# Patient Record
Sex: MALE | Race: WHITE | NOT HISPANIC OR LATINO | Employment: OTHER | URBAN - METROPOLITAN AREA
[De-identification: names, ages, dates, MRNs, and addresses within clinical notes are randomized per-mention and may not be internally consistent; named-entity substitution may affect disease eponyms.]

---

## 2017-01-09 DIAGNOSIS — D66 SEVERE HEMOPHILIA A (H): ICD-10-CM

## 2017-01-09 DIAGNOSIS — M36.2 HEMOPHILIC ARTHROPATHY (H): Primary | ICD-10-CM

## 2017-01-09 DIAGNOSIS — D66 HEMOPHILIC ARTHROPATHY (H): Primary | ICD-10-CM

## 2017-01-09 RX ORDER — CODEINE SULFATE 30 MG/1
60 TABLET ORAL EVERY 6 HOURS PRN
Qty: 168 TABLET | Refills: 0 | Status: SHIPPED | OUTPATIENT
Start: 2017-01-09 | End: 2017-02-06

## 2017-02-03 DIAGNOSIS — F41.1 GENERALIZED ANXIETY DISORDER: Primary | ICD-10-CM

## 2017-02-03 RX ORDER — MIRTAZAPINE 45 MG/1
45 TABLET, FILM COATED ORAL AT BEDTIME
Qty: 30 TABLET | Refills: 1 | Status: SHIPPED | OUTPATIENT
Start: 2017-02-03 | End: 2017-03-07

## 2017-02-03 NOTE — TELEPHONE ENCOUNTER
Medication Requested: Mirtazapine 45 mg tabs  Last Written RX Date: 9-8-16  Last Pharmacy Fill Date: 1-12-17  Last RX Quantity: 30,   # refills: 5    Last Office Visit: 9-8-16  Recommended RTC: 6 mo  Next Scheduled Office Visit: 3-7-17    Since last Visit: # of CX 0 ,# of NOS 0    Last Visit Recommendations for:  Medications: continue  Labs: 0    Will refill 30 day supply per protocol with 1 additional refill.    Kathleen M Doege RN

## 2017-02-06 DIAGNOSIS — D66 HEMOPHILIC ARTHROPATHY (H): Primary | ICD-10-CM

## 2017-02-06 DIAGNOSIS — D66 SEVERE HEMOPHILIA A (H): Primary | ICD-10-CM

## 2017-02-06 DIAGNOSIS — M36.2 HEMOPHILIC ARTHROPATHY (H): Primary | ICD-10-CM

## 2017-02-06 RX ORDER — CODEINE SULFATE 30 MG/1
60 TABLET ORAL EVERY 6 HOURS PRN
Qty: 168 TABLET | Refills: 0 | Status: SHIPPED | OUTPATIENT
Start: 2017-02-06 | End: 2017-03-13

## 2017-02-09 ENCOUNTER — DOCUMENTATION ONLY (OUTPATIENT)
Dept: HEMATOLOGY | Facility: CLINIC | Age: 67
End: 2017-02-09

## 2017-02-09 NOTE — PROGRESS NOTES
PA approved.  Effective dates: 01/02/2017 to 02/08/18  PA reference #: [id number]  Pt/pharmacy notified: 02/09/17    PA for quantity limit submitted over the phone, ins allows 6 tabs per day, patient using up to 8 tabs per day. 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

## 2017-03-02 DIAGNOSIS — I10 ESSENTIAL HYPERTENSION WITH GOAL BLOOD PRESSURE LESS THAN 140/90: ICD-10-CM

## 2017-03-02 DIAGNOSIS — D66 SEVERE HEMOPHILIA A (H): ICD-10-CM

## 2017-03-02 RX ORDER — AMLODIPINE BESYLATE 5 MG/1
5 TABLET ORAL DAILY
Qty: 90 TABLET | Refills: 1 | Status: SHIPPED | OUTPATIENT
Start: 2017-03-02 | End: 2017-08-31

## 2017-03-02 NOTE — TELEPHONE ENCOUNTER
amLODIPine (NORVASC) 5 MG tablet      Last Written Prescription Date: 9/7/16  Last Fill Quantity: 90, # refills: 1    Last Office Visit with [facility], [facility] or [facility] prescribing provider:  9/7/16   Future Office Visit:        BP Readings from Last 3 Encounters:   12/20/16 124/88   09/07/16 (!) 122/94   02/09/16 (!) 161/104

## 2017-03-02 NOTE — TELEPHONE ENCOUNTER
HTN addressed 9/7/2016    Prescription approved per [facility] Refill Protocol.    Signed Prescriptions:                        Disp   Refills    amLODIPine (NORVASC) 5 MG tablet           90 tab*1        Sig: Take 1 tablet (5 mg) by mouth daily  Authorizing Provider: JUDY RANGEL  Ordering User: DOUGIE GIBBS      Closing encounter - no further actions needed at this time    Dougie Gibbs RN

## 2017-03-07 ENCOUNTER — OFFICE VISIT (OUTPATIENT)
Dept: PSYCHIATRY | Facility: CLINIC | Age: 67
End: 2017-03-07
Attending: CLINICAL NURSE SPECIALIST
Payer: MEDICARE

## 2017-03-07 VITALS
BODY MASS INDEX: 24.38 KG/M2 | HEART RATE: 75 BPM | SYSTOLIC BLOOD PRESSURE: 128 MMHG | DIASTOLIC BLOOD PRESSURE: 82 MMHG | WEIGHT: 174.8 LBS

## 2017-03-07 DIAGNOSIS — F32.A DEPRESSIVE DISORDER: ICD-10-CM

## 2017-03-07 DIAGNOSIS — F41.1 GENERALIZED ANXIETY DISORDER: ICD-10-CM

## 2017-03-07 PROCEDURE — 99212 OFFICE O/P EST SF 10 MIN: CPT | Mod: ZF

## 2017-03-07 RX ORDER — MIRTAZAPINE 45 MG/1
45 TABLET, FILM COATED ORAL AT BEDTIME
Qty: 30 TABLET | Refills: 5 | Status: SHIPPED | OUTPATIENT
Start: 2017-03-07 | End: 2017-08-10

## 2017-03-07 RX ORDER — DIAZEPAM 5 MG
TABLET ORAL
Qty: 30 TABLET | Refills: 5
Start: 2017-03-07 | End: 2017-09-12

## 2017-03-07 NOTE — TELEPHONE ENCOUNTER
Message  Received: Today       Kaylin Rajan APRN CNS Blake, Katherine J, IRON                     Please phone in diazepam.  Looks like the PA might need to be renewed.

## 2017-03-07 NOTE — PROGRESS NOTES
"  Outpatient Psychiatry Progress Note     Provider: JELLY Chapa CNS  Date: 3/7/2017  Service:  Medication follow up with counseling.   Patient Identification: Orlin Alcantar  : 1950   MRN: [id number]    Orlin Alcantar is a 67 year old year old male who presents for ongoing psychiatric care.  Orlin Alcantar was last seen in clinic on 16.   At that time,   Assessment & Plan      Orlin Alcantar is seen today for follow up and reports his mood has been much better since he has moved to an apartment that is both less expensive and more accessible for his disabilities. He would like to continue current medications. Is using Diazepam less frequently.     Diagnosis  Axis 1: Depression, Anxiety  Axis 2: none  Axis 3: See problem list in the medical record     Plan:  Medication: Continue current medications  OTC Recommendations: none  Lab Orders: none  Referrals: none  Release of Information: none  Future Treatment Considerations:per symptoms  Return for Follow Up:6 months      3/7/2017  Today Orlin reports he takes one table of Diazepam x1-2/week.. He had a kidney bleed last week (not diagnosed, but had bleeding in urine at home) and was worried that it might be mirtazapine so has been off for a couple weeks. He has been having some problems sleeping since off it .  His mood has been very good and he is not sure that if he wants to go back on Mirtazapine as he feels \"best I feel in 3 years\"  but will think about it.    Finances are still tentative.  He is still in his apartment from last  and this going well. Still has the same car which seems to be be holding up. Pt is also concerned financial effects on his medical condition; his insurance is currently covered by hemophilia [employer], but it will  x1 year in 2017.      Side effects of medication include: none    Psychiatric Review of Systems:  The patient endorses symptoms of depression: In the last 2 weeks per PHQ 9, several days: " depressed mood, worthelessness  He  patient endorses symptoms of anxiety : stable  He endorses symptoms of sis including none.    He endorses symptoms of psychosis including no psychotic symptoms.       Review of Medical Systems:  Sleep: takes about 1/2 hour to fall asleep since off Mirtazapine  Energy: stable  Concentration: stable  Appetite: stable  GI Concerns: none  Cardiac concerns: none  Neurological concerns: none  Other medical concerns: possible hematuria due to Mirtazapine?    Current Substance Use:  Alcohol:denies  Other drugs:denies  Caffeine:occasional  Nicotine: none    Past Medical History:   Past Medical History   Diagnosis Date     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      Hemophilia (H)      Type A     Hepatitis C, chronic (H)      treated [facility] interferon/ribavirin     Patient Active Problem List   Diagnosis     History of total hip arthroplasty     Hemophilia (H)     Pain in joint, pelvic region and thigh     Anxiety state     Depressive disorder, not elsewhere classified     Adjustment disorder with anxious mood     ACP (advance care planning)     Hypertension goal BP (blood pressure) < 140/90     Mood disorder in conditions classified elsewhere       Allergies:   Allergies   Allergen Reactions     Hemophilia Support Therapy      Please see hemophilia treatment recommendations from progress note 10/14/2014-patient does not have VWD type 2         Aspirin      This drug inhibits platelets and is contraindicated due to hemophilia diagnosis.             Current Medications     Current Outpatient Prescriptions Ordered in [employer]   Medication Sig Dispense Refill     factor VIII, recomb, (HELIXATE FS) 1000 UNITS KIT Infuse 2000 to 3000 units of Helixate 2-3 times weekly and as needed for bleeding episodes 23004 Units 3     amLODIPine (NORVASC) 5 MG tablet Take 1 tablet (5 mg) by mouth daily 90 tablet 1     codeine 30 MG tablet Take 2 tablets (60 mg) by mouth every 6 hours as needed for moderate to severe pain (up  "to 8 tabs in 24 hours) 168 tablet 0     mirtazapine (REMERON) 45 MG tablet Take 1 tablet (45 mg) by mouth At Bedtime 30 tablet 1     diazepam (VALIUM) 5 MG tablet Take up to one tablet by mouth daily as needed for anxiety 30 tablet 5     lisinopril (PRINIVIL,ZESTRIL) 40 MG tablet Take 1 tablet (40 mg) by mouth daily 90 tablet 1     No current Epic-ordered facility-administered medications on file.         Mental Status Exam     Appearance:  Casually dressed and Adequately groomed  Behavior/relationship to examiner/demeanor:  Cooperative, Engaged and Pleasant  Orientation: Oriented to person, place, time and situation  Psychomotor: normal form  Speech Rate:  Normal  Speech Spontaneity:  Normal  Mood:  \"okay\"  Affect:  Appropriate/mood-congruent  Thought Process (Associations):  Logical and Goal directed  Thought Content:  no overt psychosis, denies suicidal ideation, intent or thoughts and patient does not appear to be responding to internal stimuli  Abnormal Perception:  None  Attention/Concentration:  Normal  Language:  Intact  Insight:  Good  Judgment:  Good      Results     Vital signs: /82  Pulse 75  Wt 79.3 kg (174 lb 12.8 oz)  BMI 24.38 kg/m2    Laboratory Data:  no new data reviewed today    Assessment & Plan      Orlin Alcantar is seen today for follow up and reports not taking Remeron for couple weeks after hematuria.  Pt reports his anxiety is fairly well controlled and his mood is stable, but having some initial insomnia.  Pt is using Valium x1-2/week. Pt is unsure if he wants to restart his medication at this time, but will consider and wants refill of the medications.    Diagnosis  Axis 1: Depression, Anxiety  Axis 2: none  Axis 3: See problem list in the medical record    Plan:  Medication: Will reorder Mirtazapine and Valium.    OTC Recommendations: none  Lab Orders:  none  Referrals: none  Release of Information: per symptoms  Future Treatment Considerations:per symptoms  Return for Follow Up:6 " months   The risks, benefits, alternatives and side effects have been discussed and are understood by the patient. The patient understands the risks of using street drugs or alcohol. There are no medical contraindications, the patient agrees to treatment, and has the capacity to do so. The patient understands to call 911 or come to the nearest ED if life threatening or urgent symptoms present.  Over 50% of this time was spent counseling the patient and/or coordinating care regarding review of social and occupational functioning.  In addition patient was counseled on health and wellness practices to augment medication treatment of symptoms. See note for details.    Nelida Flores, Psychiatric/Mental Health Nurse Practitioner Student, 3/7/2017      Kaylin Rajan, JELLY CNS 3/7/2017

## 2017-03-07 NOTE — NURSING NOTE
Chief Complaint   Patient presents with     RECHECK     Generalized anxiety disorder      Reviewed allergies, smoking status, and pharmacy preference  Administered abuse screening questions   Obtained weight, blood pressure and heart rate   Shanti Pittman LPN

## 2017-03-07 NOTE — MR AVS SNAPSHOT
After Visit Summary   3/7/2017    Orlin Alcantar    MRN: [id number]           Patient Information     Date Of Birth          1950        Visit Information        Provider Department      3/7/2017 10:15 AM Kaylin Rajan APRN CNS Psychiatry [facility]        Today's Diagnoses     Generalized anxiety disorder        Depressive disorder           Follow-ups after your visit        Follow-up notes from your care team     Return in about 6 months (around 9/7/2017).      Your next 10 appointments already scheduled     Mar 21, 2017 12:30 PM CDT   (Arrive by 12:15 PM)   COMPREHENSIVE CLINIC VISIT with Jorge Jiang MD   [facility] ([facility])    [address]  [address] [facility] [id number] [phone]            Sep 12, 2017 10:15 AM CDT   Adult Med Follow UP with JELLY Chapa CNS   Psychiatry Clinic ([facility])    [place] [id number] [address] [phone]              Who to contact     Please call your clinic at [phone] to:    Ask questions about your health    Make or cancel appointments    Discuss your medicines    Learn about your test results    Speak to your doctor   If you have compliments or concerns about an experience at your clinic, or if you wish to file a complaint, please contact [facility] Patient Relations at [phone] or email us at [email or web address]         Additional Information About Your Visit        MyChart Information     [employer] is an electronic gateway that provides easy, online access to your medical records. With [employer], you can request a clinic appointment, read your test results, renew a prescription or communicate with your care team.     To sign up for [employer] visit the website at [email or web address]   You will be asked to enter the access code listed below, as well  as some personal information. Please follow the directions to create your username and password.     Your access code is: [email or web address]  Expires: 2017  7:30 AM     Your access code will  in 90 days. If you need help or a new code, please contact your [facility] or call [phone] for assistance.        Care EveryWhere ID     This is your Care EveryWhere ID. This could be used by other organizations to access your [place] medical records  [id number]        Your Vitals Were     Pulse BMI (Body Mass Index)                75 24.38 kg/m2           Blood Pressure from Last 3 Encounters:   17 128/82   16 124/88   16 131/87    Weight from Last 3 Encounters:   17 79.3 kg (174 lb 12.8 oz)   16 78 kg (172 lb)   16 77.6 kg (171 lb)              Today, you had the following     No orders found for display         Where to get your medicines      These medications were sent to [place], [place] - [address] [facility] [address] [address]    Hours:  TRANSPLANT PHONE NUMBER [phone] Phone:  [phone]     mirtazapine 45 MG tablet         Some of these will need a paper prescription and others can be bought over the counter.  Ask your nurse if you have questions.     You don't need a prescription for these medications     diazepam 5 MG tablet          Primary Care Provider Office Phone # Fax #    Katie Ramos [phone]       [facility] [address] [facility] [address]        Thank you!     Thank you for choosing PSYCHIATRY CLINIC  for your care. Our goal is always to provide you with excellent care. Hearing back from our patients is one way we can continue to improve our services. Please take a few minutes to complete the written survey that you may receive in the mail after your visit with us. Thank you!             Your Updated  Medication List - Protect others around you: Learn how to safely use, store and throw away your medicines at www.disposemymeds.org.          This list is accurate as of: 3/7/17 11:59 PM.  Always use your most recent med list.                   Brand Name Dispense Instructions for use    amLODIPine 5 MG tablet    NORVASC    90 tablet    Take 1 tablet (5 mg) by mouth daily       codeine 30 MG tablet     168 tablet    Take 2 tablets (60 mg) by mouth every 6 hours as needed for moderate to severe pain (up to 8 tabs in 24 hours)       diazepam 5 MG tablet    VALIUM    30 tablet    Take up to one tablet by mouth daily as needed for anxiety       factor VIII (recomb) 1000 UNITS Kit     89656 Units    Infuse 2000 to 3000 units of Helixate 2-3 times weekly and as needed for bleeding episodes       lisinopril 40 MG tablet    PRINIVIL/ZESTRIL    90 tablet    Take 1 tablet (40 mg) by mouth daily       mirtazapine 45 MG tablet    REMERON    30 tablet    Take 1 tablet (45 mg) by mouth At Bedtime

## 2017-03-07 NOTE — TELEPHONE ENCOUNTER
Returned call to [facility] to confirm the order for Valium. Confirmed with Hannah the Valium 5 mg order.

## 2017-03-07 NOTE — TELEPHONE ENCOUNTER
-Contacted [facility] (ph: [phone])  -No answer  -Left msg on pharmacy vm with refill information for Valium as outlined in med tab  -Asked that if PA is needed that information be faxed to us at [phone]

## 2017-03-07 NOTE — TELEPHONE ENCOUNTER
Rosi Pradhan, Rosi Morelos, RN        Phone Number: [phone]                       Previous Messages       ----- Message -----      From: Mely Geiger      Sent: 3/7/2017  12:18 PM        To: Isadora Sood, RN   Subject: Pharmacy Call                                     Hi Hannah Schmitt from the [facility] (on [address]) is calling to confirm the order for Valium.  Please call Hannah or Viola at [phone] to confirm.     ThanksMely

## 2017-03-13 DIAGNOSIS — M36.2 HEMOPHILIC ARTHROPATHY (H): ICD-10-CM

## 2017-03-13 DIAGNOSIS — D66 HEMOPHILIC ARTHROPATHY (H): ICD-10-CM

## 2017-03-13 RX ORDER — CODEINE SULFATE 30 MG/1
60 TABLET ORAL EVERY 6 HOURS PRN
Qty: 168 TABLET | Refills: 0 | Status: SHIPPED | OUTPATIENT
Start: 2017-03-13 | End: 2017-04-24

## 2017-03-15 DIAGNOSIS — M25.571 CHRONIC PAIN OF BOTH ANKLES: ICD-10-CM

## 2017-03-15 DIAGNOSIS — M25.669 DECREASED RANGE OF KNEE MOVEMENT: ICD-10-CM

## 2017-03-15 DIAGNOSIS — M25.551 HIP PAIN, RIGHT: ICD-10-CM

## 2017-03-15 DIAGNOSIS — M25.572 CHRONIC PAIN OF BOTH ANKLES: ICD-10-CM

## 2017-03-15 DIAGNOSIS — D66 SEVERE HEMOPHILIA A (H): Primary | ICD-10-CM

## 2017-03-15 DIAGNOSIS — M25.522 PAIN OF BOTH ELBOWS: ICD-10-CM

## 2017-03-15 DIAGNOSIS — M25.629 DECREASED RANGE OF MOTION OF ELBOW, UNSPECIFIED LATERALITY: ICD-10-CM

## 2017-03-15 DIAGNOSIS — G89.29 CHRONIC PAIN OF BOTH ANKLES: ICD-10-CM

## 2017-03-15 DIAGNOSIS — M25.673 DECREASED RANGE OF MOTION OF ANKLE: ICD-10-CM

## 2017-03-15 DIAGNOSIS — M25.521 PAIN OF BOTH ELBOWS: ICD-10-CM

## 2017-03-21 ENCOUNTER — HOSPITAL ENCOUNTER (OUTPATIENT)
Dept: PHYSICAL THERAPY | Facility: CLINIC | Age: 67
Setting detail: THERAPIES SERIES
End: 2017-03-21
Attending: INTERNAL MEDICINE
Payer: MEDICARE

## 2017-03-21 ENCOUNTER — OFFICE VISIT (OUTPATIENT)
Dept: HEMATOLOGY | Facility: CLINIC | Age: 67
End: 2017-03-21
Attending: INTERNAL MEDICINE
Payer: COMMERCIAL

## 2017-03-21 ENCOUNTER — OFFICE VISIT (OUTPATIENT)
Dept: HEMATOLOGY | Facility: CLINIC | Age: 67
End: 2017-03-21

## 2017-03-21 ENCOUNTER — DOCUMENTATION ONLY (OUTPATIENT)
Dept: PHARMACY | Facility: CLINIC | Age: 67
End: 2017-03-21

## 2017-03-21 VITALS
WEIGHT: 178.9 LBS | HEIGHT: 72 IN | BODY MASS INDEX: 24.23 KG/M2 | DIASTOLIC BLOOD PRESSURE: 95 MMHG | HEART RATE: 86 BPM | SYSTOLIC BLOOD PRESSURE: 138 MMHG | TEMPERATURE: 97.8 F

## 2017-03-21 DIAGNOSIS — D66 SEVERE HEMOPHILIA A (H): Primary | ICD-10-CM

## 2017-03-21 DIAGNOSIS — M36.3 ARTHROPATHY ASSOCIATED WITH A HEMATOLOGICAL DISORDER: ICD-10-CM

## 2017-03-21 DIAGNOSIS — D75.9 ARTHROPATHY ASSOCIATED WITH A HEMATOLOGICAL DISORDER: ICD-10-CM

## 2017-03-21 DIAGNOSIS — Z71.9 ENCOUNTER FOR COUNSELING: Primary | ICD-10-CM

## 2017-03-21 PROCEDURE — 99213 OFFICE O/P EST LOW 20 MIN: CPT

## 2017-03-21 PROCEDURE — 97535 SELF CARE MNGMENT TRAINING: CPT | Mod: GP | Performed by: PHYSICAL THERAPIST

## 2017-03-21 PROCEDURE — 40000176 ZZH STATISTIC PT HEMOPHILIA CLINIC VISIT: Performed by: PHYSICAL THERAPIST

## 2017-03-21 PROCEDURE — 40000183 ZZH STATISTIC PT MED CONFERENCE < 30 MIN: Performed by: PHYSICAL THERAPIST

## 2017-03-21 PROCEDURE — G8979 MOBILITY GOAL STATUS: HCPCS | Mod: GP,CJ | Performed by: PHYSICAL THERAPIST

## 2017-03-21 PROCEDURE — G8980 MOBILITY D/C STATUS: HCPCS | Mod: GP,CJ | Performed by: PHYSICAL THERAPIST

## 2017-03-21 PROCEDURE — 97161 PT EVAL LOW COMPLEX 20 MIN: CPT | Mod: GP | Performed by: PHYSICAL THERAPIST

## 2017-03-21 PROCEDURE — 99215 OFFICE O/P EST HI 40 MIN: CPT | Performed by: INTERNAL MEDICINE

## 2017-03-21 PROCEDURE — G8978 MOBILITY CURRENT STATUS: HCPCS | Mod: GP,CJ | Performed by: PHYSICAL THERAPIST

## 2017-03-21 ASSESSMENT — PAIN SCALES - GENERAL: PAINLEVEL: NO PAIN (0)

## 2017-03-21 NOTE — PROGRESS NOTES
Teaching Flowsheet   Orlin Alcantar  has a diagnosis of HEMOPHILIA A  with a baseline factor  FACTOR VIII  of   <1% . He  presents today for his  comprehensive Hemophilia clinic evaluation.  Teaching Topic: hemophilia self care     Person(s) involved in teaching:   Patient     Motivation Level:  Asks Questions: Yes Eager to Learn: Yes Cooperative: Yes  Receptive (willing/able to accept information): Yes  Comments: very pleasant and engaged     Patient demonstrates understanding of the following:  Reason for the appointment, diagnosis and treatment plan: Yes  Knowledge of proper use of medications and conditions for which they are ordered (with special attention to potential side effects or drug interactions): Yes  Which situations necessitate calling provider and whom to contact: Yes      Proper use and care of   (medical equip, care aids, etc.): NA  Nutritional needs and diet plan: NA  Pain management techniques: Yes  Wound Care: NA  How and/when to access community resources: Yes      Orlin Alcantar   averages 0-1  Bleeding Episodes per Month . Annual Bleed Rate (ABR)= ~ 6.  He uses Helixate brand factor VIII concentrate and treats prophylactically.  He treats himself using peripheral venipuncture independently and his typical dose   is 8862-4044 units. He is not on a consistent schedule and reports treating every other day, but sometimes daily.  He was encouraged to keep track of his infusions.      In the last 6 months, he reports one episode of urinary track bleeding and 1 bleed into right elbow and 1 bleed into left ankle.     Reviewed procedure for home infusions of factor concentrates.  See attached treatment recommendations.      Reviewed severe/life threatening hemorrhage and handout given that recommends appropriate dosing.  If suspect bleeding in head, neck, throat or abdomen, give dose of factor if able, contact the hemophilia center immediately or report to the Emergency Room at [facility] or the nearest emergency room.      Discussed importance of Medic Alert identification.  Social work will assist patient in getting a medic alert.     Discussed need for dental check-up.  He has not seen a dentist since the 1970s,  but has no dental problems and brushes regularly.     Patient does receive information from [facility].    Margot Reed, RN - Nurse Clinician - [facility] - [phone]

## 2017-03-21 NOTE — NURSING NOTE
Reviewed and discussed the patient instructions point by point and patient verbalized understanding. Copy of the After Visit Summary (AVS) given to patient for future reference. Patient given the contact card for the [facility] and has the appropriate numbers to call with any questions.    Margot Reed RN - Nurse Clinician - [facility] - [phone]

## 2017-03-21 NOTE — PROGRESS NOTES
[facility]  Outpatient Clinic Visit    Orlin Alcantar is a 67-year-old male with severe hemophilia A who is seen today for annual Comprehensive Hemophilia Clinic evaluation.  Since we last saw him a year ago, he has been doing well.  He has not made any significant changes in his factor regimen.  After many years of treating on demand, he has been on a more regular prophylaxis schedule over the last 2-3 years, which he acknowledges has been beneficial for him.  He is not on a rigid schedule, but generally treats fairly consistently.  Based on dispensing records, he is using approximately 12,000 units per week.  There are some months where his usage has been closer to 15,000 units per week, although he states that he has been continuing to treat the same as he always has.  He has had  3 bleeding episodes in the last 6 months.  This is a bit less than he had in the previous 6 months.  Although he has hemophiliac arthropathy involving multiple joints, he says that his joint function is currently stable and he has no specific limitations related to that.  He has continued to remain quite active in spite of his arthropathy.       He has undergone several orthopedic procedures including a right hip arthroplasty in 1992 with revision in 2012.  He underwent a left total knee arthroplasty in 1992.        He continues to use codeine tablets (30 mg), 2 in the morning, 2 at bedtime and 1-2 tablets as needed during the day.  This has been a consistent regimen now for a number of years.       Other than hypertension, his overall health has been good.  He has regular followup in primary care clinic for that issue.       PHYSICAL EXAMINATION:  He looks well.  I did not perform a detailed physical exam today.       LABORATORY DATA:  No new labs were obtained here today.         ASSESSMENT AND PLAN:   1.  Severe hemophilia A with a baseline factor VIII level of less than 1%.  He has no history of  factor VIII inhibitor.   2.  Hemophiliac arthropathy involving multiple joints.      I spent a total of 40 minutes today with Orlin, all of which was in counseling and coordination of care.  We had a detailed discussion regarding factor prophylaxis and he was particularly interested in some of the newer treatments coming down the pipeline.  After many years of treating on demand, he is now treating on a fairly consistent prophylaxis schedule and he has noticed a significant decrease in his bleed frequency and improvement in his overall function.  His joint pain remains stable on the codeine regimen outlined above which has not changed in several years.       Overall, his factor usage seems stable and when assessed on a weekly basis is in line with standard prophylactic regimens.  He sometimes treats in a bit of an atypical fashion, but overall his factor utilization is appropriate and his response to this regimen has been acceptable and so we decided to make no changes in that today.  He prefers to receive 1000-unit vials, as he sometimes uses small doses more frequently because he feels that works better for him.  We will continue to monitor his factor dispensing and will continue to work with him to be sure he remains consistent in this regard.      Lastly, we did discuss that Helixate, which is the factor VIII product he has been using for quite some time, will be discontinued at the end of this year.  We discussed options of transitioning to another factor product such as Kogenate, which is essentially the same product (same , different distributor).  There are other options as well.  He is aware of this and had no further questions today but will continue to work with our pharmacy team in that regard.     We will see him back in 12 months, or sooner if there are new issues / concerns.      Jorge Jiang MD  Associate Professor of Medicine  Division of Hematology, Oncology, and  Transplantation  Director, [facility]

## 2017-03-21 NOTE — MR AVS SNAPSHOT
"              After Visit Summary   3/21/2017    Orlin Alcantar    MRN: [id number]           Patient Information     Date Of Birth          1950        Visit Information        Provider Department      3/21/2017 12:30 PM Jorge Jiang MD [facility]        Care Instructions    Factor Plan:    Continue taking Helixate factor concentrate at 8285-8926 units per dose.  Please try to limit to no more than 12,000 units per week.     We discussed that Helixate will no longer be available by the end of 2017.  Kogenate is an option which is very similar.    Please call with any breakthrough bleeding to discuss your plan of care.    If you have a severe blow to the head, please give 4000 units to boost your factor level to 100%.  Please go the the Emergency Room for evaluation and for head CT scan.    Please keep infusion records if possible.    Carry factor concentrate with you at all times. Call the center if you will be traveling out of the area. We will create a travel letter and letter with instructions on emergency care in hemophilia. For treatment centers around the world go to [email or web address], click on left link \"resources\" and then click on \"passport\" and type in travel destination.    PHYSICAL THERAPY INSTRUCTIONS:  Treatment of New Joint and/or Muscle Bleeds:     Treat with factor per doctor s orders.    Apply RICE treatment as needed for pain relief and to allow rest and healing   o R=Rest, Limit movement and protect your joint with splints, braces and assistive devices as directed by your treatment center.  Use crutches and do not put any weight on a new lower extremity bleed (ex: hip, knee, ankle).   Only move in pain-free range.  o I=Ice, Apply ice to surround the affected area for 15-20 minutes every 2 hours.  Use ice cubes in bag, frozen vegetables, gel ice packs, or commercial products (Cryocuff ).  o C=Compression, Helps to control swelling and can decrease pain. Can use elastic " wraps or compression sleeve such as tubigrip.  Avoid tourniquet effect by using proper technique. Remove if pain increases or with any numbness or tingling.   o E=Elevate, Can help decrease swelling and encourages rest.  Most effective with elevation above level of the heart.  Joint Health: Bleed prevention and regular activity are key elements to maintaining health joints. Limit activities that cause joint pain and spread our activities/exercises throughout the day and the week, alternate exercises each day.    Footwear: Always wear supportive footwear-shoes with laces and good ankle support are best.  If you experience frequent foot/ankle pain, try wearing shoes indoors as well as outdoors.    Cardiovascular activity recommendations (18 years and over): For health benefits, adults need at least 150 minutes (about 30 minutes, 5 days a week) of moderate intensity aerobic activity each week AND 2 or more days of muscle strengthening to include all major muscle groups.  Also include a general stretching program into your daily routine.  Try breaking up your aerobic activity into small chunks of time during the day,  10 minutes at a time.        General Recommendations:    Wear medic alert on your person for highest level of safety [email or web address]    Continue to brush your teeth regularly.  Ideally, we could get you into see a dentist. Let us know if/when you are interested in pursuing this and we can get you scheduled.   Since you have had a joint replacements, we may recommend antibiotics (by mouth) just prior to dental cleaning.    See primary care provider for general health maintenance.    For any questions, please contact your [facility] nurse, Margot Reed RN at [phone] or the main phone number [phone].    If you have emergency needs in the evening or weekend, please contact the page  [phone] and page the hematologist on call or Dr. Jorge Jiang.    Please return for comprehensive clinic in  1 year.      Margot Reed RN - Nurse Clinician - [facility] - [phone]      Emergencies in Hemophilia  ? Remember:  with all hemophilia emergencies, treat with factor concentrate first         and seek medical evaluation immediately. (4000 units should bring your level up to 100% from baseline.)  ? Note: Any severe physical trauma should be evaluated immediately even if no symptoms are present.  ?   Head Injury / Bleeding  ? All head injuries are considered to be serious!  Please seek medical evaluation immediately.  ? Signs:  headache, drowsiness, dizziness, irritability, lethargy, nausea and/or vomiting, seizures, dilated or unequal pupils,  stiff neck and/or back, double vision, confusion    Throat / Neck Bleeding  ? Bruising in the mouth or under the tongue can spread down the tissues of the neck and   block the airway (this can happen after dental work if not adequately treated with factor/Amicar)  ? Tonsillitis or strep throat often can cause throat bleeding.  ? Severe coughing spells can cause bleeding.  ? Injury to the neck area can cause bleeding which can result in a blocked airway.  ? All swelling in this area should be considered to be the result of bleeding unless proven otherwise.    Abdominal Bleeding  ? Any complaint of abdominal pain should be investigated by a doctor.  ? Vomiting blood may be a result of abdominal bleeding.  ? Bleeding from the rectum or black, tarry stools may be a result of abdominal bleeding.  ? Large amounts of blood can be lost in this area with little or no swelling.    Iliopsoas Muscle Bleed (Thigh/groin/hip area)  ? Signs: * Hip flexion (difficult/painful to straighten leg) * Groin pain   * Numbness/tingling in quadriceps muscle * Lower back/flank pain       (front of the thigh)  ? Large amounts of blood can be lost in this muscle group with little or no swelling.    Compartment Syndrome  ? An increase in pressure within the muscle  which can cut off the circulation to the area.  ? Numbness/tingling to the affected area or below (such as fingers or toes)  ? Usually happens 12 hours to 3 days after the injury  ? Surgery may be required to relieve pressure on the blood vessels and nerves.    The following types of bleeds aren t always emergencies, but left untreated or treated incorrectly can lead to emergencies:    Mouth / Nose Bleeding  ? Minor mouth bleeding that continues for several days can become serious.  Slow, constant loss         of blood could quickly lead to severe anemia and require blood transfusion  ? Coughing up or vomiting blood may be a result of continued swallowing of blood.  ? Bleeding from the rectum or black, tarry stools may be from continued swallowing of blood.      Hematuria (Blood in urine)  ? Amicar /Lysteda should not be used in patients with hematuria, to avoid risk of clotting in the kidneys.  ? In most instances, pushing fluids and decreased activity or bedrest are the only treatments necessary.  ? Seek medical attention if hematuria is associated with significant pain, urine is bright red and recurs frequently, or if bleeding continues following factor infusion.            Follow-ups after your visit        Your next 10 appointments already scheduled     Sep 12, 2017 10:15 AM CDT   Adult Med Follow UP with JELLY Chapa CNS   Psychiatry Clinic ([facility])    [address] [facility] [address] [phone]              Who to contact     If you have questions or need follow up information about today's clinic visit or your schedule please contact [facility] directly at [phone].  Normal or non-critical lab and imaging results will be communicated to you by MyChart, letter or phone within 4 business days after the clinic has received the results. If you do not hear from us within 7 days, please contact the clinic  "through [employer] or phone. If you have a critical or abnormal lab result, we will notify you by phone as soon as possible.  Submit refill requests through [employer] or call your pharmacy and they will forward the refill request to us. Please allow 3 business days for your refill to be completed.          Additional Information About Your Visit        [employer] Information     [employer] lets you send messages to your doctor, view your test results, renew your prescriptions, schedule appointments and more. To sign up, go to [email or web address] . Click on \"Log in\" on the left side of the screen, which will take you to the Welcome page. Then click on \"Sign up Now\" on the right side of the page.     You will be asked to enter the access code listed below, as well as some personal information. Please follow the directions to create your username and password.     Your access code is: [email or web address]  Expires: 2017  7:30 AM     Your access code will  in 90 days. If you need help or a new code, please call your [place] clinic or [phone].        Care EveryWhere ID     This is your Care EveryWhere ID. This could be used by other organizations to access your [place] medical records  [id number]        Your Vitals Were     Pulse Temperature Height BMI (Body Mass Index)          86 97.8  F (36.6  C) (Oral) 1.829 m (6') 24.26 kg/m2         Blood Pressure from Last 3 Encounters:   17 (!) 138/95   16 124/88   16 (!) 122/94    Weight from Last 3 Encounters:   17 81.1 kg (178 lb 14.4 oz)   16 77.6 kg (171 lb)   16 72.3 kg (159 lb 8 oz)              Today, you had the following     No orders found for display       Primary Care Provider Office Phone # Fax #    Katie Ramos [phone]       [facility] [address]  [facility] [address]        Thank you!     Thank you for choosing  [facility]  for your care. Our goal is always to " provide you with excellent care. Hearing back from our patients is one way we can continue to improve our services. Please take a few minutes to complete the written survey that you may receive in the mail after your visit with us. Thank you!             Your Updated Medication List - Protect others around you: Learn how to safely use, store and throw away your medicines at www.disposemymeds.org.          This list is accurate as of: 3/21/17  1:52 PM.  Always use your most recent med list.                   Brand Name Dispense Instructions for use    amLODIPine 5 MG tablet    NORVASC    90 tablet    Take 1 tablet (5 mg) by mouth daily       codeine 30 MG tablet     168 tablet    Take 2 tablets (60 mg) by mouth every 6 hours as needed for moderate to severe pain (up to 8 tabs in 24 hours)       diazepam 5 MG tablet    VALIUM    30 tablet    Take up to one tablet by mouth daily as needed for anxiety       factor VIII (recomb) 1000 UNITS Kit     07199 Units    Infuse 2000 to 3000 units of Helixate 2-3 times weekly and as needed for bleeding episodes       lisinopril 40 MG tablet    PRINIVIL/ZESTRIL    90 tablet    Take 1 tablet (40 mg) by mouth daily       mirtazapine 45 MG tablet    REMERON    30 tablet    Take 1 tablet (45 mg) by mouth At Bedtime

## 2017-03-21 NOTE — PROGRESS NOTES
HEMOPHILIA EMERGENCY TREATMENT RECOMMENDATIONS    Please CALL the [facility] [phone] if seen in the ER and for additional recommendations and follow-up treatment.    AFTER HOURS: Doctor consult line [phone] & ask for hematologist. [place] [phone].     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    (Avoid aspirin, all other non-steroidal anti-inflammatory drugs, and other drugs known to inhibit platelet function.)    TREATMENT PLAN - Factor MUST be infused PRIOR to IMAGING or SURGICAL procedures. Infuse bolus factor at 10ml/minute.    1. SUSPECTED INTRACRANIAL HEMORRHAGE OR LIFE THREATENING BLEEDS (Hemorrhages or Injuries to Neck, Thorax, or Gastrointestinal Bleeding)  A. Initial Dose: Give 50 units/kg recombinant factor VIII (approx. 4000 units). 10 minutes after bolus dose draw factor VIII level.  B. Begin Factor VIII continuous infusion IMMEDIATELY after drawing Factor VIII level at 4-6 units/kg/hour (approx.400 units).    2. SERIOUS BLEEDS INTO JOINTS, MUSCLES, SOFT TISSUE, OR GROSS HEMATURIA OR MUCOUS MEMBRANE BLEEDING  A. Initial Dose: 30 units/kg recombinant Factor VIII (approx.2500 units)  B. Comment: Amicar may be used for mucous membrane bleeding. The dose is   3 gms TID x 5-10 days. Do not use Amicar if there is hematuria. Splinting and  Immobilization is often used for injured joints/muscles.  C. Follow-up dosing, please contact [facility].    Margot Reed RN - Nurse Clinician - [facility] - [phone]  Jorge Jiang M.D.  and Director of the [facility].

## 2017-03-21 NOTE — NURSING NOTE
Chief Complaint   Patient presents with     RECHECK     follow up with comp xavier, gumaro campbell       Initial BP (!) 138/95  Pulse 86  Temp 97.8  F (36.6  C) (Oral)  Ht 1.829 m (6')  Wt 81.1 kg (178 lb 14.4 oz)  BMI 24.26 kg/m2 Estimated body mass index is 24.26 kg/(m^2) as calculated from the following:    Height as of this encounter: 1.829 m (6').    Weight as of this encounter: 81.1 kg (178 lb 14.4 oz).  Medication Reconciliation: complete

## 2017-03-21 NOTE — PATIENT INSTRUCTIONS
"Factor Plan:    Continue taking Helixate factor concentrate at 8438-9683 units per dose.  Please try to limit to no more than 12,000 units per week.     We discussed that Helixate will no longer be available by the end of 2017.  Kogenate is an option which is very similar.    Please call with any breakthrough bleeding to discuss your plan of care.    If you have a severe blow to the head, please give 4000 units to boost your factor level to 100%.  Please go the the Emergency Room for evaluation and for head CT scan.    Please keep infusion records if possible.    Carry factor concentrate with you at all times. Call the center if you will be traveling out of the area. We will create a travel letter and letter with instructions on emergency care in hemophilia. For treatment centers around the world go to [email or web address], click on left link \"resources\" and then click on \"passport\" and type in travel destination.    PHYSICAL THERAPY INSTRUCTIONS:  Treatment of New Joint and/or Muscle Bleeds:     Treat with factor per doctor s orders.    Apply RICE treatment as needed for pain relief and to allow rest and healing   o R=Rest, Limit movement and protect your joint with splints, braces and assistive devices as directed by your treatment center.  Use crutches and do not put any weight on a new lower extremity bleed (ex: hip, knee, ankle).   Only move in pain-free range.  o I=Ice, Apply ice to surround the affected area for 15-20 minutes every 2 hours.  Use ice cubes in bag, frozen vegetables, gel ice packs, or commercial products (Cryocuff ).  o C=Compression, Helps to control swelling and can decrease pain. Can use elastic wraps or compression sleeve such as tubigrip.  Avoid tourniquet effect by using proper technique. Remove if pain increases or with any numbness or tingling.   o E=Elevate, Can help decrease swelling and encourages rest.  Most effective with elevation above level of the heart.  Joint Health: Bleed prevention " and regular activity are key elements to maintaining health joints. Limit activities that cause joint pain and spread our activities/exercises throughout the day and the week, alternate exercises each day.    Footwear: Always wear supportive footwear-shoes with laces and good ankle support are best.  If you experience frequent foot/ankle pain, try wearing shoes indoors as well as outdoors.    Cardiovascular activity recommendations (18 years and over): For health benefits, adults need at least 150 minutes (about 30 minutes, 5 days a week) of moderate intensity aerobic activity each week AND 2 or more days of muscle strengthening to include all major muscle groups.  Also include a general stretching program into your daily routine.  Try breaking up your aerobic activity into small chunks of time during the day,  10 minutes at a time.        General Recommendations:    Wear medic alert on your person for highest level of safety [email or web address]    Continue to brush your teeth regularly.  Ideally, we could get you into see a dentist. Let us know if/when you are interested in pursuing this and we can get you scheduled.   Since you have had a joint replacements, we may recommend antibiotics (by mouth) just prior to dental cleaning.    See primary care provider for general health maintenance.    For any questions, please contact your [facility] nurse, Margot Reed RN at [phone] or the main phone number [phone].    If you have emergency needs in the evening or weekend, please contact the page  [phone] and page the hematologist on call or Dr. Jorge Jiang.    Please return for comprehensive clinic in 1 year.      Margot Reed RN - Nurse Clinician - [facility] - [phone]      Emergencies in Hemophilia  ? Remember:  with all hemophilia emergencies, treat with factor concentrate first         and seek medical evaluation immediately. (4000 units should bring your  level up to 100% from baseline.)  ? Note: Any severe physical trauma should be evaluated immediately even if no symptoms are present.  ?   Head Injury / Bleeding  ? All head injuries are considered to be serious!  Please seek medical evaluation immediately.  ? Signs:  headache, drowsiness, dizziness, irritability, lethargy, nausea and/or vomiting, seizures, dilated or unequal pupils,  stiff neck and/or back, double vision, confusion    Throat / Neck Bleeding  ? Bruising in the mouth or under the tongue can spread down the tissues of the neck and   block the airway (this can happen after dental work if not adequately treated with factor/Amicar)  ? Tonsillitis or strep throat often can cause throat bleeding.  ? Severe coughing spells can cause bleeding.  ? Injury to the neck area can cause bleeding which can result in a blocked airway.  ? All swelling in this area should be considered to be the result of bleeding unless proven otherwise.    Abdominal Bleeding  ? Any complaint of abdominal pain should be investigated by a doctor.  ? Vomiting blood may be a result of abdominal bleeding.  ? Bleeding from the rectum or black, tarry stools may be a result of abdominal bleeding.  ? Large amounts of blood can be lost in this area with little or no swelling.    Iliopsoas Muscle Bleed (Thigh/groin/hip area)  ? Signs: * Hip flexion (difficult/painful to straighten leg) * Groin pain   * Numbness/tingling in quadriceps muscle * Lower back/flank pain       (front of the thigh)  ? Large amounts of blood can be lost in this muscle group with little or no swelling.    Compartment Syndrome  ? An increase in pressure within the muscle which can cut off the circulation to the area.  ? Numbness/tingling to the affected area or below (such as fingers or toes)  ? Usually happens 12 hours to 3 days after the injury  ? Surgery may be required to relieve pressure on the blood vessels and nerves.    The following types of bleeds aren t always  emergencies, but left untreated or treated incorrectly can lead to emergencies:    Mouth / Nose Bleeding  ? Minor mouth bleeding that continues for several days can become serious.  Slow, constant loss         of blood could quickly lead to severe anemia and require blood transfusion  ? Coughing up or vomiting blood may be a result of continued swallowing of blood.  ? Bleeding from the rectum or black, tarry stools may be from continued swallowing of blood.      Hematuria (Blood in urine)  ? Amicar /Lysteda should not be used in patients with hematuria, to avoid risk of clotting in the kidneys.  ? In most instances, pushing fluids and decreased activity or bedrest are the only treatments necessary.  ? Seek medical attention if hematuria is associated with significant pain, urine is bright red and recurs frequently, or if bleeding continues following factor infusion.

## 2017-03-21 NOTE — PROGRESS NOTES
OUTPATIENT PHYSICAL THERAPY HEMOPHILIA CLINIC NOTE  [facility]    Orlin Alcantar     YOB: 1950  [id number]    Reason for visit: Comprehensive Clinic  Date of service:  3/21/2017  Referring provider: Dr. Jorge Jiang  Medical Diagnosis: Factor VIII -  Severe  Replacement therapy: Prophylaxis: Schedule-variable, about 10,000 units/week    Interval History (include personal factors and/or comorbidities that impact the plan of care):   Orlin was last seen on 2/9/2016 for a comp clinic at which time his primary concern was increasing difficulty accessing his 3rd floor apartment in a building without an elevator.  Today he is happy to report that he was able to move to new housing this past summer and the building has no stairs to enter and has an elevator to access his apartment.  He reports that he has not had to use his crutches for LE pain flares and/or bleeding since the move.    Orlin reports that he does not get any regular exercise but he is also not sedentary.  He is out of the apartment running errands every day.      Target joints: Right elbow, Left elbow, Left knee, Right ankle , Left ankle and right hip all have loss of motion     Orthopedic past medical history:   Right SUNG approx. 1992  Right hip multiple steroid injections between 6315-5810  Right hip open synovectomy 2005  Right hip revision with synovectomy 4/26/12  Left TKA-20+ years ago  Left hip fracture with internal fixation-20+ years ago    Patient rehab goal: continue with current level of function and activity without further loss of joint motion    Pain:  Chief complaint: chronic bilat ankles, right hip and bilat elbows  Symptoms have been present for many years and have not progressed in the past year.  He actually reports some decrease in LE pain now without having to do daily stairs.    Fall Risk Screen:  Has the patient fallen 2 or more times in the last year? No  Has the patient fallen and had an injury in  the past year? No  Timed Up and Go Score: NA  Is the patient a fall risk? Yes due to multiple joint contractures impairing his balance strategies, department fall risk interventions implemented.    Physical Exam: ROM measurements deferred per patient request, he does not feel that he has lost any motion since his last visit.  Previous measurements showed contractures at bilateral elbows, knees, ankles and right hip extension. He continues to ambulate without an assistive device.  He is also driving independently and walking for all errands and daily activities.  Orlin has gained some weight since his last visit and appears sturdier and healthier overall.      Assessment: Despite multiple joint contractures his mobility and daily function is fair/good and he has developed strategies to manage his joint limitations.   Pain: present in multiple joints  Swelling: none  Crepitus: NA  ROM: impaired- no functional change from previous  Strength: impaired in bilateral UE and LEs, adequate for daily activities  Imagin xrays show severe degenerative changes in left ankle and bilateral elbows  Gait: impaired, meeting functional needs  Education: Orlin has a good understanding of his bleeding disorder and has made appropriate adjustments to his activities including moving to a new apartment building without stairs which has greatly improved his LE pain and has eliminated the need for crutches at this time.     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  Treatment focused on symptom management and joint protection today and included:      Instructed in use of RICE for new injuries and/or bleeds    Educated about joint health and bleed prevention including activity modifications and pacing. Discussed taking inventory with body scan daily and modifying schedule in response.    Educated on bony restrictions in his joints and expectation that ROM will not increase significantly but that continued loss of ROM is concerning and should be addressed quickly.  Encouraged him to contact our clinic if he notices any changes in joint motion or function.  Plan of Care:   1. Continue with regular daily activity using pacing techniques to limit overuse and joint stress.    2. Recognize and treat all bleeds early and adequately.  Contact our center with any new concerns, or changes in joint motion or function.    Therapy Frequency and Predicted Duration of Therapy Intervention: One session evaluation and treatment  Goals: Patient verbalizes understanding of evaluation results, and home management instructions provided today to maximize functional mobility and allow for continued safe participation in current home activities. -Goal Met    Recommendations: Follow up at next scheduled [facility] clinic visit  Educational assessment/Barriers to learning: No barriers noted  Treatment provided this date (including minutes): Home management   Patient/Family Education:Early and adequate treatment principles, Signs and symptoms of a bleed, Adjunctive treatment/RICE, Home environment safety/falls prevention, Energy conservation and Hemophilic arthropathy   Risks and benefits of evaluation/treatment have been explained.  Patient, family and/or caregiver are in agreement with Plan of Care.    Evaluation time: 10   Treatment time: 12  Total contact time: 22  Signature: Maria Teresa Chadwick [facility]  Physical Therapist  [facility]  [phone]  Date: 3/21/2017    Mobility: Walking and Moving Around  Current Status , Goal , Discharge   1 session  only, modifier the same for all G-codes.  CJ: 20-39% impairment  Modifier determined by clinical judgment in conjunction with objective data and subjective report.      Certification:  Onset date: 3/21/2017  Start of care date: 3/21/2017  Certification date from 3/21/2017 to 3/22/2017    I CERTIFY THE NEED FOR THESE SERVICES FURNISHED UNDER        THIS PLAN OF TREATMENT AND WHILE UNDER MY CARE     (Physician co-signature of this document indicates review and certification of the therapy plan).                 .

## 2017-03-22 NOTE — PROGRESS NOTES
"Social Work Evaluation  [facility]    Name: Orlin Alcantar  Age:  67 year old  :  1950    Patient is a SWM diagnosed with severe hemophilia A who presented to clinic today for his annual comprehensive clinic visit.   Met with the patient 1:1 to complete updated psychosocial assessment.    Living Situation:   Orlin lives in section 8 housing in [place].  He relocated from a 3rd floor apartment to his current unit last summer and it is working out very well for him.    Family/Social Support:    Orlin has stated that he has limited social support andthat was due to his own preference of being a \"loner\" and that he is \"stubborn.\" He stated if he needs support he will reach out to [facility] clinic staff as they know him the best.   He goes to the psychiatry clinic twice a year and stated that he was involved with some [facility] gatherings in the past.    He was raised by foster parents since the age of 1. His foster parents and siblings have all passed away within the last 9 years per prior documentation.    Functional Status: Independent. Owns a vehicle and drives to and from appointments as needed.  See PT note for specific documentation.    Current Community Services:  Currently receiving premium assistance from [employer] through their general bleeding disorders fund. His eligibility for this is expected to change as of , so option for [facility] specific PSI assistance will be explored.    Past Community Services: Inpatient psychiatry stay at [facility] in .  He discussed this briefly today, and stated \"it was a low time in my life but I am doing much better now, and feel as though I have been better for quite some time.\"    Chemical Dependency:    The patient reports using alcohol one time per week, and has one glass of Guiness.  The patient denies using marijuana.  The patient denies using street drugs.    The patient reported the following psychosocial concerns related to use: " "none.    Mental/Emotional Health:   The patient reported a history of the following mental health concerns and/or diagnoses: depression and anxiety.   He stated his symptoms are currently well managed, managed to the point that he and his psychiatry provider are considering cutting his bi-monthly visits back to monthly and continuing to taper as appropriate.    Abuse Concerns:  No concerns indicated    Education/Employment:   Orlin is a high school graduate and has one year of post-secondary education.  He worked as a  and is now retired.    Financial/Income:  Orlin recieves $1596/month in SSI payments, pre-Medicare deductions. Finances are very tight for him but he is \"making ends meet.\"    Health Insurance:  Medicare and BCBS.  He has a $3000 deductible and receives assistance from the [facility] pharmacy program with this.  He has a premium of $170/month that he pays quarterly for his [employer].  He stated that he is receiving assistance from [facility] for this payment, but that this will end July 1st.  Writer and patient to explore [facility]'s PSI assistance program for patient.     Health Literacy/Adjustment to Illness:  Orlin has taken a proactive stance at managing his finances and medical bills from a hemophilia standpoint.     Advanced Care Planning:  Health Care Directive is on file listing Dr oJrge Jiang as his POA. He confirms this document is up to date.     Authorization to discuss PHI and Email Consent: Not on file, patient declined needing to complete either form..    Interventions Utilized:  Assessment of patient's strengths and needs  Assessment of impact of illness, overall adjustment and current coping skills  Normalized and validated feelings and experiences  Provided positive feedback and encouragement  Verified current Health Care Agent  Discussion re: applicable community resources  Case Coordination    Impressions/Recommendations:    The patient presented as alert, oriented and " engaged.  He came to today's visit with several questions related to community resources and finances, which were discussed.  Orlin stated his mental health is very well controlled at this point, and did not have specific needs related to his mental health.  Housing is stable and he continues to spend time reading and listening to music.    Plan:   Writer to contact patient on Friday at 1300 to discuss PSI application.  There were no further SW questions at the conclusion of today's visit.  Writer will remain available to assist with psychosocial needs or concerns as they arise.  Contact information was provided and the patient was encouraged to call as needed.    MARIN Ferrari, LICSW, ACM  Clinical   [facility], [facility], [facility]  Phone: [phone]  Pager: [phone]

## 2017-03-23 NOTE — PROGRESS NOTES
I met with Orlin Alcantar on March 21, 2017 during his annual comprehensive clinic visit with the [facility].     INSURANCE: Orlin has a Medicare advantage policy through [facility]. He has a $3000 deductible.     ASSISTANCE PROGRAMS: Orlin is currently receiving assistance with premium costs through [employer] which he says will end after the June payment. His premiums are $170 per month. He expressed interest in the PSI program we have just for our patients. Analilia PATHAK, , and I will follow up with him about this. Orlin is also approved for assistance through our [facility], [facility] Assistance fund for 2017.     CURRENT FACTOR PLAN: Orlin currently prophy prescription is to treat with Helixate 2000 to 3000 two to three times weekly and as needed for bleeding, however he does more of a modified prophy treating with 3000 sometimes, or 2000 one day and 1000 the next.  Based on his dispense history, he averages just over 12,000 units per week. He had some increased bleeding last year around his move to a new apartment. His plan is staying the same today. We did talk about the fact that Helixate will no longer be available on the market after the end of this year. He is interested in transitioning to Kogenate once that happens. We may send him a few vials of that in the next couple of months so he can get familiar with reconstituting it.     RECORDS: Orlin does not currently keep records of his infusions / bleeds. He said he used to, but feels it is too much work and not worth it. I explained to him the benefits of keeping track both for himself and for his care team. I also told him that payers may start requiring records in the future and that we have heard of this already happening in some states, and there are rumors CMS will start soon. I gave him both one of our log books and a printed, blank calendar and asked that at the very least, he slap on the  sticker from the vials when he treats for a breakthrough bleed and document the location of the bleed. He agreed to this. I hope that once he gets in the routine for this, he may be willing to also use stickers/ calendar to record all prophy.     The patient choice form to designate patient's pharmacy and pharmaceutical company was discussed and signed.     Pharmacy: [facility]  Phone: [phone]  Fax:  [phone]  Product: Helixate - will switch to Kogenate when necessary  Home Care Company (if applicable): None

## 2017-03-31 DIAGNOSIS — D66 SEVERE HEMOPHILIA A (H): Primary | ICD-10-CM

## 2017-03-31 RX ORDER — ANTIHEMOPHILIC FACTOR (RECOMBINANT) 1000 (+/-)
KIT INTRAVENOUS
Qty: 6000 EACH | Refills: 0 | Status: SHIPPED | OUTPATIENT
Start: 2017-03-31 | End: 2017-05-12

## 2017-04-07 ENCOUNTER — TELEPHONE (OUTPATIENT)
Dept: HEMATOLOGY | Facility: CLINIC | Age: 67
End: 2017-04-07

## 2017-04-07 NOTE — TELEPHONE ENCOUNTER
Called patient at the request of Analilia Rajput (pharmacy hemophilia supervisor) to document patient's use of factor.    Orlin Alcantar has severe Hemophilia A.  He is on home therapy and usually receives a shipment of Kogenate 12, 000 units every Monday.  He treats on a unique schedule based on his activity and symptoms. He is calling to request additional factor VIII due to treatment of a urinary bleed this week.    Orlin reports that he awoke with blood in his urine on 4/3.  He had no precipitating injury and denied pain on urination and denied an problems voiding.  He did say he had some left flank dull ached for ~ 2 hours, but this resolved. He reports the urine was clear red (like Jack Aid) and he did see one small clot.  He treated with 3000 units on Monday and the urine was clear and normal color. He treated with 1000 units every 8 hours x 2 more doses.  On Wdnesday AM, he awoke and had darkened urine, more brown tinged.  He again dosed with 3000 units and then a couple of follow up doses.  He used a total of 11,000 units of factor up to today and was going to take his last dose of 1000 units today.    We discussed his factor use, recommendation to force fluids and rest with urinary bleeding, and discussed factor fall off levels.  Orlin is very anxious and had a more severe urinary bleed in February.  We discussed that we would like to strike a balance between treating and not over treating.  WE discussed the possibility of getting a factor recovery done so that we can demonstrate how he utilizes factor in his system.  He was very open to this.  I told him we would discuss as a team at our Monday meeting (4/10) and get back to him with a plan.    I did tell him that it would be great to hear from him when he has such a bleed so that we can help guide his treatment a bit.     Margot Reed, RN - Nurse Clinician - [facility] - [phone]

## 2017-04-11 ENCOUNTER — TELEPHONE (OUTPATIENT)
Dept: HEMATOLOGY | Facility: CLINIC | Age: 67
End: 2017-04-11

## 2017-04-11 NOTE — TELEPHONE ENCOUNTER
Orlin Alcantar has severe Hemophilia A and treats himself independently.  He is on prophylaxis with Helixate FS, using ~ 53912 units per week.  His schedule varies each week with his symptoms and activities. We talked last week as he was having some urinary bleeding, that responded to factor infusion.    He called today saying that he had taken Helixate 3000 units 4/10 AM and than awoke with blood in urine today.  He said it looked like dilute Jack Aid - it was not viscous, and there were no clots. He again treated with 3000 units today.  I told him to continue to force fluids and hold off on further factor until I could discuss with Dr. Jiang. He says he still has this very slight twinge of left sided low back pain, but the pain is not significant     Reviewed with Dr. Jiang who recommended that he force fluids and dose again with Helixate FS 3000 units 4/11 AM if he notes blood in the urine.  He agreed to talk via conference call at 11:30 on Wednesday with Dr. Jiang and Yu Schultz RN and this writer to discuss next steps for this recurrent urinary bleeding.      I did briefly review the half life of factor and reinforce that he has good levels of factor VIII in his system currently and that we need to explore other reasons for bleeding. I reassured Orlin that we are taking this seriously and will be available as he has more questions. Margot Reed RN - Nurse Clinician - [facility] - [phone]

## 2017-04-12 ENCOUNTER — TELEPHONE (OUTPATIENT)
Dept: HEMATOLOGY | Facility: CLINIC | Age: 67
End: 2017-04-12

## 2017-04-13 ENCOUNTER — TELEPHONE (OUTPATIENT)
Dept: HEMATOLOGY | Facility: CLINIC | Age: 67
End: 2017-04-13

## 2017-04-13 DIAGNOSIS — D66 SEVERE HEMOPHILIA A (H): ICD-10-CM

## 2017-04-13 DIAGNOSIS — D66 HEMOPHILIA A (H): Primary | ICD-10-CM

## 2017-04-13 DIAGNOSIS — N30.01 ACUTE CYSTITIS WITH HEMATURIA: Primary | ICD-10-CM

## 2017-04-13 RX ORDER — PREDNISONE 20 MG/1
TABLET ORAL
Qty: 8 TABLET | Refills: 0 | Status: SHIPPED | OUTPATIENT
Start: 2017-04-13 | End: 2017-11-08

## 2017-04-13 NOTE — TELEPHONE ENCOUNTER
Mr. Cavanaugh is 67 year old man with severe Hemophilia A and recurring hematuria (see notes form 4/7, 4/11, and 4/12). He called this morning to report more blood in his urine and wanted to proceed with evaluation by Urology as proposed yesterday during the phone conference. For follow up:  1. Mr Cavanaugh will infuse 1,000 units per day through Monday 4/17 and continue hydration and rest.  2. A 1:00 pm appointment Monday in Urology Clinic 4/17.  3. A short course of prednisone (40 mg po daily for 4 days).

## 2017-04-13 NOTE — TELEPHONE ENCOUNTER
Mr. Cavanaugh, a 67 year old man with severe Hemophilia A, had two recent episodes of hematuria (see notes from 4/7 and 4/11/2017 for details). A conference call was established ( Mr. Cavanaugh,Dr. Jiang, Margot Reed RN, Analliia Jones Hemophilia Pharmacy Supervisor, and myself) to review the situation and establish a f/u plan. He reports that currently is urine is clear and he continues with hydration. Evaluation by Urology was proposed and discussed. Mr. Cavanaugh, given that his urine is currently clear, wanted to wait on the referral. The plan is that if the urine remains clear a urine sample will be obtained for a UA with reflex culture and urine cytology. We will talk with Mr. Cavanaugh on Friday for an update on the hematuria and finalize the laboratory evaluation plan.

## 2017-04-14 ENCOUNTER — PRE VISIT (OUTPATIENT)
Dept: UROLOGY | Facility: CLINIC | Age: 67
End: 2017-04-14

## 2017-04-14 DIAGNOSIS — I10 ESSENTIAL HYPERTENSION WITH GOAL BLOOD PRESSURE LESS THAN 140/90: ICD-10-CM

## 2017-04-14 NOTE — TELEPHONE ENCOUNTER
Medication Detail      Disp Refills Start End HORACE   lisinopril (PRINIVIL,ZESTRIL) 40 MG tablet 90 tablet 1 9/7/2016  No   Sig: Take 1 tablet (40 mg) by mouth daily   Class: E-Prescribe   Route: Oral       Last Office Visit with G, P or [facility] prescribing provider: 3/21/2017       Potassium   Date Value Ref Range Status   04/14/2016 4.4 3.4 - 5.3 mmol/L Final     Creatinine   Date Value Ref Range Status   04/14/2016 0.84 0.66 - 1.25 mg/dL Final     BP Readings from Last 3 Encounters:   03/21/17 (!) 138/95   12/20/16 124/88   09/07/16 (!) 122/94

## 2017-04-17 ENCOUNTER — OFFICE VISIT (OUTPATIENT)
Dept: UROLOGY | Facility: CLINIC | Age: 67
End: 2017-04-17

## 2017-04-17 VITALS
WEIGHT: 177 LBS | DIASTOLIC BLOOD PRESSURE: 87 MMHG | SYSTOLIC BLOOD PRESSURE: 130 MMHG | BODY MASS INDEX: 23.98 KG/M2 | HEIGHT: 72 IN | HEART RATE: 115 BPM

## 2017-04-17 DIAGNOSIS — D66 SEVERE HEMOPHILIA A (H): Primary | ICD-10-CM

## 2017-04-17 DIAGNOSIS — R31.0 GROSS HEMATURIA: Primary | ICD-10-CM

## 2017-04-17 DIAGNOSIS — D66 SEVERE HEMOPHILIA A (H): ICD-10-CM

## 2017-04-17 DIAGNOSIS — B18.2 CHRONIC HEPATITIS C WITHOUT HEPATIC COMA (H): ICD-10-CM

## 2017-04-17 DIAGNOSIS — D66 HEMOPHILIA A (H): ICD-10-CM

## 2017-04-17 LAB
ALBUMIN UR-MCNC: NEGATIVE MG/DL
APPEARANCE UR: CLEAR
BILIRUB UR QL STRIP: NEGATIVE
COLOR UR AUTO: YELLOW
GLUCOSE UR STRIP-MCNC: NEGATIVE MG/DL
HGB UR QL STRIP: ABNORMAL
HYALINE CASTS #/AREA URNS LPF: 10 /LPF (ref 0–2)
KETONES UR STRIP-MCNC: 5 MG/DL
LEUKOCYTE ESTERASE UR QL STRIP: ABNORMAL
MUCOUS THREADS #/AREA URNS LPF: PRESENT /LPF
NITRATE UR QL: NEGATIVE
PH UR STRIP: 5 PH (ref 5–7)
RBC #/AREA URNS AUTO: 24 /HPF (ref 0–2)
SP GR UR STRIP: 1.02 (ref 1–1.03)
URN SPEC COLLECT METH UR: ABNORMAL
UROBILINOGEN UR STRIP-MCNC: 0 MG/DL (ref 0–2)
WBC #/AREA URNS AUTO: 23 /HPF (ref 0–2)

## 2017-04-17 ASSESSMENT — PAIN SCALES - GENERAL: PAINLEVEL: NO PAIN (0)

## 2017-04-17 NOTE — PROGRESS NOTES
"CC: gross hematuria.    HPI: It is a pleasure to see Mr. Orlin Alcantar, a very pleasant 67 year old male with severe hemophilia A seen today in the urology clinic in consultation from Dr. Jiang for evaluation of gross hematuria. The patient states he has seen blood in his urine off and on for the past 50 years - describes this as \"intermittent kidney hemorrhaging\" which occurs every 3-4 years, lasts for 1-2 days, and then resolves on it's own. Last time this occurred was 2/2017. He does self-infuse at home with clotting factors when this occurs which usually takes care of the problem. More recently, he noticed blood in his urine on the morning of 4/3/17. Self-treated with clotting factors as normal but hematuria persisted. Describes his urine as dilute Koolaid in appearance with passage of a few small clots. Got in touch with his hematology team who provided recommendations re: pushing fluids and self-treating with clotting factors. Despite following recommendations, his gross hematuria has persisted for close to 2 weeks now. Last saw blood in his urine yesterday morning - has been clear yellow today.     Denies any dysuria, frequency, urgency, hesitancy, intermittency, needing to strain to void, or sensation of incomplete emptying. No abdominal pain. Has felt a \"tightness\" in his left lower back for the past few days to weeks - almost like a pulled muscle. Denies history of kidney stones or UTI's. Was a longtime smoker until 2013 when he quit.     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      left total knee arthroplasty  07/1992     pins in left hip      hip fracture     radioactive synovectomy  03/2003     right total hip arthroplasty  12/1992     SYNOVECTOMY HIP  4/26/2012    Procedure:SYNOVECTOMY HIP; Right Hip Open Synovectomy, Right Total Hip Revision with Exchange of Poly Liner and Head; Surgeon:ALEJANDRA RAYMOND; Location:[facility] OR     Current Outpatient Prescriptions   Medication Sig Dispense Refill     predniSONE (DELTASONE) 20 MG tablet Take two 20 mg tablets daily for 4 days 8 tablet 0     factor VIII, recomb, (HELIXATE FS) 1000 UNITS KIT Infuse 3000 units of Helixate x 1 then 1000 every 24 hours through 4/17/2017 secondary to persistent hematuria 4000 Units 0     factor VIII, recomb, (KOGENATE FS) 1000 UNITS KIT Infuse 2000 to 3000 units of Helixate 2-3 times weekly and as needed for bleeding episodes 6000 each 0     codeine 30 MG tablet Take 2 tablets (60 mg) by mouth every 6 hours as needed for moderate to severe pain (up to 8 tabs in 24 hours) 168 tablet 0     mirtazapine (REMERON) 45 MG tablet Take 1 tablet (45 mg) by mouth At Bedtime 30 tablet 5     diazepam (VALIUM) 5 MG tablet Take up to one tablet by mouth daily as needed for anxiety 30 tablet 5     factor VIII, recomb, (HELIXATE FS) 1000 UNITS KIT Infuse 2000 to 3000 units of Helixate 2-3 times weekly and as needed for bleeding episodes 69924 Units 3     amLODIPine (NORVASC) 5 MG tablet Take 1 tablet (5 mg) by mouth daily 90 tablet 1     lisinopril (PRINIVIL,ZESTRIL) 40 MG tablet Take 1 tablet (40 mg) by mouth daily 90 tablet 1     Allergies   Allergen Reactions     Aspirin      This drug inhibits platelets and  is contraindicated due to hemophilia diagnosis.        FAMILY HISTORY: Unknown as he is adopted.     SOCIAL HISTORY: The patient does formerly smoked cigarettes, no EtOH and no illicit drug use.    ROS: A comprehensive 14 point ROS was obtained and was positive for hemophilia A, HTN, anxiety and otherwise negative except for that outlined above in the HPI.    PHYSICAL EXAM:   Vitals:    04/17/17 1300   BP: 130/87   Pulse: 115   Weight: 80.3 kg (177 lb)   Height: 1.829 m (6')     GENERAL: Well groomed, well developed, well nourished male in NAD.  HEENT: AT, NC, EOMI bilaterally.  SKIN: Warm to touch, dry.  No visible rashes or lesions on examined areas.  RESP: No increased respiratory effort.  MS: Full ROM in extremities.  : Deferred for now.  ALEXI: Deferred for now.  NEURO: Alert and oriented x 3.  PSYCH: Normal mood and affect, pleasant and agreeable during interview and exam.    LABS: UA in process    IMAGING: none    ASSESSMENT and PLAN:    Mr. Orlin Alcantar is a pleasant 67 year old male with severe hemophilia A, referred to urology by hematology clinic for continued evaluation of persistent gross hematuria that has been ongoing for close to 2 weeks now despite self-treatment with clotting factors and pushing fluids. Has had similar episodes of gross hematuria in the past but these were always fleeting and transient - went away with self-treatment with clotting factors. Given his previous smoking history, would recommend full hematuria workup to exclude other causes.  The differential diagnosis at this point includes stone disease, infection, BPH, prostatitis, urothelial malignancy, renal disorder versus another yet unknown diagnosis.    Recommend proceeding with comprehensive hematuria evaluation to include:  - Urinalysis and urine culture to rule out an acute urinary tract infection.   - Urine cytology to look for cells concerning for malignancy.  - CT urogram for upper tract imaging.  - Cystoscopy with the  first available urologist to evaluate the interior of the bladder. Follow up for hematuria as recommended by urologist performing cystoscopic evaluation.    Thank you for allowing me to participate in Mr. Alcantar's care.      About 25 minutes were spent with the patient today, > 50% in counseling and coordination of care.    Mely Smith PA-C  Department of Urology

## 2017-04-17 NOTE — MR AVS SNAPSHOT
After Visit Summary   4/17/2017    rOlin Alcantar    MRN: [id number]           Patient Information     Date Of Birth          1950        Visit Information        Provider Department      4/17/2017 1:00 PM Mely Smith PA-C [facility] and [facility] for Prostate and Urologic Cancers        Today's Diagnoses     Gross hematuria    -  1      Care Instructions    To look for a source for the blood in your urine, we will plan for the following things:  1) Urine culture today to check for infection  2) Urine cytology today to check for cancer cells or other abnormal cells  3) CT scan to look at the kidneys, ureters, and bladder (today if possible)  4) Cystoscopy with one of our urologists to evaluate the interior of the bladder    Please do not hesitate to contact me with any questions or concerns in the meantime.    Thank you,  Mely Smith PA-C  Urology        Follow-ups after your visit        Your next 10 appointments already scheduled     Apr 17, 2017  5:20 PM CDT   (Arrive by 5:05 PM)   CT ABDOMEN PELVIS W/O & W CONTRAST with UCCT1   [facility] CT ([facility])    [address] [place] [id number] [phone]           Please bring any scans or X-rays taken at other hospitals, if similar tests were done. Also bring a list of your medicines, including vitamins, minerals and over-the-counter drugs. It is safest to leave personal items at home.  Be sure to tell your doctor:   If you have any allergies.   If there s any chance you are pregnant.   If you are breastfeeding.   If you have any special needs.  You may have contrast for this exam. To prepare:   Do not eat or drink for 2 hours before your exam. If you need to take medicine, you may take it with small sips of water. (We may ask you to take liquid medicine as well.)   The day before your exam, drink extra fluids at least six 8-ounce glasses (unless your doctor tells you  to restrict your fluids).  Patients over 70 or patients with diabetes or kidney problems:   If you haven t had a blood test (creatinine test) within the last 30 days, go to your clinic or Diagnostic Imaging Department for this test.  If you have diabetes:   If your kidney function is normal, continue taking your metformin (Avandamet, Glucophage, Glucovance, Metaglip) on the day of your exam.   If your kidney function is abnormal, wait 48 hours before restarting this medicine.  You will have oral contrast for this exam:   You will drink the contrast at home. Get this from your clinic or Diagnostic Imaging Department. Please follow the directions given.  Please wear loose clothing, such as a sweat suit or jogging clothes. Avoid snaps, zippers and other metal. We may ask you to undress and put on a hospital gown.  If you have any questions, please call the Imaging Department where you will have your exam.            May 22, 2017  2:30 PM CDT   (Arrive by 2:15 PM)   Cystoscopy with Gutierrez Nagel DO   [facility] and [facility] for Prostate and Urologic Cancers ([facility])    [address] [id number] [phone]            Sep 12, 2017 10:15 AM CDT   Adult Med Follow UP with JELLY Chapa CNS   Psychiatry Clinic ([facility])    [place] [id number] [facility] [id number] [phone]              Future tests that were ordered for you today     Open Future Orders        Priority Expected Expires Ordered    CT Abdomen pelvis w & w/o Contrast Routine  4/17/2018 4/17/2017    Factor 8 assay Routine 4/17/2017 4/17/2018 4/17/2017            Who to contact     Please call your clinic at [phone] to:    Ask questions about your health    Make or cancel appointments    Discuss your medicines    Learn about your test results    Speak to your doctor   If you have compliments or concerns about an  experience at your clinic, or if you wish to file a complaint, please contact [facility] Patient Relations at [phone] or email us at [email or web address]         Additional Information About Your Visit        [employer] Information     [employer] is an electronic gateway that provides easy, online access to your medical records. With [employer], you can request a clinic appointment, read your test results, renew a prescription or communicate with your care team.     To sign up for [employer] visit the website at [email or web address]   You will be asked to enter the access code listed below, as well as some personal information. Please follow the directions to create your username and password.     Your access code is: [email or web address]  Expires: 2017  7:30 AM     Your access code will  in 90 days. If you need help or a new code, please contact your [facility] or call [phone] for assistance.        Care EveryWhere ID     This is your Care EveryWhere ID. This could be used by other organizations to access your [place] medical records  [id number]        Your Vitals Were     Pulse Height BMI (Body Mass Index)             115 1.829 m (6') 24.01 kg/m2          Blood Pressure from Last 3 Encounters:   17 130/87   17 (!) 138/95   16 124/88    Weight from Last 3 Encounters:   17 80.3 kg (177 lb)   17 81.1 kg (178 lb 14.4 oz)   16 77.6 kg (171 lb)              We Performed the Following     Cytology non gyn     Routine UA with microscopic - No culture     Urine Culture Aerobic Bacterial        Primary Care Provider Office Phone # Fax #    Katie Ramos [phone]       [facility] [address]  [facility] [address]        Thank you!     Thank you for choosing [facility] UROLOGY AND [facility] FOR PROSTATE AND UROLOGIC CANCERS  for your care. Our goal is always to provide you with  excellent care. Hearing back from our patients is one way we can continue to improve our services. Please take a few minutes to complete the written survey that you may receive in the mail after your visit with us. Thank you!             Your Updated Medication List - Protect others around you: Learn how to safely use, store and throw away your medicines at www.disposemymeds.org.          This list is accurate as of: 4/17/17  1:35 PM.  Always use your most recent med list.                   Brand Name Dispense Instructions for use    amLODIPine 5 MG tablet    NORVASC    90 tablet    Take 1 tablet (5 mg) by mouth daily       codeine 30 MG tablet     168 tablet    Take 2 tablets (60 mg) by mouth every 6 hours as needed for moderate to severe pain (up to 8 tabs in 24 hours)       diazepam 5 MG tablet    VALIUM    30 tablet    Take up to one tablet by mouth daily as needed for anxiety       * factor VIII (recomb) 1000 UNITS Kit     34152 Units    Infuse 2000 to 3000 units of Helixate 2-3 times weekly and as needed for bleeding episodes       * factor VIII (recomb) 1000 UNITS Kit     6000 each    Infuse 2000 to 3000 units of Helixate 2-3 times weekly and as needed for bleeding episodes       * factor VIII (recomb) 1000 UNITS Kit     4000 Units    Infuse 3000 units of Helixate x 1 then 1000 every 24 hours through 4/17/2017 secondary to persistent hematuria       lisinopril 40 MG tablet    PRINIVIL/ZESTRIL    90 tablet    Take 1 tablet (40 mg) by mouth daily       mirtazapine 45 MG tablet    REMERON    30 tablet    Take 1 tablet (45 mg) by mouth At Bedtime       predniSONE 20 MG tablet    DELTASONE    8 tablet    Take two 20 mg tablets daily for 4 days       * Notice:  This list has 3 medication(s) that are the same as other medications prescribed for you. Read the directions carefully, and ask your doctor or other care provider to review them with you.

## 2017-04-17 NOTE — ADDENDUM NOTE
Encounter addended by: Maria Teresa Chadwick PT on: 4/17/2017 11:32 AM<BR>     Actions taken: Charge Capture section accepted

## 2017-04-17 NOTE — LETTER
"4/17/2017       RE: Orlin Alcantar  [address]   [place] [address]     Dear Colleague,    Thank you for referring your patient, Orlin Alcantar, to the [facility] UROLOGY AND INST FOR PROSTATE AND UROLOGIC CANCERS at [facility]. Please see a copy of my visit note below.    CC: gross hematuria.    HPI: It is a pleasure to see Mr. Orlin Alcantar, a very pleasant 67 year old male with severe hemophilia A seen today in the urology clinic in consultation from Dr. Jiang for evaluation of gross hematuria. The patient states he has seen blood in his urine off and on for the past 50 years - describes this as \"intermittent kidney hemorrhaging\" which occurs every 3-4 years, lasts for 1-2 days, and then resolves on it's own. Last time this occurred was 2/2017. He does self-infuse at home with clotting factors when this occurs which usually takes care of the problem. More recently, he noticed blood in his urine on the morning of 4/3/17. Self-treated with clotting factors as normal but hematuria persisted. Describes his urine as dilute Koolaid in appearance with passage of a few small clots. Got in touch with his hematology team who provided recommendations re: pushing fluids and self-treating with clotting factors. Despite following recommendations, his gross hematuria has persisted for close to 2 weeks now. Last saw blood in his urine yesterday morning - has been clear yellow today.     Denies any dysuria, frequency, urgency, hesitancy, intermittency, needing to strain to void, or sensation of incomplete emptying. No abdominal pain. Has felt a \"tightness\" in his left lower back for the past few days to weeks - almost like a pulled muscle. Denies history of kidney stones or UTI's. Was a longtime smoker until 2013 when he quit.     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 interferon/ribavirin     Past Surgical History:   Procedure Laterality Date     ARTHROPLASTY REVISION HIP  4/26/2012    Procedure:ARTHROPLASTY REVISION HIP; Surgeon:ALEJANDRA RAYMOND; [facility]     INJECT STEROID (LOCATION)      right hip x 2, 2 weeks before.      left total knee arthroplasty  07/1992     pins in left hip      hip fracture     radioactive synovectomy  03/2003     right total hip arthroplasty  12/1992     SYNOVECTOMY HIP  4/26/2012    Procedure:SYNOVECTOMY HIP; Right Hip Open Synovectomy, Right Total Hip Revision with Exchange of Poly Liner and Head; Surgeon:ALEJANDRA RAYMOND; [facility]     Current Outpatient Prescriptions   Medication Sig Dispense Refill     predniSONE (DELTASONE) 20 MG tablet Take two 20 mg tablets daily for 4 days 8 tablet 0     factor VIII, recomb, (HELIXATE FS) 1000 UNITS KIT Infuse 3000 units of Helixate x 1 then 1000 every 24 hours through 4/17/2017 secondary to persistent hematuria 4000 Units 0     factor VIII, recomb, (KOGENATE FS) 1000 UNITS KIT Infuse 2000 to 3000 units of Helixate 2-3 times weekly and as needed for bleeding episodes 6000 each 0     codeine 30 MG tablet Take 2 tablets (60 mg) by mouth every 6 hours as needed for moderate to severe pain (up to 8 tabs in 24 hours) 168 tablet 0     mirtazapine (REMERON) 45 MG tablet Take 1 tablet (45 mg) by mouth At Bedtime 30 tablet 5     diazepam (VALIUM) 5 MG tablet Take up to one tablet by mouth daily as needed for anxiety 30 tablet 5     factor VIII, recomb, (HELIXATE FS) 1000 UNITS KIT Infuse 2000 to 3000 units of Helixate  2-3 times weekly and as needed for bleeding episodes 76355 Units 3     amLODIPine (NORVASC) 5 MG tablet Take 1 tablet (5 mg) by mouth daily 90 tablet 1     lisinopril (PRINIVIL,ZESTRIL) 40 MG tablet Take 1 tablet (40 mg) by mouth daily 90 tablet 1     Allergies   Allergen Reactions     Aspirin      This drug inhibits platelets and is contraindicated due to hemophilia diagnosis.        FAMILY HISTORY: Unknown as he is adopted.     SOCIAL HISTORY: The patient does formerly smoked cigarettes, no EtOH and no illicit drug use.    ROS: A comprehensive 14 point ROS was obtained and was positive for hemophilia A, HTN, anxiety and otherwise negative except for that outlined above in the HPI.    PHYSICAL EXAM:   Vitals:    04/17/17 1300   BP: 130/87   Pulse: 115   Weight: 80.3 kg (177 lb)   Height: 1.829 m (6')     GENERAL: Well groomed, well developed, well nourished male in NAD.  HEENT: AT, NC, EOMI bilaterally.  SKIN: Warm to touch, dry.  No visible rashes or lesions on examined areas.  RESP: No increased respiratory effort.  MS: Full ROM in extremities.  : Deferred for now.  ALEXI: Deferred for now.  NEURO: Alert and oriented x 3.  PSYCH: Normal mood and affect, pleasant and agreeable during interview and exam.    LABS: UA in process    IMAGING: none    ASSESSMENT and PLAN:    Mr. Orlin Alcantar is a pleasant 67 year old male with severe hemophilia A, referred to urology by hematology clinic for continued evaluation of persistent gross hematuria that has been ongoing for close to 2 weeks now despite self-treatment with clotting factors and pushing fluids. Has had similar episodes of gross hematuria in the past but these were always fleeting and transient - went away with self-treatment with clotting factors. Given his previous smoking history, would recommend full hematuria workup to exclude other causes.  The differential diagnosis at this point includes stone disease, infection, BPH, prostatitis, urothelial malignancy,  renal disorder versus another yet unknown diagnosis.    Recommend proceeding with comprehensive hematuria evaluation to include:  - Urinalysis and urine culture to rule out an acute urinary tract infection.   - Urine cytology to look for cells concerning for malignancy.  - CT urogram for upper tract imaging.  - Cystoscopy with the first available urologist to evaluate the interior of the bladder. Follow up for hematuria as recommended by urologist performing cystoscopic evaluation.    Thank you for allowing me to participate in Mr. Alcantar's care.      About 25 minutes were spent with the patient today, > 50% in counseling and coordination of care.    Mely Smith PA-C  Department of Urology

## 2017-04-17 NOTE — LETTER
April 20, 2017       TO: Orlin Greenberg  [address]   [place] [address]       Dear ,    We are writing to inform you of your test results.    Your test results fall within the expected range(s) or remain unchanged from previous results.  Please continue with current treatment plan.    Resulted Orders   Routine UA with microscopic - No culture   Result Value Ref Range    Color Urine Yellow     Appearance Urine Clear     Glucose Urine Negative NEG mg/dL    Bilirubin Urine Negative NEG    Ketones Urine 5 (A) NEG mg/dL    Specific Gravity Urine 1.023 1.003 - 1.035    Blood Urine Small (A) NEG    pH Urine 5.0 5.0 - 7.0 pH    Protein Albumin Urine Negative NEG mg/dL    Urobilinogen mg/dL 0.0 0.0 - 2.0 mg/dL    Nitrite Urine Negative NEG    Leukocyte Esterase Urine Trace (A) NEG    Source Midstream Urine     WBC Urine 23 (H) 0 - 2 /HPF    RBC Urine 24 (H) 0 - 2 /HPF    Mucous Urine Present (A) NEG /LPF    Hyaline Casts 10 (H) 0 - 2 /LPF   Urine Culture Aerobic Bacterial   Result Value Ref Range    Specimen Description Midstream Urine     Special Requests Specimen received in preservative     Culture Micro No growth     Micro Report Status FINAL 04/18/2017    Cytology non gyn   Result Value Ref Range    Copath Report       Patient Name: ORLIN GREENBERG  MR#: [id number]  Specimen #: [id number]  Collected: 4/17/2017  Received: 4/18/2017  Reported: 4/18/2017 14:19  Ordering Phy(s): JOSS NAIK    For improved result formatting, select 'View Enhanced Report Format'  under Linked Documents section.    SPECIMEN/STAIN PROCESS:  Urine-voided       Pap-Cyto x 1    ----------------------------------------------------------------    CYTOLOGIC INTERPRETATION:    Urine, voided:  - Negative for High-Grade Urothelial Carcinoma  - Crystals are present.  Specimen Adequacy: Satisfactory for evaluation.    I have personally reviewed all specimens and/or slides, including the  listed special stains, and used  them with my medical judgment to  determine the final diagnosis.    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 A voided urine specimen is submitted for  cytologic evaluation.    ,    GROSS:  Urine-voided:  Received 15 ml of dark yellow, hazy fluid, processed as 1  Pap stained Autocyte..    MICROSCOPIC:  Microscopic examination is performed.    DO Chris Tenorio III, MD.    CPT Codes:  A: 08432-ZOGEAGP    TESTING LAB LOCATION:  [facility], [address]   [id number] [phone]    COLLECTION SITE:  Client:  [facility]  Location:  UCUROP (B)           The results of your CT scan and urine tests are attached and are essentially normal.   The CT scan shows a few tiny kidney stones - one in each kidney. There were a few benign cysts in the left kidney as well - nothing of concern.   There were a few enlarged blood vessels along the left ureter (tube that connects kidney to bladder). This is a non-specific finding and could be related to inflammation, infection, a recently passed kidney stone, or nothing at all.   Your bladder has some diverticula (or outpouchings). Also not a concerning finding. These can be further evaluated with the cystoscopy procedure (scope to look into your bladder) which you are scheduled for in May.   The urine tests show persistent blood but no infection. The cytology test to look for cancer cells was negative, but did show some crystals in the urine (we can often see crystals when kidney stones are present).   Overall, nothing that would suggest a concerning cause for the blood in your urine (no masses or  tumors).   Please plan to follow up for the cystoscopy procedure to look into your bladder as the last step in this process.   Let me know if you have any questions or concerns in the meantime.     Thank you,   Mely Smith PA-C

## 2017-04-17 NOTE — LETTER
[facility]   [address] [place]   [address]  [phone]    April 20, 2017      Orlin Alcantar  [address]   [place] [address]              Dear Mr. Alcantar,    Thanks for completing the Hepatitis C level.  This shows no evidence for active Hepatitis C (no Hepatitis C in your blood).    Results for orders placed or performed in visit on 04/17/17   Hepatitis C RNA, quantitative   Result Value Ref Range    HCV RNA Quant IU/ml  HCVND [IU]/mL     HCV RNA Not Detected   The TYESHA AmpliPrep/TYESHA TaqMan HCV Test is an FDA-approved in vitro nucleic   acid amplification test for the quantitation of HCV RNA in human plasma (ETDA   plasma) or serum using the TYESHA AmpliPrep Instrument for automated viral   nucleic acid extraction and the TYESHA TaqMan Analyzer or TYESHA TaqMan for   automated Real Time PCR amplification and detection of the viral nucleic acid   target.   Titer results are reported in International Units/mL (IU/mL) using the 1st WHO   International standard for HCV for Nucleic Acid Amplification based assays.      Log of HCV RNA Qt Not Calculated <1.2 Log IU/mL   Factor 8 assay   Result Value Ref Range    Factor 8 Assay 40 (L) 55 - 200 %          Sincerely,    Katie Ramos MD

## 2017-04-17 NOTE — NURSING NOTE
Chief Complaint   Patient presents with     Consult     gross hematuria          Katie Renteria MA

## 2017-04-17 NOTE — PATIENT INSTRUCTIONS
To look for a source for the blood in your urine, we will plan for the following things:  1) Urine culture today to check for infection  2) Urine cytology today to check for cancer cells or other abnormal cells  3) CT scan to look at the kidneys, ureters, and bladder (today if possible)  4) Cystoscopy with one of our urologists to evaluate the interior of the bladder    Please do not hesitate to contact me with any questions or concerns in the meantime.    Thank you,  Mely Smith PA-C  Urology

## 2017-04-18 LAB
BACTERIA SPEC CULT: NO GROWTH
COPATH REPORT: NORMAL
FACT VIII ACT/NOR PPP: 40 % (ref 55–200)
Lab: NORMAL
MICRO REPORT STATUS: NORMAL
SPECIMEN SOURCE: NORMAL

## 2017-04-18 RX ORDER — LISINOPRIL 40 MG/1
40 TABLET ORAL DAILY
Qty: 90 TABLET | Refills: 1 | Status: SHIPPED | OUTPATIENT
Start: 2017-04-18 | End: 2017-10-20

## 2017-04-18 NOTE — TELEPHONE ENCOUNTER
BP Readings from Last 3 Encounters:   04/17/17 130/87   03/21/17 (!) 138/95   12/20/16 124/88       Refill request to MD/primary care provider.  Ashly Drake RN

## 2017-04-20 LAB
HCV RNA SERPL NAA+PROBE-ACNC: NORMAL [IU]/ML
HCV RNA SERPL NAA+PROBE-LOG IU: NORMAL LOG IU/ML

## 2017-04-24 DIAGNOSIS — D66 HEMOPHILIC ARTHROPATHY (H): ICD-10-CM

## 2017-04-24 DIAGNOSIS — M36.2 HEMOPHILIC ARTHROPATHY (H): ICD-10-CM

## 2017-04-24 RX ORDER — CODEINE SULFATE 30 MG/1
60 TABLET ORAL EVERY 6 HOURS PRN
Qty: 168 TABLET | Refills: 0 | Status: SHIPPED | OUTPATIENT
Start: 2017-04-24 | End: 2017-05-30

## 2017-05-01 DIAGNOSIS — D66 SEVERE HEMOPHILIA A (H): ICD-10-CM

## 2017-05-12 DIAGNOSIS — D66 SEVERE HEMOPHILIA A (H): ICD-10-CM

## 2017-05-12 RX ORDER — ANTIHEMOPHILIC FACTOR (RECOMBINANT) 1000 (+/-)
KIT INTRAVENOUS
Qty: 13200 EACH | Refills: 1 | Status: SHIPPED | OUTPATIENT
Start: 2017-05-12 | End: 2017-06-14

## 2017-05-12 NOTE — TELEPHONE ENCOUNTER
Orlin Alcantar   :1950  Bleeding disorder: Hemophilia A    Last visit: 3/21/17   Next visit scheduled: Due 2018  Prophy (Y/N):DARLING Ordaz gets his factor weekly. His pharmacy, [facility], is calling today requesting a new prescription.      Margot Reed RN - Nurse Clinician - [facility] - [phone]

## 2017-05-16 ENCOUNTER — PRE VISIT (OUTPATIENT)
Dept: UROLOGY | Facility: CLINIC | Age: 67
End: 2017-05-16

## 2017-05-16 NOTE — TELEPHONE ENCOUNTER
Patient coming in for cystoscopy for hematuria workup. Patient saw Mely CUEVAS on 4/17/17. Records in [facility]. No need to contact patient

## 2017-05-30 DIAGNOSIS — M36.2 HEMOPHILIC ARTHROPATHY (H): ICD-10-CM

## 2017-05-30 DIAGNOSIS — D66 HEMOPHILIC ARTHROPATHY (H): ICD-10-CM

## 2017-05-30 RX ORDER — CODEINE SULFATE 30 MG/1
60 TABLET ORAL EVERY 6 HOURS PRN
Qty: 168 TABLET | Refills: 0 | Status: SHIPPED | OUTPATIENT
Start: 2017-05-30 | End: 2017-07-17

## 2017-05-30 NOTE — TELEPHONE ENCOUNTER
Called for routine refill.  Continue with current plan.  Refill is not early.            Yanira Branch MPH, PA-C  [facility]  [facility]  [phone] main line  [phone] pager  [phone] fax

## 2017-06-14 DIAGNOSIS — D66 SEVERE HEMOPHILIA A (H): ICD-10-CM

## 2017-06-14 RX ORDER — ANTIHEMOPHILIC FACTOR (RECOMBINANT) 1000 (+/-)
KIT INTRAVENOUS
Qty: 13200 EACH | Refills: 1 | Status: SHIPPED | OUTPATIENT
Start: 2017-06-14 | End: 2017-06-28

## 2017-06-20 ENCOUNTER — TELEPHONE (OUTPATIENT)
Dept: HEMATOLOGY | Facility: CLINIC | Age: 67
End: 2017-06-20

## 2017-06-20 NOTE — TELEPHONE ENCOUNTER
Social Work Note - [facility]    Patient and writer completed application to [facility] for premium assistance.  Income verification, insurance verification/invoice, and MD statement uploaded to [employer] portal.    Awaiting application review by [facility].    MARIN Ferrari, LICSW, ACM  Clinical   [facility], [facility], and [facility]  Phone: [phone]  Pager: [phone]

## 2017-06-28 DIAGNOSIS — D66 SEVERE HEMOPHILIA A (H): ICD-10-CM

## 2017-06-28 RX ORDER — ANTIHEMOPHILIC FACTOR (RECOMBINANT) 1000 (+/-)
KIT INTRAVENOUS
Qty: 13200 EACH | Refills: 1 | Status: SHIPPED | OUTPATIENT
Start: 2017-06-28 | End: 2017-06-28

## 2017-06-28 RX ORDER — ANTIHEMOPHILIC FACTOR (RECOMBINANT) 1000 (+/-)
KIT INTRAVENOUS
Qty: 13200 EACH | Refills: 3 | Status: SHIPPED | OUTPATIENT
Start: 2017-06-28 | End: 2017-07-31

## 2017-07-17 DIAGNOSIS — D66 HEMOPHILIC ARTHROPATHY (H): ICD-10-CM

## 2017-07-17 DIAGNOSIS — M36.2 HEMOPHILIC ARTHROPATHY (H): ICD-10-CM

## 2017-07-17 RX ORDER — CODEINE SULFATE 30 MG/1
60 TABLET ORAL EVERY 6 HOURS PRN
Qty: 168 TABLET | Refills: 0 | Status: SHIPPED | OUTPATIENT
Start: 2017-07-17 | End: 2017-09-01

## 2017-07-31 DIAGNOSIS — D66 SEVERE HEMOPHILIA A (H): ICD-10-CM

## 2017-07-31 RX ORDER — ANTIHEMOPHILIC FACTOR (RECOMBINANT) 1000 (+/-)
KIT INTRAVENOUS
Qty: 13200 EACH | Refills: 3 | Status: SHIPPED | OUTPATIENT
Start: 2017-07-31 | End: 2017-08-28

## 2017-08-10 DIAGNOSIS — F41.1 GENERALIZED ANXIETY DISORDER: ICD-10-CM

## 2017-08-10 RX ORDER — MIRTAZAPINE 45 MG/1
45 TABLET, FILM COATED ORAL AT BEDTIME
Qty: 30 TABLET | Refills: 0 | Status: SHIPPED | OUTPATIENT
Start: 2017-08-10 | End: 2017-09-01

## 2017-08-10 NOTE — TELEPHONE ENCOUNTER
Medication requested: Remeron  Last refilled: 7/24/17  Qty: 30      Last seen: 3/7/17  RTC: 6 months  Cancel: 0  No-show: 0  Next appt: 9/12/17    Refill decision: refill x 1month supply

## 2017-08-28 ENCOUNTER — TELEPHONE (OUTPATIENT)
Dept: FAMILY MEDICINE | Facility: CLINIC | Age: 67
End: 2017-08-28

## 2017-08-28 DIAGNOSIS — D66 SEVERE HEMOPHILIA A (H): ICD-10-CM

## 2017-08-28 RX ORDER — ANTIHEMOPHILIC FACTOR (RECOMBINANT) 1000 (+/-)
KIT INTRAVENOUS
Qty: 13200 EACH | Refills: 3 | Status: SHIPPED | OUTPATIENT
Start: 2017-08-28 | End: 2017-09-25

## 2017-08-31 DIAGNOSIS — I10 ESSENTIAL HYPERTENSION WITH GOAL BLOOD PRESSURE LESS THAN 140/90: ICD-10-CM

## 2017-08-31 NOTE — TELEPHONE ENCOUNTER
amLODIPine (NORVASC) 5 MG tablet      Last Written Prescription Date: 3/2/17  Last Fill Quantity: 90, # refills: 1    Last Office Visit with [facility], [facility] or [facility] prescribing provider:  9/7/16   Future Office Visit:        BP Readings from Last 3 Encounters:   04/17/17 130/87   03/21/17 (!) 138/95   12/20/16 124/88

## 2017-09-01 DIAGNOSIS — M36.2 HEMOPHILIC ARTHROPATHY (H): ICD-10-CM

## 2017-09-01 DIAGNOSIS — D66 HEMOPHILIC ARTHROPATHY (H): ICD-10-CM

## 2017-09-01 DIAGNOSIS — F41.1 GENERALIZED ANXIETY DISORDER: ICD-10-CM

## 2017-09-01 RX ORDER — CODEINE SULFATE 30 MG/1
60 TABLET ORAL EVERY 6 HOURS PRN
Qty: 168 TABLET | Refills: 0 | Status: SHIPPED | OUTPATIENT
Start: 2017-09-05 | End: 2017-09-05

## 2017-09-01 RX ORDER — MIRTAZAPINE 45 MG/1
45 TABLET, FILM COATED ORAL AT BEDTIME
Qty: 30 TABLET | Refills: 0 | Status: SHIPPED | OUTPATIENT
Start: 2017-09-01 | End: 2017-09-12

## 2017-09-01 RX ORDER — AMLODIPINE BESYLATE 5 MG/1
5 TABLET ORAL DAILY
Qty: 30 TABLET | Refills: 0 | Status: SHIPPED | OUTPATIENT
Start: 2017-09-01 | End: 2017-11-08

## 2017-09-01 NOTE — TELEPHONE ENCOUNTER
Medication requested: mirtazapine   Last refilled: 8/10/17  Qty: 30:0    Last seen: 3/7/17  RTC: 6 MOS  Cancel: 0  No-show: 0  Next appt: 9/12/17  Refill decision: Refilled for 30 days per protocol

## 2017-09-01 NOTE — TELEPHONE ENCOUNTER
Medication is being filled for 1 time refill only due to:  Patient needs to be seen because it has been more than one year since last visit.   IRON Moran

## 2017-09-05 RX ORDER — CODEINE SULFATE 30 MG/1
60 TABLET ORAL EVERY 6 HOURS PRN
Qty: 168 TABLET | Refills: 0 | Status: SHIPPED | OUTPATIENT
Start: 2017-09-05 | End: 2017-10-16

## 2017-09-12 ENCOUNTER — OFFICE VISIT (OUTPATIENT)
Dept: PSYCHIATRY | Facility: CLINIC | Age: 67
End: 2017-09-12
Attending: CLINICAL NURSE SPECIALIST
Payer: MEDICARE

## 2017-09-12 VITALS
HEART RATE: 78 BPM | DIASTOLIC BLOOD PRESSURE: 85 MMHG | BODY MASS INDEX: 25.01 KG/M2 | WEIGHT: 184.4 LBS | SYSTOLIC BLOOD PRESSURE: 125 MMHG

## 2017-09-12 DIAGNOSIS — F41.1 GENERALIZED ANXIETY DISORDER: ICD-10-CM

## 2017-09-12 DIAGNOSIS — F41.1 ANXIETY STATE: Primary | ICD-10-CM

## 2017-09-12 DIAGNOSIS — F06.30 MOOD DISORDER IN CONDITIONS CLASSIFIED ELSEWHERE: ICD-10-CM

## 2017-09-12 DIAGNOSIS — F32.A DEPRESSIVE DISORDER: ICD-10-CM

## 2017-09-12 PROCEDURE — 99212 OFFICE O/P EST SF 10 MIN: CPT | Mod: ZF

## 2017-09-12 RX ORDER — MIRTAZAPINE 45 MG/1
45 TABLET, FILM COATED ORAL AT BEDTIME
Qty: 37 TABLET | Refills: 5 | Status: SHIPPED | OUTPATIENT
Start: 2017-09-12 | End: 2018-03-13

## 2017-09-12 RX ORDER — DIAZEPAM 5 MG
TABLET ORAL
Qty: 30 TABLET | Refills: 5
Start: 2017-09-12 | End: 2018-03-13

## 2017-09-12 NOTE — NURSING NOTE
Chief Complaint   Patient presents with     Recheck Medication     RATNA     Reviewed allergies, smoking status, and pharmacy preference  Administered abuse screening question   Obtained weight, blood pressure and heart rate

## 2017-09-12 NOTE — PROGRESS NOTES
Outpatient Psychiatry Progress Note     Provider: JELLY Chapa CNS  Date: 2017  Service:  Medication follow up with counseling.   Patient Identification: Orlin Alcantar  : 1950   MRN: [id number]    Orlin Alcantar is a 67 year old year old male who presents for ongoing psychiatric care.  Orlin Alcantar was last seen in clinic on 3/7/17.   At that time,   Assessment & Plan       Orlin Alcantar is seen today for follow up and reports not taking Remeron for couple weeks after hematuria.  Pt reports his anxiety is fairly well controlled and his mood is stable, but having some initial insomnia.  Pt is using Valium x1-2/week. Pt is unsure if he wants to restart his medication at this time, but will consider and wants refill of the medications.     Diagnosis  Axis 1: Depression, Anxiety  Axis 2: none  Axis 3: See problem list in the medical record     Plan:  Medication: Will reorder Mirtazapine and Valium.    OTC Recommendations: none  Lab Orders:  none  Referrals: none  Release of Information: per symptoms  Future Treatment Considerations:per symptoms  Return for Follow Up:6 months      ____________________________________________________________________________________________________________________________________________    2017  Today Orlin reports his mood has been good. Living situation and his car are the same. He has been there a little over a year.  He had a kidney bleed earlier this year but it seems to have stopped. Some anxiety about coverage for his insurance premium but this has bee taken care of.  Family is stable.   Reports that for the past 6 months at least he takes one and a half of the mirtazapine about 2 to 3 times a week if he is having sleep problems. This seems to help without side effect  Side effects of medication include: none reported  Psychiatric Review of Systems:  The patient endorses symptoms of depression: In the last 2 weeks per PHQ 9 several days feelings of  failure  He  patient endorses symptoms of anxiety : stable  He endorses symptoms of sis including none.    He endorses symptoms of psychosis including no psychotic symptoms.       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      Hemophilia (H)     Type A     Hepatitis C, chronic (H)     treated [facility] interferon/ribavirin     Patient Active Problem List   Diagnosis     History of total hip arthroplasty     Hemophilia (H)     Pain in joint, pelvic region and thigh     Anxiety state     Depressive disorder, not elsewhere classified     Adjustment disorder with anxious mood     ACP (advance care planning)     Hypertension goal BP (blood pressure) < 140/90     Mood disorder in conditions classified elsewhere     Hepatitis C, chronic (H)       Allergies:   Allergies   Allergen Reactions     Aspirin      This drug inhibits platelets and is contraindicated due to hemophilia diagnosis.             Current Medications     Current Outpatient Prescriptions Ordered in [facility]   Medication Sig Dispense Refill     codeine 30 MG tablet Take 2 tablets (60 mg) by mouth every 6 hours as needed for moderate to severe pain (up to 8 tabs in 24 hours) 168 tablet 0     amLODIPine (NORVASC) 5 MG tablet Take 1 tablet (5 mg) by mouth daily 30 tablet 0     mirtazapine (REMERON) 45 MG tablet Take 1 tablet (45 mg) by mouth At Bedtime 30 tablet 0     factor VIII, recomb, (KOGENATE FS) 1000 UNITS KIT Infuse 2000 to 3000 units (-5/+10%) IV 2 to 3 times a week and as needed for bleeding episodes 95479 each 3     lisinopril (PRINIVIL/ZESTRIL) 40 MG tablet Take 1 tablet  "(40 mg) by mouth daily 90 tablet 1     predniSONE (DELTASONE) 20 MG tablet Take two 20 mg tablets daily for 4 days 8 tablet 0     diazepam (VALIUM) 5 MG tablet Take up to one tablet by mouth daily as needed for anxiety 30 tablet 5     No current Epic-ordered facility-administered medications on file.         Mental Status Exam     Appearance:  Casually dressed and Adequately groomed  Behavior/relationship to examiner/demeanor:  Cooperative  Orientation: Oriented to person, place, time and situation  Psychomotor: walks with limb and slow   Speech Rate:  Normal  Speech Spontaneity:  Normal  Mood:  \"okay\"  Affect:  Appropriate/mood-congruent  Thought Process (Associations):  Goal directed  Thought Content:  no overt psychosis, denies suicidal ideation, intent or thoughts and patient does not appear to be responding to internal stimuli  Abnormal Perception:  None  Attention/Concentration:  Normal  Language:  Intact  Insight:  Good  Judgment:  Good      Results     Vital signs: /85  Pulse 78  Wt 83.6 kg (184 lb 6.4 oz)  BMI 25.01 kg/m2    Laboratory Data:  no new data reviewed    Assessment & Plan      Orlin Alcantar is seen today for follow up and reports his mood has been stable but on some nights he takes an extra 1/2 of Mirtazapine to help with sleep.  Since this works for him there does not seem reason to have to try other medications.    Diagnosis  Anxiety, RATNA, Depression, Mood disorder in conditions classified elsewhere.    Plan:  Medication: May increase Mirtazapine by 1/2 tablet up to 3 days a week for insomnia  OTC Recommendations: none  Lab Orders:  none  Referrals: none  Release of Information: none  Future Treatment Considerations:per symptoms   Return for Follow Up:6 months   The risks, benefits, alternatives and side effects have been discussed and are understood by the patient. The patient understands the risks of using street drugs or alcohol. There are no medical contraindications, the patient " agrees to treatment, and has the capacity to do so. The patient understands to call 911 or come to the nearest ED if life threatening or urgent symptoms present.  Over 50% of this time was spent counseling the patient and/or coordinating care regarding review of social and occupational functioning.  In addition patient was counseled on health and wellness practices to augment medication treatment of symptoms. See note for details.    Kaylin Rajan, JELLY CNS 9/12/2017

## 2017-09-12 NOTE — MR AVS SNAPSHOT
After Visit Summary   9/12/2017    Orlin Alcantar    MRN: [id number]           Patient Information     Date Of Birth          1950        Visit Information        Provider Department      9/12/2017 10:15 AM Kaylin Rajan APRN CNS Psychiatry [facility]        Today's Diagnoses     Anxiety state    -  1    Generalized anxiety disorder        Depressive disorder        Mood disorder in conditions classified elsewhere           Follow-ups after your visit        Follow-up notes from your care team     Return in about 6 months (around 3/12/2018).      Your next 10 appointments already scheduled     Mar 13, 2018 10:15 AM CDT   Adult Med Follow UP with JELLY Chapa CNS   Psychiatry Clinic ([facility])    [address] [facility] [address] [phone]              Who to contact     Please call your clinic at [phone] to:    Ask questions about your health    Make or cancel appointments    Discuss your medicines    Learn about your test results    Speak to your doctor   If you have compliments or concerns about an experience at your clinic, or if you wish to file a complaint, please contact [facility] Patient Relations at [phone] or email us at [email or web address]         Additional Information About Your Visit        MyChart Information     [employer] gives you secure access to your electronic health record. If you see a primary care provider, you can also send messages to your care team and make appointments. If you have questions, please call your primary care clinic.  If you do not have a primary care provider, please call [phone] and they will assist you.      [employer] is an electronic gateway that provides easy, online access to your medical records. With [employer], you can request a clinic appointment, read your test results, renew a prescription or communicate with your care  team.     To access your existing account, please contact your [facility] or call [phone] for assistance.        Care EveryWhere ID     This is your Care EveryWhere ID. This could be used by other organizations to access your [facility] medical records  [id number]        Your Vitals Were     Pulse BMI (Body Mass Index)                78 25.01 kg/m2           Blood Pressure from Last 3 Encounters:   09/12/17 125/85   04/17/17 130/87   03/21/17 (!) 138/95    Weight from Last 3 Encounters:   09/12/17 83.6 kg (184 lb 6.4 oz)   04/17/17 80.3 kg (177 lb)   03/21/17 81.1 kg (178 lb 14.4 oz)              Today, you had the following     No orders found for display         Today's Medication Changes          These changes are accurate as of: 9/12/17 11:59 PM.  If you have any questions, ask your nurse or doctor.               These medicines have changed or have updated prescriptions.        Dose/Directions    mirtazapine 45 MG tablet   Commonly known as:  REMERON   This may have changed:  additional instructions   Used for:  Generalized anxiety disorder   Changed by:  Kaylin Rajan APRN CNS        Dose:  45 mg   Take 1 tablet (45 mg) by mouth At Bedtime And may take 1/2 tablet for insomnia up to 3 times a week.   Quantity:  37 tablet   Refills:  5            Where to get your medicines      These medications were sent to [facility] [place], [place] - [address] [facility] [address] [address]    Hours:  TRANSPLANT PHONE NUMBER [phone] Phone:  [phone]     mirtazapine 45 MG tablet         Some of these will need a paper prescription and others can be bought over the counter.  Ask your nurse if you have questions.     You don't need a prescription for these medications     diazepam 5 MG tablet                Primary Care Provider Office Phone # Fax #    Katie Ramos [phone] [address]  [facility] [address]        Equal Access to Services     ANDREW FLANAGAN : Hadii violette ku hadjayo Somaicoali, waaxda luqadaha, qaybta kaalmada lloyd huddlestonadelsoaneesh blum. So [facility] [phone].    ATENCIÓN: Si habla español, tiene a hernadez disposición servicios gratuitos de asistencia lingüística. Perry al [phone].    We comply with applicable federal civil rights laws and [place] laws. We do not discriminate on the basis of race, color, national origin, age, disability, sex, sexual orientation, or gender identity.            Thank you!     Thank you for choosing PSYCHIATRY CLINIC  for your care. Our goal is always to provide you with excellent care. Hearing back from our patients is one way we can continue to improve our services. Please take a few minutes to complete the written survey that you may receive in the mail after your visit with us. Thank you!             Your Updated Medication List - Protect others around you: Learn how to safely use, store and throw away your medicines at www.disposemymeds.org.          This list is accurate as of: 9/12/17 11:59 PM.  Always use your most recent med list.                   Brand Name Dispense Instructions for use Diagnosis    amLODIPine 5 MG tablet    NORVASC    30 tablet    Take 1 tablet (5 mg) by mouth daily    Essential hypertension with goal blood pressure less than 140/90       codeine 30 MG tablet     168 tablet    Take 2 tablets (60 mg) by mouth every 6 hours as needed for moderate to severe pain (up to 8 tabs in 24 hours)    Hemophilic arthropathy       diazepam 5 MG tablet    VALIUM    30 tablet    Take up to one tablet by mouth daily as needed for anxiety    Depressive disorder       lisinopril 40 MG tablet    PRINIVIL/ZESTRIL    90 tablet    Take 1 tablet (40 mg) by mouth daily    Essential hypertension with goal blood pressure less than 140/90       mirtazapine 45 MG tablet    REMERON    37 tablet    Take 1 tablet (45 mg) by  mouth At Bedtime And may take 1/2 tablet for insomnia up to 3 times a week.    Generalized anxiety disorder       predniSONE 20 MG tablet    DELTASONE    8 tablet    Take two 20 mg tablets daily for 4 days    Acute cystitis with hematuria

## 2017-09-13 ENCOUNTER — TELEPHONE (OUTPATIENT)
Dept: PSYCHIATRY | Facility: CLINIC | Age: 67
End: 2017-09-13

## 2017-09-15 ENCOUNTER — TELEPHONE (OUTPATIENT)
Dept: PSYCHIATRY | Facility: CLINIC | Age: 67
End: 2017-09-15

## 2017-09-15 NOTE — TELEPHONE ENCOUNTER
-Returned call to Sofía  -Relayed writer has not rec'd PA information  -Verified that she has our correct fax number  -She will fax again as she has partially completed form

## 2017-09-15 NOTE — TELEPHONE ENCOUNTER
----- Message from Mely Stewart sent at 9/15/2017  9:13 AM CDT -----  Regarding: Prior Authorization/Mohini  Contact: [phone]  Sof[facility] pharmacist called to check the status on the Prior Authorization form sent over a couple days ago for pt's Remeron 45 mg tablet.     Please follow up with pharmacy when you can.    Thanks!

## 2017-09-20 NOTE — TELEPHONE ENCOUNTER
-Rec'd fax from [employer]  -PA approved 6/17/17 - 9/15/18  -Pharmacy notified  -Pt notified via phone  -PA forms placed in scanning

## 2017-09-25 DIAGNOSIS — D66 SEVERE HEMOPHILIA A (H): ICD-10-CM

## 2017-09-25 RX ORDER — ANTIHEMOPHILIC FACTOR (RECOMBINANT) 1000 (+/-)
KIT INTRAVENOUS
Qty: 13200 EACH | Refills: 3 | Status: SHIPPED | OUTPATIENT
Start: 2017-09-25 | End: 2017-10-23

## 2017-10-16 DIAGNOSIS — D66 HEMOPHILIC ARTHROPATHY (H): ICD-10-CM

## 2017-10-16 DIAGNOSIS — M36.2 HEMOPHILIC ARTHROPATHY (H): ICD-10-CM

## 2017-10-16 RX ORDER — CODEINE SULFATE 30 MG/1
60 TABLET ORAL EVERY 6 HOURS PRN
Qty: 168 TABLET | Refills: 0 | Status: SHIPPED | OUTPATIENT
Start: 2017-10-16 | End: 2017-11-27

## 2017-10-20 DIAGNOSIS — I10 ESSENTIAL HYPERTENSION WITH GOAL BLOOD PRESSURE LESS THAN 140/90: ICD-10-CM

## 2017-10-23 DIAGNOSIS — D66 SEVERE HEMOPHILIA A (H): ICD-10-CM

## 2017-10-23 RX ORDER — ANTIHEMOPHILIC FACTOR (RECOMBINANT) 1000 (+/-)
KIT INTRAVENOUS
Qty: 13200 EACH | Refills: 3 | Status: SHIPPED | OUTPATIENT
Start: 2017-10-23 | End: 2017-11-20

## 2017-10-23 RX ORDER — LISINOPRIL 40 MG/1
40 TABLET ORAL DAILY
Qty: 30 TABLET | Refills: 0 | Status: SHIPPED | OUTPATIENT
Start: 2017-10-23 | End: 2017-11-08

## 2017-10-23 NOTE — TELEPHONE ENCOUNTER
Patient 6 months overdue for annual labs  1 month supply pended  Routing to provider and to scheduling  Labs pended.  Amlodipine refill from last month asked patient to schedule an annual exam  No appointment has been scheduled  Erikase Roger RN

## 2017-11-06 NOTE — PROGRESS NOTES
SUBJECTIVE:  Orlin Alcantar, a 67 year old male, is here to discuss the following issues:     HYPERTENSION FOLLOW-UP    Current medication regimen includes amlodipine 5 and lisinopril 40.   Patient reports no problems or side effects with the medication.  Headaches:  No  Dizziness: No  Patient does not check blood pressure outside of clinic.          Problem list and histories reviewed & updated, as indicated.  Patient Active Problem List   Diagnosis     History of total hip arthroplasty     Hemophilia (H)     Pain in joint, pelvic region and thigh     Anxiety state     Depressive disorder, not elsewhere classified     ACP (advance care planning)     Hypertension goal BP (blood pressure) < 140/90     Mood disorder in conditions classified elsewhere     Hepatitis C, chronic (H)       BP Readings from Last 3 Encounters:   11/08/17 121/84   09/12/17 125/85   04/17/17 130/87    Wt Readings from Last 3 Encounters:   11/08/17 180 lb (81.6 kg)   09/12/17 184 lb 6.4 oz (83.6 kg)   04/17/17 177 lb (80.3 kg)          ROS:  RESP: NEGATIVE for shortness of breath  CV: NEGATIVE for chest pain        OBJECTIVE:    /84  Pulse 93  Temp 97.9  F (36.6  C) (Tympanic)  Resp 16  Wt 180 lb (81.6 kg)  SpO2 96%  BMI 24.41 kg/m2  GEN:  no apparent distress  LUNGS:  normal respiratory effort, and lungs clear to auscultation bilaterally - no rales, rhonchi or wheezes  CV: regular rate and rhythm, normal S1 S2, no S3 or S4 and no murmur, click or rub       ASSESSMENT/PLAN:    ICD-10-CM    1. Hypertension goal BP (blood pressure) < 140/90 I10 Basic metabolic panel     lisinopril (PRINIVIL/ZESTRIL) 40 MG tablet     amLODIPine (NORVASC) 5 MG tablet     Hypertension is stable/well controlled on current regimen.  I offered immunizations today which he declined.  Return to Clinic in 1 year.        Katie Ramos MD   [facility]

## 2017-11-08 ENCOUNTER — OFFICE VISIT (OUTPATIENT)
Dept: FAMILY MEDICINE | Facility: CLINIC | Age: 67
End: 2017-11-08
Payer: COMMERCIAL

## 2017-11-08 VITALS
HEART RATE: 93 BPM | WEIGHT: 180 LBS | OXYGEN SATURATION: 96 % | DIASTOLIC BLOOD PRESSURE: 84 MMHG | SYSTOLIC BLOOD PRESSURE: 121 MMHG | TEMPERATURE: 97.9 F | BODY MASS INDEX: 24.41 KG/M2 | RESPIRATION RATE: 16 BRPM

## 2017-11-08 DIAGNOSIS — I10 HYPERTENSION GOAL BP (BLOOD PRESSURE) < 140/90: Primary | ICD-10-CM

## 2017-11-08 PROCEDURE — 99213 OFFICE O/P EST LOW 20 MIN: CPT | Performed by: FAMILY MEDICINE

## 2017-11-08 PROCEDURE — 80048 BASIC METABOLIC PNL TOTAL CA: CPT | Performed by: FAMILY MEDICINE

## 2017-11-08 PROCEDURE — 36415 COLL VENOUS BLD VENIPUNCTURE: CPT | Performed by: FAMILY MEDICINE

## 2017-11-08 RX ORDER — LISINOPRIL 40 MG/1
40 TABLET ORAL DAILY
Qty: 90 TABLET | Refills: 3 | Status: ON HOLD | OUTPATIENT
Start: 2017-11-08 | End: 2018-08-06

## 2017-11-08 RX ORDER — AMLODIPINE BESYLATE 5 MG/1
5 TABLET ORAL DAILY
Qty: 90 TABLET | Refills: 3 | Status: SHIPPED | OUTPATIENT
Start: 2017-11-08 | End: 2019-01-11

## 2017-11-08 ASSESSMENT — ANXIETY QUESTIONNAIRES
1. FEELING NERVOUS, ANXIOUS, OR ON EDGE: NOT AT ALL
5. BEING SO RESTLESS THAT IT IS HARD TO SIT STILL: NOT AT ALL
6. BECOMING EASILY ANNOYED OR IRRITABLE: NOT AT ALL
7. FEELING AFRAID AS IF SOMETHING AWFUL MIGHT HAPPEN: NOT AT ALL
2. NOT BEING ABLE TO STOP OR CONTROL WORRYING: NOT AT ALL
3. WORRYING TOO MUCH ABOUT DIFFERENT THINGS: NOT AT ALL
GAD7 TOTAL SCORE: 0
IF YOU CHECKED OFF ANY PROBLEMS ON THIS QUESTIONNAIRE, HOW DIFFICULT HAVE THESE PROBLEMS MADE IT FOR YOU TO DO YOUR WORK, TAKE CARE OF THINGS AT HOME, OR GET ALONG WITH OTHER PEOPLE: NOT DIFFICULT AT ALL

## 2017-11-08 ASSESSMENT — PATIENT HEALTH QUESTIONNAIRE - PHQ9
SUM OF ALL RESPONSES TO PHQ QUESTIONS 1-9: 0
5. POOR APPETITE OR OVEREATING: NOT AT ALL

## 2017-11-08 NOTE — MR AVS SNAPSHOT
After Visit Summary   11/8/2017    Orlin Alcantar    MRN: [id number]           Patient Information     Date Of Birth          1950        Visit Information        Provider Department      11/8/2017 10:20 AM Katie Ramos MD [facility] [place]        Today's Diagnoses     Hypertension goal BP (blood pressure) < 140/90    -  1    Essential hypertension with goal blood pressure less than 140/90           Follow-ups after your visit        Your next 10 appointments already scheduled     Mar 13, 2018 10:15 AM CDT   Adult Med Follow UP with JELLY Chapa CNS   Psychiatry Clinic ([facility])    [address]  [address] [facility] [address] [phone]              Who to contact     If you have questions or need follow up information about today's clinic visit or your schedule please contact [facility] directly at [phone].  Normal or non-critical lab and imaging results will be communicated to you by MyChart, letter or phone within 4 business days after the clinic has received the results. If you do not hear from us within 7 days, please contact the clinic through [employer] or phone. If you have a critical or abnormal lab result, we will notify you by phone as soon as possible.  Submit refill requests through [employer] or call your pharmacy and they will forward the refill request to us. Please allow 3 business days for your refill to be completed.          Additional Information About Your Visit        MyChart Information     [employer] gives you secure access to your electronic health record. If you see a primary care provider, you can also send messages to your care team and make appointments. If you have questions, please call your primary care clinic.  If you do not have a primary care provider, please call [phone] and they will assist you.        Care EveryWhere ID     This is your Care EveryWhere ID. This  could be used by other organizations to access your [place] medical records  [id number]        Your Vitals Were     Pulse Temperature Respirations Pulse Oximetry BMI (Body Mass Index)       93 97.9  F (36.6  C) (Tympanic) 16 96% 24.41 kg/m2        Blood Pressure from Last 3 Encounters:   11/08/17 121/84   09/12/17 125/85   04/17/17 130/87    Weight from Last 3 Encounters:   11/08/17 180 lb (81.6 kg)   09/12/17 184 lb 6.4 oz (83.6 kg)   04/17/17 177 lb (80.3 kg)              We Performed the Following     Basic metabolic panel          Today's Medication Changes          These changes are accurate as of: 11/8/17 11:06 AM.  If you have any questions, ask your nurse or doctor.               Stop taking these medicines if you haven't already. Please contact your care team if you have questions.     predniSONE 20 MG tablet   Commonly known as:  DELTASONE   Stopped by:  Katie Ramos MD                Where to get your medicines      These medications were sent to [place], [place] - [address] [facility]  [address] [address]    Hours:  TRANSPLANT PHONE NUMBER [phone] Phone:  [phone]     amLODIPine 5 MG tablet    lisinopril 40 MG tablet                Primary Care Provider Office Phone # Fax #    Katie Ramos [phone]       [address]  [facility] [address]        Equal Access to Services     ANDREW FLANAGAN AH: Hadii aad ku hadasho Soomaali, waaxda luqadaha, qaybta kaalmada adeegyada, waxay bradley haymarj blum. So [facility] [phone].    ATENCIÓN: Si habla español, tiene a hernadez disposición servicios gratuitos de asistencia lingüística. Llame al [phone].    We comply with applicable federal civil rights laws and [place] laws. We do not discriminate on the basis of race, color, national origin, age, disability, sex, sexual orientation, or gender identity.            Thank you!     Thank you for  choosing [facility]  for your care. Our goal is always to provide you with excellent care. Hearing back from our patients is one way we can continue to improve our services. Please take a few minutes to complete the written survey that you may receive in the mail after your visit with us. Thank you!             Your Updated Medication List - Protect others around you: Learn how to safely use, store and throw away your medicines at www.disposemymeds.org.          This list is accurate as of: 11/8/17 11:06 AM.  Always use your most recent med list.                   Brand Name Dispense Instructions for use Diagnosis    amLODIPine 5 MG tablet    NORVASC    90 tablet    Take 1 tablet (5 mg) by mouth daily    Essential hypertension with goal blood pressure less than 140/90       codeine 30 MG tablet     168 tablet    Take 2 tablets (60 mg) by mouth every 6 hours as needed for moderate to severe pain (up to 8 tabs in 24 hours)    Hemophilic arthropathy       diazepam 5 MG tablet    VALIUM    30 tablet    Take up to one tablet by mouth daily as needed for anxiety    Depressive disorder       KOGENATE FS 1000 UNITS Kit     77980 each    Infuse 2000 to 3000 units (-5/+10%) IV 2 to 3 times a week and as needed for bleeding episodes    Severe hemophilia A (H)       lisinopril 40 MG tablet    PRINIVIL/ZESTRIL    90 tablet    Take 1 tablet (40 mg) by mouth daily    Essential hypertension with goal blood pressure less than 140/90       mirtazapine 45 MG tablet    REMERON    37 tablet    Take 1 tablet (45 mg) by mouth At Bedtime And may take 1/2 tablet for insomnia up to 3 times a week.    Generalized anxiety disorder

## 2017-11-08 NOTE — NURSING NOTE
Chief Complaint   Patient presents with     Hypertension       Initial /84  Pulse 93  Temp 97.9  F (36.6  C) (Tympanic)  Resp 16  Wt 180 lb (81.6 kg)  SpO2 96%  BMI 24.41 kg/m2 Estimated body mass index is 24.41 kg/(m^2) as calculated from the following:    Height as of 4/17/17: 6' (1.829 m).    Weight as of this encounter: 180 lb (81.6 kg).  Medication Reconciliation: complete     Sakina Haney MA

## 2017-11-09 LAB
ANION GAP SERPL CALCULATED.3IONS-SCNC: 8 MMOL/L (ref 3–14)
BUN SERPL-MCNC: 15 MG/DL (ref 7–30)
CALCIUM SERPL-MCNC: 9.2 MG/DL (ref 8.5–10.1)
CHLORIDE SERPL-SCNC: 106 MMOL/L (ref 94–109)
CO2 SERPL-SCNC: 27 MMOL/L (ref 20–32)
CREAT SERPL-MCNC: 0.76 MG/DL (ref 0.66–1.25)
GFR SERPL CREATININE-BSD FRML MDRD: >90 ML/MIN/1.7M2
GLUCOSE SERPL-MCNC: 83 MG/DL (ref 70–99)
POTASSIUM SERPL-SCNC: 4.3 MMOL/L (ref 3.4–5.3)
SODIUM SERPL-SCNC: 141 MMOL/L (ref 133–144)

## 2017-11-09 ASSESSMENT — ANXIETY QUESTIONNAIRES: GAD7 TOTAL SCORE: 0

## 2017-11-09 NOTE — PROGRESS NOTES
Christoph Ordaz,  Your basic metabolic panel results (blood salts, blood sugar, and kidney function) are nice and stable.     Katie Ramos MD

## 2017-11-20 DIAGNOSIS — D66 SEVERE HEMOPHILIA A (H): ICD-10-CM

## 2017-11-20 RX ORDER — ANTIHEMOPHILIC FACTOR (RECOMBINANT) 1000 (+/-)
KIT INTRAVENOUS
Qty: 13200 EACH | Refills: 3 | Status: SHIPPED | OUTPATIENT
Start: 2017-11-20 | End: 2017-12-18

## 2017-11-27 DIAGNOSIS — M36.2 HEMOPHILIC ARTHROPATHY (H): ICD-10-CM

## 2017-11-27 DIAGNOSIS — F32.A DEPRESSIVE DISORDER: ICD-10-CM

## 2017-11-27 DIAGNOSIS — D66 HEMOPHILIC ARTHROPATHY (H): ICD-10-CM

## 2017-11-27 RX ORDER — CODEINE SULFATE 30 MG/1
60 TABLET ORAL EVERY 6 HOURS PRN
Qty: 168 TABLET | Refills: 0 | Status: ON HOLD | OUTPATIENT
Start: 2017-12-04 | End: 2018-08-06

## 2017-12-12 ENCOUNTER — TELEPHONE (OUTPATIENT)
Dept: HEMATOLOGY | Facility: CLINIC | Age: 67
End: 2017-12-12

## 2017-12-12 DIAGNOSIS — D66 SEVERE HEMOPHILIA A (H): Primary | ICD-10-CM

## 2017-12-12 DIAGNOSIS — R31.9 HEMATURIA, UNSPECIFIED TYPE: ICD-10-CM

## 2017-12-12 RX ORDER — ANTIHEMOPHILIC FACTOR (RECOMBINANT) 2000 (+/-)
KIT INTRAVENOUS
Qty: 3000 UNITS | Refills: 0 | Status: SHIPPED | OUTPATIENT
Start: 2017-12-12 | End: 2018-01-02 | Stop reason: ALTCHOICE

## 2017-12-12 RX ORDER — PREDNISONE 20 MG/1
TABLET ORAL
Qty: 8 TABLET | Refills: 0 | Status: SHIPPED | OUTPATIENT
Start: 2017-12-12 | End: 2017-12-22

## 2017-12-13 NOTE — TELEPHONE ENCOUNTER
Mr. 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 On Monday the urine was a darker red so he infused 3000 units of Kogenate at 2 pm. He reports no pain on urination or difficulty voiding. For several hours after the infusion he urine color improved. Prior to retiring for the evening he treated again with 1000 units of Kogenate to avoid further hematuria. At 3 am his void was again dark andres so infused another 2000 units. This again improved the hematuria. He called the Center this morning to discuss further treatment. After discussion with Dr. Jiang the following plan was decided on:  1. He should hydrate aggressively and minimize physical activity.  2. He should infuse 3,000 units of Kogenate every 12 hours for two doses beginning early this afternoon.   3. He was prescribed 40 mg Prednisone daily for 4 days.   He will follow up with the Center tomorrow with an update.  (Prescriptions were sent to facilitate the plan).

## 2017-12-14 ENCOUNTER — TELEPHONE (OUTPATIENT)
Dept: HEMATOLOGY | Facility: CLINIC | Age: 67
End: 2017-12-14

## 2017-12-14 DIAGNOSIS — D66 SEVERE HEMOPHILIA A (H): Primary | ICD-10-CM

## 2017-12-14 DIAGNOSIS — R31.9 HEMATURIA: ICD-10-CM

## 2017-12-14 RX ORDER — ANTIHEMOPHILIC FACTOR (RECOMBINANT) 1000 (+/-)
KIT INTRAVENOUS
Qty: 4000 UNITS | Refills: 0 | Status: SHIPPED | OUTPATIENT
Start: 2017-12-14 | End: 2017-12-15

## 2017-12-14 NOTE — TELEPHONE ENCOUNTER
Mr. Cavanaugh reports that the hematuria has significantly improved. No pain with urination or frequency and no fever.  After discussion with Clement Griffin PA-C the course of prednisone (40 mg x 4 days) prescribed should be completed and the factor infusions should decrease to 2000 units of Kogenate every 24 hours.  He will continue to update the Center.

## 2017-12-15 RX ORDER — ANTIHEMOPHILIC FACTOR (RECOMBINANT) 1000 (+/-)
KIT INTRAVENOUS
Qty: 9000 UNITS | Refills: 0 | Status: SHIPPED | OUTPATIENT
Start: 2017-12-15 | End: 2018-01-02 | Stop reason: ALTCHOICE

## 2017-12-15 NOTE — TELEPHONE ENCOUNTER
I received another call from  Mao that he again passed another clot and there was visible blood in the urine. After discussion with Dr. Jiang it was decided that he should continue with rest, hydration, and infuse 3000 units of Kogenate every morning times 3 days. He will update the Center on Monday.

## 2017-12-18 DIAGNOSIS — D66 SEVERE HEMOPHILIA A (H): ICD-10-CM

## 2017-12-18 RX ORDER — ANTIHEMOPHILIC FACTOR (RECOMBINANT) 1000 (+/-)
KIT INTRAVENOUS
Qty: 15400 EACH | Refills: 1 | Status: SHIPPED | OUTPATIENT
Start: 2017-12-18 | End: 2018-01-02 | Stop reason: ALTCHOICE

## 2017-12-22 DIAGNOSIS — D66 SEVERE HEMOPHILIA A (H): Primary | ICD-10-CM

## 2017-12-22 DIAGNOSIS — F41.1 GENERALIZED ANXIETY DISORDER: ICD-10-CM

## 2017-12-22 DIAGNOSIS — R31.9 HEMATURIA, UNSPECIFIED TYPE: ICD-10-CM

## 2017-12-22 RX ORDER — PREDNISONE 20 MG/1
TABLET ORAL
Qty: 8 TABLET | Refills: 0 | Status: SHIPPED | OUTPATIENT
Start: 2017-12-22 | End: 2018-02-07

## 2017-12-22 RX ORDER — ANTIHEMOPHILIC FACTOR RECOMBINANT 1000 (+/-)
KIT INTRAVENOUS
Qty: 9000 EACH | Refills: 0 | Status: SHIPPED | OUTPATIENT
Start: 2017-12-22 | End: 2017-12-29

## 2017-12-22 NOTE — TELEPHONE ENCOUNTER
Mr. Cavanaugh continues to have concern for periodic hematuria . After discussion with Dr. Jiang it was determined that he should pursue evaluation by urology as soon as possible. I spoke with Mr. Cavanaugh who is in agreement with this plan.

## 2017-12-26 ENCOUNTER — TELEPHONE (OUTPATIENT)
Dept: UROLOGY | Facility: CLINIC | Age: 67
End: 2017-12-26

## 2017-12-26 DIAGNOSIS — R31.0 GROSS HEMATURIA: Primary | ICD-10-CM

## 2017-12-26 NOTE — TELEPHONE ENCOUNTER
----- Message from Mely Smith PA-C sent at 12/26/2017  1:50 PM CST -----  Christoph Garcia,    He needs to see one of the urologists for a cystoscopy (looks like he didn't follow through after our last visit). He's had everything else done (CT, cytology, etc.) so doesn't need to see me at this time.     As the cysto will likely be booked out a few weeks, would recommend he get in for a UA/UC at any [place] lab to ensure he doesn't have a UTI. I can follow up on these results via phone/MyChart.    Thanks!  Mely Smith PA-C  ----- Message -----     From: Radha Hardy LPN     Sent: 12/26/2017   9:52 AM       To: Meyl Smith PA-C    Patient has hematuria on and off for weeks gross hematuria  He has hemophilia  You saw him in April but needs help now asa.  They have been changing his meds but not helping.when can you see him Radha Hardy LPN Staff Nurse

## 2017-12-26 NOTE — TELEPHONE ENCOUNTER
Called patient he will not do it thru our lab is going to his PMD where he gets his blood work done. Refused to set up appt with urologist at this time. Radha Hardy LPN Staff Nurse

## 2017-12-26 NOTE — TELEPHONE ENCOUNTER
Patient 's care giver called stating he is having on and off hematuria and needs follow up  Mely had a plan in place patient usually does not follow thur. Radha Hardy LPN Staff Nurse

## 2017-12-28 ENCOUNTER — OFFICE VISIT (OUTPATIENT)
Dept: HEMATOLOGY | Facility: CLINIC | Age: 67
End: 2017-12-28
Payer: MEDICARE

## 2017-12-28 ENCOUNTER — TELEPHONE (OUTPATIENT)
Dept: UROLOGY | Facility: CLINIC | Age: 67
End: 2017-12-28

## 2017-12-28 VITALS
HEIGHT: 70 IN | RESPIRATION RATE: 12 BRPM | OXYGEN SATURATION: 99 % | HEART RATE: 133 BPM | DIASTOLIC BLOOD PRESSURE: 86 MMHG | TEMPERATURE: 97.4 F | WEIGHT: 168.9 LBS | SYSTOLIC BLOOD PRESSURE: 120 MMHG | BODY MASS INDEX: 24.18 KG/M2

## 2017-12-28 DIAGNOSIS — R31.0 GROSS HEMATURIA: ICD-10-CM

## 2017-12-28 LAB
ALBUMIN UR-MCNC: 30 MG/DL
APPEARANCE UR: ABNORMAL
BACTERIA #/AREA URNS HPF: ABNORMAL /HPF
BILIRUB UR QL STRIP: NEGATIVE
COLOR UR AUTO: YELLOW
GLUCOSE UR STRIP-MCNC: NEGATIVE MG/DL
HGB UR QL STRIP: ABNORMAL
KETONES UR STRIP-MCNC: NEGATIVE MG/DL
LEUKOCYTE ESTERASE UR QL STRIP: ABNORMAL
MUCOUS THREADS #/AREA URNS LPF: PRESENT /LPF
NITRATE UR QL: NEGATIVE
PH UR STRIP: 6 PH (ref 5–7)
RBC #/AREA URNS AUTO: 87 /HPF (ref 0–2)
SOURCE: ABNORMAL
SP GR UR STRIP: 1.01 (ref 1–1.03)
SQUAMOUS #/AREA URNS AUTO: <1 /HPF (ref 0–1)
UROBILINOGEN UR STRIP-MCNC: NORMAL MG/DL (ref 0–2)
WBC #/AREA URNS AUTO: >182 /HPF (ref 0–2)
WBC CLUMPS #/AREA URNS HPF: PRESENT /HPF

## 2017-12-28 PROCEDURE — 88112 CYTOPATH CELL ENHANCE TECH: CPT | Mod: 26 | Performed by: PHYSICIAN ASSISTANT

## 2017-12-28 PROCEDURE — 81001 URINALYSIS AUTO W/SCOPE: CPT | Performed by: PHYSICIAN ASSISTANT

## 2017-12-28 PROCEDURE — 87086 URINE CULTURE/COLONY COUNT: CPT | Performed by: PHYSICIAN ASSISTANT

## 2017-12-28 PROCEDURE — 88112 CYTOPATH CELL ENHANCE TECH: CPT | Performed by: PHYSICIAN ASSISTANT

## 2017-12-28 ASSESSMENT — PAIN SCALES - GENERAL: PAINLEVEL: NO PAIN (0)

## 2017-12-28 NOTE — MR AVS SNAPSHOT
After Visit Summary   12/28/2017    Orlin Alcantar    MRN: [id number]           Patient Information     Date Of Birth          1950        Visit Information        Provider Department      12/28/2017 10:30 AM Clement Griffin PA-C [facility]        Today's Diagnoses     Gross hematuria           Follow-ups after your visit        Your next 10 appointments already scheduled     Mar 13, 2018 10:15 AM CDT   Adult Med Follow UP with JELLY Chapa CNS   Psychiatry Clinic ([facility])    [address]  [address] [facility] [id number] [phone]              Who to contact     If you have questions or need follow up information about today's clinic visit or your schedule please contact [facility] directly at [phone].  Normal or non-critical lab and imaging results will be communicated to you by [employer], letter or phone within 4 business days after the clinic has received the results. If you do not hear from us within 7 days, please contact the clinic through [employer] or phone. If you have a critical or abnormal lab result, we will notify you by phone as soon as possible.  Submit refill requests through [employer] or call your pharmacy and they will forward the refill request to us. Please allow 3 business days for your refill to be completed.          Additional Information About Your Visit        MyChart Information     [employer] gives you secure access to your electronic health record. If you see a primary care provider, you can also send messages to your care team and make appointments. If you have questions, please call your primary care clinic.  If you do not have a primary care provider, please call [phone] and they will assist you.        Care EveryWhere ID     This is your Care EveryWhere ID. This could be used by other organizations to access your [facility]  "records  [id number]        Your Vitals Were     Pulse Temperature Respirations Height Pulse Oximetry BMI (Body Mass Index)    133 97.4  F (36.3  C) (Oral) 12 1.783 m (5' 10.2\") 99% 24.1 kg/m2       Blood Pressure from Last 3 Encounters:   12/28/17 120/86   11/08/17 121/84   04/17/17 130/87    Weight from Last 3 Encounters:   12/28/17 76.6 kg (168 lb 14.4 oz)   11/08/17 81.6 kg (180 lb)   04/17/17 80.3 kg (177 lb)              We Performed the Following     Cytology non gyn     Routine UA with microscopic - No culture     Urine Culture Aerobic Bacterial        Primary Care Provider Office Phone # Fax #    Katie Ramos [phone] [address]  [facility] [address]        Equal Access to Services     [facility]: Hadii violette Colmenares, waaxda luqadaha, qaybta kaalmada adesid, lloyd rene . So [facility] [phone].    ATENCIÓN: Si habla español, tiene a hernadez disposición servicios gratuitos de asistencia lingüística. Llame al [phone].    We comply with applicable federal civil rights laws and [place] laws. We do not discriminate on the basis of race, color, national origin, age, disability, sex, sexual orientation, or gender identity.            Thank you!     Thank you for choosing [facility]  for your care. Our goal is always to provide you with excellent care. Hearing back from our patients is one way we can continue to improve our services. Please take a few minutes to complete the written survey that you may receive in the mail after your visit with us. Thank you!             Your Updated Medication List - Protect others around you: Learn how to safely use, store and throw away your medicines at www.disposemymeds.org.          This list is accurate as of: 12/28/17 11:59 PM.  Always use your most recent med list.                   Brand Name Dispense Instructions for use Diagnosis    amLODIPine 5 MG tablet    " NORVASC    90 tablet    Take 1 tablet (5 mg) by mouth daily    Hypertension goal BP (blood pressure) < 140/90       codeine 30 MG tablet     168 tablet    Take 2 tablets (60 mg) by mouth every 6 hours as needed for moderate to severe pain (up to 8 tabs in 24 hours)    Hemophilic arthropathy       diazepam 5 MG tablet    VALIUM    30 tablet    Take up to one tablet by mouth daily as needed for anxiety    Depressive disorder       * KOGENATE FS 2000 UNITS Kit     3000 Units    Infuse 3000 units Kogenate every 12 hours for gross hematuria    Severe hemophilia A (H), Hematuria, unspecified type       * KOGENATE FS 1000 UNITS Kit     9000 Units    [id number] units of Kogenate (-5+10%) every 24 hours x 3 days) for Hematuria    Severe hemophilia A (H), Hematuria       * KOGENATE FS 1000 UNITS Kit     11911 each    Infuse 2000 to 3000 units (-5/+10%) IV 2 to 3 times a week and as needed for bleeding episodes    Severe hemophilia A (H)       lisinopril 40 MG tablet    PRINIVIL/ZESTRIL    90 tablet    Take 1 tablet (40 mg) by mouth daily    Hypertension goal BP (blood pressure) < 140/90       mirtazapine 45 MG tablet    REMERON    37 tablet    Take 1 tablet (45 mg) by mouth At Bedtime And may take 1/2 tablet for insomnia up to 3 times a week.    Generalized anxiety disorder       predniSONE 20 MG tablet    DELTASONE    8 tablet    Take 2 tablets daily for 4 days    Severe hemophilia A (H), Hematuria, unspecified type       * Notice:  This list has 3 medication(s) that are the same as other medications prescribed for you. Read the directions carefully, and ask your doctor or other care provider to review them with you.

## 2017-12-28 NOTE — TELEPHONE ENCOUNTER
----- Message from Mely Smith PA-C sent at 12/27/2017  9:12 AM CST -----  ThanksAudi. Please make sure all of this gets documented in his chart. Recommendation is for cystoscopy for intravesical examination given ongoing gross hematuria but patient refusing to schedule appointment with urologist.    Mely Smith PA-C  Urology  ----- Message -----     From: Audi Hardy LPN     Sent: 12/26/2017   1:54 PM       To: Mely Smith PA-C    Well he already has refused to come here for uauc he is going to his lab where he drawns all of his blood work  Refused to make an appt to see urologist as he did this in April too audi  ----- Message -----     From: Mely Smith PA-C     Sent: 12/26/2017   1:50 PM       To: JESSIKA Hartman,    He needs to see one of the urologists for a cystoscopy (looks like he didn't follow through after our last visit). He's had everything else done (CT, cytology, etc.) so doesn't need to see me at this time.     As the cysto will likely be booked out a few weeks, would recommend he get in for a UA/UC at any [place] lab to ensure he doesn't have a UTI. I can follow up on these results via phone/MyChart.    Thanks!  Mely Smith PA-C  ----- Message -----     From: Audi Hardy LPN     Sent: 12/26/2017   9:52 AM       To: Mely Smith PA-C    Patient has hematuria on and off for weeks gross hematuria  He has hemophilia  You saw him in April but needs help now asap.  They have been changing his meds but not helping.when can you see him Audi Hardy LPN Staff Nurse

## 2017-12-29 DIAGNOSIS — D66 SEVERE HEMOPHILIA A (H): ICD-10-CM

## 2017-12-29 DIAGNOSIS — R31.9 HEMATURIA: ICD-10-CM

## 2017-12-29 LAB
BACTERIA SPEC CULT: NO GROWTH
Lab: NORMAL
SPECIMEN SOURCE: NORMAL

## 2017-12-29 RX ORDER — ANTIHEMOPHILIC FACTOR RECOMBINANT 1000 (+/-)
KIT INTRAVENOUS
Qty: 6000 EACH | Refills: 0 | Status: SHIPPED | OUTPATIENT
Start: 2017-12-29 | End: 2018-01-02 | Stop reason: ALTCHOICE

## 2018-01-02 DIAGNOSIS — D66 SEVERE HEMOPHILIA A (H): ICD-10-CM

## 2018-01-02 LAB — COPATH REPORT: NORMAL

## 2018-01-02 RX ORDER — ANTIHEMOPHILIC FACTOR (RECOMBINANT) 1000 (+/-)
KIT INTRAVENOUS
Qty: 13200 EACH | Refills: 1 | Status: SHIPPED | OUTPATIENT
Start: 2018-01-02 | End: 2018-01-15

## 2018-01-02 NOTE — NURSING NOTE
Patient with severe Hemophilia A and persistent hematuria here for urine culture and urine cytology. Samples obtained.

## 2018-01-03 ENCOUNTER — TELEPHONE (OUTPATIENT)
Dept: UROLOGY | Facility: CLINIC | Age: 68
End: 2018-01-03

## 2018-01-03 ENCOUNTER — PRE VISIT (OUTPATIENT)
Dept: UROLOGY | Facility: CLINIC | Age: 68
End: 2018-01-03

## 2018-01-03 DIAGNOSIS — R31.0 GROSS HEMATURIA: Primary | ICD-10-CM

## 2018-01-03 NOTE — TELEPHONE ENCOUNTER
Attempted to contact patient via phone regarding results of urine cytology test which are positive for high grade urothelial carcinoma. Unable to reach by phone and unable to leave a message. Results will be sent via [employer] as well as this is listed as his preferred method of communication.     Patient initially seen in April 2017 at which he was recommended to undergo cystoscopy but never followed up. Urine cytology at that time was negative. As he continues to have gross hematuria, cytology was repeated and is now positive.    Discussed with Dr. Liao who is able to see patient this Friday, 1/5/18 for office cystoscopy and will then be able to add patient to OR schedule quickly if needed.     Will plan to get updated CT urogram prior to appointment as well.    Mely Smith PA-C  Department of Urology    ADDENDUM: I was notified by one of our triage nurses that she was able to contact the patient by phone. He is aware of results and the need for cystoscopy this coming Friday, 1/5/18.     Mely Smith PA-C  Department of Urology

## 2018-01-03 NOTE — TELEPHONE ENCOUNTER
----- Message from Mely Smith PA-C sent at 1/3/2018  8:11 AM CST -----  Regarding: Patient needing CT and cysto with Yanni this Friday  Christoph Vernon,    This is a patient I saw back in April for gross hematuria who now has a positive urine cytology (high grade urothelial carcinoma). He did not follow up for a cysto back in the Spring as recommended. I know Radha has been communicating with him off and on.    I spoke with Dr. Liao who has an opening this Friday, 1/5/18 at 9:45 AM and she is willing to cysto him on that date. I had Yunior schedule the appt to avoid losing the open time slot.     I also attempted to call Mr. Alcantar with these results but no answer and could not leave a VM. I sent him a [employer] message as well but not sure if he regularly checks this.     Could one of you please do me a favor and try to reach Orlin to inform him of these results and the need for cysto this Friday? (hopefully he can make it). Also, Dr. Liao would like him to have a CT urogram prior to the appointment. I've placed future orders if you could help to get that arranged as well.    Thank you!!  Mely Smith PA-C

## 2018-01-05 ENCOUNTER — TELEPHONE (OUTPATIENT)
Dept: HEMATOLOGY | Facility: CLINIC | Age: 68
End: 2018-01-05

## 2018-01-05 DIAGNOSIS — D66 SEVERE HEMOPHILIA A (H): Primary | ICD-10-CM

## 2018-01-05 RX ORDER — ANTIHEMOPHILIC FACTOR RECOMBINANT 1000 (+/-)
KIT INTRAVENOUS
Qty: 5000 EACH | Refills: 0 | Status: SHIPPED | OUTPATIENT
Start: 2018-01-05 | End: 2018-01-12
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
## 2018-01-08 ENCOUNTER — TELEPHONE (OUTPATIENT)
Dept: UROLOGY | Facility: CLINIC | Age: 68
End: 2018-01-08

## 2018-01-08 ENCOUNTER — TELEPHONE (OUTPATIENT)
Dept: HEMATOLOGY | Facility: CLINIC | Age: 68
End: 2018-01-08

## 2018-01-08 NOTE — TELEPHONE ENCOUNTER
Patient called and cancelled his appointment last Friday.  Patient called today and scheduled again out in the future  It was stressed to him he must make this appointment. Radha Hardy LPN Staff Nurse

## 2018-01-08 NOTE — TELEPHONE ENCOUNTER
Patient  with severe Hemophilia A with on-going hematuria (4 weeks) despite factor prophylaxis and two short courses of prednisone (40 mg daily for 4 days). Urinalysis demonstrated possible infection so a course of Bactrim was prescribed by urology.Urine cytology was positive so he is scheduled for cystoscopy 1/19/2018. After talking to Dr. Jiang it was recommended that continue bleed prevention with 3000 units every other day while awaiting cystoscopy. Mr. Cavanaugh agreed with this plan.

## 2018-01-12 DIAGNOSIS — R31.9 HEMATURIA: Primary | ICD-10-CM

## 2018-01-12 DIAGNOSIS — D66 SEVERE HEMOPHILIA A (H): ICD-10-CM

## 2018-01-12 RX ORDER — ANTIHEMOPHILIC FACTOR RECOMBINANT 1000 (+/-)
KIT INTRAVENOUS
Qty: 5000 EACH | Refills: 0 | Status: ON HOLD | OUTPATIENT
Start: 2018-01-12 | End: 2018-02-19

## 2018-01-15 DIAGNOSIS — D66 SEVERE HEMOPHILIA A (H): ICD-10-CM

## 2018-01-15 RX ORDER — ANTIHEMOPHILIC FACTOR (RECOMBINANT) 1000 (+/-)
KIT INTRAVENOUS
Qty: 13200 EACH | Refills: 1 | Status: SHIPPED | OUTPATIENT
Start: 2018-01-15 | End: 2018-01-25

## 2018-01-19 ENCOUNTER — OFFICE VISIT (OUTPATIENT)
Dept: UROLOGY | Facility: CLINIC | Age: 68
End: 2018-01-19
Payer: COMMERCIAL

## 2018-01-19 ENCOUNTER — TELEPHONE (OUTPATIENT)
Dept: HEMATOLOGY | Facility: CLINIC | Age: 68
End: 2018-01-19

## 2018-01-19 ENCOUNTER — ALLIED HEALTH/NURSE VISIT (OUTPATIENT)
Dept: UROLOGY | Facility: CLINIC | Age: 68
End: 2018-01-19
Payer: COMMERCIAL

## 2018-01-19 VITALS
HEART RATE: 117 BPM | BODY MASS INDEX: 24.05 KG/M2 | WEIGHT: 168 LBS | SYSTOLIC BLOOD PRESSURE: 108 MMHG | HEIGHT: 70 IN | DIASTOLIC BLOOD PRESSURE: 75 MMHG

## 2018-01-19 DIAGNOSIS — R31.0 GROSS HEMATURIA: ICD-10-CM

## 2018-01-19 DIAGNOSIS — C67.9 MALIGNANT NEOPLASM OF URINARY BLADDER, UNSPECIFIED SITE (H): Primary | ICD-10-CM

## 2018-01-19 DIAGNOSIS — C67.2 MALIGNANT NEOPLASM OF LATERAL WALL OF URINARY BLADDER (H): ICD-10-CM

## 2018-01-19 DIAGNOSIS — D66 SEVERE HEMOPHILIA A (H): Primary | ICD-10-CM

## 2018-01-19 LAB
ALBUMIN UR-MCNC: 100 MG/DL
APPEARANCE UR: ABNORMAL
BACTERIA #/AREA URNS HPF: ABNORMAL /HPF
BILIRUB UR QL STRIP: NEGATIVE
COLOR UR AUTO: YELLOW
GLUCOSE UR STRIP-MCNC: NEGATIVE MG/DL
HGB UR QL STRIP: ABNORMAL
HYALINE CASTS #/AREA URNS LPF: 6 /LPF (ref 0–2)
KETONES UR STRIP-MCNC: NEGATIVE MG/DL
LEUKOCYTE ESTERASE UR QL STRIP: ABNORMAL
MUCOUS THREADS #/AREA URNS LPF: PRESENT /LPF
NITRATE UR QL: NEGATIVE
PH UR STRIP: 5 PH (ref 5–7)
RBC #/AREA URNS AUTO: >182 /HPF (ref 0–2)
SOURCE: ABNORMAL
SP GR UR STRIP: 1.02 (ref 1–1.03)
TRANS CELLS #/AREA URNS HPF: 1 /HPF
UROBILINOGEN UR STRIP-MCNC: 0 MG/DL (ref 0–2)
WBC #/AREA URNS AUTO: >182 /HPF (ref 0–2)
WBC CLUMPS #/AREA URNS HPF: PRESENT /HPF

## 2018-01-19 RX ORDER — CEFAZOLIN SODIUM 1 G/3ML
1 INJECTION, POWDER, FOR SOLUTION INTRAMUSCULAR; INTRAVENOUS SEE ADMIN INSTRUCTIONS
Status: CANCELLED | OUTPATIENT
Start: 2018-01-19

## 2018-01-19 ASSESSMENT — PAIN SCALES - GENERAL
PAINLEVEL: NO PAIN (0)
PAINLEVEL: MODERATE PAIN (4)

## 2018-01-19 NOTE — LETTER
1/19/2018       RE: Orlin Alcantar  [address]   [place] [address]     Dear Colleague,    Thank you for referring your patient, Orlin Alcantar, to the [facility] UROLOGY AND INST FOR PROSTATE AND UROLOGIC CANCERS at [facility]. Please see a copy of my visit note below.      PRE-PROCEDURE DIAGNOSIS: gross hematuria   POST-PROCEDURE DIAGNOSIS: bladder cancer   PROCEDURE: Cystoscopy  HISTORY: Orlin Alcantar is a 67 year old male with history of gross hematuria. Deferred cystoscopy but had repeat episode as well as positive cytology associated with this episode   REVIEW OF OFFICE STUDIES (e.g., uroflow or PVR):   DESCRIPTION OF PROCEDURE: After informed consent was obtained, the patient was brought to the procedure room where he was placed in the supine position with all pressure points well padded.  The penis and scrotum were prepped and draped in a sterile fashion. A flexible cystoscope was introduced through a well-lubricated urethra. Anterior urethra strictures were absent.   The urinary sphincter was intact.  The prostate demonstrated mild/moderate bilobar hypertrophy.  Bladder neck was open.   Bladder signififcant for presence of the following:      Diverticuli: present small, wide mouthed on the left       Cellules: present scattered       Trabeculation: present 1      Tumors: present large nodular tumor over the right lateral wall to the ureteral orficie       Stones: absent  The flexible cystoscope was removed and the findings were described to the patient.   ASSESSMENT AND PLAN: 67 year old male with large bladder tumor noted on cystoscopy.     Schedule for TURBT and possible stent placement next available   Again, thank you for allowing me to participate in the care of your patient.      Sincerely,    Cassidy Liao MD

## 2018-01-19 NOTE — NURSING NOTE
Pre Op Teaching Flowsheet       Pre and Post op Patient Education  Relevant Diagnosis:  Bladder tumor      Motivation Level:  Asks Questions: Yes  Eager to Learn:  Yes  Cooperative: Yes  Receptive (willing/able to accept information):  Yes  Patient demonstrates understanding of the following:  Date and time of surgery:  Feb 19th   Location of surgery: [address] [place] [address]  History and Physical and any other testing necessary prior to surgery: Yes  Required time line for completion of History and Physical and any pre-op testing: Yes, Patient having PAC    NPO Guidelines: Nothing to eat 8 hours prior to surgery. Can have clear liquids up to 2 hours prior to sugery    Patient demonstrates understanding of the following:  Pre-op bowel prep: N/A  Pre-op showering/scrub information with Hibiclens Soap: Yes  Medications to take the day of surgery:  Per PCP  Blood thinner medications discussed and when to stop (if applicable):  Yes  Diabetes medication management (if applicable):  Patient to discuss with Primary Care Provider  Discussed pain control after surgery: pain scale, pain medications and pain management techniques  Infection Prevention: Patient demonstrates understanding of the following:  Patient instructed on hand hygiene:  Yes  Surgical procedure site care taught: N/A  Signs and symptoms of infection taught:  Yes  Wound care will be taught at the time of discharge.  Central venous catheter care will be taught at the time of discharge (if applicable).    Post-op follow-up:  Discussed how to contact the hospital, nurse, and clinic scheduling staff if necessary.    Instructional materials used/given/mailed:  [facility] Booklet, post op teaching sheet, Map, Soap, and arrival/location information.    Surgical instructions given to patient in clinic: Yes.    Instructional Materials given:  Before your surgery packet , Medications to avoid before surgery , Showering or Bathing instructions before surgery  and  What to expect after surgery  Total time with patient: 10 minutes    Ligia Ram RN

## 2018-01-19 NOTE — NURSING NOTE
Invasive Procedure Safety Checklist:    Procedure: cysto     Action: Complete sections and checkboxes as appropriate.    Pre-procedure:  1. Patient ID Verified with 2 identifiers (Daisy and  or MRN) : YES    2. Procedure and site verified with patient/designee (when able) : YES    3. Accurate consent documentation in medical record : YES    4. H&P (or appropriate assessment) documented in medical record : NO  H&P must be up to 30 days prior to procedure an updated within 24 hours of                 Procedure as applicable.     5. Relevant diagnostic and radiology test results appropriately labeled and displayed as applicable : YES    6. Blood products, implants, devices, and/or special equipment available for the procedure as applicable : YES    7. Procedure site(s) marked with provider initials [Exclusions: none] : NO    8. Marking not required. Reason : Yes  Procedure does not require site marking    Time Out:     Time-Out performed immediately prior to starting procedure, including verbal and active participation of all team members addressing: YES    1. Correct patient identity.  2. Confirmed that the correct side and site are marked.  3. An accurate procedure to be done.  4. Agreement on the procedure to be done.  5. Correct patient position.  6. Relevant images and results are properly labeled and appropriately displayed.  7. The need to administer antibiotics or fluids for irrigation purposes during the procedure as applicable.  8. Safety precautions based on patient history or medication use.    During Procedure: Verification of correct person, site, and procedure occurs any time the responsibility for care of the patient is transferred to another member of the care team.

## 2018-01-19 NOTE — MR AVS SNAPSHOT
After Visit Summary   1/19/2018    Orlin Alcantar    MRN: [id number]           Patient Information     Date Of Birth          1950        Visit Information        Provider Department      1/19/2018 1:30 PM Nurse, [facility] Urology and Inst for Prostate and Urologic Cancers        Today's Diagnoses     Malignant neoplasm of urinary bladder, unspecified site (H)    -  1       Follow-ups after your visit        Your next 10 appointments already scheduled     Feb 07, 2018 11:00 AM CST   (Arrive by 10:45 AM)   PAC EVALUATION with Gloria Pac Moira 7   [facility] Preoperative [facility] [place] ([facility])    [address] [address] [phone]            Feb 07, 2018 12:00 PM CST   (Arrive by 11:45 AM)   PAC RN ASSESSMENT with Gloria Pac Rn   [facility] [place] ([facility])    [address] [address] [phone]            Feb 07, 2018 12:40 PM CST   (Arrive by 12:25 PM)   PAC Anesthesia Consult with Gloria Pac Anesthesiologist   [facility] [place] ([facility])    [address] [id number] [phone]            Feb 19, 2018   Procedure with Cassidy Liao MD   [facility], [place], Same Day Surgery (--)    [address] [facility] [address]   [phone]            Mar 02, 2018  1:45 PM CST   (Arrive by 1:30 PM)   Cystoscopy with Cassidy Liao MD   [facility] for Prostate and Urologic Cancers ([facility])    [address] [place] [address] [phone]            Mar 13, 2018 10:15 AM CDT   Adult Med Follow UP with JELLY Chapa CNS   [facility] ([facility])    [place] [id number]  [address] [facility] [id number] [phone]               Who to contact     Please call your clinic at [phone] to:    Ask questions about your health    Make or cancel appointments    Discuss your medicines    Learn about your test results    Speak to your doctor   If you have compliments or concerns about an experience at your clinic, or if you wish to file a complaint, please contact [facility] Patient Relations at [phone] or email us at [email or web address]         Additional Information About Your Visit        mmCHANNELhart Information     [employer] gives you secure access to your electronic health record. If you see a primary care provider, you can also send messages to your care team and make appointments. If you have questions, please call your primary care clinic.  If you do not have a primary care provider, please call [phone] and they will assist you.      [employer] is an electronic gateway that provides easy, online access to your medical records. With [employer], you can request a clinic appointment, read your test results, renew a prescription or communicate with your care team.     To access your existing account, please contact your [facility] or call [phone] for assistance.        Care EveryWhere ID     This is your Care EveryWhere ID. This could be used by other organizations to access your [facility] medical records  [id number]         Blood Pressure from Last 3 Encounters:   01/19/18 108/75   12/28/17 120/86   11/08/17 121/84    Weight from Last 3 Encounters:   01/19/18 76.2 kg (168 lb)   12/28/17 76.6 kg (168 lb 14.4 oz)   11/08/17 81.6 kg (180 lb)              Today, you had the following     No orders found for display       Primary Care Provider Office Phone # Fax #    Katie Ramos [phone] [address]  [facility] [address]        Equal Access to Services     ANDREW FLANAGAN : wilber Mora qaybta  lloyd pagepeggy rene ah. Ida [facility] [phone].    ATENCIÓN: Si nnamdi bernstein, tiene a hernadez disposición servicios gratuitos de asistencia lingüística. Perry al [phone].    We comply with applicable federal civil rights laws and [place] laws. We do not discriminate on the basis of race, color, national origin, age, disability, sex, sexual orientation, or gender identity.            Thank you!     Thank you for choosing [facility] UROLOGY AND [facility] FOR PROSTATE AND UROLOGIC CANCERS  for your care. Our goal is always to provide you with excellent care. Hearing back from our patients is one way we can continue to improve our services. Please take a few minutes to complete the written survey that you may receive in the mail after your visit with us. Thank you!             Your Updated Medication List - Protect others around you: Learn how to safely use, store and throw away your medicines at www.disposemymeds.org.          This list is accurate as of: 1/19/18  2:12 PM.  Always use your most recent med list.                   Brand Name Dispense Instructions for use Diagnosis    amLODIPine 5 MG tablet    NORVASC    90 tablet    Take 1 tablet (5 mg) by mouth daily    Hypertension goal BP (blood pressure) < 140/90       codeine 30 MG tablet     168 tablet    Take 2 tablets (60 mg) by mouth every 6 hours as needed for moderate to severe pain (up to 8 tabs in 24 hours)    Hemophilic arthropathy       diazepam 5 MG tablet    VALIUM    30 tablet    Take up to one tablet by mouth daily as needed for anxiety    Depressive disorder       lisinopril 40 MG tablet    PRINIVIL/ZESTRIL    90 tablet    Take 1 tablet (40 mg) by mouth daily    Hypertension goal BP (blood pressure) < 140/90       mirtazapine 45 MG tablet    REMERON    37 tablet    Take 1 tablet (45 mg) by mouth At Bedtime And may take 1/2 tablet for insomnia up to 3 times a week.    Generalized anxiety disorder       predniSONE 20 MG  tablet    DELTASONE    8 tablet    Take 2 tablets daily for 4 days    Severe hemophilia A (H), Hematuria, unspecified type       * RECOMBINATE 801-1240 UNITS Solr   Generic drug:  factor VIII (recomb)     5000 each    Infuse 5238-2961 units Recombinate Saturday 1/13 and Sunday 1/14    Severe hemophilia A (H), Hematuria       * RECOMBINATE 801-1240 UNITS Solr   Generic drug:  factor VIII (recomb)     5000 each    Infuse 8765-2419 units Recombinate Saturday 1/13 and Sunday 1/14    Severe hemophilia A (H), Hematuria       * KOGENATE FS 1000 UNITS Kit     28422 each    Infuse 2000 to 3000 units (-5/+10%) IV 2 to 3 times a week and as needed for bleeding episodes    Severe hemophilia A (H)       * Notice:  This list has 3 medication(s) that are the same as other medications prescribed for you. Read the directions carefully, and ask your doctor or other care provider to review them with you.

## 2018-01-20 LAB
BACTERIA SPEC CULT: NO GROWTH
Lab: NORMAL
SPECIMEN SOURCE: NORMAL

## 2018-01-20 NOTE — PROGRESS NOTES
PRE-PROCEDURE DIAGNOSIS: gross hematuria   POST-PROCEDURE DIAGNOSIS: bladder cancer   PROCEDURE: Cystoscopy  HISTORY: Orlin Alcantar is a 67 year old male with history of gross hematuria. Deferred cystoscopy but had repeat episode as well as positive cytology associated with this episode   REVIEW OF OFFICE STUDIES (e.g., uroflow or PVR):   DESCRIPTION OF PROCEDURE: After informed consent was obtained, the patient was brought to the procedure room where he was placed in the supine position with all pressure points well padded.  The penis and scrotum were prepped and draped in a sterile fashion. A flexible cystoscope was introduced through a well-lubricated urethra. Anterior urethra strictures were absent.   The urinary sphincter was intact.  The prostate demonstrated mild/moderate bilobar hypertrophy.  Bladder neck was open.   Bladder signififcant for presence of the following:      Diverticuli: present small, wide mouthed on the left       Cellules: present scattered       Trabeculation: present 1      Tumors: present large nodular tumor over the right lateral wall to the ureteral orficie       Stones: absent  The flexible cystoscope was removed and the findings were described to the patient.   ASSESSMENT AND PLAN: 67 year old male with large bladder tumor noted on cystoscopy.     Schedule for TURBT and possible stent placement next available

## 2018-01-25 DIAGNOSIS — D66 SEVERE HEMOPHILIA A (H): ICD-10-CM

## 2018-01-25 RX ORDER — ANTIHEMOPHILIC FACTOR (RECOMBINANT) 1000 (+/-)
KIT INTRAVENOUS
Qty: 5000 EACH | Refills: 1 | Status: SHIPPED | OUTPATIENT
Start: 2018-01-25 | End: 2018-01-29

## 2018-01-29 DIAGNOSIS — D66 SEVERE HEMOPHILIA A (H): ICD-10-CM

## 2018-01-29 RX ORDER — ANTIHEMOPHILIC FACTOR (RECOMBINANT) 1000 (+/-)
KIT INTRAVENOUS
Qty: 5000 EACH | Refills: 1 | Status: SHIPPED | OUTPATIENT
Start: 2018-01-29 | End: 2018-02-26

## 2018-02-02 DIAGNOSIS — D66 SEVERE HEMOPHILIA A (H): ICD-10-CM

## 2018-02-02 DIAGNOSIS — R31.9 HEMATURIA: Primary | ICD-10-CM

## 2018-02-04 NOTE — ADDENDUM NOTE
Addended by: JOJO ANDREA on: 4/13/2017 02:51 PM     Modules accepted: Orders     [facility]  Obstetrics  Discharge Summary      Patient Name: Hillary Fountain  MRN: [id number]  Admission Date: 2018  Hospital Length of Stay: 3 days  Discharge Date and Time:  2018 8:26 AM  Attending Physician: Tanisha Salmeron MD   Discharging Provider: Aishwarya Reinoso DO  Primary Care Provider: Primary Doctor No    HPI: 29 yo  admitted at 39 0/7 wga for repeat  delivery, #4.  Pregnancy complicated by mild anemia.     Procedure(s) (LRB):  DELIVERY- SECTION (N/A)     Hospital Course:   HD#1 -  delivery  - no complications.   POD#1 - patient doing well; but mild mid-line headache possibly due to CSE - anesthesia treating with Fioricet and PO fluids.   POD#2 - patient is without complaints, HA resolved, pain controlled.    POD#3 - Ms Fountain is resting, no major complaints.  HA controlled with fioricet        Final Active Diagnoses:    Diagnosis Date Noted POA    PRINCIPAL PROBLEM:   delivery delivered [O82] 2018 No    Anemia in pregnancy, delivered, current hospitalization [O99.02] 2018 Yes      Problems Resolved During this Admission:    Diagnosis Date Noted Date Resolved POA    35 weeks gestation of pregnancy [Z3A.35] 2018 Not Applicable        Labs: CBC No results for input(s): WBC, HGB, HCT, PLT in the last 48 hours.    Feeding Method: breast    Immunizations     Date Immunization Status Dose Route/Site Given by    18 MMR Incomplete 0.5 mL Subcutaneous/Left deltoid     18 Tdap Incomplete 0.5 mL Intramuscular/Left deltoid           Delivery:    Episiotomy: None   Lacerations: None   Repair suture:     Repair # of packets: 7   Blood loss (ml): 0     Birth information:  YOB: 2018   Time of birth: 7:41 AM   Sex: female   Delivery type: , Low Transverse   Gestational Age: 39w0d    Delivery Clinician:      Other providers:       Additional  information:  Forceps:    Vacuum:     Breech:    Observed anomalies      Living?:           APGARS  One minute Five minutes Ten minutes   Skin color:         Heart rate:         Grimace:         Muscle tone:         Breathing:         Totals: 9  9        Placenta: Delivered:       appearance    Pending Diagnostic Studies:     None          Discharged Condition: good    Disposition: Home or Self Care    Follow Up:  Follow-up Information     Tanisha Salmeron MD In 2 weeks.    Specialties:  Obstetrics, Obstetrics and Gynecology  Why:  For wound re-check  Contact information:  [address]  [address]  [facility] [id number] [phone]             Tanisha Salmeron MD In 6 weeks.    Specialties:  Obstetrics, Obstetrics and Gynecology  Why:  post partum exam  Contact information:  [address]  [address]  [facility] [id number] [phone]                 Patient Instructions:     Call MD for:  temperature >100.4     Call MD for:  persistent nausea and vomiting or diarrhea     Call MD for:  severe uncontrolled pain     Call MD for:  redness, tenderness, or signs of infection (pain, swelling, redness, odor or green/yellow discharge around incision site)     Call MD for:   Order Comments: Vaginal bleeding greater than 1 pad an hour for more than 2 hours     No dressing needed   Order Comments: Keep incision clean and dry       Medications:  Current Discharge Medication List      START taking these medications    Details   butalbital-acetaminophen-caffeine -40 mg (FIORICET, ESGIC) -40 mg per tablet Take 1 tablet by mouth every 4 (four) hours.  Qty: 30 tablet, Refills: 0      ibuprofen (ADVIL,MOTRIN) 600 MG tablet Take 1 tablet (600 mg total) by mouth every 6 (six) hours.  Qty: 45 tablet, Refills: 1      oxyCODONE-acetaminophen (PERCOCET) 5-325 mg per tablet Take 1 tablet by mouth every 4 (four) hours as needed.  Qty: 40 tablet, Refills: 0         CONTINUE these medications which have NOT CHANGED    Details   PEDIATRIC MULTIVIT COMB  #19/FA (CHILDREN'S MULTI-VIT GUMMIES ORAL) Take 1 tablet by mouth once daily.             Aishwarya Reinoso,   Obstetrics  [facility]

## 2018-02-07 ENCOUNTER — ALLIED HEALTH/NURSE VISIT (OUTPATIENT)
Dept: SURGERY | Facility: CLINIC | Age: 68
End: 2018-02-07
Payer: COMMERCIAL

## 2018-02-07 ENCOUNTER — OFFICE VISIT (OUTPATIENT)
Dept: SURGERY | Facility: CLINIC | Age: 68
End: 2018-02-07
Payer: COMMERCIAL

## 2018-02-07 ENCOUNTER — DOCUMENTATION ONLY (OUTPATIENT)
Dept: HEMATOLOGY | Facility: CLINIC | Age: 68
End: 2018-02-07

## 2018-02-07 ENCOUNTER — ANESTHESIA EVENT (OUTPATIENT)
Dept: SURGERY | Facility: CLINIC | Age: 68
DRG: 668 | End: 2018-02-07
Payer: MEDICARE

## 2018-02-07 VITALS
HEIGHT: 70 IN | DIASTOLIC BLOOD PRESSURE: 71 MMHG | TEMPERATURE: 97.8 F | OXYGEN SATURATION: 99 % | RESPIRATION RATE: 16 BRPM | HEART RATE: 110 BPM | BODY MASS INDEX: 23.71 KG/M2 | WEIGHT: 165.6 LBS | SYSTOLIC BLOOD PRESSURE: 107 MMHG

## 2018-02-07 DIAGNOSIS — R31.0 GROSS HEMATURIA: ICD-10-CM

## 2018-02-07 DIAGNOSIS — Z01.818 PREOP GENERAL PHYSICAL EXAM: Primary | ICD-10-CM

## 2018-02-07 DIAGNOSIS — Z01.818 PREOP GENERAL PHYSICAL EXAM: ICD-10-CM

## 2018-02-07 DIAGNOSIS — D66 SEVERE HEMOPHILIA A (H): Primary | ICD-10-CM

## 2018-02-07 LAB
ALBUMIN UR-MCNC: 100 MG/DL
ANION GAP SERPL CALCULATED.3IONS-SCNC: 8 MMOL/L (ref 3–14)
APPEARANCE UR: ABNORMAL
BILIRUB UR QL STRIP: NEGATIVE
BUN SERPL-MCNC: 23 MG/DL (ref 7–30)
CALCIUM SERPL-MCNC: 10.2 MG/DL (ref 8.5–10.1)
CHLORIDE SERPL-SCNC: 102 MMOL/L (ref 94–109)
CO2 SERPL-SCNC: 28 MMOL/L (ref 20–32)
COLOR UR AUTO: YELLOW
CREAT SERPL-MCNC: 1.42 MG/DL (ref 0.66–1.25)
ERYTHROCYTE [DISTWIDTH] IN BLOOD BY AUTOMATED COUNT: 13.9 % (ref 10–15)
GFR SERPL CREATININE-BSD FRML MDRD: 50 ML/MIN/1.7M2
GLUCOSE SERPL-MCNC: 105 MG/DL (ref 70–99)
GLUCOSE UR STRIP-MCNC: NEGATIVE MG/DL
HCT VFR BLD AUTO: 37.7 % (ref 40–53)
HGB BLD-MCNC: 11.8 G/DL (ref 13.3–17.7)
HGB UR QL STRIP: ABNORMAL
INR PPP: 0.99 (ref 0.86–1.14)
KETONES UR STRIP-MCNC: NEGATIVE MG/DL
LEUKOCYTE ESTERASE UR QL STRIP: ABNORMAL
MCH RBC QN AUTO: 27.1 PG (ref 26.5–33)
MCHC RBC AUTO-ENTMCNC: 31.3 G/DL (ref 31.5–36.5)
MCV RBC AUTO: 87 FL (ref 78–100)
MUCOUS THREADS #/AREA URNS LPF: PRESENT /LPF
NITRATE UR QL: NEGATIVE
PH UR STRIP: 5 PH (ref 5–7)
PLATELET # BLD AUTO: 370 10E9/L (ref 150–450)
POTASSIUM SERPL-SCNC: 5 MMOL/L (ref 3.4–5.3)
RBC # BLD AUTO: 4.36 10E12/L (ref 4.4–5.9)
RBC #/AREA URNS AUTO: >182 /HPF (ref 0–2)
SODIUM SERPL-SCNC: 138 MMOL/L (ref 133–144)
SOURCE: ABNORMAL
SP GR UR STRIP: 1.01 (ref 1–1.03)
UROBILINOGEN UR STRIP-MCNC: 0 MG/DL (ref 0–2)
WBC # BLD AUTO: 10.5 10E9/L (ref 4–11)
WBC #/AREA URNS AUTO: >182 /HPF (ref 0–2)
WBC CLUMPS #/AREA URNS HPF: PRESENT /HPF

## 2018-02-07 ASSESSMENT — LIFESTYLE VARIABLES: TOBACCO_USE: 1

## 2018-02-07 NOTE — ANESTHESIA PREPROCEDURE EVALUATION
Anesthesia Evaluation     . Pt has had prior anesthetic. Type: General and MAC           ROS/MED HX    ENT/Pulmonary:     (+)tobacco use, Past use 1 PPD for 20 years, quit 2013 packs/day  , . .    Neurologic:  - neg neurologic ROS     Cardiovascular:     (+) hypertension----. : . . . :. . Previous cardiac testing date:results:date: results:ECG reviewed date:2/7/2018 results:NSR, possible LVH date: results:          METS/Exercise Tolerance:  3 - Able to walk 1-2 blocks without stopping   Hematologic:     (+) Other Hematologic Disorder-Hemophillia      Musculoskeletal:   (+) , , other musculoskeletal- hemophilic arthropathy Left TKA, SUNG      GI/Hepatic:     (+) hepatitis type A and C, liver disease (Hepatitis C),       Renal/Genitourinary:     (+) Other Renal/ Genitourinary, blader tumor      Endo:  - neg endo ROS   (+) type II DM .   (-) Type I DM   Psychiatric:  - neg psychiatric ROS   (+) psychiatric history anxiety and depression      Infectious Disease:  - neg infectious disease ROS       Malignancy:   (+) Malignancy History of Other  Other CA bladder Active status post         Other:    (+) No chance of pregnancy C-spine cleared: N/A, no H/O Chronic Pain,no other significant disability                    Physical Exam  Normal systems: cardiovascular, pulmonary and dental    Airway   Mallampati: II  TM distance: >3 FB  Neck ROM: full    Dental     Cardiovascular   Rhythm and rate: regular and normal      Pulmonary     Other findings:   Results for HIREN GREENBERG (MRN [id number]) as of 2/7/2018 14:54    2/7/2018 13:35  Sodium: 138  Potassium: 5.0  Chloride: 102  Carbon Dioxide: 28  Urea Nitrogen: 23  Creatinine: 1.42 (H)  GFR Estimate: 50 (L)  GFR Estimate If Black: 60 (L)  Calcium: 10.2 (H)  Anion Gap: 8  Glucose: 105 (H)  WBC: 10.5  Hemoglobin: 11.8 (L)  Hematocrit: 37.7 (L)  Platelet Count: 370  RBC Count: 4.36 (L)  MCV: 87  MCH: 27.1  MCHC: 31.3 (L)  RDW: 13.9  INR: 0.99      Color Urine: Yellow  Appearance  "Urine: Cloudy  Glucose Urine: Negative  Bilirubin Urine: Negative  Ketones Urine: Negative  Specific Gravity Urine: 1.014  pH Urine: 5.0  Protein Albumin Urine: 100 (A)  Urobilinogen mg/dL: 0.0  Nitrite Urine: Negative  Blood Urine: Large (A)  Leukocyte Esterase Urine: Large (A)  Source: Midstream Urine  WBC Urine: >182 (H)  RBC Urine: >182 (H)  WBC Clumps: Present (A)  Mucous Urine: Present (A)  URINE CULTURE AEROBIC BACTERIAL: Rpt           PAC Discussion and Assessment    ASA Classification: 3  Case is suitable for: [place]  Anesthetic techniques and relevant risks discussed: GA  Invasive monitoring and risk discussed: Yes  Types:   Possibility and Risk of blood transfusion discussed: Yes  NPO instructions given:   Additional anesthetic preparation and risks discussed:   Needs early admission to pre-op area:   Other:     PAC Resident/NP Anesthesia Assessment:  Orlin Alcantar is a 66 yo male scheduled for Cystoscopy, Transurethral Resection Of Bladder Tumor Possible Right Stent Placement on 2/19/2018 by Dr. Liao. PAC referral by Dr. Liao for assessment and optimization of anesthesia. Previous anesthesia without complications.    1) Cardiac: HTN, well managed. EKG today NSR, possibly LVH.   2) Pulmonary: Smoked 1 PPD for 20 years, quit 2013. Denies pulmonary symptoms.  3) Heme: severe hemophilia, followed by Dr. Marley. Will contact group with required dose of meds for surgery.  4) : gross hematuria, bladder tumor seen on cystoscopy.  5) Hep. C     Per hematology group  \"After discussion with Dr. Jiang and  it was recognized that given the size of the tumor and the current challenge of maintaining hemostasis (his bleeding despite regular bolus infusions) that for the procedure he will receive a bolus of factor VIII just prior to the procedure (50 units/kg) and then a continuous infusion of factor VIII (5 units/kg/hour) will be initiated to avoid trough levels of Factor VIII for 24 to 48 hours " "post procedure. The need for the continuous infusion and on going laboratory assessment will require his admission to the hospital.\"  Able to walk about 2 block and flight od stairs  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 causes decreased ROM in upper extremities.          Pt also evaluated by Dr. Joyce. Please see recommendations below. Labs drawn today.  I spent 20 minutes face to face with pt, assessing, examining, and educating        Reviewed and Signed by PAC Mid-Level Provider/Resident  Mid-Level Provider/Resident: JELLY Celeste  Date: 2/7/2018  Time: 1420    Attending Anesthesiologist Anesthesia Assessment:  Pt seen,  q's answered  hemophilia plan in place    Reviewed and Signed by PAC Anesthesiologist  Anesthesiologist: cathy  Date: 2/9/18  Time:   Pass/Fail:   Disposition:     PAC Pharmacist Assessment:        Pharmacist:   Date:   Time:      Anesthesia Plan      History & Physical Review  History and physical reviewed and following examination; no interval change.    ASA Status:  3 .        Plan for General and ETT with Intravenous and Propofol induction. Maintenance will be Balanced.    PONV prophylaxis:  Ondansetron (or other 5HT-3) and Dexamethasone or Solumedrol  Additional equipment: 2nd IV      Postoperative Care  Postoperative pain management:  IV analgesics and Multi-modal analgesia.      Consents  Anesthetic plan, risks, benefits and alternatives discussed with:  Patient.  Use of blood products discussed: Yes.   Use of blood products discussed with Patient.  Blood product refusal consent signed.   Reason for refusal: .                          .  "

## 2018-02-07 NOTE — H&P
Pre-Operative H & P     CC:  Preoperative exam to assess for increased cardiopulmonary risk while undergoing surgery and anesthesia.    Date of Encounter: 2/7/2018  Primary Care Physician:  Katie Ramos  Orlin Alcantar is a 67 year old male who presents for pre-operative H & P in preparation for Cystoscopy, Transurethral Resection Of Bladder Tumor Possible Right Stent Placement on 2/19/2018 by Dr. Liao at [facility].     History is obtained from the patient.   Gross hematuria, bladder tumor seen on cystoscopy. Evaluated by Dr. Liao and above procedure planned.      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      left total knee arthroplasty  07/1992     pins in left hip      hip fracture     radioactive synovectomy  03/2003     right total hip arthroplasty  12/1992     SYNOVECTOMY HIP  4/26/2012    Procedure:SYNOVECTOMY HIP; Right Hip Open Synovectomy, Right Total Hip Revision with Exchange of Poly Liner and Head; Surgeon:ALEJANDRA RAYMOND; Location:[facility] OR       Hx of Blood transfusions/reactions: yes, no reactions    Hx of abnormal bleeding or anti-platelet use: none    Menstrual history: No LMP for male patient.:     Steroid use in the last year: none    Personal or FH with difficulty with Anesthesia:  None        Prior to Admission Medications  Current Outpatient Prescriptions   Medication Sig  Dispense Refill     Antihemophilic Factor, Recomb, (KOGENATE FS) 1000 UNITS KIT Infuse 2000 to 3000 units (-5/+10%) IV 2 to 3 times a week and as needed for bleeding episodes 5000 each 1     codeine 30 MG tablet Take 2 tablets (60 mg) by mouth every 6 hours as needed for moderate to severe pain (up to 8 tabs in 24 hours) (Patient taking differently: Take 60 mg by mouth as needed for moderate to severe pain (up to 8 tabs in 24 hours) ) 168 tablet 0     lisinopril (PRINIVIL/ZESTRIL) 40 MG tablet Take 1 tablet (40 mg) by mouth daily (Patient taking differently: Take 40 mg by mouth every morning ) 90 tablet 3     amLODIPine (NORVASC) 5 MG tablet Take 1 tablet (5 mg) by mouth daily (Patient taking differently: Take 5 mg by mouth every morning ) 90 tablet 3     mirtazapine (REMERON) 45 MG tablet Take 1 tablet (45 mg) by mouth At Bedtime And may take 1/2 tablet for insomnia up to 3 times a week. 37 tablet 5     Antihemophilic Factor, Recomb, 6675-8115 UNITS SOLR Infuse recombinate 2000 units every 24 as needed for Hematuria 6000 each 0     RECOMBINATE 801-1240 UNITS SOLR Infuse 4244-3325 units Recombinate Saturday 1/13 and Sunday 1/14 5000 each 0     RECOMBINATE 801-1240 UNITS SOLR Infuse 9338-4267 units Recombinate Saturday 1/13 and Sunday 1/14 5000 each 0     diazepam (VALIUM) 5 MG tablet Take up to one tablet by mouth daily as needed for anxiety 30 tablet 5       Allergies  Allergies   Allergen Reactions     Aspirin      This drug inhibits platelets and is contraindicated due to hemophilia diagnosis.          Social History  Social History     Social History     Marital status: Single     Spouse name: N/A     Number of children: 0     Years of education: N/A     Occupational History     Not on file.     Social History Main Topics     Smoking status: Former Smoker     Packs/day: 1.00     Years: 20.00     Quit date: 6/30/2013     Smokeless tobacco: Never Used      Comment: e cig      Alcohol use Yes      Comment: social  "    Drug use: No     Sexual activity: Yes     Partners: Female     Birth control/ protection: Condom     Other Topics Concern     Parent/Sibling W/ Cabg, Mi Or Angioplasty Before 65f 55m? No     Social History Narrative       Family History  Family History   Problem Relation Age of Onset     Unknown/Adopted Other            Anesthesia Evaluation     . Pt has had prior anesthetic. Type: General and MAC           ROS/MED HX    ENT/Pulmonary:     (+)tobacco use, Past use 1 PPD for 20 years, quit 2013 packs/day  , . .    Neurologic:  - neg neurologic ROS     Cardiovascular:     (+) hypertension----. : . . . :. . Previous cardiac testing date:results:date: results:ECG reviewed date:2/7/2018 results: date: results:          METS/Exercise Tolerance:  3 - Able to walk 1-2 blocks without stopping   Hematologic:     (+) Other Hematologic Disorder-Hemophillia      Musculoskeletal:   (+) , , other musculoskeletal- hemophilic arthropathy      GI/Hepatic:     (+) hepatitis type A and C,       Renal/Genitourinary:     (+) Other Renal/ Genitourinary, blader tumpr      Endo:  - neg endo ROS       Psychiatric:  - neg psychiatric ROS       Infectious Disease:  - neg infectious disease ROS       Malignancy:   (+) Malignancy History of Other  Other CA bladder Active status post         Other:    (+) No chance of pregnancy C-spine cleared: N/A, no H/O Chronic Pain,no other significant disability                Physical Exam  Normal systems: cardiovascular, pulmonary and dental    Airway   Mallampati: II  TM distance: >3 FB  Neck ROM: full    Dental   Normal    Cardiovascular   Rhythm and rate: regular and normal      Pulmonary   Clear to ausculation              The complete review of systems is negative other than noted in the HPI or here.   Temp: 97.8  F (36.6  C) Temp src: Oral BP: 107/71 Pulse: 110   Resp: 16 SpO2: 99 %         165 lbs 9.6 oz  5' 10\"   Body mass index is 23.76 kg/(m^2).         Physical Exam  Constitutional: Awake, " alert, cooperative, no apparent distress, and appears stated age.  Eyes: Pupils equal, round and reactive to light, extra ocular muscles intact, sclera clear, conjunctiva normal.  HENT: Normocephalic, oral pharynx with moist mucus membranes, good dentition. No goiter appreciated.   Respiratory: Clear to auscultation bilaterally, no crackles or wheezing.  Cardiovascular: Regular rate and rhythm, normal S1 and S2, and no murmur noted.  Carotids +2, no bruits. No edema. Palpable pulses to radial  DP and PT arteries.   GI: Normal bowel sounds, soft, non-distended, non-tender, no masses palpated, no hepatosplenomegaly.  Surgical scars: left knee, right hip  Lymph/Hematologic: No cervical lymphadenopathy and no supraclavicular lymphadenopathy.  Genitourinary:  deferred  Skin: Warm and dry.  No rashes at anticipated surgical site.   Musculoskeletal: Full ROM of neck. There is no redness, warmth, or swelling of the joints. Gross motor strength is normal.  Decreased ROM to upper extremities.   Neurologic: Awake, alert, oriented to name, place and time. Cranial nerves II-XII are grossly intact. Gait is normal.   Neuropsychiatric: Calm, cooperative. Normal affect.     Labs: (personally reviewed)  Results for HIREN GREENBERG (MRN [id number]) as of 2/7/2018 14:54   Ref. Range 2/7/2018 13:35   Sodium Latest Ref Range: 133 - 144 mmol/L 138   Potassium Latest Ref Range: 3.4 - 5.3 mmol/L 5.0   Chloride Latest Ref Range: 94 - 109 mmol/L 102   Carbon Dioxide Latest Ref Range: 20 - 32 mmol/L 28   Urea Nitrogen Latest Ref Range: 7 - 30 mg/dL 23   Creatinine Latest Ref Range: 0.66 - 1.25 mg/dL 1.42 (H)   GFR Estimate Latest Ref Range: >60 mL/min/1.7m2 50 (L)   GFR Estimate If Black Latest Ref Range: >60 mL/min/1.7m2 60 (L)   Calcium Latest Ref Range: 8.5 - 10.1 mg/dL 10.2 (H)   Anion Gap Latest Ref Range: 3 - 14 mmol/L 8   Glucose Latest Ref Range: 70 - 99 mg/dL 105 (H)   WBC Latest Ref Range: 4.0 - 11.0 10e9/L 10.5   Hemoglobin Latest  Ref Range: 13.3 - 17.7 g/dL 11.8 (L)   Hematocrit Latest Ref Range: 40.0 - 53.0 % 37.7 (L)   Platelet Count Latest Ref Range: 150 - 450 10e9/L 370   RBC Count Latest Ref Range: 4.4 - 5.9 10e12/L 4.36 (L)   MCV Latest Ref Range: 78 - 100 fl 87   MCH Latest Ref Range: 26.5 - 33.0 pg 27.1   MCHC Latest Ref Range: 31.5 - 36.5 g/dL 31.3 (L)   RDW Latest Ref Range: 10.0 - 15.0 % 13.9   INR Latest Ref Range: 0.86 - 1.14  0.99   Color Urine Unknown Yellow   Appearance Urine Unknown Cloudy   Glucose Urine Latest Ref Range: NEG^Negative mg/dL Negative   Bilirubin Urine Latest Ref Range: NEG^Negative  Negative   Ketones Urine Latest Ref Range: NEG^Negative mg/dL Negative   Specific Gravity Urine Latest Ref Range: 1.003 - 1.035  1.014   pH Urine Latest Ref Range: 5.0 - 7.0 pH 5.0   Protein Albumin Urine Latest Ref Range: NEG^Negative mg/dL 100 (A)   Urobilinogen mg/dL Latest Ref Range: 0.0 - 2.0 mg/dL 0.0   Nitrite Urine Latest Ref Range: NEG^Negative  Negative   Blood Urine Latest Ref Range: NEG^Negative  Large (A)   Leukocyte Esterase Urine Latest Ref Range: NEG^Negative  Large (A)   Source Unknown Midstream Urine   WBC Urine Latest Ref Range: 0 - 2 /HPF >182 (H)   RBC Urine Latest Ref Range: 0 - 2 /HPF >182 (H)   WBC Clumps Latest Ref Range: NEG^Negative /HPF Present (A)   Mucous Urine Latest Ref Range: NEG^Negative /LPF Present (A)   URINE CULTURE AEROBIC BACTERIAL Unknown Rpt         EKG: Personally reviewed but formal cardiology read pendin2018 NSR, possible LVH        ASSESSMENT and PLAN  Orlin Alcantar is a 67 year old male scheduled to undergo Cystoscopy, Transurethral Resection Of Bladder Tumor Possible Right Stent Placement on 2018 by Dr. Liao. He has the following specific operative considerations:   - RCRI : Low serious cardiac risks.  0.4% risk of major adverse cardiac event.   - Anesthesia considerations:  Refer to PAC assessment in anesthesia records  - VTE risk: low risk  - LILLIE # of risks 3/8 =  "intermediate risk  - Post-op delirium risk: high risk due to age  - Risk of PONV score = 2.  If > 2, anti-emetic intervention recommended.    Previous anesthesia without complications.  1) Cardiac: HTN, well managed. EKG today NSR, possibly LVH.   2) Pulmonary: Smoked 1 PPD for 20 years, quit 2013. Denies pulmonary symptoms.  3) Heme: severe hemophilia, followed by Dr. Marley. Will contact group with required dose of meds for surgery.  4) : gross hematuria, bladder tumor seen on cystoscopy.  5) Hep. 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     Per hematology group  \"After discussion with Dr. Jiang and  it was recognized that given the size of the tumor and the current challenge of maintaining hemostasis (his bleeding despite regular bolus infusions) that for the procedure he will receive a bolus of factor VIII just prior to the procedure (50 units/kg) and then a continuous infusion of factor VIII (5 units/kg/hour) will be initiated to avoid trough levels of Factor VIII for 24 to 48 hours post procedure. The need for the continuous infusion and on going laboratory assessment will require his admission to the hospital.\"      Pt optimized for surgery. AVS with information on surgery time/arrival time, meds and NPO status given by nursing staff      Patient was discussed with Dr Joyce.    JELLY Falcon CNS  Preoperative Assessment Center  [facility]  Phone: [phone]  Fax: [phone]    Orlin Alcantar is a 67 year old male patient born on 1950.  1. 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        Active Problems:    * No active hospital problems. *    Blood pressure 107/71, pulse 110, temperature 97.8  F (36.6  C), temperature source Oral, resp. rate 16, height 1.778 m (5' 10\"), weight 75.1 kg (165 lb 9.6 oz), SpO2 99 %.    NICU Admission  Maternal Medical History  Assessment & Plan    Marguerite Marie  2/12/2018    "

## 2018-02-07 NOTE — PATIENT INSTRUCTIONS
Preparing for Your Surgery      Name:  Orlin Alcantar   MRN:  [id number]   :  1950   Today's Date:  2018     Arriving for surgery:  Surgery date: 18  Arrival time:  1030 AM   Please come to:       [facility] [address]  [address] [place]  [address]    -   parking is available in front of the hospital from 5:15 am to 8:00 pm    -  Stop at the Information Desk in the lobby    -   Inform the information person that you are here for surgery. An escort to 3c will be provided. If you would not like an escort, please proceed to 3C on the 3rd floor. [phone]     What can I eat or drink?  -  You may have solid food or milk products until 8 hours prior to your surgery. [address] [place]  [place]. (0500 AM Monday)  -  You may have water, apple juice or 7up/Sprite until 2 hours prior to your surgery. 1030 AM Monday AM    Which medicines can I take?  -  Do NOT take these medications in the morning, the day of surgery:  HOLD Lisinopril AM of surgery.    -  Please take these medications the day of surgery:  [place] Meds.    How do I prepare myself?  -  Take two showers: one the night before surgery; and one the morning of surgery.         Use Scrubcare or Hibiclens to wash from neck down.  You may use your own shampoo and conditioner. No other hair products.   -  Do NOT use lotion, powder, deodorant, or antiperspirant the day of your surgery.  -  Do NOT wear any makeup, fingernail polish or jewelry.  -  Begin using Incentive Spirometer 1 week prior to surgery.  Use 4 times per day, up to 5 breaths each time.  Bring Incentive Spirometer to hospital.  -Do not bring your own medications to the hospital, except for inhalers and eye drops.  -  Bring your ID and insurance card.    Questions or Concerns:  If you have questions or concerns, please call the  Preoperative Assessment Center, Monday-Friday 7AM-7PM:  [phone]

## 2018-02-07 NOTE — PROGRESS NOTES
Mr. 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 After discussion with Dr. Jiang and  it was recognized that given the size of the tumor and the current challenge of maintaining hemostasis (his bleeding despite regular bolus infusions) that for the procedure he will receive a bolus of factor VIII just prior to the procedure (50 units/kg) and then a continuous infusion of factor VIII (5 units/kg/hour) will be initiated to avoid trough levels of Factor VIII for 24 to 48 hours post procedure. The need for the continuous infusion and on going laboratory assessment will require his admission to the hospital. I have called Mr. Cavanaugh to inform him of the need to be admitted to the hospital for 1-2 days.

## 2018-02-07 NOTE — MR AVS SNAPSHOT
After Visit Summary   2018    Orlin Alcantar    MRN: [id number]           Patient Information     Date Of Birth          1950        Visit Information        Provider Department      2018 12:00 PM Rn, [facility]        Care Instructions    Preparing for Your Surgery      Name:  Orlin Alcantar   MRN:  [id number]   :  1950   Today's Date:  2018     Arriving for surgery:  Surgery date: 18  Arrival time:  1030 AM   Please come to:       [facility] [address]  [address] [place]  [address]    -   parking is available in front of the hospital from 5:15 am to 8:00 pm    -  Stop at the Information Desk in the lobby    -   Inform the information person that you are here for surgery. An escort to 3c will be provided. If you would not like an escort, please proceed to 3C on the 3rd floor. [phone]     What can I eat or drink?  -  You may have solid food or milk products until 8 hours prior to your surgery. [address] [place]  [place]. (0500 AM Monday)  -  You may have water, apple juice or 7up/Sprite until 2 hours prior to your surgery. 1030 AM Monday AM    Which medicines can I take?  -  Do NOT take these medications in the morning, the day of surgery:  HOLD Lisinopril AM of surgery.    -  Please take these medications the day of surgery:  [place] Meds.    How do I prepare myself?  -  Take two showers: one the night before surgery; and one the morning of surgery.         Use Scrubcare or Hibiclens to wash from neck down.  You may use your own shampoo and conditioner. No other hair products.   -  Do NOT use lotion, powder, deodorant, or antiperspirant the day of your surgery.  -  Do NOT wear any makeup, fingernail polish or jewelry.  -  Begin using Incentive Spirometer 1 week prior to surgery.  Use 4 times per day, up to 5 breaths each time.  Bring Incentive Spirometer to hospital.  -Do not bring your own  medications to the hospital, except for inhalers and eye drops.  -  Bring your ID and insurance card.    Questions or Concerns:  If you have questions or concerns, please call the  Preoperative Assessment Center, Monday-Friday 7AM-7PM:  [phone]                    Follow-ups after your visit        Your next 10 appointments already scheduled     Feb 07, 2018  1:00 PM CST   LAB with  LAB   [facility] ([facility]    [address]  1st Floor  [facility] [id number] [phone]           Please do not eat 10-12 hours before your appointment if you are coming in fasting for labs on lipids, cholesterol, or glucose (sugar). This does not apply to pregnant women. Water, hot tea and black coffee (with nothing added) are okay. Do not drink other fluids, diet soda or chew gum.            Feb 19, 2018   Procedure with Cassidy Liao MD   [facility], [place], Same Day Surgery (--)    [address] [address] [phone]            Mar 02, 2018  1:45 PM CST   (Arrive by 1:30 PM)   Cystoscopy with Cassidy Liao MD   [facility] ([facility])    [address]  [address] [facility] [id number] [phone]            Mar 13, 2018 10:15 AM CDT   Adult Med Follow UP with JELLY Chapa CNS   [facility] ([facility])    [place] [id number]  [address] [id number] [phone]              Future tests that were ordered for you today     Open Future Orders        Priority Expected Expires Ordered    ABO/Rh type and screen Routine 2/7/2018 3/9/2018 2/7/2018    Basic metabolic panel Routine 2/7/2018 3/9/2018 2/7/2018    CBC with platelets Routine 2/7/2018 3/9/2018 2/7/2018    UA reflex to Microscopic and Culture Routine 2/7/2018 3/9/2018 2/7/2018    INR Routine 2/7/2018 3/9/2018 2/7/2018            Who to contact      Please call your clinic at [phone] to:    Ask questions about your health    Make or cancel appointments    Discuss your medicines    Learn about your test results    Speak to your doctor   If you have compliments or concerns about an experience at your clinic, or if you wish to file a complaint, please contact [facility] Patient Relations at [phone] or email us at [email or web address]         Additional Information About Your Visit        [employer] Information     [employer] gives you secure access to your electronic health record. If you see a primary care provider, you can also send messages to your care team and make appointments. If you have questions, please call your primary care clinic.  If you do not have a primary care provider, please call [phone] and they will assist you.      [employer] is an electronic gateway that provides easy, online access to your medical records. With [employer], you can request a clinic appointment, read your test results, renew a prescription or communicate with your care team.     To access your existing account, please contact your [facility] or call [phone] for assistance.        Care EveryWhere ID     This is your Care EveryWhere ID. This could be used by other organizations to access your [facility] medical records  [id number]         Blood Pressure from Last 3 Encounters:   02/07/18 107/71   01/19/18 108/75   12/28/17 120/86    Weight from Last 3 Encounters:   02/07/18 75.1 kg (165 lb 9.6 oz)   01/19/18 76.2 kg (168 lb)   12/28/17 76.6 kg (168 lb 14.4 oz)              Today, you had the following     No orders found for display         Today's Medication Changes          These changes are accurate as of 2/7/18 12:57 PM.  If you have any questions, ask your nurse or doctor.               These medicines have changed or have updated prescriptions.        Dose/Directions    amLODIPine 5 MG tablet    Commonly known as:  NORVASC   This may have changed:  when to take this   Used for:  Hypertension goal BP (blood pressure) < 140/90        Dose:  5 mg   Take 1 tablet (5 mg) by mouth daily   Quantity:  90 tablet   Refills:  3       codeine 30 MG tablet   This may have changed:  when to take this   Used for:  Hemophilic arthropathy        Dose:  60 mg   Take 2 tablets (60 mg) by mouth every 6 hours as needed for moderate to severe pain (up to 8 tabs in 24 hours)   Quantity:  168 tablet   Refills:  0       lisinopril 40 MG tablet   Commonly known as:  PRINIVIL/ZESTRIL   This may have changed:  when to take this   Used for:  Hypertension goal BP (blood pressure) < 140/90        Dose:  40 mg   Take 1 tablet (40 mg) by mouth daily   Quantity:  90 tablet   Refills:  3                Primary Care Provider Office Phone # Fax #    Katie Ramos [phone]       [address] [facility] [address]        Equal Access to Services     ANDREW FLANAGAN AH: Hadii violette owens hadasho Sofaiza, waaxda luqadaha, qaybta kaalmada adeegyada, lloyd rene . So [facility] [phone].    ATENCIÓN: Si habla español, tiene a hernadez disposición servicios gratuitos de asistencia lingüística. [facility] [phone].    We comply with applicable federal civil rights laws and [place] laws. We do not discriminate on the basis of race, color, national origin, age, disability, sex, sexual orientation, or gender identity.            Thank you!     Thank you for choosing [facility]  for your care. Our goal is always to provide you with excellent care. Hearing back from our patients is one way we can continue to improve our services. Please take a few minutes to complete the written survey that you may receive in the mail after your visit with us. Thank you!             Your Updated Medication List - Protect others around you: Learn how to safely use, store and throw away your medicines  at www.disposemymeds.org.          This list is accurate as of 2/7/18 12:57 PM.  Always use your most recent med list.                   Brand Name Dispense Instructions for use Diagnosis    amLODIPine 5 MG tablet    NORVASC    90 tablet    Take 1 tablet (5 mg) by mouth daily    Hypertension goal BP (blood pressure) < 140/90       codeine 30 MG tablet     168 tablet    Take 2 tablets (60 mg) by mouth every 6 hours as needed for moderate to severe pain (up to 8 tabs in 24 hours)    Hemophilic arthropathy       diazepam 5 MG tablet    VALIUM    30 tablet    Take up to one tablet by mouth daily as needed for anxiety    Depressive disorder       lisinopril 40 MG tablet    PRINIVIL/ZESTRIL    90 tablet    Take 1 tablet (40 mg) by mouth daily    Hypertension goal BP (blood pressure) < 140/90       mirtazapine 45 MG tablet    REMERON    37 tablet    Take 1 tablet (45 mg) by mouth At Bedtime And may take 1/2 tablet for insomnia up to 3 times a week.    Generalized anxiety disorder       * RECOMBINATE 801-1240 UNITS Solr   Generic drug:  factor VIII (recomb)     5000 each    Infuse 0842-8530 units Recombinate Saturday 1/13 and Sunday 1/14    Severe hemophilia A (H), Hematuria       * RECOMBINATE 801-1240 UNITS Solr   Generic drug:  factor VIII (recomb)     5000 each    Infuse 2282-6644 units Recombinate Saturday 1/13 and Sunday 1/14    Severe hemophilia A (H), Hematuria       * KOGENATE FS 1000 UNITS Kit     5000 each    Infuse 2000 to 3000 units (-5/+10%) IV 2 to 3 times a week and as needed for bleeding episodes    Severe hemophilia A (H)       * Antihemophilic Factor (Recomb) 0445-9077 UNITS Solr     6000 each    Infuse recombinate 2000 units every 24 as needed for Hematuria    Severe hemophilia A (H), Hematuria       * Notice:  This list has 4 medication(s) that are the same as other medications prescribed for you. Read the directions carefully, and ask your doctor or other care provider to review them with you.

## 2018-02-08 LAB
BACTERIA SPEC CULT: NO GROWTH
INTERPRETATION ECG - MUSE: NORMAL
Lab: NORMAL
SPECIMEN SOURCE: NORMAL

## 2018-02-19 ENCOUNTER — HOSPITAL ENCOUNTER (INPATIENT)
Facility: CLINIC | Age: 68
LOS: 3 days | Discharge: HOME | DRG: 668 | End: 2018-02-23
Attending: UROLOGY | Admitting: UROLOGY
Payer: MEDICARE

## 2018-02-19 ENCOUNTER — ANESTHESIA (OUTPATIENT)
Dept: SURGERY | Facility: CLINIC | Age: 68
DRG: 668 | End: 2018-02-19
Payer: MEDICARE

## 2018-02-19 DIAGNOSIS — D66 HEMOPHILIA (H): Primary | ICD-10-CM

## 2018-02-19 DIAGNOSIS — D66 SEVERE HEMOPHILIA A (H): ICD-10-CM

## 2018-02-19 DIAGNOSIS — R33.9 INCOMPLETE BLADDER EMPTYING: ICD-10-CM

## 2018-02-19 PROBLEM — R31.9 HEMATURIA: Status: ACTIVE | Noted: 2018-02-19

## 2018-02-19 LAB
ABO + RH BLD: NORMAL
ABO + RH BLD: NORMAL
BLD GP AB SCN SERPL QL: NORMAL
BLOOD BANK CMNT PATIENT-IMP: NORMAL
BLOOD BANK CMNT PATIENT-IMP: NORMAL
FACT VIII ACT/NOR PPP: 136 % (ref 55–200)
FACT VIII ACT/NOR PPP: 153 % (ref 55–200)
GLUCOSE BLDC GLUCOMTR-MCNC: 99 MG/DL (ref 70–99)
POTASSIUM SERPL-SCNC: 4.4 MMOL/L (ref 3.4–5.3)
SPECIMEN EXP DATE BLD: NORMAL

## 2018-02-19 PROCEDURE — 25000132 ZZH RX MED GY IP 250 OP 250 PS 637: Mod: GY | Performed by: STUDENT IN AN ORGANIZED HEALTH CARE EDUCATION/TRAINING PROGRAM

## 2018-02-19 PROCEDURE — 25000128 H RX IP 250 OP 636: Performed by: STUDENT IN AN ORGANIZED HEALTH CARE EDUCATION/TRAINING PROGRAM

## 2018-02-19 PROCEDURE — 88307 TISSUE EXAM BY PATHOLOGIST: CPT | Performed by: UROLOGY

## 2018-02-19 PROCEDURE — 85240 CLOT FACTOR VIII AHG 1 STAGE: CPT | Performed by: INTERNAL MEDICINE

## 2018-02-19 PROCEDURE — 71000014 ZZH RECOVERY PHASE 1 LEVEL 2 FIRST HR: Performed by: UROLOGY

## 2018-02-19 PROCEDURE — 88360 TUMOR IMMUNOHISTOCHEM/MANUAL: CPT | Performed by: UROLOGY

## 2018-02-19 PROCEDURE — 40000141 ZZH STATISTIC PERIPHERAL IV START W/O US GUIDANCE

## 2018-02-19 PROCEDURE — 25000566 ZZH SEVOFLURANE, EA 15 MIN: Performed by: UROLOGY

## 2018-02-19 PROCEDURE — 0TBB8ZZ EXCISION OF BLADDER, VIA NATURAL OR ARTIFICIAL OPENING ENDOSCOPIC: ICD-10-PCS | Performed by: UROLOGY

## 2018-02-19 PROCEDURE — 25000128 H RX IP 250 OP 636: Performed by: UROLOGY

## 2018-02-19 PROCEDURE — G0378 HOSPITAL OBSERVATION PER HR: HCPCS

## 2018-02-19 PROCEDURE — 00000167 ZZHCL STATISTIC INR NC: Performed by: INTERNAL MEDICINE

## 2018-02-19 PROCEDURE — 25000128 H RX IP 250 OP 636: Performed by: NURSE ANESTHETIST, CERTIFIED REGISTERED

## 2018-02-19 PROCEDURE — 00000146 ZZHCL STATISTIC GLUCOSE BY METER IP

## 2018-02-19 PROCEDURE — 30233V1 TRANSFUSION OF NONAUTOLOGOUS ANTIHEMOPHILIC FACTORS INTO PERIPHERAL VEIN, PERCUTANEOUS APPROACH: ICD-10-PCS | Performed by: UROLOGY

## 2018-02-19 PROCEDURE — 00000401 ZZHCL STATISTIC THROMBIN TIME NC: Performed by: INTERNAL MEDICINE

## 2018-02-19 PROCEDURE — 25000555 ZZHC RX FACTOR IP 250 OP 636

## 2018-02-19 PROCEDURE — 25800025 ZZH RX 258: Performed by: UROLOGY

## 2018-02-19 PROCEDURE — A9270 NON-COVERED ITEM OR SERVICE: HCPCS | Mod: GY | Performed by: STUDENT IN AN ORGANIZED HEALTH CARE EDUCATION/TRAINING PROGRAM

## 2018-02-19 PROCEDURE — 25000555 ZZHC RX FACTOR IP 250 OP 636: Performed by: PHYSICIAN ASSISTANT

## 2018-02-19 PROCEDURE — 25000125 ZZHC RX 250: Performed by: NURSE ANESTHETIST, CERTIFIED REGISTERED

## 2018-02-19 PROCEDURE — 37000008 ZZH ANESTHESIA TECHNICAL FEE, 1ST 30 MIN: Performed by: UROLOGY

## 2018-02-19 PROCEDURE — 71000015 ZZH RECOVERY PHASE 1 LEVEL 2 EA ADDTL HR: Performed by: UROLOGY

## 2018-02-19 PROCEDURE — 25000128 H RX IP 250 OP 636: Performed by: ANESTHESIOLOGY

## 2018-02-19 PROCEDURE — 36415 COLL VENOUS BLD VENIPUNCTURE: CPT | Performed by: ANESTHESIOLOGY

## 2018-02-19 PROCEDURE — C9399 UNCLASSIFIED DRUGS OR BIOLOG: HCPCS | Performed by: STUDENT IN AN ORGANIZED HEALTH CARE EDUCATION/TRAINING PROGRAM

## 2018-02-19 PROCEDURE — 36415 COLL VENOUS BLD VENIPUNCTURE: CPT | Performed by: INTERNAL MEDICINE

## 2018-02-19 PROCEDURE — 36000059 ZZH SURGERY LEVEL 3 EA 15 ADDTL MIN UMMC: Performed by: UROLOGY

## 2018-02-19 PROCEDURE — 00000328 ZZHCL STATISTIC PTT NC: Performed by: INTERNAL MEDICINE

## 2018-02-19 PROCEDURE — 40000170 ZZH STATISTIC PRE-PROCEDURE ASSESSMENT II: Performed by: UROLOGY

## 2018-02-19 PROCEDURE — C1769 GUIDE WIRE: HCPCS | Performed by: UROLOGY

## 2018-02-19 PROCEDURE — P9041 ALBUMIN (HUMAN),5%, 50ML: HCPCS | Performed by: NURSE ANESTHETIST, CERTIFIED REGISTERED

## 2018-02-19 PROCEDURE — 84132 ASSAY OF SERUM POTASSIUM: CPT | Performed by: ANESTHESIOLOGY

## 2018-02-19 PROCEDURE — 36000061 ZZH SURGERY LEVEL 3 W FLUORO 1ST 30 MIN - UMMC: Performed by: UROLOGY

## 2018-02-19 PROCEDURE — 37000009 ZZH ANESTHESIA TECHNICAL FEE, EACH ADDTL 15 MIN: Performed by: UROLOGY

## 2018-02-19 PROCEDURE — 27210794 ZZH OR GENERAL SUPPLY STERILE: Performed by: UROLOGY

## 2018-02-19 RX ORDER — CEFAZOLIN SODIUM 1 G/3ML
1 INJECTION, POWDER, FOR SOLUTION INTRAMUSCULAR; INTRAVENOUS SEE ADMIN INSTRUCTIONS
Status: DISCONTINUED | OUTPATIENT
Start: 2018-02-19 | End: 2018-02-19 | Stop reason: HOSPADM

## 2018-02-19 RX ORDER — SODIUM CHLORIDE 9 MG/ML
INJECTION, SOLUTION INTRAVENOUS CONTINUOUS
Status: DISCONTINUED | OUTPATIENT
Start: 2018-02-19 | End: 2018-02-21 | Stop reason: CLARIF

## 2018-02-19 RX ORDER — LABETALOL HYDROCHLORIDE 5 MG/ML
INJECTION, SOLUTION INTRAVENOUS PRN
Status: DISCONTINUED | OUTPATIENT
Start: 2018-02-19 | End: 2018-02-19

## 2018-02-19 RX ORDER — ALBUMIN, HUMAN INJ 5% 5 %
SOLUTION INTRAVENOUS CONTINUOUS PRN
Status: DISCONTINUED | OUTPATIENT
Start: 2018-02-19 | End: 2018-02-19

## 2018-02-19 RX ORDER — SODIUM CHLORIDE, SODIUM LACTATE, POTASSIUM CHLORIDE, CALCIUM CHLORIDE 600; 310; 30; 20 MG/100ML; MG/100ML; MG/100ML; MG/100ML
INJECTION, SOLUTION INTRAVENOUS CONTINUOUS
Status: DISCONTINUED | OUTPATIENT
Start: 2018-02-19 | End: 2018-02-19 | Stop reason: HOSPADM

## 2018-02-19 RX ORDER — LIDOCAINE 40 MG/G
CREAM TOPICAL
Status: DISCONTINUED | OUTPATIENT
Start: 2018-02-19 | End: 2018-02-23 | Stop reason: HOSPADM

## 2018-02-19 RX ORDER — OXYCODONE HYDROCHLORIDE 5 MG/1
5-10 TABLET ORAL
Status: DISCONTINUED | OUTPATIENT
Start: 2018-02-19 | End: 2018-02-23 | Stop reason: HOSPADM

## 2018-02-19 RX ORDER — ONDANSETRON 4 MG/1
4 TABLET, ORALLY DISINTEGRATING ORAL EVERY 6 HOURS PRN
Status: DISCONTINUED | OUTPATIENT
Start: 2018-02-19 | End: 2018-02-23 | Stop reason: HOSPADM

## 2018-02-19 RX ORDER — CEFAZOLIN SODIUM 2 G/100ML
2 INJECTION, SOLUTION INTRAVENOUS
Status: DISCONTINUED | OUTPATIENT
Start: 2018-02-19 | End: 2018-02-19 | Stop reason: HOSPADM

## 2018-02-19 RX ORDER — CEFAZOLIN SODIUM 2 G/100ML
2 INJECTION, SOLUTION INTRAVENOUS
Status: COMPLETED | OUTPATIENT
Start: 2018-02-19 | End: 2018-02-19

## 2018-02-19 RX ORDER — LIDOCAINE HYDROCHLORIDE 20 MG/ML
INJECTION, SOLUTION INFILTRATION; PERINEURAL PRN
Status: DISCONTINUED | OUTPATIENT
Start: 2018-02-19 | End: 2018-02-19

## 2018-02-19 RX ORDER — ANTIHEMOPHILIC FACTOR (RECOMBINANT) 2000 (+/-)
KIT INTRAVENOUS CONTINUOUS
Status: DISPENSED | OUTPATIENT
Start: 2018-02-19 | End: 2018-02-22

## 2018-02-19 RX ORDER — ONDANSETRON 2 MG/ML
4 INJECTION INTRAMUSCULAR; INTRAVENOUS EVERY 6 HOURS PRN
Status: DISCONTINUED | OUTPATIENT
Start: 2018-02-19 | End: 2018-02-23 | Stop reason: HOSPADM

## 2018-02-19 RX ORDER — ONDANSETRON 2 MG/ML
4 INJECTION INTRAMUSCULAR; INTRAVENOUS EVERY 30 MIN PRN
Status: DISCONTINUED | OUTPATIENT
Start: 2018-02-19 | End: 2018-02-19 | Stop reason: HOSPADM

## 2018-02-19 RX ORDER — FENTANYL CITRATE 50 UG/ML
INJECTION, SOLUTION INTRAMUSCULAR; INTRAVENOUS PRN
Status: DISCONTINUED | OUTPATIENT
Start: 2018-02-19 | End: 2018-02-19

## 2018-02-19 RX ORDER — FENTANYL CITRATE 50 UG/ML
25-50 INJECTION, SOLUTION INTRAMUSCULAR; INTRAVENOUS EVERY 5 MIN PRN
Status: DISCONTINUED | OUTPATIENT
Start: 2018-02-19 | End: 2018-02-19 | Stop reason: HOSPADM

## 2018-02-19 RX ORDER — ONDANSETRON 4 MG/1
4 TABLET, ORALLY DISINTEGRATING ORAL EVERY 30 MIN PRN
Status: DISCONTINUED | OUTPATIENT
Start: 2018-02-19 | End: 2018-02-19 | Stop reason: HOSPADM

## 2018-02-19 RX ORDER — NALOXONE HYDROCHLORIDE 0.4 MG/ML
.1-.4 INJECTION, SOLUTION INTRAMUSCULAR; INTRAVENOUS; SUBCUTANEOUS
Status: DISCONTINUED | OUTPATIENT
Start: 2018-02-19 | End: 2018-02-23 | Stop reason: HOSPADM

## 2018-02-19 RX ORDER — ONDANSETRON 2 MG/ML
INJECTION INTRAMUSCULAR; INTRAVENOUS PRN
Status: DISCONTINUED | OUTPATIENT
Start: 2018-02-19 | End: 2018-02-19

## 2018-02-19 RX ORDER — AMLODIPINE BESYLATE 5 MG/1
5 TABLET ORAL EVERY MORNING
Status: DISCONTINUED | OUTPATIENT
Start: 2018-02-20 | End: 2018-02-23 | Stop reason: HOSPADM

## 2018-02-19 RX ORDER — PROCHLORPERAZINE MALEATE 5 MG
5 TABLET ORAL EVERY 6 HOURS PRN
Status: DISCONTINUED | OUTPATIENT
Start: 2018-02-19 | End: 2018-02-23 | Stop reason: HOSPADM

## 2018-02-19 RX ORDER — MIRTAZAPINE 15 MG/1
45 TABLET, FILM COATED ORAL AT BEDTIME
Status: DISCONTINUED | OUTPATIENT
Start: 2018-02-19 | End: 2018-02-23 | Stop reason: HOSPADM

## 2018-02-19 RX ORDER — MEPERIDINE HYDROCHLORIDE 50 MG/ML
12.5 INJECTION INTRAMUSCULAR; INTRAVENOUS; SUBCUTANEOUS
Status: DISCONTINUED | OUTPATIENT
Start: 2018-02-19 | End: 2018-02-19 | Stop reason: HOSPADM

## 2018-02-19 RX ORDER — SODIUM CHLORIDE, SODIUM LACTATE, POTASSIUM CHLORIDE, CALCIUM CHLORIDE 600; 310; 30; 20 MG/100ML; MG/100ML; MG/100ML; MG/100ML
INJECTION, SOLUTION INTRAVENOUS CONTINUOUS PRN
Status: DISCONTINUED | OUTPATIENT
Start: 2018-02-19 | End: 2018-02-19

## 2018-02-19 RX ORDER — LABETALOL HYDROCHLORIDE 5 MG/ML
10 INJECTION, SOLUTION INTRAVENOUS
Status: DISCONTINUED | OUTPATIENT
Start: 2018-02-19 | End: 2018-02-19 | Stop reason: HOSPADM

## 2018-02-19 RX ORDER — NALOXONE HYDROCHLORIDE 0.4 MG/ML
.1-.4 INJECTION, SOLUTION INTRAMUSCULAR; INTRAVENOUS; SUBCUTANEOUS
Status: DISCONTINUED | OUTPATIENT
Start: 2018-02-19 | End: 2018-02-19 | Stop reason: HOSPADM

## 2018-02-19 RX ORDER — PROPOFOL 10 MG/ML
INJECTION, EMULSION INTRAVENOUS PRN
Status: DISCONTINUED | OUTPATIENT
Start: 2018-02-19 | End: 2018-02-19

## 2018-02-19 RX ADMIN — ANTIHEMOPHILIC FACTOR, RECOMBINANT 4348 UNITS: KIT at 14:23

## 2018-02-19 RX ADMIN — MIDAZOLAM 1 MG: 1 INJECTION INTRAMUSCULAR; INTRAVENOUS at 15:02

## 2018-02-19 RX ADMIN — FENTANYL CITRATE 50 MCG: 50 INJECTION, SOLUTION INTRAMUSCULAR; INTRAVENOUS at 19:03

## 2018-02-19 RX ADMIN — SODIUM CHLORIDE, POTASSIUM CHLORIDE, SODIUM LACTATE AND CALCIUM CHLORIDE: 600; 310; 30; 20 INJECTION, SOLUTION INTRAVENOUS at 18:00

## 2018-02-19 RX ADMIN — SUGAMMADEX 200 MG: 100 INJECTION, SOLUTION INTRAVENOUS at 18:01

## 2018-02-19 RX ADMIN — CEFAZOLIN SODIUM 2 G: 2 INJECTION, SOLUTION INTRAVENOUS at 15:28

## 2018-02-19 RX ADMIN — FENTANYL CITRATE 50 MCG: 50 INJECTION, SOLUTION INTRAMUSCULAR; INTRAVENOUS at 16:26

## 2018-02-19 RX ADMIN — FENTANYL CITRATE 50 MCG: 50 INJECTION, SOLUTION INTRAMUSCULAR; INTRAVENOUS at 18:51

## 2018-02-19 RX ADMIN — ROCURONIUM BROMIDE 20 MG: 10 INJECTION INTRAVENOUS at 15:47

## 2018-02-19 RX ADMIN — PHENYLEPHRINE HYDROCHLORIDE 200 MCG: 10 INJECTION, SOLUTION INTRAMUSCULAR; INTRAVENOUS; SUBCUTANEOUS at 15:18

## 2018-02-19 RX ADMIN — FENTANYL CITRATE 50 MCG: 50 INJECTION, SOLUTION INTRAMUSCULAR; INTRAVENOUS at 15:09

## 2018-02-19 RX ADMIN — PHENYLEPHRINE HYDROCHLORIDE 200 MCG: 10 INJECTION, SOLUTION INTRAMUSCULAR; INTRAVENOUS; SUBCUTANEOUS at 15:22

## 2018-02-19 RX ADMIN — HYDROMORPHONE HYDROCHLORIDE 0.3 MG: 1 INJECTION, SOLUTION INTRAMUSCULAR; INTRAVENOUS; SUBCUTANEOUS at 18:16

## 2018-02-19 RX ADMIN — FENTANYL CITRATE 25 MCG: 50 INJECTION, SOLUTION INTRAMUSCULAR; INTRAVENOUS at 18:36

## 2018-02-19 RX ADMIN — PROPOFOL 30 MG: 10 INJECTION, EMULSION INTRAVENOUS at 16:25

## 2018-02-19 RX ADMIN — LIDOCAINE HYDROCHLORIDE 100 MG: 20 INJECTION, SOLUTION INFILTRATION; PERINEURAL at 15:09

## 2018-02-19 RX ADMIN — MEPERIDINE HYDROCHLORIDE 12.5 MG: 50 INJECTION, SOLUTION INTRAMUSCULAR; INTRAVENOUS; SUBCUTANEOUS at 18:34

## 2018-02-19 RX ADMIN — ROCURONIUM BROMIDE 50 MG: 10 INJECTION INTRAVENOUS at 15:11

## 2018-02-19 RX ADMIN — SODIUM CHLORIDE: 9 INJECTION, SOLUTION INTRAVENOUS at 19:33

## 2018-02-19 RX ADMIN — MIDAZOLAM 1 MG: 1 INJECTION INTRAMUSCULAR; INTRAVENOUS at 14:59

## 2018-02-19 RX ADMIN — ROCURONIUM BROMIDE 10 MG: 10 INJECTION INTRAVENOUS at 15:36

## 2018-02-19 RX ADMIN — HYDROMORPHONE HYDROCHLORIDE 0.3 MG: 1 INJECTION, SOLUTION INTRAMUSCULAR; INTRAVENOUS; SUBCUTANEOUS at 17:42

## 2018-02-19 RX ADMIN — PHENYLEPHRINE HYDROCHLORIDE 200 MCG: 10 INJECTION, SOLUTION INTRAMUSCULAR; INTRAVENOUS; SUBCUTANEOUS at 15:26

## 2018-02-19 RX ADMIN — FENTANYL CITRATE 50 MCG: 50 INJECTION, SOLUTION INTRAMUSCULAR; INTRAVENOUS at 18:42

## 2018-02-19 RX ADMIN — FENTANYL CITRATE 50 MCG: 50 INJECTION, SOLUTION INTRAMUSCULAR; INTRAVENOUS at 15:57

## 2018-02-19 RX ADMIN — ROCURONIUM BROMIDE 20 MG: 10 INJECTION INTRAVENOUS at 16:26

## 2018-02-19 RX ADMIN — LABETALOL HYDROCHLORIDE 5 MG: 5 INJECTION INTRAVENOUS at 16:07

## 2018-02-19 RX ADMIN — SODIUM CHLORIDE, POTASSIUM CHLORIDE, SODIUM LACTATE AND CALCIUM CHLORIDE: 600; 310; 30; 20 INJECTION, SOLUTION INTRAVENOUS at 14:50

## 2018-02-19 RX ADMIN — ONDANSETRON 4 MG: 2 INJECTION INTRAMUSCULAR; INTRAVENOUS at 17:21

## 2018-02-19 RX ADMIN — FENTANYL CITRATE 25 MCG: 50 INJECTION, SOLUTION INTRAMUSCULAR; INTRAVENOUS at 18:27

## 2018-02-19 RX ADMIN — FENTANYL CITRATE 50 MCG: 50 INJECTION, SOLUTION INTRAMUSCULAR; INTRAVENOUS at 15:02

## 2018-02-19 RX ADMIN — MIRTAZAPINE 45 MG: 15 TABLET, FILM COATED ORAL at 23:38

## 2018-02-19 RX ADMIN — HYDROMORPHONE HYDROCHLORIDE 0.3 MG: 1 INJECTION, SOLUTION INTRAMUSCULAR; INTRAVENOUS; SUBCUTANEOUS at 19:53

## 2018-02-19 RX ADMIN — FENTANYL CITRATE 50 MCG: 50 INJECTION, SOLUTION INTRAMUSCULAR; INTRAVENOUS at 15:43

## 2018-02-19 RX ADMIN — ALBUMIN HUMAN: 0.05 INJECTION, SOLUTION INTRAVENOUS at 15:30

## 2018-02-19 RX ADMIN — ROCURONIUM BROMIDE 20 MG: 10 INJECTION INTRAVENOUS at 16:54

## 2018-02-19 RX ADMIN — ANTIHEMOPHILIC FACTOR (RECOMBINANT): KIT at 14:45

## 2018-02-19 RX ADMIN — CEFAZOLIN SODIUM 1 G: 2 INJECTION, SOLUTION INTRAVENOUS at 17:25

## 2018-02-19 RX ADMIN — PROPOFOL 100 MG: 10 INJECTION, EMULSION INTRAVENOUS at 15:09

## 2018-02-19 RX ADMIN — OXYCODONE HYDROCHLORIDE 10 MG: 5 TABLET ORAL at 21:15

## 2018-02-19 NOTE — IP AVS SNAPSHOT
[address] [address]    Phone:  [phone]    Fax:  [phone]                                       After Visit Summary   2/19/2018    Orlin Alcantar    MRN: [id number]           After Visit Summary Signature Page     I have received my discharge instructions, and my questions have been answered. I have discussed any challenges I see with this plan with the nurse or doctor.    ..........................................................................................................................................  Patient/Patient Representative Signature      ..........................................................................................................................................  Patient Representative Print Name and Relationship to Patient    ..................................................               ................................................  Date                                            Time    ..........................................................................................................................................  Reviewed by Signature/Title    ...................................................              ..............................................  Date                                                            Time

## 2018-02-19 NOTE — DISCHARGE INSTRUCTIONS
[facility]  Same-Day Surgery   Adult Discharge Orders & Instructions     For 24 hours after surgery    1. Get plenty of rest.  A responsible adult must stay with you for at least 24 hours after you leave the hospital.   2. Do not drive or use heavy equipment.  If you have weakness or tingling, don't drive or use heavy equipment until this feeling goes away.  3. Do not drink alcohol.  4. Avoid strenuous or risky activities.  Ask for help when climbing stairs.   5. You may feel lightheaded.  IF so, sit for a few minutes before standing.  Have someone help you get up.   6. If you have nausea (feel sick to your stomach): Drink only clear liquids such as apple juice, ginger ale, broth or 7-Up.  Rest may also help.  Be sure to drink enough fluids.  Move to a regular diet as you feel able.  7. You may have a slight fever. Call the doctor if your fever is over 100 F (37.7 C) (taken under the tongue) or lasts longer than 24 hours.  8. You may have a dry mouth, a sore throat, muscle aches or trouble sleeping.  These should go away after 24 hours.  9. Do not make important or legal decisions.   Call your doctor for any of the followin.  Signs of infection (fever, growing tenderness at the surgery site, a large amount of drainage or bleeding, severe pain, foul-smelling drainage, redness, swelling).    2. It has been over 8 to 10 hours since surgery and you are still not able to urinate (pass water).    3.  Headache for over 24 hours.    To contact a doctor, call Dr. Liao's  office at [phone]:     X  [phone] and ask for the resident on call for Urology (answered 24 hours a day)  X   Emergency Department:    [facility]: [phone]       (TTY for hearing impaired: [phone])           Tips for taking pain medications  To get the best pain relief possible , remember these points:      Take pain medications as directed, before pain becomes severe      Pain  medication can upset your stomach: taking it with food may help      Constipation is a common side effect of pain medication. Drink plenty of  Fluids      Eat foods high in fiber. Take a stool softener  if recommended by your doctor or  Pharmacist.        Do not drink alcohol, drive or operate machinery while taking pain medications.      Ask about other ways to control pain, such as with heat, ice or relaxation.

## 2018-02-19 NOTE — OP NOTE
OPERATIVE NOTE      PREOPERATIVE DIAGNOSIS: Bladder tumor  POSTOPERATIVE DIAGNOSIS: Same  PROCEDURE PERFORMED:   1) Cystoscopy  2) Transurethral resection of bladder tumor, large, >5cm total area  ATTENDING SURGEON: RAKESH Liao MD   RESIDENT SURGEON: BHAVIN Diggs MD; Juan Carlos Dias MD    FINDINGS: Large >5cm right lateral wall tumor involving the right UO. Right UO could not be found for stent placement.  ANESTHESIA: General.  ESTIMATED BLOOD LOSS: 5 mL.   SPECIMENS: bladder tumor chips    INDICATIONS FOR PROCEDURE: Orlin Alcantar is a 67 year old male with a large bladder tumor, hemophilia and Chronic Hepatitis C. After a discussion of options, risks, benefits the patient chose to procedure with the above procedures    DESCRIPTION OF PROCEDURE: After verifying informed consent, the patient was taken to the operating room. Adequate anesthesia was induced, he was placed in dorsal lithotomy position and he was prepped and draped in standard sterile fashion. A timeout was performed to verify correct patient and procedure. Pneumo boots and perioperative antibiotics were in place before the procedure commenced. We began the procedure by inserting a 22-Vietnamese rigid cystoscope. The urethra was unremarkable except for some minor BPH type findings. Upon gaining entrance into the bladder, complete survey was performed. This was significant for a large tumor on the RLW involving the right ureteral orifice and trigone. A large diverticulum was noted in the LLW. We then switched out to a 26Fr bipolar resectoscope and performed resection of all visible bladder tumor. This was a significantly challenging resection given the volume of disease. We then obtained hemostasis.     We spent a significant amount of time searching for the right UO with multiple scopes and lenses, without success. Hence a right stent could not be placed.    POSTOPERATIVE PLAN:   1) Transfer to recovery  2) Renal US in AM  3) Hemophilia management per  Hematology    Juan Carlos Dias  PGY-5 Urology  [phone]

## 2018-02-19 NOTE — IP AVS SNAPSHOT
MRN:[id number]                      After Visit Summary   2/19/2018    Orlin Alcantar    MRN: [id number]           Thank you!     Thank you for choosing [facility] for your care. Our goal is always to provide you with excellent care. Hearing back from our patients is one way we can continue to improve our services. Please take a few minutes to complete the written survey that you may receive in the mail after you visit with us. Thank you!        Patient Information     Date Of Birth          1950        Designated Caregiver       Most Recent Value    Caregiver    Will someone help with your care after discharge? no      About your hospital stay     You were admitted on:  February 19, 2018 You last received care in the:  Unit 5[address] BMT [facility] [place]    You were discharged on:  February 23, 2018        Reason for your hospital stay       Resection of bladder tumor                  Who to Call     For medical emergencies, please call 911.  For non-urgent questions about your medical care, please call your primary care provider or clinic, [phone]  For questions related to your surgery, please call your surgery clinic        Attending Provider     Provider Specialty    Cassidy Liao MD Urology       Primary Care Provider Office Phone # Fax #    Katie Ramos [phone]      After Care Instructions     Activity       Your activity upon discharge: See discharge instructions            Diet       Follow this diet upon discharge: See discharge instructions            Supplies       List the supplies the pt needs to go home:  PNT instructions and care supplies (ie dressings, etc)                  Follow-up Appointments     Adult [facility] Follow-up and recommended labs and tests       Diet:  - Regular  - Drink 2-3L of water per day (to keep urine light pink / yellow)    Discharge Activity:  - Some hematuria (blood in the urine) is normal after this type of surgery.   Avoid strenuous activity, lifting, pushing pulling for ~4-6 weeks postop to help prevent recurrent hematuria  - Do not drive until you can press the brake pedal quickly and fully without pain.   - Do not operate a motor vehicle while taking narcotic pain medications.     Medications:  - PAIN: Use Tylenol (acetaminophen 625mg) every 4-6 hours as needed for pain.  Do not take more than 4,000mg of Tylenol (acetaminophen/ APAP) from all sources in any 24 hour period since this can cause liver damage.  Do not take more than 2400mg of ibuprofen in any 24 hour period since this can cause kidney damage. Never drive, operate machinery or drink alcoholic beverages while you are taking narcotic pain medications.      - Take stool softeners such as Senna while you are using narcotics, but stop if you develop diarrhea.   - We have prescribed Flomax (tamsulosin) to help you with bladder emptying, which you should continue unless Dr. Liao recommends that you stop. Rarely, Flomax can cause dizziness with standing.  Stop the medication and call the office if this occurs.   - Continue Factor VIII infusions as directed by the Hematology service.   - Your Lisinopril was held while you were in the hospital because your blood pressure wasn't high.  Once at home, take your blood pressure twice daily.  You may restart the lisinopril once your systolic blood pressure (the top number) is consistently 140mmHg or higher.  Contact your primary care physician with any questions or concerns.     Percutaneous Nephrostomy Tube (PNT):  - You are going home with a right percutaneous nephrostomy tube   - For the first few weeks, you may having bruising or soreness at the tube site.  Use a warm heating pad to relieve discomfort.    - Blood in the urine can be expected.  Drink 8-10 glasses of fluid per day to keep urine light pink or light yellow.    - Daily showering is important but keep your tube site dry.  Avoid baths, swimming, etc.   - Clean  around the tube site every other day using soap and water, then cover the site with a sterile bandage. Take care not to tug the tube or remove the suture.   - Keep the tube securely taped to your back at all times and do not let it get caught, snagged or tugged.  The tubing should always remain tension-free and without kinks.  In order to drain best (and prevent urinary infections), the tubing and bag should always hang lower than your kidney.     Voiding Instructions  - Continue Flomax (tamsulosin) to help with emptying  - During the daytime, urinate every 2-3 hours.  Take your time at the toilet and try to empty as completely as possible by using gentle straining, pushing on your low abdomen or urinating 2-3 times per trip to the bathroom.     Follow-up:  - Follow up with Dr. Liao next Friday  - Call your primary care provider to touch base regarding your recent admission.       - Call or return sooner than your regularly scheduled visit if you develop any of the following:  fever, uncontrolled pain, uncontrolled nausea or vomiting, or if you are passing large clots, are unable to void, your urine will not lighten in color with increase water intake or if you are having any problems with your percutaneous nephrostomy tube.   You may call the Urology Clinic during weekdays (8A-5P) at [phone].  If after hours, call [phone] and ask to speak with the Urology resident on call.  For emergencies always call [phone].            Adult [facility] Follow-up and recommended labs and tests       Follow-up:  - Follow up with Dr. Liao next Friday  - Call your primary care provider to touch base regarding your recent admission.       - Call or return sooner than your regularly scheduled visit if you develop any of the following:  fever, uncontrolled pain, uncontrolled nausea or vomiting, or if you are passing large clots, are unable to void, your urine will not lighten in color with increase water intake or if you are  having any problems with your percutaneous nephrostomy tube.   You may call the Urology Clinic during weekdays (8A-5P) at [phone].  If after hours, call [phone] and ask to speak with the Urology resident on call.  For emergencies always call [phone].    Appointments on [place] and/or [facility] (with [facility] or [facility] provider or service). Call [phone] if you haven't heard regarding these appointments within 7 days of discharge.                  Your next 10 appointments already scheduled     Mar 02, 2018  1:45 PM CST   (Arrive by 1:30 PM)   Cystoscopy with Cassidy Liao MD   [facility] ([facility])    [address] [id number] [phone]            Mar 13, 2018 10:15 AM CDT   Adult Med Follow UP with JELLY Chapa CNS   Psychiatry Clinic ([facility])    [place] [id number] [id number] [phone]              Further instructions from your care team       [facility], [place]  Same-Day Surgery   Adult Discharge Orders & Instructions     For 24 hours after surgery    1. Get plenty of rest.  A responsible adult must stay with you for at least 24 hours after you leave the hospital.   2. Do not drive or use heavy equipment.  If you have weakness or tingling, don't drive or use heavy equipment until this feeling goes away.  3. Do not drink alcohol.  4. Avoid strenuous or risky activities.  Ask for help when climbing stairs.   5. You may feel lightheaded.  IF so, sit for a few minutes before standing.  Have someone help you get up.   6. If you have nausea (feel sick to your stomach): Drink only clear liquids such as apple juice, ginger ale, broth or 7-Up.  Rest may also help.  Be sure to drink enough fluids.  Move to a regular diet as you feel able.  7. You may have a slight fever.  Call the doctor if your fever is over 100 F (37.7 C) (taken under the tongue) or lasts longer than 24 hours.  8. You may have a dry mouth, a sore throat, muscle aches or trouble sleeping.  These should go away after 24 hours.  9. Do not make important or legal decisions.   Call your doctor for any of the followin.  Signs of infection (fever, growing tenderness at the surgery site, a large amount of drainage or bleeding, severe pain, foul-smelling drainage, redness, swelling).    2. It has been over 8 to 10 hours since surgery and you are still not able to urinate (pass water).    3.  Headache for over 24 hours.    To contact a doctor, call Dr. Liao's  office at [phone]:     X  [phone] and ask for the resident on call for Urology (answered 24 hours a day)  X   Emergency Department:    [facility]: [phone]       (TTY for hearing impaired: [phone])           Tips for taking pain medications  To get the best pain relief possible , remember these points:      Take pain medications as directed, before pain becomes severe      Pain medication can upset your stomach: taking it with food may help      Constipation is a common side effect of pain medication. Drink plenty of  Fluids      Eat foods high in fiber. Take a stool softener  if recommended by your doctor or  Pharmacist.        Do not drink alcohol, drive or operate machinery while taking pain medications.      Ask about other ways to control pain, such as with heat, ice or relaxation.    Pending Results     Date and Time Order Name Status Description    2018 1539 Urine Culture Aerobic Bacterial Preliminary     2018 1437 IR Nephrostomy Tube Placement Right In process             Statement of Approval     Ordered          18 1232  I have reviewed and agree with all the recommendations and orders detailed in this document.  EFFECTIVE NOW     Approved and electronically signed by:  Roberta Dumont PA              Admission Information     Date & Time Provider Department Dept. Phone    2/19/2018 Cassidy Liao MD Unit 5C BMT [facility] [place] [phone]      Your Vitals Were     Blood Pressure Pulse Temperature Respirations Height Weight    126/82 100 98.7  F (37.1  C) (Oral) 16 1.829 m (6') 73.6 kg (162 lb 3.2 oz)    Pulse Oximetry BMI (Body Mass Index)                95% 22 kg/m2          MyChart Information     [facility] gives you secure access to your electronic health record. If you see a primary care provider, you can also send messages to your care team and make appointments. If you have questions, please call your primary care clinic.  If you do not have a primary care provider, please call [phone] and they will assist you.        Care EveryWhere ID     This is your Care EveryWhere ID. This could be used by other organizations to access your [facility] medical records  [id number]        Equal Access to Services     ANDREW FLANAGAN AH: Hadii violette Colmenares, waaxondina lusarah, qaybta kaalmada karo, lloyd rene . So [facility] [phone].    ATENCIÓN: Si habla español, tiene a hernadez disposición servicios gratuitos de asistencia lingüística. Llame al [phone].    We comply with applicable federal civil rights laws and [place] laws. We do not discriminate on the basis of race, color, national origin, age, disability, sex, sexual orientation, or gender identity.               Review of your medicines      START taking        Dose / Directions    tamsulosin 0.4 MG capsule   Commonly known as:  FLOMAX   Used for:  Incomplete bladder emptying        Dose:  0.4 mg   Take 1 capsule (0.4 mg) by mouth daily   Quantity:  45 capsule   Refills:  1         CONTINUE these medicines which may have CHANGED, or have new prescriptions. If we are uncertain of the size of tablets/capsules you have at home, strength may be listed as something that might have changed.        Dose / Directions     amLODIPine 5 MG tablet   Commonly known as:  NORVASC   This may have changed:  when to take this   Used for:  Hypertension goal BP (blood pressure) < 140/90        Dose:  5 mg   Take 1 tablet (5 mg) by mouth daily   Quantity:  90 tablet   Refills:  3       codeine 30 MG tablet   This may have changed:  when to take this   Used for:  Hemophilic arthropathy        Dose:  60 mg   Take 2 tablets (60 mg) by mouth every 6 hours as needed for moderate to severe pain (up to 8 tabs in 24 hours)   Quantity:  168 tablet   Refills:  0       lisinopril 40 MG tablet   Commonly known as:  PRINIVIL/ZESTRIL   This may have changed:  when to take this   Used for:  Hypertension goal BP (blood pressure) < 140/90        Dose:  40 mg   Take 1 tablet (40 mg) by mouth daily   Quantity:  90 tablet   Refills:  3         CONTINUE these medicines which have NOT CHANGED        Dose / Directions    diazepam 5 MG tablet   Commonly known as:  VALIUM   Used for:  Depressive disorder        Take up to one tablet by mouth daily as needed for anxiety   Quantity:  30 tablet   Refills:  5       * KOGENATE FS 1000 UNITS Kit   Used for:  Severe hemophilia A (H)        Infuse 2000 to 3000 units (-5/+10%) IV 2 to 3 times a week and as needed for bleeding episodes   Quantity:  5000 each   Refills:  1       * Antihemophilic Factor (Recomb) 5675-2103 UNITS Solr   Used for:  Severe hemophilia A (H), Hematuria        Infuse recombinate 2000 units every 24 as needed for Hematuria   Quantity:  6000 each   Refills:  0       mirtazapine 45 MG tablet   Commonly known as:  REMERON   Used for:  Generalized anxiety disorder        Dose:  45 mg   Take 1 tablet (45 mg) by mouth At Bedtime And may take 1/2 tablet for insomnia up to 3 times a week.   Quantity:  37 tablet   Refills:  5       * Notice:  This list has 2 medication(s) that are the same as other medications prescribed for you. Read the directions carefully, and ask your doctor or other care provider to review  them with you.         Where to get your medicines      These medications were sent to [facility] - [place], [place] - [address]  [address] [address]     Phone:  [phone]     tamsulosin 0.4 MG capsule                Protect others around you: Learn how to safely use, store and throw away your medicines at www.disposemymeds.org.             Medication List: This is a list of all your medications and when to take them. Check marks below indicate your daily home schedule. Keep this list as a reference.      Medications           Morning Afternoon Evening Bedtime As Needed    amLODIPine 5 MG tablet   Commonly known as:  NORVASC   Take 1 tablet (5 mg) by mouth daily   Last time this was given:  5 mg on 2/23/2018  9:17 AM                                codeine 30 MG tablet   Take 2 tablets (60 mg) by mouth every 6 hours as needed for moderate to severe pain (up to 8 tabs in 24 hours)                                diazepam 5 MG tablet   Commonly known as:  VALIUM   Take up to one tablet by mouth daily as needed for anxiety                                * KOGENATE FS 1000 UNITS Kit   Infuse 2000 to 3000 units (-5/+10%) IV 2 to 3 times a week and as needed for bleeding episodes   Last time this was given:  2/23/2018 10:45 AM                                * Antihemophilic Factor (Recomb) 1981-1992 UNITS Solr   Infuse recombinate 2000 units every 24 as needed for Hematuria                                lisinopril 40 MG tablet   Commonly known as:  PRINIVIL/ZESTRIL   Take 1 tablet (40 mg) by mouth daily                                mirtazapine 45 MG tablet   Commonly known as:  REMERON   Take 1 tablet (45 mg) by mouth At Bedtime And may take 1/2 tablet for insomnia up to 3 times a week.   Last time this was given:  45 mg on 2/22/2018 10:36 PM                                tamsulosin 0.4 MG capsule   Commonly known as:  FLOMAX   Take 1 capsule (0.4 mg) by mouth daily    Last time this was given:  0.4 mg on 2/23/2018 11:21 AM                                * Notice:  This list has 2 medication(s) that are the same as other medications prescribed for you. Read the directions carefully, and ask your doctor or other care provider to review them with you.              More Information        Ureteral Stents  A ureteral stent is a soft plastic tube with holes in it. It s temporarily inserted into a ureter to help drain urine into the bladder. One end goes in the kidney. The other end goes in the bladder. A coil on each end holds the stent in place. The stent can t be seen from outside the body. It shouldn t interfere with your normal routine. Your stent will be put in by a doctor trained in treating the urinary tract (a urologist) or another specialist. The procedure is done in a hospital or surgery center. You ll likely go home the same day.  When is a ureteral stent used?  A ureteral stent may be used:    To bypass a blockage in a kidney or ureter.    During kidney stone removal.    To let a ureter heal after surgery.    Before the Procedure  Your healthcare provider will give you instructions to prepare for the procedure. X-rays or other imaging tests of your kidneys and ureters may be done beforehand.  During the procedure    You receive medicine to prevent pain and help you relax or sleep during the procedure. Once this takes effect, the procedure starts.    The doctor inserts a cystoscope (lighted instrument) through the urethra and into the bladder. This shows the opening to the ureter.    A thin wire is carefully threaded through the cystoscope, up the ureter, and into the kidney. The stent is inserted over the wire.    A fluoroscope (special X-ray machine) is used to help position the stent. When the stent is in place, the wire and cystoscope are removed.  While you have a stent    Some discomfort is normal. Certain movements may trigger pain or a feeling that you need to  urinate. You may also feel mild soreness or pressure before or during urination. These symptoms will go away a few days after the stent is removed.    Medicine to control pain or bladder spasms or to prevent infection may be prescribed. Take this as directed.    Drink plenty of fluids to help flush out your urinary tract.    Your urine may be slightly pink or red. This is due to bleeding caused by minor irritation from the stent. This may happen on and off while you have the stent.    As with any synthetic device placed in the body, there is a risk of infection. The stent may have to be removed if this happens.   How long will you need a stent?  The stent is often taken out after the blockage in the ureter is treated or the ureter has healed. This may take 1 week to 2 weeks, or longer. If a stent is needed for a long time, it may need to be changed every few months.  When to call your healthcare provider  Contact your healthcare provider right away if:    Your urine contains blood clots or you see a large amount of blood-tinged urine    You have symptoms similar to those you had before the stent was placed    You constantly leak urine    You have a fever over 100.4 F (38 C), chills, nausea, or vomiting    Your pain is not relieved with medicine    The end of the stent comes out of the urethra   Date Last Reviewed: 1/1/2017 2000-2017 [employer]. [address] [place] [address], [place] [address]. All rights reserved. This information is not intended as a substitute for professional medical care. Always follow your healthcare professional's instructions.

## 2018-02-20 ENCOUNTER — APPOINTMENT (OUTPATIENT)
Dept: ULTRASOUND IMAGING | Facility: CLINIC | Age: 68
DRG: 668 | End: 2018-02-20
Attending: UROLOGY
Payer: MEDICARE

## 2018-02-20 PROBLEM — D49.4 BLADDER TUMOR: Status: ACTIVE | Noted: 2018-02-20

## 2018-02-20 LAB
ANION GAP SERPL CALCULATED.3IONS-SCNC: 8 MMOL/L (ref 3–14)
BUN SERPL-MCNC: 18 MG/DL (ref 7–30)
CALCIUM SERPL-MCNC: 8.8 MG/DL (ref 8.5–10.1)
CHLORIDE SERPL-SCNC: 103 MMOL/L (ref 94–109)
CO2 SERPL-SCNC: 24 MMOL/L (ref 20–32)
CREAT SERPL-MCNC: 1.21 MG/DL (ref 0.66–1.25)
ERYTHROCYTE [DISTWIDTH] IN BLOOD BY AUTOMATED COUNT: 14.7 % (ref 10–15)
FACT VIII ACT/NOR PPP: 149 % (ref 55–200)
GFR SERPL CREATININE-BSD FRML MDRD: 60 ML/MIN/1.7M2
GLUCOSE BLDC GLUCOMTR-MCNC: 104 MG/DL (ref 70–99)
GLUCOSE BLDC GLUCOMTR-MCNC: 118 MG/DL (ref 70–99)
GLUCOSE BLDC GLUCOMTR-MCNC: 128 MG/DL (ref 70–99)
GLUCOSE SERPL-MCNC: 105 MG/DL (ref 70–99)
HCT VFR BLD AUTO: 32.5 % (ref 40–53)
HGB BLD-MCNC: 10.1 G/DL (ref 13.3–17.7)
MCH RBC QN AUTO: 26.7 PG (ref 26.5–33)
MCHC RBC AUTO-ENTMCNC: 31.1 G/DL (ref 31.5–36.5)
MCV RBC AUTO: 86 FL (ref 78–100)
PLATELET # BLD AUTO: 286 10E9/L (ref 150–450)
POTASSIUM SERPL-SCNC: 4 MMOL/L (ref 3.4–5.3)
RBC # BLD AUTO: 3.78 10E12/L (ref 4.4–5.9)
SODIUM SERPL-SCNC: 135 MMOL/L (ref 133–144)
WBC # BLD AUTO: 9.5 10E9/L (ref 4–11)

## 2018-02-20 PROCEDURE — 25000555 ZZHC RX FACTOR IP 250 OP 636: Performed by: PHYSICIAN ASSISTANT

## 2018-02-20 PROCEDURE — 40000556 ZZH STATISTIC PERIPHERAL IV START W US GUIDANCE

## 2018-02-20 PROCEDURE — 36415 COLL VENOUS BLD VENIPUNCTURE: CPT | Performed by: STUDENT IN AN ORGANIZED HEALTH CARE EDUCATION/TRAINING PROGRAM

## 2018-02-20 PROCEDURE — 00000146 ZZHCL STATISTIC GLUCOSE BY METER IP

## 2018-02-20 PROCEDURE — 96374 THER/PROPH/DIAG INJ IV PUSH: CPT

## 2018-02-20 PROCEDURE — 85027 COMPLETE CBC AUTOMATED: CPT | Performed by: STUDENT IN AN ORGANIZED HEALTH CARE EDUCATION/TRAINING PROGRAM

## 2018-02-20 PROCEDURE — 85240 CLOT FACTOR VIII AHG 1 STAGE: CPT | Performed by: INTERNAL MEDICINE

## 2018-02-20 PROCEDURE — 00000328 ZZHCL STATISTIC PTT NC: Performed by: INTERNAL MEDICINE

## 2018-02-20 PROCEDURE — 96376 TX/PRO/DX INJ SAME DRUG ADON: CPT

## 2018-02-20 PROCEDURE — 80048 BASIC METABOLIC PNL TOTAL CA: CPT | Performed by: STUDENT IN AN ORGANIZED HEALTH CARE EDUCATION/TRAINING PROGRAM

## 2018-02-20 PROCEDURE — 00000167 ZZHCL STATISTIC INR NC: Performed by: INTERNAL MEDICINE

## 2018-02-20 PROCEDURE — 83036 HEMOGLOBIN GLYCOSYLATED A1C: CPT | Performed by: INTERNAL MEDICINE

## 2018-02-20 PROCEDURE — 76770 US EXAM ABDO BACK WALL COMP: CPT

## 2018-02-20 PROCEDURE — 00000401 ZZHCL STATISTIC THROMBIN TIME NC: Performed by: INTERNAL MEDICINE

## 2018-02-20 PROCEDURE — 20000002 ZZH R&B BMT INTERMEDIATE

## 2018-02-20 PROCEDURE — 25000132 ZZH RX MED GY IP 250 OP 250 PS 637: Mod: GY | Performed by: STUDENT IN AN ORGANIZED HEALTH CARE EDUCATION/TRAINING PROGRAM

## 2018-02-20 PROCEDURE — 25000555 ZZHC RX FACTOR IP 250 OP 636

## 2018-02-20 PROCEDURE — A9270 NON-COVERED ITEM OR SERVICE: HCPCS | Mod: GY | Performed by: STUDENT IN AN ORGANIZED HEALTH CARE EDUCATION/TRAINING PROGRAM

## 2018-02-20 PROCEDURE — G0378 HOSPITAL OBSERVATION PER HR: HCPCS

## 2018-02-20 PROCEDURE — 99232 SBSQ HOSP IP/OBS MODERATE 35: CPT | Performed by: INTERNAL MEDICINE

## 2018-02-20 PROCEDURE — 36415 COLL VENOUS BLD VENIPUNCTURE: CPT | Performed by: INTERNAL MEDICINE

## 2018-02-20 RX ORDER — CEFAZOLIN SODIUM 2 G/100ML
2 INJECTION, SOLUTION INTRAVENOUS
Status: CANCELLED | OUTPATIENT
Start: 2018-02-21

## 2018-02-20 RX ADMIN — MIRTAZAPINE 45 MG: 15 TABLET, FILM COATED ORAL at 22:05

## 2018-02-20 RX ADMIN — AMLODIPINE BESYLATE 5 MG: 5 TABLET ORAL at 08:58

## 2018-02-20 RX ADMIN — ANTIHEMOPHILIC FACTOR (RECOMBINANT): KIT at 02:23

## 2018-02-20 RX ADMIN — OXYCODONE HYDROCHLORIDE 10 MG: 5 TABLET ORAL at 09:10

## 2018-02-20 RX ADMIN — OXYCODONE HYDROCHLORIDE 10 MG: 5 TABLET ORAL at 22:05

## 2018-02-20 RX ADMIN — OXYCODONE HYDROCHLORIDE 10 MG: 5 TABLET ORAL at 18:50

## 2018-02-20 RX ADMIN — ANTIHEMOPHILIC FACTOR (RECOMBINANT): KIT at 16:06

## 2018-02-20 ASSESSMENT — PAIN DESCRIPTION - DESCRIPTORS: DESCRIPTORS: ACHING;SHARP;SORE

## 2018-02-20 NOTE — PLAN OF CARE
Problem: Patient Care Overview  Goal: Discharge Needs Assessment   Complete 48 hours factor 8 infusion. Yes  Wean off CBI. Yes. Clamped  Pale pink urine draining. Out put 300.  Factor 8 infusing. Lab draw this am.  /77 (BP Location: Left arm)  Pulse 94  Temp 98.5  F (36.9  C) (Oral)  Resp 21  Ht 1.829 m (6')  Wt 75.1 kg (165 lb 9.1 oz)  SpO2 94%  BMI 22.45 kg/m2

## 2018-02-20 NOTE — PLAN OF CARE
Problem: Patient Care Overview  Goal: Plan of Care/Patient Progress Review  Outcome: Improving  Observation Discharge Goals. List all goals to be met before discharge home:     1. Complete 48 hours factor 8 infusion - Factor VIII infusing. Hemo/Onc to consult and determine duration of infusion. Per patient, receives Factor VIII about every other day at home - bolus dose.   2. Wean off CBI - Has not been infusing. Urine output is pink. No clots. Adequate amount of urine noted.     Pain controlled by Oxycodone. Ate breakfast without difficulty. Drinking fluids. Blood sugar WNL. Vital signs stable. Renal US completed this morning. Up walking x2 in room.

## 2018-02-20 NOTE — CONSULTS
A collaboration between [facility] and [facility]  Experts in minimally invasive, targeted treatments performed using imaging guidance    Interventional Radiology Consult Service Note    Patient Name:  Orlin Alcantar   YOB: 1950  Medical Record Number (MRN):  [id number]  Age:  67 year old male  Patient location / Unit:  [id number]    Requested IR consult:  Interventional Radiology Adult/Peds IP Consult: Patient to be seen: Routine within 24 hours; Call back #: [phone]; bladder cancer involving right UO, unable to identify during resection, now with hydronephrosis [[id number]]    Authorizing Provider Encounter Provider   Debra Diggs MD - [id number]      Order Questions   Question Answer Comment   Patient to be seen Routine within 24 hours    Call back # [phone]    Reason for Consult bladder cancer involving right UO, unable to identify during resection, now with hydronephrosis    Consultant may enter orders Yes    Is the patient on an anticoagulant ? No    Is the patient currently NPO ? No    Which provider care team? INTERVENTIONAL RADIOLOGY ([facility])        Complete Blood Count:  Lab Results   Component Value Date     02/20/2018       Coagulation:  Lab Results   Component Value Date    INR 0.99 02/07/2018       -----    Associated Imaging:  US 2/20/2018        Pertinent imaging reviewed with IR Dr. Urmila Borden    -----    PLAN:     Request, patient data, and imaging reviewed.     Patient will be placed on the IR schedule tomorrow, Wed 2/21, for a Placement of a RIGHT percutaneous nephrostomy tube vs. nephroureteral stent.     Pre-procedure hematology and coagulation labs per IR protocols are WNL for procedure.  The patient's hemophilia status is being followed by hematology and she is being given factor 8 infusion; Pre-procedure recommendations from them.    Pre-procedure for NPO status, skin site cleansing scrubs and Pre-op IV  antibiotics have been entered.    Consent will be done prior to procedure.    You may contact the IR charge RN at [phone] for an estimated time of procedure for this inpatient.     Case discussed with referring and Dr. Borden.      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      Bladder tumor      Depression      DM II (diabetes mellitus, type II), controlled (H)     diet controlled     Hemophilia (H)     Type A     Hepatitis C, chronic (H)     treated wtih interferon/ribavirin     HTN (hypertension)          Patient Active Problem List    Diagnosis Date Noted     Hematuria 02/19/2018     Priority: Medium     Hepatitis C, chronic (H)      Priority: Medium     treated wtih interferon/ribavirin  No detectable HCV RNA 4/2017       Mood disorder in conditions classified elsewhere 03/10/2016     Priority: Medium     Hypertension goal BP (blood pressure) < 140/90 04/09/2015     Priority: Medium     ACP (advance care planning) 12/02/2014     Priority: Medium     Advance Care Planning: Receipt of ACP document:  Received: Health Care Directive which was witnessed or notarized on 10-16-14.  Document previously scanned on 11-25-14.  Validation form completed and sent to be scanned.  Code Status is full code- pt choosing DNR in [facility]- suggest goals of care discussion with patient to confirm choice. Confirmed/documented designated decision maker(s). See permanent comments section of demographics in clinical tab. View document(s) and details by clicking on code status. Added by Katie Santos RN, System ACP Coordinator [employer] [employer]  on 12/2/2014.             Anxiety state 04/08/2014     Priority: Medium     Problem list name updated by automated process. Provider to review       Depressive  disorder, not elsewhere classified 04/08/2014     Priority: Medium     History of total hip arthroplasty 11/11/2011     Priority: Medium     Hemophilia (H) 11/11/2011     Priority: Medium     Pain in joint, pelvic region and thigh 11/11/2011     Priority: Medium         Prescription Medications as of 2/20/2018             Antihemophilic Factor, Recomb, 5399-3507 UNITS SOLR Infuse recombinate 2000 units every 24 as needed for Hematuria    Antihemophilic Factor, Recomb, (KOGENATE FS) 1000 UNITS KIT Infuse 2000 to 3000 units (-5/+10%) IV 2 to 3 times a week and as needed for bleeding episodes    codeine 30 MG tablet Take 2 tablets (60 mg) by mouth every 6 hours as needed for moderate to severe pain (up to 8 tabs in 24 hours)    lisinopril (PRINIVIL/ZESTRIL) 40 MG tablet Take 1 tablet (40 mg) by mouth daily    amLODIPine (NORVASC) 5 MG tablet Take 1 tablet (5 mg) by mouth daily    mirtazapine (REMERON) 45 MG tablet Take 1 tablet (45 mg) by mouth At Bedtime And may take 1/2 tablet for insomnia up to 3 times a week.    diazepam (VALIUM) 5 MG tablet Take up to one tablet by mouth daily as needed for anxiety      Facility Administered Medications as of 2/20/2018             factor VIII (Recomb) (Kogenate) injection 4,348 Units Inject 4,348 Units into the vein once    ceFAZolin (ANCEF) intermittent infusion 2 g in 100 mL dextrose PRE-MIX Inject 100 mLs (2 g) into the vein Pre-Op/Pre-procedure x 1 dose    factor VIII (recomb) (KOGENATE FS) infusion Inject into the vein continuous    amLODIPine (NORVASC) tablet 5 mg Take 2 tablets (5 mg) by mouth every morning    mirtazapine (REMERON) tablet 45 mg Take 1 tablet (45 mg) by mouth At Bedtime    lidocaine 1 % 1 mL 1 mL by Other route every hour as needed (mild pain with VAD insertion or accessing implanted port)    lidocaine (LMX4) kit Apply topically every hour as needed for pain (with VAD insertion or accessing implanted port.)    sodium chloride (PF) 0.9% PF flush 3 mL 3 mLs by  "Intracatheter route every hour as needed for line flush (for peripheral IV flush post IV meds)    sodium chloride (PF) 0.9% PF flush 3 mL 3 mLs by Intracatheter route every 8 hours    naloxone (NARCAN) injection 0.1-0.4 mg Inject 0.25-1 mLs (0.1-0.4 mg) into the vein every 2 minutes as needed for opioid reversal    sodium chloride 0.9% infusion Inject into the vein continuous    ondansetron (ZOFRAN-ODT) ODT tab 4 mg Take 1 tablet (4 mg) by mouth every 6 hours as needed for nausea or vomiting    Linked Group 1:  \"Or\" Linked Group Details     ondansetron (ZOFRAN) injection 4 mg Inject 2 mLs (4 mg) into the vein every 6 hours as needed for nausea or vomiting    Linked Group 1:  \"Or\" Linked Group Details     prochlorperazine (COMPAZINE) injection 5 mg Inject 1 mL (5 mg) into the vein every 6 hours as needed for nausea or vomiting    Linked Group 2:  \"Or\" Linked Group Details     prochlorperazine (COMPAZINE) tablet 5 mg Take 1 tablet (5 mg) by mouth every 6 hours as needed for nausea or vomiting    Linked Group 2:  \"Or\" Linked Group Details     oxyCODONE IR (ROXICODONE) tablet 5-10 mg Take 1-2 tablets (5-10 mg) by mouth every 3 hours as needed for moderate to severe pain    factor VIII (Recomb) (HELIXATE) injection (Discontinued) Inject 375.5 Units/hr into the vein continuous    ceFAZolin (ANCEF) 1 g vial to attach to  ml bag for ADULT or 50 ml bag for PEDS (Discontinued) Inject 1 g into the vein See Admin Instructions    ceFAZolin (ANCEF) intermittent infusion 2 g in 100 mL dextrose PRE-MIX (Discontinued) Inject 100 mLs (2 g) into the vein Pre-Op/Pre-procedure x 1 dose    ceFAZolin (ANCEF) 1 g vial to attach to  ml bag for ADULT or 50 ml bag for PEDS (Discontinued) Inject 1 g into the vein See Admin Instructions    lactated ringers infusion (Discontinued) Inject into the vein continuous prn    midazolam (VERSED) injection (Discontinued) Inject into the vein as needed for anxiety    fentaNYL (PF) (SUBLIMAZE) " "injection (Discontinued) Inject into the vein as needed for moderate to severe pain    lidocaine injection 2% (MDV) (Discontinued) Inject into the vein as needed    propofol (DIPRIVAN) injection 10 mg/mL vial (Discontinued) Inject into the vein as needed    rocuronium (ZEMURON) injection (Discontinued) Inject into the vein as needed    phenylephrine (MANOJ-SYNEPHRINE) injection 1 mg (Discontinued) Inject 1 mg into the vein continuous prn    albumin human 5 % injection (Discontinued) continuous prn for other    lactated ringers infusion (Discontinued) Inject into the vein continuous    ondansetron (ZOFRAN-ODT) ODT tab 4 mg (Discontinued) Take 1 tablet (4 mg) by mouth every 30 minutes as needed for nausea or vomiting    Linked Group 3:  \"Or\" Linked Group Details     ondansetron (ZOFRAN) injection 4 mg (Discontinued) Inject 2 mLs (4 mg) into the vein every 30 minutes as needed for nausea or vomiting    Linked Group 3:  \"Or\" Linked Group Details     prochlorperazine (COMPAZINE) injection 5 mg (Discontinued) Inject 1 mL (5 mg) into the vein every 6 hours as needed for nausea or vomiting    meperidine (DEMEROL) injection 12.5 mg (Discontinued) Inject 0.25 mLs (12.5 mg) into the vein every 15 minutes as needed for post anesthesia shivering    naloxone (NARCAN) injection 0.1-0.4 mg (Discontinued) Inject 0.25-1 mLs (0.1-0.4 mg) into the vein every 2 minutes as needed for opioid reversal    fentaNYL (PF) (SUBLIMAZE) injection 25-50 mcg (Discontinued) Inject 0.5-1 mLs (25-50 mcg) into the vein every 5 minutes as needed for other (acute pain while in PACU.)    HYDROmorphone (DILAUDID) injection 0.3-0.5 mg (Discontinued) Inject 0.3-0.5 mg into the vein every 10 minutes as needed for other (acute pain.  May administer if Respiratory Rate is greater than 10)    ORAL Pain Medications -  may administer as ordered by surgeon for take home use (Discontinued) continuous prn    labetalol (NORMODYNE/TRANDATE) injection 10 mg (Discontinued) " Inject 2 mLs (10 mg) into the vein once as needed for high blood pressure (for Systolic Blood Pressure greater than 160 mmHg and Heart Rate greater than 60 bpm, Max cumulative dose = 10 mg)    sodium chloride 0.9% (bag) irrigation (Discontinued) as needed    labetalol (NORMODYNE/TRANDATE) injection (Discontinued) as needed for high blood pressure    ondansetron (ZOFRAN) injection (Discontinued) Inject into the vein as needed for nausea or vomiting    HYDROmorphone (DILAUDID) injection (Discontinued) Inject into the vein as needed for moderate to severe pain    sugammadex (BRIDION) injection (Discontinued) as needed            Allergies   Allergen Reactions     Aspirin      This drug inhibits platelets and is contraindicated due to hemophilia diagnosis.            Complete Blood Count:  Lab Results   Component Value Date     02/20/2018       Coagulation:  Lab Results   Component Value Date    INR 0.99 02/07/2018       Vital Signs:  /77 (BP Location: Left arm)  Pulse 94  Temp 98.5  F (36.9  C) (Oral)  Resp 20  Ht 1.829 m (6')  Wt 75.1 kg (165 lb 9.1 oz)  SpO2 94%  BMI 22.45 kg/m2

## 2018-02-20 NOTE — ANESTHESIA POSTPROCEDURE EVALUATION
Patient: Orlin Alcantar    Procedure(s):  Cystoscopy, Transurethral Resection Of Bladder Tumor  - Wound Class: II-Clean Contaminated   - Wound Class: II-Clean Contaminated    Diagnosis:Malignant Neoplasm Of Urinary Bladder  Diagnosis Additional Information: No value filed.    Anesthesia Type:  General, ETT    Note:  Anesthesia Post Evaluation    Patient location during evaluation: PACU  Patient participation: Able to fully participate in evaluation  Level of consciousness: awake and alert  Pain management: adequate  Airway patency: patent  Cardiovascular status: acceptable  Respiratory status: acceptable  Hydration status: acceptable  PONV: none     Anesthetic complications: None          Last vitals:  Vitals:    02/19/18 1150   BP: 112/74   Pulse: 113   Resp: 16   Temp: 36.6  C (97.8  F)   SpO2: 99%         Electronically Signed By: Zeferino Ernst MD  February 19, 2018  6:28 PM

## 2018-02-20 NOTE — ANESTHESIA CARE TRANSFER NOTE
Patient: Orlin Alcantar    Procedure(s):  Cystoscopy, Transurethral Resection Of Bladder Tumor  - Wound Class: II-Clean Contaminated   - Wound Class: II-Clean Contaminated    Diagnosis: Malignant Neoplasm Of Urinary Bladder  Diagnosis Additional Information: No value filed.    Anesthesia Type:   General, ETT     Note:  Airway :Face Mask  Patient transferred to:PACU  Handoff Report: Identifed the Patient, Identified the Reponsible Provider, Reviewed the pertinent medical history, Discussed the surgical course, Reviewed Intra-OP anesthesia mangement and issues during anesthesia, Set expectations for post-procedure period and Allowed opportunity for questions and acknowledgement of understanding      Vitals: (Last set prior to Anesthesia Care Transfer)    CRNA VITALS  2/19/2018 1741 - 2/19/2018 1819      2/19/2018             NIBP: (!)  136/111    Pulse: 91                Electronically Signed By: Owen Gutierrez MD  February 19, 2018  6:19 PM

## 2018-02-20 NOTE — PROGRESS NOTES
Care Coordinator Progress Note     Admission Date/Time:  2/19/2018  Attending MD:  Cassidy Liao*     Data  Chart reviewed, discussed with interdisciplinary team.   Patient was admitted for: Hemophilia (H).    Concerns with insurance coverage for discharge needs: None.  Current Living Situation: Patient lives alone.  Support System: Supportive and Involved  Services Involved: none  Transportation: pt drives self  Barriers to Discharge: chronically ill    Coordination of Care and Referrals: Provided patient/family with options for discussion of pt current living situation.      Assessment   At pt bedside to discuss RNCC role and pt current living situation. Pt states he lives alone , independently and receives no home services. Pt states he is able to administer his own factor at home with a 23G butterfly needle. Anticipate no needs at DC.      Plan  Anticipated Discharge Date:  02/20/18  Anticipated Discharge Plan:  DC with provider recommendations.     Jamaica Lazaro RN [facility] ED/6D Obs  [phone]

## 2018-02-20 NOTE — PLAN OF CARE
Problem: Patient Care Overview  Goal: Discharge Needs Assessment   Complete 48 hours factor 8 infusion. Yes  Wean off CBI. Yes. Clamped  Pale pink urine draining.

## 2018-02-20 NOTE — PLAN OF CARE
Problem: Patient Care Overview  Goal: Plan of Care/Patient Progress Review  Outcome: Improving  Observation Discharge Goals. List all goals to be met before discharge home:     1. Complete 48 hours factor 8 infusion - Factor VIII infusing. Hemo/Onc to consult and determine duration of infusion. Per patient, receives Factor VIII about every other day at home - bolus dose.   2. Wean off CBI - Has not been infusing. Urine output was pink in the early morning but become more red this afternoon. Presently is light pink again. Will monitor. MD's are aware. No clots at anytime. Adequate amount of urine noted.      NPO after midnight for PNT placement tomorrow. Urology Staff was here and spoke with patient. Questions answered. No further concerns offered by patient.

## 2018-02-20 NOTE — PROGRESS NOTES
Urology Daily Progress Note    24 hour events/Subjective:     - No acute events overnight   - Remains on Factor 8 drip per heme oncology recommendations; F8 levels appropriate   -     O:  Vitals: /77 (BP Location: Left arm)  Pulse 94  Temp 98  F (36.7  C) (Oral)  Resp 21  Ht 1.829 m (6')  Wt 75.1 kg (165 lb 9.1 oz)  SpO2 94%  BMI 22.45 kg/m2  General: Alert, interactive, in NAD  Resp: Non-labored breathing on RA  Ext: Warm and well perfused   Gordon/Urostomy: clear watermelom    I/O  UOP 75/300    Labs  Factor 8 levels 153 -> 136 overnight   Pending this am    Heme:No lab results found in last 7 days.  Chem:  Recent Labs  Lab 02/19/18  1447   POTASSIUM 4.4       Assessment/Plan  67 year old y/o male with PMH noted for factor 8 deficiency who is POD#0  s/p TURBT for large bladder tumor ; doing well     NEURO Pain well controlled on  APAP with PRN Oxycodone and IV Dilaudid.  Changes:  None    CV HDS.    PULM Aggressive pulmonary toilet and I/S.   FEN/GI mIVF @ 100 ml/hr. Continue current diet     Gordon in place    HEME Hgb as above. Postop anemia expected for this surgery.  Continue to monitor. No transfusions indicated at this time   ID Afebrile, no leukocytosis.    Antibiotics: Completed periop antibiotics    ENDO No issues   ACTIVITY Up as tolerated  PT/OT consulted    PPx On factor 8 drip per heme recommendations    DISPO 7B, anticipated d/c to home today/tomorrow depending on heme recommendations          Seen and examined with chief resident, to be discussed with Dr. Liao    --    Anyi Solis MD MS  Urology Resident           Contacting the Urology Team     Please use the following job codes to reach the Urology Team. Note that you must use an in house phone and that job codes cannot receive text pages.     On weekdays, dial [phone] (or star-star-star 777 on the new [employer] telephones) then 0817 to reach the Adult Urology resident or PA on call    On weekdays, dial 893 (or star-star-star 777 on the  new [employer] telephones) then 0818 to reach the Pediatric Urology resident    On weeknights and weekends, dial [phone] (or star-star-star 777 on the new [employer] telephones) then [phone] to reach the Urology resident on call (for both Adult and Pediatrics)

## 2018-02-21 ENCOUNTER — APPOINTMENT (OUTPATIENT)
Dept: NUCLEAR MEDICINE | Facility: CLINIC | Age: 68
DRG: 668 | End: 2018-02-21
Attending: PHYSICIAN ASSISTANT
Payer: MEDICARE

## 2018-02-21 ENCOUNTER — APPOINTMENT (OUTPATIENT)
Dept: INTERVENTIONAL RADIOLOGY/VASCULAR | Facility: CLINIC | Age: 68
DRG: 668 | End: 2018-02-21
Attending: PHYSICIAN ASSISTANT
Payer: MEDICARE

## 2018-02-21 LAB
ANION GAP SERPL CALCULATED.3IONS-SCNC: 9 MMOL/L (ref 3–14)
ANION GAP SERPL CALCULATED.3IONS-SCNC: 9 MMOL/L (ref 3–14)
BUN SERPL-MCNC: 18 MG/DL (ref 7–30)
BUN SERPL-MCNC: 19 MG/DL (ref 7–30)
CALCIUM SERPL-MCNC: 7.7 MG/DL (ref 8.5–10.1)
CALCIUM SERPL-MCNC: 8.8 MG/DL (ref 8.5–10.1)
CHLORIDE SERPL-SCNC: 105 MMOL/L (ref 94–109)
CHLORIDE SERPL-SCNC: 111 MMOL/L (ref 94–109)
CO2 SERPL-SCNC: 22 MMOL/L (ref 20–32)
CO2 SERPL-SCNC: 24 MMOL/L (ref 20–32)
COPATH REPORT: NORMAL
CREAT SERPL-MCNC: 1.09 MG/DL (ref 0.66–1.25)
CREAT SERPL-MCNC: 1.17 MG/DL (ref 0.66–1.25)
ERYTHROCYTE [DISTWIDTH] IN BLOOD BY AUTOMATED COUNT: 14.7 % (ref 10–15)
ERYTHROCYTE [DISTWIDTH] IN BLOOD BY AUTOMATED COUNT: 14.8 % (ref 10–15)
FACT VIII ACT/NOR PPP: 86 % (ref 55–200)
GFR SERPL CREATININE-BSD FRML MDRD: 62 ML/MIN/1.7M2
GFR SERPL CREATININE-BSD FRML MDRD: 67 ML/MIN/1.7M2
GLUCOSE SERPL-MCNC: 104 MG/DL (ref 70–99)
GLUCOSE SERPL-MCNC: 96 MG/DL (ref 70–99)
HBA1C MFR BLD: 5.9 % (ref 4.3–6)
HBA1C MFR BLD: 6.1 % (ref 4.3–6)
HCT VFR BLD AUTO: 30.3 % (ref 40–53)
HCT VFR BLD AUTO: 33.6 % (ref 40–53)
HGB BLD-MCNC: 10.4 G/DL (ref 13.3–17.7)
HGB BLD-MCNC: 9.4 G/DL (ref 13.3–17.7)
INR PPP: 1.1 (ref 0.86–1.14)
MCH RBC QN AUTO: 26.1 PG (ref 26.5–33)
MCH RBC QN AUTO: 26.4 PG (ref 26.5–33)
MCHC RBC AUTO-ENTMCNC: 31 G/DL (ref 31.5–36.5)
MCHC RBC AUTO-ENTMCNC: 31 G/DL (ref 31.5–36.5)
MCV RBC AUTO: 84 FL (ref 78–100)
MCV RBC AUTO: 85 FL (ref 78–100)
PLATELET # BLD AUTO: 279 10E9/L (ref 150–450)
PLATELET # BLD AUTO: 280 10E9/L (ref 150–450)
POTASSIUM SERPL-SCNC: 3.7 MMOL/L (ref 3.4–5.3)
POTASSIUM SERPL-SCNC: 4 MMOL/L (ref 3.4–5.3)
RBC # BLD AUTO: 3.6 10E12/L (ref 4.4–5.9)
RBC # BLD AUTO: 3.94 10E12/L (ref 4.4–5.9)
SODIUM SERPL-SCNC: 138 MMOL/L (ref 133–144)
SODIUM SERPL-SCNC: 143 MMOL/L (ref 133–144)
WBC # BLD AUTO: 8.8 10E9/L (ref 4–11)
WBC # BLD AUTO: 9.5 10E9/L (ref 4–11)

## 2018-02-21 PROCEDURE — 25000555 ZZHC RX FACTOR IP 250 OP 636

## 2018-02-21 PROCEDURE — 40000556 ZZH STATISTIC PERIPHERAL IV START W US GUIDANCE

## 2018-02-21 PROCEDURE — 27210905 ZZH KIT CR7

## 2018-02-21 PROCEDURE — 27210912 ZZH NEEDLE CR8

## 2018-02-21 PROCEDURE — 83036 HEMOGLOBIN GLYCOSYLATED A1C: CPT | Performed by: UROLOGY

## 2018-02-21 PROCEDURE — 36415 COLL VENOUS BLD VENIPUNCTURE: CPT | Performed by: STUDENT IN AN ORGANIZED HEALTH CARE EDUCATION/TRAINING PROGRAM

## 2018-02-21 PROCEDURE — 00000401 ZZHCL STATISTIC THROMBIN TIME NC: Performed by: INTERNAL MEDICINE

## 2018-02-21 PROCEDURE — 25000128 H RX IP 250 OP 636: Performed by: UROLOGY

## 2018-02-21 PROCEDURE — CT231ZZ TOMOGRAPHIC (TOMO) NUCLEAR MEDICINE IMAGING OF KIDNEYS, URETERS AND BLADDER USING TECHNETIUM 99M (TC-99M): ICD-10-PCS | Performed by: RADIOLOGY

## 2018-02-21 PROCEDURE — 20000002 ZZH R&B BMT INTERMEDIATE

## 2018-02-21 PROCEDURE — 36415 COLL VENOUS BLD VENIPUNCTURE: CPT | Performed by: INTERNAL MEDICINE

## 2018-02-21 PROCEDURE — 00000328 ZZHCL STATISTIC PTT NC: Performed by: INTERNAL MEDICINE

## 2018-02-21 PROCEDURE — 25000128 H RX IP 250 OP 636: Performed by: RADIOLOGY

## 2018-02-21 PROCEDURE — 85027 COMPLETE CBC AUTOMATED: CPT | Performed by: INTERNAL MEDICINE

## 2018-02-21 PROCEDURE — 99152 MOD SED SAME PHYS/QHP 5/>YRS: CPT

## 2018-02-21 PROCEDURE — 25000555 ZZHC RX FACTOR IP 250 OP 636: Performed by: PHYSICIAN ASSISTANT

## 2018-02-21 PROCEDURE — 50432 PLMT NEPHROSTOMY CATHETER: CPT | Mod: 74

## 2018-02-21 PROCEDURE — 25000132 ZZH RX MED GY IP 250 OP 250 PS 637: Mod: GY | Performed by: STUDENT IN AN ORGANIZED HEALTH CARE EDUCATION/TRAINING PROGRAM

## 2018-02-21 PROCEDURE — 85240 CLOT FACTOR VIII AHG 1 STAGE: CPT | Performed by: INTERNAL MEDICINE

## 2018-02-21 PROCEDURE — A9270 NON-COVERED ITEM OR SERVICE: HCPCS | Mod: GY | Performed by: STUDENT IN AN ORGANIZED HEALTH CARE EDUCATION/TRAINING PROGRAM

## 2018-02-21 PROCEDURE — 85610 PROTHROMBIN TIME: CPT | Performed by: INTERNAL MEDICINE

## 2018-02-21 PROCEDURE — 78708 K FLOW/FUNCT IMAGE W/DRUG: CPT

## 2018-02-21 PROCEDURE — 80048 BASIC METABOLIC PNL TOTAL CA: CPT | Performed by: STUDENT IN AN ORGANIZED HEALTH CARE EDUCATION/TRAINING PROGRAM

## 2018-02-21 PROCEDURE — A9562 TC99M MERTIATIDE: HCPCS | Performed by: UROLOGY

## 2018-02-21 PROCEDURE — 85027 COMPLETE CBC AUTOMATED: CPT | Performed by: STUDENT IN AN ORGANIZED HEALTH CARE EDUCATION/TRAINING PROGRAM

## 2018-02-21 PROCEDURE — 25000128 H RX IP 250 OP 636: Performed by: STUDENT IN AN ORGANIZED HEALTH CARE EDUCATION/TRAINING PROGRAM

## 2018-02-21 PROCEDURE — 34300033 ZZH RX 343: Performed by: UROLOGY

## 2018-02-21 PROCEDURE — 80048 BASIC METABOLIC PNL TOTAL CA: CPT | Performed by: INTERNAL MEDICINE

## 2018-02-21 PROCEDURE — C1769 GUIDE WIRE: HCPCS

## 2018-02-21 PROCEDURE — 27210732 ZZH ACCESSORY CR1

## 2018-02-21 RX ORDER — IODIXANOL 320 MG/ML
50 INJECTION, SOLUTION INTRAVASCULAR ONCE
Status: DISCONTINUED | OUTPATIENT
Start: 2018-02-21 | End: 2018-02-21

## 2018-02-21 RX ORDER — FUROSEMIDE 10 MG/ML
20 INJECTION INTRAMUSCULAR; INTRAVENOUS ONCE
Status: COMPLETED | OUTPATIENT
Start: 2018-02-21 | End: 2018-02-21

## 2018-02-21 RX ORDER — NALOXONE HYDROCHLORIDE 0.4 MG/ML
.1-.4 INJECTION, SOLUTION INTRAMUSCULAR; INTRAVENOUS; SUBCUTANEOUS
Status: DISCONTINUED | OUTPATIENT
Start: 2018-02-21 | End: 2018-02-21 | Stop reason: HOSPADM

## 2018-02-21 RX ORDER — FENTANYL CITRATE 50 UG/ML
25-50 INJECTION, SOLUTION INTRAMUSCULAR; INTRAVENOUS EVERY 5 MIN PRN
Status: DISCONTINUED | OUTPATIENT
Start: 2018-02-21 | End: 2018-02-21 | Stop reason: HOSPADM

## 2018-02-21 RX ORDER — FLUMAZENIL 0.1 MG/ML
0.2 INJECTION, SOLUTION INTRAVENOUS
Status: DISCONTINUED | OUTPATIENT
Start: 2018-02-21 | End: 2018-02-21 | Stop reason: HOSPADM

## 2018-02-21 RX ADMIN — ANTIHEMOPHILIC FACTOR (RECOMBINANT) 375.5 UNITS/HR: KIT at 03:38

## 2018-02-21 RX ADMIN — FENTANYL CITRATE 100 MCG: 50 INJECTION, SOLUTION INTRAMUSCULAR; INTRAVENOUS at 11:16

## 2018-02-21 RX ADMIN — MIRTAZAPINE 45 MG: 15 TABLET, FILM COATED ORAL at 23:00

## 2018-02-21 RX ADMIN — SODIUM CHLORIDE: 9 INJECTION, SOLUTION INTRAVENOUS at 13:22

## 2018-02-21 RX ADMIN — OXYCODONE HYDROCHLORIDE 10 MG: 5 TABLET ORAL at 17:41

## 2018-02-21 RX ADMIN — OXYCODONE HYDROCHLORIDE 10 MG: 5 TABLET ORAL at 23:01

## 2018-02-21 RX ADMIN — Medication 9.5 MCI.: at 15:14

## 2018-02-21 RX ADMIN — AMLODIPINE BESYLATE 5 MG: 5 TABLET ORAL at 08:54

## 2018-02-21 RX ADMIN — FUROSEMIDE 20 MG: 10 INJECTION, SOLUTION INTRAVENOUS at 15:21

## 2018-02-21 RX ADMIN — OXYCODONE HYDROCHLORIDE 10 MG: 5 TABLET ORAL at 11:51

## 2018-02-21 RX ADMIN — ANTIHEMOPHILIC FACTOR (RECOMBINANT): KIT at 16:08

## 2018-02-21 ASSESSMENT — PAIN DESCRIPTION - DESCRIPTORS: DESCRIPTORS: ACHING;SHARP;SPASM

## 2018-02-21 NOTE — PROGRESS NOTES
Patient Name: Orlin Alcantar  Medical Record Number: [id number]  Today's Date: 2/21/2018    Procedure: right percutaneous nephrostomy tube placement, possible embolization  Proceduralist:    Sedation start time: 1040  Sedation end time: 1100  Sedation medications administered: 2mg versed, 100 mcg fentanyl  Total sedation time: 20 minutes    Patient in room time: 1020  Procedure time out: 1045  Patient out of room: 1119    Report given to: 5C IRON [place] Notes: Pt arrived to IR room 4 from nursing unit 5c. Providers here to do a sedation assessment for patient.  Pt denies any questions or concerns regarding procedure. Pt positioned prone and monitored per protocol.    Patient time out at 1045, providers did not start procedure yet.  Phone call at 1047 from urology team to providers asking to cancel procedure.  Procedure cancelled after time out but before any procedure done (sedation was already given).   . Pt transferred back to 5C.

## 2018-02-21 NOTE — PLAN OF CARE
"Problem: Patient Care Overview  Goal: Plan of Care/Patient Progress Review  Outcome: No Change   02/20/18 2150   OTHER   Plan Of Care Reviewed With patient   Plan of Care Review   Progress no change     Pt admitted to 5C from observation 6D. Afebrile, OVSS on RA. A&Ox4. Up w/ SBA, repositioning ind in bed. C/o pain (tenderness and occasional \"spasming\") r/t indwelling urinary cath, given oxy x 2 w/ partial relief. Ramirez is set up for CBI, has been clamped all day per 6D RN. UOP pink on arrival, has now been light macarena w/out clots; ramirez cares completed, small amount of dried blood on ramirez/ inner thigh/ anterior glans. Pt to have PNT placed tomorrow morning: NPO @ 0000. Factor VIII gtt infusing @ 0.86 ml/hr; will need to reorder new syringe overnight. NS infusing @ 100 ml/hr. Denies N/V/D/C; appetite low to fair. Will continue to monitor per POC.      Problem: Diabetes, Type 2 (Adult)  Goal: Signs and Symptoms of Listed Potential Problems Will be Absent, Minimized or Managed (Diabetes, Type 2)  Signs and symptoms of listed potential problems will be absent, minimized or managed by discharge/transition of care (reference Diabetes, Type 2 (Adult) CPG).   Outcome: No Change   02/20/18 2150   Diabetes, Type 2   Problems Assessed (Type 2 Diabetes) all   Problems Present (Type 2 Diabetes) none         "

## 2018-02-21 NOTE — PROGRESS NOTES
Urology Daily Progress Note    24 hour events/Subjective:     - No acute events overnight   - Remains on Factor 8 drip per heme oncology recommendations; F8 levels appropriate   - No complaints   - No flank pain   - Tentatively added onto IR scheduled for PNT today    O:  Vitals: /77 (BP Location: Left arm)  Pulse 94  Temp 98.3  F (36.8  C) (Oral)  Resp 16  Ht 1.829 m (6')  Wt 75.1 kg (165 lb 9.1 oz)  SpO2 96%  BMI 22.45 kg/m2  General: Alert, interactive, in NAD  Resp: Non-labored breathing on RA  Ext: Warm and well perfused   Gordon/Urostomy: clear macarena urine; no clots    I/O  UOP 1350/NR    Labs  Heme:    Recent Labs  Lab 02/21/18  0357 02/20/18  0637   WBC 8.8 9.5   HGB 9.4* 10.1*    286     Chem:    Recent Labs  Lab 02/21/18  0357 02/20/18  0637 02/19/18  1447   POTASSIUM 3.7 4.0 4.4   CR 1.09 1.21  --        Assessment/Plan  67 year old y/o male with PMH noted for factor 8 deficiency who is s/p TURBT for large bladder tumor done 2/19/2018; doing well     NEURO Pain well controlled on  APAP with PRN Oxycodone and IV Dilaudid.  Changes:  None    CV HDS.    PULM Aggressive pulmonary toilet and I/S.   FEN/GI mIVF @ 100 ml/hr. Continue current diet     Gordon in place   Will discuss plan for PNT today; currently on IR schedule but as patient has no flank pain and improving renal function, may elect to defer at this time.   HEME Hgb as above. Postop anemia expected for this surgery.  Continue to monitor. Factor VIII infusions per heme/onc   ID Afebrile, no leukocytosis.    Antibiotics: Completed periop antibiotics    ENDO No issues   ACTIVITY Up as tolerated  PT/OT consulted    PPx On factor 8 drip per heme recommendations    DISPO 7B, anticipated d/c to home today/tomorrow depending on heme recommendations          Seen and examined with chief resident, to be discussed with Dr. Liao    --    Rogelio Brown MD  Urology Resident    6:08 AM, 02/21/2018       Contacting the Urology Team      Please use the following job codes to reach the Urology Team. Note that you must use an in house phone and that job codes cannot receive text pages.     On weekdays, dial [phone] (or star-star-star 777 on the new [employer] telephones) then 0817 to reach the Adult Urology resident or PA on call    On weekdays, dial 893 (or star-star-star 777 on the new [employer] telephones) then 0818 to reach the Pediatric Urology resident    On weeknights and weekends, dial 893 (or star-star-star 777 on the new [employer] telephones) then 0039 to reach the Urology resident on call (for both Adult and Pediatrics)

## 2018-02-21 NOTE — PROGRESS NOTES
Interventional Radiology Pre-Procedure Sedation Assessment   Time of Assessment: 10:34 AM    Expected Level: Moderate Sedation    Indication: Sedation is required for the following type of Procedure: Drain    Sedation and procedural consent: Risks, benefits and alternatives were discussed with Patient    PO Intake: Appropriately NPO for procedure    ASA Class: Class 2 - MILD SYSTEMIC DISEASE, NO ACUTE PROBLEMS, NO FUNCTIONAL LIMITATIONS.    Mallampati: Grade 3:  Soft palate visible, posterior pharyngeal wall not visible    Lungs: Lungs Clear with good breath sounds bilaterally    Heart: Normal heart sounds and rate    History and physical reviewed and no updates needed. I have reviewed the lab findings, diagnostic data, medications, and the plan for sedation. I have determined this patient to be an appropriate candidate for the planned sedation and procedure and have reassessed the patient IMMEDIATELY PRIOR to sedation and procedure.    Milton Kim MD

## 2018-02-21 NOTE — CONSULTS
Hematology-Oncology Progress note    Assessment and Plan    Mr. 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 He is admitted for post-op monitoring of bleeding and factor 8 infusion. He received bolus of factor VIII just prior to the procedure (50 units/kg) and then a continuous infusion of factor VIII (5 units/kg/hour).     Pt underwent TURBT procedure yesterday, minimal blood loss. He is on ramirez and urine look light pink in color but otherwise no bleeding. He has been on the continuous infusoion at rate of 5 units/kg/hr. Factor levels are adequate at 150, 136 and 149.     He is due for PNT procedure on the rt side tomorrow for hydronephrosis.     Plan    -contiue Factor 8 infusion at 5 units/kg/hr  -f/u with Factor 8 levels AM    Please call us for questions.    Ryley Singh MD  Hematology Oncology fellow  [phone]    Attending Note:  I have reviewed the patient chart, and interviewed and examined the patient.  I agree with the assessment and plan.  When he is ready for discharge, will give himself once daily FVIII infusinos at home. Yu Schultz RN, [facility] ([phone]), will make sure that a supply of FVIII is sent to his home.   Claudia Perez MD  Hematology    Subjective:    Pt reports some pain in the urethral site but otherwise he is doing good.       Objective:        Vital signs:  Temp: 98.9  F (37.2  C) Temp src: Oral BP: 109/72 Pulse: 94 Heart Rate: 96 Resp: 16 SpO2: 94 % O2 Device: None (Room air) Oxygen Delivery: 3 LPM Height: 182.9 cm (6') Weight: 75.1 kg (165 lb 9.1 oz)  Estimated body mass index is 22.45 kg/(m^2) as calculated from the following:    Height as of this encounter: 1.829 m (6').    Weight as of this encounter: 75.1 kg (165 lb 9.1 oz).      Exam:  Gen: Well built and nourished, not in  any acute distress  HEENT: MMM, no oral lesions, no pallor, icterus   CV: RRR, no murmurs  Resp: CTA  Abd: Soft, NTND, no HSM , has ramirez, some bleeding around urethra  Ext: no edema   Neuro: AAOx4.      Intake/Output Summary (Last 24 hours) at 02/20/18 1940  Last data filed at 02/20/18 1900   Gross per 24 hour   Intake          1001.71 ml   Output             1075 ml   Net           -73.29 ml       Data:    CBC RESULTS:   Recent Labs   Lab Test  02/20/18   0637   WBC  9.5   RBC  3.78*   HGB  10.1*   HCT  32.5*   MCV  86   MCH  26.7   MCHC  31.1*   RDW  14.7   PLT  286       Last Basic Metabolic Panel:  Lab Results   Component Value Date     02/20/2018      Lab Results   Component Value Date    POTASSIUM 4.0 02/20/2018     Lab Results   Component Value Date    CHLORIDE 103 02/20/2018     Lab Results   Component Value Date    MARLA 8.8 02/20/2018     Lab Results   Component Value Date    CO2 24 02/20/2018     Lab Results   Component Value Date    BUN 18 02/20/2018     Lab Results   Component Value Date    CR 1.21 02/20/2018     Lab Results   Component Value Date     02/20/2018       Results for orders placed or performed during the hospital encounter of 02/19/18   US Renal Complete    Narrative    EXAMINATION: Renal ultrasound complete, 2/20/2018 8:34 AM     COMPARISON: Abdomen CT 4/17/2017    HISTORY: Transurethral bladder tumor resection    FINDINGS:    Right kidney: Measures 11.0 cm in length. New moderate  pelvocaliectasis of the right kidney. No renal stone. The proximal  right ureter appears dilated. No renal mass is seen.    Left kidney: Measures 11.9 cm in length. No hydronephrosis. 16 x 18 x  13 mm cyst in the mid kidney with peripheral calcification. 13 x 13 x  14 mm simple appearing inferior pole cyst.    Bladder: Decompressed by Ramirez catheter.      Impression    IMPRESSION:  1.  Moderate right pelvocaliectasis and dilated proximal right ureter.  2.  Cysts in the left kidney, one with  peripheral calcification,  otherwise without suspicious features.    I have personally reviewed the examination and initial interpretation  and I agree with the findings.    CYRIL LAL MD ([place])

## 2018-02-21 NOTE — PLAN OF CARE
Problem: Patient Care Overview  Goal: Plan of Care/Patient Progress Review  Outcome: No Change   02/21/18 0611   OTHER   Plan Of Care Reviewed With patient   Plan of Care Review   Progress no change       Comments: VSS and WNL. Denies pain and nausea. CBI clamped and ramirez draining plenty of clear yellow urine. SBA to BR. Factor eight running 0.86ml/hr. Spoke with pharmacy and requested new syringe to be sent up by 0900. If current syringe isn't empty do not change, deliver with patient to procedure. NPO since 0000 for neph tube placements planned at 0930 today. NS running at 100ml/hr.

## 2018-02-21 NOTE — PLAN OF CARE
Problem: Patient Care Overview  Goal: Plan of Care/Patient Progress Review  Ramirez draining clear yellow urine with CBI clamped all day except it was pink for a few minutes after going down and returning from radiology for cancelled PNT placement.  Factor VIII drip infusing through PIV as ordered.  Oxycodone given x 1 for generalized pain with relief.  Patient is currently at nuclear medicine for a renogram with lasix.  Plan to remove the ramirez and do a trial of void when he returns.      Problem: Diabetes, Type 2 (Adult)  Goal: Signs and Symptoms of Listed Potential Problems Will be Absent, Minimized or Managed (Diabetes, Type 2)  Signs and symptoms of listed potential problems will be absent, minimized or managed by discharge/transition of care (reference Diabetes, Type 2 (Adult) CPG).   Outcome: No Change   02/21/18 1546   Diabetes, Type 2   Problems Assessed (Type 2 Diabetes) all   Problems Present (Type 2 Diabetes) none

## 2018-02-22 ENCOUNTER — APPOINTMENT (OUTPATIENT)
Dept: INTERVENTIONAL RADIOLOGY/VASCULAR | Facility: CLINIC | Age: 68
DRG: 668 | End: 2018-02-22
Attending: NURSE PRACTITIONER
Payer: MEDICARE

## 2018-02-22 ENCOUNTER — CARE COORDINATION (OUTPATIENT)
Dept: UROLOGY | Facility: CLINIC | Age: 68
End: 2018-02-22

## 2018-02-22 LAB
ANION GAP SERPL CALCULATED.3IONS-SCNC: 9 MMOL/L (ref 3–14)
BUN SERPL-MCNC: 25 MG/DL (ref 7–30)
CALCIUM SERPL-MCNC: 8.9 MG/DL (ref 8.5–10.1)
CHLORIDE SERPL-SCNC: 102 MMOL/L (ref 94–109)
CO2 SERPL-SCNC: 26 MMOL/L (ref 20–32)
CREAT SERPL-MCNC: 1.28 MG/DL (ref 0.66–1.25)
ERYTHROCYTE [DISTWIDTH] IN BLOOD BY AUTOMATED COUNT: 14.6 % (ref 10–15)
FACT VIII ACT/NOR PPP: 103 % (ref 55–200)
GFR SERPL CREATININE-BSD FRML MDRD: 56 ML/MIN/1.7M2
GLUCOSE SERPL-MCNC: 106 MG/DL (ref 70–99)
HCT VFR BLD AUTO: 33.5 % (ref 40–53)
HGB BLD-MCNC: 10.7 G/DL (ref 13.3–17.7)
MCH RBC QN AUTO: 26.8 PG (ref 26.5–33)
MCHC RBC AUTO-ENTMCNC: 31.9 G/DL (ref 31.5–36.5)
MCV RBC AUTO: 84 FL (ref 78–100)
PLATELET # BLD AUTO: 296 10E9/L (ref 150–450)
POTASSIUM SERPL-SCNC: 4 MMOL/L (ref 3.4–5.3)
RBC # BLD AUTO: 4 10E12/L (ref 4.4–5.9)
SODIUM SERPL-SCNC: 137 MMOL/L (ref 133–144)
WBC # BLD AUTO: 10.7 10E9/L (ref 4–11)

## 2018-02-22 PROCEDURE — 25000555 ZZHC RX FACTOR IP 250 OP 636

## 2018-02-22 PROCEDURE — 85240 CLOT FACTOR VIII AHG 1 STAGE: CPT | Performed by: INTERNAL MEDICINE

## 2018-02-22 PROCEDURE — C1729 CATH, DRAINAGE: HCPCS

## 2018-02-22 PROCEDURE — 25000125 ZZHC RX 250: Performed by: RADIOLOGY

## 2018-02-22 PROCEDURE — 80048 BASIC METABOLIC PNL TOTAL CA: CPT | Performed by: STUDENT IN AN ORGANIZED HEALTH CARE EDUCATION/TRAINING PROGRAM

## 2018-02-22 PROCEDURE — 85027 COMPLETE CBC AUTOMATED: CPT | Performed by: STUDENT IN AN ORGANIZED HEALTH CARE EDUCATION/TRAINING PROGRAM

## 2018-02-22 PROCEDURE — 20600000 ZZH R&B BMT

## 2018-02-22 PROCEDURE — A9270 NON-COVERED ITEM OR SERVICE: HCPCS | Mod: GY | Performed by: STUDENT IN AN ORGANIZED HEALTH CARE EDUCATION/TRAINING PROGRAM

## 2018-02-22 PROCEDURE — 00000401 ZZHCL STATISTIC THROMBIN TIME NC: Performed by: INTERNAL MEDICINE

## 2018-02-22 PROCEDURE — 99152 MOD SED SAME PHYS/QHP 5/>YRS: CPT

## 2018-02-22 PROCEDURE — 25000555 ZZHC RX FACTOR IP 250 OP 636: Performed by: PHYSICIAN ASSISTANT

## 2018-02-22 PROCEDURE — 25000128 H RX IP 250 OP 636: Performed by: RADIOLOGY

## 2018-02-22 PROCEDURE — 0T9130Z DRAINAGE OF LEFT KIDNEY WITH DRAINAGE DEVICE, PERCUTANEOUS APPROACH: ICD-10-PCS | Performed by: RADIOLOGY

## 2018-02-22 PROCEDURE — 50432 PLMT NEPHROSTOMY CATHETER: CPT | Mod: RT

## 2018-02-22 PROCEDURE — 00000167 ZZHCL STATISTIC INR NC: Performed by: INTERNAL MEDICINE

## 2018-02-22 PROCEDURE — 25000132 ZZH RX MED GY IP 250 OP 250 PS 637: Mod: GY | Performed by: STUDENT IN AN ORGANIZED HEALTH CARE EDUCATION/TRAINING PROGRAM

## 2018-02-22 PROCEDURE — 27210732 ZZH ACCESSORY CR1

## 2018-02-22 PROCEDURE — 87086 URINE CULTURE/COLONY COUNT: CPT | Performed by: RADIOLOGY

## 2018-02-22 PROCEDURE — 27210912 ZZH NEEDLE CR8

## 2018-02-22 PROCEDURE — 27210804 ZZH SHEATH CR3

## 2018-02-22 PROCEDURE — 36415 COLL VENOUS BLD VENIPUNCTURE: CPT | Performed by: STUDENT IN AN ORGANIZED HEALTH CARE EDUCATION/TRAINING PROGRAM

## 2018-02-22 PROCEDURE — 00000328 ZZHCL STATISTIC PTT NC: Performed by: INTERNAL MEDICINE

## 2018-02-22 PROCEDURE — 27210905 ZZH KIT CR7

## 2018-02-22 PROCEDURE — 25000128 H RX IP 250 OP 636: Performed by: NURSE PRACTITIONER

## 2018-02-22 PROCEDURE — 25000555 ZZHC RX FACTOR IP 250 OP 636: Performed by: UROLOGY

## 2018-02-22 PROCEDURE — 36415 COLL VENOUS BLD VENIPUNCTURE: CPT | Performed by: INTERNAL MEDICINE

## 2018-02-22 PROCEDURE — 25000128 H RX IP 250 OP 636: Performed by: PHYSICIAN ASSISTANT

## 2018-02-22 RX ORDER — AMPICILLIN 1 G/1
1 INJECTION, POWDER, FOR SOLUTION INTRAMUSCULAR; INTRAVENOUS
Status: COMPLETED | OUTPATIENT
Start: 2018-02-22 | End: 2018-02-22

## 2018-02-22 RX ORDER — SODIUM CHLORIDE 9 MG/ML
INJECTION, SOLUTION INTRAVENOUS CONTINUOUS
Status: DISCONTINUED | OUTPATIENT
Start: 2018-02-22 | End: 2018-02-23 | Stop reason: HOSPADM

## 2018-02-22 RX ORDER — NALOXONE HYDROCHLORIDE 0.4 MG/ML
.1-.4 INJECTION, SOLUTION INTRAMUSCULAR; INTRAVENOUS; SUBCUTANEOUS
Status: DISCONTINUED | OUTPATIENT
Start: 2018-02-22 | End: 2018-02-22 | Stop reason: HOSPADM

## 2018-02-22 RX ORDER — ANTIHEMOPHILIC FACTOR (RECOMBINANT) 2000 (+/-)
KIT INTRAVENOUS CONTINUOUS
Status: DISCONTINUED | OUTPATIENT
Start: 2018-02-22 | End: 2018-02-22

## 2018-02-22 RX ORDER — FENTANYL CITRATE 50 UG/ML
25-50 INJECTION, SOLUTION INTRAMUSCULAR; INTRAVENOUS EVERY 5 MIN PRN
Status: DISCONTINUED | OUTPATIENT
Start: 2018-02-22 | End: 2018-02-22 | Stop reason: HOSPADM

## 2018-02-22 RX ORDER — ANTIHEMOPHILIC FACTOR (RECOMBINANT) 2000 (+/-)
KIT INTRAVENOUS CONTINUOUS
Status: DISPENSED | OUTPATIENT
Start: 2018-02-22 | End: 2018-02-23

## 2018-02-22 RX ORDER — IODIXANOL 320 MG/ML
50 INJECTION, SOLUTION INTRAVASCULAR ONCE
Status: COMPLETED | OUTPATIENT
Start: 2018-02-22 | End: 2018-02-22

## 2018-02-22 RX ORDER — FLUMAZENIL 0.1 MG/ML
0.2 INJECTION, SOLUTION INTRAVENOUS
Status: DISCONTINUED | OUTPATIENT
Start: 2018-02-22 | End: 2018-02-22 | Stop reason: HOSPADM

## 2018-02-22 RX ADMIN — MIRTAZAPINE 45 MG: 15 TABLET, FILM COATED ORAL at 22:36

## 2018-02-22 RX ADMIN — OXYCODONE HYDROCHLORIDE 10 MG: 5 TABLET ORAL at 16:30

## 2018-02-22 RX ADMIN — AMLODIPINE BESYLATE 5 MG: 5 TABLET ORAL at 08:23

## 2018-02-22 RX ADMIN — ANTIHEMOPHILIC FACTOR (RECOMBINANT): KIT at 22:36

## 2018-02-22 RX ADMIN — LIDOCAINE HYDROCHLORIDE 10 ML: 10 INJECTION, SOLUTION EPIDURAL; INFILTRATION; INTRACAUDAL; PERINEURAL at 16:03

## 2018-02-22 RX ADMIN — ANTIHEMOPHILIC FACTOR (RECOMBINANT): KIT at 16:14

## 2018-02-22 RX ADMIN — ANTIHEMOPHILIC FACTOR (RECOMBINANT): KIT at 04:23

## 2018-02-22 RX ADMIN — AMPICILLIN SODIUM 1 G: 1 INJECTION, POWDER, FOR SOLUTION INTRAMUSCULAR; INTRAVENOUS at 15:10

## 2018-02-22 RX ADMIN — OXYCODONE HYDROCHLORIDE 10 MG: 5 TABLET ORAL at 22:36

## 2018-02-22 RX ADMIN — IODIXANOL 5 ML: 320 INJECTION, SOLUTION INTRAVASCULAR at 15:46

## 2018-02-22 RX ADMIN — MIDAZOLAM 1 MG: 1 INJECTION INTRAMUSCULAR; INTRAVENOUS at 15:30

## 2018-02-22 RX ADMIN — GENTAMICIN SULFATE 120 MG: 40 INJECTION, SOLUTION INTRAMUSCULAR; INTRAVENOUS at 16:02

## 2018-02-22 RX ADMIN — FENTANYL CITRATE 50 MCG: 50 INJECTION, SOLUTION INTRAMUSCULAR; INTRAVENOUS at 15:30

## 2018-02-22 RX ADMIN — SODIUM CHLORIDE: 9 INJECTION, SOLUTION INTRAVENOUS at 07:07

## 2018-02-22 ASSESSMENT — PAIN DESCRIPTION - DESCRIPTORS
DESCRIPTORS: DISCOMFORT
DESCRIPTORS: DISCOMFORT

## 2018-02-22 NOTE — PLAN OF CARE
Problem: Patient Care Overview  Goal: Plan of Care/Patient Progress Review  Outcome: Improving   02/22/18 0610   Plan of Care Review   Progress improving     VSS. Afebrile.  No pain reported on this shift.  Urinating with urinal, macarena/ clear, no blood in urine. No c/o N/V.  Factor VIII continuous 0.86 mL/hr.  NPO since midnight for possible procedure this morning; PNT.

## 2018-02-22 NOTE — PROGRESS NOTES
Interventional Radiology Intra-procedural Nursing Note    Patient Name: Orlin Alcantar  Medical Record Number: [id number]  Today's Date: February 22, 2018    Procedure: right percutaneous nephrostomy tube placement    Attending MD in room during timeout: Dr Camacho  Proceduralist: Dr Camacho    Procedure Start Time: 1530  Procedure Puncture time: 1530  Procedure End Time: 1550    Sedation start time: 1530  Sedation end time: 1550  Sedation medications given: versed 1 mg, fentanyl 50 mcg IV    Report given to: Belia PERDUE 5C  : not needed    D: Patient brought to IR 5. Verified Patient's ID and informed consent using two identifiers. Patient denied having questions or concerns regarding the procedure.  A: Patient was positioned and was monitored per IR protocol. Patient tolerated the procedure without apparent incident. 3 mL clear yellow urine from the right renal pelvis was sent to the lab for cultures. The dressing over the right percutaneous nephrostomy tube is clean, dry and intact.   P: Patient returned to 5C for continued cares.     Maryjane Tanner RN  [phone]

## 2018-02-22 NOTE — CONSULTS
Patient is on IR schedule 2/22/2018 for an image guided placement of right sided percutaneous nephrostomy tube.   Labs WNL for procedure.    Orders for NPO and antibiotics have been entered.   Consent will be done prior to procedure.     Please contact the IR charge RN at [phone] for estimated time of procedure.     Case discussed with Dr. Camacho from IR and Josette Dumont PA-C.    Mary Ellen Gary, AHMET, APRN  Interventional Radiology  Phone: [phone]  Pager: [phone]

## 2018-02-22 NOTE — PLAN OF CARE
Problem: Patient Care Overview  Goal: Plan of Care/Patient Progress Review  Almas denied pain today.  Voiding macarena urine.  Up independently in his room.  Currently down in IR for PNT placement.  Factor VIII drip infusing through his PIV as ordered.  Continue plan of care.    Problem: Diabetes, Type 2 (Adult)  Goal: Signs and Symptoms of Listed Potential Problems Will be Absent, Minimized or Managed (Diabetes, Type 2)  Signs and symptoms of listed potential problems will be absent, minimized or managed by discharge/transition of care (reference Diabetes, Type 2 (Adult) CPG).   Outcome: No Change   02/22/18 1545   Diabetes, Type 2   Problems Assessed (Type 2 Diabetes) all   Problems Present (Type 2 Diabetes) none

## 2018-02-22 NOTE — PROGRESS NOTES
Left message for patient to return my chart to see how he is doing following surgical procedure with Dr. Liao on 2-.    Orlin Alcantar,        Any fever, chills, nausea or vomiting?  How is your appetite?  Are you drinking enough fluids?  Have you had a bowel movement since you have been home?  Are you having any difficulties with urination?  Any problems with your catheter?  Is your urine clear yellow? If not, what color is it?  How does your incision look? Is there any drainage? What color is the drainage?  Is there any redness, warmth or tenderness around the incision?  How is your pain? On a scale of 0-10, what number would you rate the pain?  What medications are you taking for your pain? Are you able to control the pain?      Do you have any questions or concerns?      Please feel free to reply via [employer] or contact the clinic.  [phone], option #3 to speak to the nurse

## 2018-02-22 NOTE — PROGRESS NOTES
Urology Daily Progress Note    24 hour events/Subjective:     - No acute events overnight   - Remains on Factor 8 drip    - No complaints   - PNT placed last evening without issues since     O:  Vitals: /89 (BP Location: Left arm)  Pulse 100  Temp 98  F (36.7  C) (Oral)  Resp 16  Ht 1.829 m (6')  Wt 72.1 kg (158 lb 15.2 oz)  SpO2 97%  BMI 21.56 kg/m2  General: Alert, interactive, in NAD  Resp: Non-labored breathing on RA  Ext: Warm and well perfused   Right PNT: with watermelon urine     I/O   Voided: 1150/100   Right PNT: 200/150    Labs  Heme:    Recent Labs  Lab 02/22/18  0320 02/21/18  0657 02/21/18  0357 02/20/18  0637   WBC 10.7 9.5 8.8 9.5   HGB 10.7* 10.4* 9.4* 10.1*    279 280 286     Chem:    Recent Labs  Lab 02/22/18  0320 02/21/18  0657 02/21/18  0357 02/20/18  0637   POTASSIUM 4.0 4.0 3.7 4.0   CR 1.28* 1.17 1.09 1.21       Assessment/Plan  67 year old y/o male with PMH noted for factor 8 deficiency who is s/p TURBT for large bladder tumor done 2/19/2018; doing well     NEURO Pain well controlled on  APAP with PRN Oxycodone and IV Dilaudid.  Changes:  None    CV HDS.    PULM Aggressive pulmonary toilet and I/S.   FEN/GI mIVF @ 100 ml/hr. Continue current diet     Gordon removed 2/22 with low PVRs  Right PNT placed 2/22    HEME Hgb as above. Postop anemia expected for this surgery.  Continue to monitor. Factor VIII infusions per heme/onc   ID Afebrile, no leukocytosis.    Antibiotics: Completed periop antibiotics    ENDO No issues   ACTIVITY Up as tolerated  PT/OT consulted    PPx On factor 8 drip per heme recommendations    DISPO 5C, anticipated d/c today         Seen and examined with chief resident, to be discussed with Dr. Liao    --    Debra Dgigs MD  Urology PGY3       Contacting the Urology Team     Please use the following job codes to reach the Urology Team. Note that you must use an in house phone and that job codes cannot receive text pages.     On weekdays, dial [phone]  (or star-star-star [phone] on the new [employer] telephones) then [phone] to reach the Adult Urology resident or PA on call    On weekdays, dial [phone] (or star-star-star [phone] on the new [employer] telephones) then [phone] to reach the Pediatric Urology resident    On weeknights and weekends, dial [phone] (or star-star-star [phone] on the new [employer] telephones) then [phone] to reach the Urology resident on call (for both Adult and Pediatrics)

## 2018-02-22 NOTE — PLAN OF CARE
Problem: Patient Care Overview  Goal: Plan of Care/Patient Progress Review   02/21/18 2251   OTHER   Plan Of Care Reviewed With patient   Plan of Care Review   Progress improving     1500 - 2330: AVSS. Mild headache and generalized pain - given oxy with adequate relief. Ate 100% dinner. Up with SBA. Gordon cath removed - 100cc instilled in bladder, pt voided 200cc after. Kogenate gtt infusing at 0.86ml/hr into R PIV. Renogram shows R ureteral obstruction - plan for PNT in am, NPO at 0000. Continue to monitor and with POC.

## 2018-02-22 NOTE — PROGRESS NOTES
Interventional Radiology Pre-Procedure Sedation Assessment   Time of Assessment: 3:05 PM    Expected Level: Moderate Sedation    Indication: Sedation is required for the following type of Procedure: Nephrostomy tube placement    Sedation and procedural consent: Risks, benefits and alternatives were discussed with Patient    PO Intake: Appropriately NPO for procedure    ASA Class: Class 2 - MILD SYSTEMIC DISEASE, NO ACUTE PROBLEMS, NO FUNCTIONAL LIMITATIONS.    Mallampati: Grade 1:  Soft palate, uvula, tonsillar pillars, and posterior pharyngeal wall visible    Lungs: Lungs Clear with good breath sounds bilaterally    Heart: Normal heart sounds and rate    History and physical reviewed and no updates needed. I have reviewed the lab findings, diagnostic data, medications, and the plan for sedation. I have determined this patient to be an appropriate candidate for the planned sedation and procedure and have reassessed the patient IMMEDIATELY PRIOR to sedation and procedure.    Benedicto Elkins MD

## 2018-02-23 ENCOUNTER — DOCUMENTATION ONLY (OUTPATIENT)
Dept: HEMATOLOGY | Facility: CLINIC | Age: 68
End: 2018-02-23

## 2018-02-23 VITALS
OXYGEN SATURATION: 95 % | RESPIRATION RATE: 16 BRPM | BODY MASS INDEX: 21.97 KG/M2 | SYSTOLIC BLOOD PRESSURE: 126 MMHG | DIASTOLIC BLOOD PRESSURE: 82 MMHG | TEMPERATURE: 98.7 F | HEIGHT: 72 IN | WEIGHT: 162.2 LBS | HEART RATE: 100 BPM

## 2018-02-23 DIAGNOSIS — D66 SEVERE HEMOPHILIA A (H): Primary | ICD-10-CM

## 2018-02-23 LAB
ANION GAP SERPL CALCULATED.3IONS-SCNC: 12 MMOL/L (ref 3–14)
BACTERIA SPEC CULT: NO GROWTH
BUN SERPL-MCNC: 28 MG/DL (ref 7–30)
CALCIUM SERPL-MCNC: 8.8 MG/DL (ref 8.5–10.1)
CHLORIDE SERPL-SCNC: 106 MMOL/L (ref 94–109)
CO2 SERPL-SCNC: 20 MMOL/L (ref 20–32)
CREAT SERPL-MCNC: 1.19 MG/DL (ref 0.66–1.25)
ERYTHROCYTE [DISTWIDTH] IN BLOOD BY AUTOMATED COUNT: 14.5 % (ref 10–15)
FACT VIII ACT/NOR PPP: 53 % (ref 55–200)
GFR SERPL CREATININE-BSD FRML MDRD: 61 ML/MIN/1.7M2
GLUCOSE SERPL-MCNC: 118 MG/DL (ref 70–99)
HCT VFR BLD AUTO: 31.6 % (ref 40–53)
HGB BLD-MCNC: 9.9 G/DL (ref 13.3–17.7)
MCH RBC QN AUTO: 26.5 PG (ref 26.5–33)
MCHC RBC AUTO-ENTMCNC: 31.3 G/DL (ref 31.5–36.5)
MCV RBC AUTO: 85 FL (ref 78–100)
PLATELET # BLD AUTO: 301 10E9/L (ref 150–450)
POTASSIUM SERPL-SCNC: 4 MMOL/L (ref 3.4–5.3)
RBC # BLD AUTO: 3.73 10E12/L (ref 4.4–5.9)
SODIUM SERPL-SCNC: 138 MMOL/L (ref 133–144)
SPECIMEN SOURCE: NORMAL
WBC # BLD AUTO: 10.7 10E9/L (ref 4–11)

## 2018-02-23 PROCEDURE — 00000167 ZZHCL STATISTIC INR NC: Performed by: INTERNAL MEDICINE

## 2018-02-23 PROCEDURE — 25000132 ZZH RX MED GY IP 250 OP 250 PS 637: Mod: GY | Performed by: STUDENT IN AN ORGANIZED HEALTH CARE EDUCATION/TRAINING PROGRAM

## 2018-02-23 PROCEDURE — 80048 BASIC METABOLIC PNL TOTAL CA: CPT | Performed by: PHYSICIAN ASSISTANT

## 2018-02-23 PROCEDURE — 25000132 ZZH RX MED GY IP 250 OP 250 PS 637: Mod: GY | Performed by: PHYSICIAN ASSISTANT

## 2018-02-23 PROCEDURE — 00000328 ZZHCL STATISTIC PTT NC: Performed by: INTERNAL MEDICINE

## 2018-02-23 PROCEDURE — A9270 NON-COVERED ITEM OR SERVICE: HCPCS | Mod: GY | Performed by: STUDENT IN AN ORGANIZED HEALTH CARE EDUCATION/TRAINING PROGRAM

## 2018-02-23 PROCEDURE — 36415 COLL VENOUS BLD VENIPUNCTURE: CPT | Performed by: PHYSICIAN ASSISTANT

## 2018-02-23 PROCEDURE — 25000555 ZZHC RX FACTOR IP 250 OP 636: Performed by: UROLOGY

## 2018-02-23 PROCEDURE — 36415 COLL VENOUS BLD VENIPUNCTURE: CPT | Performed by: INTERNAL MEDICINE

## 2018-02-23 PROCEDURE — 00000401 ZZHCL STATISTIC THROMBIN TIME NC: Performed by: INTERNAL MEDICINE

## 2018-02-23 PROCEDURE — 85240 CLOT FACTOR VIII AHG 1 STAGE: CPT | Performed by: INTERNAL MEDICINE

## 2018-02-23 PROCEDURE — 85027 COMPLETE CBC AUTOMATED: CPT | Performed by: PHYSICIAN ASSISTANT

## 2018-02-23 RX ORDER — TAMSULOSIN HYDROCHLORIDE 0.4 MG/1
0.4 CAPSULE ORAL DAILY
Qty: 45 CAPSULE | Refills: 1 | Status: SHIPPED | OUTPATIENT
Start: 2018-02-23 | End: 2018-07-25

## 2018-02-23 RX ORDER — ANTIHEMOPHILIC FACTOR (RECOMBINANT) 2000 (+/-)
KIT INTRAVENOUS CONTINUOUS
Status: DISCONTINUED | OUTPATIENT
Start: 2018-02-23 | End: 2018-02-23 | Stop reason: HOSPADM

## 2018-02-23 RX ORDER — TAMSULOSIN HYDROCHLORIDE 0.4 MG/1
0.4 CAPSULE ORAL DAILY
Status: DISCONTINUED | OUTPATIENT
Start: 2018-02-23 | End: 2018-02-23 | Stop reason: HOSPADM

## 2018-02-23 RX ADMIN — OXYCODONE HYDROCHLORIDE 10 MG: 5 TABLET ORAL at 13:32

## 2018-02-23 RX ADMIN — TAMSULOSIN HYDROCHLORIDE 0.4 MG: 0.4 CAPSULE ORAL at 11:21

## 2018-02-23 RX ADMIN — AMLODIPINE BESYLATE 5 MG: 5 TABLET ORAL at 09:17

## 2018-02-23 RX ADMIN — ANTIHEMOPHILIC FACTOR (RECOMBINANT): KIT at 10:45

## 2018-02-23 NOTE — PROGRESS NOTES
Mr. Cavanaugh is a 68 year old man with severe Hemophilia POD#4 from transurethral resection of a large bleeder tumor involving the right UO. Post operatively he required placement of a right PNT. Urine from the PNT remains bloody. Urology following closely and will decide if can DC this afternoon. Factor VIII gtt continue at 5 units/kg/hour with 4 am factor VIII activity 53%. Plan is for Factor VIII gtt to continue to discharge. Discharge factor plan is for Mr. Cavanaugh to infuse 3000 units of Kogenate every 24 hours. He has sufficient factor at home to facilitate this. Will arrange a factor shipment on Monday if he is discharged.

## 2018-02-23 NOTE — PROGRESS NOTES
Almas was discharged to home.  Ride given by mind[employer] ver[employer].  We reviewed PNT care and dressing changes.  Almas did most of the dressing change himself before he left.  He feels like he will be able to do it completely solo with a mirror he has at home.  Factor VIII infused at 0.86 ml pre hour until he was ready to discharge then we bolused him with what was left in the syringe.  Both PIV's removed.  PNT drainage was red with a few small clots.  We encouraged him to drink a lot to help keep it patent.  PNT care patient education printed from [employer] and provided to him.  Supplies for keeping dry in shower and dressing changes provided.  Almas then demonstrated being able to empty his PNT into the toilet before he got dressed in his home clothes and was discharged.  Planning to return to urology clinic as scheduled in March.

## 2018-02-23 NOTE — PLAN OF CARE
Problem: Pain, Acute (Adult)  Goal: Identify Related Risk Factors and Signs and Symptoms  Related risk factors and signs and symptoms are identified upon initiation of Human Response Clinical Practice Guideline (CPG).   Outcome: Adequate for Discharge Date Met: 02/23/18 02/23/18 1519   Pain, Acute   Related Risk Factors (Acute Pain) disease process   Signs and Symptoms (Acute Pain) fatigue/weakness

## 2018-02-23 NOTE — PROGRESS NOTES
Social Work Services Discharge Note    Hospital Day: 3  Collaborated with: Pt, [facility], Med Team, and Transportation Companies    Data: Pt is a 68 year old male with PMH noted for factor 8 deficiency who is s/p TURBT for large bladder tumor done 2/19/2018; pt is now doing well; per chart review.    Intervention: Per RN and Med Team, pt is in need of transportation home as pt arrived here in a Taxi Cab and pt does not feel that getting in and out of a small vehicle would be medically feasible at this time; pt also has PNT Tube. Pt and Med Team prefer a van transport for comfort. SW called various transportation companies ([employer], [employer] and [facility]) to obtain quotes for a van transport. Pt's insurance, Medicare and BCBS (secondary), do not cover van transportation even if medically necessary. SW met with pt and provided quotes. Pt shared that [facility], who quoted $25-30 for a Van Transport, is the route he would prefer at this time. SUBHASH called [facility] (P: [phone]) and set up transportation for 1430 today. Pt needs to be in main lobby.  SUBHASH met with pt and provided above update. SW provided pt with [facility] phone number, confirmation number, ride time and price quote. Pt is agreeable with discharge plan and private-pay transportation.  Transportation Arranged: [facility] (P: [phone]), Confirmation Number ([id number]), Quote Price for Van ($25-30), [facility] will call pt's cell phone once they have arrived for pick-up.    Assessment: Pt was alert, oriented, and engaged.  Discharge Plan: Pt will discharge home today via [facility] [employer] [facility] [employer] at 1430. Pt needs to be in the main lobby by 1430; pt and RN are aware.    MARIN Rodriguez, LGSW  Pager: [phone]

## 2018-02-23 NOTE — PLAN OF CARE
Problem: Patient Care Overview  Goal: Plan of Care/Patient Progress Review  T max 99.5. VSS. Denies pain. R neph tube draining clear pink urine. Pt voiding clear, macarena urine. Factor VIII drip running. No prns, no replacements overnight.    Problem: Diabetes, Type 2 (Adult)  Goal: Signs and Symptoms of Listed Potential Problems Will be Absent, Minimized or Managed (Diabetes, Type 2)  Signs and symptoms of listed potential problems will be absent, minimized or managed by discharge/transition of care (reference Diabetes, Type 2 (Adult) CPG).    02/23/18 0629   Diabetes, Type 2   Problems Assessed (Type 2 Diabetes) all   Problems Present (Type 2 Diabetes) none

## 2018-02-23 NOTE — DISCHARGE SUMMARY
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 Cassidy Liao (Urology)            Medications Prior to Admission:     Prescriptions Prior to Admission   Medication Sig Dispense Refill Last Dose     Antihemophilic Factor, Recomb, (KOGENATE FS) 1000 UNITS KIT Infuse 2000 to 3000 units (-5/+10%) IV 2 to 3 times a week and as needed for bleeding episodes 5000 each 1 2018 at 1000     codeine 30 MG tablet Take 2 tablets (60 mg) by mouth every 6 hours as needed for moderate to severe pain (up to 8 tabs in 24 hours) (Patient taking differently: Take 60 mg by mouth as needed for  moderate to severe pain (up to 8 tabs in 24 hours) ) 168 tablet 0 2/18/2018 at 1600     lisinopril (PRINIVIL/ZESTRIL) 40 MG tablet Take 1 tablet (40 mg) by mouth daily (Patient taking differently: Take 40 mg by mouth every morning ) 90 tablet 3 2/18/2018 at 0700     amLODIPine (NORVASC) 5 MG tablet Take 1 tablet (5 mg) by mouth daily (Patient taking differently: Take 5 mg by mouth every morning ) 90 tablet 3 2/19/2018 at 0700     mirtazapine (REMERON) 45 MG tablet Take 1 tablet (45 mg) by mouth At Bedtime And may take 1/2 tablet for insomnia up to 3 times a week. 37 tablet 5 2/18/2018 at 2100     diazepam (VALIUM) 5 MG tablet Take up to one tablet by mouth daily as needed for anxiety 30 tablet 5 2/18/2018 at 1600     Antihemophilic Factor, Recomb, 5687-4930 UNITS SOLR Infuse recombinate 2000 units every 24 as needed for Hematuria 6000 each 0 Unknown at Unknown time             Discharge Medications:     Current Discharge Medication List      CONTINUE these medications which have NOT CHANGED    Details   Antihemophilic Factor, Recomb, (KOGENATE FS) 1000 UNITS KIT Infuse 2000 to 3000 units (-5/+10%) IV 2 to 3 times a week and as needed for bleeding episodes  Qty: 5000 each, Refills: 1    Associated Diagnoses: Severe hemophilia A (H)      codeine 30 MG tablet Take 2 tablets (60 mg) by mouth every 6 hours as needed for moderate to severe pain (up to 8 tabs in 24 hours)  Qty: 168 tablet, Refills: 0    Associated Diagnoses: Hemophilic arthropathy      lisinopril (PRINIVIL/ZESTRIL) 40 MG tablet Take 1 tablet (40 mg) by mouth daily  Qty: 90 tablet, Refills: 3    Associated Diagnoses: Hypertension goal BP (blood pressure) < 140/90      amLODIPine (NORVASC) 5 MG tablet Take 1 tablet (5 mg) by mouth daily  Qty: 90 tablet, Refills: 3    Associated Diagnoses: Hypertension goal BP (blood pressure) < 140/90      mirtazapine (REMERON) 45 MG tablet Take 1 tablet (45 mg) by mouth At Bedtime And may take 1/2 tablet for insomnia up to  3 times a week.  Qty: 37 tablet, Refills: 5    Associated Diagnoses: Generalized anxiety disorder      diazepam (VALIUM) 5 MG tablet Take up to one tablet by mouth daily as needed for anxiety  Qty: 30 tablet, Refills: 5    Associated Diagnoses: Depressive disorder      Antihemophilic Factor, Recomb, 7748-3203 UNITS SOLR Infuse recombinate 2000 units every 24 as needed for Hematuria  Qty: 6000 each, Refills: 0    Associated Diagnoses: Severe hemophilia A (H); Hematuria                   Consultations:   Consultation during this admission received: Hematology, Interventional Radiology          Brief History of Illness:   Reason for admission requiring a surgical or invasive procedure:   Malignant Neoplasm Of Urinary Bladder   The patient underwent the following procedure(s):   See above     Orlin Alcantar is a 67 year old male with a large bladder tumor, right hydronephrosis, hemophilia and Chronic Hepatitis C. After a discussion of options, risks, benefits the patient chose to procedure with the above procedures.  Notably, the right ureteral orifice could not be identified intraoperatively therefore a right ureteral stent could not be placed.  There were otherwise no immediate complications during this procedure.  Please refer to the full operative summary for details.           Hospital Course:   Hematology was consulted both before and after the procedure for assistance with managing Mr. Alcantar's hemophilia and factor VIII infusions.  Post-operatively, a Gordon was left in place to rest the bladder and monitor hematuria.  Hemoglobin levels were trended and remained stable through hospitalization (10.1g/dL - 2/2018 --> 9.9g/dL - 2/23/18).  No transfusions were required.  Creatinine was also trended given patient's known right hydronephrosis (with inability to place a stent).  Although it remained largely in the normal range (~1.19mg/dL), it was elevated in comparison with November 2017 (~0.8mg/dL) and this was  predicted to be secondary to the hydronephrosis.  To confirm, Mr. Alcantar underwent NM renogram with lasix on 2/21.  This showed decreased perfusion of the right kidney with no excretion demonstrated, therefore high grade obstruction was diagnosed.  On 2/22/18 Mr. Alcantar underwent right percutaneous nephrostomy tube with interventional radiology.  He was able to be discharged the next date.     The patient's hospital course was otherwise unremarkable.  On POD #4 he was ambulating without assitance, tolerating the discharge diet, had pain controlled with PO medications to go home with, and was requiring no IV medications or fluids. His Gordon had been removed and he was voiding without difficulty. He was discharged home with a new right percutaneous nephrostomy tube.  He was given appropriate contact information, follow-up and instructions as seen below in the discharge paperwork.         Discharge Instructions and Follow-Up:   Diet:  - Regular  - Drink 2-3L of water per day (to keep urine light pink / yellow)    Discharge Activity:  - Some hematuria (blood in the urine) is normal after this type of surgery.  Avoid strenuous activity, lifting, pushing pulling for ~4-6 weeks postop to help prevent recurrent hematuria  - Do not drive until you can press the brake pedal quickly and fully without pain.   - Do not operate a motor vehicle while taking narcotic pain medications.     Medications:  - PAIN: Use Tylenol (acetaminophen 625mg) every 4-6 hours as needed for pain.  Do not take more than 4,000mg of Tylenol (acetaminophen/ APAP) from all sources in any 24 hour period since this can cause liver damage.  Do not take more than 2400mg of ibuprofen in any 24 hour period since this can cause kidney damage. Never drive, operate machinery or drink alcoholic beverages while you are taking narcotic pain medications.      - Take stool softeners such as Senna while you are using narcotics, but stop if you develop diarrhea.   -  We have prescribed Flomax (tamsulosin) to help you with bladder emptying, which you should continue unless Dr. Liao recommends that you stop. Rarely, Flomax can cause dizziness with standing.  Stop the medication and call the office if this occurs.   - Continue Factor VIII infusions as directed by the Hematology service.   - Your Lisinopril was held while you were in the hospital because your blood pressure wasn't high.  Once at home, take your blood pressure twice daily.  You may restart the lisinopril once your systolic blood pressure (the top number) is consistently 140mmHg or higher.  Contact your primary care physician with any questions or concerns.     Percutaneous Nephrostomy Tube (PNT):  - You are going home with a right percutaneous nephrostomy tube   - For the first few weeks, you may having bruising or soreness at the tube site.  Use a warm heating pad to relieve discomfort.    - Blood in the urine can be expected.  Drink 8-10 glasses of fluid per day to keep urine light pink or light yellow.    - Daily showering is important but keep your tube site dry.  Avoid baths, swimming, etc.   - Clean around the tube site every other day using soap and water, then cover the site with a sterile bandage. Take care not to tug the tube or remove the suture.   - Keep the tube securely taped to your back at all times and do not let it get caught, snagged or tugged.  The tubing should always remain tension-free and without kinks.  In order to drain best (and prevent urinary infections), the tubing and bag should always hang lower than your kidney.     Voiding Instructions  - Continue Flomax (tamsulosin) to help with emptying  - During the daytime, urinate every 2-3 hours.  Take your time at the toilet and try to empty as completely as possible by using gentle straining, pushing on your low abdomen or urinating 2-3 times per trip to the bathroom.     Follow-up:  - Follow up with Dr. Liao next Friday  - Call your  primary care provider to touch base regarding your recent admission.       - Call or return sooner than your regularly scheduled visit if you develop any of the following:  fever, uncontrolled pain, uncontrolled nausea or vomiting, or if you are passing large clots, are unable to void, your urine will not lighten in color with increase water intake or if you are having any problems with your percutaneous nephrostomy tube.   You may call the Urology Clinic during weekdays (8A-5P) at [phone].  If after hours, call [phone] and ask to speak with the Urology resident on call.  For emergencies always call [phone].         Discharge Disposition:   Discharged to home      Attestation: I have reviewed today's vital signs, notes, medications, labs and imaging.    Josette Dumont PA-C  Urology Physician Assistant  Personal Pager: [phone]    Please call Job Code:   [phone] to reach the Urology resident or PA on call - Weekdays  [phone] to reach the Urology resident or PA on call - Weeknights and weekends    February 23, 2018

## 2018-02-23 NOTE — PLAN OF CARE
"Problem: Patient Care Overview  Goal: Plan of Care/Patient Progress Review   02/22/18 2248   OTHER   Plan Of Care Reviewed With patient   Plan of Care Review   Progress improving     1500 - 2330: AVSS. New R PNT placed - pain managed with PRN oxycodone. Draining light pink from PNT. No N/V/D. Regular diet - ate full dinner meal. Up x1 assist. NS infusing at 75 ml/hr into PIV. Factor VIII infusing at 0.86ml/hr into R PIV. Patient expressed concern and that he is a \"worrier.\" Benefits from positive reinforcement and education on new PNT. Possible discharge tomorrow. Continue to monitor, following plan of care.     Problem: Pain, Acute (Adult)  Goal: Identify Related Risk Factors and Signs and Symptoms  Related risk factors and signs and symptoms are identified upon initiation of [employer] Clinical Practice Guideline (CPG).   02/22/18 2248   Pain, Acute   Related Risk Factors (Acute Pain) fear;knowledge deficit;procedure/treatment   Signs and Symptoms (Acute Pain) fatigue/weakness;verbalization of pain descriptors         "

## 2018-02-26 ENCOUNTER — PRE VISIT (OUTPATIENT)
Dept: UROLOGY | Facility: CLINIC | Age: 68
End: 2018-02-26

## 2018-02-26 ENCOUNTER — TELEPHONE (OUTPATIENT)
Dept: FAMILY MEDICINE | Facility: CLINIC | Age: 68
End: 2018-02-26

## 2018-02-26 DIAGNOSIS — D66 SEVERE HEMOPHILIA A (H): ICD-10-CM

## 2018-02-26 DIAGNOSIS — Z85.51 PERSONAL HISTORY OF MALIGNANT NEOPLASM OF BLADDER: Primary | ICD-10-CM

## 2018-02-26 RX ORDER — ANTIHEMOPHILIC FACTOR (RECOMBINANT) 1000 (+/-)
KIT INTRAVENOUS
Qty: 21000 EACH | Refills: 1 | Status: SHIPPED | OUTPATIENT
Start: 2018-02-26 | End: 2018-03-05

## 2018-02-26 NOTE — TELEPHONE ENCOUNTER
"Hospital/TCU/ED for chronic condition Discharge Protocol    \"Hi, my name is Shiela Garcia, a registered nurse, and I am calling from [facility].  I am calling to follow up and see how things are going for you after your recent emergency visit/hospital/TCU stay.\"    Tell me how you are doing now that you are home?\" Doing just fine.  Surgery completed 1 week ago.  Everything is going well.    Urology follow up on 3/2/18.  Lisinopril held due to lower BP but still taking Amlodipine.  Will ask Dr. Veloz about Lisinopril.  Informed patient he may need PCP follow up to address blood pressure medications.  Please let clinic know if needing follow up appt.    Fluid intake going well.  Has PNT in place.  No dizziness.    Lost 12 pounds the past 3-4 months.  Feeling good at this weight and wondering if this is related to BP medication vs lifestyle changes upon receiving diagnosis of diabetes: watches what he eats and increased exercise.    Writer discussed with patient steady weight loss when intentional, is fine but if weight loss becomes unexpected or excessive, follow up with PCP.        Discharge Instructions    \"Let's review your discharge instructions.  What is/are the follow-up recommendations?  Pt. Response: Follow up with urology on 3/2/18    \"Has an appointment with your primary care provider been scheduled?\"   No, only wishes to follo wup with urology at this time    \"When you see the provider, I would recommend that you bring your medications with you.\"    Medications    \"Tell me what changed about your medicines when you discharged?\"    Changes to chronic meds?    0-1    \"What questions do you have about your medications?\"    None     New diagnoses of heart failure, COPD, diabetes, or MI?    No              Medication reconciliation completed? No.  Was MTM referral placed (*Make sure to put transitions as reason for referral)?   No    Call Summary    \"What questions or concerns do you have about your " "recent visit and your follow-up care?\"     none    \"If you have questions or things don't continue to improve, we encourage you contact us through the main clinic number (give number).  Even if the clinic is not open, triage nurses are available 24/7 to help you.     We would like you to know that our clinic has extended hours (provide information).  We also have urgent care (provide details on closest location and hours/contact info)\"      \"Thank you for your time and take care!\"             "

## 2018-03-02 ENCOUNTER — OFFICE VISIT (OUTPATIENT)
Dept: UROLOGY | Facility: CLINIC | Age: 68
End: 2018-03-02
Payer: COMMERCIAL

## 2018-03-02 VITALS
WEIGHT: 170 LBS | HEART RATE: 120 BPM | DIASTOLIC BLOOD PRESSURE: 84 MMHG | HEIGHT: 72 IN | BODY MASS INDEX: 23.03 KG/M2 | SYSTOLIC BLOOD PRESSURE: 120 MMHG

## 2018-03-02 DIAGNOSIS — C67.2 MALIGNANT NEOPLASM OF LATERAL WALL OF URINARY BLADDER (H): Primary | ICD-10-CM

## 2018-03-02 ASSESSMENT — PAIN SCALES - GENERAL: PAINLEVEL: NO PAIN (0)

## 2018-03-02 NOTE — NURSING NOTE
Pt's dressing removed without difficulty.  I cleaned the site with ChloroPrep.   Bandage placed over clean site.    Pt tolerated the dressing change well.

## 2018-03-02 NOTE — PROGRESS NOTES
UROLOGIC DIAGNOSES:       CURRENT INTERVENTIONS:       HISTORY:   Patient has history of hemophilia and history of hematuria. Found to have a large bladder mass.   He is s/p TURBT with subsequent PNT placement (as u/o was not visible during or after resection)   Pathology revealed high grade muscle invasive bladder cancer.   We reviewed further management of muscle invasive bladder cancer including cystectomy and chemotx.   Patient was given the opportunity to ask questions regarding cystectomy and diversion as well as chemotx.           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      Bladder tumor      Depression      DM II (diabetes mellitus, type II), controlled (H)     diet controlled     Hemophilia (H)     Type A     Hepatitis C, chronic (H)     treated wt interferon/ribavirin     HTN (hypertension)        PAST SURGICAL HISTORY:   Past Surgical History:   Procedure Laterality Date     ARTHROPLASTY REVISION HIP  4/26/2012    Procedure:ARTHROPLASTY REVISION HIP; Surgeon:ALEJANDRA RAYMOND; [facility]     CYSTOSCOPY, TRANSURETHRAL RESECTION (TUR) TUMOR BLADDER, COMBINED N/A 2/19/2018    Procedure: COMBINED CYSTOSCOPY, TRANSURETHRAL RESECTION (TUR) TUMOR BLADDER;  Cystoscopy, Transurethral Resection Of Bladder Tumor ;  Surgeon: Cassidy Liao MD;  [facility]: [facility]     INJECT STEROID (LOCATION)      right hip x 2, 2 weeks before.      left total knee arthroplasty  07/1992     pins in left hip      hip fracture     radioactive synovectomy  03/2003     right total hip arthroplasty  12/1992     SYNOVECTOMY HIP  4/26/2012    Procedure:SYNOVECTOMY HIP; Right Hip Open Synovectomy, Right Total Hip Revision with Exchange of Poly Liner and Head; Surgeon:ALEJANDRA RAYMOND; Location:[facility] OR       FAMILY HISTORY:   Family History   Problem Relation Age of Onset     Unknown/Adopted Other        SOCIAL HISTORY:   Social History   Substance Use  Topics     Smoking status: Former Smoker     Packs/day: 1.00     Years: 20.00     Quit date: 6/30/2013     Smokeless tobacco: Never Used      Comment: e cig      Alcohol use Yes      Comment: social       Current Outpatient Prescriptions   Medication     Antihemophilic Factor, Recomb, (KOGENATE FS) 1000 UNITS KIT     tamsulosin (FLOMAX) 0.4 MG capsule     Antihemophilic Factor, Recomb, 2807-7122 UNITS SOLR     codeine 30 MG tablet     lisinopril (PRINIVIL/ZESTRIL) 40 MG tablet     amLODIPine (NORVASC) 5 MG tablet     mirtazapine (REMERON) 45 MG tablet     diazepam (VALIUM) 5 MG tablet     No current facility-administered medications for this visit.          PHYSICAL EXAM:    /84  Pulse 120  Ht 1.829 m (6')  Wt 77.1 kg (170 lb)  BMI 23.06 kg/m2    HEENT: Normocephalic and atraumatic   Cardiac: Not done  Back/Flank: Not done  CNS/PNS: Not done  Respiratory: Normal non-labored breathing  Abdomen: Soft nontender and nondistended  Peripheral Vascular: Not done  Mental Status: Not done    Penis: Not done  Scrotal Skin: Not done  Testicles: Not done  Epididymis: Not done  Digital Rectal Exam:     Cystoscopy: Not done    Imaging: None    Urinalysis: [facility] RESULTS:  Recent Labs   Lab Test  02/07/18   1335   04/23/15   1021   COLOR  Yellow   < >  Yellow   APPEARANCE  Cloudy   < >  Clear   URINEGLC  Negative   < >  Negative   URINEBILI  Negative   < >  Negative   URINEKETONE  Negative   < >  Negative   SG  1.014   < >  1.020   UBLD  Large*   < >  Negative   URINEPH  5.0   < >  7.0   PROTEIN  100*   < >  Negative   UROBILINOGEN   --    --   0.2   NITRITE  Negative   < >  Negative   LEUKEST  Large*   < >  Negative   RBCU  >182*   < >  O - 2   WBCU  >182*   < >  O - 2    < > = values in this interval not displayed.       PSA:     Post Void Residual:     Other labs: None today      IMPRESSION:  67 y/o M with recently diagnosed muscle invasive bladder cancer     PLAN:  PNT dressing change today   PET scan next available    Refer to Dr Burns for discussion of chemotx options   Refer to Dr. Shea to discuss cystectomy     Total Time: 15 minutes                                      Total in Consultation: greater than 50%

## 2018-03-02 NOTE — PATIENT INSTRUCTIONS
Schedule a consult with Dr. Shea and Dr. Burns.    Preventive Care:    Colorectal Cancer Screening: During our visit today, we discussed that it is recommended you receive colorectal cancer screening. Please call or make an appointment with your primary care provider to discuss this. You may also call the  [email or web address] scheduling line ([phone]) to set up a colonoscopy appointment.

## 2018-03-02 NOTE — LETTER
3/2/2018       RE: Orlin Alcantar  [address]   [place] [address]     Dear Colleague,    Thank you for referring your patient, Orlin Alcantar, to the [facility] UR[facility] AND [facility] FOR PROSTATE AND UROLOGIC CANCERS at [facility]. Please see a copy of my visit note below.    UROLOGIC DIAGNOSES:       CURRENT INTERVENTIONS:       HISTORY:   Patient has history of hemophilia and history of hematuria. Found to have a large bladder mass.   He is s/p TURBT with subsequent PNT placement (as u/o was not visible during or after resection)   Pathology revealed high grade muscle invasive bladder cancer.   We reviewed further management of muscle invasive bladder cancer including cystectomy and chemotx.   Patient was given the opportunity to ask questions regarding cystectomy and diversion as well as chemotx.           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      Bladder tumor      Depression      DM II (diabetes mellitus, type II), controlled (H)     diet controlled     Hemophilia (H)     Type A     Hepatitis C, chronic (H)     treated wt interferon/ribavirin     HTN (hypertension)        PAST SURGICAL HISTORY:   Past Surgical History:   Procedure Laterality Date     ARTHROPLASTY REVISION HIP  4/26/2012    Procedure:ARTHROPLASTY REVISION HIP; Surgeon:ALEJANDRA RAYMOND; [facility]     CYSTOSCOPY, TRANSURETHRAL RESECTION (TUR) TUMOR BLADDER, COMBINED N/A 2/19/2018    Procedure: COMBINED CYSTOSCOPY, TRANSURETHRAL RESECTION (TUR) TUMOR BLADDER;  Cystoscopy, Transurethral Resection Of Bladder Tumor ;  Surgeon: Cassidy Liao MD;  [facility]     INJECT STEROID (LOCATION)      right hip x 2, 2 weeks before.      left total knee arthroplasty  07/1992     pins in left hip      hip fracture     radioactive synovectomy  03/2003     right total hip arthroplasty  12/1992     SYNOVECTOMY HIP  4/26/2012     Procedure:SYNOVECTOMY HIP; Right Hip Open Synovectomy, Right Total Hip Revision with Exchange of Poly Liner and Head; Surgeon:ALEJANDRA RAYMOND; Location[facility]       FAMILY HISTORY:   Family History   Problem Relation Age of Onset     Unknown/Adopted Other        SOCIAL HISTORY:   Social History   Substance Use Topics     Smoking status: Former Smoker     Packs/day: 1.00     Years: 20.00     Quit date: 6/30/2013     Smokeless tobacco: Never Used      Comment: e cig      Alcohol use Yes      Comment: social       Current Outpatient Prescriptions   Medication     Antihemophilic Factor, Recomb, (KOGENATE FS) 1000 UNITS KIT     tamsulosin (FLOMAX) 0.4 MG capsule     Antihemophilic Factor, Recomb, 3348-1858 UNITS SOLR     codeine 30 MG tablet     lisinopril (PRINIVIL/ZESTRIL) 40 MG tablet     amLODIPine (NORVASC) 5 MG tablet     mirtazapine (REMERON) 45 MG tablet     diazepam (VALIUM) 5 MG tablet     No current facility-administered medications for this visit.          PHYSICAL EXAM:    /84  Pulse 120  Ht 1.829 m (6')  Wt 77.1 kg (170 lb)  BMI 23.06 kg/m2    HEENT: Normocephalic and atraumatic   Cardiac: Not done  Back/Flank: Not done  CNS/PNS: Not done  Respiratory: Normal non-labored breathing  Abdomen: Soft nontender and nondistended  Peripheral Vascular: Not done  Mental Status: Not done    Penis: Not done  Scrotal Skin: Not done  Testicles: Not done  Epididymis: Not done  Digital Rectal Exam:     Cystoscopy: Not done    Imaging: None    Urinalysis: [facility] RESULTS:  Recent Labs   Lab Test  02/07/18   1335   04/23/15   1021   COLOR  Yellow   < >  Yellow   APPEARANCE  Cloudy   < >  Clear   URINEGLC  Negative   < >  Negative   URINEBILI  Negative   < >  Negative   URINEKETONE  Negative   < >  Negative   SG  1.014   < >  1.020   UBLD  Large*   < >  Negative   URINEPH  5.0   < >  7.0   PROTEIN  100*   < >  Negative   UROBILINOGEN   --    --   0.2   NITRITE  Negative   < >  Negative   LEUKEST  Large*   < >   Negative   RBCU  >182*   < >  O - 2   WBCU  >182*   < >  O - 2    < > = values in this interval not displayed.       PSA:     Post Void Residual:     Other labs: None today      IMPRESSION:  69 y/o M with recently diagnosed muscle invasive bladder cancer     PLAN:  PNT dressing change today   PET scan next available   Refer to Dr Burns for discussion of chemotx options   Refer to Dr. Shea to discuss cystectomy     Total Time: 15 minutes                                      Total in Consultation: greater than 50%      Again, thank you for allowing me to participate in the care of your patient.      Sincerely,    Cassidy Liao MD

## 2018-03-02 NOTE — MR AVS SNAPSHOT
After Visit Summary   3/2/2018    Orlin Alcantar    MRN: [id number]           Patient Information     Date Of Birth          1950        Visit Information        Provider Department      3/2/2018 1:45 PM Cassidy Liao MD [facility]        Care Instructions    Schedule a consult with Dr. Shea and Dr. Burns.    Preventive Care:    Colorectal Cancer Screening: During our visit today, we discussed that it is recommended you receive colorectal cancer screening. Please call or make an appointment with your primary care provider to discuss this. You may also call the [facility] scheduling line ([phone]) to set up a colonoscopy appointment.              Follow-ups after your visit        Your next 10 appointments already scheduled     Mar 13, 2018 10:15 AM CDT   Adult Med Follow UP with JELLY Chapa CNS   Psychiatry Clinic ([facility])    [address]  [address] [id number] [phone]            Mar 21, 2018  4:00 PM CDT   (Arrive by 3:45 PM)   Return Visit with Bebeto Shea MD   [facility] and [facility] ([facility])    [address]  4th Floor  [facility] [address] [phone]            Mar 21, 2018  5:00 PM CDT   (Arrive by 4:45 PM)   New Patient Visit with Jose Manuel Burns MD   [facility] ([facility])    [address]  [address]  [facility] [id number] [phone]              Who to contact     Please call your clinic at [phone] to:    Ask questions about your health    Make or cancel appointments    Discuss your medicines    Learn about your test results    Speak to your doctor            Additional Information About Your Visit        MyChart Information     [employer] gives you secure access to your electronic health  record. If you see a primary care provider, you can also send messages to your care team and make appointments. If you have questions, please call your primary care clinic.  If you do not have a primary care provider, please call [phone] and they will assist you.      [employer] is an electronic gateway that provides easy, online access to your medical records. With [employer], you can request a clinic appointment, read your test results, renew a prescription or communicate with your care team.     To access your existing account, please contact your [facility] or call [phone] for assistance.        Care EveryWhere ID     This is your Care EveryWhere ID. This could be used by other organizations to access your [facility] medical records  [id number]        Your Vitals Were     Pulse Height BMI (Body Mass Index)             120 1.829 m (6') 23.06 kg/m2          Blood Pressure from Last 3 Encounters:   03/02/18 120/84   02/23/18 126/82   02/07/18 107/71    Weight from Last 3 Encounters:   03/02/18 77.1 kg (170 lb)   02/23/18 73.6 kg (162 lb 3.2 oz)   02/07/18 75.1 kg (165 lb 9.6 oz)              Today, you had the following     No orders found for display         Today's Medication Changes          These changes are accurate as of 3/2/18  3:14 PM.  If you have any questions, ask your nurse or doctor.               These medicines have changed or have updated prescriptions.        Dose/Directions    amLODIPine 5 MG tablet   Commonly known as:  NORVASC   This may have changed:  when to take this   Used for:  Hypertension goal BP (blood pressure) < 140/90        Dose:  5 mg   Take 1 tablet (5 mg) by mouth daily   Quantity:  90 tablet   Refills:  3       codeine 30 MG tablet   This may have changed:  when to take this   Used for:  Hemophilic arthropathy        Dose:  60 mg   Take 2 tablets (60 mg) by mouth every 6 hours as needed for moderate to severe pain (up to 8 tabs in 24 hours)    Quantity:  168 tablet   Refills:  0       lisinopril 40 MG tablet   Commonly known as:  PRINIVIL/ZESTRIL   This may have changed:  when to take this   Used for:  Hypertension goal BP (blood pressure) < 140/90        Dose:  40 mg   Take 1 tablet (40 mg) by mouth daily   Quantity:  90 tablet   Refills:  3                Primary Care Provider Office Phone # Fax #    Katie Ramos [phone]       [address]  [facility] [address]        Equal Access to Services     ANDREW FLANAGAN : Hadii aad ku hadasho Soomaali, waaxda luqadaha, qaybta kaalmada adeegyada, waxay idiin hayaan adepeggy khbeatriz rene . So [facility] [phone].    ATENCIÓN: Si habla español, tiene a hernadez disposición servicios gratuitos de asistencia lingüística. Llame al [phone].    We comply with applicable federal civil rights laws and [place] laws. We do not discriminate on the basis of race, color, national origin, age, disability, sex, sexual orientation, or gender identity.            Thank you!     Thank you for choosing [facility] UROLOGY AND [facility] FOR PROSTATE AND UROLOGIC CANCERS  for your care. Our goal is always to provide you with excellent care. Hearing back from our patients is one way we can continue to improve our services. Please take a few minutes to complete the written survey that you may receive in the mail after your visit with us. Thank you!             Your Updated Medication List - Protect others around you: Learn how to safely use, store and throw away your medicines at www.disposemymeds.org.          This list is accurate as of 3/2/18  3:14 PM.  Always use your most recent med list.                   Brand Name Dispense Instructions for use Diagnosis    amLODIPine 5 MG tablet    NORVASC    90 tablet    Take 1 tablet (5 mg) by mouth daily    Hypertension goal BP (blood pressure) < 140/90       * Antihemophilic Factor (Recomb) 2699-3013 UNITS Solr     6000 each    Infuse recombinate 2000 units every 24 as  needed for Hematuria    Severe hemophilia A (H), Hematuria       * KOGENATE FS 1000 UNITS Kit     35378 each    Infuse 3000 units (-5/+10%) IV every 24 hours x 7 days post tumor removal    Severe hemophilia A (H), Personal history of malignant neoplasm of bladder       codeine 30 MG tablet     168 tablet    Take 2 tablets (60 mg) by mouth every 6 hours as needed for moderate to severe pain (up to 8 tabs in 24 hours)    Hemophilic arthropathy       diazepam 5 MG tablet    VALIUM    30 tablet    Take up to one tablet by mouth daily as needed for anxiety    Depressive disorder       lisinopril 40 MG tablet    PRINIVIL/ZESTRIL    90 tablet    Take 1 tablet (40 mg) by mouth daily    Hypertension goal BP (blood pressure) < 140/90       mirtazapine 45 MG tablet    REMERON    37 tablet    Take 1 tablet (45 mg) by mouth At Bedtime And may take 1/2 tablet for insomnia up to 3 times a week.    Generalized anxiety disorder       tamsulosin 0.4 MG capsule    FLOMAX    45 capsule    Take 1 capsule (0.4 mg) by mouth daily    Incomplete bladder emptying       * Notice:  This list has 2 medication(s) that are the same as other medications prescribed for you. Read the directions carefully, and ask your doctor or other care provider to review them with you.

## 2018-03-05 ENCOUNTER — TELEPHONE (OUTPATIENT)
Dept: HEMATOLOGY | Facility: CLINIC | Age: 68
End: 2018-03-05

## 2018-03-05 DIAGNOSIS — D66 SEVERE HEMOPHILIA A (H): ICD-10-CM

## 2018-03-05 DIAGNOSIS — Z85.51 PERSONAL HISTORY OF MALIGNANT NEOPLASM OF BLADDER: ICD-10-CM

## 2018-03-05 RX ORDER — ANTIHEMOPHILIC FACTOR (RECOMBINANT) 1000 (+/-)
KIT INTRAVENOUS
Qty: 15000 EACH | Refills: 1 | Status: SHIPPED | OUTPATIENT
Start: 2018-03-05 | End: 2018-03-19

## 2018-03-13 ENCOUNTER — PRE VISIT (OUTPATIENT)
Dept: UROLOGY | Facility: CLINIC | Age: 68
End: 2018-03-13

## 2018-03-13 ENCOUNTER — OFFICE VISIT (OUTPATIENT)
Dept: PSYCHIATRY | Facility: CLINIC | Age: 68
End: 2018-03-13
Attending: CLINICAL NURSE SPECIALIST
Payer: MEDICARE

## 2018-03-13 VITALS
BODY MASS INDEX: 21.92 KG/M2 | HEART RATE: 97 BPM | SYSTOLIC BLOOD PRESSURE: 106 MMHG | WEIGHT: 161.6 LBS | DIASTOLIC BLOOD PRESSURE: 70 MMHG

## 2018-03-13 DIAGNOSIS — F32.A DEPRESSIVE DISORDER: ICD-10-CM

## 2018-03-13 DIAGNOSIS — F41.1 GENERALIZED ANXIETY DISORDER: ICD-10-CM

## 2018-03-13 DIAGNOSIS — F06.30 MOOD DISORDER IN CONDITIONS CLASSIFIED ELSEWHERE: ICD-10-CM

## 2018-03-13 DIAGNOSIS — F32.A DEPRESSION, UNSPECIFIED DEPRESSION TYPE: Primary | ICD-10-CM

## 2018-03-13 PROCEDURE — G0463 HOSPITAL OUTPT CLINIC VISIT: HCPCS | Mod: ZF

## 2018-03-13 RX ORDER — MIRTAZAPINE 45 MG/1
45 TABLET, FILM COATED ORAL AT BEDTIME
Qty: 37 TABLET | Refills: 5 | Status: SHIPPED | OUTPATIENT
Start: 2018-03-13 | End: 2018-09-11

## 2018-03-13 RX ORDER — DIAZEPAM 5 MG
TABLET ORAL
Qty: 30 TABLET | Refills: 5
Start: 2018-03-13 | End: 2018-09-11

## 2018-03-13 ASSESSMENT — PAIN SCALES - GENERAL: PAINLEVEL: NO PAIN (0)

## 2018-03-13 NOTE — TELEPHONE ENCOUNTER
Message  Received: Today       Kaylin Rajan APRN CNS Blake, Katherine J, RN                     Please call in 6 months refill for Diazepam. Patient reports that this will need a PA. I have information if you needed.   Thank you,   Kaylin

## 2018-03-13 NOTE — Clinical Note
Please call in 6 months refill for Diazepam. Patient reports that this will need a PA. I have information if you needed. Thank you, Kaylin

## 2018-03-13 NOTE — PROGRESS NOTES
Outpatient Psychiatry Progress Note     Provider: JELLY Chapa CNS  Date: 3/13/2018  Service:  Medication follow up with counseling.   Patient Identification: Orlin Alcantar  : 1950   MRN: [id number]    Orlin Alcantar is a 68 year old year old male who presents for ongoing psychiatric care.  Orlin Alcantar was last seen in clinic on 17.   At that time,   Assessment & Plan       Orlin Alcantar is seen today for follow up and reports his mood has been stable but on some nights he takes an extra 1/2 of Mirtazapine to help with sleep.  Since this works for him there does not seem reason to have to try other medications.     Diagnosis  Anxiety, RATNA, Depression, Mood disorder in conditions classified elsewhere.     Plan:  Medication: May increase Mirtazapine by 1/2 tablet up to 3 days a week for insomnia  OTC Recommendations: none  Lab Orders:  none  Referrals: none  Release of Information: none  Future Treatment Considerations:per symptoms   Return for Follow Up:6 months      ____________________________________________________________________________________________________________________________________________    2018  Today Orlin reports was diagnosed with bladder cancer and had surgery last month but will need further treatment. He notes that even with this he has been able to maintain a positive mood and credits the medication he is taking for depression and anxiety.  Side effects of medication include: none  Psychiatric Review of Systems:  Depression: In the last 2 weeks per PHQ 9 several days feeling depressed  Anxiety : situational  Usha n/a  Psychosis  n/a.   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      Bladder tumor      Depression      DM II (diabetes mellitus, type II), controlled (H)     diet controlled     Hemophilia (H)     Type A     Hepatitis C, chronic (H)     treated wt interferon/ribavirin     HTN (hypertension)      Patient Active Problem List   Diagnosis     History of total hip arthroplasty     Hemophilia (H)     Pain in joint, pelvic region and thigh     Anxiety state     Depressive disorder, not elsewhere classified     ACP (advance care planning)     Hypertension goal BP (blood pressure) < 140/90     Mood disorder in conditions classified elsewhere     Hepatitis C, chronic (H)     Hematuria     Bladder tumor       Allergies:   Allergies   Allergen Reactions     Aspirin      This drug inhibits platelets and is contraindicated due to hemophilia diagnosis.             Current Medications     Current Outpatient Prescriptions Ordered in [employer]   Medication Sig Dispense Refill     Antihemophilic Factor, Recomb, (KOGENATE FS) 1000 UNITS KIT Infuse 3000 units (-5/+10%) IV every 48 hours and as needed for breakthrough bleeding 96599 each 1     tamsulosin (FLOMAX) 0.4 MG capsule Take 1 capsule (0.4 mg) by mouth daily 45 capsule 1     Antihemophilic Factor, Recomb, 2094-2242 UNITS SOLR Infuse recombinate 2000 units every 24 as needed for Hematuria 6000 each 0     codeine 30 MG tablet Take 2 tablets (60 mg) by mouth every 6 hours as needed for moderate to severe pain (up to 8 tabs in 24 hours) (Patient taking differently: Take 60 mg by mouth as needed for moderate to severe pain (up to 8 tabs in 24 hours) ) 168 tablet 0     lisinopril (PRINIVIL/ZESTRIL) 40 MG tablet Take 1 tablet (40 mg) by mouth daily (Patient taking differently: Take 40 mg by mouth every morning ) 90 tablet 3     amLODIPine (NORVASC) 5 MG tablet Take 1 tablet (5 mg) by mouth daily  "(Patient taking differently: Take 5 mg by mouth every morning ) 90 tablet 3     mirtazapine (REMERON) 45 MG tablet Take 1 tablet (45 mg) by mouth At Bedtime And may take 1/2 tablet for insomnia up to 3 times a week. 37 tablet 5     diazepam (VALIUM) 5 MG tablet Take up to one tablet by mouth daily as needed for anxiety 30 tablet 5     No current Epic-ordered facility-administered medications on file.         Mental Status Exam     Appearance:  Casually dressed and Well groomed  Behavior/relationship to examiner/demeanor:  Cooperative  Orientation: Oriented to person, place, time and situation  Psychomotor: normal form  Speech Rate:  Normal  Speech Spontaneity:  Normal  Mood:  \"okay\"  Affect:  Appropriate/mood-congruent  Thought Process (Associations):  Goal directed  Thought Content:  no overt psychosis, denies suicidal ideation, intent or thoughts and patient does not appear to be responding to internal stimuli  Abnormal Perception:  None  Attention/Concentration:  Normal  Insight:  Good  Judgment:  Good      Results     Vital signs: /70  Pulse 97  Wt 73.3 kg (161 lb 9.6 oz)  BMI 21.92 kg/m2    Laboratory Data:  reviewed data from other providers    Assessment & Plan      Orlin Alcantar is seen today for follow up and reports he was diagnosed with bladder cancer in January and had surgery in February. He will need further treatment. Has limited social support. Despite this feels that he his mood has been stable and medication for mood are helpful. Would like to continue current medications.  Encouraged to call before next appointment difficulty with support as he goes through cancer treatment.    Diagnosis  Anxiety, RATNA, Depression, Mood disorder in conditions classified elsewhere.      Plan:  Medication: Continue current medications  OTC Recommendations: none  Lab Orders:  none  Referrals: none  Release of Information: none   Future Treatment Considerations:per symptoms  Return for Follow Up:6 months   The " risks, benefits, alternatives and side effects have been discussed and are understood by the patient. The patient understands the risks of using street drugs or alcohol. There are no medical contraindications, the patient agrees to treatment, and has the capacity to do so. The patient understands to call 911 or come to the nearest ED if life threatening or urgent symptoms present.  In addition time was spent counseling the patient and/or coordinating care regarding review of social and occupational functioning.  In addition patient was counseled on health and wellness practices to augment medication treatment of symptoms. See note for details.    Kaylin Rajan, APRN CNS 3/13/2018

## 2018-03-13 NOTE — TELEPHONE ENCOUNTER
Reason for visit: Discuss cystectomy     Relevant information: bladder cancer    Records/imaging/labs: records available    Pt called: No need for a call    Rooming: Regular

## 2018-03-13 NOTE — MR AVS SNAPSHOT
After Visit Summary   3/13/2018    Orlin Alcantar    MRN: [id number]           Patient Information     Date Of Birth          1950        Visit Information        Provider Department      3/13/2018 10:15 AM Kaylin Rajan APRN CNS Psychiatry Clinic        Today's Diagnoses     Depression, unspecified depression type    -  1    Generalized anxiety disorder        Mood disorder in conditions classified elsewhere           Follow-ups after your visit        Follow-up notes from your care team     Return in about 6 months (around 9/13/2018).      Your next 10 appointments already scheduled     Mar 26, 2018  3:00 PM CDT   (Arrive by 2:45 PM)   Return Visit with Amy Doll PA-C   [facility] ([facility])    [address]  [address]  [facility] [id number] [phone]            May 23, 2018  3:20 PM CDT   (Arrive by 3:05 PM)   Cystoscopy with Bebeto Shea MD   [facility] and [facility] Urologic Cancers ([facility])    [address] [place] [address]  4th Floor  [facility] [id number] [phone]            Sep 11, 2018 10:15 AM CDT   Adult Med Follow UP with JELLY Chapa CNS   Psychiatry Clinic ([facility])    [address]  [address] [address] [phone]              Who to contact     Please call your clinic at [phone] to:    Ask questions about your health    Make or cancel appointments    Discuss your medicines    Learn about your test results    Speak to your doctor            Additional Information About Your Visit        MyChart Information     [employer] gives you secure access to your electronic health record. If you see a primary care provider, you can also send messages to your care team and make appointments. If you have questions, please call your primary care clinic.  If you do not have a primary care  provider, please call [phone] and they will assist you.      [employer] is an electronic gateway that provides easy, online access to your medical records. With [employer], you can request a clinic appointment, read your test results, renew a prescription or communicate with your care team.     To access your existing account, please contact your [facility] or call [phone] for assistance.        Care EveryWhere ID     This is your Care EveryWhere ID. This could be used by other organizations to access your [facility] medical records  [id number]        Your Vitals Were     Pulse BMI (Body Mass Index)                97 21.92 kg/m2           Blood Pressure from Last 3 Encounters:   03/21/18 107/74   03/21/18 107/74   03/13/18 106/70    Weight from Last 3 Encounters:   03/21/18 72.6 kg (160 lb)   03/21/18 73 kg (161 lb)   03/13/18 73.3 kg (161 lb 9.6 oz)              Today, you had the following     No orders found for display         Today's Medication Changes          These changes are accurate as of 3/13/18 11:59 PM.  If you have any questions, ask your nurse or doctor.               These medicines have changed or have updated prescriptions.        Dose/Directions    amLODIPine 5 MG tablet   Commonly known as:  NORVASC   This may have changed:  when to take this   Used for:  Hypertension goal BP (blood pressure) < 140/90        Dose:  5 mg   Take 1 tablet (5 mg) by mouth daily   Quantity:  90 tablet   Refills:  3       codeine 30 MG tablet   This may have changed:  when to take this   Used for:  Hemophilic arthropathy        Dose:  60 mg   Take 2 tablets (60 mg) by mouth every 6 hours as needed for moderate to severe pain (up to 8 tabs in 24 hours)   Quantity:  168 tablet   Refills:  0       lisinopril 40 MG tablet   Commonly known as:  PRINIVIL/ZESTRIL   This may have changed:  when to take this   Used for:  Hypertension goal BP (blood pressure) < 140/90        Dose:  40 mg   Take  1 tablet (40 mg) by mouth daily   Quantity:  90 tablet   Refills:  3            Where to get your medicines      These medications were sent to [facility] [place], [place] - [address]  [address], [facility] [address]     Phone:  [phone]     mirtazapine 45 MG tablet         Some of these will need a paper prescription and others can be bought over the counter.  Ask your nurse if you have questions.     You don't need a prescription for these medications     diazepam 5 MG tablet                Primary Care Provider Office Phone # Fax #    Katie Ramos [phone] [address]  [facility] [address]        Equal Access to Services     ANDREW FLANAGAN : Suleman gar Sofaiza, waaxda sofiadaha, qaybta kaalmada adepeggyyada, lloyd blum. So [facility] [phone].    ATENCIÓN: Si habla español, tiene a hernadez disposición servicios gratuitos de asistencia lingüística. Llame al [phone].    We comply with applicable federal civil rights laws and [place] laws. We do not discriminate on the basis of race, color, national origin, age, disability, sex, sexual orientation, or gender identity.            Thank you!     Thank you for choosing PSYCHIATRY CLINIC  for your care. Our goal is always to provide you with excellent care. Hearing back from our patients is one way we can continue to improve our services. Please take a few minutes to complete the written survey that you may receive in the mail after your visit with us. Thank you!             Your Updated Medication List - Protect others around you: Learn how to safely use, store and throw away your medicines at www.disposemymeds.org.          This list is accurate as of 3/13/18 11:59 PM.  Always use your most recent med list.                   Brand Name Dispense Instructions for use Diagnosis    amLODIPine 5 MG tablet    NORVASC    90 tablet    Take 1 tablet (5 mg) by mouth  daily    Hypertension goal BP (blood pressure) < 140/90       Antihemophilic Factor (Recomb) 2844-7179 UNITS Solr     6000 each    Infuse recombinate 2000 units every 24 as needed for Hematuria    Severe hemophilia A (H), Hematuria       codeine 30 MG tablet     168 tablet    Take 2 tablets (60 mg) by mouth every 6 hours as needed for moderate to severe pain (up to 8 tabs in 24 hours)    Hemophilic arthropathy       diazepam 5 MG tablet    VALIUM    30 tablet    Take up to one tablet by mouth daily as needed for anxiety    Depressive disorder       lisinopril 40 MG tablet    PRINIVIL/ZESTRIL    90 tablet    Take 1 tablet (40 mg) by mouth daily    Hypertension goal BP (blood pressure) < 140/90       mirtazapine 45 MG tablet    REMERON    37 tablet    Take 1 tablet (45 mg) by mouth At Bedtime And may take 1/2 tablet for insomnia up to 3 times a week.    Generalized anxiety disorder       tamsulosin 0.4 MG capsule    FLOMAX    45 capsule    Take 1 capsule (0.4 mg) by mouth daily    Incomplete bladder emptying

## 2018-03-19 ENCOUNTER — RADIANT APPOINTMENT (OUTPATIENT)
Dept: PET IMAGING | Facility: CLINIC | Age: 68
End: 2018-03-19
Attending: UROLOGY
Payer: COMMERCIAL

## 2018-03-19 DIAGNOSIS — C67.2 MALIGNANT NEOPLASM OF LATERAL WALL OF URINARY BLADDER (H): ICD-10-CM

## 2018-03-19 DIAGNOSIS — D66 SEVERE HEMOPHILIA A (H): ICD-10-CM

## 2018-03-19 DIAGNOSIS — Z85.51 PERSONAL HISTORY OF MALIGNANT NEOPLASM OF BLADDER: ICD-10-CM

## 2018-03-19 LAB
CREAT BLD-MCNC: 0.9 MG/DL (ref 0.5–1.2)
GFR SERPL CREATININE-BSD FRML MDRD: 87 ML/MIN/{1.73_M2}
GFRB: NORMAL
GLUCOSE SERPL-MCNC: 117 MG/DL (ref 70–99)

## 2018-03-19 RX ORDER — FUROSEMIDE 10 MG/ML
40 INJECTION INTRAMUSCULAR; INTRAVENOUS ONCE
Status: COMPLETED | OUTPATIENT
Start: 2018-03-19 | End: 2018-03-19

## 2018-03-19 RX ORDER — ANTIHEMOPHILIC FACTOR (RECOMBINANT) 1000 (+/-)
KIT INTRAVENOUS
Qty: 15000 EACH | Refills: 1 | Status: SHIPPED | OUTPATIENT
Start: 2018-03-19 | End: 2018-04-02

## 2018-03-19 RX ADMIN — FUROSEMIDE 40 MG: 10 INJECTION INTRAMUSCULAR; INTRAVENOUS at 09:42

## 2018-03-21 ENCOUNTER — OFFICE VISIT (OUTPATIENT)
Dept: UROLOGY | Facility: CLINIC | Age: 68
End: 2018-03-21
Payer: MEDICARE

## 2018-03-21 ENCOUNTER — CARE COORDINATION (OUTPATIENT)
Dept: ONCOLOGY | Facility: CLINIC | Age: 68
End: 2018-03-21

## 2018-03-21 ENCOUNTER — ONCOLOGY VISIT (OUTPATIENT)
Dept: ONCOLOGY | Facility: CLINIC | Age: 68
End: 2018-03-21
Attending: INTERNAL MEDICINE
Payer: COMMERCIAL

## 2018-03-21 VITALS
HEART RATE: 105 BPM | OXYGEN SATURATION: 96 % | BODY MASS INDEX: 21.67 KG/M2 | TEMPERATURE: 98.5 F | RESPIRATION RATE: 16 BRPM | SYSTOLIC BLOOD PRESSURE: 107 MMHG | WEIGHT: 160 LBS | HEIGHT: 72 IN | DIASTOLIC BLOOD PRESSURE: 74 MMHG

## 2018-03-21 VITALS
HEIGHT: 72 IN | WEIGHT: 161 LBS | SYSTOLIC BLOOD PRESSURE: 107 MMHG | HEART RATE: 108 BPM | BODY MASS INDEX: 21.81 KG/M2 | DIASTOLIC BLOOD PRESSURE: 74 MMHG

## 2018-03-21 DIAGNOSIS — C67.2 MALIGNANT NEOPLASM OF LATERAL WALL OF URINARY BLADDER (H): Primary | ICD-10-CM

## 2018-03-21 DIAGNOSIS — C67.8 MALIGNANT NEOPLASM OF OVERLAPPING SITES OF BLADDER (H): Primary | ICD-10-CM

## 2018-03-21 DIAGNOSIS — C77.5 METASTASIS TO OBTURATOR LYMPH NODE (H): ICD-10-CM

## 2018-03-21 PROCEDURE — 99354 ZZC PROLONGED SERV,OFFICE,1ST HR: CPT | Performed by: INTERNAL MEDICINE

## 2018-03-21 PROCEDURE — 99355 ZZC PROLONGED SERV,OFFICE,EA ADDN 1/2: CPT | Performed by: INTERNAL MEDICINE

## 2018-03-21 PROCEDURE — 99215 OFFICE O/P EST HI 40 MIN: CPT | Mod: ZP | Performed by: INTERNAL MEDICINE

## 2018-03-21 PROCEDURE — G0463 HOSPITAL OUTPT CLINIC VISIT: HCPCS | Mod: ZF

## 2018-03-21 ASSESSMENT — PAIN SCALES - GENERAL
PAINLEVEL: NO PAIN (0)
PAINLEVEL: NO PAIN (0)

## 2018-03-21 NOTE — PROGRESS NOTES
We are pleased to see Mr. Orlin Alcantar in consultation at the request of Dr. Ramos for the evaluation of chief complaint listed below    Chief Complaint:    High grade muscle invasive bladder cancer         History of Present Illness:   Orlin Alcantar is a(n) 68 year old male w/ PMH of hemophilia A, DMII, Hep C, HTN, and urologic history of hematuria who underwent TURBT for large >5cm bladder mass with involvement of the Right UO.  Follow up u/s during his hospital stay showed R hydronephrosis, for which he underwent placement of Right PNT.    Surgical pathology:  FINAL DIAGNOSIS:   Bladder tumor, transurethral resection:   - Invasive high grade urothelial carcinoma   - Extent of invasion: Muscularis propria, multifocal   - Lymphovascular invasion: Present     PET- CT revealed:  IMPRESSION:   1. In this patient with high grade muscle invasive bladder cancer:  a. FDG avidity along the right bladder wall with associated mucosal  thickening, maximum SUV 30, consistent with primary neoplasm.  b. FDG avid right obturator lymph node, concerning for metastases.  c. 'NO' Additional evidence for metastatic disease in the chest, abdomen or  pelvis.  2. Enlarged left palatine tonsil, maximum SUV 6.5 Recommend direct  visualization and tissue sampling.            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      Bladder tumor      Depression      DM II (diabetes mellitus, type II), controlled (H)     diet controlled     Hemophilia (H)     Type A     Hepatitis C, chronic (H)     treated wt interferon/ribavirin     HTN (hypertension)             Past Surgical History:     Past Surgical History:   Procedure Laterality Date     ARTHROPLASTY REVISION HIP  4/26/2012    Procedure:ARTHROPLASTY REVISION HIP; Surgeon:ALEJANDRA RAYMOND; Location:[facility]     CYSTOSCOPY, TRANSURETHRAL RESECTION (TUR) TUMOR BLADDER, COMBINED N/A 2/19/2018    Procedure: COMBINED CYSTOSCOPY,  TRANSURETHRAL RESECTION (TUR) TUMOR BLADDER;  Cystoscopy, Transurethral Resection Of Bladder Tumor ;  Surgeon: Cassidy Liao MD;  Location: [facility]     INJECT STEROID (LOCATION)      right hip x 2, 2 weeks before.      left total knee arthroplasty  07/1992     pins in left hip      hip fracture     radioactive synovectomy  03/2003     right total hip arthroplasty  12/1992     SYNOVECTOMY HIP  4/26/2012    Procedure:SYNOVECTOMY HIP; Right Hip Open Synovectomy, Right Total Hip Revision with Exchange of Poly Liner and Head; Surgeon:ALEJANDRA RAYMOND; Location:[facility] OR          Family History:     Family History   Problem Relation Age of Onset     Unknown/Adopted Other             Allergies:     Allergies   Allergen Reactions     Aspirin      This drug inhibits platelets and is contraindicated due to hemophilia diagnosis.               Medications:     Current Outpatient Prescriptions   Medication Sig     Antihemophilic Factor, Recomb, (KOGENATE FS) 1000 UNITS KIT Infuse 3000 units (-5/+10%) IV every 48 hours and as needed for breakthrough bleeding     mirtazapine (REMERON) 45 MG tablet Take 1 tablet (45 mg) by mouth At Bedtime And may take 1/2 tablet for insomnia up to 3 times a week.     diazepam (VALIUM) 5 MG tablet Take up to one tablet by mouth daily as needed for anxiety     tamsulosin (FLOMAX) 0.4 MG capsule Take 1 capsule (0.4 mg) by mouth daily     Antihemophilic Factor, Recomb, 1242-9995 UNITS SOLR Infuse recombinate 2000 units every 24 as needed for Hematuria     codeine 30 MG tablet Take 2 tablets (60 mg) by mouth every 6 hours as needed for moderate to severe pain (up to 8 tabs in 24 hours) (Patient taking differently: Take 60 mg by mouth as needed for moderate to severe pain (up to 8 tabs in 24 hours) )     lisinopril (PRINIVIL/ZESTRIL) 40 MG tablet Take 1 tablet (40 mg) by mouth daily (Patient taking differently: Take 40 mg by mouth every morning )     amLODIPine (NORVASC) 5 MG tablet Take 1  tablet (5 mg) by mouth daily (Patient taking differently: Take 5 mg by mouth every morning )     No current facility-administered medications for this visit.             REVIEW OF SYSTEMS:    See HPI for pertinent details.  Remainder of 10-point ROS negative.         PHYSICAL EXAM   /74  Pulse 108  Ht 1.829 m (6')  Wt 73 kg (161 lb)  BMI 21.84 kg/m2  GENERAL: No acute distress. Well nourished.   HEENT:  Sclerae anicteric.  Conjunctivae pink.  Moist mucous membranes.  NECK:  Supple.  No lymphadenopathy.  CARDIAC:  Regular rate and rhythm.  LUNGS:  Non-labored breathing  BACK: PNT in place. No costovertebral tenderness.  ABDOMEN: Soft, non-tender, no surgical scars, no organomegaly, non-tender.  SKIN: No rashes.  Dry.             LABS AND IMAGING:   PET-CT reviewed, notable for positive nodes, without evidence of distant metastasis.          ASSESSMENT:   Orlin Alcantar is a 68 year old male who presents for evaluation for new diagnosis of muscle invasive bladder cancer with unfavorable pathology and evidence of ashlie metastasis on PET.          PLAN:     - Patient is slated to see Dr. Burns later today for consideration of neoadjuvant chemotherapy vs enrollment in clinical trial.  He states that per Dr. Jiang his hemophilia is not a contraindication for surgery  - Will follow up in 2 months to assess chemo response and discuss cystectomy    Kian Garcia, G2  Urology Resident    Patient was seen and examined with Dr. Shea    Patient seen and examined with the resident.  Visit time 15 minutes and >50% spent in counseling.  I agree with the resident's note and plan of care.       Bebeto Shea MD  Urology Staff        CC  Patient Care Team:  Katie Ramos MD as PCP - General (Family Practice)  Kaylin Rajan APRN CNS as Nurse Practitioner (Clinical Nurse Specialist)  Jess Mack APRN CNP as Nurse Practitioner (Nurse Practitioner)  Isadora Sood, RN as Nurse Coordinator  (Psychiatry)  Mely Smith PA-C as Physician Assistant (Physician Assistant)  Hailee Hernandez, RN as Registered Nurse  Cassidy Liao MD as MD (Urology)  Ligia Ram, IRON as Registered Nurse (Urology)  Katie Rangel MD as Referring Physician (Family Practice)  KATIE RANGEL    Copy to patient  HIREN GREENBERG  [address] [facility]   [place] [address]

## 2018-03-21 NOTE — PATIENT INSTRUCTIONS
Preventive Care:    Colorectal Cancer Screening: During our visit today, we discussed that it is recommended you receive colorectal cancer screening. Please call or make an appointment with your primary care provider to discuss this. You may also call the [facility] scheduling line ([phone]) to set up a colonoscopy appointment.

## 2018-03-21 NOTE — NURSING NOTE
Dressing changed on rt lower back drainage tube site. Sterile technique used . Site WDL.  RIP GARCIA LPN

## 2018-03-21 NOTE — NURSING NOTE
"Oncology Rooming Note    March 21, 2018 5:03 PM   Orlin Alcantar is a 68 year old male who presents for:    Chief Complaint   Patient presents with     Oncology Clinic Visit     New Patient , Bladder CA     Initial Vitals: /74  Pulse 105  Temp 98.5  F (36.9  C) (Tympanic)  Resp 16  Ht 1.829 m (6' 0.01\")  Wt 72.6 kg (160 lb)  SpO2 96%  BMI 21.7 kg/m2 Estimated body mass index is 21.7 kg/(m^2) as calculated from the following:    Height as of this encounter: 1.829 m (6' 0.01\").    Weight as of this encounter: 72.6 kg (160 lb). Body surface area is 1.92 meters squared.  No Pain (0) Comment: Data Unavailable   No LMP for male patient.  Allergies reviewed: Yes  Medications reviewed: Yes    Medications: Medication refills not needed today.  Pharmacy name entered into [employer]:    [facility] - [place], [place] - [address] [place] [address] PHARMACY [place], [place] - [facility] [address]  [place] [facility] [place], [place] - [address]    Clinical concerns: none Dr Burns was NOT notified.    10 minutes for nursing intake (face to face time)     RIP GARCIA LPN            "

## 2018-03-21 NOTE — PROGRESS NOTES
"Met with pt in clinic. Introduced myself and role as RN care coordinator. Contact information given.  Gave pt information on Cisplatin, Gemzar and Nivolumab from via oncology as well as \"My Cancer Guidebook\" binder. Pt declined reviewing information in clinic at this time. Encouraged him to review information and contact clinic with any questions or concerns. Pt is scheduled on Monday to meet with Monica Harris PA-C as well as the research nurse to consent for the Cis, Stewart + Nivo study.  "

## 2018-03-21 NOTE — MR AVS SNAPSHOT
After Visit Summary   3/21/2018    Orlin Alcantar    MRN: [id number]           Patient Information     Date Of Birth          1950        Visit Information        Provider Department      3/21/2018 5:00 PM Jose Manuel Burns MD [facility]         Follow-ups after your visit        Your next 10 appointments already scheduled     Mar 26, 2018 11:00 AM CDT   (Arrive by 10:45 AM)   Return Visit with Brianna Harris PA-C   [facility] ([facility] [place])    [address]  [address]  [facility] [id number] [phone]            May 23, 2018  3:20 PM CDT   (Arrive by 3:05 PM)   Cystoscopy with Bebeto Shea MD   [facility] for Prostate and Urologic Cancers ([facility])    [address]  4th Floor  [facility] [id number] [phone]            Sep 11, 2018 10:15 AM CDT   Adult Med Follow UP with JELLY Chapa CNS   Psychiatry Clinic ([facility])    [place] [id number]  [address] [id number] [phone]              Who to contact     If you have questions or need follow up information about today's clinic visit or your schedule please contact [facility] directly at [phone].  Normal or non-critical lab and imaging results will be communicated to you by MyChart, letter or phone within 4 business days after the clinic has received the results. If you do not hear from us within 7 days, please contact the clinic through MyChart or phone. If you have a critical or abnormal lab result, we will notify you by phone as soon as possible.  Submit refill requests through [employer] or call your pharmacy and they will forward the refill request to us. Please allow 3 business days for your refill to be completed.          Additional Information About Your Visit        MyChart Information     [facility]  "gives you secure access to your electronic health record. If you see a primary care provider, you can also send messages to your care team and make appointments. If you have questions, please call your primary care clinic.  If you do not have a primary care provider, please call [phone] and they will assist you.        Care EveryWhere ID     This is your Care EveryWhere ID. This could be used by other organizations to access your [facility] medical records  [id number]        Your Vitals Were     Pulse Temperature Respirations Height Pulse Oximetry BMI (Body Mass Index)    105 98.5  F (36.9  C) (Tympanic) 16 1.829 m (6' 0.01\") 96% 21.7 kg/m2       Blood Pressure from Last 3 Encounters:   03/21/18 107/74   03/21/18 107/74   03/02/18 120/84    Weight from Last 3 Encounters:   03/21/18 72.6 kg (160 lb)   03/21/18 73 kg (161 lb)   03/02/18 77.1 kg (170 lb)              Today, you had the following     No orders found for display         Today's Medication Changes          These changes are accurate as of 3/21/18  6:37 PM.  If you have any questions, ask your nurse or doctor.               These medicines have changed or have updated prescriptions.        Dose/Directions    amLODIPine 5 MG tablet   Commonly known as:  NORVASC   This may have changed:  when to take this   Used for:  Hypertension goal BP (blood pressure) < 140/90        Dose:  5 mg   Take 1 tablet (5 mg) by mouth daily   Quantity:  90 tablet   Refills:  3       codeine 30 MG tablet   This may have changed:  when to take this   Used for:  Hemophilic arthropathy        Dose:  60 mg   Take 2 tablets (60 mg) by mouth every 6 hours as needed for moderate to severe pain (up to 8 tabs in 24 hours)   Quantity:  168 tablet   Refills:  0       lisinopril 40 MG tablet   Commonly known as:  PRINIVIL/ZESTRIL   This may have changed:  when to take this   Used for:  Hypertension goal BP (blood pressure) < 140/90        Dose:  40 mg   Take 1 tablet (40 mg) by mouth " daily   Quantity:  90 tablet   Refills:  3                Primary Care Provider Office Phone # Fax #    Katie Ramos [phone] [address] [facility] [address]        Equal Access to Services     ANDREW FLANAGAN : Suleman violette owens surekhao Soomaali, waaxda luqadaha, qaybta kaalmada adegeorgeda, lloyd rahmankeenan bandapeggy patel anju blum. So [facility] [phone].    ATENCIÓN: Si habla español, tiene a hernadez disposición servicios gratuitos de asistencia lingüística. Llame al [phone].    We comply with applicable federal civil rights laws and [place] laws. We do not discriminate on the basis of race, color, national origin, age, disability, sex, sexual orientation, or gender identity.            Thank you!     Thank you for choosing [facility]  for your care. Our goal is always to provide you with excellent care. Hearing back from our patients is one way we can continue to improve our services. Please take a few minutes to complete the written survey that you may receive in the mail after your visit with us. Thank you!             Your Updated Medication List - Protect others around you: Learn how to safely use, store and throw away your medicines at www.disposemymeds.org.          This list is accurate as of 3/21/18  6:37 PM.  Always use your most recent med list.                   Brand Name Dispense Instructions for use Diagnosis    amLODIPine 5 MG tablet    NORVASC    90 tablet    Take 1 tablet (5 mg) by mouth daily    Hypertension goal BP (blood pressure) < 140/90       * Antihemophilic Factor (Recomb) 2565-0323 UNITS Solr     6000 each    Infuse recombinate 2000 units every 24 as needed for Hematuria    Severe hemophilia A (H), Hematuria       * KOGENATE FS 1000 UNITS Kit     06122 each    Infuse 3000 units (-5/+10%) IV every 48 hours and as needed for breakthrough bleeding    Severe hemophilia A (H), Personal history of malignant neoplasm of bladder       codeine 30  MG tablet     168 tablet    Take 2 tablets (60 mg) by mouth every 6 hours as needed for moderate to severe pain (up to 8 tabs in 24 hours)    Hemophilic arthropathy       diazepam 5 MG tablet    VALIUM    30 tablet    Take up to one tablet by mouth daily as needed for anxiety    Depressive disorder       lisinopril 40 MG tablet    PRINIVIL/ZESTRIL    90 tablet    Take 1 tablet (40 mg) by mouth daily    Hypertension goal BP (blood pressure) < 140/90       mirtazapine 45 MG tablet    REMERON    37 tablet    Take 1 tablet (45 mg) by mouth At Bedtime And may take 1/2 tablet for insomnia up to 3 times a week.    Generalized anxiety disorder       tamsulosin 0.4 MG capsule    FLOMAX    45 capsule    Take 1 capsule (0.4 mg) by mouth daily    Incomplete bladder emptying       TYLENOL PO      Take 500 mg by mouth every 4 hours as needed for mild pain or fever        * Notice:  This list has 2 medication(s) that are the same as other medications prescribed for you. Read the directions carefully, and ask your doctor or other care provider to review them with you.

## 2018-03-21 NOTE — MR AVS SNAPSHOT
After Visit Summary   3/21/2018    Orlin Alcantar    MRN: [id number]           Patient Information     Date Of Birth          1950        Visit Information        Provider Department      3/21/2018 4:00 PM Bebeto Shea MD [facility] for Prostate and Urologic Cancers        Today's Diagnoses     Malignant neoplasm of overlapping sites of bladder (H)    -  1      Care Instructions    Preventive Care:    Colorectal Cancer Screening: During our visit today, we discussed that it is recommended you receive colorectal cancer screening. Please call or make an appointment with your primary care provider to discuss this. You may also call the [facility] scheduling line ([phone]) to set up a colonoscopy appointment.              Follow-ups after your visit        Follow-up notes from your care team     Return in about 2 months (around 5/21/2018).      Your next 10 appointments already scheduled     Mar 26, 2018 11:00 AM CDT   (Arrive by 10:45 AM)   Return Visit with Brianna Harris PA-C   [facility] ([facility] [place])    [address] [place] [address]  [address] [place] [id number] [phone]            May 23, 2018  3:20 PM CDT   (Arrive by 3:05 PM)   Cystoscopy with Bebeto Shea MD   [facility] for Prostate and Urologic Cancers ([facility])    [address] [place] [address]  [address] [facility] [place] [id number] [phone]            Sep 11, 2018 10:15 AM CDT   Adult Med Follow UP with JELLY Chapa CNS   Psychiatry Clinic ([facility])    [address]  [address] [place] [id number] [phone]              Who to contact     Please call your clinic at [phone] to:    Ask questions about your health    Make or cancel appointments    Discuss your medicines    Learn about your test results    Speak to your doctor             Additional Information About Your Visit        [employer] Information     [employer] gives you secure access to your electronic health record. If you see a primary care provider, you can also send messages to your care team and make appointments. If you have questions, please call your primary care clinic.  If you do not have a primary care provider, please call [phone] and they will assist you.      [employer] is an electronic gateway that provides easy, online access to your medical records. With [employer], you can request a clinic appointment, read your test results, renew a prescription or communicate with your care team.     To access your existing account, please contact your [facility] or call [phone] for assistance.        Care EveryWhere ID     This is your Care EveryWhere ID. This could be used by other organizations to access your [facility] medical records  [id number]        Your Vitals Were     Pulse Height BMI (Body Mass Index)             108 1.829 m (6') 21.84 kg/m2          Blood Pressure from Last 3 Encounters:   03/21/18 107/74   03/21/18 107/74   03/02/18 120/84    Weight from Last 3 Encounters:   03/21/18 72.6 kg (160 lb)   03/21/18 73 kg (161 lb)   03/02/18 77.1 kg (170 lb)              Today, you had the following     No orders found for display         Today's Medication Changes          These changes are accurate as of 3/21/18 11:59 PM.  If you have any questions, ask your nurse or doctor.               These medicines have changed or have updated prescriptions.        Dose/Directions    amLODIPine 5 MG tablet   Commonly known as:  NORVASC   This may have changed:  when to take this   Used for:  Hypertension goal BP (blood pressure) < 140/90        Dose:  5 mg   Take 1 tablet (5 mg) by mouth daily   Quantity:  90 tablet   Refills:  3       codeine 30 MG tablet   This may have changed:  when to take this   Used for:  Hemophilic arthropathy        Dose:  60 mg    Take 2 tablets (60 mg) by mouth every 6 hours as needed for moderate to severe pain (up to 8 tabs in 24 hours)   Quantity:  168 tablet   Refills:  0       lisinopril 40 MG tablet   Commonly known as:  PRINIVIL/ZESTRIL   This may have changed:  when to take this   Used for:  Hypertension goal BP (blood pressure) < 140/90        Dose:  40 mg   Take 1 tablet (40 mg) by mouth daily   Quantity:  90 tablet   Refills:  3                Primary Care Provider Office Phone # Fax #    Katie Ramos [phone] [address]  [facility] [address]        Equal Access to Services     [facility] Center[facility]OKUL : Hadii violette Colmenares, walashon eastman, qanatalio kaalmada karo, lloyd rene . So [facility] [phone].    ATENCIÓN: Si habla español, tiene a hernadez disposición servicios gratuitos de asistencia lingüística. [facility] [phone].    We comply with applicable federal civil rights laws and [place] laws. We do not discriminate on the basis of race, color, national origin, age, disability, sex, sexual orientation, or gender identity.            Thank you!     Thank you for choosing [facility]OLOGY AND [facility] FOR PROSTATE AND UROLOGIC CANCERS  for your care. Our goal is always to provide you with excellent care. Hearing back from our patients is one way we can continue to improve our services. Please take a few minutes to complete the written survey that you may receive in the mail after your visit with us. Thank you!             Your Updated Medication List - Protect others around you: Learn how to safely use, store and throw away your medicines at www.disposemymeds.org.          This list is accurate as of 3/21/18 11:59 PM.  Always use your most recent med list.                   Brand Name Dispense Instructions for use Diagnosis    amLODIPine 5 MG tablet    NORVASC    90 tablet    Take 1 tablet (5 mg) by mouth daily    Hypertension goal BP (blood pressure) < 140/90       *  Antihemophilic Factor (Recomb) 3863-6617 UNITS Solr     6000 each    Infuse recombinate 2000 units every 24 as needed for Hematuria    Severe hemophilia A (H), Hematuria       * KOGENATE FS 1000 UNITS Kit     11145 each    Infuse 3000 units (-5/+10%) IV every 48 hours and as needed for breakthrough bleeding    Severe hemophilia A (H), Personal history of malignant neoplasm of bladder       codeine 30 MG tablet     168 tablet    Take 2 tablets (60 mg) by mouth every 6 hours as needed for moderate to severe pain (up to 8 tabs in 24 hours)    Hemophilic arthropathy       diazepam 5 MG tablet    VALIUM    30 tablet    Take up to one tablet by mouth daily as needed for anxiety    Depressive disorder       lisinopril 40 MG tablet    PRINIVIL/ZESTRIL    90 tablet    Take 1 tablet (40 mg) by mouth daily    Hypertension goal BP (blood pressure) < 140/90       mirtazapine 45 MG tablet    REMERON    37 tablet    Take 1 tablet (45 mg) by mouth At Bedtime And may take 1/2 tablet for insomnia up to 3 times a week.    Generalized anxiety disorder       tamsulosin 0.4 MG capsule    FLOMAX    45 capsule    Take 1 capsule (0.4 mg) by mouth daily    Incomplete bladder emptying       TYLENOL PO      Take 500 mg by mouth every 4 hours as needed for mild pain or fever        * Notice:  This list has 2 medication(s) that are the same as other medications prescribed for you. Read the directions carefully, and ask your doctor or other care provider to review them with you.

## 2018-03-21 NOTE — LETTER
3/21/2018       RE: Orlin Alcantar  [address]   [place] [address]     Dear Colleague,    Thank you for referring your patient, Orlin Alcantar, to the [facility]. Please see a copy of my visit note below.    [facility]    NEW PATIENT VISIT NOTE    PATIENT NAME: Orlin Alcantar MRN # [id number]  DATE OF VISIT: March 21, 2018 YOB: 1950    REFERRING PROVIDER: Bebeto Shea MD  [address] [facility] [address]  [place] [address]    CANCER TYPE: Urothelial - bladder  STAGE: IV radiographic - FDG avid obturator node on PET-CT      HISTORY OF PRESENT ILLNESS   Orlin Alcantar is 68 year old male with PMH of hemophilia, HTN, DM TII, chronic hepatis C who has been referred for recently diagnosed muscle invasive bladder cancer.        He has hemophilia and had several surgeries in right hip replacement, knee replacement. About a year ago he had some hematuria. He had previous episodes of hematuria where he notes that he had kidney bleed. He had recurrent hematuria in April of last year - 2017. He had cystoscopy scheduled which he cancelled as his bleeding resolved. He had recurrent bleed in Dec 2017 and had cystoscopy and biopsy which has revealed muscle invasive bladder cancer.     He had a recent PET-CT scan - which has revealed muscle invasive bladder cancer.     He has good energy and appetite. He has percutaneous nephrostomy in place. He has no aches or pain. He denies anxiety or depression.   He had a follow up in psychiatry recently. He had been admitted with depression in 2014. He has been coping well with cancer diagnosis.     He has been treated for hepatitis C virus.      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      Bladder tumor      Depression      DM II (diabetes mellitus, type II), controlled (H)     diet controlled     Hemophilia (H)     Type A     Hepatitis C,  chronic (H)     treated [facility] interferon/ribavirin     HTN (hypertension)           CURRENT OUTPATIENT MEDICATIONS     Current Outpatient Prescriptions   Medication Sig     Acetaminophen (TYLENOL PO) Take 500 mg by mouth every 4 hours as needed for mild pain or fever     Antihemophilic Factor, Recomb, (KOGENATE FS) 1000 UNITS KIT Infuse 3000 units (-5/+10%) IV every 48 hours and as needed for breakthrough bleeding     mirtazapine (REMERON) 45 MG tablet Take 1 tablet (45 mg) by mouth At Bedtime And may take 1/2 tablet for insomnia up to 3 times a week.     diazepam (VALIUM) 5 MG tablet Take up to one tablet by mouth daily as needed for anxiety     tamsulosin (FLOMAX) 0.4 MG capsule Take 1 capsule (0.4 mg) by mouth daily     Antihemophilic Factor, Recomb, 1560-8533 UNITS SOLR Infuse recombinate 2000 units every 24 as needed for Hematuria     codeine 30 MG tablet Take 2 tablets (60 mg) by mouth every 6 hours as needed for moderate to severe pain (up to 8 tabs in 24 hours) (Patient taking differently: Take 60 mg by mouth as needed for moderate to severe pain (up to 8 tabs in 24 hours) )     lisinopril (PRINIVIL/ZESTRIL) 40 MG tablet Take 1 tablet (40 mg) by mouth daily (Patient taking differently: Take 40 mg by mouth every morning )     amLODIPine (NORVASC) 5 MG tablet Take 1 tablet (5 mg) by mouth daily (Patient taking differently: Take 5 mg by mouth every morning )     No current facility-administered medications for this visit.         ALLERGIES      Allergies   Allergen Reactions     Aspirin      This drug inhibits platelets and is contraindicated due to hemophilia diagnosis.           SOCIAL HISTORY   He is not  and lives alone. He was raised in foster home.     He is retired now. He worked as a . He started working with [employer] and then [employer]. 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    He does not smoke. He quit smoking 5 yrs ago. He smoked 1-2 packs a day for 10 - 15 yrs.     He drinks  beer - 6 pack in a week. He denies recreational drug use.      FAMILY HISTORY   - Not known.      REVIEW OF SYSTEMS   As above in the HPI, o/w complete 12-point ROS was negative.     PHYSICAL EXAM   B/P: 107/74, T: 98.5, P: 105, R: 16  SpO2 Readings from Last 4 Encounters:   03/21/18 96%   02/23/18 95%   02/07/18 99%   12/28/17 99%     Wt Readings from Last 3 Encounters:   03/21/18 72.6 kg (160 lb)   03/21/18 73 kg (161 lb)   03/02/18 77.1 kg (170 lb)     GEN: NAD  HEENT: PERRL, EOMI, no icterus, injection or pallor. Oropharynx is clear.  NECK: no cervical or supraclavicular lymphadenopathy  LUNGS: clear bilaterally  CV: regular, no murmurs, rubs, or gallops  ABDOMEN: soft, non-tender, non-distended, normal bowel sounds, no hepatosplenomegaly by percussion or palpation  EXT: warm, well perfused, no edema  NEURO: alert  SKIN: no rashes     LABORATORY AND IMAGING STUDIES     Recent Labs   Lab Test  03/19/18   0835  02/23/18   0739  02/22/18   0320  02/21/18   0657  02/21/18   0357  02/20/18   0637   NA   --   138  137  138  143  135   POTASSIUM   --   4.0  4.0  4.0  3.7  4.0   CHLORIDE   --   106  102  105  111*  103   CO2   --   20  26  24  22  24   ANIONGAP   --   12  9  9  9  8   BUN   --   28  25  19  18  18   CR   --   1.19  1.28*  1.17  1.09  1.21   GLC  117*  118*  106*  104*  96  105*   MARLA   --   8.8  8.9  8.8  7.7*  8.8     No results for input(s): MAG, PHOS in the last 99950 hours.  Recent Labs   Lab Test  02/23/18   0739  02/22/18   0320  02/21/18   0657  02/21/18   0357  02/20/18   0637   01/27/14   2021   WBC  10.7  10.7  9.5  8.8  9.5   < >  5.2   HGB  9.9*  10.7*  10.4*  9.4*  10.1*   < >  13.7   PLT  301  296  279  280  286   < >  241   MCV  85  84  85  84  86   < >  86   NEUTROPHIL   --    --    --    --    --    --   65.1    < > = values in this interval not displayed.     Recent Labs   Lab Test  01/27/14 2021 04/17/12   1607   BILITOTAL  0.8  0.9   ALKPHOS  113  91   ALT  30  12   AST  27  34    ALBUMIN  4.9   --      TSH   Date Value Ref Range Status   04/17/2012 0.60 0.4 - 5.0 mU/L Final   04/15/2005 0.59 0.4 - 5.0 mU/L Final     No results for input(s): CEA in the last 84565 hours.  Results for orders placed or performed in visit on 03/19/18   PET Oncology Whole Body    Narrative    Combined Report of:    PET and CT on  3/19/2018 10:52 AM :    1. PET of the neck, chest, abdomen, and pelvis.  2. PET CT Fusion for Attenuation Correction and Anatomical  Localization:    3. Diagnostic CT scan of the chest, abdomen, and pelvis with  intravenous contrast for interpretation.  3. CT of the chest, abdomen and pelvis obtained for diagnostic  interpretation.  4. 3D MIP and PET-CT fused images were processed on an independent  workstation and archived to PACS and reviewed by a radiologist.    Technique:    1. PET: The patient received 12.69 mCi of F-18-FDG; the serum glucose  was 17 prior to administration, body weight was 73.3 kg. Images were  evaluated in the axial, sagittal, and coronal planes as well as the  rotational whole body MIP. Images were acquired from the Vertex to the  Feet. Delayed images were also obtained.     UPTAKE WAS MEASURED AT 60 MINUTES.     BACKGROUND:  Liver SUV max= 3.1,   Aorta Blood SUV Max: 2.6.     2. CT: Volumetric acquisition for clinical interpretation of the  chest, abdomen, and pelvis acquired at 3 mm sections . The chest,  abdomen, and pelvis were evaluated at 5 mm sections in bone, soft  tissue, and lung windows.      The patient received 100 mL of Optiray 320 intravenously for the  examination.      3. 3D MIP and PET-CT fused images were processed on an independent  workstation and archived to PACS and reviewed by a radiologist.    INDICATION: Malignant neoplasm of lateral wall of urinary bladder (H)    ADDITIONAL INFORMATION OBTAINED FROM EMR: Patient is a 68-year-old  male with high grade muscle invasive bladder cancer status post TURBT  and PNT placement.    COMPARISON: CT  dated 4/17/2017 FINDINGS:     HEAD/NECK:  Enlargement of the left palatine tonsil with apparent lack of right  palatine tonsil. SUV max of left palatine tonsil is 6.5.     The paranasal sinuses are clear. The mastoid air cells are clear.     The mucosal pharyngeal space, the , prevertebral and carotid  spaces are within normal limits.     No masses, mass effect or pathologically enlarged lymph nodes are  evident. Subcentimeter thyroid nodules, no associated FDG avidity.    CHEST:  There is no suspicious FDG uptake in the chest.     There are no pathologically enlarged mediastinal, hilar or axillary  lymph nodes.  Emphysematous changes with apical predominance. There are no  suspicious lung nodules or evidence for infection.  Scattered areas of  mucus plugging.    There is no significant pericardial or plural effusions.    ABDOMEN AND PELVIS:  Bilateral femoral hardware resultant beam hardening artifact,  resulting in obscured visualization of the bladder mucosa. Delayed  imaging demonstrates persistent FDG avidity along the right wall of  the bladder, where there is associated mucosal thickening, maximum SUV  30.4. Redemonstration of bilateral bladder diverticula.    Right obturator lymph node measures 1.1 cm short axis diameter (series  5 image 261), maximum SUV 4.    Right percutaneous nephrostomy tube is in place with decompressed  collecting system. Relatively symmetric nephrographic enhancement of  the kidneys. Simple attenuation cysts of the left kidney are unchanged  from prior examinations and do not demonstrate FDG avidity.    There are no suspicious hepatic lesions. There is no splenomegaly or  evidence for splenic or pancreatic mass lesion. The adrenal glands are  unremarkable. Cholelithiasis without gallbladder wall thickening or  pericholecystic fluid. Mild atrophy of the pancreas, no pancreatic  ductal dilatation. The bowel is nondilated. The appendix is normal.  Mild diffuse metabolic  activity in the bowel is presumed physiologic.    Atherosclerotic calcifications of the abdominal aorta without  aneurysmal dilatation.    LOWER EXTREMITIES:   No abnormal masses or hypermetabolic lesions.    BONES:   There are no suspicious lytic or blastic osseous lesions.  Bony  demineralization. No focal osseous abnormality. No suspicious osseous  lesion. Right total hip arthroplasty and left femoral screws. No acute  fracture. There is no abnormal FDG uptake in the skeleton.      Impression    IMPRESSION:   1. In this patient with high grade muscle invasive bladder cancer:  a. FDG avidity along the right bladder wall with associated mucosal  thickening, maximum SUV 30, consistent with primary neoplasm.  b. FDG avid right obturator lymph node, concerning for metastases.  c. Additional evidence for metastatic disease in the chest, abdomen or  pelvis.  2. Enlarged left palatine tonsil, maximum SUV 6.5 Recommend direct  visualization and tissue sampling.     I have personally reviewed the examination and initial interpretation  and I agree with the findings.    MATTHIEU WILDER MD       Lab Results   Component Value Date    PATH  02/19/2018     Patient Name: HIREN GREENBERG  MR#: [id number]  Specimen #: [id number]  Collected: 2/19/2018  Received: 2/19/2018  Reported: 2/21/2018 17:12  Ordering Phy(s): AARON STONE    For improved result formatting, select 'View Enhanced Report Format' under   Linked Documents section.    SPECIMEN(S):  Bladder tumor    FINAL DIAGNOSIS:  Bladder tumor, transurethral resection:  - Invasive high grade urothelial carcinoma  - Extent of invasion: Muscularis propria, multifocal  - Lymphovascular invasion: Present    (Dictated by: Sri Putnam MD 2/21/2018 02:09 PM)    I have personally reviewed all specimens and/or slides, including the   listed special stains, and used them  with my medical judgement to determine or confirm the final diagnosis.    Electronically signed out  "by:    Maxime Coronado M.D., [facility]    CLINICAL HISTORY:  67-year-old male with a history of hemophilia and gross hematuria.  Urine   cytology was positive for high-grade  urothelial carcinoma ([id number]; 12/29/2017).  Cystoscopy demonstrated a   large nodular tumor over the right  lateral wall (>5 cm).  GROSS:  The specimen is received in formalin with proper patient identification,   labeled \"bladder tumor\".  The  specimen consists of  a 23.0 g 8.5 x 6.5 x 1.5 cm aggregate of 80%   tan-yellow rubbery and soft tissue and 20%  clotted blood.  The soft tissue contains a tan pink papillary appearance.   Approximately 70% of the rubbery and  soft tissue is submitted.    Summary of Sections:  A1-A3  soft tissue with papillary appearance  A4-A12  representative soft and rubbery tissue (Dictated by: Esteban Carr   2/20/2018 09:59 AM)    MICROSCOPIC:  Microscopic examination was performed.    CPT Codes:  A: 86485-GP3    TESTING LAB LOCATION:  [address]   [address]  [phone]    COLLECTION SITE:  Client: [facility]  Location: [facility] (B)    Resident  [id number]           ASSESSMENT    1. High grade muscle invasive bladder cancer  2. ECOG PS 0  3. Hemophilia, DM TII, HTN  4. Previously treated for hepatitis C    DISCUSSION   I had a lengthy discussion with Orlin regarding recent diagnosis of bladder cancer. We briefly touched on the fact that cigarette smoking is the most significant cause for bladder cancer, and it is encouraging that Orlin has at least quit smoking at this point in time. We focused on the typical disease course, prognosis, and different treatment options.   Orlin has locally advanced disease with high-grade bladder tumor with FDG uptake and SUV of 30 in bladder and also in obturator node concerning for metastatic disease. Despite surgical resection a significant portion of " bladder cancer does recur. To decrease this risk of recurrence chemotherapy either prior to cystectomy (john-adjuvant) or post resection in the adjuvant setting has been studied. There has been evidence to suggest benefit for patients with high risk disease who received john-adjuvant chemotherapy. This addresses micro-metastatic disease at the earliest. This gives an opportunity to test the effectiveness of therapy within the patients body. A complete pathologic response is associated with excellent long term outcomes. There is a chance of over treating some patients who would have been otherwise been cured with surgery alone and would have not needed chemotherapy.   Regimen with methotrexate, vinblastine, doxorubicin, and cisplatin (MVAC) was the standard of care through 1980's and 90's. Unfortunately, this was a fairly toxic regimen with about 3 to 4% treatment-related mortality. Since the last decade, cisplatin with gemcitabine has emerged as the preferred regimen in the neoadjuvant, adjuvant, or the metastatic setting. The only study was done in the metastatic setting, which compared these 2 regimens, showed similar efficacy but much better toxicity profile for cisplatin with gemcitabine. Across the [place] and [place], this is the preferred regimen in the perioperative setting. However more recently there has been a renewed interest in accelerated or dose dense MVAC. In this the 4 drugs are administered on the first day every 2 weeks and the subsequent weekly doses of methotrexate are skipped. This increases the dose intensity while decreasing the mucositis, one of primary concerns with 4 drug regimen.   There have been no large head to head comparisons with the two combinations. Several studies suggest that the 4 drug regimen with MVAC when administered in dose dense/accelerated fashion - has higher pathologic complete response and downstaging rates than reported for cisplatin with gemcitabine regimen.  (Adrian hassan et al. J Clin Oncol. 2014 Jun 20;32(18):1889-94; Henrry et al. J Clin Oncol. 2014 Jun 20;32(18):1889-94; Cancer. 2012 Aug 15;118(16):2050-7). It is well established for bladder cancer - pathologic downstaging correlates with survival. At many major centers, this has become the preferred regimen in most fit patients.   We discussed the individual side effects of cisplatin and gemcitabine and MVAC regimens, and I have also provided printed information. Most notably, we discussed tinnitus, hearing deficits, nephrotoxicity, peripheral neuropathy, nausea, and vomiting with cisplatin. We also reviewed the myelosuppression with these agents, and in particular thrombocytopenia, which is fairly common with gemcitabine. Both agents together could contribute to fatigue, diminished appetite, altered taste, neutropenia and possibly neutropenic fever. He has to take every temperature greater than 100.4F seriously and immediately call us to care. In case he is neutropenic or would potentially get neutropenic soon, we would admit him and treat with IV antibiotics for 48 hrs.In the MVAC regimen, cisplatin remains as one the most active agents and so all of toxicity of cisplatin remains common the two regimens. Besides, methotrexate causes mucositis, immunosuppression; vinblastine causes additional neuropathy and adriamycin has cardiotoxicity. The four drug regimen causes alopecia and more of the combined toxicity of fatigue, suppressed appetite, altered taste.     I also reviewed the treatment schedule and curative intent. The entire treatment is given as an outpatient. We discussed the placement of PORT for ease of venous access.   I reviewed clinical trial which is assessing value of adding nivolumab to cisplatin and gemcitabine regimen.  I extensively reviewed immunotherapy with a PD-L1 inhibitor - nivolumab. I explained to them the concept of checkpoint inhibitor with a physiological role to sustain an effective  immune response. PD-L1 is expressed on healthy, normal tissue in the area of trauma so that the immune response is limited to dead tissue and the infecting agents and does not extend over to the normal tissue. The tumor uses some of these checks and balances to its advantage and successfully evades the immune system.  This places patient at risk for some autoimmunity which when it involves skin give rash and is called dermatitis, with gut it presents with diarrhea and is called colitis, and with lung it gives shortness of breath and is called pneumonitis. Similarly, depending on the affected tissue, we might have hepatitis, nephritis, thyroiditis, hypophysitis and so on.     Nivolumab is administered intravenously every 3 weeks as an outpatient on day 8 along with day 8 gemcitabine. For the most part, only a few percentage of patients has substantial side effects; for example, pneumonitis or hepatitis. In these cases, we identify and act aggressively. We can use oral steroids to suppress the autoimmune response. The antitumor response is known to persist even beyond that.   He would like to know my recommendations. I explained him that nivolumab is approved in recurrent metastatic setting and I have seen several dramatic responses with this drug as a single agent. The study is testing the effectiveness of this agent when used in presurgical setting. This is a good choice. If for some reason he is not a candidate for clinical trial or he decides not to chose this option, I would recommend dose dense MVAC for him.        PLAN   1. On discussing the risks, benefits, and alternatives, Orlin is enthusiastic about starting chemotherapy. We discussed the regimen (dose dense MVAC and clinical trial with cisplatin, gemcitabine and nivolumab) at great length  2. I could not find the consent form to hand him. I will have him return to meet research nurse and understand the study before consenting for it, if he so chooses. We  will have to ensure his eligibility for the study.   3.  I will coordinate care with clinical trials office and Urology teams  Over 120 minutes of direct face to face time spent with patient with more than 50% time spent in counseling and coordinating care.    Jose Manuel Armenta ,  Division of Hematology, Oncology & Transplantation  [facility].

## 2018-03-21 NOTE — LETTER
3/21/2018       RE: Orlin Alcantar  [address]   [place] [address]     Dear Colleague,    Thank you for referring your patient, Orlin Alcantar, to the [facility] UROLOGY AND INST FOR PROSTATE AND UROLOGIC CANCERS at [facility]. Please see a copy of my visit note below.    We are pleased to see Mr. Orlin Alcantar in consultation at the request of Dr. Ramos for the evaluation of chief complaint listed below    Chief Complaint:    High grade muscle invasive bladder cancer         History of Present Illness:   Orlin Alcantar is a(n) 68 year old male w/ PMH of hemophilia A, DMII, Hep C, HTN, and urologic history of hematuria who underwent TURBT for large >5cm bladder mass with involvement of the Right UO.  Follow up u/s during his hospital stay showed R hydronephrosis, for which he underwent placement of Right PNT.    Surgical pathology:  FINAL DIAGNOSIS:   Bladder tumor, transurethral resection:   - Invasive high grade urothelial carcinoma   - Extent of invasion: Muscularis propria, multifocal   - Lymphovascular invasion: Present     PET- CT revealed:  IMPRESSION:   1. In this patient with high grade muscle invasive bladder cancer:  a. FDG avidity along the right bladder wall with associated mucosal  thickening, maximum SUV 30, consistent with primary neoplasm.  b. FDG avid right obturator lymph node, concerning for metastases.  c. 'NO' Additional evidence for metastatic disease in the chest, abdomen or  pelvis.  2. Enlarged left palatine tonsil, maximum SUV 6.5 Recommend direct  visualization and tissue sampling.            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      Bladder tumor      Depression      DM II (diabetes mellitus, type II), controlled (H)     diet controlled     Hemophilia (H)     Type A     Hepatitis C, chronic (H)     treated [facility] interferon/ribavirin     HTN (hypertension)              Past Surgical History:     Past Surgical History:   Procedure Laterality Date     ARTHROPLASTY REVISION HIP  4/26/2012    Procedure:ARTHROPLASTY REVISION HIP; Surgeon:ALEJANDRA RAYMOND; Location:[facility] OR     CYSTOSCOPY, TRANSURETHRAL RESECTION (TUR) TUMOR BLADDER, COMBINED N/A 2/19/2018    Procedure: COMBINED CYSTOSCOPY, TRANSURETHRAL RESECTION (TUR) TUMOR BLADDER;  Cystoscopy, Transurethral Resection Of Bladder Tumor ;  Surgeon: Cassidy Liao MD;  Location: [facility] OR     INJECT STEROID (LOCATION)      right hip x 2, 2 weeks before.      left total knee arthroplasty  07/1992     pins in left hip      hip fracture     radioactive synovectomy  03/2003     right total hip arthroplasty  12/1992     SYNOVECTOMY HIP  4/26/2012    Procedure:SYNOVECTOMY HIP; Right Hip Open Synovectomy, Right Total Hip Revision with Exchange of Poly Liner and Head; Surgeon:ALEJANDRA RAYMOND; Location:[facility] OR          Family History:     Family History   Problem Relation Age of Onset     Unknown/Adopted Other             Allergies:     Allergies   Allergen Reactions     Aspirin      This drug inhibits platelets and is contraindicated due to hemophilia diagnosis.               Medications:     Current Outpatient Prescriptions   Medication Sig     Antihemophilic Factor, Recomb, (KOGENATE FS) 1000 UNITS KIT Infuse 3000 units (-5/+10%) IV every 48 hours and as needed for breakthrough bleeding     mirtazapine (REMERON) 45 MG tablet Take 1 tablet (45 mg) by mouth At Bedtime And may take 1/2 tablet for insomnia up to 3 times a week.     diazepam (VALIUM) 5 MG tablet Take up to one tablet by mouth daily as needed for anxiety     tamsulosin (FLOMAX) 0.4 MG capsule Take 1 capsule (0.4 mg) by mouth daily     Antihemophilic Factor, Recomb, 3158-5566 UNITS SOLR Infuse recombinate 2000 units every 24 as needed for Hematuria     codeine 30 MG tablet Take 2 tablets (60 mg) by mouth every 6 hours as needed for moderate to severe pain (up to 8 tabs in  24 hours) (Patient taking differently: Take 60 mg by mouth as needed for moderate to severe pain (up to 8 tabs in 24 hours) )     lisinopril (PRINIVIL/ZESTRIL) 40 MG tablet Take 1 tablet (40 mg) by mouth daily (Patient taking differently: Take 40 mg by mouth every morning )     amLODIPine (NORVASC) 5 MG tablet Take 1 tablet (5 mg) by mouth daily (Patient taking differently: Take 5 mg by mouth every morning )     No current facility-administered medications for this visit.             REVIEW OF SYSTEMS:    See HPI for pertinent details.  Remainder of 10-point ROS negative.         PHYSICAL EXAM   /74  Pulse 108  Ht 1.829 m (6')  Wt 73 kg (161 lb)  BMI 21.84 kg/m2  GENERAL: No acute distress. Well nourished.   HEENT:  Sclerae anicteric.  Conjunctivae pink.  Moist mucous membranes.  NECK:  Supple.  No lymphadenopathy.  CARDIAC:  Regular rate and rhythm.  LUNGS:  Non-labored breathing  BACK: PNT in place. No costovertebral tenderness.  ABDOMEN: Soft, non-tender, no surgical scars, no organomegaly, non-tender.  SKIN: No rashes.  Dry.             LABS AND IMAGING:   PET-CT reviewed, notable for positive nodes, without evidence of distant metastasis.          ASSESSMENT:   Orlin Alcantar is a 68 year old male who presents for evaluation for new diagnosis of muscle invasive bladder cancer with unfavorable pathology and evidence of ashlie metastasis on PET.          PLAN:     - Patient is slated to see Dr. Burns later today for consideration of neoadjuvant chemotherapy vs enrollment in clinical trial.  He states that per Dr. Jiang his hemophilia is not a contraindication for surgery  - Will follow up in 2 months to assess chemo response and discuss cystectomy    Kian Garcia, G2  Urology Resident    Patient was seen and examined with Dr. Shea    Patient seen and examined with the resident.  Visit time 15 minutes and >50% spent in counseling.  I agree with the resident's note and plan of care.       Bebeto Shea,  MD  Urology Staff        CC  Patient Care Team:  Katie Rangel MD as PCP - General (Family Practice)  Kaylin Rajan APRN CNS as Nurse Practitioner (Clinical Nurse Specialist)  Jess Mack APRN CNP as Nurse Practitioner (Nurse Practitioner)  Isadora Sood RN as Nurse Coordinator (Psychiatry)  Mely Smith PA-C as Physician Assistant (Physician Assistant)  Hailee Hernandez, RN as Registered Nurse  Cassidy Liao MD as MD (Urology)  Ligia Ram RN as Registered Nurse (Urology)  Katie Rangel MD as Referring Physician (Family Practice)  KATIE RANGEL    Copy to patient  [facility]BER[facility]  [address]    [place] [address]

## 2018-03-21 NOTE — PROGRESS NOTES
[facility] CANCER CLINIC    NEW PATIENT VISIT NOTE    PATIENT NAME: Orlin Alcantar MRN # [id number]  DATE OF VISIT: March 21, 2018 YOB: 1950    REFERRING PROVIDER: Bebeto Shea MD  [address]  [place] [address]    CANCER TYPE: Urothelial - bladder  STAGE: IV radiographic - FDG avid obturator node on PET-CT      HISTORY OF PRESENT ILLNESS   Orlin Alcantar is 68 year old male with PMH of hemophilia, HTN, DM TII, chronic hepatis C who has been referred for recently diagnosed muscle invasive bladder cancer.        He has hemophilia and had several surgeries in right hip replacement, knee replacement. About a year ago he had some hematuria. He had previous episodes of hematuria where he notes that he had kidney bleed. He had recurrent hematuria in April of last year - 2017. He had cystoscopy scheduled which he cancelled as his bleeding resolved. He had recurrent bleed in Dec 2017 and had cystoscopy and biopsy which has revealed muscle invasive bladder cancer.     He had a recent PET-CT scan - which has revealed muscle invasive bladder cancer.     He has good energy and appetite. He has percutaneous nephrostomy in place. He has no aches or pain. He denies anxiety or depression.   He had a follow up in psychiatry recently. He had been admitted with depression in 2014. He has been coping well with cancer diagnosis.     He has been treated for hepatitis C virus.      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      Bladder tumor      Depression      DM II (diabetes mellitus, type II), controlled (H)     diet controlled     Hemophilia (H)     Type A     Hepatitis C, chronic (H)     treated [facility] interferon/ribavirin     HTN (hypertension)           CURRENT OUTPATIENT MEDICATIONS     Current Outpatient Prescriptions   Medication Sig     Acetaminophen (TYLENOL PO) Take 500 mg by mouth every 4 hours as needed  for mild pain or fever     Antihemophilic Factor, Recomb, (KOGENATE FS) 1000 UNITS KIT Infuse 3000 units (-5/+10%) IV every 48 hours and as needed for breakthrough bleeding     mirtazapine (REMERON) 45 MG tablet Take 1 tablet (45 mg) by mouth At Bedtime And may take 1/2 tablet for insomnia up to 3 times a week.     diazepam (VALIUM) 5 MG tablet Take up to one tablet by mouth daily as needed for anxiety     tamsulosin (FLOMAX) 0.4 MG capsule Take 1 capsule (0.4 mg) by mouth daily     Antihemophilic Factor, Recomb, 1211-1760 UNITS SOLR Infuse recombinate 2000 units every 24 as needed for Hematuria     codeine 30 MG tablet Take 2 tablets (60 mg) by mouth every 6 hours as needed for moderate to severe pain (up to 8 tabs in 24 hours) (Patient taking differently: Take 60 mg by mouth as needed for moderate to severe pain (up to 8 tabs in 24 hours) )     lisinopril (PRINIVIL/ZESTRIL) 40 MG tablet Take 1 tablet (40 mg) by mouth daily (Patient taking differently: Take 40 mg by mouth every morning )     amLODIPine (NORVASC) 5 MG tablet Take 1 tablet (5 mg) by mouth daily (Patient taking differently: Take 5 mg by mouth every morning )     No current facility-administered medications for this visit.         ALLERGIES      Allergies   Allergen Reactions     Aspirin      This drug inhibits platelets and is contraindicated due to hemophilia diagnosis.           SOCIAL HISTORY   He is not  and lives alone. He was raised in foster home.     He is retired now. He worked as a . He started working with [employer] and then [employer]. 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    He does not smoke. He quit smoking 5 yrs ago. He smoked 1-2 packs a day for 10 - 15 yrs.     He drinks beer - 6 pack in a week. He denies recreational drug use.      FAMILY HISTORY   - Not known.      REVIEW OF SYSTEMS   As above in the HPI, o/w complete 12-point ROS was negative.     PHYSICAL EXAM   B/P: 107/74, T: 98.5, P: 105, R: 16  SpO2  Readings from Last 4 Encounters:   03/21/18 96%   02/23/18 95%   02/07/18 99%   12/28/17 99%     Wt Readings from Last 3 Encounters:   03/21/18 72.6 kg (160 lb)   03/21/18 73 kg (161 lb)   03/02/18 77.1 kg (170 lb)     GEN: NAD  HEENT: PERRL, EOMI, no icterus, injection or pallor. Oropharynx is clear.  NECK: no cervical or supraclavicular lymphadenopathy  LUNGS: clear bilaterally  CV: regular, no murmurs, rubs, or gallops  ABDOMEN: soft, non-tender, non-distended, normal bowel sounds, no hepatosplenomegaly by percussion or palpation  EXT: warm, well perfused, no edema  NEURO: alert  SKIN: no rashes     LABORATORY AND IMAGING STUDIES     Recent Labs   Lab Test  03/19/18   0835  02/23/18   0739  02/22/18   0320  02/21/18   0657  02/21/18   0357  02/20/18   0637   NA   --   138  137  138  143  135   POTASSIUM   --   4.0  4.0  4.0  3.7  4.0   CHLORIDE   --   106  102  105  111*  103   CO2   --   20  26  24  22  24   ANIONGAP   --   12  9  9  9  8   BUN   --   28  25  19  18  18   CR   --   1.19  1.28*  1.17  1.09  1.21   GLC  117*  118*  106*  104*  96  105*   MARLA   --   8.8  8.9  8.8  7.7*  8.8     No results for input(s): MAG, PHOS in the last 30461 hours.  Recent Labs   Lab Test  02/23/18   0739  02/22/18   0320  02/21/18   0657  02/21/18   0357  02/20/18   0637   01/27/14 2021   WBC  10.7  10.7  9.5  8.8  9.5   < >  5.2   HGB  9.9*  10.7*  10.4*  9.4*  10.1*   < >  13.7   PLT  301  296  279  280  286   < >  241   MCV  85  84  85  84  86   < >  86   NEUTROPHIL   --    --    --    --    --    --   65.1    < > = values in this interval not displayed.     Recent Labs   Lab Test  01/27/14 2021 04/17/12   1607   BILITOTAL  0.8  0.9   ALKPHOS  113  91   ALT  30  12   AST  27  34   ALBUMIN  4.9   --      TSH   Date Value Ref Range Status   04/17/2012 0.60 0.4 - 5.0 mU/L Final   04/15/2005 0.59 0.4 - 5.0 mU/L Final     No results for input(s): CEA in the last 33159 hours.  Results for orders placed or performed in visit  on 03/19/18   PET Oncology Whole Body    Narrative    Combined Report of:    PET and CT on  3/19/2018 10:52 AM :    1. PET of the neck, chest, abdomen, and pelvis.  2. PET CT Fusion for Attenuation Correction and Anatomical  Localization:    3. Diagnostic CT scan of the chest, abdomen, and pelvis with  intravenous contrast for interpretation.  3. CT of the chest, abdomen and pelvis obtained for diagnostic  interpretation.  4. 3D MIP and PET-CT fused images were processed on an independent  workstation and archived to PACS and reviewed by a radiologist.    Technique:    1. PET: The patient received 12.69 mCi of F-18-FDG; the serum glucose  was 17 prior to administration, body weight was 73.3 kg. Images were  evaluated in the axial, sagittal, and coronal planes as well as the  rotational whole body MIP. Images were acquired from the Vertex to the  Feet. Delayed images were also obtained.     UPTAKE WAS MEASURED AT 60 MINUTES.     BACKGROUND:  Liver SUV max= 3.1,   Aorta Blood SUV Max: 2.6.     2. CT: Volumetric acquisition for clinical interpretation of the  chest, abdomen, and pelvis acquired at 3 mm sections . The chest,  abdomen, and pelvis were evaluated at 5 mm sections in bone, soft  tissue, and lung windows.      The patient received 100 mL of Optiray 320 intravenously for the  examination.      3. 3D MIP and PET-CT fused images were processed on an independent  workstation and archived to PACS and reviewed by a radiologist.    INDICATION: Malignant neoplasm of lateral wall of urinary bladder (H)    ADDITIONAL INFORMATION OBTAINED FROM EMR: Patient is a 68-year-old  male with high grade muscle invasive bladder cancer status post TURBT  and PNT placement.    COMPARISON: CT dated 4/17/2017 FINDINGS:     HEAD/NECK:  Enlargement of the left palatine tonsil with apparent lack of right  palatine tonsil. SUV max of left palatine tonsil is 6.5.     The paranasal sinuses are clear. The mastoid air cells are clear.      The mucosal pharyngeal space, the , prevertebral and carotid  spaces are within normal limits.     No masses, mass effect or pathologically enlarged lymph nodes are  evident. Subcentimeter thyroid nodules, no associated FDG avidity.    CHEST:  There is no suspicious FDG uptake in the chest.     There are no pathologically enlarged mediastinal, hilar or axillary  lymph nodes.  Emphysematous changes with apical predominance. There are no  suspicious lung nodules or evidence for infection.  Scattered areas of  mucus plugging.    There is no significant pericardial or plural effusions.    ABDOMEN AND PELVIS:  Bilateral femoral hardware resultant beam hardening artifact,  resulting in obscured visualization of the bladder mucosa. Delayed  imaging demonstrates persistent FDG avidity along the right wall of  the bladder, where there is associated mucosal thickening, maximum SUV  30.4. Redemonstration of bilateral bladder diverticula.    Right obturator lymph node measures 1.1 cm short axis diameter (series  5 image 261), maximum SUV 4.    Right percutaneous nephrostomy tube is in place with decompressed  collecting system. Relatively symmetric nephrographic enhancement of  the kidneys. Simple attenuation cysts of the left kidney are unchanged  from prior examinations and do not demonstrate FDG avidity.    There are no suspicious hepatic lesions. There is no splenomegaly or  evidence for splenic or pancreatic mass lesion. The adrenal glands are  unremarkable. Cholelithiasis without gallbladder wall thickening or  pericholecystic fluid. Mild atrophy of the pancreas, no pancreatic  ductal dilatation. The bowel is nondilated. The appendix is normal.  Mild diffuse metabolic activity in the bowel is presumed physiologic.    Atherosclerotic calcifications of the abdominal aorta without  aneurysmal dilatation.    LOWER EXTREMITIES:   No abnormal masses or hypermetabolic lesions.    BONES:   There are no suspicious  lytic or blastic osseous lesions.  Bony  demineralization. No focal osseous abnormality. No suspicious osseous  lesion. Right total hip arthroplasty and left femoral screws. No acute  fracture. There is no abnormal FDG uptake in the skeleton.      Impression    IMPRESSION:   1. In this patient with high grade muscle invasive bladder cancer:  a. FDG avidity along the right bladder wall with associated mucosal  thickening, maximum SUV 30, consistent with primary neoplasm.  b. FDG avid right obturator lymph node, concerning for metastases.  c. Additional evidence for metastatic disease in the chest, abdomen or  pelvis.  2. Enlarged left palatine tonsil, maximum SUV 6.5 Recommend direct  visualization and tissue sampling.     I have personally reviewed the examination and initial interpretation  and I agree with the findings.    MATTHIEU WILDER MD       Lab Results   Component Value Date    PATH  02/19/2018     Patient Name: HIREN GREENBERG  MR#: [id number]  Specimen #: [id number]  Collected: 2/19/2018  Received: 2/19/2018  Reported: 2/21/2018 17:12  Ordering Phy(s): AARON STONE    For improved result formatting, select 'View Enhanced Report Format' under   Linked Documents section.    SPECIMEN(S):  Bladder tumor    FINAL DIAGNOSIS:  Bladder tumor, transurethral resection:  - Invasive high grade urothelial carcinoma  - Extent of invasion: Muscularis propria, multifocal  - Lymphovascular invasion: Present    (Dictated by: Sri Putnam MD 2/21/2018 02:09 PM)    I have personally reviewed all specimens and/or slides, including the   listed special stains, and used them  with my medical judgement to determine or confirm the final diagnosis.    Electronically signed out by:    Maxime Coronado M.D., [facility]    CLINICAL HISTORY:  67-year-old male with a history of hemophilia and gross hematuria.  Urine   cytology was positive for high-grade  urothelial carcinoma ([id number]; 12/29/2017).  Cystoscopy demonstrated a  "  large nodular tumor over the right  lateral wall (>5 cm).  GROSS:  The specimen is received in formalin with proper patient identification,   labeled \"bladder tumor\".  The  specimen consists of  a 23.0 g 8.5 x 6.5 x 1.5 cm aggregate of 80%   tan-yellow rubbery and soft tissue and 20%  clotted blood.  The soft tissue contains a tan pink papillary appearance.   Approximately 70% of the rubbery and  soft tissue is submitted.    Summary of Sections:  A1-A3  soft tissue with papillary appearance  A4-A12  representative soft and rubbery tissue (Dictated by: Esteban Carr   2/20/2018 09:59 AM)    MICROSCOPIC:  Microscopic examination was performed.    CPT Codes:  A: 44157-IT1    TESTING LAB LOCATION:  [facility], [address]   [id number] [phone]    COLLECTION SITE:  Client: [facility]  Location: [facility] (B)    Resident  [place]           ASSESSMENT    1. High grade muscle invasive bladder cancer  2. ECOG PS 0  3. Hemophilia, DM TII, HTN  4. Previously treated for hepatitis C    DISCUSSION   I had a lengthy discussion with Orlin regarding recent diagnosis of bladder cancer. We briefly touched on the fact that cigarette smoking is the most significant cause for bladder cancer, and it is encouraging that Orlin has at least quit smoking at this point in time. We focused on the typical disease course, prognosis, and different treatment options.   Orlin has locally advanced disease with high-grade bladder tumor with FDG uptake and SUV of 30 in bladder and also in obturator node concerning for metastatic disease. Despite surgical resection a significant portion of bladder cancer does recur. To decrease this risk of recurrence chemotherapy either prior to cystectomy (john-adjuvant) or post resection in the adjuvant setting has been studied. There has been evidence to suggest benefit for patients with high risk " disease who received john-adjuvant chemotherapy. This addresses micro-metastatic disease at the earliest. This gives an opportunity to test the effectiveness of therapy within the patients body. A complete pathologic response is associated with excellent long term outcomes. There is a chance of over treating some patients who would have been otherwise been cured with surgery alone and would have not needed chemotherapy.   Regimen with methotrexate, vinblastine, doxorubicin, and cisplatin (MVAC) was the standard of care through 1980's and 90's. Unfortunately, this was a fairly toxic regimen with about 3 to 4% treatment-related mortality. Since the last decade, cisplatin with gemcitabine has emerged as the preferred regimen in the neoadjuvant, adjuvant, or the metastatic setting. The only study was done in the metastatic setting, which compared these 2 regimens, showed similar efficacy but much better toxicity profile for cisplatin with gemcitabine. Across the [place] and [place], this is the preferred regimen in the perioperative setting. However more recently there has been a renewed interest in accelerated or dose dense MVAC. In this the 4 drugs are administered on the first day every 2 weeks and the subsequent weekly doses of methotrexate are skipped. This increases the dose intensity while decreasing the mucositis, one of primary concerns with 4 drug regimen.   There have been no large head to head comparisons with the two combinations. Several studies suggest that the 4 drug regimen with MVAC when administered in dose dense/accelerated fashion - has higher pathologic complete response and downstaging rates than reported for cisplatin with gemcitabine regimen. (Adrian hassan et al. J Clin Oncol. 2014 Jun 20;32(18):1889-94; Henrry et al. J Clin Oncol. 2014 Jun 20;32(18):1889-94; Cancer. 2012 Aug 15;118(16):3920-7). It is well established for bladder cancer - pathologic downstaging correlates with survival. At  many major centers, this has become the preferred regimen in most fit patients.   We discussed the individual side effects of cisplatin and gemcitabine and MVAC regimens, and I have also provided printed information. Most notably, we discussed tinnitus, hearing deficits, nephrotoxicity, peripheral neuropathy, nausea, and vomiting with cisplatin. We also reviewed the myelosuppression with these agents, and in particular thrombocytopenia, which is fairly common with gemcitabine. Both agents together could contribute to fatigue, diminished appetite, altered taste, neutropenia and possibly neutropenic fever. He has to take every temperature greater than 100.4F seriously and immediately call us to care. In case he is neutropenic or would potentially get neutropenic soon, we would admit him and treat with IV antibiotics for 48 hrs.In the MVAC regimen, cisplatin remains as one the most active agents and so all of toxicity of cisplatin remains common the two regimens. Besides, methotrexate causes mucositis, immunosuppression; vinblastine causes additional neuropathy and adriamycin has cardiotoxicity. The four drug regimen causes alopecia and more of the combined toxicity of fatigue, suppressed appetite, altered taste.     I also reviewed the treatment schedule and curative intent. The entire treatment is given as an outpatient. We discussed the placement of PORT for ease of venous access.   I reviewed clinical trial which is assessing value of adding nivolumab to cisplatin and gemcitabine regimen.  I extensively reviewed immunotherapy with a PD-L1 inhibitor - nivolumab. I explained to them the concept of checkpoint inhibitor with a physiological role to sustain an effective immune response. PD-L1 is expressed on healthy, normal tissue in the area of trauma so that the immune response is limited to dead tissue and the infecting agents and does not extend over to the normal tissue. The tumor uses some of these checks and  balances to its advantage and successfully evades the immune system.  This places patient at risk for some autoimmunity which when it involves skin give rash and is called dermatitis, with gut it presents with diarrhea and is called colitis, and with lung it gives shortness of breath and is called pneumonitis. Similarly, depending on the affected tissue, we might have hepatitis, nephritis, thyroiditis, hypophysitis and so on.     Nivolumab is administered intravenously every 3 weeks as an outpatient on day 8 along with day 8 gemcitabine. For the most part, only a few percentage of patients has substantial side effects; for example, pneumonitis or hepatitis. In these cases, we identify and act aggressively. We can use oral steroids to suppress the autoimmune response. The antitumor response is known to persist even beyond that.   He would like to know my recommendations. I explained him that nivolumab is approved in recurrent metastatic setting and I have seen several dramatic responses with this drug as a single agent. The study is testing the effectiveness of this agent when used in presurgical setting. This is a good choice. If for some reason he is not a candidate for clinical trial or he decides not to chose this option, I would recommend dose dense MVAC for him.        PLAN   1. On discussing the risks, benefits, and alternatives, Orlin is enthusiastic about starting chemotherapy. We discussed the regimen (dose dense MVAC and clinical trial with cisplatin, gemcitabine and nivolumab) at great length  2. I could not find the consent form to hand him. I will have him return to meet research nurse and understand the study before consenting for it, if he so chooses. We will have to ensure his eligibility for the study.   3.  I will coordinate care with clinical trials office and Urology teams  Over 120 minutes of direct face to face time spent with patient with more than 50% time spent in counseling and coordinating  sanchez.    Jose Manuel Armenta ,  Division of Hematology, Oncology & Transplantation  [facility].

## 2018-03-25 PROBLEM — F32.A DEPRESSION, UNSPECIFIED DEPRESSION TYPE: Status: ACTIVE | Noted: 2018-03-25

## 2018-03-26 ENCOUNTER — RESEARCH ENCOUNTER (OUTPATIENT)
Dept: ONCOLOGY | Facility: CLINIC | Age: 68
End: 2018-03-26

## 2018-03-26 ENCOUNTER — ONCOLOGY VISIT (OUTPATIENT)
Dept: ONCOLOGY | Facility: CLINIC | Age: 68
End: 2018-03-26
Attending: PHYSICIAN ASSISTANT
Payer: COMMERCIAL

## 2018-03-26 VITALS
HEIGHT: 72 IN | BODY MASS INDEX: 21.81 KG/M2 | RESPIRATION RATE: 16 BRPM | WEIGHT: 161 LBS | SYSTOLIC BLOOD PRESSURE: 127 MMHG | TEMPERATURE: 99 F | OXYGEN SATURATION: 96 % | DIASTOLIC BLOOD PRESSURE: 84 MMHG | HEART RATE: 105 BPM

## 2018-03-26 DIAGNOSIS — C67.9 MALIGNANT NEOPLASM OF URINARY BLADDER, UNSPECIFIED SITE (H): Primary | ICD-10-CM

## 2018-03-26 DIAGNOSIS — Z00.6 EXAMINATION OF PARTICIPANT IN CLINICAL TRIAL: ICD-10-CM

## 2018-03-26 DIAGNOSIS — D49.4 BLADDER TUMOR: Primary | ICD-10-CM

## 2018-03-26 PROCEDURE — 99214 OFFICE O/P EST MOD 30 MIN: CPT | Mod: ZP | Performed by: PHYSICIAN ASSISTANT

## 2018-03-26 PROCEDURE — G0463 HOSPITAL OUTPT CLINIC VISIT: HCPCS | Mod: ZF

## 2018-03-26 ASSESSMENT — PAIN SCALES - GENERAL: PAINLEVEL: NO PAIN (0)

## 2018-03-26 NOTE — LETTER
3/26/2018      RE: Orlin Alcantar  [address]   [place] [address]       Heme/onc visit note  March 26, 2018    Reason for visit: Follow up bladder cancer    HPI: Orlin Alcantar is a 68 year old male with hemophilia, DM (controlled with diet), HTN, and treated Hep C has a recent diagnosis of high grade muscle invasive bladder cancer. About a year ago he had some hematuria. He had previous episodes of hematuria where he notes that he had kidney bleed that he thought was related to his hemophilia. He had recurrent hematuria in April of 2017. He had cystoscopy scheduled which he cancelled as his bleeding resolved. He had recurrent bleed in Dec 2017 and had cystoscopy and biopsy which has revealed muscle invasive bladder cancer.  He had a recent PET-CT scan 3/19/2018 which has revealed muscle invasive bladder cancer and a single right obturator lymph node that measures 1.1cm. No evidence of metastatic disease. He is here for consideration of a clinical trial with neoadjuvant cisplatin, gemcitabine, and Opdivo.     In regards to Hemophilia, he is on daily factor and hasn't had a bleed for several months. He was treated for Hep C many years ago with combination therapy and was told he was cured. He had DM which was revered with diet and weight loss. He is treated for HTN with 2 agents with a good BP.    Interval history: Orlin is doing well, he has no complains. No f/u/d or hematuria. No pain. Quit smoking 5 years ago. He uses Remeron to sleep and takes valium prn anxiety. 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 10 pt ROS otherwise negative.     Past medical history:  Patient Active Problem List   Diagnosis     History of total hip arthroplasty     Hemophilia (H)     Pain in joint, pelvic region and thigh     Anxiety state     ACP (advance care planning)     Hypertension goal BP (blood pressure) < 140/90     Mood disorder in conditions classified elsewhere      "Hepatitis C, chronic (H)     Hematuria     Bladder tumor     Depression, unspecified depression type       Medications:    Current Outpatient Prescriptions on File Prior to Visit:  Acetaminophen (TYLENOL PO) Take 500 mg by mouth every 4 hours as needed for mild pain or fever   Antihemophilic Factor, Recomb, (KOGENATE FS) 1000 UNITS KIT Infuse 3000 units (-5/+10%) IV every 48 hours and as needed for breakthrough bleeding   mirtazapine (REMERON) 45 MG tablet Take 1 tablet (45 mg) by mouth At Bedtime And may take 1/2 tablet for insomnia up to 3 times a week.   diazepam (VALIUM) 5 MG tablet Take up to one tablet by mouth daily as needed for anxiety   tamsulosin (FLOMAX) 0.4 MG capsule Take 1 capsule (0.4 mg) by mouth daily   Antihemophilic Factor, Recomb, 0968-0465 UNITS SOLR Infuse recombinate 2000 units every 24 as needed for Hematuria   codeine 30 MG tablet Take 2 tablets (60 mg) by mouth every 6 hours as needed for moderate to severe pain (up to 8 tabs in 24 hours) (Patient taking differently: Take 60 mg by mouth as needed for moderate to severe pain (up to 8 tabs in 24 hours) )   lisinopril (PRINIVIL/ZESTRIL) 40 MG tablet Take 1 tablet (40 mg) by mouth daily (Patient taking differently: Take 40 mg by mouth every morning )   amLODIPine (NORVASC) 5 MG tablet Take 1 tablet (5 mg) by mouth daily (Patient taking differently: Take 5 mg by mouth every morning )     No current facility-administered medications on file prior to visit.     Allergy:     Allergies   Allergen Reactions     Aspirin      This drug inhibits platelets and is contraindicated due to hemophilia diagnosis.          Physical exam  /84 (BP Location: Left arm, Patient Position: Sitting, Cuff Size: Adult Regular)  Pulse 105  Temp 99  F (37.2  C) (Tympanic)  Resp 16  Ht 1.829 m (6' 0.01\")  Wt 73 kg (161 lb)  SpO2 96%  BMI 21.83 kg/m2  Wt Readings from Last 4 Encounters:   03/26/18 73 kg (161 lb)   03/21/18 72.6 kg (160 lb)   03/21/18 73 kg (161 " lb)   03/02/18 77.1 kg (170 lb)     General: No acute distress  HEENT: Sclera anicteric. Oral mucosa pink and moist.  No mucositis or thrush  Lymph: No lymphadenopathy in neck, supraclavicular, and axillary areas  Heart: Regular, rate, and rhythm  Lungs: Clear to ascultation bilaterally  Abdomen: Positive bowel sounds. Soft, non-distended, non-tender. No organomegaly or mass.   Extremities: no lower extremity edema. Decreased ROM of elbows and ankles bilaterally  Neuro: Cranial nerves grossly intact  Rash: none    Assessment and plan:  Orlin Alcantar is a 68 year old male with hemophilia, DM (controlled with diet), HTN, and treated Hep C has a recent diagnosis of high grade muscle invasive bladder cancer with a single right obturator lymph node. No signs of metastatic disease.     Bladder cancer, here to discuss neoadjuvant chemotherapy on clinical trial which is standard of care plus nivolumab (Opdivo). He signed the consent today and will return Wednesday to meet Dr. Espinal and get labs and EKG. He will likely get started the first week on April. ECOG 0.     Over 50% of 25 min visit was spent counseling and coordinating care    Amy Doll PA-C

## 2018-03-26 NOTE — MR AVS SNAPSHOT
After Visit Summary   3/26/2018    Orlin Alcantar    MRN: [id number]           Patient Information     Date Of Birth          1950        Visit Information        Provider Department      3/26/2018 3:00 PM Amy Doll PA-C [facility]        Today's Diagnoses     Malignant neoplasm of urinary bladder, unspecified site (H)    -  1       Follow-ups after your visit        Your next 10 appointments already scheduled     Mar 28, 2018 12:00 PM CDT   [facility] Draw with [facility] LAB DRAW   [facility] Draw ([facility])    [address]  [address]  [facility] [address]   [phone]            Mar 28, 2018 12:15 PM CDT   (Arrive by 12:00 PM)   Return Visit with Magalie Espinal MD   [facility] ([facility])    [address] [place] [address]  [address]  [facility] [address] [phone]            Mar 28, 2018 12:50 PM CDT   Nurse Visit with  Oncology Nurse   [facility] ([facility])    [address]  [address]  [facility] [address] [phone]            May 23, 2018  3:20 PM CDT   (Arrive by 3:05 PM)   Cystoscopy with Bebeto Shea MD   [facility] ([facility])    [address] [place] [address]  4th Floor  [facility] [address] [phone]            Sep 11, 2018 10:15 AM CDT   Adult Med Follow UP with JELLY Chapa CNS   [facility] ([facility])    [place] [id number]  [address] [place] [id number] [phone]              Who to contact     If you have questions or need follow up information about today's clinic visit or your schedule please contact [facility] directly at [phone].  Normal or non-critical lab and imaging results will be communicated to you by Jessica  "letter or phone within 4 business days after the clinic has received the results. If you do not hear from us within 7 days, please contact the clinic through [employer] or phone. If you have a critical or abnormal lab result, we will notify you by phone as soon as possible.  Submit refill requests through [employer] or call your pharmacy and they will forward the refill request to us. Please allow 3 business days for your refill to be completed.          Additional Information About Your Visit        [employer] Information     [employer] gives you secure access to your electronic health record. If you see a primary care provider, you can also send messages to your care team and make appointments. If you have questions, please call your primary care clinic.  If you do not have a primary care provider, please call [phone] and they will assist you.        Care EveryWhere ID     This is your Care EveryWhere ID. This could be used by other organizations to access your [place] medical records  [id number]        Your Vitals Were     Pulse Temperature Respirations Height Pulse Oximetry BMI (Body Mass Index)    105 99  F (37.2  C) (Tympanic) 16 1.829 m (6' 0.01\") 96% 21.83 kg/m2       Blood Pressure from Last 3 Encounters:   03/26/18 127/84   03/21/18 107/74   03/21/18 107/74    Weight from Last 3 Encounters:   03/26/18 73 kg (161 lb)   03/21/18 72.6 kg (160 lb)   03/21/18 73 kg (161 lb)              Today, you had the following     No orders found for display         Today's Medication Changes          These changes are accurate as of 3/26/18  5:05 PM.  If you have any questions, ask your nurse or doctor.               These medicines have changed or have updated prescriptions.        Dose/Directions    amLODIPine 5 MG tablet   Commonly known as:  NORVASC   This may have changed:  when to take this   Used for:  Hypertension goal BP (blood pressure) < 140/90        Dose:  5 mg   Take 1 tablet (5 mg) by mouth daily   Quantity:  " 90 tablet   Refills:  3       codeine 30 MG tablet   This may have changed:  when to take this   Used for:  Hemophilic arthropathy        Dose:  60 mg   Take 2 tablets (60 mg) by mouth every 6 hours as needed for moderate to severe pain (up to 8 tabs in 24 hours)   Quantity:  168 tablet   Refills:  0       lisinopril 40 MG tablet   Commonly known as:  PRINIVIL/ZESTRIL   This may have changed:  when to take this   Used for:  Hypertension goal BP (blood pressure) < 140/90        Dose:  40 mg   Take 1 tablet (40 mg) by mouth daily   Quantity:  90 tablet   Refills:  3                Primary Care Provider Office Phone # Fax #    Katie Ramos [phone] [address] [place] [address]        Equal Access to Services     ANDREW FLANAGAN : Suleman Colmenares, walashon luqadaha, qaybta kaalmada adesid, lloyd rene . So [facility] [phone].    ATENCIÓN: Si habla español, tiene a hernadez disposición servicios gratuitos de asistencia lingüística. [facility] [phone].    We comply with applicable federal civil rights laws and [place] laws. We do not discriminate on the basis of race, color, national origin, age, disability, sex, sexual orientation, or gender identity.            Thank you!     Thank you for choosing [facility]  for your care. Our goal is always to provide you with excellent care. Hearing back from our patients is one way we can continue to improve our services. Please take a few minutes to complete the written survey that you may receive in the mail after your visit with us. Thank you!             Your Updated Medication List - Protect others around you: Learn how to safely use, store and throw away your medicines at www.disposemymeds.org.          This list is accurate as of 3/26/18  5:05 PM.  Always use your most recent med list.                   Brand Name Dispense Instructions for use Diagnosis    amLODIPine 5 MG  tablet    NORVASC    90 tablet    Take 1 tablet (5 mg) by mouth daily    Hypertension goal BP (blood pressure) < 140/90       * Antihemophilic Factor (Recomb) 1739-6967 UNITS Solr     6000 each    Infuse recombinate 2000 units every 24 as needed for Hematuria    Severe hemophilia A (H), Hematuria       * KOGENATE FS 1000 UNITS Kit     17555 each    Infuse 3000 units (-5/+10%) IV every 48 hours and as needed for breakthrough bleeding    Severe hemophilia A (H), Personal history of malignant neoplasm of bladder       codeine 30 MG tablet     168 tablet    Take 2 tablets (60 mg) by mouth every 6 hours as needed for moderate to severe pain (up to 8 tabs in 24 hours)    Hemophilic arthropathy       diazepam 5 MG tablet    VALIUM    30 tablet    Take up to one tablet by mouth daily as needed for anxiety    Depressive disorder       lisinopril 40 MG tablet    PRINIVIL/ZESTRIL    90 tablet    Take 1 tablet (40 mg) by mouth daily    Hypertension goal BP (blood pressure) < 140/90       mirtazapine 45 MG tablet    REMERON    37 tablet    Take 1 tablet (45 mg) by mouth At Bedtime And may take 1/2 tablet for insomnia up to 3 times a week.    Generalized anxiety disorder       tamsulosin 0.4 MG capsule    FLOMAX    45 capsule    Take 1 capsule (0.4 mg) by mouth daily    Incomplete bladder emptying       TYLENOL PO      Take 500 mg by mouth every 4 hours as needed for mild pain or fever        * Notice:  This list has 2 medication(s) that are the same as other medications prescribed for you. Read the directions carefully, and ask your doctor or other care provider to review them with you.

## 2018-03-26 NOTE — PROGRESS NOTES
Heme/onc visit note  March 26, 2018    Reason for visit: Follow up bladder cancer    HPI: Orlin Alcantar is a 68 year old male with hemophilia, DM (controlled with diet), HTN, and treated Hep C has a recent diagnosis of high grade muscle invasive bladder cancer. About a year ago he had some hematuria. He had previous episodes of hematuria where he notes that he had kidney bleed that he thought was related to his hemophilia. He had recurrent hematuria in April of 2017. He had cystoscopy scheduled which he cancelled as his bleeding resolved. He had recurrent bleed in Dec 2017 and had cystoscopy and biopsy which has revealed muscle invasive bladder cancer.  He had a recent PET-CT scan 3/19/2018 which has revealed muscle invasive bladder cancer and a single right obturator lymph node that measures 1.1cm. No evidence of metastatic disease. He is here for consideration of a clinical trial with neoadjuvant cisplatin, gemcitabine, and Opdivo.     In regards to Hemophilia, he is on daily factor and hasn't had a bleed for several months. He was treated for Hep C many years ago with combination therapy and was told he was cured. He had DM which was revered with diet and weight loss. He is treated for HTN with 2 agents with a good BP.    Interval history: Orlin is doing well, he has no complains. No f/u/d or hematuria. No pain. Quit smoking 5 years ago. He uses Remeron to sleep and takes valium prn anxiety. 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 10 pt ROS otherwise negative.     Past medical history:  Patient Active Problem List   Diagnosis     History of total hip arthroplasty     Hemophilia (H)     Pain in joint, pelvic region and thigh     Anxiety state     ACP (advance care planning)     Hypertension goal BP (blood pressure) < 140/90     Mood disorder in conditions classified elsewhere     Hepatitis C, chronic (H)     Hematuria     Bladder tumor     Depression, unspecified depression  "type       Medications:    Current Outpatient Prescriptions on File Prior to Visit:  Acetaminophen (TYLENOL PO) Take 500 mg by mouth every 4 hours as needed for mild pain or fever   Antihemophilic Factor, Recomb, (KOGENATE FS) 1000 UNITS KIT Infuse 3000 units (-5/+10%) IV every 48 hours and as needed for breakthrough bleeding   mirtazapine (REMERON) 45 MG tablet Take 1 tablet (45 mg) by mouth At Bedtime And may take 1/2 tablet for insomnia up to 3 times a week.   diazepam (VALIUM) 5 MG tablet Take up to one tablet by mouth daily as needed for anxiety   tamsulosin (FLOMAX) 0.4 MG capsule Take 1 capsule (0.4 mg) by mouth daily   Antihemophilic Factor, Recomb, 8420-8299 UNITS SOLR Infuse recombinate 2000 units every 24 as needed for Hematuria   codeine 30 MG tablet Take 2 tablets (60 mg) by mouth every 6 hours as needed for moderate to severe pain (up to 8 tabs in 24 hours) (Patient taking differently: Take 60 mg by mouth as needed for moderate to severe pain (up to 8 tabs in 24 hours) )   lisinopril (PRINIVIL/ZESTRIL) 40 MG tablet Take 1 tablet (40 mg) by mouth daily (Patient taking differently: Take 40 mg by mouth every morning )   amLODIPine (NORVASC) 5 MG tablet Take 1 tablet (5 mg) by mouth daily (Patient taking differently: Take 5 mg by mouth every morning )     No current facility-administered medications on file prior to visit.     Allergy:     Allergies   Allergen Reactions     Aspirin      This drug inhibits platelets and is contraindicated due to hemophilia diagnosis.          Physical exam  /84 (BP Location: Left arm, Patient Position: Sitting, Cuff Size: Adult Regular)  Pulse 105  Temp 99  F (37.2  C) (Tympanic)  Resp 16  Ht 1.829 m (6' 0.01\")  Wt 73 kg (161 lb)  SpO2 96%  BMI 21.83 kg/m2  Wt Readings from Last 4 Encounters:   03/26/18 73 kg (161 lb)   03/21/18 72.6 kg (160 lb)   03/21/18 73 kg (161 lb)   03/02/18 77.1 kg (170 lb)     General: No acute distress  HEENT: Sclera anicteric. Oral " mucosa pink and moist.  No mucositis or thrush  Lymph: No lymphadenopathy in neck, supraclavicular, and axillary areas  Heart: Regular, rate, and rhythm  Lungs: Clear to ascultation bilaterally  Abdomen: Positive bowel sounds. Soft, non-distended, non-tender. No organomegaly or mass.   Extremities: no lower extremity edema. Decreased ROM of elbows and ankles bilaterally  Neuro: Cranial nerves grossly intact  Rash: none    Assessment and plan:  Orlin Alcantar is a 68 year old male with hemophilia, DM (controlled with diet), HTN, and treated Hep C has a recent diagnosis of high grade muscle invasive bladder cancer with a single right obturator lymph node. No signs of metastatic disease.     Bladder cancer, here to discuss neoadjuvant chemotherapy on clinical trial which is standard of care plus nivolumab (Opdivo). He signed the consent today and will return Wednesday to meet Dr. Espinal and get labs and EKG. He will likely get started the first week on April. ECOG 0.     Over 50% of 25 min visit was spent counseling and coordinating care    Amy Doll PA-C

## 2018-03-26 NOTE — NURSING NOTE
"Oncology Rooming Note    March 26, 2018 2:39 PM   Orlin Alcantar is a 68 year old male who presents for:    Chief Complaint   Patient presents with     Oncology Clinic Visit     Return visit related to Bladder Cancer     Initial Vitals: /84 (BP Location: Left arm, Patient Position: Sitting, Cuff Size: Adult Regular)  Pulse 105  Temp 99  F (37.2  C) (Tympanic)  Resp 16  Ht 1.829 m (6' 0.01\")  Wt 73 kg (161 lb)  SpO2 96%  BMI 21.83 kg/m2 Estimated body mass index is 21.83 kg/(m^2) as calculated from the following:    Height as of this encounter: 1.829 m (6' 0.01\").    Weight as of this encounter: 73 kg (161 lb). Body surface area is 1.93 meters squared.  No Pain (0) Comment: Data Unavailable   No LMP for male patient.  Allergies reviewed: Yes  Medications reviewed: Yes    Medications: Medication refills not needed today.  Pharmacy name entered into [employer]:    [facility] - [place], [place] - [address] [place] [address] PHARMACY - [facility] - [place], [place] - [facility] [place] PHARMACY [place], [place] - [address]    Clinical concerns: No new concerns. Provider was notified.    10 minutes for nursing intake (face to face time)     Shiela Adhikari LPN            "

## 2018-03-27 NOTE — PROGRESS NOTES
Phase II Trial of Neoadjuvant  Nivolumab with Cisplatin and Gemcitabine in Muscle-Invasive Bladder Cancer (MIBC) Patients undergoing Radical Cystectomy   [id number]  [id number]  PI and treating physician: Dr. Magalie Espinal    Writer paged by FAITH singh: possible trial patient. Writer went to clinic and met with patient and FAITH Doll.     Patient here to discuss clinical trial for nivo adjuvant [id number]. Reviewed trial aims, structure, and schedule. The subject demonstrated understanding about the trial design, study requirements, and know side-effects based on the questions asked. Patient verbalized that he would like to participate in the study. Patient signed consent prior to any study procedures.     Consents signed:  (1) Treatment consent (Oct 23, 2017 version, approved 1/5/2018)  (2) HIPAA (9/21/17 version date)    Consents scanned to chart, copy given to patient, original retained in research chart.     Initial review of case and inclusion and exclusion criteria with PI and treating physician Dr. Espinal over phone -- EKG, labs drawn, meeting with PIguillermo and baseline history collected. RN Care Coordinator Ivania smith chemo teaching for cis/gem last week. First day of treatment will be scheduled for next week. Dr. Shea and Aileen Montoya urology RN coordinator informed of signing of consent.      Eric Srivastava RN  Clinical Research Coordinator  Clinical Trials Office  [facility]  Desk Phone: [phone]  Pager: [phone]

## 2018-03-28 ENCOUNTER — ALLIED HEALTH/NURSE VISIT (OUTPATIENT)
Dept: ONCOLOGY | Facility: CLINIC | Age: 68
End: 2018-03-28
Attending: INTERNAL MEDICINE
Payer: MEDICARE

## 2018-03-28 ENCOUNTER — TELEPHONE (OUTPATIENT)
Dept: HEMATOLOGY | Facility: CLINIC | Age: 68
End: 2018-03-28

## 2018-03-28 ENCOUNTER — RESEARCH ENCOUNTER (OUTPATIENT)
Dept: ONCOLOGY | Facility: CLINIC | Age: 68
End: 2018-03-28

## 2018-03-28 ENCOUNTER — APPOINTMENT (OUTPATIENT)
Dept: LAB | Facility: CLINIC | Age: 68
End: 2018-03-28
Attending: INTERNAL MEDICINE
Payer: MEDICARE

## 2018-03-28 VITALS
TEMPERATURE: 98.3 F | BODY MASS INDEX: 21.82 KG/M2 | SYSTOLIC BLOOD PRESSURE: 123 MMHG | WEIGHT: 160.9 LBS | OXYGEN SATURATION: 99 % | DIASTOLIC BLOOD PRESSURE: 80 MMHG | RESPIRATION RATE: 16 BRPM | HEART RATE: 110 BPM

## 2018-03-28 DIAGNOSIS — C67.9 UROTHELIAL CARCINOMA OF BLADDER (H): ICD-10-CM

## 2018-03-28 DIAGNOSIS — Z00.6 EXAMINATION OF PARTICIPANT IN CLINICAL TRIAL: Primary | ICD-10-CM

## 2018-03-28 DIAGNOSIS — Z00.6 EXAMINATION OF PARTICIPANT IN CLINICAL TRIAL: ICD-10-CM

## 2018-03-28 DIAGNOSIS — D49.4 BLADDER TUMOR: ICD-10-CM

## 2018-03-28 LAB
ALBUMIN SERPL-MCNC: 3.7 G/DL (ref 3.4–5)
ALP SERPL-CCNC: 111 U/L (ref 40–150)
ALT SERPL W P-5'-P-CCNC: 16 U/L (ref 0–70)
ANION GAP SERPL CALCULATED.3IONS-SCNC: 8 MMOL/L (ref 3–14)
APTT PPP: 36 SEC (ref 22–37)
AST SERPL W P-5'-P-CCNC: 12 U/L (ref 0–45)
BASOPHILS # BLD AUTO: 0 10E9/L (ref 0–0.2)
BASOPHILS NFR BLD AUTO: 0.6 %
BILIRUB SERPL-MCNC: 0.4 MG/DL (ref 0.2–1.3)
BUN SERPL-MCNC: 13 MG/DL (ref 7–30)
CALCIUM SERPL-MCNC: 9.3 MG/DL (ref 8.5–10.1)
CHLORIDE SERPL-SCNC: 106 MMOL/L (ref 94–109)
CO2 SERPL-SCNC: 26 MMOL/L (ref 20–32)
CREAT SERPL-MCNC: 0.9 MG/DL (ref 0.66–1.25)
DIFFERENTIAL METHOD BLD: ABNORMAL
EOSINOPHIL # BLD AUTO: 0.3 10E9/L (ref 0–0.7)
EOSINOPHIL NFR BLD AUTO: 3.5 %
ERYTHROCYTE [DISTWIDTH] IN BLOOD BY AUTOMATED COUNT: 14.6 % (ref 10–15)
GFR SERPL CREATININE-BSD FRML MDRD: 83 ML/MIN/1.7M2
GLUCOSE SERPL-MCNC: 96 MG/DL (ref 70–99)
HBV CORE AB SERPL QL IA: NONREACTIVE
HBV SURFACE AG SERPL QL IA: NONREACTIVE
HCT VFR BLD AUTO: 36.2 % (ref 40–53)
HCV AB SERPL QL IA: REACTIVE
HGB BLD-MCNC: 11.2 G/DL (ref 13.3–17.7)
HIV 1+2 AB+HIV1 P24 AG SERPL QL IA: NONREACTIVE
IMM GRANULOCYTES # BLD: 0 10E9/L (ref 0–0.4)
IMM GRANULOCYTES NFR BLD: 0.3 %
INR PPP: 1.02 (ref 0.86–1.14)
LDH SERPL L TO P-CCNC: 114 U/L (ref 85–227)
LYMPHOCYTES # BLD AUTO: 0.8 10E9/L (ref 0.8–5.3)
LYMPHOCYTES NFR BLD AUTO: 11 %
MAGNESIUM SERPL-MCNC: 2 MG/DL (ref 1.6–2.3)
MCH RBC QN AUTO: 26.5 PG (ref 26.5–33)
MCHC RBC AUTO-ENTMCNC: 30.9 G/DL (ref 31.5–36.5)
MCV RBC AUTO: 86 FL (ref 78–100)
MONOCYTES # BLD AUTO: 0.5 10E9/L (ref 0–1.3)
MONOCYTES NFR BLD AUTO: 7.1 %
NEUTROPHILS # BLD AUTO: 5.5 10E9/L (ref 1.6–8.3)
NEUTROPHILS NFR BLD AUTO: 77.5 %
NRBC # BLD AUTO: 0 10*3/UL
NRBC BLD AUTO-RTO: 0 /100
PHOSPHATE SERPL-MCNC: 3.3 MG/DL (ref 2.5–4.5)
PLATELET # BLD AUTO: 271 10E9/L (ref 150–450)
POTASSIUM SERPL-SCNC: 3.9 MMOL/L (ref 3.4–5.3)
PROT SERPL-MCNC: 7.8 G/DL (ref 6.8–8.8)
RBC # BLD AUTO: 4.22 10E12/L (ref 4.4–5.9)
SODIUM SERPL-SCNC: 140 MMOL/L (ref 133–144)
TSH SERPL DL<=0.005 MIU/L-ACNC: 1.5 MU/L (ref 0.4–4)
URATE SERPL-MCNC: 5.8 MG/DL (ref 3.5–7.2)
WBC # BLD AUTO: 7.1 10E9/L (ref 4–11)

## 2018-03-28 PROCEDURE — 93010 ELECTROCARDIOGRAM REPORT: CPT | Performed by: INTERNAL MEDICINE

## 2018-03-28 PROCEDURE — 83615 LACTATE (LD) (LDH) ENZYME: CPT | Performed by: INTERNAL MEDICINE

## 2018-03-28 PROCEDURE — 83735 ASSAY OF MAGNESIUM: CPT | Performed by: INTERNAL MEDICINE

## 2018-03-28 PROCEDURE — 84100 ASSAY OF PHOSPHORUS: CPT | Performed by: INTERNAL MEDICINE

## 2018-03-28 PROCEDURE — 85610 PROTHROMBIN TIME: CPT | Performed by: INTERNAL MEDICINE

## 2018-03-28 PROCEDURE — 84443 ASSAY THYROID STIM HORMONE: CPT | Performed by: INTERNAL MEDICINE

## 2018-03-28 PROCEDURE — 36415 COLL VENOUS BLD VENIPUNCTURE: CPT

## 2018-03-28 PROCEDURE — 84550 ASSAY OF BLOOD/URIC ACID: CPT | Performed by: INTERNAL MEDICINE

## 2018-03-28 PROCEDURE — G0463 HOSPITAL OUTPT CLINIC VISIT: HCPCS | Mod: ZF

## 2018-03-28 PROCEDURE — 85025 COMPLETE CBC W/AUTO DIFF WBC: CPT | Performed by: INTERNAL MEDICINE

## 2018-03-28 PROCEDURE — 85730 THROMBOPLASTIN TIME PARTIAL: CPT | Performed by: INTERNAL MEDICINE

## 2018-03-28 PROCEDURE — 80053 COMPREHEN METABOLIC PANEL: CPT | Performed by: INTERNAL MEDICINE

## 2018-03-28 PROCEDURE — 99215 OFFICE O/P EST HI 40 MIN: CPT | Mod: ZP | Performed by: INTERNAL MEDICINE

## 2018-03-28 PROCEDURE — 86803 HEPATITIS C AB TEST: CPT | Performed by: INTERNAL MEDICINE

## 2018-03-28 PROCEDURE — 87389 HIV-1 AG W/HIV-1&-2 AB AG IA: CPT | Performed by: INTERNAL MEDICINE

## 2018-03-28 PROCEDURE — 93005 ELECTROCARDIOGRAM TRACING: CPT

## 2018-03-28 PROCEDURE — 86704 HEP B CORE ANTIBODY TOTAL: CPT | Performed by: INTERNAL MEDICINE

## 2018-03-28 PROCEDURE — 87340 HEPATITIS B SURFACE AG IA: CPT | Performed by: INTERNAL MEDICINE

## 2018-03-28 PROCEDURE — 87522 HEPATITIS C REVRS TRNSCRPJ: CPT | Performed by: INTERNAL MEDICINE

## 2018-03-28 RX ORDER — METHYLPREDNISOLONE SODIUM SUCCINATE 125 MG/2ML
125 INJECTION, POWDER, LYOPHILIZED, FOR SOLUTION INTRAMUSCULAR; INTRAVENOUS
Status: CANCELLED
Start: 2018-04-04

## 2018-03-28 RX ORDER — LORAZEPAM 2 MG/ML
0.5 INJECTION INTRAMUSCULAR EVERY 4 HOURS PRN
Status: CANCELLED
Start: 2018-04-04

## 2018-03-28 RX ORDER — EPINEPHRINE 0.3 MG/.3ML
0.3 INJECTION SUBCUTANEOUS EVERY 5 MIN PRN
Status: CANCELLED | OUTPATIENT
Start: 2018-04-04

## 2018-03-28 RX ORDER — LORAZEPAM 1 MG/1
0.5 TABLET ORAL EVERY 6 HOURS PRN
Qty: 30 TABLET | Refills: 1 | Status: SHIPPED | OUTPATIENT
Start: 2018-03-28 | End: 2018-04-04

## 2018-03-28 RX ORDER — SODIUM CHLORIDE 9 MG/ML
1000 INJECTION, SOLUTION INTRAVENOUS CONTINUOUS PRN
Status: CANCELLED
Start: 2018-04-11

## 2018-03-28 RX ORDER — EPINEPHRINE 0.3 MG/.3ML
0.3 INJECTION SUBCUTANEOUS EVERY 5 MIN PRN
Status: CANCELLED | OUTPATIENT
Start: 2018-04-11

## 2018-03-28 RX ORDER — DEXAMETHASONE 4 MG/1
4 TABLET ORAL 2 TIMES DAILY WITH MEALS
Qty: 24 TABLET | Refills: 0 | Status: SHIPPED | OUTPATIENT
Start: 2018-03-28 | End: 2018-07-25

## 2018-03-28 RX ORDER — ALBUTEROL SULFATE 0.83 MG/ML
2.5 SOLUTION RESPIRATORY (INHALATION)
Status: CANCELLED | OUTPATIENT
Start: 2018-04-04

## 2018-03-28 RX ORDER — MEPERIDINE HYDROCHLORIDE 25 MG/ML
25 INJECTION INTRAMUSCULAR; INTRAVENOUS; SUBCUTANEOUS EVERY 30 MIN PRN
Status: CANCELLED | OUTPATIENT
Start: 2018-04-04

## 2018-03-28 RX ORDER — PALONOSETRON 0.05 MG/ML
0.25 INJECTION, SOLUTION INTRAVENOUS ONCE
Status: CANCELLED
Start: 2018-04-04

## 2018-03-28 RX ORDER — LORAZEPAM 2 MG/ML
0.5 INJECTION INTRAMUSCULAR EVERY 4 HOURS PRN
Status: CANCELLED
Start: 2018-04-11

## 2018-03-28 RX ORDER — ALBUTEROL SULFATE 90 UG/1
1-2 AEROSOL, METERED RESPIRATORY (INHALATION)
Status: CANCELLED
Start: 2018-04-11

## 2018-03-28 RX ORDER — MEPERIDINE HYDROCHLORIDE 25 MG/ML
25 INJECTION INTRAMUSCULAR; INTRAVENOUS; SUBCUTANEOUS EVERY 30 MIN PRN
Status: CANCELLED | OUTPATIENT
Start: 2018-04-11

## 2018-03-28 RX ORDER — METHYLPREDNISOLONE SODIUM SUCCINATE 125 MG/2ML
125 INJECTION, POWDER, LYOPHILIZED, FOR SOLUTION INTRAMUSCULAR; INTRAVENOUS
Status: CANCELLED
Start: 2018-04-11

## 2018-03-28 RX ORDER — PROCHLORPERAZINE MALEATE 10 MG
5 TABLET ORAL EVERY 6 HOURS PRN
Qty: 30 TABLET | Refills: 1 | Status: SHIPPED | OUTPATIENT
Start: 2018-03-28 | End: 2018-07-25

## 2018-03-28 RX ORDER — ALBUTEROL SULFATE 90 UG/1
1-2 AEROSOL, METERED RESPIRATORY (INHALATION)
Status: CANCELLED
Start: 2018-04-04

## 2018-03-28 RX ORDER — SODIUM CHLORIDE 9 MG/ML
1000 INJECTION, SOLUTION INTRAVENOUS CONTINUOUS PRN
Status: CANCELLED
Start: 2018-04-04

## 2018-03-28 RX ORDER — DIPHENHYDRAMINE HYDROCHLORIDE 50 MG/ML
50 INJECTION INTRAMUSCULAR; INTRAVENOUS
Status: CANCELLED
Start: 2018-04-11

## 2018-03-28 RX ORDER — EPINEPHRINE 1 MG/ML
0.3 INJECTION, SOLUTION, CONCENTRATE INTRAVENOUS EVERY 5 MIN PRN
Status: CANCELLED | OUTPATIENT
Start: 2018-04-04

## 2018-03-28 RX ORDER — EPINEPHRINE 1 MG/ML
0.3 INJECTION, SOLUTION, CONCENTRATE INTRAVENOUS EVERY 5 MIN PRN
Status: CANCELLED | OUTPATIENT
Start: 2018-04-11

## 2018-03-28 RX ORDER — DIPHENHYDRAMINE HYDROCHLORIDE 50 MG/ML
50 INJECTION INTRAMUSCULAR; INTRAVENOUS
Status: CANCELLED
Start: 2018-04-04

## 2018-03-28 RX ORDER — ONDANSETRON 8 MG/1
8 TABLET, ORALLY DISINTEGRATING ORAL EVERY 8 HOURS PRN
Qty: 30 TABLET | Refills: 1 | Status: SHIPPED | OUTPATIENT
Start: 2018-03-28 | End: 2018-07-25

## 2018-03-28 RX ORDER — ALBUTEROL SULFATE 0.83 MG/ML
2.5 SOLUTION RESPIRATORY (INHALATION)
Status: CANCELLED | OUTPATIENT
Start: 2018-04-11

## 2018-03-28 ASSESSMENT — PAIN SCALES - GENERAL: PAINLEVEL: NO PAIN (0)

## 2018-03-28 NOTE — MR AVS SNAPSHOT
After Visit Summary   3/28/2018    Orlin Alcantar    MRN: [id number]           Patient Information     Date Of Birth          1950        Visit Information        Provider Department      3/28/2018 12:00 PM Nurse, Uc Oncology [facility]        Today's Diagnoses     Examination of participant in clinical trial    -  1       Follow-ups after your visit        Your next 10 appointments already scheduled     Mar 28, 2018 12:45 PM CDT   (Arrive by 12:30 PM)   Return Visit with Magalie Espinal MD   [facility] ([facility])    [address] [place] [address]  [address]  [facility] [address]   [phone]            Apr 04, 2018  8:00 AM CDT   M[facility] Lab Draw with [facility]ONIC LAB DRAW   [facility] ([facility])    [address] [place] [address]  [address]  [facility] [address]   [phone]            Apr 04, 2018  8:40 AM CDT   (Arrive by 8:25 AM)   Return Visit with Brianna Harris PA-C   [facility] ([facility])    [address] [place] [address]  [address]  [facility] [address]   [phone]            Apr 04, 2018  9:30 AM CDT   Infusion 360 with UC ONCOLOGY INFUSION, UC 22 ATC   [facility] ([facility])    [address] [place] [address]  [address]  [facility] [address]   [phone]            Apr 11, 2018  7:45 AM CDT   Masonic Lab Draw with [facility] MASONIC LAB DRAW   [facility] Lab Draw ([facility])    [address] [place] [address]  [address]  [facility] [address]   [phone]            Apr 11, 2018  8:15 AM CDT   (Arrive by 8:00 AM)   Return Visit with Magalie Espinal MD   [facility] ([facility])    [address] [place] [address]  [address]  [facility] [address]   [phone]            Apr 11, 2018  9:00 AM CDT   Infusion 120 with UC ONCOLOGY INFUSION, UC 25 ATC    [facility] ([facility])    [address]  [address]  [facility] [id number] [phone]            Apr 25, 2018  7:45 AM T   Masonic Lab Draw with  MASONIC LAB DRAW   [facility] Draw ([facility])    [address]  [address]  [facility] [address] [phone]              Who to contact     If you have questions or need follow up information about today's clinic visit or your schedule please contact [facility] directly at [phone].  Normal or non-critical lab and imaging results will be communicated to you by Lindsey Shellhart, letter or phone within 4 business days after the clinic has received the results. If you do not hear from us within 7 days, please contact the clinic through Lindsey Shellhart or phone. If you have a critical or abnormal lab result, we will notify you by phone as soon as possible.  Submit refill requests through [employer] or call your pharmacy and they will forward the refill request to us. Please allow 3 business days for your refill to be completed.          Additional Information About Your Visit        Lindsey Shellhart Information     [employer] gives you secure access to your electronic health record. If you see a primary care provider, you can also send messages to your care team and make appointments. If you have questions, please call your primary care clinic.  If you do not have a primary care provider, please call [phone] and they will assist you.        Care EveryWhere ID     This is your Care EveryWhere ID. This could be used by other organizations to access your [place] medical records  [id number]         Blood Pressure from Last 3 Encounters:   03/26/18 127/84   03/21/18 107/74   03/21/18 107/74    Weight from Last 3 Encounters:   03/26/18 73 kg (161 lb)   03/21/18 72.6 kg (160 lb)   03/21/18 73 kg (161 lb)              Today, you had the following     No orders found  for display         Today's Medication Changes          These changes are accurate as of 3/28/18 12:21 PM.  If you have any questions, ask your nurse or doctor.               These medicines have changed or have updated prescriptions.        Dose/Directions    amLODIPine 5 MG tablet   Commonly known as:  NORVASC   This may have changed:  when to take this   Used for:  Hypertension goal BP (blood pressure) < 140/90        Dose:  5 mg   Take 1 tablet (5 mg) by mouth daily   Quantity:  90 tablet   Refills:  3       codeine 30 MG tablet   This may have changed:  when to take this   Used for:  Hemophilic arthropathy        Dose:  60 mg   Take 2 tablets (60 mg) by mouth every 6 hours as needed for moderate to severe pain (up to 8 tabs in 24 hours)   Quantity:  168 tablet   Refills:  0       lisinopril 40 MG tablet   Commonly known as:  PRINIVIL/ZESTRIL   This may have changed:  when to take this   Used for:  Hypertension goal BP (blood pressure) < 140/90        Dose:  40 mg   Take 1 tablet (40 mg) by mouth daily   Quantity:  90 tablet   Refills:  3                Primary Care Provider Office Phone # Fax #    Katie Ramos [phone]       [address]  [facility] [address]        Equal Access to Services     ANDREW FLANAGAN AH: Hadii violette irbyo Sofaiza, waaxda luqadaha, qaybta kaalmada adeegyada, lloyd blum. So [facility] [phone].    ATENCIÓN: Si habla español, tiene a hernadez disposición servicios gratuitos de asistencia lingüística. Perry al [phone].    We comply with applicable federal civil rights laws and [place] laws. We do not discriminate on the basis of race, color, national origin, age, disability, sex, sexual orientation, or gender identity.            Thank you!     Thank you for choosing [facility]  for your care. Our goal is always to provide you with excellent care. Hearing back from our patients is one way we can continue to  improve our services. Please take a few minutes to complete the written survey that you may receive in the mail after your visit with us. Thank you!             Your Updated Medication List - Protect others around you: Learn how to safely use, store and throw away your medicines at www.disposemymeds.org.          This list is accurate as of 3/28/18 12:21 PM.  Always use your most recent med list.                   Brand Name Dispense Instructions for use Diagnosis    amLODIPine 5 MG tablet    NORVASC    90 tablet    Take 1 tablet (5 mg) by mouth daily    Hypertension goal BP (blood pressure) < 140/90       * Antihemophilic Factor (Recomb) 1022-2834 UNITS Solr     6000 each    Infuse recombinate 2000 units every 24 as needed for Hematuria    Severe hemophilia A (H), Hematuria       * KOGENATE FS 1000 UNITS Kit     46555 each    Infuse 3000 units (-5/+10%) IV every 48 hours and as needed for breakthrough bleeding    Severe hemophilia A (H), Personal history of malignant neoplasm of bladder       codeine 30 MG tablet     168 tablet    Take 2 tablets (60 mg) by mouth every 6 hours as needed for moderate to severe pain (up to 8 tabs in 24 hours)    Hemophilic arthropathy       diazepam 5 MG tablet    VALIUM    30 tablet    Take up to one tablet by mouth daily as needed for anxiety    Depressive disorder       lisinopril 40 MG tablet    PRINIVIL/ZESTRIL    90 tablet    Take 1 tablet (40 mg) by mouth daily    Hypertension goal BP (blood pressure) < 140/90       mirtazapine 45 MG tablet    REMERON    37 tablet    Take 1 tablet (45 mg) by mouth At Bedtime And may take 1/2 tablet for insomnia up to 3 times a week.    Generalized anxiety disorder       tamsulosin 0.4 MG capsule    FLOMAX    45 capsule    Take 1 capsule (0.4 mg) by mouth daily    Incomplete bladder emptying       TYLENOL PO      Take 500 mg by mouth every 4 hours as needed for mild pain or fever        * Notice:  This list has 2 medication(s) that are the same  as other medications prescribed for you. Read the directions carefully, and ask your doctor or other care provider to review them with you.

## 2018-03-28 NOTE — NURSING NOTE
Chief Complaint   Patient presents with     Blood Draw     labs drawn by venipuncture by rn.  vs taken.     Labs drawn by venipuncture by rn.  Vital signs taken.  Pt checked in to next appointment.  Ama Denton RN

## 2018-03-28 NOTE — LETTER
3/28/2018]     RE: Orlin Alcantar  [address]   [place] [address]     Dear Colleague,    Thank you for referring your patient, Orlin Alcantar, to the [facility]. Please see a copy of my visit note below.      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   3/26/18: Consented for the Nivolumab-Gemcitabine-Cisplatin study [id number]    3/28/18: Screening visit for study.     HPI:    68 year old male with PMH of hemophilia, chronic hepatis C who was seen by DR. Burns for evaluation of neoadjuvant therapy with Gemcitabine Cisplatin and Nivolumab.  I will be taking over his care while on study.     Patient has a history of hemophilia and has undergone several surgeries in right hip replacement, knee replacement. Hemophilia managed by Dr. Marley.   He reports recurrent hematuria for a year, initially attributed to kidney stones, cystoscopy in Feb 2018 revealed:  Bladder tumor, transurethral resection:   - Invasive high grade urothelial carcinoma   - Extent of invasion: Muscularis propria, multifocal   - Lymphovascular invasion: Present     PET-CT scan - which has revealed muscle invasive bladder right lateral wall of bladder and right  obturator lymphnode, 1.1 cm short axis, suspicious for metastatic disease.  He has been treated for HCV in the 1990s and has not had any complications. His LFTs have remained normal.   His hemophilia is very well controlled on current regimen of factor replacement.   He does not have any autoimmune disease and is not on immunesuppressants or steroids.   His diabetes is diet controlled and he has not required any medications.      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      Bladder tumor      Depression      DM II (diabetes mellitus, type II), controlled (H)     diet controlled     Hemophilia (H)     Type A     Hepatitis  C, chronic (H)     treated [facility] interferon/ribavirin     HTN (hypertension)           CURRENT OUTPATIENT MEDICATIONS     Current Outpatient Prescriptions   Medication Sig     dexamethasone (DECADRON) 4 MG tablet Take 1 tablet (4 mg) by mouth 2 times daily (with meals)     ondansetron (ZOFRAN-ODT) 8 MG ODT tab Take 1 tablet (8 mg) by mouth every 8 hours as needed for nausea     prochlorperazine (COMPAZINE) 10 MG tablet Take 0.5 tablets (5 mg) by mouth every 6 hours as needed for nausea or vomiting     LORazepam (ATIVAN) 1 MG tablet Take 0.5 tablets (0.5 mg) by mouth every 6 hours as needed for anxiety     Acetaminophen (TYLENOL PO) Take 500 mg by mouth every 4 hours as needed for mild pain or fever     Antihemophilic Factor, Recomb, (KOGENATE FS) 1000 UNITS KIT Infuse 3000 units (-5/+10%) IV every 48 hours and as needed for breakthrough bleeding     mirtazapine (REMERON) 45 MG tablet Take 1 tablet (45 mg) by mouth At Bedtime And may take 1/2 tablet for insomnia up to 3 times a week.     diazepam (VALIUM) 5 MG tablet Take up to one tablet by mouth daily as needed for anxiety     tamsulosin (FLOMAX) 0.4 MG capsule Take 1 capsule (0.4 mg) by mouth daily     codeine 30 MG tablet Take 2 tablets (60 mg) by mouth every 6 hours as needed for moderate to severe pain (up to 8 tabs in 24 hours) (Patient taking differently: Take 60 mg by mouth as needed for moderate to severe pain (up to 8 tabs in 24 hours) )     lisinopril (PRINIVIL/ZESTRIL) 40 MG tablet Take 1 tablet (40 mg) by mouth daily (Patient taking differently: Take 40 mg by mouth every morning )     amLODIPine (NORVASC) 5 MG tablet Take 1 tablet (5 mg) by mouth daily (Patient taking differently: Take 5 mg by mouth every morning )     Antihemophilic Factor, Recomb, 8449-6934 UNITS SOLR Infuse recombinate 2000 units every 24 as needed for Hematuria (Patient not taking: Reported on 3/28/2018)     No current facility-administered medications for this visit.          ALLERGIES      Allergies   Allergen Reactions     Aspirin      This drug inhibits platelets and is contraindicated due to hemophilia diagnosis.        CBC RESULTS:   Recent Labs   Lab Test  03/28/18   1203   WBC  7.1   RBC  4.22*   HGB  11.2*   HCT  36.2*   MCV  86   MCH  26.5   MCHC  30.9*   RDW  14.6   PLT  271     Recent Labs   Lab Test  03/28/18   1203  03/19/18   0835  02/23/18   0739   NA  140   --   138   POTASSIUM  3.9   --   4.0   CHLORIDE  106   --   106   CO2  26   --   20   ANIONGAP  8   --   12   GLC  96  117*  118*   BUN  13   --   28   CR  0.90   --   1.19   MARLA  9.3   --   8.8     TSH   Date Value Ref Range Status   03/28/2018 1.50 0.40 - 4.00 mU/L Final   Results for HIREN GREENBERG (MRN [id number]) as of 4/1/2018 14:54   Ref. Range 3/28/2018 12:03   INR Latest Ref Range: 0.86 - 1.14  1.02   PTT Latest Ref Range: 22 - 37 sec 36     IMAGING: Reviewed images and report of PET-CT: Per HPI.      ASSESSMENT/PLAN:     We discussed the role of systemic chemotherapy in muscle-invasive urothelial cancer and platinum based chemotherapy with MVAC or Gemcitabine and Cisplatin is the standard of care.   We also again discussed (previously discussed by Dr. Shea, Dr. Burns and Amy Doll).our clinical trial of Nivolumab with Stonewall-Cis for MIBC with the primary outcome of assessing pathologic respoonse rates. We reviewed the role of immunotherapy and nivolumab in particular in metastatic urothelial carcinoma and rationale of adding to standard of care Stonewall-cis.  He has been deemed a surgical candidate for radical cystectomy by DR. Shea.   Patient has already consented for the study and presents to complete screening procedures.  All the side effects of Gemcitabine and Cisplatin were discussed in detail, including but not limited to neurotoxicity, ototoxicity, nephrotoxicity, fatigue, neutropenia, anemia, thrombocytopenia, nausea, and vomiting.   All the known and potential side effects of nivolumab were discussed in  detail.   He meets all eligibility requirements and does not have any exclusionary criteria.   He understands that he will need a radical cystectomy per standard of care after completing the neoadjuvant therapy.    He also understands that immunotherapy may have some rare potential side effects, we will monitor him closely. His history of hemophilia should not have any bearing on use of immunotherapy.  We will need to verify he does not have active HCV infection.     I also reviewed that given his DM, he would have to monitor blood sugars, he is diet controlled and we will monitor closely.  He will get Gemcitabine (1000 mg/m2) day 1, 8 and Cisplatin (70 mg/m2) day 1 and nivolumab 360 mg day 8. Treatment will be repeated every 21 days for total of 4 cycles.   We will give him neulasta.   Prophylactic antiemetics will be used and we discussed the role of decadron and lorazepam.  We called in prescriptions for decadron 4 mg BID for 3 days starting day after cisplatin, lorazepam on day of cisplatin and zofran and compazine as needed.  Emend and will will be administered prior to infusion.   Patient was encouraged to keep very well hydrated.   Encouraged her to call us with any complaints of fever > 100.4, N/V etc. or any symptoms conserning for immune related AEs    Plan:   Complete screening procedures today.  RTC 4/4 to start C1d1 Tunica-Cis and see Pneny BERG  RTC 4/11 for C1d8 Tunica-Nivolumab and see me.   Will schedule 4 cycles with  Provider visits per protocol.   He will need radical cystectomy with DR. Shea within 6-8 weeks post completion of NAC.    Magalie Espinal MD  Hematology Oncology and Transplantation  [facility]

## 2018-03-28 NOTE — MR AVS SNAPSHOT
After Visit Summary   3/28/2018    Orlin Alcantar    MRN: [id number]           Patient Information     Date Of Birth          1950        Visit Information        Provider Department      3/28/2018 12:45 PM Magalie Espinal MD [facility]        Today's Diagnoses     Examination of participant in clinical trial        Bladder tumor           Follow-ups after your visit        Your next 10 appointments already scheduled     Apr 04, 2018  8:00 AM CDT   [facility] Lab Draw with [facility]IC LAB DRAW   [facility] ([facility])    [address] [place] [address]  [address]  [facility] [address]   [phone]            Apr 04, 2018  8:40 AM CDT   (Arrive by 8:25 AM)   Return Visit with Brianna Harris PA-C   [facility] ([facility])    [address] [place] [address]  [address]  [facility] [address]   [phone]            Apr 04, 2018  9:30 AM CDT   Infusion 360 with UC ONCOLOGY INFUSION, UC 22 ATC   [facility] ([facility])    [address] [place] [address]  [address]  [facility] [address]   [phone]            Apr 11, 2018  7:45 AM CDT   Masonic Lab Draw with UC MASONIC LAB DRAW   [facility] Lab Draw ([facility])    [address] [place] [address]  [address]  [facility] [address]   [phone]            Apr 11, 2018  8:15 AM CDT   (Arrive by 8:00 AM)   Return Visit with Magalie Espinal MD   [facility] ([facility])    [address] [place] [address]  [address]  [facility] [address]   [phone]            Apr 11, 2018  9:00 AM CDT   Infusion 120 with UC ONCOLOGY INFUSION, UC 25 ATC   [facility] ([facility])    [address] [place] [address]  [address]  [facility] [address]   [phone]            Apr 25, 2018  7:45 AM CDT   M[facility]ic Lab Draw with UC MASONIC LAB DRAW    [facility] Draw ([facility])    [address]  [address]  [facility] [address] [phone]            Apr 25, 2018  8:15 AM CDT   (Arrive by 8:00 AM)   Return Visit with Magalie Espinal MD   [facility] ([facility])    [address]  [address]  [facility] [place] [facility] [address] [phone]              Who to contact     If you have questions or need follow up information about today's clinic visit or your schedule please contact [facility] directly at [phone].  Normal or non-critical lab and imaging results will be communicated to you by MyChart, letter or phone within 4 business days after the clinic has received the results. If you do not hear from us within 7 days, please contact the clinic through [employer] or phone. If you have a critical or abnormal lab result, we will notify you by phone as soon as possible.  Submit refill requests through [employer] or call your pharmacy and they will forward the refill request to us. Please allow 3 business days for your refill to be completed.          Additional Information About Your Visit        MyChart Information     [employer] gives you secure access to your electronic health record. If you see a primary care provider, you can also send messages to your care team and make appointments. If you have questions, please call your primary care clinic.  If you do not have a primary care provider, please call [phone] and they will assist you.        Care EveryWhere ID     This is your Care EveryWhere ID. This could be used by other organizations to access your [place] medical records  [id number]        Your Vitals Were     Pulse Temperature Respirations Pulse Oximetry BMI (Body Mass Index)       110 98.3  F (36.8  C) (Oral) 16 99% 21.82 kg/m2        Blood Pressure from Last 3 Encounters:   03/28/18 123/80   03/26/18 127/84   03/21/18 107/74    Weight from  Last 3 Encounters:   03/28/18 73 kg (160 lb 14.4 oz)   03/26/18 73 kg (161 lb)   03/21/18 72.6 kg (160 lb)              We Performed the Following     *CBC with platelets differential     Comprehensive metabolic panel     Hepatitis B core antibody     Hepatitis B surface antigen     Hepatitis C RNA Quantitative     Hepatitis C Screen Reflex to HCV RNA Quant and Genotype     HIV Antigen Antibody Combo     INR     Lactate Dehydrogenase     Magnesium     Partial thromboplastin time     Phosphorus     TSH with free T4 reflex     Uric acid          Today's Medication Changes          These changes are accurate as of 3/28/18 11:59 PM.  If you have any questions, ask your nurse or doctor.               Start taking these medicines.        Dose/Directions    dexamethasone 4 MG tablet   Commonly known as:  DECADRON   Used for:  Bladder tumor   Started by:  Magalie Espinal MD        Dose:  4 mg   Take 1 tablet (4 mg) by mouth 2 times daily (with meals)   Quantity:  24 tablet   Refills:  0       LORazepam 1 MG tablet   Commonly known as:  ATIVAN   Used for:  Bladder tumor   Started by:  Magalie Espinal MD        Dose:  0.5 mg   Take 0.5 tablets (0.5 mg) by mouth every 6 hours as needed for anxiety   Quantity:  30 tablet   Refills:  1       ondansetron 8 MG ODT tab   Commonly known as:  ZOFRAN-ODT   Used for:  Bladder tumor   Started by:  Magalie Espinal MD        Dose:  8 mg   Take 1 tablet (8 mg) by mouth every 8 hours as needed for nausea   Quantity:  30 tablet   Refills:  1       prochlorperazine 10 MG tablet   Commonly known as:  COMPAZINE   Used for:  Bladder tumor   Started by:  Magalie Espinal MD        Dose:  5 mg   Take 0.5 tablets (5 mg) by mouth every 6 hours as needed for nausea or vomiting   Quantity:  30 tablet   Refills:  1         These medicines have changed or have updated prescriptions.        Dose/Directions    amLODIPine 5 MG tablet   Commonly known as:  NORVASC   This may have changed:  when to take this    Used for:  Hypertension goal BP (blood pressure) < 140/90        Dose:  5 mg   Take 1 tablet (5 mg) by mouth daily   Quantity:  90 tablet   Refills:  3       codeine 30 MG tablet   This may have changed:  when to take this   Used for:  Hemophilic arthropathy        Dose:  60 mg   Take 2 tablets (60 mg) by mouth every 6 hours as needed for moderate to severe pain (up to 8 tabs in 24 hours)   Quantity:  168 tablet   Refills:  0       lisinopril 40 MG tablet   Commonly known as:  PRINIVIL/ZESTRIL   This may have changed:  when to take this   Used for:  Hypertension goal BP (blood pressure) < 140/90        Dose:  40 mg   Take 1 tablet (40 mg) by mouth daily   Quantity:  90 tablet   Refills:  3            Where to get your medicines      These medications were sent to [facility] [address]  [address] [facility] [address], [facility] [address]     Phone:  [phone]     dexamethasone 4 MG tablet    ondansetron 8 MG ODT tab    prochlorperazine 10 MG tablet         Some of these will need a paper prescription and others can be bought over the counter.  Ask your nurse if you have questions.     Bring a paper prescription for each of these medications     LORazepam 1 MG tablet                Primary Care Provider Office Phone # Fax #    Katie Ramos [phone] [address]  [facility] [address]        Equal Access to Services     ANDREW FLANAGAN AH: Hadii violette owens hadasho Soomaali, waaxda luqadaha, qaybta kaalmada adepeggyyada, lloyd blum. So [facility] [phone].    ATENCIÓN: Si habla español, tiene a hernadez disposición servicios gratuitos de asistencia lingüística. Perry benites [phone].    We comply with applicable federal civil rights laws and [place] laws. We do not discriminate on the basis of race, color, national origin, age, disability, sex, sexual orientation, or gender identity.            Thank you!     Thank you for choosing [facility]  for your care. Our goal is always to provide you with excellent care. Hearing back from our patients is one way we can continue to improve our services. Please take a few minutes to complete the written survey that you may receive in the mail after your visit with us. Thank you!             Your Updated Medication List - Protect others around you: Learn how to safely use, store and throw away your medicines at www.disposemymeds.org.          This list is accurate as of 3/28/18 11:59 PM.  Always use your most recent med list.                   Brand Name Dispense Instructions for use Diagnosis    amLODIPine 5 MG tablet    NORVASC    90 tablet    Take 1 tablet (5 mg) by mouth daily    Hypertension goal BP (blood pressure) < 140/90       * Antihemophilic Factor (Recomb) 3526-7153 UNITS Solr     6000 each    Infuse recombinate 2000 units every 24 as needed for Hematuria    Severe hemophilia A (H), Hematuria       * KOGENATE FS 1000 UNITS Kit     82684 each    Infuse 3000 units (-5/+10%) IV every 48 hours and as needed for breakthrough bleeding    Severe hemophilia A (H), Personal history of malignant neoplasm of bladder       codeine 30 MG tablet     168 tablet    Take 2 tablets (60 mg) by mouth every 6 hours as needed for moderate to severe pain (up to 8 tabs in 24 hours)    Hemophilic arthropathy       dexamethasone 4 MG tablet    DECADRON    24 tablet    Take 1 tablet (4 mg) by mouth 2 times daily (with meals)    Bladder tumor       diazepam 5 MG tablet    VALIUM    30 tablet    Take up to one tablet by mouth daily as needed for anxiety    Depressive disorder       lisinopril 40 MG tablet    PRINIVIL/ZESTRIL    90 tablet    Take 1 tablet (40 mg) by mouth daily    Hypertension goal BP (blood pressure) < 140/90       LORazepam 1 MG tablet    ATIVAN    30 tablet    Take 0.5 tablets (0.5 mg) by mouth every 6 hours as needed for anxiety    Bladder tumor       mirtazapine 45 MG tablet     REMERON    37 tablet    Take 1 tablet (45 mg) by mouth At Bedtime And may take 1/2 tablet for insomnia up to 3 times a week.    Generalized anxiety disorder       ondansetron 8 MG ODT tab    ZOFRAN-ODT    30 tablet    Take 1 tablet (8 mg) by mouth every 8 hours as needed for nausea    Bladder tumor       prochlorperazine 10 MG tablet    COMPAZINE    30 tablet    Take 0.5 tablets (5 mg) by mouth every 6 hours as needed for nausea or vomiting    Bladder tumor       tamsulosin 0.4 MG capsule    FLOMAX    45 capsule    Take 1 capsule (0.4 mg) by mouth daily    Incomplete bladder emptying       TYLENOL PO      Take 500 mg by mouth every 4 hours as needed for mild pain or fever        * Notice:  This list has 2 medication(s) that are the same as other medications prescribed for you. Read the directions carefully, and ask your doctor or other care provider to review them with you.

## 2018-03-30 LAB
HCV RNA SERPL NAA+PROBE-ACNC: NORMAL [IU]/ML
HCV RNA SERPL NAA+PROBE-LOG IU: NORMAL LOG IU/ML

## 2018-03-30 NOTE — NURSING NOTE
Phase II Trial of Neoadjuvant  Nivolumab with Cisplatin and Gemcitabine in Muscle-Invasive Bladder Cancer (MIBC) Patients undergoing Radical Cystectomy   [id number]  [id number]  PI and treating physician: Dr. Magalie Espinal    Patient here to discuss clinical trial for nivo adjuvant [id number]. Reviewed inclusion and exclusion criteria with PI and treating physician Dr. Espinal. EKG, labs drawn, meeting with PI, conmed and baseline history & conmeds collected by writer. First day of treatment will be scheduled for Wednesday April 4. Dr. Shea and Aileen Montoya urology RN coordinator informed of treatment schedule. Aileen Montoya is ordering [employer].     Phone call received from Yu PERDUE ([phone], pager [phone]) at [facility] - reviewed study protocol.     Eric Srivastava RN  Clinical Research Coordinator  Clinical Trials Office  [facility]  Desk Phone: [phone]  Pager: [phone]

## 2018-04-01 NOTE — PROGRESS NOTES
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   3/26/18: Consented for the Nivolumab-Gemcitabine-Cisplatin study [id number]    3/28/18: Screening visit for study.     HPI:    68 year old male with PMH of hemophilia, chronic hepatis C who was seen by DR. Burns for evaluation of neoadjuvant therapy with Gemcitabine Cisplatin and Nivolumab.  I will be taking over his care while on study.     Patient has a history of hemophilia and has undergone several surgeries in right hip replacement, knee replacement. Hemophilia managed by Dr. Marley.   He reports recurrent hematuria for a year, initially attributed to kidney stones, cystoscopy in Feb 2018 revealed:  Bladder tumor, transurethral resection:   - Invasive high grade urothelial carcinoma   - Extent of invasion: Muscularis propria, multifocal   - Lymphovascular invasion: Present     PET-CT scan - which has revealed muscle invasive bladder right lateral wall of bladder and right  obturator lymphnode, 1.1 cm short axis, suspicious for metastatic disease.  He has been treated for HCV in the 1990s and has not had any complications. His LFTs have remained normal.   His hemophilia is very well controlled on current regimen of factor replacement.   He does not have any autoimmune disease and is not on immunesuppressants or steroids.   His diabetes is diet controlled and he has not required any medications.      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5 tablets (5 mg) by mouth every 6 hours as needed for nausea or vomiting     LORazepam (ATIVAN) 1 MG tablet Take 0.5 tablets (0.5 mg) by mouth every 6 hours as needed for anxiety     Acetaminophen (TYLENOL PO) Take 500 mg by mouth every 4 hours as needed for mild pain or fever     Antihemophilic Factor, Recomb, (KOGENATE FS) 1000 UNITS KIT Infuse 3000 units (-5/+10%) IV every 48 hours and as needed for breakthrough bleeding     mirtazapine (REMERON) 45 MG tablet Take 1 tablet (45 mg) by mouth At Bedtime And may take 1/2 tablet for insomnia up to 3 times a week.     diazepam (VALIUM) 5 MG tablet Take up to one tablet by mouth daily as needed for anxiety     tamsulosin (FLOMAX) 0.4 MG capsule Take 1 capsule (0.4 mg) by mouth daily     codeine 30 MG tablet Take 2 tablets (60 mg) by mouth every 6 hours as needed for moderate to severe pain (up to 8 tabs in 24 hours) (Patient taking differently: Take 60 mg by mouth as needed for moderate to severe pain (up to 8 tabs in 24 hours) )     lisinopril (PRINIVIL/ZESTRIL) 40 MG tablet Take 1 tablet (40 mg) by mouth daily (Patient taking differently: Take 40 mg by mouth every morning )     amLODIPine (NORVASC) 5 MG tablet Take 1 tablet (5 mg) by mouth daily (Patient taking differently: Take 5 mg by mouth every morning )     Antihemophilic Factor, Recomb, 6360-0674 UNITS SOLR Infuse recombinate 2000 units every 24 as needed for Hematuria (Patient not taking: Reported on 3/28/2018)     No current facility-administered medications for this visit.         ALLERGIES      Allergies   Allergen Reactions     Aspirin      This drug inhibits platelets and is contraindicated due to hemophilia diagnosis.        ECOG performance status of 1.   Vital signs: reviewed from chart   HEENT: No icterus, no pallor.  Moist mucous membranes. Oropharynx is clear.   NECK: Supple  LUNGS: Bilateral clear to ausculation  CARDIOVASCULAR: Regular rate and rhythm, no murmurs, gallops or rubs.   ABDOMEN: Soft, nontender and nondistended.   EXTREMITIES: No cyanosis, no clubbing, no edema.   NEUROLOGIC: No focal deficits.  CBC RESULTS:   Recent Labs   Lab Test  03/28/18   1203   WBC  7.1   RBC  4.22*   HGB  11.2*   HCT  36.2*   MCV  86   MCH  26.5   MCHC  30.9*   RDW  14.6   PLT  271     Recent Labs   Lab Test  03/28/18   1203  03/19/18   0835  02/23/18   0739   NA  140   --   138   POTASSIUM  3.9   --   4.0   CHLORIDE  106   --   106   CO2  26   --   20   ANIONGAP  8   --   12   GLC  96  117*  118*   BUN  13   --   28   CR  0.90   --   1.19   MARLA  9.3   --   8.8     TSH   Date Value Ref Range Status   03/28/2018 1.50 0.40 - 4.00 mU/L Final   Results for HIREN GREENBERG (MRN [id number]) as of 4/1/2018 14:54   Ref. Range 3/28/2018 12:03   INR Latest Ref Range: 0.86 - 1.14  1.02   PTT Latest Ref Range: 22 - 37 sec 36     IMAGING: Reviewed images and report of PET-CT: Per HPI.      ASSESSMENT/PLAN:     We discussed the role of systemic chemotherapy in muscle-invasive urothelial cancer and platinum based chemotherapy with MVAC or Gemcitabine and Cisplatin is the standard of care.   We also again discussed (previously discussed by Dr. Shea, Dr. Burns and Amy Doll).our clinical trial of Nivolumab with [place]-Cis for MIBC with the primary outcome of assessing pathologic respoonse rates. We reviewed the role of immunotherapy and nivolumab in particular in metastatic urothelial carcinoma and rationale of adding to standard of care [place]-cis.  He has been deemed a surgical candidate for radical cystectomy by DR. Shea.   Patient has already consented for the study and presents to complete screening procedures.  All the side effects of Gemcitabine and Cisplatin were discussed in detail, including but not limited to neurotoxicity, ototoxicity,  nephrotoxicity, fatigue, neutropenia, anemia, thrombocytopenia, nausea, and vomiting.   All the known and potential side effects of nivolumab were discussed in detail.   He meets all eligibility requirements and does not have any exclusionary criteria.   He understands that he will need a radical cystectomy per standard of care after completing the neoadjuvant therapy.    He also understands that immunotherapy may have some rare potential side effects, we will monitor him closely. His history of hemophilia should not have any bearing on use of immunotherapy.  We will need to verify he does not have active HCV infection.     I also reviewed that given his DM, he would have to monitor blood sugars, he is diet controlled and we will monitor closely.  He will get Gemcitabine (1000 mg/m2) day 1, 8 and Cisplatin (70 mg/m2) day 1 and nivolumab 360 mg day 8. Treatment will be repeated every 21 days for total of 4 cycles.   We will give him neulasta.   Prophylactic antiemetics will be used and we discussed the role of decadron and lorazepam.  We called in prescriptions for decadron 4 mg BID for 3 days starting day after cisplatin, lorazepam on day of cisplatin and zofran and compazine as needed.  Emend and will will be administered prior to infusion.   Patient was encouraged to keep very well hydrated.   Encouraged her to call us with any complaints of fever > 100.4, N/V etc. or any symptoms conserning for immune related AEs    Plan:   Complete screening procedures today.  RTC 4/4 to start C1d1 [place]-Cis and see Penny BERG  RTC 4/11 for C1d8 [place]-Nivolumab and see me.   Will schedule 4 cycles with  Provider visits per protocol.   He will need radical cystectomy with DR. Shea within 6-8 weeks post completion of NAC.    Magalie Espinal MD  Hematology Oncology and Transplantation  [facility]     Addendum:  His HCV results are as follows:   Ref. Range 3/28/2018 12:03   Hep B Surface Agn Latest Ref Range: NR^Nonreactive   Nonreactive   Hepatitis B Core Nidhi Latest Ref Range: NR^Nonreactive  Nonreactive   Hepatitis C Antibody Latest Ref Range: NR^Nonreactive  Reactive (A)   HIV Antigen Antibody Combo Latest Ref Range: NR^Nonreactive     Nonreactive   HCV RNA Quant IU/ml Latest Ref Range: HCVND^HCV RNA Not Detected [IU]/mL HCV RNA Not Detected   Log of HCV RNA Qt Latest Ref Range: <1.2 Log IU/mL Not Calculated

## 2018-04-02 ENCOUNTER — TELEPHONE (OUTPATIENT)
Dept: HEMATOLOGY | Facility: CLINIC | Age: 68
End: 2018-04-02

## 2018-04-02 ENCOUNTER — TELEPHONE (OUTPATIENT)
Dept: ONCOLOGY | Facility: CLINIC | Age: 68
End: 2018-04-02

## 2018-04-02 DIAGNOSIS — D66 SEVERE HEMOPHILIA A (H): ICD-10-CM

## 2018-04-02 DIAGNOSIS — Z85.51 PERSONAL HISTORY OF MALIGNANT NEOPLASM OF BLADDER: ICD-10-CM

## 2018-04-02 PROBLEM — C67.9 UROTHELIAL CARCINOMA OF BLADDER (H): Status: ACTIVE | Noted: 2018-04-02

## 2018-04-02 RX ORDER — ANTIHEMOPHILIC FACTOR (RECOMBINANT) 1000 (+/-)
KIT INTRAVENOUS
Qty: 15000 EACH | Refills: 1 | Status: SHIPPED | OUTPATIENT
Start: 2018-04-02 | End: 2018-04-04

## 2018-04-02 NOTE — TELEPHONE ENCOUNTER
"Prior Authorization Retail Medication Request    Medication/Dose: Zofran 8mg ODT  ICD code (if different than what is on RX):  Bladder Tumor (D49.4)   Previously Tried and Failed:  unk  Rationale:  unk    Insurance Name:  [facility] PART B/D  Insurance ID:  [id number]  Rejection: \"DRUG MAY BE COVERED UNDER PART B OR D  COVERAGE DETERMINATION REQUIRED  CALL [phone]\"      Ashwini Orr   II    [facility]  [address] [place] [address]  [address]  [place] [address]  [email or web address]  Phone: [phone]  Fax: [phone]      "

## 2018-04-02 NOTE — TELEPHONE ENCOUNTER
Mr. Cavanaugh is a 68 year old man with severe Hemophilia A and invasive bladder carcinoma. He plans to begin chemotherapy on 4/4/2018. He reports no bleeding on the current bleed prevention infusion of 3000 units every 48 hours. No issues with right PNT. Will continiue current prophylaxis and follow laboratory results during chemotherapy.He will call the Center with any concerns.

## 2018-04-03 ENCOUNTER — TELEPHONE (OUTPATIENT)
Dept: ONCOLOGY | Facility: CLINIC | Age: 68
End: 2018-04-03

## 2018-04-03 RX ORDER — DEXAMETHASONE 4 MG/1
8 TABLET ORAL DAILY
Qty: 6 TABLET | Refills: 3 | Status: SHIPPED | OUTPATIENT
Start: 2018-04-05 | End: 2018-04-25

## 2018-04-03 RX ORDER — LORAZEPAM 0.5 MG/1
0.5 TABLET ORAL EVERY 4 HOURS PRN
Qty: 30 TABLET | Refills: 3 | Status: SHIPPED | OUTPATIENT
Start: 2018-04-03 | End: 2018-04-04

## 2018-04-03 RX ORDER — PROCHLORPERAZINE MALEATE 10 MG
10 TABLET ORAL EVERY 6 HOURS PRN
Qty: 30 TABLET | Refills: 3 | Status: SHIPPED | OUTPATIENT
Start: 2018-04-03 | End: 2018-07-25

## 2018-04-03 NOTE — TELEPHONE ENCOUNTER
Prior Authorization Not Needed per Insurance    Medication: Zofran 8mg ODT-PA not needed  Insurance Company: [employer] [place] - Phone [phone] Fax [phone]  Expected CoPay:      Pharmacy Filling the Rx: [place], [place] - [address]  Pharmacy Notified: Yes  Patient Notified:  No    This medication does not require PA. Pharmacy will contact their help desk to get claim unstuck and will process.

## 2018-04-03 NOTE — TELEPHONE ENCOUNTER
Prior Authorization Retail Medication Request    Medication/Dose: Zofran ODT 8mg  ICD code (if different than what is on RX):    Previously Tried and Failed:    Rationale:  Prior Authorization required    Insurance Name:  [facility] [place] Part D  Insurance ID:  [id number]      Pharmacy Information (if different than what is on RX)  Name:  [facility]  Phone:  [phone]    Patient does have [employer] Part [place] as well. however, because the prescription does not have a diagnosis code that categorizes the patient as a Cancer patient, the medication is not covered under Part B. Please try for a Prior Auth for the Zofran ODT 8mg under [employer] Part D.    Thank you!  Larisa Townsend PhT  [facility] Float Department  On behalf of [facility]

## 2018-04-04 ENCOUNTER — ONCOLOGY VISIT (OUTPATIENT)
Dept: ONCOLOGY | Facility: CLINIC | Age: 68
End: 2018-04-04
Attending: PHYSICIAN ASSISTANT
Payer: MEDICARE

## 2018-04-04 ENCOUNTER — INFUSION THERAPY VISIT (OUTPATIENT)
Dept: ONCOLOGY | Facility: CLINIC | Age: 68
End: 2018-04-04
Attending: INTERNAL MEDICINE
Payer: MEDICARE

## 2018-04-04 ENCOUNTER — RESEARCH ENCOUNTER (OUTPATIENT)
Dept: ONCOLOGY | Facility: CLINIC | Age: 68
End: 2018-04-04

## 2018-04-04 VITALS
DIASTOLIC BLOOD PRESSURE: 69 MMHG | BODY MASS INDEX: 21.52 KG/M2 | RESPIRATION RATE: 16 BRPM | OXYGEN SATURATION: 98 % | HEART RATE: 125 BPM | HEIGHT: 72 IN | TEMPERATURE: 97.8 F | SYSTOLIC BLOOD PRESSURE: 109 MMHG | WEIGHT: 158.9 LBS

## 2018-04-04 DIAGNOSIS — D66 SEVERE HEMOPHILIA A (H): ICD-10-CM

## 2018-04-04 DIAGNOSIS — Z85.51 PERSONAL HISTORY OF MALIGNANT NEOPLASM OF BLADDER: ICD-10-CM

## 2018-04-04 DIAGNOSIS — C67.9 UROTHELIAL CARCINOMA OF BLADDER (H): Primary | ICD-10-CM

## 2018-04-04 LAB
ALBUMIN SERPL-MCNC: 3.9 G/DL (ref 3.4–5)
ALP SERPL-CCNC: 110 U/L (ref 40–150)
ALT SERPL W P-5'-P-CCNC: 13 U/L (ref 0–70)
ANION GAP SERPL CALCULATED.3IONS-SCNC: 8 MMOL/L (ref 3–14)
AST SERPL W P-5'-P-CCNC: 10 U/L (ref 0–45)
BASOPHILS # BLD AUTO: 0 10E9/L (ref 0–0.2)
BASOPHILS NFR BLD AUTO: 0.6 %
BILIRUB SERPL-MCNC: 0.4 MG/DL (ref 0.2–1.3)
BUN SERPL-MCNC: 18 MG/DL (ref 7–30)
CALCIUM SERPL-MCNC: 9.5 MG/DL (ref 8.5–10.1)
CHLORIDE SERPL-SCNC: 110 MMOL/L (ref 94–109)
CO2 SERPL-SCNC: 24 MMOL/L (ref 20–32)
CREAT SERPL-MCNC: 1.16 MG/DL (ref 0.66–1.25)
DIFFERENTIAL METHOD BLD: ABNORMAL
EOSINOPHIL # BLD AUTO: 0.2 10E9/L (ref 0–0.7)
EOSINOPHIL NFR BLD AUTO: 2.3 %
ERYTHROCYTE [DISTWIDTH] IN BLOOD BY AUTOMATED COUNT: 14.5 % (ref 10–15)
GFR SERPL CREATININE-BSD FRML MDRD: 63 ML/MIN/1.7M2
GLUCOSE SERPL-MCNC: 96 MG/DL (ref 70–99)
HCT VFR BLD AUTO: 36.3 % (ref 40–53)
HGB BLD-MCNC: 11.6 G/DL (ref 13.3–17.7)
IMM GRANULOCYTES # BLD: 0 10E9/L (ref 0–0.4)
IMM GRANULOCYTES NFR BLD: 0.3 %
LYMPHOCYTES # BLD AUTO: 0.9 10E9/L (ref 0.8–5.3)
LYMPHOCYTES NFR BLD AUTO: 12.9 %
MAGNESIUM SERPL-MCNC: 2.3 MG/DL (ref 1.6–2.3)
MCH RBC QN AUTO: 26.5 PG (ref 26.5–33)
MCHC RBC AUTO-ENTMCNC: 32 G/DL (ref 31.5–36.5)
MCV RBC AUTO: 83 FL (ref 78–100)
MONOCYTES # BLD AUTO: 0.5 10E9/L (ref 0–1.3)
MONOCYTES NFR BLD AUTO: 7.6 %
NEUTROPHILS # BLD AUTO: 5.3 10E9/L (ref 1.6–8.3)
NEUTROPHILS NFR BLD AUTO: 76.3 %
NRBC # BLD AUTO: 0 10*3/UL
NRBC BLD AUTO-RTO: 0 /100
PLATELET # BLD AUTO: 317 10E9/L (ref 150–450)
POTASSIUM SERPL-SCNC: 3.8 MMOL/L (ref 3.4–5.3)
PROT SERPL-MCNC: 8 G/DL (ref 6.8–8.8)
RBC # BLD AUTO: 4.38 10E12/L (ref 4.4–5.9)
SODIUM SERPL-SCNC: 142 MMOL/L (ref 133–144)
WBC # BLD AUTO: 7 10E9/L (ref 4–11)

## 2018-04-04 PROCEDURE — 25000128 H RX IP 250 OP 636: Mod: ZF | Performed by: INTERNAL MEDICINE

## 2018-04-04 PROCEDURE — 96366 THER/PROPH/DIAG IV INF ADDON: CPT

## 2018-04-04 PROCEDURE — G0463 HOSPITAL OUTPT CLINIC VISIT: HCPCS | Mod: ZF

## 2018-04-04 PROCEDURE — 96415 CHEMO IV INFUSION ADDL HR: CPT

## 2018-04-04 PROCEDURE — 96413 CHEMO IV INFUSION 1 HR: CPT

## 2018-04-04 PROCEDURE — 40000556 ZZH STATISTIC PERIPHERAL IV START W US GUIDANCE: Mod: ZF

## 2018-04-04 PROCEDURE — 80053 COMPREHEN METABOLIC PANEL: CPT | Performed by: INTERNAL MEDICINE

## 2018-04-04 PROCEDURE — 96375 TX/PRO/DX INJ NEW DRUG ADDON: CPT

## 2018-04-04 PROCEDURE — 85025 COMPLETE CBC W/AUTO DIFF WBC: CPT | Performed by: INTERNAL MEDICINE

## 2018-04-04 PROCEDURE — 99214 OFFICE O/P EST MOD 30 MIN: CPT | Mod: ZP | Performed by: PHYSICIAN ASSISTANT

## 2018-04-04 PROCEDURE — 96367 TX/PROPH/DG ADDL SEQ IV INF: CPT

## 2018-04-04 PROCEDURE — 96417 CHEMO IV INFUS EACH ADDL SEQ: CPT

## 2018-04-04 PROCEDURE — 83735 ASSAY OF MAGNESIUM: CPT | Performed by: INTERNAL MEDICINE

## 2018-04-04 RX ORDER — PALONOSETRON 0.05 MG/ML
0.25 INJECTION, SOLUTION INTRAVENOUS ONCE
Status: COMPLETED | OUTPATIENT
Start: 2018-04-04 | End: 2018-04-04

## 2018-04-04 RX ORDER — EPINEPHRINE 0.3 MG/.3ML
INJECTION SUBCUTANEOUS
Status: DISCONTINUED
Start: 2018-04-04 | End: 2018-04-04 | Stop reason: WASHOUT

## 2018-04-04 RX ORDER — ANTIHEMOPHILIC FACTOR (RECOMBINANT) 1000 (+/-)
KIT INTRAVENOUS
Qty: 15000 EACH | Refills: 1 | COMMUNITY
Start: 2018-04-04 | End: 2018-04-30

## 2018-04-04 RX ADMIN — PALONOSETRON HYDROCHLORIDE 0.25 MG: 0.25 INJECTION INTRAVENOUS at 10:22

## 2018-04-04 RX ADMIN — SODIUM CHLORIDE 1000 ML: 9 INJECTION, SOLUTION INTRAVENOUS at 10:17

## 2018-04-04 RX ADMIN — DEXAMETHASONE SODIUM PHOSPHATE 150 MG: 10 INJECTION, SOLUTION INTRAMUSCULAR; INTRAVENOUS at 10:25

## 2018-04-04 RX ADMIN — GEMCITABINE 1930 MG: 38 INJECTION, SOLUTION INTRAVENOUS at 11:23

## 2018-04-04 RX ADMIN — CISPLATIN 135 MG: 1 INJECTION, SOLUTION INTRAVENOUS at 12:12

## 2018-04-04 RX ADMIN — MAGNESIUM SULFATE HEPTAHYDRATE: 500 INJECTION, SOLUTION INTRAMUSCULAR; INTRAVENOUS at 14:10

## 2018-04-04 ASSESSMENT — PAIN SCALES - GENERAL: PAINLEVEL: NO PAIN (0)

## 2018-04-04 NOTE — TELEPHONE ENCOUNTER
Central Prior Authorization Team   Phone: [phone]      PA Initiation    Medication: Zofran ODT 8mg   Insurance Company: [employer] [place] - Phone [phone] Fax [phone]  Pharmacy Filling the Rx: [place], [place] - [address]  Filling Pharmacy Phone: [phone]  Filling Pharmacy Fax:    Start Date: 4/4/2018    PA initiated by phone as an urgent request - 72 hours for determination

## 2018-04-04 NOTE — PROGRESS NOTES
Infusion Nursing Note:  Orlin Alcantar presents for D1C1 Gemzar/Cisplatin  Met with FAITH Anderson before infusion.    Note: Patient new to oncology infusion.  New patient teaching done previously.  All medications and side effects reviewed with patient. Patient instructed to call triage with questions/concerns or if they have chills and/or temperature greater than or equal to 100.5, pt verbalized understanding of plan.         Treatment Conditions:  Lab Results   Component Value Date    HGB 11.6 04/04/2018     Lab Results   Component Value Date    WBC 7.0 04/04/2018      Lab Results   Component Value Date    ANEU 5.3 04/04/2018     Lab Results   Component Value Date     04/04/2018      Lab Results   Component Value Date     04/04/2018                   Lab Results   Component Value Date    POTASSIUM 3.8 04/04/2018           Lab Results   Component Value Date    MAG 2.3 04/04/2018            Lab Results   Component Value Date    CR 1.16 04/04/2018                   Lab Results   Component Value Date    MARLA 9.5 04/04/2018                Lab Results   Component Value Date    BILITOTAL 0.4 04/04/2018           Lab Results   Component Value Date    ALBUMIN 3.9 04/04/2018                    Lab Results   Component Value Date    ALT 13 04/04/2018           Lab Results   Component Value Date    AST 10 04/04/2018       Results reviewed, labs MET treatment parameters, ok to proceed with treatment.    Intravenous Access:  Peripheral IV placed by Oneyda Gaona RN with US guidance    Post Infusion Assessment:  Patient tolerated infusion without incident.  Blood return noted pre and post infusion.  Site patent and intact, free from redness, edema or discomfort.  No evidence of extravasations.  Access discontinued per protocol.    Discharge Plan:   Prescription refills given for Dexamethasone, Compazine, Ativan.  Patient and/or family verbalized understanding of discharge instructions and all questions  answered.  Copy of AVS reviewed with patient and/or family.  Patient will return 4/11 for next appointment.  Patient discharged in stable condition accompanied by: self.    Sri Galan RN

## 2018-04-04 NOTE — PROGRESS NOTES
Phase II Trial of Neoadjuvant  Nivolumab with Cisplatin and Gemcitabine in Muscle-Invasive Bladder Cancer (MIBC) Patients undergoing Radical Cystectomy   [id number]  [id number]  PI and treating physician: Dr. Magalie Espinal     Consent date: 3/26/18  Cycle 1, Day 1: 4/4/18    Patient here today to start treatment - cis/gem today, nivo/gem Day 8. Writer and Monica CUEVAS met with patient. Conmeds, AEs, and labs reviewed. Reviewed HCV RNA came back negative (positive Hep C antibody - known history of treated Hep C in 1999)  - okay per protocol, reviewed with PA today, discussed with Dr. Espinal during screening period. Okay to treat today.     62mL/min (original formula)   Creatine clearance by Cockgraft-Gault   Age: 68 years old  Height: 6'  Weight: 72.1 kg  Creat: 1.16    Report given to infusion RN. Patient will return to clinic next Wed for Day 8 infusion.      Performance Status     Subject name: Orlin Alcantar   Eastern Cooperative Oncology Group (ECOG) Performance Status  GRADE ECOG PERFORMANCE STATUS   0 Fully active, able to carry on all pre-disease performance without restriction   1 Restricted in physically strenuous activity but ambulatory and able to carry out work of a light   2 Ambulatory and capable of all selfcare but unable to carry out any work activities; up and about more than 50% of waking hours   3 Capable of only limited selfcare; confined to bed or chair more than 50% of waking hours   4 Completely disabled; cannot carry on any selfcare; totally confined to bed or chair   5 Dead     The patient was assessed on 4/4/18using the ECOG scale. ECOG Score: 0    Eric Srivastava RN  Clinical Research Coordinator  Clinical Trials Office  [facility]  Desk Phone: [phone]  Pager: [phone]

## 2018-04-04 NOTE — NURSING NOTE
Chief Complaint   Patient presents with     Blood Draw     labs drawn from right arm via veinpuncture and vitals taken by JEAN Le CMA April 4, 2018

## 2018-04-04 NOTE — TELEPHONE ENCOUNTER
Central Prior Authorization Team   Phone: [phone]      Insurance called with additional questions and does think this will be approved today but their fax isn't working so we will need to call and follow up tomorrow.

## 2018-04-04 NOTE — PATIENT INSTRUCTIONS
Contact Numbers  [facility] Nurse Triage: [phone]  After Hours Nurse Line:  [phone]     Please call the [facility] Triage line if you experience a temperature greater than or equal to 100.5, shaking chills, have uncontrolled nausea, vomiting and/or diarrhea, dizziness, shortness of breath, chest pain, bleeding, unexplained bruising, or if you have any other new/concerning symptoms, questions or concerns.      If it is after hours, weekends, or holidays, please call either the after hours nurse line listed above.      If you are having any concerning symptoms or wish to speak to a provider before your next infusion visit, please call your care coordinator or triage to notify them so we can adequately serve you.      If you need a refill on a narcotic prescription or other medication, please call triage before your infusion appointment.         April 2018 Sunday Monday Tuesday Wednesday Thursday Friday Saturday   1     2     3     4     [facility]    8:00 AM   (15 min.)    [facility]   [facility] Lab Draw     [facility] RETURN    8:25 AM   (50 min.)   Brianna Harris PA-C   [facility] ONC INFUSION 360    9:30 AM   (360 min.)    ONCOLOGY INFUSION   [facility] 5     6     7       8     9     10     11     [facility]    7:45 AM   (15 min.)    [facility]   [facility] Lab Draw     [facility] RETURN    8:00 AM   (30 min.)   Magalie Espinal MD   [facility] ONC INFUSION 120    9:00 AM   (120 min.)    ONCOLOGY INFUSION   [facility] 12     13     14       15     16     17     18     19     20     21       22     23     24     25     [facility]    7:45 AM   (15 min.)    [facility]   [facility] Lab Draw     [facility] RETURN    8:00 AM   (30 min.)   Magalie Espinal MD   [facility] ONC INFUSION 360    9:00 AM   (360 min.)     ONCOLOGY INFUSION   [facility] 26     27     28       29     30                                         May 2018   Ricky Monday Tuesday Wednesday Thursday Friday Saturday             1     2     [facility]    7:45 AM   (15 min.)   [facility]   [facility] Draw     [facility] RETURN    8:00 AM   (30 min.)   Magalie Espinal MD   [facility]     [facility] ONC INFUSION 120    9:00 AM   (120 min.)    ONCOLOGY INFUSION   [facility] 3     4     5       6     7     8     9     10     11     12       13     14     15     16     [facility]    8:15 AM   (15 min.)   [facility]   [facility] Lab Draw     [facility] RETURN    8:30 AM   (30 min.)   Magalie Espinal MD   [facility]    9:30 AM   (360 min.)    ONCOLOGY INFUSION   [facility] 17     18     19       20     21     22     23     [facility]    7:45 AM   (15 min.)   [facility]   [facility] Lab Draw     [facility] RETURN    8:00 AM   (30 min.)   Magalie Espinal MD   [facility]     [facility] ONC INFUSION 120    9:00 AM   (120 min.)    ONCOLOGY INFUSION   [facility]     [facility] CYSTOSCOPY    3:05 PM   (20 min.)   Bebeto Shea MD   [facility] 24     25     26       27     28     29     30     31                            Lab Results:  Recent Results (from the past 12 hour(s))   CBC with platelets differential    Collection Time: 04/04/18  7:53 AM   Result Value Ref Range    WBC 7.0 4.0 - 11.0 10e9/L    RBC Count 4.38 (L) 4.4 - 5.9 10e12/L    Hemoglobin 11.6 (L) 13.3 - 17.7 g/dL    Hematocrit 36.3 (L) 40.0 - 53.0 %    MCV 83 78 - 100 fl    MCH 26.5 26.5 - 33.0 pg    MCHC 32.0 31.5 - 36.5 g/dL    RDW 14.5 10.0 - 15.0 %    Platelet Count 317 150 - 450 10e9/L    Diff Method Automated Method     % Neutrophils 76.3 %    %  Lymphocytes 12.9 %    % Monocytes 7.6 %    % Eosinophils 2.3 %    % Basophils 0.6 %    % Immature Granulocytes 0.3 %    Nucleated RBCs 0 0 /100    Absolute Neutrophil 5.3 1.6 - 8.3 10e9/L    Absolute Lymphocytes 0.9 0.8 - 5.3 10e9/L    Absolute Monocytes 0.5 0.0 - 1.3 10e9/L    Absolute Eosinophils 0.2 0.0 - 0.7 10e9/L    Absolute Basophils 0.0 0.0 - 0.2 10e9/L    Abs Immature Granulocytes 0.0 0 - 0.4 10e9/L    Absolute Nucleated RBC 0.0    Comprehensive metabolic panel    Collection Time: 04/04/18  7:53 AM   Result Value Ref Range    Sodium 142 133 - 144 mmol/L    Potassium 3.8 3.4 - 5.3 mmol/L    Chloride 110 (H) 94 - 109 mmol/L    Carbon Dioxide 24 20 - 32 mmol/L    Anion Gap 8 3 - 14 mmol/L    Glucose 96 70 - 99 mg/dL    Urea Nitrogen 18 7 - 30 mg/dL    Creatinine 1.16 0.66 - 1.25 mg/dL    GFR Estimate 63 >60 mL/min/1.7m2    GFR Estimate If Black 76 >60 mL/min/1.7m2    Calcium 9.5 8.5 - 10.1 mg/dL    Bilirubin Total 0.4 0.2 - 1.3 mg/dL    Albumin 3.9 3.4 - 5.0 g/dL    Protein Total 8.0 6.8 - 8.8 g/dL    Alkaline Phosphatase 110 40 - 150 U/L    ALT 13 0 - 70 U/L    AST 10 0 - 45 U/L   Magnesium    Collection Time: 04/04/18  7:53 AM   Result Value Ref Range    Magnesium 2.3 1.6 - 2.3 mg/dL

## 2018-04-04 NOTE — MR AVS SNAPSHOT
After Visit Summary   4/4/2018    Orlin Alcantar    MRN: [id number]           Patient Information     Date Of Birth          1950        Visit Information        Provider Department      4/4/2018 9:30 AM  22 ATC;  ONCOLOGY INFUSION [facility]        Today's Diagnoses     Urothelial carcinoma of bladder (H)    -  1      Care Instructions    Contact Numbers  [facility] Nurse Triage: [phone]  After Hours Nurse Line:  [phone]     Please call the [facility] Triage line if you experience a temperature greater than or equal to 100.5, shaking chills, have uncontrolled nausea, vomiting and/or diarrhea, dizziness, shortness of breath, chest pain, bleeding, unexplained bruising, or if you have any other new/concerning symptoms, questions or concerns.      If it is after hours, weekends, or holidays, please call either the after hours nurse line listed above.      If you are having any concerning symptoms or wish to speak to a provider before your next infusion visit, please call your care coordinator or triage to notify them so we can adequately serve you.      If you need a refill on a narcotic prescription or other medication, please call triage before your infusion appointment.         April 2018 Sunday Monday Tuesday Wednesday Thursday Friday Saturday   1     2     3     4     [facility] DRAW    8:00 AM   (15 min.)   [facility]   [facility] Lab Draw     [facility] RETURN    8:25 AM   (50 min.)   Brianna Harris PA-C   [facility] ONC INFUSION 360    9:30 AM   (360 min.)    ONCOLOGY INFUSION   [facility] 5     6     7       8     9     10     11     [facility] DRAW    7:45 AM   (15 min.)    [facility] DRAW   [facility] Lab Draw     [facility] RETURN    8:00 AM   (30 min.)   Magalie Espinal MD   [facility] ONC INFUSION 120    9:00 AM   (120 min.)    ONCOLOGY  INFUSION   [facility] 12     13     14       15     16     17     18     19     20     21       22     23     24     25     [facility]    7:45 AM   (15 min.)   [facility]   [facility] Draw     [facility] RETURN    8:00 AM   (30 min.)   Magalie Espinal MD   [facility]     [facility] ONC INFUSION 360    9:00 AM   (360 min.)    ONCOLOGY INFUSION   [facility] 26     27     28       29     30                                         May 2018   Ricky Monday Tuesday Wednesday Thursday Friday Saturday             1     2     [facility]    7:45 AM   (15 min.)   [facility]   [facility] Draw     [facility] RETURN    8:00 AM   (30 min.)   Magalie Espinal MD   [facility]     [facility] ONC INFUSION 120    9:00 AM   (120 min.)    ONCOLOGY INFUSION   [facility] 3     4     5       6     7     8     9     10     11     12       13     14     15     16     [facility]    8:15 AM   (15 min.)    [facility]     [facility] RETURN    8:30 AM   (30 min.)   Magalie Espinal MD   [facility]     [facility] ONC INFUSION 360    9:30 AM   (360 min.)    ONCOLOGY INFUSION   [facility] 17     18     19       20     21     22     23     [facility]    7:45 AM   (15 min.)   [facility] DRAW   [facility] Draw     [facility] RETURN    8:00 AM   (30 min.)   Magalie Espinal MD   [facility]     [facility] ONC INFUSION 120    9:00 AM   (120 min.)    ONCOLOGY INFUSION   [facility]     [facility] CYSTOSCOPY    3:05 PM   (20 min.)   Bebeto Shea MD   [facility] 24     25     26       27     28     29     30     31                            Lab Results:  Recent Results (from the past 12 hour(s))   CBC with platelets differential    Collection Time: 04/04/18   7:53 AM   Result Value Ref Range    WBC 7.0 4.0 - 11.0 10e9/L    RBC Count 4.38 (L) 4.4 - 5.9 10e12/L    Hemoglobin 11.6 (L) 13.3 - 17.7 g/dL    Hematocrit 36.3 (L) 40.0 - 53.0 %    MCV 83 78 - 100 fl    MCH 26.5 26.5 - 33.0 pg    MCHC 32.0 31.5 - 36.5 g/dL    RDW 14.5 10.0 - 15.0 %    Platelet Count 317 150 - 450 10e9/L    Diff Method Automated Method     % Neutrophils 76.3 %    % Lymphocytes 12.9 %    % Monocytes 7.6 %    % Eosinophils 2.3 %    % Basophils 0.6 %    % Immature Granulocytes 0.3 %    Nucleated RBCs 0 0 /100    Absolute Neutrophil 5.3 1.6 - 8.3 10e9/L    Absolute Lymphocytes 0.9 0.8 - 5.3 10e9/L    Absolute Monocytes 0.5 0.0 - 1.3 10e9/L    Absolute Eosinophils 0.2 0.0 - 0.7 10e9/L    Absolute Basophils 0.0 0.0 - 0.2 10e9/L    Abs Immature Granulocytes 0.0 0 - 0.4 10e9/L    Absolute Nucleated RBC 0.0    Comprehensive metabolic panel    Collection Time: 04/04/18  7:53 AM   Result Value Ref Range    Sodium 142 133 - 144 mmol/L    Potassium 3.8 3.4 - 5.3 mmol/L    Chloride 110 (H) 94 - 109 mmol/L    Carbon Dioxide 24 20 - 32 mmol/L    Anion Gap 8 3 - 14 mmol/L    Glucose 96 70 - 99 mg/dL    Urea Nitrogen 18 7 - 30 mg/dL    Creatinine 1.16 0.66 - 1.25 mg/dL    GFR Estimate 63 >60 mL/min/1.7m2    GFR Estimate If Black 76 >60 mL/min/1.7m2    Calcium 9.5 8.5 - 10.1 mg/dL    Bilirubin Total 0.4 0.2 - 1.3 mg/dL    Albumin 3.9 3.4 - 5.0 g/dL    Protein Total 8.0 6.8 - 8.8 g/dL    Alkaline Phosphatase 110 40 - 150 U/L    ALT 13 0 - 70 U/L    AST 10 0 - 45 U/L   Magnesium    Collection Time: 04/04/18  7:53 AM   Result Value Ref Range    Magnesium 2.3 1.6 - 2.3 mg/dL               Follow-ups after your visit        Your next 10 appointments already scheduled     Apr 11, 2018  7:45 AM CDT   [facility] Draw with  [facility] DRAW   [facility] Draw ([facility])    [address]  [address] [place] [address]   [phone]            Apr 11, 2018  8:15 AM CDT    (Arrive by 8:00 AM)   Return Visit with Magalie Espinal MD   [facility]    [address]  [address]  [facility] [address]   [phone]            Apr 11, 2018  9:00 AM CDT   Infusion 120 with UC ONCOLOGY INFUSION, [facility] 25 ATC   [facility]    [address]  [address]  [facility] [address] [phone]            Apr 25, 2018  7:45 AM CDT   [facility] Draw with [facility] DRAW   [facility] Draw ([facility])    [address]  [address]  [facility] [address] [phone]            Apr 25, 2018  8:15 AM CDT   (Arrive by 8:00 AM)   Return Visit with Magalie Espinal MD   [facility]    [address]  [address]  [facility] [address] [phone]            Apr 25, 2018  9:00 AM CDT   Infusion 360 with UC ONCOLOGY INFUSION, UC 23 ATC   [facility]    [address]  [address]  [facility] [address] [phone]            May 02, 2018  7:45 AM CDT   [facility] Draw with [facility] DRAW   [facility] Draw ([facility])    [address]  [address]  [facility] [address] [phone]            May 02, 2018  8:15 AM CDT   (Arrive by 8:00 AM)   Return Visit with Magalie Espinal MD   [facility]    [address] [place] [address]  [address]  [facility] [address] [phone]              Who to contact     If you have questions or need follow up information about today's clinic visit or your schedule please contact [facility] directly at [phone].  Normal or non-critical lab and imaging results will be communicated to you by MyChart, letter or phone within 4 business days after  the clinic has received the results. If you do not hear from us within 7 days, please contact the clinic through [employer] or phone. If you have a critical or abnormal lab result, we will notify you by phone as soon as possible.  Submit refill requests through [employer] or call your pharmacy and they will forward the refill request to us. Please allow 3 business days for your refill to be completed.          Additional Information About Your Visit        [employer] Information     [employer] gives you secure access to your electronic health record. If you see a primary care provider, you can also send messages to your care team and make appointments. If you have questions, please call your primary care clinic.  If you do not have a primary care provider, please call [phone] and they will assist you.        Care EveryWhere ID     This is your Care EveryWhere ID. This could be used by other organizations to access your [facility] medical records  [id number]         Blood Pressure from Last 3 Encounters:   04/04/18 109/69   03/28/18 123/80   03/26/18 127/84    Weight from Last 3 Encounters:   04/04/18 72.1 kg (158 lb 14.4 oz)   03/28/18 73 kg (160 lb 14.4 oz)   03/26/18 73 kg (161 lb)              We Performed the Following     CBC with platelets differential     Comprehensive metabolic panel     Magnesium          Today's Medication Changes          These changes are accurate as of 4/4/18 11:54 AM.  If you have any questions, ask your nurse or doctor.               These medicines have changed or have updated prescriptions.        Dose/Directions    amLODIPine 5 MG tablet   Commonly known as:  NORVASC   This may have changed:  when to take this   Used for:  Hypertension goal BP (blood pressure) < 140/90        Dose:  5 mg   Take 1 tablet (5 mg) by mouth daily   Quantity:  90 tablet   Refills:  3       codeine 30 MG tablet   This may have changed:  when to take this   Used for:  Hemophilic arthropathy        Dose:  60 mg    Take 2 tablets (60 mg) by mouth every 6 hours as needed for moderate to severe pain (up to 8 tabs in 24 hours)   Quantity:  168 tablet   Refills:  0       * dexamethasone 4 MG tablet   Commonly known as:  DECADRON   This may have changed:  Another medication with the same name was added. Make sure you understand how and when to take each.   Used for:  Bladder tumor        Dose:  4 mg   Take 1 tablet (4 mg) by mouth 2 times daily (with meals)   Quantity:  24 tablet   Refills:  0       * dexamethasone 4 MG tablet   Commonly known as:  DECADRON   This may have changed:  You were already taking a medication with the same name, and this prescription was added. Make sure you understand how and when to take each.   Used for:  Urothelial carcinoma of bladder (H)        Dose:  8 mg   Start taking on:  4/5/2018   Take 2 tablets (8 mg) by mouth daily Take for 3 days, starting the day after cisplatin (Day 2).   Quantity:  6 tablet   Refills:  3       KOGENATE FS 1000 UNITS Kit   This may have changed:  additional instructions   Used for:  Severe hemophilia A (H), Personal history of malignant neoplasm of bladder   Changed by:  Brianna Harris PA-C        Infuse 3000 units alternating with 1000 units daily   Quantity:  42999 each   Refills:  1       lisinopril 40 MG tablet   Commonly known as:  PRINIVIL/ZESTRIL   This may have changed:  when to take this   Used for:  Hypertension goal BP (blood pressure) < 140/90        Dose:  40 mg   Take 1 tablet (40 mg) by mouth daily   Quantity:  90 tablet   Refills:  3       * prochlorperazine 10 MG tablet   Commonly known as:  COMPAZINE   This may have changed:  Another medication with the same name was added. Make sure you understand how and when to take each.   Used for:  Bladder tumor        Dose:  5 mg   Take 0.5 tablets (5 mg) by mouth every 6 hours as needed for nausea or vomiting   Quantity:  30 tablet   Refills:  1       * prochlorperazine 10 MG tablet   Commonly known as:   COMPAZINE   This may have changed:  You were already taking a medication with the same name, and this prescription was added. Make sure you understand how and when to take each.   Used for:  Urothelial carcinoma of bladder (H)        Dose:  10 mg   Take 1 tablet (10 mg) by mouth every 6 hours as needed (Nausea/Vomiting)   Quantity:  30 tablet   Refills:  3       * Notice:  This list has 4 medication(s) that are the same as other medications prescribed for you. Read the directions carefully, and ask your doctor or other care provider to review them with you.         Where to get your medicines      These medications were sent to [place], [place] - [address]  [address], [facility] [address]    Hours:  TRANSPLANT PHONE NUMBER [phone] Phone:  [phone]     dexamethasone 4 MG tablet    prochlorperazine 10 MG tablet                Primary Care Provider Office Phone # Fax #    Katie Ramos [phone]       [address]  [facility] [address]        Equal Access to Services     [facility]NA [facility] AH: Hadii violette owens hadasho Soomaali, waaxda luqadaha, qaybta kaalmada adeegyada, lloyd rene . So [facility] [phone].    ATENCIÓN: Si habla español, tiene a hernadez disposición servicios gratuitos de asistencia lingüística. Llame al [phone].    We comply with applicable federal civil rights laws and [place] laws. We do not discriminate on the basis of race, color, national origin, age, disability, sex, sexual orientation, or gender identity.            Thank you!     Thank you for choosing [facility]  for your care. Our goal is always to provide you with excellent care. Hearing back from our patients is one way we can continue to improve our services. Please take a few minutes to complete the written survey that you may receive in the mail after your visit with us. Thank you!              Your Updated Medication List - Protect others around you: Learn how to safely use, store and throw away your medicines at www.disposemymeds.org.          This list is accurate as of 4/4/18 11:54 AM.  Always use your most recent med list.                   Brand Name Dispense Instructions for use Diagnosis    amLODIPine 5 MG tablet    NORVASC    90 tablet    Take 1 tablet (5 mg) by mouth daily    Hypertension goal BP (blood pressure) < 140/90       ATIVAN PO      Take 0.5 mg by mouth every 6 hours as needed for anxiety        codeine 30 MG tablet     168 tablet    Take 2 tablets (60 mg) by mouth every 6 hours as needed for moderate to severe pain (up to 8 tabs in 24 hours)    Hemophilic arthropathy       * dexamethasone 4 MG tablet    DECADRON    24 tablet    Take 1 tablet (4 mg) by mouth 2 times daily (with meals)    Bladder tumor       * dexamethasone 4 MG tablet   Start taking on:  4/5/2018    DECADRON    6 tablet    Take 2 tablets (8 mg) by mouth daily Take for 3 days, starting the day after cisplatin (Day 2).    Urothelial carcinoma of bladder (H)       diazepam 5 MG tablet    VALIUM    30 tablet    Take up to one tablet by mouth daily as needed for anxiety    Depressive disorder       KOGENATE FS 1000 UNITS Kit     88351 each    Infuse 3000 units alternating with 1000 units daily    Severe hemophilia A (H), Personal history of malignant neoplasm of bladder       lisinopril 40 MG tablet    PRINIVIL/ZESTRIL    90 tablet    Take 1 tablet (40 mg) by mouth daily    Hypertension goal BP (blood pressure) < 140/90       mirtazapine 45 MG tablet    REMERON    37 tablet    Take 1 tablet (45 mg) by mouth At Bedtime And may take 1/2 tablet for insomnia up to 3 times a week.    Generalized anxiety disorder       ondansetron 8 MG ODT tab    ZOFRAN-ODT    30 tablet    Take 1 tablet (8 mg) by mouth every 8 hours as needed for nausea    Bladder tumor       * prochlorperazine 10 MG tablet    COMPAZINE    30 tablet    Take 0.5  tablets (5 mg) by mouth every 6 hours as needed for nausea or vomiting    Bladder tumor       * prochlorperazine 10 MG tablet    COMPAZINE    30 tablet    Take 1 tablet (10 mg) by mouth every 6 hours as needed (Nausea/Vomiting)    Urothelial carcinoma of bladder (H)       tamsulosin 0.4 MG capsule    FLOMAX    45 capsule    Take 1 capsule (0.4 mg) by mouth daily    Incomplete bladder emptying       TYLENOL PO      Take 500 mg by mouth every 4 hours as needed for mild pain or fever        * Notice:  This list has 4 medication(s) that are the same as other medications prescribed for you. Read the directions carefully, and ask your doctor or other care provider to review them with you.

## 2018-04-04 NOTE — PROGRESS NOTES
"[facility]CER CLINIC  FOLLOW-UP VISIT NOTE  Date of visit: Apr 4, 2018            REASON FOR VISIT: bladder CA, here for assessment prior to C1 Day 1 Cis-gem + Opdivo    HPI: 68 year old male with PMH of hemophilia, chronic hepatis C who was seen by DR. Burns for evaluation of neoadjuvant therapy with Gemcitabine Cisplatin and Nivolumab.  He will be seeing Dr Espinal while on trial.     Patient has a history of hemophilia and has undergone several surgeries in right hip replacement, knee replacement. Hemophilia managed by Dr. Marley.   He reports recurrent hematuria for a year, initially attributed to kidney stones, cystoscopy in Feb 2018 revealed: urothelial cancer with muscle involvement. PET-CT scan - which has revealed muscle invasive bladder right lateral wall of bladder and right  obturator lymphnode, 1.1 cm short axis, suspicious for metastatic disease.  He has been treated for HCV in the 1990s and has not had any complications. His LFTs have remained normal. His hemophilia is very well controlled on current regimen of factor replacement. He does not have any autoimmune disease and is not on immunesuppressants or steroids.     INTERVAL HISTORY: Orlin is here alone today ready to start chemo. He has been feeling well, no new issues.  He takes factor daily 3000 units alternating with 1000 units. He takes 1-3 codeine every 3 days or so for his joint pains.  No active hematuria. Occasionally some dysuria that is infrequent.     ROS: no recent f/s/c. No chest pain/cough/dyspnea.  No new GI issues.      EXAM:  /69 (BP Location: Left arm, Patient Position: Chair, Cuff Size: Adult Regular)  Pulse 125  Temp 97.8  F (36.6  C) (Oral)  Resp 16  Ht 1.829 m (6' 0.01\")  Wt 72.1 kg (158 lb 14.4 oz)  SpO2 98%  BMI 21.55 kg/m2  Wt Readings from Last 10 Encounters:   04/04/18 72.1 kg (158 lb 14.4 oz)   03/28/18 73 kg (160 lb 14.4 oz)   03/26/18 73 kg (161 lb)   03/21/18 72.6 kg (160 lb) "   03/21/18 73 kg (161 lb)   03/02/18 77.1 kg (170 lb)   02/23/18 73.6 kg (162 lb 3.2 oz)   02/07/18 75.1 kg (165 lb 9.6 oz)   01/19/18 76.2 kg (168 lb)   12/28/17 76.6 kg (168 lb 14.4 oz)     Vital signs were reviewed.   Patient alert and oriented x3.   PERRLA. EOMI. No scleral icterus noted. OP without thrush/sores.  Neck exam: No palpable cervical, supraclavicular or axillary nodes bilaterally.   Heart: RRR no murmurs noted.   Lungs: clear to auscultation bilaterally.  No crackles or wheezing.   Abd: positive bowel sounds in all four quadrants.  No tenderness to palpation.  No hepatomegaly.   Extremities: No lower extremity edema. Elbows stiff with mild arthritis changes noted in elbows and knees   Neuro: grossly intact.   Mood and affect is stable.     LABS:    4/4/2018 07:53   Sodium 142   Potassium 3.8   Chloride 110 (H)   Carbon Dioxide 24   Urea Nitrogen 18   Creatinine 1.16   GFR Estimate 63   GFR Estimate If Black 76   Calcium 9.5   Anion Gap 8   Magnesium 2.3   Albumin 3.9   Protein Total 8.0   Bilirubin Total 0.4   Alkaline Phosphatase 110   ALT 13   AST 10   Glucose 96   WBC 7.0   Hemoglobin 11.6 (L)   Hematocrit 36.3 (L)   Platelet Count 317   RBC Count 4.38 (L)   MCV 83       ASSESSMENT/PLAN: 68 year old with history of Hemophilia with new MIBC currently on trial [id number] with Cisplatin-Gemzar-Opdivo    ONC:  Current therapy includes Gemcitabine (1000 mg/m2) day 1, 8 and Cisplatin (70 mg/m2) day 1 and nivolumab 360 mg day 8. Treatment will be repeated every 21 days for total of 4 cycles with Neulasta support.  Currently here for C1 today. Plan for 4 cycles and then cystectomy.     --discussed anti emetics with Dex on days 1-3, Zofran 8 mg starting on day 4, compazine 10 mg prn q4-6 and ativan qhs.   --discussed hydration goals of 3L for the first week after cisplatin  --labs and visit next week for Day 8 Gemzar and Opdivo    HEME: hemophilia give himself alternating 3000 units vs 1000 units.   Hem  clinic aware we are starting chemo today and will closely monitor.     ID: no currently infections.  Initial HCV showed reactive, however his DNA quant is negative, indicating an old infection that is cleared.      Pain: taking 1-2 codiene every ~3 days for hemophilia arthritis     Sleep: taking Remeron 45 mg qhs    Coping: doing well today, takes Valium once a week for anxiety    CV: HTN controlled with Norvasc 5 mg, lisinopril 40 mg daily    Monica Harris PA-C

## 2018-04-04 NOTE — NURSING NOTE
"Oncology Rooming Note    April 4, 2018 8:51 AM   Orlin Alcantar is a 68 year old male who presents for:    Chief Complaint   Patient presents with     Blood Draw     labs drawn from right arm via veinpuncture and vitals taken by [facility]     Oncology Clinic Visit     f/u Bladder ca     Initial Vitals: /69 (BP Location: Left arm, Patient Position: Chair, Cuff Size: Adult Regular)  Pulse 125  Temp 97.8  F (36.6  C) (Oral)  Resp 16  Ht 1.829 m (6' 0.01\")  Wt 72.1 kg (158 lb 14.4 oz)  SpO2 98%  BMI 21.55 kg/m2 Estimated body mass index is 21.55 kg/(m^2) as calculated from the following:    Height as of this encounter: 1.829 m (6' 0.01\").    Weight as of this encounter: 72.1 kg (158 lb 14.4 oz). Body surface area is 1.91 meters squared.  No Pain (0) Comment: Data Unavailable   No LMP for male patient.  Allergies reviewed: Yes  Medications reviewed: Yes    Medications: Medication refills not needed today.  Pharmacy name entered into [employer]:    [facility] - [place], [place] - [address] [place] [address] PHARMACY - [place], [place] - [address]  [facility] [place] - [place], [place] - [address]    Clinical concerns: none Brianna was NOT notified.    10 minutes for nursing intake (face to face time)     RIP GARCIA LPN            "

## 2018-04-04 NOTE — MR AVS SNAPSHOT
After Visit Summary   4/4/2018    Orlin Alcantar    MRN: [id number]           Patient Information     Date Of Birth          1950        Visit Information        Provider Department      4/4/2018 8:40 AM Brianna Harris PA-C [facility]        Today's Diagnoses     Severe hemophilia A (H)        Personal history of malignant neoplasm of bladder           Follow-ups after your visit        Your next 10 appointments already scheduled     Apr 11, 2018  7:45 AM CDT   Masonic Lab Draw with [facility] MASONIC LAB DRAW   [facility] ([facility]    [address] [place] [address]  [address]  [facility] [address]   [phone]            Apr 11, 2018  8:15 AM CDT   (Arrive by 8:00 AM)   Return Visit with Magalie Espinal MD   [facility] ([facility])    [address] [place] [address]  [address]  [facility] [address]   [phone]            Apr 11, 2018  9:00 AM CDT   Infusion 120 with UC ONCOLOGY INFUSION, UC 25 ATC   [facility] ([facility])    [address] [place] [address]  [address]  [facility] [address]   [phone]            Apr 25, 2018  7:45 AM CDT   Masonic Lab Draw with UC MASONIC LAB DRAW   [facility] Masonic Lab Draw ([facility])    [address] [place] [address]  [address]  [facility] [address]   [phone]            Apr 25, 2018  8:15 AM CDT   (Arrive by 8:00 AM)   Return Visit with Magalie Espinal MD   [facility] ([facility])    [address] [place] [address]  [address]  [facility] [address]   [phone]            Apr 25, 2018  9:00 AM CDT   Infusion 360 with UC ONCOLOGY INFUSION, UC 23 ATC   [facility] ([facility])    [address] [place] [address]  [address]  [facility] [address]   [phone]            May 02, 2018  7:45 AM CDT   Masonic Lab Draw with UC  "[facility] LAB DRAW   [facility] ([facility])    [address]  [address]  [facility] [address] [phone]            May 02, 2018  8:15 AM CDT   (Arrive by 8:00 AM)   Return Visit with Magalie Espinal MD   [facility] ([facility])    [address]  [address]  [facility] [address] [phone]              Who to contact     If you have questions or need follow up information about today's clinic visit or your schedule please contact [facility] directly at [phone].  Normal or non-critical lab and imaging results will be communicated to you by [employer], letter or phone within 4 business days after the clinic has received the results. If you do not hear from us within 7 days, please contact the clinic through [employer] or phone. If you have a critical or abnormal lab result, we will notify you by phone as soon as possible.  Submit refill requests through [employer] or call your pharmacy and they will forward the refill request to us. Please allow 3 business days for your refill to be completed.          Additional Information About Your Visit        [employer] Information     [employer] gives you secure access to your electronic health record. If you see a primary care provider, you can also send messages to your care team and make appointments. If you have questions, please call your primary care clinic.  If you do not have a primary care provider, please call [phone] and they will assist you.        Care EveryWhere ID     This is your Care EveryWhere ID. This could be used by other organizations to access your [facility] medical records  [id number]        Your Vitals Were     Pulse Temperature Respirations Height Pulse Oximetry BMI (Body Mass Index)    125 97.8  F (36.6  C) (Oral) 16 1.829 m (6' 0.01\") 98% 21.55 kg/m2       Blood Pressure from Last 3 Encounters:   04/04/18 109/69   03/28/18 " 123/80   03/26/18 127/84    Weight from Last 3 Encounters:   04/04/18 72.1 kg (158 lb 14.4 oz)   03/28/18 73 kg (160 lb 14.4 oz)   03/26/18 73 kg (161 lb)              Today, you had the following     No orders found for display         Today's Medication Changes          These changes are accurate as of 4/4/18 12:42 PM.  If you have any questions, ask your nurse or doctor.               These medicines have changed or have updated prescriptions.        Dose/Directions    amLODIPine 5 MG tablet   Commonly known as:  NORVASC   This may have changed:  when to take this   Used for:  Hypertension goal BP (blood pressure) < 140/90        Dose:  5 mg   Take 1 tablet (5 mg) by mouth daily   Quantity:  90 tablet   Refills:  3       codeine 30 MG tablet   This may have changed:  when to take this   Used for:  Hemophilic arthropathy        Dose:  60 mg   Take 2 tablets (60 mg) by mouth every 6 hours as needed for moderate to severe pain (up to 8 tabs in 24 hours)   Quantity:  168 tablet   Refills:  0       * dexamethasone 4 MG tablet   Commonly known as:  DECADRON   This may have changed:  Another medication with the same name was added. Make sure you understand how and when to take each.   Used for:  Bladder tumor        Dose:  4 mg   Take 1 tablet (4 mg) by mouth 2 times daily (with meals)   Quantity:  24 tablet   Refills:  0       * dexamethasone 4 MG tablet   Commonly known as:  DECADRON   This may have changed:  You were already taking a medication with the same name, and this prescription was added. Make sure you understand how and when to take each.   Used for:  Urothelial carcinoma of bladder (H)        Dose:  8 mg   Start taking on:  4/5/2018   Take 2 tablets (8 mg) by mouth daily Take for 3 days, starting the day after cisplatin (Day 2).   Quantity:  6 tablet   Refills:  3       KOGENATE FS 1000 UNITS Kit   This may have changed:  additional instructions   Used for:  Severe hemophilia A (H), Personal history of  malignant neoplasm of bladder   Changed by:  Brianna Harris PA-C        Infuse 3000 units alternating with 1000 units daily   Quantity:  74165 each   Refills:  1       lisinopril 40 MG tablet   Commonly known as:  PRINIVIL/ZESTRIL   This may have changed:  when to take this   Used for:  Hypertension goal BP (blood pressure) < 140/90        Dose:  40 mg   Take 1 tablet (40 mg) by mouth daily   Quantity:  90 tablet   Refills:  3       * prochlorperazine 10 MG tablet   Commonly known as:  COMPAZINE   This may have changed:  Another medication with the same name was added. Make sure you understand how and when to take each.   Used for:  Bladder tumor        Dose:  5 mg   Take 0.5 tablets (5 mg) by mouth every 6 hours as needed for nausea or vomiting   Quantity:  30 tablet   Refills:  1       * prochlorperazine 10 MG tablet   Commonly known as:  COMPAZINE   This may have changed:  You were already taking a medication with the same name, and this prescription was added. Make sure you understand how and when to take each.   Used for:  Urothelial carcinoma of bladder (H)        Dose:  10 mg   Take 1 tablet (10 mg) by mouth every 6 hours as needed (Nausea/Vomiting)   Quantity:  30 tablet   Refills:  3       * Notice:  This list has 4 medication(s) that are the same as other medications prescribed for you. Read the directions carefully, and ask your doctor or other care provider to review them with you.         Where to get your medicines      These medications were sent to [facility] [place], [place] - [address]  [address] [place] [address]    Hours:  TRANSPLANT PHONE NUMBER [phone] Phone:  [phone]     dexamethasone 4 MG tablet    prochlorperazine 10 MG tablet                Primary Care Provider Office Phone # Fax #    Katie Ramos [phone] [address]  [facility] [address]        Equal Access to Services      [facility] : Hadii aad roman rin Colmenares, waaxda luqadaha, qaybta kaalmada karo, lloyd bradley haymarj sueadelsoaneesh rene . So [place] -[phone].    ATENCIÓN: Si nathanla day, tiene a hernadez disposición servicios gratuitos de asistencia lingüística. Sauravame al [phone].    We comply with applicable federal civil rights laws and [place] laws. We do not discriminate on the basis of race, color, national origin, age, disability, sex, sexual orientation, or gender identity.            Thank you!     Thank you for choosing [facility]  for your care. Our goal is always to provide you with excellent care. Hearing back from our patients is one way we can continue to improve our services. Please take a few minutes to complete the written survey that you may receive in the mail after your visit with us. Thank you!             Your Updated Medication List - Protect others around you: Learn how to safely use, store and throw away your medicines at www.disposemymeds.org.          This list is accurate as of 4/4/18 12:42 PM.  Always use your most recent med list.                   Brand Name Dispense Instructions for use Diagnosis    amLODIPine 5 MG tablet    NORVASC    90 tablet    Take 1 tablet (5 mg) by mouth daily    Hypertension goal BP (blood pressure) < 140/90       ATIVAN PO      Take 0.5 mg by mouth every 6 hours as needed for anxiety        codeine 30 MG tablet     168 tablet    Take 2 tablets (60 mg) by mouth every 6 hours as needed for moderate to severe pain (up to 8 tabs in 24 hours)    Hemophilic arthropathy       * dexamethasone 4 MG tablet    DECADRON    24 tablet    Take 1 tablet (4 mg) by mouth 2 times daily (with meals)    Bladder tumor       * dexamethasone 4 MG tablet   Start taking on:  4/5/2018    DECADRON    6 tablet    Take 2 tablets (8 mg) by mouth daily Take for 3 days, starting the day after cisplatin (Day 2).    Urothelial carcinoma of bladder (H)       diazepam 5 MG  tablet    VALIUM    30 tablet    Take up to one tablet by mouth daily as needed for anxiety    Depressive disorder       KOGENATE FS 1000 UNITS Kit     28080 each    Infuse 3000 units alternating with 1000 units daily    Severe hemophilia A (H), Personal history of malignant neoplasm of bladder       lisinopril 40 MG tablet    PRINIVIL/ZESTRIL    90 tablet    Take 1 tablet (40 mg) by mouth daily    Hypertension goal BP (blood pressure) < 140/90       mirtazapine 45 MG tablet    REMERON    37 tablet    Take 1 tablet (45 mg) by mouth At Bedtime And may take 1/2 tablet for insomnia up to 3 times a week.    Generalized anxiety disorder       ondansetron 8 MG ODT tab    ZOFRAN-ODT    30 tablet    Take 1 tablet (8 mg) by mouth every 8 hours as needed for nausea    Bladder tumor       * prochlorperazine 10 MG tablet    COMPAZINE    30 tablet    Take 0.5 tablets (5 mg) by mouth every 6 hours as needed for nausea or vomiting    Bladder tumor       * prochlorperazine 10 MG tablet    COMPAZINE    30 tablet    Take 1 tablet (10 mg) by mouth every 6 hours as needed (Nausea/Vomiting)    Urothelial carcinoma of bladder (H)       tamsulosin 0.4 MG capsule    FLOMAX    45 capsule    Take 1 capsule (0.4 mg) by mouth daily    Incomplete bladder emptying       TYLENOL PO      Take 500 mg by mouth every 4 hours as needed for mild pain or fever        * Notice:  This list has 4 medication(s) that are the same as other medications prescribed for you. Read the directions carefully, and ask your doctor or other care provider to review them with you.

## 2018-04-09 DIAGNOSIS — D66 SEVERE HEMOPHILIA A (H): Primary | ICD-10-CM

## 2018-04-09 NOTE — TELEPHONE ENCOUNTER
Central Prior Authorization Team   Phone: [phone]      Prior Authorization Approval    Authorization Effective Date: 4/4/2018  Authorization Expiration Date: 4/4/2019  Medication: Zofran ODT 8mg - Approved  Approved Dose/Quantity: 30 for 20  Insurance Company: [employer] [place] - Phone [phone] Fax [phone]  Expected CoPay:     18$  Which Pharmacy is filling the prescription (Not needed for infusion/clinic administered): [facility] [place], [place] - [address]  Pharmacy Notified: Yes  Patient Notified: Yes    Approved via follow up call. Will add approval letter once received.

## 2018-04-11 ENCOUNTER — ONCOLOGY VISIT (OUTPATIENT)
Dept: ONCOLOGY | Facility: CLINIC | Age: 68
End: 2018-04-11
Attending: INTERNAL MEDICINE
Payer: MEDICARE

## 2018-04-11 ENCOUNTER — RESEARCH ENCOUNTER (OUTPATIENT)
Dept: ONCOLOGY | Facility: CLINIC | Age: 68
End: 2018-04-11

## 2018-04-11 VITALS
OXYGEN SATURATION: 99 % | BODY MASS INDEX: 21.68 KG/M2 | RESPIRATION RATE: 18 BRPM | HEIGHT: 72 IN | SYSTOLIC BLOOD PRESSURE: 103 MMHG | DIASTOLIC BLOOD PRESSURE: 70 MMHG | HEART RATE: 116 BPM | WEIGHT: 160.1 LBS

## 2018-04-11 DIAGNOSIS — C67.9 UROTHELIAL CARCINOMA OF BLADDER (H): Primary | ICD-10-CM

## 2018-04-11 DIAGNOSIS — D66 SEVERE HEMOPHILIA A (H): ICD-10-CM

## 2018-04-11 LAB
ALBUMIN SERPL-MCNC: 3.5 G/DL (ref 3.4–5)
ALP SERPL-CCNC: 95 U/L (ref 40–150)
ALT SERPL W P-5'-P-CCNC: 50 U/L (ref 0–70)
ANION GAP SERPL CALCULATED.3IONS-SCNC: 7 MMOL/L (ref 3–14)
AST SERPL W P-5'-P-CCNC: 24 U/L (ref 0–45)
BASOPHILS # BLD AUTO: 0 10E9/L (ref 0–0.2)
BASOPHILS NFR BLD AUTO: 0.1 %
BILIRUB SERPL-MCNC: 0.5 MG/DL (ref 0.2–1.3)
BUN SERPL-MCNC: 22 MG/DL (ref 7–30)
CALCIUM SERPL-MCNC: 9.1 MG/DL (ref 8.5–10.1)
CHLORIDE SERPL-SCNC: 102 MMOL/L (ref 94–109)
CO2 SERPL-SCNC: 25 MMOL/L (ref 20–32)
CREAT SERPL-MCNC: 1.01 MG/DL (ref 0.66–1.25)
DIFFERENTIAL METHOD BLD: ABNORMAL
EOSINOPHIL # BLD AUTO: 0.1 10E9/L (ref 0–0.7)
EOSINOPHIL NFR BLD AUTO: 1 %
ERYTHROCYTE [DISTWIDTH] IN BLOOD BY AUTOMATED COUNT: 14.3 % (ref 10–15)
FACT VIII ACT/NOR PPP: 124 % (ref 55–200)
GFR SERPL CREATININE-BSD FRML MDRD: 73 ML/MIN/1.7M2
GLUCOSE SERPL-MCNC: 115 MG/DL (ref 70–99)
HCT VFR BLD AUTO: 37.3 % (ref 40–53)
HGB BLD-MCNC: 12.2 G/DL (ref 13.3–17.7)
IMM GRANULOCYTES # BLD: 0 10E9/L (ref 0–0.4)
IMM GRANULOCYTES NFR BLD: 0.5 %
LDH SERPL L TO P-CCNC: 117 U/L (ref 85–227)
LYMPHOCYTES # BLD AUTO: 0.6 10E9/L (ref 0.8–5.3)
LYMPHOCYTES NFR BLD AUTO: 7.6 %
MAGNESIUM SERPL-MCNC: 2 MG/DL (ref 1.6–2.3)
MCH RBC QN AUTO: 26.6 PG (ref 26.5–33)
MCHC RBC AUTO-ENTMCNC: 32.7 G/DL (ref 31.5–36.5)
MCV RBC AUTO: 81 FL (ref 78–100)
MONOCYTES # BLD AUTO: 0.4 10E9/L (ref 0–1.3)
MONOCYTES NFR BLD AUTO: 4.6 %
NEUTROPHILS # BLD AUTO: 6.8 10E9/L (ref 1.6–8.3)
NEUTROPHILS NFR BLD AUTO: 86.2 %
NRBC # BLD AUTO: 0 10*3/UL
NRBC BLD AUTO-RTO: 0 /100
PHOSPHATE SERPL-MCNC: 2.7 MG/DL (ref 2.5–4.5)
PLATELET # BLD AUTO: 193 10E9/L (ref 150–450)
POTASSIUM SERPL-SCNC: 3.7 MMOL/L (ref 3.4–5.3)
PROT SERPL-MCNC: 7.3 G/DL (ref 6.8–8.8)
RBC # BLD AUTO: 4.59 10E12/L (ref 4.4–5.9)
SODIUM SERPL-SCNC: 134 MMOL/L (ref 133–144)
TSH SERPL DL<=0.005 MIU/L-ACNC: 0.98 MU/L (ref 0.4–4)
URATE SERPL-MCNC: 5.1 MG/DL (ref 3.5–7.2)
WBC # BLD AUTO: 7.9 10E9/L (ref 4–11)

## 2018-04-11 PROCEDURE — 00000167 ZZHCL STATISTIC INR NC: Performed by: INTERNAL MEDICINE

## 2018-04-11 PROCEDURE — 80053 COMPREHEN METABOLIC PANEL: CPT | Performed by: INTERNAL MEDICINE

## 2018-04-11 PROCEDURE — 96375 TX/PRO/DX INJ NEW DRUG ADDON: CPT

## 2018-04-11 PROCEDURE — 83735 ASSAY OF MAGNESIUM: CPT | Performed by: INTERNAL MEDICINE

## 2018-04-11 PROCEDURE — 25000128 H RX IP 250 OP 636: Mod: ZF | Performed by: INTERNAL MEDICINE

## 2018-04-11 PROCEDURE — 84443 ASSAY THYROID STIM HORMONE: CPT | Performed by: INTERNAL MEDICINE

## 2018-04-11 PROCEDURE — 00000328 ZZHCL STATISTIC PTT NC: Performed by: INTERNAL MEDICINE

## 2018-04-11 PROCEDURE — 96377 APPLICATON ON-BODY INJECTOR: CPT | Mod: 59

## 2018-04-11 PROCEDURE — 83615 LACTATE (LD) (LDH) ENZYME: CPT | Performed by: INTERNAL MEDICINE

## 2018-04-11 PROCEDURE — 96417 CHEMO IV INFUS EACH ADDL SEQ: CPT

## 2018-04-11 PROCEDURE — 99214 OFFICE O/P EST MOD 30 MIN: CPT | Mod: ZP | Performed by: INTERNAL MEDICINE

## 2018-04-11 PROCEDURE — 85025 COMPLETE CBC W/AUTO DIFF WBC: CPT | Performed by: INTERNAL MEDICINE

## 2018-04-11 PROCEDURE — 00000401 ZZHCL STATISTIC THROMBIN TIME NC: Performed by: INTERNAL MEDICINE

## 2018-04-11 PROCEDURE — 84550 ASSAY OF BLOOD/URIC ACID: CPT | Performed by: INTERNAL MEDICINE

## 2018-04-11 PROCEDURE — 40000141 ZZH STATISTIC PERIPHERAL IV START W/O US GUIDANCE: Mod: ZF

## 2018-04-11 PROCEDURE — G0463 HOSPITAL OUTPT CLINIC VISIT: HCPCS | Mod: ZF

## 2018-04-11 PROCEDURE — 85240 CLOT FACTOR VIII AHG 1 STAGE: CPT | Performed by: INTERNAL MEDICINE

## 2018-04-11 PROCEDURE — 84100 ASSAY OF PHOSPHORUS: CPT | Performed by: INTERNAL MEDICINE

## 2018-04-11 PROCEDURE — 96413 CHEMO IV INFUSION 1 HR: CPT

## 2018-04-11 RX ORDER — ONDANSETRON 2 MG/ML
8 INJECTION INTRAMUSCULAR; INTRAVENOUS ONCE
Status: COMPLETED | OUTPATIENT
Start: 2018-04-11 | End: 2018-04-11

## 2018-04-11 RX ADMIN — GEMCITABINE 1930 MG: 38 INJECTION, SOLUTION INTRAVENOUS at 09:44

## 2018-04-11 RX ADMIN — ONDANSETRON 8 MG: 2 INJECTION INTRAMUSCULAR; INTRAVENOUS at 09:26

## 2018-04-11 RX ADMIN — PEGFILGRASTIM 6 MG: KIT SUBCUTANEOUS at 11:38

## 2018-04-11 RX ADMIN — SODIUM CHLORIDE 250 ML: 9 INJECTION, SOLUTION INTRAVENOUS at 09:26

## 2018-04-11 ASSESSMENT — PAIN SCALES - GENERAL: PAINLEVEL: NO PAIN (0)

## 2018-04-11 NOTE — PROGRESS NOTES
Phase II Trial of Neoadjuvant  Nivolumab with Cisplatin and Gemcitabine in Muscle-Invasive Bladder Cancer (MIBC) Patients undergoing Radical Cystectomy   [id number]  [id number]  PI and treating physician: Dr. Magalie Espinal     Cycle 1, Day 8. Today will be first day of nivo with gem. Patient had first dosing of cis/gem on 3/28.     Dr. Espinal met with patient and writer met with patient in infusion prior to treatment. Conmeds, AEs, and labs reviewed. Reports intermittent fatigue gr 1, starting 4/7/18. Has taken Zofran and compazine PRN prophylactically, denies nausea.    62mL/min (original formula)   Creatine clearance by Cockgraft-Gault   Age: 68 years old  Height: 6'  Weight: 72.1 kg  Creat: 1.16    Report given to infusion RN. Patient will return to clinic 4/25/18 for Cycle 2 Day 1.    Eric Srivastava RN  Clinical Research Coordinator  Clinical Trials Office  [facility]  Desk Phone: [phone]  Pager: [phone]

## 2018-04-11 NOTE — NURSING NOTE
Oncology Rooming Note    April 11, 2018 8:35 AM   Orlin Alcantar is a 68 year old male who presents for:    Chief Complaint   Patient presents with     Blood Draw     labs drawn from arm by RN     Oncology Clinic Visit     Return: Bladder CA     Initial Vitals: /70  Pulse 116  Resp 18  Ht 1.829 m (6')  Wt 72.6 kg (160 lb 1.6 oz)  SpO2 99%  BMI 21.71 kg/m2 Estimated body mass index is 21.71 kg/(m^2) as calculated from the following:    Height as of this encounter: 1.829 m (6').    Weight as of this encounter: 72.6 kg (160 lb 1.6 oz). Body surface area is 1.92 meters squared.  No Pain (0) Comment: Data Unavailable   No LMP for male patient.  Allergies reviewed: Yes  Medications reviewed: Yes    Medications: Medication refills not needed today.  Pharmacy name entered into [facility]:    [facility] - [place], [place] - [address] [place] [address] PHARMACY [place], [place] - [facility]  [facility] - [place], [place] - [address]    Clinical concerns: new concerns are that he feels lousy/queasy, patient reports not sleeping well last noc. Dr. Espinal was notified.    10 minutes for nursing intake (face to face time)     Elizabeth Norman CMA

## 2018-04-11 NOTE — PROGRESS NOTES
Infusion Nursing Note:  Orlin Alcantar presents for C1D8 Gemzar, Nivolumab (study)  Met with Dr. Espinal before infusion.    Note: Neulasta On Pro- On Body injector applied to patient today at 11:40. Writer discussed with patient when to call, and that injection would start 27 hours after application. Patient's injection scheduled to start at 2:40 on 4/12. Patient aware that Neulasta On-pro on body should have green flashing light throughout, and if it flashes red to call triage or the on-call number on discharge paperwork. Patient aware to keep electronics 4 inches away from on pro injector and to avoid showering 4 hours prior to injection. Patient given written and verbal instruction on the above and verbalized understanding.   Lot number for Neulasta On Pro: see MAR comments    Nivolumab given 45 minutes after Gemzar had finished per study orders    Treatment Conditions:  Lab Results   Component Value Date    HGB 12.2 04/11/2018     Lab Results   Component Value Date    WBC 7.9 04/11/2018      Lab Results   Component Value Date    ANEU 6.8 04/11/2018     Lab Results   Component Value Date     04/11/2018      Lab Results   Component Value Date     04/11/2018                   Lab Results   Component Value Date    POTASSIUM 3.7 04/11/2018           Lab Results   Component Value Date    MAG 2.0 04/11/2018            Lab Results   Component Value Date    CR 1.01 04/11/2018                   Lab Results   Component Value Date    MARLA 9.1 04/11/2018                Lab Results   Component Value Date    BILITOTAL 0.5 04/11/2018           Lab Results   Component Value Date    ALBUMIN 3.5 04/11/2018                    Lab Results   Component Value Date    ALT 50 04/11/2018           Lab Results   Component Value Date    AST 24 04/11/2018       Results reviewed, labs MET treatment parameters, ok to proceed with treatment.    Intravenous Access:  Peripheral IV placed by vascular access.    Post Infusion  Assessment:  Patient tolerated infusion without incident.  Blood return noted pre and post infusion.  No evidence of extravasations.  Access discontinued per protocol.    Discharge Plan:   Patient declined prescription refills.  Discharge instructions reviewed with: Patient.  Patient and/or family verbalized understanding of discharge instructions and all questions answered.  AVS to patient via [employer].  Patient will return 4/25 for next appointment.   Patient discharged in stable condition accompanied by: self.    Kerry Thomas RN

## 2018-04-11 NOTE — LETTER
4/11/2018       RE: Orlin Alcantar  [address]   [place] [address]     Dear Colleague,    Thank you for referring your patient, Orlin Alcantar, to the [facility]. Please see a copy of my visit note below.      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   3/26/18: Consented for the Nivolumab-Gemcitabine-Cisplatin study [id number]    3/28/18: Screening visit for study.   4/4/18; c1d1 gemcitabine  4/11/18: c1d8 gemcitabine-nivolumab    HPI:    68 year old male with PMH of hemophilia, chronic hepatis C who was seen by DR. Burns for evaluation of neoadjuvant therapy with Gemcitabine Cisplatin and Nivolumab.  I will be taking over his care while on study.     Patient has a history of hemophilia and has undergone several surgeries in right hip replacement, knee replacement. Hemophilia managed by Dr. Marley.   He reports recurrent hematuria for a year, initially attributed to kidney stones, cystoscopy in Feb 2018 revealed:  Bladder tumor, transurethral resection:   - Invasive high grade urothelial carcinoma   - Extent of invasion: Muscularis propria, multifocal   - Lymphovascular invasion: Present     PET-CT scan - which has revealed muscle invasive bladder right lateral wall of bladder and right  obturator lymphnode, 1.1 cm short axis, suspicious for metastatic disease.  He has been treated for HCV in the 1990s and has not had any complications. His LFTs have remained normal.   His hemophilia is very well controlled on current regimen of factor replacement.   He does not have any autoimmune disease and is not on immunesuppressants or steroids.   His diabetes is diet controlled and he has not required any medications.     Interval History:  Patient reports he was fatigued for 3 days after c1d1 then felt better.  Denies nausea, vomiting, hearing loss, neuropathy, fever, chills.     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      Bladder tumor      Depression      DM II (diabetes mellitus, type II), controlled (H)     diet controlled     Hemophilia (H)     Type A     Hepatitis C, chronic (H)     treated [facility] interferon/ribavirin     HTN (hypertension)           CURRENT OUTPATIENT MEDICATIONS     Current Outpatient Prescriptions   Medication Sig     LORazepam (ATIVAN PO) Take 0.5 mg by mouth every 6 hours as needed for anxiety     Antihemophilic Factor, Recomb, (KOGENATE FS) 1000 UNITS KIT Infuse 3000 units alternating with 1000 units daily     dexamethasone (DECADRON) 4 MG tablet Take 1 tablet (4 mg) by mouth 2 times daily (with meals)     ondansetron (ZOFRAN-ODT) 8 MG ODT tab Take 1 tablet (8 mg) by mouth every 8 hours as needed for nausea     prochlorperazine (COMPAZINE) 10 MG tablet Take 0.5 tablets (5 mg) by mouth every 6 hours as needed for nausea or vomiting     Acetaminophen (TYLENOL PO) Take 500 mg by mouth every 4 hours as needed for mild pain or fever     mirtazapine (REMERON) 45 MG tablet Take 1 tablet (45 mg) by mouth At Bedtime And may take 1/2 tablet for insomnia up to 3 times a week.     diazepam (VALIUM) 5 MG tablet Take up to one tablet by mouth daily as needed for anxiety     tamsulosin (FLOMAX) 0.4 MG capsule Take 1 capsule (0.4 mg) by mouth daily     codeine 30 MG tablet Take 2 tablets (60 mg) by mouth every 6 hours as needed for moderate to severe pain (up to 8 tabs in 24 hours) (Patient taking differently: Take 60 mg by mouth as needed for moderate to severe pain (up to 8 tabs in 24 hours) )     lisinopril (PRINIVIL/ZESTRIL) 40 MG tablet Take 1 tablet (40 mg) by mouth daily (Patient taking differently: Take 40 mg by mouth every morning )     amLODIPine (NORVASC) 5 MG tablet Take 1 tablet (5 mg) by mouth daily (Patient taking differently: Take 5 mg by mouth every morning )     prochlorperazine (COMPAZINE) 10 MG tablet Take 1 tablet (10 mg)  by mouth every 6 hours as needed (Nausea/Vomiting) (Patient not taking: Reported on 4/4/2018)     dexamethasone (DECADRON) 4 MG tablet Take 2 tablets (8 mg) by mouth daily Take for 3 days, starting the day after cisplatin (Day 2). (Patient not taking: Reported on 4/4/2018)     No current facility-administered medications for this visit.      Facility-Administered Medications Ordered in Other Visits   Medication     pegfilgrastim (NEULASTA Onpro Kit) On-body injector 6 mg     study - nivolumab (IDS# [id number]) 360 mg in sodium chloride 0.9 % 146 mL infusion        ALLERGIES      Allergies   Allergen Reactions     Aspirin      This drug inhibits platelets and is contraindicated due to hemophilia diagnosis.        CBC RESULTS:   Recent Labs   Lab Test  04/11/18   0822   WBC  7.9   RBC  4.59   HGB  12.2*   HCT  37.3*   MCV  81   MCH  26.6   MCHC  32.7   RDW  14.3   PLT  193     Recent Labs   Lab Test  04/11/18 0822 04/04/18   0753   NA  134  142   POTASSIUM  3.7  3.8   CHLORIDE  102  110*   CO2  25  24   ANIONGAP  7  8   GLC  115*  96   BUN  22  18   CR  1.01  1.16   MARLA  9.1  9.5       TSH   Date Value Ref Range Status   04/11/2018 0.98 0.40 - 4.00 mU/L Final         ASSESSMENT/PLAN:     68 year old male with T2N1(solitary node)  urothelial carcinoma,currently enrolled on the clinical trial of Nivolumab with Gemcitabine and Cisplatin for MIBC.  He has been deemed a surgical candidate for radical cystectomy by DR. Shea.   Patient tolerated C1d1 fairly well except for fatigue.    Plan:   Proceed with c1d8 gemcitabine-nivolumab today.   Will get neulasta with each cycle.  RTC per study protocol treatment schedule.  He will need radical cystectomy with DR. Shea within 6-8 weeks post completion of NAC.    DM: diet controlled, encouraged to monitor sugars closely.  Fatigue: related to chemotherapy. Discussed that this is common after chemotherapy and even immunotherapy (today he will get gemcitabine and nivolumab).  Reassured that will get better in the off week.    Magalie Espinal MD  Hematology Oncology and Transplantation  [facility]

## 2018-04-11 NOTE — PROGRESS NOTES
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   3/26/18: Consented for the Nivolumab-Gemcitabine-Cisplatin study [id number]    3/28/18: Screening visit for study.   4/4/18; c1d1 gemcitabine  4/11/18: c1d8 gemcitabine-nivolumab    HPI:    68 year old male with PMH of hemophilia, chronic hepatis C who was seen by DR. Burns for evaluation of neoadjuvant therapy with Gemcitabine Cisplatin and Nivolumab.  I will be taking over his care while on study.     Patient has a history of hemophilia and has undergone several surgeries in right hip replacement, knee replacement. Hemophilia managed by Dr. Marley.   He reports recurrent hematuria for a year, initially attributed to kidney stones, cystoscopy in Feb 2018 revealed:  Bladder tumor, transurethral resection:   - Invasive high grade urothelial carcinoma   - Extent of invasion: Muscularis propria, multifocal   - Lymphovascular invasion: Present     PET-CT scan - which has revealed muscle invasive bladder right lateral wall of bladder and right  obturator lymphnode, 1.1 cm short axis, suspicious for metastatic disease.  He has been treated for HCV in the 1990s and has not had any complications. His LFTs have remained normal.   His hemophilia is very well controlled on current regimen of factor replacement.   He does not have any autoimmune disease and is not on immunesuppressants or steroids.   His diabetes is diet controlled and he has not required any medications.     Interval History:  Patient reports he was fatigued for 3 days after c1d1 then felt better.  Denies nausea, vomiting, hearing loss, neuropathy, fever, chills.     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      Bladder tumor      Depression      DM II (diabetes mellitus, type II), controlled (H)     diet controlled     Hemophilia (H)     Type A     Hepatitis C, chronic  (H)     treated [facility] [place] interferon/ribavirin     HTN (hypertension)           CURRENT OUTPATIENT MEDICATIONS     Current Outpatient Prescriptions   Medication Sig     LORazepam (ATIVAN PO) Take 0.5 mg by mouth every 6 hours as needed for anxiety     Antihemophilic Factor, Recomb, (KOGENATE FS) 1000 UNITS KIT Infuse 3000 units alternating with 1000 units daily     dexamethasone (DECADRON) 4 MG tablet Take 1 tablet (4 mg) by mouth 2 times daily (with meals)     ondansetron (ZOFRAN-ODT) 8 MG ODT tab Take 1 tablet (8 mg) by mouth every 8 hours as needed for nausea     prochlorperazine (COMPAZINE) 10 MG tablet Take 0.5 tablets (5 mg) by mouth every 6 hours as needed for nausea or vomiting     Acetaminophen (TYLENOL PO) Take 500 mg by mouth every 4 hours as needed for mild pain or fever     mirtazapine (REMERON) 45 MG tablet Take 1 tablet (45 mg) by mouth At Bedtime And may take 1/2 tablet for insomnia up to 3 times a week.     diazepam (VALIUM) 5 MG tablet Take up to one tablet by mouth daily as needed for anxiety     tamsulosin (FLOMAX) 0.4 MG capsule Take 1 capsule (0.4 mg) by mouth daily     codeine 30 MG tablet Take 2 tablets (60 mg) by mouth every 6 hours as needed for moderate to severe pain (up to 8 tabs in 24 hours) (Patient taking differently: Take 60 mg by mouth as needed for moderate to severe pain (up to 8 tabs in 24 hours) )     lisinopril (PRINIVIL/ZESTRIL) 40 MG tablet Take 1 tablet (40 mg) by mouth daily (Patient taking differently: Take 40 mg by mouth every morning )     amLODIPine (NORVASC) 5 MG tablet Take 1 tablet (5 mg) by mouth daily (Patient taking differently: Take 5 mg by mouth every morning )     prochlorperazine (COMPAZINE) 10 MG tablet Take 1 tablet (10 mg) by mouth every 6 hours as needed (Nausea/Vomiting) (Patient not taking: Reported on 4/4/2018)     dexamethasone (DECADRON) 4 MG tablet Take 2 tablets (8 mg) by mouth daily Take for 3 days, starting the day after cisplatin (Day 2). (Patient not  taking: Reported on 4/4/2018)     No current facility-administered medications for this visit.      Facility-Administered Medications Ordered in Other Visits   Medication     pegfilgrastim (NEULASTA Onpro Kit) On-body injector 6 mg     study - nivolumab (IDS# [id number]) 360 mg in sodium chloride 0.9 % 146 mL infusion        ALLERGIES      Allergies   Allergen Reactions     Aspirin      This drug inhibits platelets and is contraindicated due to hemophilia diagnosis.        CBC RESULTS:   Recent Labs   Lab Test  04/11/18   0822   WBC  7.9   RBC  4.59   HGB  12.2*   HCT  37.3*   MCV  81   MCH  26.6   MCHC  32.7   RDW  14.3   PLT  193     Recent Labs   Lab Test  04/11/18   0822  04/04/18   0753   NA  134  142   POTASSIUM  3.7  3.8   CHLORIDE  102  110*   CO2  25  24   ANIONGAP  7  8   GLC  115*  96   BUN  22  18   CR  1.01  1.16   MARLA  9.1  9.5       TSH   Date Value Ref Range Status   04/11/2018 0.98 0.40 - 4.00 mU/L Final         ASSESSMENT/PLAN:     68 year old male with T2N1(solitary node)  urothelial carcinoma,currently enrolled on the clinical trial of Nivolumab with Gemcitabine and Cisplatin for MIBC.  He has been deemed a surgical candidate for radical cystectomy by DR. Shea.   Patient tolerated C1d1 fairly well except for fatigue.    Plan:   Proceed with c1d8 gemcitabine-nivolumab today.   Will get neulasta with each cycle.  RTC per study protocol treatment schedule.  He will need radical cystectomy with DR. Shea within 6-8 weeks post completion of NAC.    DM: diet controlled, encouraged to monitor sugars closely.  Fatigue: related to chemotherapy. Discussed that this is common after chemotherapy and even immunotherapy (today he will get gemcitabine and nivolumab). Reassured that will get better in the off week.    Magalie Espinal MD  Hematology Oncology and Transplantation  [facility]

## 2018-04-11 NOTE — MR AVS SNAPSHOT
After Visit Summary   4/11/2018    Orlin Alcantar    MRN: [id number]           Patient Information     Date Of Birth          1950        Visit Information        Provider Department      4/11/2018 8:15 AM Magalie Espinal MD [facility]        Today's Diagnoses     Urothelial carcinoma of bladder (H)    -  1       Follow-ups after your visit        Your next 10 appointments already scheduled     Apr 25, 2018  7:45 AM CDT   [facility] Lab Draw with [facility] LAB DRAW   [facility] ([facility])    [address] [place] [address]  [address]  [facility] [address]   [phone]            Apr 25, 2018  8:15 AM CDT   (Arrive by 8:00 AM)   Return Visit with Magalie Espinal MD   [facility] ([facility])    [address] [place] [address]  [address]  [facility] [address]   [phone]            Apr 25, 2018  9:00 AM CDT   Infusion 360 with UC ONCOLOGY INFUSION, UC 23 ATC   [facility] ([facility])    [address] [place] [address]  [address]  [facility] [address]   [phone]            May 02, 2018  7:45 AM CDT   [facility] Lab Draw with [facility] LAB DRAW   [facility] ([facility])    [address] [place] [address]  [address]  [facility] [address]   [phone]            May 02, 2018  8:15 AM CDT   (Arrive by 8:00 AM)   Return Visit with Magalie Espinal MD   [facility] ([facility])    [address] [place] [address]  [address]  [facility] [address]   [phone]            May 02, 2018  9:00 AM CDT   Infusion 120 with UC ONCOLOGY INFUSION, UC 25 ATC   [facility] ([facility])    [address] [place] [address]  [address]  [facility] [address]   [phone]            May 16, 2018  8:15 AM CDT   [facility] Lab Draw with [facility] LAB DRAW   [facility] (  [facility])    [address]  [address]  [facility] [id number] [phone]            May 16, 2018  8:45 AM CDT   (Arrive by 8:30 AM)   Return Visit with Magalie Espinal MD   [facility] ([facility])    [address]  [address]  [facility] [id number] [phone]              Who to contact     If you have questions or need follow up information about today's clinic visit or your schedule please contact [facility] directly at [phone].  Normal or non-critical lab and imaging results will be communicated to you by MyChart, letter or phone within 4 business days after the clinic has received the results. If you do not hear from us within 7 days, please contact the clinic through [employer] or phone. If you have a critical or abnormal lab result, we will notify you by phone as soon as possible.  Submit refill requests through [employer] or call your pharmacy and they will forward the refill request to us. Please allow 3 business days for your refill to be completed.          Additional Information About Your Visit        [employer] Information     [employer] gives you secure access to your electronic health record. If you see a primary care provider, you can also send messages to your care team and make appointments. If you have questions, please call your primary care clinic.  If you do not have a primary care provider, please call [phone] and they will assist you.        Care EveryWhere ID     This is your Care EveryWhere ID. This could be used by other organizations to access your [facility] medical records  [id number]        Your Vitals Were     Pulse Respirations Height Pulse Oximetry BMI (Body Mass Index)       116 18 1.829 m (6') 99% 21.71 kg/m2        Blood Pressure from Last 3 Encounters:   04/11/18 103/70   04/04/18 109/69   03/28/18 123/80    Weight from Last 3 Encounters:   04/11/18 72.6 kg (160 lb  1.6 oz)   04/04/18 72.1 kg (158 lb 14.4 oz)   03/28/18 73 kg (160 lb 14.4 oz)              Today, you had the following     No orders found for display         Today's Medication Changes          These changes are accurate as of 4/11/18 11:39 AM.  If you have any questions, ask your nurse or doctor.               These medicines have changed or have updated prescriptions.        Dose/Directions    amLODIPine 5 MG tablet   Commonly known as:  NORVASC   This may have changed:  when to take this   Used for:  Hypertension goal BP (blood pressure) < 140/90        Dose:  5 mg   Take 1 tablet (5 mg) by mouth daily   Quantity:  90 tablet   Refills:  3       codeine 30 MG tablet   This may have changed:  when to take this   Used for:  Hemophilic arthropathy        Dose:  60 mg   Take 2 tablets (60 mg) by mouth every 6 hours as needed for moderate to severe pain (up to 8 tabs in 24 hours)   Quantity:  168 tablet   Refills:  0       lisinopril 40 MG tablet   Commonly known as:  PRINIVIL/ZESTRIL   This may have changed:  when to take this   Used for:  Hypertension goal BP (blood pressure) < 140/90        Dose:  40 mg   Take 1 tablet (40 mg) by mouth daily   Quantity:  90 tablet   Refills:  3                Primary Care Provider Office Phone # Fax #    Katie Ramos [phone] [address]  [facility] [address]        Equal Access to Services     ANDREW FLANAGAN AH: Hadgeeta owens hadjayo Sofaiza, waaxda luqadaha, qaybta kaalmada karo, lloyd rene . So [facility] [phone].    ATENCIÓN: Si habla español, tiene a hernadez disposición servicios gratuitos de asistencia lingüística. Perry benites [phone].    We comply with applicable federal civil rights laws and [place] laws. We do not discriminate on the basis of race, color, national origin, age, disability, sex, sexual orientation, or gender identity.            Thank you!     Thank you for choosing [facility]  for your care. Our goal is always to provide you with excellent care. Hearing back from our patients is one way we can continue to improve our services. Please take a few minutes to complete the written survey that you may receive in the mail after your visit with us. Thank you!             Your Updated Medication List - Protect others around you: Learn how to safely use, store and throw away your medicines at www.disposemymeds.org.          This list is accurate as of 4/11/18 11:39 AM.  Always use your most recent med list.                   Brand Name Dispense Instructions for use Diagnosis    amLODIPine 5 MG tablet    NORVASC    90 tablet    Take 1 tablet (5 mg) by mouth daily    Hypertension goal BP (blood pressure) < 140/90       ATIVAN PO      Take 0.5 mg by mouth every 6 hours as needed for anxiety        codeine 30 MG tablet     168 tablet    Take 2 tablets (60 mg) by mouth every 6 hours as needed for moderate to severe pain (up to 8 tabs in 24 hours)    Hemophilic arthropathy       * dexamethasone 4 MG tablet    DECADRON    24 tablet    Take 1 tablet (4 mg) by mouth 2 times daily (with meals)    Bladder tumor       * dexamethasone 4 MG tablet    DECADRON    6 tablet    Take 2 tablets (8 mg) by mouth daily Take for 3 days, starting the day after cisplatin (Day 2).    Urothelial carcinoma of bladder (H)       diazepam 5 MG tablet    VALIUM    30 tablet    Take up to one tablet by mouth daily as needed for anxiety    Depressive disorder       KOGENATE FS 1000 units Kit     76768 each    Infuse 3000 units alternating with 1000 units daily    Severe hemophilia A (H), Personal history of malignant neoplasm of bladder       lisinopril 40 MG tablet    PRINIVIL/ZESTRIL    90 tablet    Take 1 tablet (40 mg) by mouth daily    Hypertension goal BP (blood pressure) < 140/90       mirtazapine 45 MG tablet    REMERON    37 tablet    Take 1 tablet (45 mg) by mouth At Bedtime And may take 1/2 tablet for insomnia up  to 3 times a week.    Generalized anxiety disorder       ondansetron 8 MG ODT tab    ZOFRAN-ODT    30 tablet    Take 1 tablet (8 mg) by mouth every 8 hours as needed for nausea    Bladder tumor       * prochlorperazine 10 MG tablet    COMPAZINE    30 tablet    Take 0.5 tablets (5 mg) by mouth every 6 hours as needed for nausea or vomiting    Bladder tumor       * prochlorperazine 10 MG tablet    COMPAZINE    30 tablet    Take 1 tablet (10 mg) by mouth every 6 hours as needed (Nausea/Vomiting)    Urothelial carcinoma of bladder (H)       tamsulosin 0.4 MG capsule    FLOMAX    45 capsule    Take 1 capsule (0.4 mg) by mouth daily    Incomplete bladder emptying       TYLENOL PO      Take 500 mg by mouth every 4 hours as needed for mild pain or fever        * Notice:  This list has 4 medication(s) that are the same as other medications prescribed for you. Read the directions carefully, and ask your doctor or other care provider to review them with you.

## 2018-04-11 NOTE — NURSING NOTE
Chief Complaint   Patient presents with     Blood Draw     labs drawn from arm by RN     Labs drawn from right arm AC.  Pt requested Vascular access to do PIV.  Patient checked in for  Provider visit.  Nicole Lagunas RN

## 2018-04-11 NOTE — MR AVS SNAPSHOT
After Visit Summary   4/11/2018    Orlin Alcantar    MRN: [id number]           Patient Information     Date Of Birth          1950        Visit Information        Provider Department      4/11/2018 9:00 AM UC 25 ATC;  ONCOLOGY INFUSION [facility]        Today's Diagnoses     Urothelial carcinoma of bladder (H)    -  1    Severe hemophilia A (H)          Care Instructions    Contact numbers:  Triage Main Line: [phone]  After hours: [phone]    Call with chills and/or temperature greater than or equal to 100.5 and questions or concerns.    If after hours, weekends, or holidays, call main hospital  at  [phone] and ask for Oncology doctor on call.           April 2018 Sunday Monday Tuesday Wednesday Thursday Friday Saturday   1     2     3     4     [facility] LAB DRAW    8:00 AM   (15 min.)    [facility]IC LAB DRAW   [facility] Lab Draw     P RETURN    8:25 AM   (50 min.)   Brianna Harris PA-C   [facility] ONC INFUSION 360    9:30 AM   (360 min.)    ONCOLOGY INFUSION   [facility] 5     6     LAB   10:15 AM   (15 min.)   SPECIMEN MGMT   [facility] [place], Lab 7       8     9     10     11     [facility] LAB DRAW    7:45 AM   (15 min.)    [facility]   [facility]     P RETURN    8:00 AM   (30 min.)   Magalie Espinal MD   [facility] ONC INFUSION 120    9:00 AM   (120 min.)    ONCOLOGY INFUSION   [facility] 12     13     14       15     16     17     18     19     20     21       22     23     24     25     P [facility] LAB DRAW    7:45 AM   (15 min.)    [facility] LAB DRAW   [facility] Lab Draw     P RETURN    8:00 AM   (30 min.)   Magalie Espinal MD   [facility] ONC INFUSION 360    9:00 AM   (360 min.)    ONCOLOGY INFUSION   [facility] 26     27     28       29     30                                          May 2018   Ricky Monday Tuesday Wednesday Thursday Friday Saturday             1     2     [facility]    7:45 AM   (15 min.)   [facility] LAB DRAW   [facility] Mason[facility] Lab Draw     UMP RETURN    8:00 AM   (30 min.)   Magalie Espinal MD   [facility]     [facility] ONC INFUSION 120    9:00 AM   (120 min.)   UC ONCOLOGY INFUSION   [facility] 3     4     5       6     7     8     9     10     11     12       13     14     15     16     [facility] LAB DRAW    8:15 AM   (15 min.)   [facility] LAB DRAW   [facility] Lab Draw     UMP RETURN    8:30 AM   (30 min.)   Magalie Espinal MD   [facility]     [facility] ONC INFUSION 360    9:30 AM   (360 min.)    ONCOLOGY INFUSION   [facility] 17     18     19       20     21     22     23     [facility] LAB DRAW    7:45 AM   (15 min.)   [facility] LAB DRAW   [facility] Lab Draw     UMP RETURN    8:00 AM   (30 min.)   Magalie Espinal MD   [facility]     [facility] ONC INFUSION 120    9:00 AM   (120 min.)    ONCOLOGY INFUSION   [facility]     UMP CYSTOSCOPY    3:05 PM   (20 min.)   Bebeto Shea MD   [facility] Urology and [facility] for Prostate and Urologic Cancers 24     25     26       27     28     29     30     31                            Lab Results:  Recent Results (from the past 12 hour(s))   CBC with platelets differential    Collection Time: 04/11/18  8:22 AM   Result Value Ref Range    WBC 7.9 4.0 - 11.0 10e9/L    RBC Count 4.59 4.4 - 5.9 10e12/L    Hemoglobin 12.2 (L) 13.3 - 17.7 g/dL    Hematocrit 37.3 (L) 40.0 - 53.0 %    MCV 81 78 - 100 fl    MCH 26.6 26.5 - 33.0 pg    MCHC 32.7 31.5 - 36.5 g/dL    RDW 14.3 10.0 - 15.0 %    Platelet Count 193 150 - 450 10e9/L    Diff Method Automated Method     % Neutrophils 86.2 %    % Lymphocytes 7.6 %    % Monocytes 4.6 %    % Eosinophils 1.0 %    % Basophils 0.1 %    %  Immature Granulocytes 0.5 %    Nucleated RBCs 0 0 /100    Absolute Neutrophil 6.8 1.6 - 8.3 10e9/L    Absolute Lymphocytes 0.6 (L) 0.8 - 5.3 10e9/L    Absolute Monocytes 0.4 0.0 - 1.3 10e9/L    Absolute Eosinophils 0.1 0.0 - 0.7 10e9/L    Absolute Basophils 0.0 0.0 - 0.2 10e9/L    Abs Immature Granulocytes 0.0 0 - 0.4 10e9/L    Absolute Nucleated RBC 0.0    Comprehensive metabolic panel    Collection Time: 04/11/18  8:22 AM   Result Value Ref Range    Sodium 134 133 - 144 mmol/L    Potassium 3.7 3.4 - 5.3 mmol/L    Chloride 102 94 - 109 mmol/L    Carbon Dioxide 25 20 - 32 mmol/L    Anion Gap 7 3 - 14 mmol/L    Glucose 115 (H) 70 - 99 mg/dL    Urea Nitrogen 22 7 - 30 mg/dL    Creatinine 1.01 0.66 - 1.25 mg/dL    GFR Estimate 73 >60 mL/min/1.7m2    GFR Estimate If Black 89 >60 mL/min/1.7m2    Calcium 9.1 8.5 - 10.1 mg/dL    Bilirubin Total 0.5 0.2 - 1.3 mg/dL    Albumin 3.5 3.4 - 5.0 g/dL    Protein Total 7.3 6.8 - 8.8 g/dL    Alkaline Phosphatase 95 40 - 150 U/L    ALT 50 0 - 70 U/L    AST 24 0 - 45 U/L   Magnesium    Collection Time: 04/11/18  8:22 AM   Result Value Ref Range    Magnesium 2.0 1.6 - 2.3 mg/dL   Phosphorus    Collection Time: 04/11/18  8:22 AM   Result Value Ref Range    Phosphorus 2.7 2.5 - 4.5 mg/dL   Lactate Dehydrogenase    Collection Time: 04/11/18  8:22 AM   Result Value Ref Range    Lactate Dehydrogenase 117 85 - 227 U/L   Uric acid    Collection Time: 04/11/18  8:22 AM   Result Value Ref Range    Uric Acid 5.1 3.5 - 7.2 mg/dL   TSH    Collection Time: 04/11/18  8:22 AM   Result Value Ref Range    TSH 0.98 0.40 - 4.00 mU/L               Follow-ups after your visit        Your next 10 appointments already scheduled     Apr 25, 2018  7:45 AM CDT   [facility] Lab Draw with  M[facility]IC LAB DRAW   [facility] Masonic Lab Draw ([facility])    [address]  [address]  [facility] [address] [phone]            Apr 25, 2018  8:15 AM CDT   (Arrive by 8:00 AM)   Return  Visit with Magalie Espinal MD   [facility] ([facility])    [address]  [address]  [facility] [address]   [phone]            Apr 25, 2018  9:00 AM CDT   Infusion 360 with UC ONCOLOGY INFUSION, UC 23 ATC   [facility] ([facility])    [address]  [address]  [place] [facility] [address]   [phone]            May 02, 2018  7:45 AM CDT   [facility] Lab Draw with UC MASONIC LAB DRAW   [facility] Masonic Lab Draw ([facility])    [address]  [address]  [place] [facility] [address]   [phone]            May 02, 2018  8:15 AM CDT   (Arrive by 8:00 AM)   Return Visit with Magalie Espinal MD   [facility] ([facility])    [address]  [address]  [place] [address] [phone]            May 02, 2018  9:00 AM CDT   Infusion 120 with UC ONCOLOGY INFUSION, UC 25 ATC   [facility] ([facility])    [address]  [address]  [place] [facility] [address]   [phone]            May 16, 2018  8:15 AM CDT   [facility] Lab Draw with UC MASONIC LAB DRAW   [facility] Masonic Lab Draw ([facility])    [address]  [address]  [place] [address]   [phone]            May 16, 2018  8:45 AM CDT   (Arrive by 8:30 AM)   Return Visit with Magaile Espinal MD   [facility] ([facility])    [address] [place] [address]  [address]  [facility] [address] [phone]              Who to contact     If you have questions or need follow up information about today's clinic visit or your schedule please contact [facility] directly at [phone].  Normal or non-critical lab and imaging results will be communicated to you by MyChart, letter or phone within 4 business days after the clinic has received the  results. If you do not hear from us within 7 days, please contact the clinic through [employer] or phone. If you have a critical or abnormal lab result, we will notify you by phone as soon as possible.  Submit refill requests through [employer] or call your pharmacy and they will forward the refill request to us. Please allow 3 business days for your refill to be completed.          Additional Information About Your Visit        [employer] Information     [employer] gives you secure access to your electronic health record. If you see a primary care provider, you can also send messages to your care team and make appointments. If you have questions, please call your primary care clinic.  If you do not have a primary care provider, please call [phone] and they will assist you.        Care EveryWhere ID     This is your Care EveryWhere ID. This could be used by other organizations to access your [facility] medical records  [id number]         Blood Pressure from Last 3 Encounters:   04/11/18 103/70   04/04/18 109/69   03/28/18 123/80    Weight from Last 3 Encounters:   04/11/18 72.6 kg (160 lb 1.6 oz)   04/04/18 72.1 kg (158 lb 14.4 oz)   03/28/18 73 kg (160 lb 14.4 oz)              We Performed the Following     CBC with platelets differential     Comprehensive metabolic panel     Factor 8 assay     Lactate Dehydrogenase     Magnesium     Phosphorus     TSH     Uric acid          Today's Medication Changes          These changes are accurate as of 4/11/18 11:54 AM.  If you have any questions, ask your nurse or doctor.               These medicines have changed or have updated prescriptions.        Dose/Directions    amLODIPine 5 MG tablet   Commonly known as:  NORVASC   This may have changed:  when to take this   Used for:  Hypertension goal BP (blood pressure) < 140/90        Dose:  5 mg   Take 1 tablet (5 mg) by mouth daily   Quantity:  90 tablet   Refills:  3       codeine 30 MG tablet   This may have changed:  when to take  this   Used for:  Hemophilic arthropathy        Dose:  60 mg   Take 2 tablets (60 mg) by mouth every 6 hours as needed for moderate to severe pain (up to 8 tabs in 24 hours)   Quantity:  168 tablet   Refills:  0       lisinopril 40 MG tablet   Commonly known as:  PRINIVIL/ZESTRIL   This may have changed:  when to take this   Used for:  Hypertension goal BP (blood pressure) < 140/90        Dose:  40 mg   Take 1 tablet (40 mg) by mouth daily   Quantity:  90 tablet   Refills:  3                Primary Care Provider Office Phone # Fax #    Katie Ramos [phone]       [address]  [facility] [address]        Equal Access to Services     ANDREW FLANAGAN : Suleman Colmenares, wilber eastman, slim huddleston, lloyd blum. So [facility] [phone].    ATENCIÓN: Si habla español, tiene a hernadez disposición servicios gratuitos de asistencia lingüística. Llame al [phone].    We comply with applicable federal civil rights laws and [place] laws. We do not discriminate on the basis of race, color, national origin, age, disability, sex, sexual orientation, or gender identity.            Thank you!     Thank you for choosing [facility]  for your care. Our goal is always to provide you with excellent care. Hearing back from our patients is one way we can continue to improve our services. Please take a few minutes to complete the written survey that you may receive in the mail after your visit with us. Thank you!             Your Updated Medication List - Protect others around you: Learn how to safely use, store and throw away your medicines at www.disposemymeds.org.          This list is accurate as of 4/11/18 11:54 AM.  Always use your most recent med list.                   Brand Name Dispense Instructions for use Diagnosis    amLODIPine 5 MG tablet    NORVASC    90 tablet    Take 1 tablet (5 mg) by mouth daily    Hypertension goal BP  (blood pressure) < 140/90       ATIVAN PO      Take 0.5 mg by mouth every 6 hours as needed for anxiety        codeine 30 MG tablet     168 tablet    Take 2 tablets (60 mg) by mouth every 6 hours as needed for moderate to severe pain (up to 8 tabs in 24 hours)    Hemophilic arthropathy       * dexamethasone 4 MG tablet    DECADRON    24 tablet    Take 1 tablet (4 mg) by mouth 2 times daily (with meals)    Bladder tumor       * dexamethasone 4 MG tablet    DECADRON    6 tablet    Take 2 tablets (8 mg) by mouth daily Take for 3 days, starting the day after cisplatin (Day 2).    Urothelial carcinoma of bladder (H)       diazepam 5 MG tablet    VALIUM    30 tablet    Take up to one tablet by mouth daily as needed for anxiety    Depressive disorder       KOGENATE FS 1000 units Kit     49005 each    Infuse 3000 units alternating with 1000 units daily    Severe hemophilia A (H), Personal history of malignant neoplasm of bladder       lisinopril 40 MG tablet    PRINIVIL/ZESTRIL    90 tablet    Take 1 tablet (40 mg) by mouth daily    Hypertension goal BP (blood pressure) < 140/90       mirtazapine 45 MG tablet    REMERON    37 tablet    Take 1 tablet (45 mg) by mouth At Bedtime And may take 1/2 tablet for insomnia up to 3 times a week.    Generalized anxiety disorder       ondansetron 8 MG ODT tab    ZOFRAN-ODT    30 tablet    Take 1 tablet (8 mg) by mouth every 8 hours as needed for nausea    Bladder tumor       * prochlorperazine 10 MG tablet    COMPAZINE    30 tablet    Take 0.5 tablets (5 mg) by mouth every 6 hours as needed for nausea or vomiting    Bladder tumor       * prochlorperazine 10 MG tablet    COMPAZINE    30 tablet    Take 1 tablet (10 mg) by mouth every 6 hours as needed (Nausea/Vomiting)    Urothelial carcinoma of bladder (H)       tamsulosin 0.4 MG capsule    FLOMAX    45 capsule    Take 1 capsule (0.4 mg) by mouth daily    Incomplete bladder emptying       TYLENOL PO      Take 500 mg by mouth every 4  hours as needed for mild pain or fever        * Notice:  This list has 4 medication(s) that are the same as other medications prescribed for you. Read the directions carefully, and ask your doctor or other care provider to review them with you.

## 2018-04-11 NOTE — PATIENT INSTRUCTIONS
Contact numbers:  Triage Main Line: [phone]  After hours: [phone]    Call with chills and/or temperature greater than or equal to 100.5 and questions or concerns.    If after hours, weekends, or holidays, call main hospital  at  [phone] and ask for Oncology doctor on call.           April 2018 Sunday Monday Tuesday Wednesday Thursday Friday Saturday   1     2     3     4     [facility] DRAW    8:00 AM   (15 min.)   [facility] LAB DRAW   [facility] Masonic Lab Draw     UMP RETURN    8:25 AM   (50 min.)   Brianna Harris PA-C   [facility] ONC INFUSION 360    9:30 AM   (360 min.)    ONCOLOGY INFUSION   [facility] 5     6     LAB   10:15 AM   (15 min.)   SPECIMEN [facility] [place], Lab 7       8     9     10     11     [facility] LAB DRAW    7:45 AM   (15 min.)   [facility] LAB DRAW   [facility] Masonic Lab Draw     UMP RETURN    8:00 AM   (30 min.)   Magalie Espinal MD   [facility]    9:00 AM   (120 min.)    ONCOLOGY INFUSION   [facility] 12     13     14       15     16     17     18     19     20     21       22     23     24     25     [facility] LAB DRAW    7:45 AM   (15 min.)   [facility]     [facility] RETURN    8:00 AM   (30 min.)   Magalie Espinal MD   [facility]    9:00 AM   (360 min.)    ONCOLOGY INFUSION   [facility] 26     27     28       29     30                                         May 2018   Ricky Monday Tuesday Wednesday Thursday Friday Saturday             1     2     [facility] LAB DRAW    7:45 AM   (15 min.)   [facility]     [facility] RETURN    8:00 AM   (30 min.)   Magalie Espinal MD   [facility] 120    9:00 AM   (120 min.)    ONCOLOGY INFUSION   [facility]  3     4     5       6     7     8     9     10     11     12       13     14     15     16     [facility] MASONIC LAB DRAW    8:15 AM   (15 min.)    MASONIC LAB DRAW   [facility] Lab Draw     UMP RETURN    8:30 AM   (30 min.)   Magalie Espinal MD   [facility]    9:30 AM   (360 min.)    ONCOLOGY INFUSION   [facility] 17     18     19       20     21     22     23     [facility] MASONIC LAB DRAW    7:45 AM   (15 min.)    MASONIC LAB DRAW   [facility] Lab Draw     UMP RETURN    8:00 AM   (30 min.)   Magalie Espinal MD   [facility] 120    9:00 AM   (120 min.)    [facility]Y INFUSION   [facility]    3:05 PM   (20 min.)   Bebeto Shea MD   [facility]rology and [email or web address] for Prostate and Urologic Cancers 24     25     26       27     28     29     30     31                            Lab Results:  Recent Results (from the past 12 hour(s))   CBC with platelets differential    Collection Time: 04/11/18  8:22 AM   Result Value Ref Range    WBC 7.9 4.0 - 11.0 10e9/L    RBC Count 4.59 4.4 - 5.9 10e12/L    Hemoglobin 12.2 (L) 13.3 - 17.7 g/dL    Hematocrit 37.3 (L) 40.0 - 53.0 %    MCV 81 78 - 100 fl    MCH 26.6 26.5 - 33.0 pg    MCHC 32.7 31.5 - 36.5 g/dL    RDW 14.3 10.0 - 15.0 %    Platelet Count 193 150 - 450 10e9/L    Diff Method Automated Method     % Neutrophils 86.2 %    % Lymphocytes 7.6 %    % Monocytes 4.6 %    % Eosinophils 1.0 %    % Basophils 0.1 %    % Immature Granulocytes 0.5 %    Nucleated RBCs 0 0 /100    Absolute Neutrophil 6.8 1.6 - 8.3 10e9/L    Absolute Lymphocytes 0.6 (L) 0.8 - 5.3 10e9/L    Absolute Monocytes 0.4 0.0 - 1.3 10e9/L    Absolute Eosinophils 0.1 0.0 - 0.7 10e9/L    Absolute Basophils 0.0 0.0 - 0.2 10e9/L    Abs Immature Granulocytes 0.0 0 - 0.4 10e9/L    Absolute Nucleated RBC 0.0    Comprehensive metabolic panel    Collection Time: 04/11/18   8:22 AM   Result Value Ref Range    Sodium 134 133 - 144 mmol/L    Potassium 3.7 3.4 - 5.3 mmol/L    Chloride 102 94 - 109 mmol/L    Carbon Dioxide 25 20 - 32 mmol/L    Anion Gap 7 3 - 14 mmol/L    Glucose 115 (H) 70 - 99 mg/dL    Urea Nitrogen 22 7 - 30 mg/dL    Creatinine 1.01 0.66 - 1.25 mg/dL    GFR Estimate 73 >60 mL/min/1.7m2    GFR Estimate If Black 89 >60 mL/min/1.7m2    Calcium 9.1 8.5 - 10.1 mg/dL    Bilirubin Total 0.5 0.2 - 1.3 mg/dL    Albumin 3.5 3.4 - 5.0 g/dL    Protein Total 7.3 6.8 - 8.8 g/dL    Alkaline Phosphatase 95 40 - 150 U/L    ALT 50 0 - 70 U/L    AST 24 0 - 45 U/L   Magnesium    Collection Time: 04/11/18  8:22 AM   Result Value Ref Range    Magnesium 2.0 1.6 - 2.3 mg/dL   Phosphorus    Collection Time: 04/11/18  8:22 AM   Result Value Ref Range    Phosphorus 2.7 2.5 - 4.5 mg/dL   Lactate Dehydrogenase    Collection Time: 04/11/18  8:22 AM   Result Value Ref Range    Lactate Dehydrogenase 117 85 - 227 U/L   Uric acid    Collection Time: 04/11/18  8:22 AM   Result Value Ref Range    Uric Acid 5.1 3.5 - 7.2 mg/dL   TSH    Collection Time: 04/11/18  8:22 AM   Result Value Ref Range    TSH 0.98 0.40 - 4.00 mU/L

## 2018-04-12 ENCOUNTER — TELEPHONE (OUTPATIENT)
Dept: UROLOGY | Facility: CLINIC | Age: 68
End: 2018-04-12

## 2018-04-18 DIAGNOSIS — C67.9 UROTHELIAL CARCINOMA OF BLADDER (H): ICD-10-CM

## 2018-04-18 LAB
ACANTHOCYTES BLD QL SMEAR: SLIGHT
ANISOCYTOSIS BLD QL SMEAR: SLIGHT
BASOPHILS # BLD AUTO: 0 10E9/L (ref 0–0.2)
BASOPHILS NFR BLD AUTO: 0 %
DIFFERENTIAL METHOD BLD: ABNORMAL
EOSINOPHIL # BLD AUTO: 0.2 10E9/L (ref 0–0.7)
EOSINOPHIL NFR BLD AUTO: 1.7 %
ERYTHROCYTE [DISTWIDTH] IN BLOOD BY AUTOMATED COUNT: 14.3 % (ref 10–15)
HCT VFR BLD AUTO: 34.1 % (ref 40–53)
HGB BLD-MCNC: 10.9 G/DL (ref 13.3–17.7)
LYMPHOCYTES # BLD AUTO: 1.6 10E9/L (ref 0.8–5.3)
LYMPHOCYTES NFR BLD AUTO: 13.8 %
MCH RBC QN AUTO: 26.7 PG (ref 26.5–33)
MCHC RBC AUTO-ENTMCNC: 32 G/DL (ref 31.5–36.5)
MCV RBC AUTO: 84 FL (ref 78–100)
METAMYELOCYTES # BLD: 0.1 10E9/L
METAMYELOCYTES NFR BLD MANUAL: 0.9 %
MONOCYTES # BLD AUTO: 0.2 10E9/L (ref 0–1.3)
MONOCYTES NFR BLD AUTO: 1.7 %
MYELOCYTES # BLD: 0.4 10E9/L
MYELOCYTES NFR BLD MANUAL: 3.5 %
NEUTROPHILS # BLD AUTO: 8.9 10E9/L (ref 1.6–8.3)
NEUTROPHILS NFR BLD AUTO: 78.4 %
PLATELET # BLD AUTO: 146 10E9/L (ref 150–450)
PLATELET # BLD EST: ABNORMAL 10*3/UL
POIKILOCYTOSIS BLD QL SMEAR: SLIGHT
RBC # BLD AUTO: 4.08 10E12/L (ref 4.4–5.9)
WBC # BLD AUTO: 11.3 10E9/L (ref 4–11)

## 2018-04-18 PROCEDURE — 85025 COMPLETE CBC W/AUTO DIFF WBC: CPT | Performed by: INTERNAL MEDICINE

## 2018-04-18 NOTE — NURSING NOTE
Chief Complaint   Patient presents with     Blood Draw     labs drawn by venipuncture by JEAN Damian CMA on 4/18/2018 at 11:10 AM

## 2018-04-25 ENCOUNTER — RESEARCH ENCOUNTER (OUTPATIENT)
Dept: ONCOLOGY | Facility: CLINIC | Age: 68
End: 2018-04-25

## 2018-04-25 ENCOUNTER — INFUSION THERAPY VISIT (OUTPATIENT)
Dept: ONCOLOGY | Facility: CLINIC | Age: 68
End: 2018-04-25
Attending: INTERNAL MEDICINE
Payer: MEDICARE

## 2018-04-25 ENCOUNTER — APPOINTMENT (OUTPATIENT)
Dept: LAB | Facility: CLINIC | Age: 68
End: 2018-04-25
Attending: INTERNAL MEDICINE
Payer: MEDICARE

## 2018-04-25 VITALS
SYSTOLIC BLOOD PRESSURE: 113 MMHG | WEIGHT: 157.25 LBS | BODY MASS INDEX: 21.3 KG/M2 | RESPIRATION RATE: 16 BRPM | DIASTOLIC BLOOD PRESSURE: 73 MMHG | HEIGHT: 72 IN | HEART RATE: 117 BPM | OXYGEN SATURATION: 98 % | TEMPERATURE: 97.7 F

## 2018-04-25 VITALS
WEIGHT: 157.3 LBS | TEMPERATURE: 97.7 F | SYSTOLIC BLOOD PRESSURE: 113 MMHG | OXYGEN SATURATION: 98 % | HEART RATE: 117 BPM | RESPIRATION RATE: 16 BRPM | DIASTOLIC BLOOD PRESSURE: 73 MMHG | BODY MASS INDEX: 21.33 KG/M2

## 2018-04-25 DIAGNOSIS — C67.9 UROTHELIAL CARCINOMA OF BLADDER (H): Primary | ICD-10-CM

## 2018-04-25 LAB
ALBUMIN SERPL-MCNC: 3.7 G/DL (ref 3.4–5)
ALP SERPL-CCNC: 141 U/L (ref 40–150)
ALT SERPL W P-5'-P-CCNC: 18 U/L (ref 0–70)
ANION GAP SERPL CALCULATED.3IONS-SCNC: 8 MMOL/L (ref 3–14)
ANISOCYTOSIS BLD QL SMEAR: SLIGHT
AST SERPL W P-5'-P-CCNC: 13 U/L (ref 0–45)
BASOPHILS # BLD AUTO: 0 10E9/L (ref 0–0.2)
BASOPHILS NFR BLD AUTO: 0 %
BILIRUB SERPL-MCNC: 0.2 MG/DL (ref 0.2–1.3)
BUN SERPL-MCNC: 17 MG/DL (ref 7–30)
CALCIUM SERPL-MCNC: 9.5 MG/DL (ref 8.5–10.1)
CHLORIDE SERPL-SCNC: 108 MMOL/L (ref 94–109)
CO2 SERPL-SCNC: 25 MMOL/L (ref 20–32)
CREAT SERPL-MCNC: 1.07 MG/DL (ref 0.66–1.25)
DIFFERENTIAL METHOD BLD: ABNORMAL
EOSINOPHIL # BLD AUTO: 0 10E9/L (ref 0–0.7)
EOSINOPHIL NFR BLD AUTO: 0 %
ERYTHROCYTE [DISTWIDTH] IN BLOOD BY AUTOMATED COUNT: 16.3 % (ref 10–15)
GFR SERPL CREATININE-BSD FRML MDRD: 69 ML/MIN/1.7M2
GLUCOSE SERPL-MCNC: 117 MG/DL (ref 70–99)
HCT VFR BLD AUTO: 36.7 % (ref 40–53)
HGB BLD-MCNC: 11.7 G/DL (ref 13.3–17.7)
LYMPHOCYTES # BLD AUTO: 0.9 10E9/L (ref 0.8–5.3)
LYMPHOCYTES NFR BLD AUTO: 6.2 %
MAGNESIUM SERPL-MCNC: 1.9 MG/DL (ref 1.6–2.3)
MCH RBC QN AUTO: 27 PG (ref 26.5–33)
MCHC RBC AUTO-ENTMCNC: 31.9 G/DL (ref 31.5–36.5)
MCV RBC AUTO: 85 FL (ref 78–100)
MONOCYTES # BLD AUTO: 0.6 10E9/L (ref 0–1.3)
MONOCYTES NFR BLD AUTO: 4.4 %
MYELOCYTES # BLD: 0.2 10E9/L
MYELOCYTES NFR BLD MANUAL: 1.8 %
NEUTROPHILS # BLD AUTO: 12.1 10E9/L (ref 1.6–8.3)
NEUTROPHILS NFR BLD AUTO: 87.6 %
PLATELET # BLD AUTO: 469 10E9/L (ref 150–450)
PLATELET # BLD EST: ABNORMAL 10*3/UL
POLYCHROMASIA BLD QL SMEAR: SLIGHT
POTASSIUM SERPL-SCNC: 4 MMOL/L (ref 3.4–5.3)
PROT SERPL-MCNC: 7.6 G/DL (ref 6.8–8.8)
RBC # BLD AUTO: 4.34 10E12/L (ref 4.4–5.9)
SODIUM SERPL-SCNC: 140 MMOL/L (ref 133–144)
WBC # BLD AUTO: 13.8 10E9/L (ref 4–11)

## 2018-04-25 PROCEDURE — 85025 COMPLETE CBC W/AUTO DIFF WBC: CPT | Performed by: INTERNAL MEDICINE

## 2018-04-25 PROCEDURE — 25000132 ZZH RX MED GY IP 250 OP 250 PS 637: Mod: ZF | Performed by: INTERNAL MEDICINE

## 2018-04-25 PROCEDURE — 83735 ASSAY OF MAGNESIUM: CPT | Performed by: INTERNAL MEDICINE

## 2018-04-25 PROCEDURE — 99215 OFFICE O/P EST HI 40 MIN: CPT | Mod: ZP | Performed by: INTERNAL MEDICINE

## 2018-04-25 PROCEDURE — 40000141 ZZH STATISTIC PERIPHERAL IV START W/O US GUIDANCE: Mod: ZF

## 2018-04-25 PROCEDURE — 96413 CHEMO IV INFUSION 1 HR: CPT

## 2018-04-25 PROCEDURE — A9270 NON-COVERED ITEM OR SERVICE: HCPCS | Mod: ZF | Performed by: INTERNAL MEDICINE

## 2018-04-25 PROCEDURE — 25000128 H RX IP 250 OP 636: Mod: ZF | Performed by: INTERNAL MEDICINE

## 2018-04-25 PROCEDURE — 96415 CHEMO IV INFUSION ADDL HR: CPT

## 2018-04-25 PROCEDURE — 96367 TX/PROPH/DG ADDL SEQ IV INF: CPT

## 2018-04-25 PROCEDURE — G0463 HOSPITAL OUTPT CLINIC VISIT: HCPCS | Mod: ZF

## 2018-04-25 PROCEDURE — 96417 CHEMO IV INFUS EACH ADDL SEQ: CPT

## 2018-04-25 PROCEDURE — 96375 TX/PRO/DX INJ NEW DRUG ADDON: CPT

## 2018-04-25 PROCEDURE — 80053 COMPREHEN METABOLIC PANEL: CPT | Performed by: INTERNAL MEDICINE

## 2018-04-25 RX ORDER — LORAZEPAM 2 MG/ML
0.5 INJECTION INTRAMUSCULAR EVERY 4 HOURS PRN
Status: CANCELLED
Start: 2018-05-02

## 2018-04-25 RX ORDER — ACETAMINOPHEN 325 MG/1
650 TABLET ORAL ONCE
Status: COMPLETED | OUTPATIENT
Start: 2018-04-25 | End: 2018-04-25

## 2018-04-25 RX ORDER — ALBUTEROL SULFATE 0.83 MG/ML
2.5 SOLUTION RESPIRATORY (INHALATION)
Status: CANCELLED | OUTPATIENT
Start: 2018-04-25

## 2018-04-25 RX ORDER — PALONOSETRON 0.05 MG/ML
0.25 INJECTION, SOLUTION INTRAVENOUS ONCE
Status: CANCELLED
Start: 2018-04-25

## 2018-04-25 RX ORDER — DIPHENHYDRAMINE HYDROCHLORIDE 50 MG/ML
50 INJECTION INTRAMUSCULAR; INTRAVENOUS
Status: CANCELLED
Start: 2018-04-25

## 2018-04-25 RX ORDER — DIPHENHYDRAMINE HYDROCHLORIDE 50 MG/ML
50 INJECTION INTRAMUSCULAR; INTRAVENOUS
Status: CANCELLED
Start: 2018-05-02

## 2018-04-25 RX ORDER — LORAZEPAM 2 MG/ML
0.5 INJECTION INTRAMUSCULAR EVERY 4 HOURS PRN
Status: CANCELLED
Start: 2018-04-25

## 2018-04-25 RX ORDER — METHYLPREDNISOLONE SODIUM SUCCINATE 125 MG/2ML
125 INJECTION, POWDER, LYOPHILIZED, FOR SOLUTION INTRAMUSCULAR; INTRAVENOUS
Status: CANCELLED
Start: 2018-05-02

## 2018-04-25 RX ORDER — PALONOSETRON 0.05 MG/ML
0.25 INJECTION, SOLUTION INTRAVENOUS ONCE
Status: COMPLETED | OUTPATIENT
Start: 2018-04-25 | End: 2018-04-25

## 2018-04-25 RX ORDER — EPINEPHRINE 1 MG/ML
0.3 INJECTION, SOLUTION, CONCENTRATE INTRAVENOUS EVERY 5 MIN PRN
Status: CANCELLED | OUTPATIENT
Start: 2018-04-25

## 2018-04-25 RX ORDER — MEPERIDINE HYDROCHLORIDE 25 MG/ML
25 INJECTION INTRAMUSCULAR; INTRAVENOUS; SUBCUTANEOUS EVERY 30 MIN PRN
Status: CANCELLED | OUTPATIENT
Start: 2018-05-02

## 2018-04-25 RX ORDER — EPINEPHRINE 1 MG/ML
0.3 INJECTION, SOLUTION, CONCENTRATE INTRAVENOUS EVERY 5 MIN PRN
Status: CANCELLED | OUTPATIENT
Start: 2018-05-02

## 2018-04-25 RX ORDER — EPINEPHRINE 0.3 MG/.3ML
0.3 INJECTION SUBCUTANEOUS EVERY 5 MIN PRN
Status: CANCELLED | OUTPATIENT
Start: 2018-04-25

## 2018-04-25 RX ORDER — SODIUM CHLORIDE 9 MG/ML
1000 INJECTION, SOLUTION INTRAVENOUS CONTINUOUS PRN
Status: CANCELLED
Start: 2018-05-02

## 2018-04-25 RX ORDER — ALBUTEROL SULFATE 0.83 MG/ML
2.5 SOLUTION RESPIRATORY (INHALATION)
Status: CANCELLED | OUTPATIENT
Start: 2018-05-02

## 2018-04-25 RX ORDER — METHYLPREDNISOLONE SODIUM SUCCINATE 125 MG/2ML
125 INJECTION, POWDER, LYOPHILIZED, FOR SOLUTION INTRAMUSCULAR; INTRAVENOUS
Status: CANCELLED
Start: 2018-04-25

## 2018-04-25 RX ORDER — SODIUM CHLORIDE 9 MG/ML
1000 INJECTION, SOLUTION INTRAVENOUS CONTINUOUS PRN
Status: CANCELLED
Start: 2018-04-25

## 2018-04-25 RX ORDER — DEXAMETHASONE 4 MG/1
8 TABLET ORAL DAILY
Qty: 18 TABLET | Refills: 0 | Status: SHIPPED | OUTPATIENT
Start: 2018-04-25 | End: 2018-07-25

## 2018-04-25 RX ORDER — EPINEPHRINE 0.3 MG/.3ML
0.3 INJECTION SUBCUTANEOUS EVERY 5 MIN PRN
Status: CANCELLED | OUTPATIENT
Start: 2018-05-02

## 2018-04-25 RX ORDER — MEPERIDINE HYDROCHLORIDE 25 MG/ML
25 INJECTION INTRAMUSCULAR; INTRAVENOUS; SUBCUTANEOUS EVERY 30 MIN PRN
Status: CANCELLED | OUTPATIENT
Start: 2018-04-25

## 2018-04-25 RX ORDER — ALBUTEROL SULFATE 90 UG/1
1-2 AEROSOL, METERED RESPIRATORY (INHALATION)
Status: CANCELLED
Start: 2018-05-02

## 2018-04-25 RX ORDER — ALBUTEROL SULFATE 90 UG/1
1-2 AEROSOL, METERED RESPIRATORY (INHALATION)
Status: CANCELLED
Start: 2018-04-25

## 2018-04-25 RX ADMIN — PALONOSETRON HYDROCHLORIDE 0.25 MG: 0.25 INJECTION INTRAVENOUS at 09:56

## 2018-04-25 RX ADMIN — MAGNESIUM SULFATE HEPTAHYDRATE: 500 INJECTION, SOLUTION INTRAMUSCULAR; INTRAVENOUS at 11:29

## 2018-04-25 RX ADMIN — SODIUM CHLORIDE 1000 ML: 9 INJECTION, SOLUTION INTRAVENOUS at 09:30

## 2018-04-25 RX ADMIN — CISPLATIN 135 MG: 1 INJECTION, SOLUTION INTRAVENOUS at 11:28

## 2018-04-25 RX ADMIN — ACETAMINOPHEN 650 MG: 325 TABLET ORAL at 11:11

## 2018-04-25 RX ADMIN — DEXAMETHASONE SODIUM PHOSPHATE 150 MG: 10 INJECTION, SOLUTION INTRAMUSCULAR; INTRAVENOUS at 09:58

## 2018-04-25 RX ADMIN — GEMCITABINE 1930 MG: 38 INJECTION, SOLUTION INTRAVENOUS at 10:43

## 2018-04-25 ASSESSMENT — PAIN SCALES - GENERAL: PAINLEVEL: NO PAIN (0)

## 2018-04-25 NOTE — NURSING NOTE
Chief Complaint   Patient presents with     Oncology Clinic Visit     Pt is here for an IV for chemo. IV placed by Oneyda and vitals done by LUCÍA Rouse MA

## 2018-04-25 NOTE — MR AVS SNAPSHOT
After Visit Summary   4/25/2018    Orlin Alcantar    MRN: [id number]           Patient Information     Date Of Birth          1950        Visit Information        Provider Department      4/25/2018 8:15 AM Magalie Espinal MD [facility]        Today's Diagnoses     Urothelial carcinoma of bladder (H)    -  1       Follow-ups after your visit        Your next 10 appointments already scheduled     May 02, 2018  7:45 AM CDT   Masonic Lab Draw with UC MASONIC LAB DRAW   [facility]    [address]  [address]  [facility] [address]   [phone]            May 02, 2018  8:15 AM CDT   (Arrive by 8:00 AM)   Return Visit with Magalie Espinal MD   [facility] ([facility])    [address] [place] [address]  [address]  [facility] [address]   [phone]            May 02, 2018  9:00 AM CDT   Infusion 120 with UC ONCOLOGY INFUSION, UC 25 ATC   [facility] ([facility])    [address]  [address]  [facility] [address]   [phone]            May 09, 2018  5:15 PM CDT   (Arrive by 5:00 PM)   Cystoscopy with Bebeto Shea MD   [facility] Urologic C[facility] ([facility])    [address] [place] [address]  4th Floor  [facility] [address]   [phone]            May 16, 2018  8:15 AM CDT   Masonic Lab Draw with UC MASONIC LAB DRAW   [facility])    [address]  [address]  [facility] [address]   [phone]            May 16, 2018  8:45 AM CDT   (Arrive by 8:30 AM)   Return Visit with Magalie Espinal MD   [facility] ([facility])    [address] [place] [address]  [address]  [facility] [address]   [phone]            May 16, 2018  9:30 AM CDT   Infusion 360 with UC ONCOLOGY  INFUSION,  16 ATC   [facility] ([facility])    [address]  [address]  [facility] [address] [phone]            May 23, 2018  7:45 AM T   Masonic Lab Draw with  MASONIC LAB DRAW   [facility] ([facility])    [address]  [address]  [facility] [address] [phone]              Who to contact     If you have questions or need follow up information about today's clinic visit or your schedule please contact [facility] directly at [phone].  Normal or non-critical lab and imaging results will be communicated to you by [employer], letter or phone within 4 business days after the clinic has received the results. If you do not hear from us within 7 days, please contact the clinic through GFS ITt or phone. If you have a critical or abnormal lab result, we will notify you by phone as soon as possible.  Submit refill requests through [employer] or call your pharmacy and they will forward the refill request to us. Please allow 3 business days for your refill to be completed.          Additional Information About Your Visit        [employer]GoHome Information     [employer] gives you secure access to your electronic health record. If you see a primary care provider, you can also send messages to your care team and make appointments. If you have questions, please call your primary care clinic.  If you do not have a primary care provider, please call [phone] and they will assist you.        Care EveryWhere ID     This is your Care EveryWhere ID. This could be used by other organizations to access your [place] medical records  [id number]        Your Vitals Were     Pulse Temperature Respirations Height Pulse Oximetry BMI (Body Mass Index)    117 97.7  F (36.5  C) (Oral) 16 1.829 m (6') 98% 21.33 kg/m2       Blood Pressure from Last 3 Encounters:   04/25/18 113/73   04/25/18 113/73   04/11/18  103/70    Weight from Last 3 Encounters:   04/25/18 71.4 kg (157 lb 4.8 oz)   04/25/18 71.3 kg (157 lb 4 oz)   04/11/18 72.6 kg (160 lb 1.6 oz)                 Today's Medication Changes          These changes are accurate as of 4/25/18 10:36 AM.  If you have any questions, ask your nurse or doctor.               These medicines have changed or have updated prescriptions.        Dose/Directions    amLODIPine 5 MG tablet   Commonly known as:  NORVASC   This may have changed:  when to take this   Used for:  Hypertension goal BP (blood pressure) < 140/90        Dose:  5 mg   Take 1 tablet (5 mg) by mouth daily   Quantity:  90 tablet   Refills:  3       codeine 30 MG tablet   This may have changed:  when to take this   Used for:  Hemophilic arthropathy        Dose:  60 mg   Take 2 tablets (60 mg) by mouth every 6 hours as needed for moderate to severe pain (up to 8 tabs in 24 hours)   Quantity:  168 tablet   Refills:  0       lisinopril 40 MG tablet   Commonly known as:  PRINIVIL/ZESTRIL   This may have changed:  when to take this   Used for:  Hypertension goal BP (blood pressure) < 140/90        Dose:  40 mg   Take 1 tablet (40 mg) by mouth daily   Quantity:  90 tablet   Refills:  3            Where to get your medicines      These medications were sent to [facility] [address] [facility] [address]  [address] [facility] [address] [place] [address]    Hours:  TRANSPLANT PHONE NUMBER [phone] Phone:  [phone]     dexamethasone 4 MG tablet                Primary Care Provider Office Phone # Fax #    Katie Ramos [phone]       [address]  [facility] [address]        Equal Access to Services     ANDREW FLANAGAN AH: Suleman Colmenares, wapamda luqadaha, qaybta kaalmada adeegyada, lloyd blum. So [facility] [phone].    ATENCIÓN: Si habla español, tiene a hernadez disposición servicios gratuitos de asistencia lingüística.  Perry benites [phone].    We comply with applicable federal civil rights laws and [place] laws. We do not discriminate on the basis of race, color, national origin, age, disability, sex, sexual orientation, or gender identity.            Thank you!     Thank you for choosing [facility]  for your care. Our goal is always to provide you with excellent care. Hearing back from our patients is one way we can continue to improve our services. Please take a few minutes to complete the written survey that you may receive in the mail after your visit with us. Thank you!             Your Updated Medication List - Protect others around you: Learn how to safely use, store and throw away your medicines at www.disposemymeds.org.          This list is accurate as of 4/25/18 10:36 AM.  Always use your most recent med list.                   Brand Name Dispense Instructions for use Diagnosis    amLODIPine 5 MG tablet    NORVASC    90 tablet    Take 1 tablet (5 mg) by mouth daily    Hypertension goal BP (blood pressure) < 140/90       ATIVAN PO      Take 0.5 mg by mouth every 6 hours as needed for anxiety        codeine 30 MG tablet     168 tablet    Take 2 tablets (60 mg) by mouth every 6 hours as needed for moderate to severe pain (up to 8 tabs in 24 hours)    Hemophilic arthropathy       * dexamethasone 4 MG tablet    DECADRON    24 tablet    Take 1 tablet (4 mg) by mouth 2 times daily (with meals)    Bladder tumor       * dexamethasone 4 MG tablet    DECADRON    18 tablet    Take 2 tablets (8 mg) by mouth daily Take for 3 days, starting the day after cisplatin (Day 2).    Urothelial carcinoma of bladder (H)       diazepam 5 MG tablet    VALIUM    30 tablet    Take up to one tablet by mouth daily as needed for anxiety    Depressive disorder       KOGENATE FS 1000 units Kit     00855 each    Infuse 3000 units alternating with 1000 units daily    Severe hemophilia A (H), Personal history of malignant neoplasm  of bladder       lisinopril 40 MG tablet    PRINIVIL/ZESTRIL    90 tablet    Take 1 tablet (40 mg) by mouth daily    Hypertension goal BP (blood pressure) < 140/90       mirtazapine 45 MG tablet    REMERON    37 tablet    Take 1 tablet (45 mg) by mouth At Bedtime And may take 1/2 tablet for insomnia up to 3 times a week.    Generalized anxiety disorder       ondansetron 8 MG ODT tab    ZOFRAN-ODT    30 tablet    Take 1 tablet (8 mg) by mouth every 8 hours as needed for nausea    Bladder tumor       * prochlorperazine 10 MG tablet    COMPAZINE    30 tablet    Take 0.5 tablets (5 mg) by mouth every 6 hours as needed for nausea or vomiting    Bladder tumor       * prochlorperazine 10 MG tablet    COMPAZINE    30 tablet    Take 1 tablet (10 mg) by mouth every 6 hours as needed (Nausea/Vomiting)    Urothelial carcinoma of bladder (H)       tamsulosin 0.4 MG capsule    FLOMAX    45 capsule    Take 1 capsule (0.4 mg) by mouth daily    Incomplete bladder emptying       TYLENOL PO      Take 500 mg by mouth every 4 hours as needed for mild pain or fever        * Notice:  This list has 4 medication(s) that are the same as other medications prescribed for you. Read the directions carefully, and ask your doctor or other care provider to review them with you.

## 2018-04-25 NOTE — PROGRESS NOTES
Phase II Trial of Neoadjuvant  Nivolumab with Cisplatin and Gemcitabine in Muscle-Invasive Bladder Cancer (MIBC) Patients undergoing Radical Cystectomy   [id number]  [id number]  PI and treating physician: Dr. Magalie Espinal      Cycle 2, Day 1, plan is for cis/gem today.     Dr. Espinal met with patient and writer met with patient in infusion prior to treatment. Conmeds, AEs, and labs reviewed. Epic and [employer] communication issue -- writer received verbal report of lab results with readback.    Reports intermittent fatigue gr 1, starting 4/7/18. Has taken Zofran and compazine PRN prophylactically, denies nausea.     67mL/min (original formula)   Creatine clearance by Cockgraft-Gault   Age: 68 years old  Height: 6'  Weight: 71.4 kg  Creat: 1.07     Report given to infusion RN.     Patient will return to clinic 5/2/18 for Cycle 2 Day 8.    Performance Status     Subject name: Orlin Alcantar   Eastern Cooperative Oncology Group (ECOG) Performance Status  GRADE ECOG PERFORMANCE STATUS   0 Fully active, able to carry on all pre-disease performance without restriction   1 Restricted in physically strenuous activity but ambulatory and able to carry out work of a light   2 Ambulatory and capable of all selfcare but unable to carry out any work activities; up and about more than 50% of waking hours   3 Capable of only limited selfcare; confined to bed or chair more than 50% of waking hours   4 Completely disabled; cannot carry on any selfcare; totally confined to bed or chair   5 Dead     The patient was assessed on 4/25/18 using the ECOG scale. ECOG Score: 0       Eric Srivastava RN  Clinical Research Coordinator  Clinical Trials Office  [facility]  Desk Phone: [phone]  Pager: [phone]

## 2018-04-25 NOTE — MR AVS SNAPSHOT
After Visit Summary   4/25/2018    Orlin Alcantar    MRN: [id number]           Patient Information     Date Of Birth          1950        Visit Information        Provider Department      4/25/2018 9:00 AM UC 23 ATC; UC ONCOLOGY INFUSION [facility]        Today's Diagnoses     Urothelial carcinoma of bladder (H)    -  1       Follow-ups after your visit        Your next 10 appointments already scheduled     May 02, 2018  7:45 AM CDT   Masonic Lab Draw with UC MASONIC LAB DRAW   [facility]    [address]  [address]  [facility] [address]   [phone]            May 02, 2018  8:15 AM CDT   (Arrive by 8:00 AM)   Return Visit with Magalie Espinal MD   [facility] ([facility])    [address] [place] [address]  [address]  [facility] [address]   [phone]            May 02, 2018  9:00 AM CDT   Infusion 120 with UC ONCOLOGY INFUSION, UC 25 ATC   [facility] ([facility])    [address]  [address]  [facility] [address]   [phone]            May 09, 2018  5:15 PM CDT   (Arrive by 5:00 PM)   Cystoscopy with Bebeto Shea MD   [facility] for Prostate and Urologic Cancers ([facility])    [address]  4th Floor  [facility] [address]   [phone]            May 16, 2018  8:15 AM CDT   Masonic Lab Draw with UC MASONIC LAB DRAW   [facility] Lab Draw ([facility])    [address]  [address]  [facility] [address]   [phone]            May 16, 2018  8:45 AM CDT   (Arrive by 8:30 AM)   Return Visit with Magalie Espinal MD   [facility] ([facility])    [address]  [address]  [facility] [address]   [phone]            May 16, 2018  9:30 AM CDT   Infusion 360 with UC  ONCOLOGY INFUSION,  16 ATC   [facility] ([facility])    [address]  [address]  [facility] [address] [phone]            May 23, 2018  7:45 AM T   Masonic Lab Draw with UC MASONIC LAB DRAW   [facility] Draw ([facility])    [address]  [address]  [facility] [address] [phone]              Who to contact     If you have questions or need follow up information about today's clinic visit or your schedule please contact [facility] directly at [phone].  Normal or non-critical lab and imaging results will be communicated to you by [employer], letter or phone within 4 business days after the clinic has received the results. If you do not hear from us within 7 days, please contact the clinic through [employer] or phone. If you have a critical or abnormal lab result, we will notify you by phone as soon as possible.  Submit refill requests through [employer] or call your pharmacy and they will forward the refill request to us. Please allow 3 business days for your refill to be completed.          Additional Information About Your Visit        [employer] Information     [employer] gives you secure access to your electronic health record. If you see a primary care provider, you can also send messages to your care team and make appointments. If you have questions, please call your primary care clinic.  If you do not have a primary care provider, please call [phone] and they will assist you.        Care EveryWhere ID     This is your Care EveryWhere ID. This could be used by other organizations to access your [place] medical records  [id number]        Your Vitals Were     Pulse Temperature Respirations Pulse Oximetry BMI (Body Mass Index)       117 97.7  F (36.5  C) (Oral) 16 98% 21.33 kg/m2        Blood Pressure from Last 3 Encounters:   04/25/18 113/73   04/25/18 113/73   04/11/18 103/70     Weight from Last 3 Encounters:   04/25/18 71.4 kg (157 lb 4.8 oz)   04/25/18 71.3 kg (157 lb 4 oz)   04/11/18 72.6 kg (160 lb 1.6 oz)              We Performed the Following     CBC with platelets differential     Comprehensive metabolic panel     Magnesium          Today's Medication Changes          These changes are accurate as of 4/25/18  2:33 PM.  If you have any questions, ask your nurse or doctor.               These medicines have changed or have updated prescriptions.        Dose/Directions    amLODIPine 5 MG tablet   Commonly known as:  NORVASC   This may have changed:  when to take this   Used for:  Hypertension goal BP (blood pressure) < 140/90        Dose:  5 mg   Take 1 tablet (5 mg) by mouth daily   Quantity:  90 tablet   Refills:  3       codeine 30 MG tablet   This may have changed:  when to take this   Used for:  Hemophilic arthropathy        Dose:  60 mg   Take 2 tablets (60 mg) by mouth every 6 hours as needed for moderate to severe pain (up to 8 tabs in 24 hours)   Quantity:  168 tablet   Refills:  0       lisinopril 40 MG tablet   Commonly known as:  PRINIVIL/ZESTRIL   This may have changed:  when to take this   Used for:  Hypertension goal BP (blood pressure) < 140/90        Dose:  40 mg   Take 1 tablet (40 mg) by mouth daily   Quantity:  90 tablet   Refills:  3            Where to get your medicines      These medications were sent to [facility] [address]  [address] [address]    Hours:  TRANSPLANT PHONE NUMBER [phone] Phone:  [phone]     dexamethasone 4 MG tablet                Primary Care Provider Office Phone # Fax #    Katie Ramos [phone]       [address]  [facility] [address]        Equal Access to Services     ANDREW FLANAGAN AH: Suleman Colmenares, wilber eastman, slim huddleston, lloyd blum. So [facility]  [phone].    ATENCIÓN: Si nnamdi bernstein, tiene a hernadez disposición servicios gratuitos de asistencia lingüística. Perry benites [phone].    We comply with applicable federal civil rights laws and [place] laws. We do not discriminate on the basis of race, color, national origin, age, disability, sex, sexual orientation, or gender identity.            Thank you!     Thank you for choosing [facility] CLINIC  for your care. Our goal is always to provide you with excellent care. Hearing back from our patients is one way we can continue to improve our services. Please take a few minutes to complete the written survey that you may receive in the mail after your visit with us. Thank you!             Your Updated Medication List - Protect others around you: Learn how to safely use, store and throw away your medicines at www.disposemymeds.org.          This list is accurate as of 4/25/18  2:33 PM.  Always use your most recent med list.                   Brand Name Dispense Instructions for use Diagnosis    amLODIPine 5 MG tablet    NORVASC    90 tablet    Take 1 tablet (5 mg) by mouth daily    Hypertension goal BP (blood pressure) < 140/90       ATIVAN PO      Take 0.5 mg by mouth every 6 hours as needed for anxiety        codeine 30 MG tablet     168 tablet    Take 2 tablets (60 mg) by mouth every 6 hours as needed for moderate to severe pain (up to 8 tabs in 24 hours)    Hemophilic arthropathy       * dexamethasone 4 MG tablet    DECADRON    24 tablet    Take 1 tablet (4 mg) by mouth 2 times daily (with meals)    Bladder tumor       * dexamethasone 4 MG tablet    DECADRON    18 tablet    Take 2 tablets (8 mg) by mouth daily Take for 3 days, starting the day after cisplatin (Day 2).    Urothelial carcinoma of bladder (H)       diazepam 5 MG tablet    VALIUM    30 tablet    Take up to one tablet by mouth daily as needed for anxiety    Depressive disorder       KOGENATE FS 1000 units Kit     85124 each     Infuse 3000 units alternating with 1000 units daily    Severe hemophilia A (H), Personal history of malignant neoplasm of bladder       lisinopril 40 MG tablet    PRINIVIL/ZESTRIL    90 tablet    Take 1 tablet (40 mg) by mouth daily    Hypertension goal BP (blood pressure) < 140/90       mirtazapine 45 MG tablet    REMERON    37 tablet    Take 1 tablet (45 mg) by mouth At Bedtime And may take 1/2 tablet for insomnia up to 3 times a week.    Generalized anxiety disorder       ondansetron 8 MG ODT tab    ZOFRAN-ODT    30 tablet    Take 1 tablet (8 mg) by mouth every 8 hours as needed for nausea    Bladder tumor       * prochlorperazine 10 MG tablet    COMPAZINE    30 tablet    Take 0.5 tablets (5 mg) by mouth every 6 hours as needed for nausea or vomiting    Bladder tumor       * prochlorperazine 10 MG tablet    COMPAZINE    30 tablet    Take 1 tablet (10 mg) by mouth every 6 hours as needed (Nausea/Vomiting)    Urothelial carcinoma of bladder (H)       tamsulosin 0.4 MG capsule    FLOMAX    45 capsule    Take 1 capsule (0.4 mg) by mouth daily    Incomplete bladder emptying       TYLENOL PO      Take 500 mg by mouth every 4 hours as needed for mild pain or fever        * Notice:  This list has 4 medication(s) that are the same as other medications prescribed for you. Read the directions carefully, and ask your doctor or other care provider to review them with you.

## 2018-04-25 NOTE — LETTER
4/25/2018       RE: Orlin Alcantar  [address]   [place] [address]     Dear Colleague,    Thank you for referring your patient, Orlin Alcantar, to the [facility]. Please see a copy of my visit note below.    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   3/26/18: Consented for the Nivolumab-Gemcitabine-Cisplatin study [id number]    3/28/18: Screening visit for study.   4/4/18; c1d1 gemcitabine  4/11/18: c1d8 gemcitabine-nivolumab  4/25/18: C2D1 gemcitabine-Cisplatin    HPI:    68 year old male with PMH of hemophilia, chronic hepatis C who was seen by DR. Burns for evaluation of neoadjuvant therapy with Gemcitabine Cisplatin and Nivolumab.  I will be taking over his care while on study.     Patient has a history of hemophilia and has undergone several surgeries in right hip replacement, knee replacement. Hemophilia managed by Dr. Marley.   He reports recurrent hematuria for a year, initially attributed to kidney stones, cystoscopy in Feb 2018 revealed:  Bladder tumor, transurethral resection:   - Invasive high grade urothelial carcinoma   - Extent of invasion: Muscularis propria, multifocal   - Lymphovascular invasion: Present     PET-CT scan - which has revealed muscle invasive bladder right lateral wall of bladder and right  obturator lymphnode, 1.1 cm short axis, suspicious for metastatic disease.  He has been treated for HCV in the 1990s and has not had any complications. His LFTs have remained normal.   His hemophilia is very well controlled on current regimen of factor replacement.   He does not have any autoimmune disease and is not on immunesuppressants or steroids.   His diabetes is diet controlled and he has not required any medications.     Interval History:  Denies nausea, vomiting, hearing loss, neuropathy, fever, chills.  Denies any immune related AEs   No bleeding issues.      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      Bladder tumor      Depression      DM II (diabetes mellitus, type II), controlled (H)     diet controlled     Hemophilia (H)     Type A     Hepatitis C, chronic (H)     treated [facility] interferon/ribavirin     HTN (hypertension)           CURRENT OUTPATIENT MEDICATIONS     Current Outpatient Prescriptions   Medication Sig     Acetaminophen (TYLENOL PO) Take 500 mg by mouth every 4 hours as needed for mild pain or fever     amLODIPine (NORVASC) 5 MG tablet Take 1 tablet (5 mg) by mouth daily (Patient taking differently: Take 5 mg by mouth every morning )     Antihemophilic Factor, Recomb, (KOGENATE FS) 1000 UNITS KIT Infuse 3000 units alternating with 1000 units daily     codeine 30 MG tablet Take 2 tablets (60 mg) by mouth every 6 hours as needed for moderate to severe pain (up to 8 tabs in 24 hours) (Patient taking differently: Take 60 mg by mouth as needed for moderate to severe pain (up to 8 tabs in 24 hours) )     dexamethasone (DECADRON) 4 MG tablet Take 2 tablets (8 mg) by mouth daily Take for 3 days, starting the day after cisplatin (Day 2).     diazepam (VALIUM) 5 MG tablet Take up to one tablet by mouth daily as needed for anxiety     lisinopril (PRINIVIL/ZESTRIL) 40 MG tablet Take 1 tablet (40 mg) by mouth daily (Patient taking differently: Take 40 mg by mouth every morning )     LORazepam (ATIVAN PO) Take 0.5 mg by mouth every 6 hours as needed for anxiety     mirtazapine (REMERON) 45 MG tablet Take 1 tablet (45 mg) by mouth At Bedtime And may take 1/2 tablet for insomnia up to 3 times a week.     ondansetron (ZOFRAN-ODT) 8 MG ODT tab Take 1 tablet (8 mg) by mouth every 8 hours as needed for nausea     prochlorperazine (COMPAZINE) 10 MG tablet Take 0.5 tablets (5 mg) by mouth every 6 hours as needed for nausea or vomiting     prochlorperazine (COMPAZINE) 10 MG tablet Take 1 tablet (10 mg) by mouth every 6 hours  as needed (Nausea/Vomiting)     tamsulosin (FLOMAX) 0.4 MG capsule Take 1 capsule (0.4 mg) by mouth daily     dexamethasone (DECADRON) 4 MG tablet Take 1 tablet (4 mg) by mouth 2 times daily (with meals) (Patient not taking: Reported on 4/25/2018)     [DISCONTINUED] dexamethasone (DECADRON) 4 MG tablet Take 2 tablets (8 mg) by mouth daily Take for 3 days, starting the day after cisplatin (Day 2). (Patient not taking: Reported on 4/4/2018)     No current facility-administered medications for this visit.         ALLERGIES      Allergies   Allergen Reactions     Aspirin      This drug inhibits platelets and is contraindicated due to hemophilia diagnosis.        Labs:  CBC RESULTS:   Recent Labs   Lab Test  04/25/18   0748   WBC  13.8*   RBC  4.34*   HGB  11.7*   HCT  36.7*   MCV  85   MCH  27.0   MCHC  31.9   RDW  16.3*   PLT  469*     Recent Labs   Lab Test  04/25/18   0748  04/11/18   0822   NA  140  134   POTASSIUM  4.0  3.7   CHLORIDE  108  102   CO2  25  25   ANIONGAP  8  7   GLC  117*  115*   BUN  17  22   CR  1.07  1.01   MARLA  9.5  9.1       Magnesum; 1.9  ASSESSMENT/PLAN:     68 year old male with T2N1(solitary node)  urothelial carcinoma,currently enrolled on the clinical trial of Nivolumab with Gemcitabine and Cisplatin for MIBC.  He is planned for radical cystectomy by Dr. Shea within 6-8 weeks after completion of neoadjuvant therapy.     Plan:   Proceed with C2D1 cisplatin today.   Will get neulasta with each cycle.  Hemophilia; management of factor replacement per Dr. Marley.    RTC per study protocol treatment schedule.    DM: diet controlled, encouraged to monitor sugars closely.  Fatigue: Much better. Discussed that he may experience again after each cycle when he gets chemotherapy, may be more pronounced then.      Magalie Espinal MD  Hematology Oncology and Transplantation  [facility]

## 2018-04-25 NOTE — PROGRESS NOTES
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   3/26/18: Consented for the Nivolumab-Gemcitabine-Cisplatin study [id number]    3/28/18: Screening visit for study.   4/4/18; c1d1 gemcitabine  4/11/18: c1d8 gemcitabine-nivolumab  4/25/18: C2D1 gemcitabine-Cisplatin    HPI:    68 year old male with PMH of hemophilia, chronic hepatis C who was seen by DR. Burns for evaluation of neoadjuvant therapy with Gemcitabine Cisplatin and Nivolumab.  I will be taking over his care while on study.     Patient has a history of hemophilia and has undergone several surgeries in right hip replacement, knee replacement. Hemophilia managed by Dr. Marley.   He reports recurrent hematuria for a year, initially attributed to kidney stones, cystoscopy in Feb 2018 revealed:  Bladder tumor, transurethral resection:   - Invasive high grade urothelial carcinoma   - Extent of invasion: Muscularis propria, multifocal   - Lymphovascular invasion: Present     PET-CT scan - which has revealed muscle invasive bladder right lateral wall of bladder and right  obturator lymphnode, 1.1 cm short axis, suspicious for metastatic disease.  He has been treated for HCV in the 1990s and has not had any complications. His LFTs have remained normal.   His hemophilia is very well controlled on current regimen of factor replacement.   He does not have any autoimmune disease and is not on immunesuppressants or steroids.   His diabetes is diet controlled and he has not required any medications.     Interval History:  Denies nausea, vomiting, hearing loss, neuropathy, fever, chills.  Denies any immune related AEs   No bleeding issues.      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      Bladder tumor      Depression      DM II (diabetes mellitus, type II), controlled (H)     diet controlled     Hemophilia (H)     Type A      Hepatitis C, chronic (H)     treated [facility] interferon/ribavirin     HTN (hypertension)           CURRENT OUTPATIENT MEDICATIONS     Current Outpatient Prescriptions   Medication Sig     Acetaminophen (TYLENOL PO) Take 500 mg by mouth every 4 hours as needed for mild pain or fever     amLODIPine (NORVASC) 5 MG tablet Take 1 tablet (5 mg) by mouth daily (Patient taking differently: Take 5 mg by mouth every morning )     Antihemophilic Factor, Recomb, (KOGENATE FS) 1000 UNITS KIT Infuse 3000 units alternating with 1000 units daily     codeine 30 MG tablet Take 2 tablets (60 mg) by mouth every 6 hours as needed for moderate to severe pain (up to 8 tabs in 24 hours) (Patient taking differently: Take 60 mg by mouth as needed for moderate to severe pain (up to 8 tabs in 24 hours) )     dexamethasone (DECADRON) 4 MG tablet Take 2 tablets (8 mg) by mouth daily Take for 3 days, starting the day after cisplatin (Day 2).     diazepam (VALIUM) 5 MG tablet Take up to one tablet by mouth daily as needed for anxiety     lisinopril (PRINIVIL/ZESTRIL) 40 MG tablet Take 1 tablet (40 mg) by mouth daily (Patient taking differently: Take 40 mg by mouth every morning )     LORazepam (ATIVAN PO) Take 0.5 mg by mouth every 6 hours as needed for anxiety     mirtazapine (REMERON) 45 MG tablet Take 1 tablet (45 mg) by mouth At Bedtime And may take 1/2 tablet for insomnia up to 3 times a week.     ondansetron (ZOFRAN-ODT) 8 MG ODT tab Take 1 tablet (8 mg) by mouth every 8 hours as needed for nausea     prochlorperazine (COMPAZINE) 10 MG tablet Take 0.5 tablets (5 mg) by mouth every 6 hours as needed for nausea or vomiting     prochlorperazine (COMPAZINE) 10 MG tablet Take 1 tablet (10 mg) by mouth every 6 hours as needed (Nausea/Vomiting)     tamsulosin (FLOMAX) 0.4 MG capsule Take 1 capsule (0.4 mg) by mouth daily     dexamethasone (DECADRON) 4 MG tablet Take 1 tablet (4 mg) by mouth 2 times daily (with meals) (Patient not taking: Reported on  4/25/2018)     [DISCONTINUED] dexamethasone (DECADRON) 4 MG tablet Take 2 tablets (8 mg) by mouth daily Take for 3 days, starting the day after cisplatin (Day 2). (Patient not taking: Reported on 4/4/2018)     No current facility-administered medications for this visit.         ALLERGIES      Allergies   Allergen Reactions     Aspirin      This drug inhibits platelets and is contraindicated due to hemophilia diagnosis.        Labs:  CBC RESULTS:   Recent Labs   Lab Test  04/25/18   0748   WBC  13.8*   RBC  4.34*   HGB  11.7*   HCT  36.7*   MCV  85   MCH  27.0   MCHC  31.9   RDW  16.3*   PLT  469*     Recent Labs   Lab Test  04/25/18   0748  04/11/18   0822   NA  140  134   POTASSIUM  4.0  3.7   CHLORIDE  108  102   CO2  25  25   ANIONGAP  8  7   GLC  117*  115*   BUN  17  22   CR  1.07  1.01   MARLA  9.5  9.1       Magnesum; 1.9  ASSESSMENT/PLAN:     68 year old male with T2N1(solitary node)  urothelial carcinoma,currently enrolled on the clinical trial of Nivolumab with Gemcitabine and Cisplatin for MIBC.  He is planned for radical cystectomy by Dr. Shea within 6-8 weeks after completion of neoadjuvant therapy.     Plan:   Proceed with C2D1 cisplatin and gemcitabine today.   Will get neulasta with each cycle.  Hemophilia; management of factor replacement per Dr. Marley.    RTC per study protocol treatment schedule.    DM: diet controlled, encouraged to monitor sugars closely.  Fatigue: Much better. Discussed that he may experience again after each cycle when he gets chemotherapy, may be more pronounced then.      Magalie Espinal MD  Hematology Oncology and Transplantation  [facility]

## 2018-04-25 NOTE — PROGRESS NOTES
Infusion Nursing Note:  Orlin Alcantar presents today for Cycle 2 Day 1 Cisplatin, Gemzar.    Patient seen by provider today: Yes: Dr. Espinal    Intravenous Access:  Peripheral IV placed.    Treatment Conditions:  Lab Results   Component Value Date    HGB 11.7 04/25/2018     Lab Results   Component Value Date    WBC 13.8 04/25/2018      Lab Results   Component Value Date    ANEU 12.1 04/25/2018     Lab Results   Component Value Date     04/25/2018      Lab Results   Component Value Date     04/25/2018                   Lab Results   Component Value Date    POTASSIUM 4.0 04/25/2018           Lab Results   Component Value Date    MAG 1.9 04/25/2018            Lab Results   Component Value Date    CR 1.07 04/25/2018                   Lab Results   Component Value Date    MARLA 9.5 04/25/2018                Lab Results   Component Value Date    BILITOTAL 0.2 04/25/2018           Lab Results   Component Value Date    ALBUMIN 3.7 04/25/2018                    Lab Results   Component Value Date    ALT 18 04/25/2018           Lab Results   Component Value Date    AST 13 04/25/2018       Results reviewed, labs MET treatment parameters, ok to proceed with treatment.    Post Infusion Assessment:  Patient tolerated infusion without incident.  Pt voided in adequate amounts pre, during and post Cisplatin.  Blood return noted pre and post infusion.  Site patent and intact, free from redness, edema or discomfort.  No evidence of extravasations. Access discontinued per protocol.    Discharge Plan:   Prescription refills given for Decadron.  Copy of AVS reviewed with patient and/or family.  Patient will return 5/2 for next appointment.  Patient discharged in stable condition accompanied by: self.  Departure Mode: Ambulatory.  Face to Face time: 0.    Analilia To RN

## 2018-04-30 DIAGNOSIS — Z85.51 PERSONAL HISTORY OF MALIGNANT NEOPLASM OF BLADDER: ICD-10-CM

## 2018-04-30 DIAGNOSIS — D66 SEVERE HEMOPHILIA A (H): ICD-10-CM

## 2018-04-30 RX ORDER — ANTIHEMOPHILIC FACTOR (RECOMBINANT) 1000 (+/-)
KIT INTRAVENOUS
Qty: 15000 EACH | Refills: 1 | Status: SHIPPED | OUTPATIENT
Start: 2018-04-30 | End: 2018-05-14

## 2018-05-01 ENCOUNTER — PRE VISIT (OUTPATIENT)
Dept: UROLOGY | Facility: CLINIC | Age: 68
End: 2018-05-01

## 2018-05-01 NOTE — TELEPHONE ENCOUNTER
Reason for visit: cystoscopy     Relevant information: bladder cancer, cystectomy planned, pt received chemo 4/25/18    Records/imaging/labs: All records available    Pt called: No need for a call    Rooming: collect a urine

## 2018-05-02 ENCOUNTER — INFUSION THERAPY VISIT (OUTPATIENT)
Dept: ONCOLOGY | Facility: CLINIC | Age: 68
End: 2018-05-02
Attending: INTERNAL MEDICINE
Payer: MEDICARE

## 2018-05-02 ENCOUNTER — APPOINTMENT (OUTPATIENT)
Dept: LAB | Facility: CLINIC | Age: 68
End: 2018-05-02
Attending: INTERNAL MEDICINE
Payer: MEDICARE

## 2018-05-02 ENCOUNTER — RESEARCH ENCOUNTER (OUTPATIENT)
Dept: ONCOLOGY | Facility: CLINIC | Age: 68
End: 2018-05-02

## 2018-05-02 VITALS
HEIGHT: 72 IN | BODY MASS INDEX: 21.25 KG/M2 | RESPIRATION RATE: 18 BRPM | HEART RATE: 99 BPM | DIASTOLIC BLOOD PRESSURE: 77 MMHG | TEMPERATURE: 97.7 F | WEIGHT: 156.9 LBS | SYSTOLIC BLOOD PRESSURE: 112 MMHG | OXYGEN SATURATION: 98 %

## 2018-05-02 DIAGNOSIS — C67.9 UROTHELIAL CARCINOMA OF BLADDER (H): Primary | ICD-10-CM

## 2018-05-02 DIAGNOSIS — D66 HEMOPHILIA A (H): Primary | ICD-10-CM

## 2018-05-02 LAB
ALBUMIN SERPL-MCNC: 3.7 G/DL (ref 3.4–5)
ALP SERPL-CCNC: 113 U/L (ref 40–150)
ALT SERPL W P-5'-P-CCNC: 48 U/L (ref 0–70)
ANION GAP SERPL CALCULATED.3IONS-SCNC: 7 MMOL/L (ref 3–14)
AST SERPL W P-5'-P-CCNC: 24 U/L (ref 0–45)
BASOPHILS # BLD AUTO: 0 10E9/L (ref 0–0.2)
BASOPHILS NFR BLD AUTO: 0.3 %
BILIRUB SERPL-MCNC: 0.5 MG/DL (ref 0.2–1.3)
BUN SERPL-MCNC: 17 MG/DL (ref 7–30)
CALCIUM SERPL-MCNC: 9.2 MG/DL (ref 8.5–10.1)
CHLORIDE SERPL-SCNC: 105 MMOL/L (ref 94–109)
CO2 SERPL-SCNC: 27 MMOL/L (ref 20–32)
CREAT SERPL-MCNC: 0.9 MG/DL (ref 0.66–1.25)
DIFFERENTIAL METHOD BLD: ABNORMAL
EOSINOPHIL # BLD AUTO: 0 10E9/L (ref 0–0.7)
EOSINOPHIL NFR BLD AUTO: 0.3 %
ERYTHROCYTE [DISTWIDTH] IN BLOOD BY AUTOMATED COUNT: 16.2 % (ref 10–15)
GFR SERPL CREATININE-BSD FRML MDRD: 84 ML/MIN/1.7M2
GLUCOSE SERPL-MCNC: 93 MG/DL (ref 70–99)
HCT VFR BLD AUTO: 34.6 % (ref 40–53)
HGB BLD-MCNC: 11.1 G/DL (ref 13.3–17.7)
IMM GRANULOCYTES # BLD: 0.1 10E9/L (ref 0–0.4)
IMM GRANULOCYTES NFR BLD: 1.4 %
LAB SCANNED RESULT: NORMAL
LDH SERPL L TO P-CCNC: 121 U/L (ref 85–227)
LYMPHOCYTES # BLD AUTO: 1.1 10E9/L (ref 0.8–5.3)
LYMPHOCYTES NFR BLD AUTO: 16.7 %
MAGNESIUM SERPL-MCNC: 1.9 MG/DL (ref 1.6–2.3)
MCH RBC QN AUTO: 26.9 PG (ref 26.5–33)
MCHC RBC AUTO-ENTMCNC: 32.1 G/DL (ref 31.5–36.5)
MCV RBC AUTO: 84 FL (ref 78–100)
MONOCYTES # BLD AUTO: 0.5 10E9/L (ref 0–1.3)
MONOCYTES NFR BLD AUTO: 7.6 %
NEUTROPHILS # BLD AUTO: 4.7 10E9/L (ref 1.6–8.3)
NEUTROPHILS NFR BLD AUTO: 73.7 %
NRBC # BLD AUTO: 0 10*3/UL
NRBC BLD AUTO-RTO: 0 /100
PHOSPHATE SERPL-MCNC: 3 MG/DL (ref 2.5–4.5)
PLATELET # BLD AUTO: 326 10E9/L (ref 150–450)
POTASSIUM SERPL-SCNC: 4.3 MMOL/L (ref 3.4–5.3)
PROT SERPL-MCNC: 7.4 G/DL (ref 6.8–8.8)
RBC # BLD AUTO: 4.13 10E12/L (ref 4.4–5.9)
SODIUM SERPL-SCNC: 139 MMOL/L (ref 133–144)
TSH SERPL DL<=0.005 MIU/L-ACNC: 1.2 MU/L (ref 0.4–4)
URATE SERPL-MCNC: 5.1 MG/DL (ref 3.5–7.2)
WBC # BLD AUTO: 6.3 10E9/L (ref 4–11)

## 2018-05-02 PROCEDURE — 84100 ASSAY OF PHOSPHORUS: CPT | Performed by: INTERNAL MEDICINE

## 2018-05-02 PROCEDURE — 96413 CHEMO IV INFUSION 1 HR: CPT

## 2018-05-02 PROCEDURE — 84443 ASSAY THYROID STIM HORMONE: CPT | Performed by: INTERNAL MEDICINE

## 2018-05-02 PROCEDURE — 85025 COMPLETE CBC W/AUTO DIFF WBC: CPT | Performed by: INTERNAL MEDICINE

## 2018-05-02 PROCEDURE — 96375 TX/PRO/DX INJ NEW DRUG ADDON: CPT

## 2018-05-02 PROCEDURE — 80053 COMPREHEN METABOLIC PANEL: CPT | Performed by: INTERNAL MEDICINE

## 2018-05-02 PROCEDURE — 96417 CHEMO IV INFUS EACH ADDL SEQ: CPT

## 2018-05-02 PROCEDURE — 25000128 H RX IP 250 OP 636: Mod: ZF | Performed by: INTERNAL MEDICINE

## 2018-05-02 PROCEDURE — 83735 ASSAY OF MAGNESIUM: CPT | Performed by: INTERNAL MEDICINE

## 2018-05-02 PROCEDURE — 99214 OFFICE O/P EST MOD 30 MIN: CPT | Mod: ZP | Performed by: INTERNAL MEDICINE

## 2018-05-02 PROCEDURE — 83615 LACTATE (LD) (LDH) ENZYME: CPT | Performed by: INTERNAL MEDICINE

## 2018-05-02 PROCEDURE — G0463 HOSPITAL OUTPT CLINIC VISIT: HCPCS | Mod: ZF

## 2018-05-02 PROCEDURE — 84550 ASSAY OF BLOOD/URIC ACID: CPT | Performed by: INTERNAL MEDICINE

## 2018-05-02 PROCEDURE — 96377 APPLICATON ON-BODY INJECTOR: CPT | Mod: 59

## 2018-05-02 RX ORDER — ONDANSETRON 2 MG/ML
8 INJECTION INTRAMUSCULAR; INTRAVENOUS ONCE
Status: COMPLETED | OUTPATIENT
Start: 2018-05-02 | End: 2018-05-02

## 2018-05-02 RX ADMIN — ONDANSETRON 8 MG: 2 INJECTION INTRAMUSCULAR; INTRAVENOUS at 09:09

## 2018-05-02 RX ADMIN — SODIUM CHLORIDE 250 ML: 9 INJECTION, SOLUTION INTRAVENOUS at 08:58

## 2018-05-02 RX ADMIN — GEMCITABINE 1930 MG: 38 INJECTION, SOLUTION INTRAVENOUS at 09:24

## 2018-05-02 RX ADMIN — PEGFILGRASTIM 6 MG: KIT SUBCUTANEOUS at 11:14

## 2018-05-02 ASSESSMENT — PAIN SCALES - GENERAL: PAINLEVEL: NO PAIN (0)

## 2018-05-02 NOTE — MR AVS SNAPSHOT
After Visit Summary   5/2/2018    Orlin Alcantar    MRN: [id number]           Patient Information     Date Of Birth          1950        Visit Information        Provider Department      5/2/2018 8:15 AM Magalie Espinal MD [facility]        Today's Diagnoses     Hemophilia A (H)    -  1       Follow-ups after your visit        Your next 10 appointments already scheduled     May 02, 2018  9:00 AM CDT   Infusion 120 with UC ONCOLOGY INFUSION, UC 25 ATC   [facility] ([facility])    [address] [place] [address]  [address]  [facility] [address]   [phone]            May 09, 2018  4:00 PM CDT   Mason[facility] Lab Draw with UC MASONIC LAB DRAW   [facility] ([facility])    [address] [place] [address]  [address]  [facility] [address]   [phone]            May 09, 2018  5:15 PM CDT   (Arrive by 5:00 PM)   Cystoscopy with Bebeto Shea MD   [facility] ([facility])    [address] [place] [address]  4th Floor  [facility] [address]   [phone]            May 16, 2018  8:15 AM CDT   Masonic Lab Draw with UC MASONIC LAB DRAW   [facility] Draw ([facility])    [address] [place] [address]  [address]  [facility] [address]   [phone]            May 16, 2018  8:45 AM CDT   (Arrive by 8:30 AM)   Return Visit with Magalie Espinal MD   [facility] ([facility])    [address] [place] [address]  [address]  [facility] [address]   [phone]            May 16, 2018  9:30 AM CDT   Infusion 360 with UC ONCOLOGY INFUSION, UC 16 ATC   [facility] ([facility])    [address] [place] [address]  [address]  [facility] [address]   [phone]            May 23, 2018  7:45 AM CDT   Masonic Lab Draw with UC MASONIC LAB DRAW   [facility]    [address]  [address]  [facility] [id number] [phone]            May 23, 2018  8:15 AM CDT   (Arrive by 8:00 AM)   Return Visit with Magalie Espinal MD   [facility] ([facility])    [address]  [address]  [facility] [id number] [phone]              Future tests that were ordered for you today     Open Future Orders        Priority Expected Expires Ordered    *CBC with platelets differential Routine 5/9/2018 5/2/2019 5/2/2018            Who to contact     If you have questions or need follow up information about today's clinic visit or your schedule please contact [facility] directly at [phone].  Normal or non-critical lab and imaging results will be communicated to you by [employer], letter or phone within 4 business days after the clinic has received the results. If you do not hear from us within 7 days, please contact the clinic through [employer] or phone. If you have a critical or abnormal lab result, we will notify you by phone as soon as possible.  Submit refill requests through [employer] or call your pharmacy and they will forward the refill request to us. Please allow 3 business days for your refill to be completed.          Additional Information About Your Visit        [employer] Information     [employer] gives you secure access to your electronic health record. If you see a primary care provider, you can also send messages to your care team and make appointments. If you have questions, please call your primary care clinic.  If you do not have a primary care provider, please call [phone] and they will assist you.        Care EveryWhere ID     This is your Care EveryWhere ID. This could be used by other organizations to access your [facility] medical records  [id number]        Your Vitals Were     Pulse Temperature Respirations Height Pulse Oximetry BMI (Body Mass  "Index)    99 97.7  F (36.5  C) 18 1.829 m (6' 0.01\") 98% 21.27 kg/m2       Blood Pressure from Last 3 Encounters:   05/02/18 112/77   04/25/18 113/73   04/25/18 113/73    Weight from Last 3 Encounters:   05/02/18 71.2 kg (156 lb 14.4 oz)   04/25/18 71.4 kg (157 lb 4.8 oz)   04/25/18 71.3 kg (157 lb 4 oz)                 Today's Medication Changes          These changes are accurate as of 5/2/18  8:35 AM.  If you have any questions, ask your nurse or doctor.               These medicines have changed or have updated prescriptions.        Dose/Directions    amLODIPine 5 MG tablet   Commonly known as:  NORVASC   This may have changed:  when to take this   Used for:  Hypertension goal BP (blood pressure) < 140/90        Dose:  5 mg   Take 1 tablet (5 mg) by mouth daily   Quantity:  90 tablet   Refills:  3       codeine 30 MG tablet   This may have changed:  when to take this   Used for:  Hemophilic arthropathy        Dose:  60 mg   Take 2 tablets (60 mg) by mouth every 6 hours as needed for moderate to severe pain (up to 8 tabs in 24 hours)   Quantity:  168 tablet   Refills:  0       lisinopril 40 MG tablet   Commonly known as:  PRINIVIL/ZESTRIL   This may have changed:  when to take this   Used for:  Hypertension goal BP (blood pressure) < 140/90        Dose:  40 mg   Take 1 tablet (40 mg) by mouth daily   Quantity:  90 tablet   Refills:  3                Primary Care Provider Office Phone # Fax #    Katie Ramos [phone] [address]  [facility] [facility] [address]        Equal Access to Services     REENA FLANAGAN AH: Hadgeeta Colmenares, wilber eastman, qanatalio kaalmaondina huddleston, lloyd blum. So [facility] [phone].    ATENCIÓN: Si habla español, tiene a hernadez disposición servicios gratuitos de asistencia lingüística. Llame al [phone].    We comply with applicable federal civil rights laws and [place] laws. We do not discriminate on the basis of race, " color, national origin, age, disability, sex, sexual orientation, or gender identity.            Thank you!     Thank you for choosing [facility]  for your care. Our goal is always to provide you with excellent care. Hearing back from our patients is one way we can continue to improve our services. Please take a few minutes to complete the written survey that you may receive in the mail after your visit with us. Thank you!             Your Updated Medication List - Protect others around you: Learn how to safely use, store and throw away your medicines at www.disposemymeds.org.          This list is accurate as of 5/2/18  8:35 AM.  Always use your most recent med list.                   Brand Name Dispense Instructions for use Diagnosis    amLODIPine 5 MG tablet    NORVASC    90 tablet    Take 1 tablet (5 mg) by mouth daily    Hypertension goal BP (blood pressure) < 140/90       ATIVAN PO      Take 0.5 mg by mouth every 6 hours as needed for anxiety        codeine 30 MG tablet     168 tablet    Take 2 tablets (60 mg) by mouth every 6 hours as needed for moderate to severe pain (up to 8 tabs in 24 hours)    Hemophilic arthropathy       * dexamethasone 4 MG tablet    DECADRON    24 tablet    Take 1 tablet (4 mg) by mouth 2 times daily (with meals)    Bladder tumor       * dexamethasone 4 MG tablet    DECADRON    18 tablet    Take 2 tablets (8 mg) by mouth daily Take for 3 days, starting the day after cisplatin (Day 2).    Urothelial carcinoma of bladder (H)       diazepam 5 MG tablet    VALIUM    30 tablet    Take up to one tablet by mouth daily as needed for anxiety    Depressive disorder       KOGENATE FS 1000 units Kit     52045 each    Infuse 3000 units every other day    Severe hemophilia A (H), Personal history of malignant neoplasm of bladder       lisinopril 40 MG tablet    PRINIVIL/ZESTRIL    90 tablet    Take 1 tablet (40 mg) by mouth daily    Hypertension goal BP (blood pressure) < 140/90        mirtazapine 45 MG tablet    REMERON    37 tablet    Take 1 tablet (45 mg) by mouth At Bedtime And may take 1/2 tablet for insomnia up to 3 times a week.    Generalized anxiety disorder       ondansetron 8 MG ODT tab    ZOFRAN-ODT    30 tablet    Take 1 tablet (8 mg) by mouth every 8 hours as needed for nausea    Bladder tumor       * prochlorperazine 10 MG tablet    COMPAZINE    30 tablet    Take 0.5 tablets (5 mg) by mouth every 6 hours as needed for nausea or vomiting    Bladder tumor       * prochlorperazine 10 MG tablet    COMPAZINE    30 tablet    Take 1 tablet (10 mg) by mouth every 6 hours as needed (Nausea/Vomiting)    Urothelial carcinoma of bladder (H)       tamsulosin 0.4 MG capsule    FLOMAX    45 capsule    Take 1 capsule (0.4 mg) by mouth daily    Incomplete bladder emptying       TYLENOL PO      Take 500 mg by mouth every 4 hours as needed for mild pain or fever        * Notice:  This list has 4 medication(s) that are the same as other medications prescribed for you. Read the directions carefully, and ask your doctor or other care provider to review them with you.

## 2018-05-02 NOTE — PATIENT INSTRUCTIONS
Contact Numbers    [facility] [place]: [phone]  [facility] [place]:  [phone]    Call triage with chills and/or temperature greater than or equal to 100.5, uncontrolled nausea/vomiting, diarrhea, constipation, dizziness, shortness of breath, chest pain, bleeding, unexplained bruising, or any new/concerning symptoms, questions/concerns.     If you are having any concerning symptoms or wish to speak to a provider before your next infusion visit, please call your care coordinator or triage to notify them so we can adequately serve you.       After Hours: [phone]    If after hours, weekends, or holidays, call main hospital  and ask for Oncology doctor on call.     Neulasta Onpro On-Body injector applied at 1115   Neulasta injection will start on 5/3/18 at 2:15pm, approximately 27 hours after application  When the dose delivery starts, it will take about 45 minutes to complete.  Neulasta Onpro On-Body should have green flashing light, call triage or on-call MD if injector flashes red or appears to be leaking.   Keep Onpro On-Body Neulasta 4 inches away from electrical equipment and avoid showering 4 hours prior to injection.             May 2018   Ricky Monday Tuesday Wednesday Thursday Friday Saturday             1     2     [facility] DRAW    7:45 AM   (15 min.)    [facility]   [facility]     [facility] RETURN    8:00 AM   (30 min.)   Magalie Espinal MD   [facility] ONC INFUSION 120    9:00 AM   (120 min.)    ONCOLOGY INFUSION   [facility] 3     4     5       6     7     8     9     P [facility] LAB DRAW    4:00 PM   (15 min.)    [facility]   [facility]     P CYSTOSCOPY    5:00 PM   (20 min.)   Bebeot Shea MD   [facility] 10     11     12       13     14     15     16     P [facility] LAB DRAW    8:15 AM   (15 min.)    [facility] DRAW   [facility]  Draw     [facility] RETURN    8:30 AM   (30 min.)   Magalie Espinal MD   [facility]     [facility] ONC INFUSION 360    9:30 AM   (360 min.)    ONCOLOGY INFUSION   [facility] 17     18     19       20     21     22     23     [facility] DRAW    7:45 AM   (15 min.)   [facility] DRAW   [facility] Draw     [facility] RETURN    8:00 AM   (30 min.)   Magalie Espinal MD   [facility]     [facility] ONC INFUSION 120    9:00 AM   (120 min.)    ONCOLOGY INFUSION   [facility] 24     25     26       27     28     29     30     31                          June 2018 Sunday Monday Tuesday Wednesday Thursday Friday Saturday                            1     2       3     4     5     6     [facility] DRAW    9:00 AM   (15 min.)    [facility] LAB DRAW   [facility] Draw     [facility] RETURN    9:15 AM   (50 min.)   Brianna Harris PA-C   [facility]   10:30 AM   (360 min.)    ONCOLOGY INFUSION   [facility] 7     8     9       10     11     12     13     [facility] DRAW    7:15 AM   (15 min.)    [facility] LAB DRAW   [facility] Draw     [facility] RETURN    7:30 AM   (30 min.)   Magalie Espinal MD   [facility]    9:00 AM   (120 min.)    ONCOLOGY INFUSION   [facility] 14     15     16       17     18     19     20     21     22     23       24     25     26     27     28     29     30                 Recent Results (from the past 24 hour(s))   CBC with platelets differential    Collection Time: 05/02/18  7:53 AM   Result Value Ref Range    WBC 6.3 4.0 - 11.0 10e9/L    RBC Count 4.13 (L) 4.4 - 5.9 10e12/L    Hemoglobin 11.1 (L) 13.3 - 17.7 g/dL    Hematocrit 34.6 (L) 40.0 - 53.0 %    MCV 84 78 - 100 fl    MCH 26.9 26.5 - 33.0 pg    MCHC 32.1 31.5 - 36.5 g/dL    RDW 16.2 (H) 10.0 - 15.0 %    Platelet Count 326 150 -  450 10e9/L    Diff Method Automated Method     % Neutrophils 73.7 %    % Lymphocytes 16.7 %    % Monocytes 7.6 %    % Eosinophils 0.3 %    % Basophils 0.3 %    % Immature Granulocytes 1.4 %    Nucleated RBCs 0 0 /100    Absolute Neutrophil 4.7 1.6 - 8.3 10e9/L    Absolute Lymphocytes 1.1 0.8 - 5.3 10e9/L    Absolute Monocytes 0.5 0.0 - 1.3 10e9/L    Absolute Eosinophils 0.0 0.0 - 0.7 10e9/L    Absolute Basophils 0.0 0.0 - 0.2 10e9/L    Abs Immature Granulocytes 0.1 0 - 0.4 10e9/L    Absolute Nucleated RBC 0.0    Comprehensive metabolic panel    Collection Time: 05/02/18  7:53 AM   Result Value Ref Range    Sodium 139 133 - 144 mmol/L    Potassium 4.3 3.4 - 5.3 mmol/L    Chloride 105 94 - 109 mmol/L    Carbon Dioxide 27 20 - 32 mmol/L    Anion Gap 7 3 - 14 mmol/L    Glucose 93 70 - 99 mg/dL    Urea Nitrogen 17 7 - 30 mg/dL    Creatinine 0.90 0.66 - 1.25 mg/dL    GFR Estimate 84 >60 mL/min/1.7m2    GFR Estimate If Black >90 >60 mL/min/1.7m2    Calcium 9.2 8.5 - 10.1 mg/dL    Bilirubin Total 0.5 0.2 - 1.3 mg/dL    Albumin 3.7 3.4 - 5.0 g/dL    Protein Total 7.4 6.8 - 8.8 g/dL    Alkaline Phosphatase 113 40 - 150 U/L    ALT 48 0 - 70 U/L    AST 24 0 - 45 U/L   Magnesium    Collection Time: 05/02/18  7:53 AM   Result Value Ref Range    Magnesium 1.9 1.6 - 2.3 mg/dL   Phosphorus    Collection Time: 05/02/18  7:53 AM   Result Value Ref Range    Phosphorus 3.0 2.5 - 4.5 mg/dL   Lactate Dehydrogenase    Collection Time: 05/02/18  7:53 AM   Result Value Ref Range    Lactate Dehydrogenase 121 85 - 227 U/L   Uric acid    Collection Time: 05/02/18  7:53 AM   Result Value Ref Range    Uric Acid 5.1 3.5 - 7.2 mg/dL   TSH    Collection Time: 05/02/18  7:53 AM   Result Value Ref Range    TSH 1.20 0.40 - 4.00 mU/L

## 2018-05-02 NOTE — PROGRESS NOTES
Infusion Nursing Note:  Orlin Alcantar presents today for Cycle 2 day 8 Gemzar,Nivolumab (IDS #5182), neulasta onpro    Patient seen by provider today: Yes: Dr. Espinal   present during visit today: Not Applicable.    Note: Spoke with Eric PERDUE from research, ok to proceed with treatment per research.    Intravenous Access:  Peripheral IV placed by vascular access    Treatment Conditions:  Lab Results   Component Value Date    HGB 11.1 05/02/2018     Lab Results   Component Value Date    WBC 6.3 05/02/2018      Lab Results   Component Value Date    ANEU 4.7 05/02/2018     Lab Results   Component Value Date     05/02/2018      Lab Results   Component Value Date     05/02/2018                   Lab Results   Component Value Date    POTASSIUM 4.3 05/02/2018           Lab Results   Component Value Date    MAG 1.9 05/02/2018            Lab Results   Component Value Date    CR 0.90 05/02/2018                   Lab Results   Component Value Date    MARLA 9.2 05/02/2018                Lab Results   Component Value Date    BILITOTAL 0.5 05/02/2018           Lab Results   Component Value Date    ALBUMIN 3.7 05/02/2018                    Lab Results   Component Value Date    ALT 48 05/02/2018           Lab Results   Component Value Date    AST 24 05/02/2018     Results reviewed, labs MET treatment parameters, ok to proceed with treatment.    Post Infusion Assessment:  Patient tolerated infusion without incident.  Patient observed for 60 minutes post Gemzar per research instructions.  Blood return noted pre and post infusion.  Site patent and intact, free from redness, edema or discomfort.  No evidence of extravasations.  Access discontinued per protocol.    Neulasta Onpro On-Body injector applied to right upper posterior arm at 1115 with light facing down.  Writer discussed Neulasta injection would start on 5/3/18 at 1415, approximately 27 hours after application applied today.  Written and Verbal instruction  reviewed with patient.  Pt instructed when the dose delivery starts, it will take about 45 minutes to complete.  Pt aware Neulasta Onpro On-Body should have green flashing light and to call triage or on-call MD if injector flashes red or appears to be leaking. Pt aware to keep Onpro On-Body Neulasta 4 inches away from electrical equipment and to avoid showering 4 hours prior to injection.   Neulasta Onpro Lot number: [id number]    Discharge Plan:   Patient declined prescription refills.  Discharge instructions reviewed with: Patient.  Patient and/or family verbalized understanding of discharge instructions and all questions answered.  Copy of AVS reviewed with patient and/or family.  Patient will return 5/16/18 for next appointment.  Patient discharged in stable condition accompanied by: self.  Departure Mode: Ambulatory.    SOURAV GOMEZ RN

## 2018-05-02 NOTE — NURSING NOTE
"Oncology Rooming Note    May 2, 2018 8:14 AM   Orlin Alcantar is a 68 year old male who presents for:    Chief Complaint   Patient presents with     Blood Draw      done by RN, IV placed by vascular      Oncology Clinic Visit     Return: Bladder ca      Initial Vitals: /77  Pulse 99  Temp 97.7  F (36.5  C)  Resp 18  Ht 1.829 m (6' 0.01\")  Wt 71.2 kg (156 lb 14.4 oz)  SpO2 98%  BMI 21.27 kg/m2 Estimated body mass index is 21.27 kg/(m^2) as calculated from the following:    Height as of this encounter: 1.829 m (6' 0.01\").    Weight as of this encounter: 71.2 kg (156 lb 14.4 oz). Body surface area is 1.9 meters squared.  No Pain (0) Comment: Data Unavailable   No LMP for male patient.  Allergies reviewed: YES  Medications reviewed: YES    Medications: Medication refills not needed today.  Pharmacy name entered into [facility]:    [facility] - [place], [place] - [address] [place] [address] PHARMACY - [facility] - [place], [place] - [facility] [address] PHARMACY [place], [place] - [address]    Clinical concerns: no new concerns.  Unable to give flu vaccine to patient for research reasons.  Sakina Ugalde CMA        6 minutes for nursing intake (face to face time)     Sakina Ugalde CMA                "

## 2018-05-02 NOTE — LETTER
5/2/2018       RE: Orlin Alcantar  [address]   [place] [address]     Dear Colleague,    Thank you for referring your patient, Orlin Alcantar, to the [facility]. Please see a copy of my visit note below.    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   3/26/18: Consented for the Nivolumab-Gemcitabine-Cisplatin study [id number]    3/28/18: Screening visit for study.   4/4/18; c1d1 gemcitabine  4/11/18: c1d8 gemcitabine-nivolumab  4/25/18: C2D1 gemcitabine-Cisplatin    HPI:    68 year old male with PMH of hemophilia, chronic hepatis C who was seen by DR. Burns for evaluation of neoadjuvant therapy with Gemcitabine Cisplatin and Nivolumab.  I will be taking over his care while on study.     Patient has a history of hemophilia and has undergone several surgeries in right hip replacement, knee replacement. Hemophilia managed by Dr. Marley.   He reports recurrent hematuria for a year, initially attributed to kidney stones, cystoscopy in Feb 2018 revealed:  Bladder tumor, transurethral resection:   - Invasive high grade urothelial carcinoma   - Extent of invasion: Muscularis propria, multifocal   - Lymphovascular invasion: Present     PET-CT scan - which has revealed muscle invasive bladder right lateral wall of bladder and right  obturator lymphnode, 1.1 cm short axis, suspicious for metastatic disease.  He has been treated for HCV in the 1990s and has not had any complications. His LFTs have remained normal.   His hemophilia is very well controlled on current regimen of factor replacement.   He does not have any autoimmune disease and is not on immunesuppressants or steroids.   His diabetes is diet controlled and he has not required any medications.     Interval History:  Nausea and fatigue are much better.  Denies vomiting, hearing loss, neuropathy, fever, chills.  Denies any immune related AEs   No bleeding  issues.      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      Bladder tumor      Depression      DM II (diabetes mellitus, type II), controlled (H)     diet controlled     Hemophilia (H)     Type A     Hepatitis C, chronic (H)     treated [facility] interferon/ribavirin     HTN (hypertension)           CURRENT OUTPATIENT MEDICATIONS     Current Outpatient Prescriptions   Medication Sig     Acetaminophen (TYLENOL PO) Take 500 mg by mouth every 4 hours as needed for mild pain or fever     amLODIPine (NORVASC) 5 MG tablet Take 1 tablet (5 mg) by mouth daily (Patient taking differently: Take 5 mg by mouth every morning )     Antihemophilic Factor, Recomb, (KOGENATE FS) 1000 units KIT Infuse 3000 units every other day     codeine 30 MG tablet Take 2 tablets (60 mg) by mouth every 6 hours as needed for moderate to severe pain (up to 8 tabs in 24 hours) (Patient taking differently: Take 60 mg by mouth as needed for moderate to severe pain (up to 8 tabs in 24 hours) )     dexamethasone (DECADRON) 4 MG tablet Take 1 tablet (4 mg) by mouth 2 times daily (with meals)     dexamethasone (DECADRON) 4 MG tablet Take 2 tablets (8 mg) by mouth daily Take for 3 days, starting the day after cisplatin (Day 2).     diazepam (VALIUM) 5 MG tablet Take up to one tablet by mouth daily as needed for anxiety     lisinopril (PRINIVIL/ZESTRIL) 40 MG tablet Take 1 tablet (40 mg) by mouth daily (Patient taking differently: Take 40 mg by mouth every morning )     LORazepam (ATIVAN PO) Take 0.5 mg by mouth every 6 hours as needed for anxiety     mirtazapine (REMERON) 45 MG tablet Take 1 tablet (45 mg) by mouth At Bedtime And may take 1/2 tablet for insomnia up to 3 times a week.     ondansetron (ZOFRAN-ODT) 8 MG ODT tab Take 1 tablet (8 mg) by mouth every 8 hours as needed for nausea     prochlorperazine (COMPAZINE) 10 MG tablet Take 0.5 tablets (5 mg) by mouth every 6 hours as needed for  nausea or vomiting     prochlorperazine (COMPAZINE) 10 MG tablet Take 1 tablet (10 mg) by mouth every 6 hours as needed (Nausea/Vomiting)     tamsulosin (FLOMAX) 0.4 MG capsule Take 1 capsule (0.4 mg) by mouth daily     No current facility-administered medications for this visit.         ALLERGIES      Allergies   Allergen Reactions     Aspirin      This drug inhibits platelets and is contraindicated due to hemophilia diagnosis.        CBC RESULTS:   Recent Labs   Lab Test  05/02/18   0753   WBC  6.3   RBC  4.13*   HGB  11.1*   HCT  34.6*   MCV  84   MCH  26.9   MCHC  32.1   RDW  16.2*   PLT  326     Recent Labs   Lab Test  05/02/18   0753  04/25/18   0748   NA  139  140   POTASSIUM  4.3  4.0   CHLORIDE  105  108   CO2  27  25   ANIONGAP  7  8   GLC  93  117*   BUN  17  17   CR  0.90  1.07   MARLA  9.2  9.5           Magnesum; 1.9    ASSESSMENT/PLAN:     68 year old male with T2N1(solitary node)  urothelial carcinoma,currently enrolled on the clinical trial of Nivolumab with Gemcitabine and Cisplatin for MIBC.  He is planned for radical cystectomy by Dr. Shea within 6-8 weeks after completion of neoadjuvant therapy.     Plan:   Proceed with C2D8 gemcitabine-nivolumab today.   Will get neulasta with each cycle.  Hemophilia; management of factor replacement per Dr. Marley.    RTC per study protocol treatment schedule.    DM: diet controlled, encouraged to monitor sugars closely.  Fatigue: Much better.    Magalie Espinal MD  Hematology Oncology and Transplantation  [facility]

## 2018-05-02 NOTE — PROGRESS NOTES
Phase II Trial of Neoadjuvant  Nivolumab with Cisplatin and Gemcitabine in Muscle-Invasive Bladder Cancer (MIBC) Patients undergoing Radical Cystectomy   [id number]  [id number]  PI and treating physician: Dr. Magalie Espinal      Cycle 2, Day 8. plan is for gem/nivo today.      Dr. Espinal and writer met with patient. Physical exam per Dr. Espinal. Conmeds, AEs, and labs reviewed. Reports intermittent fatigue gr 1, starting 4/7/18. This is generally worse Sunday/Monday following Wed Day 1 infusion. Rare Zofran and compazine PRN prophylactically, denies nausea at present. Reports headache every 10 days approx which is baseline - tylenol is effective. Anemia grade 1 - baseline.      79mL/min (original formula)   Creatine clearance by Cockgraft-Gault   Age: 68 years old  Height: 6'  Weight: 71.2 kg  Creat: 0.9      Report given to infusion RN Sakina.      Patient will have CBC w/ dif drawn prior to urology appointment with Dr. Shea next week (cysto and surgery planning). Patient will return to clinic 5/16/18 for Cycle 3 Day 1.      Eric Srivastava RN  Clinical Research Coordinator  Clinical Trials Office  [facility]  Desk Phone: [phone]  Pager: [phone]

## 2018-05-02 NOTE — MR AVS SNAPSHOT
After Visit Summary   5/2/2018    Orlin Alcantar    MRN: [id number]           Patient Information     Date Of Birth          1950        Visit Information        Provider Department      5/2/2018 9:00 AM UC 25 ATC;  ONCOLOGY INFUSION [facility]        Today's Diagnoses     Urothelial carcinoma of bladder (H)    -  1      Care Instructions    Contact Numbers    [facility] Main Line: [phone]  [facility] Triage:  [phone]    Call triage with chills and/or temperature greater than or equal to 100.5, uncontrolled nausea/vomiting, diarrhea, constipation, dizziness, shortness of breath, chest pain, bleeding, unexplained bruising, or any new/concerning symptoms, questions/concerns.     If you are having any concerning symptoms or wish to speak to a provider before your next infusion visit, please call your care coordinator or triage to notify them so we can adequately serve you.       After Hours: [phone]    If after hours, weekends, or holidays, call main hospital  and ask for Oncology doctor on call.     Neulasta Onpro On-Body injector applied at 1115   Neulasta injection will start on 5/3/18 at 2:15pm, approximately 27 hours after application  When the dose delivery starts, it will take about 45 minutes to complete.  Neulasta Onpro On-Body should have green flashing light, call triage or on-call MD if injector flashes red or appears to be leaking.   Keep Onpro On-Body Neulasta 4 inches away from electrical equipment and avoid showering 4 hours prior to injection.             May 2018   Ricky Monday Tuesday Wednesday Thursday Friday Saturday             1     2     [facility] MASONIC LAB DRAW    7:45 AM   (15 min.)    MASONIC LAB DRAW   [facility] Lab Draw     [facility] RETURN    8:00 AM   (30 min.)   Magalie Espinal MD   [facility] ONC INFUSION 120    9:00 AM   (120 min.)    ONCOLOGY INFUSION   [facility] 3     4     5        6     7     8     9     [facility] DRAW    4:00 PM   (15 min.)   [facility] DRAW   [facility] Mason[facility] Lab Draw     [facility] CYSTOSCOPY    5:00 PM   (20 min.)   Bebeto Shea MD   [facility]rology and [facility] 10     11     12       13     14     15     16     [facility] DRAW    8:15 AM   (15 min.)   [facility] DRAW   [facility] Lab Draw     [facility] RETURN    8:30 AM   (30 min.)   Magalie Espinal MD   [facility]     [facility] ONC INFUSION 360    9:30 AM   (360 min.)   [facility] ONCOLOGY INFUSION   [facility] 17     18     19       20     21     22     23     [facility] DRAW    7:45 AM   (15 min.)   [facility] DRAW   [facility] Lab Draw     [facility] RETURN    8:00 AM   (30 min.)   Magalie Espinal MD   [facility]     [facility] ONC INFUSION 120    9:00 AM   (120 min.)    ONCOLOGY INFUSION   [facility] 24     25     26       27     28     29     30     31 June 2018 Sunday Monday Tuesday Wednesday Thursday Friday Saturday                            1     2       3     4     5     6     [facility] DRAW    9:00 AM   (15 min.)   [facility] DRAW   [facility] Lab Draw     [facility] RETURN    9:15 AM   (50 min.)   Brianna Harris PA-C   [facility]     [facility] ONC INFUSION 360   10:30 AM   (360 min.)   UC ONCOLOGY INFUSION   [facility] 7     8     9       10     11     12     13     [facility] DRAW    7:15 AM   (15 min.)   [facility] LAB DRAW   [facility] Lab Draw     [facility] RETURN    7:30 AM   (30 min.)   Magalie Espinal MD   [facility]     [facility] ONC INFUSION 120    9:00 AM   (120 min.)   UC ONCOLOGY INFUSION   [facility] 14     15     16       17     18     19     20     21     22     23       24     25     26     27     28     29     30                 Recent Results (from the past 24  hour(s))   CBC with platelets differential    Collection Time: 05/02/18  7:53 AM   Result Value Ref Range    WBC 6.3 4.0 - 11.0 10e9/L    RBC Count 4.13 (L) 4.4 - 5.9 10e12/L    Hemoglobin 11.1 (L) 13.3 - 17.7 g/dL    Hematocrit 34.6 (L) 40.0 - 53.0 %    MCV 84 78 - 100 fl    MCH 26.9 26.5 - 33.0 pg    MCHC 32.1 31.5 - 36.5 g/dL    RDW 16.2 (H) 10.0 - 15.0 %    Platelet Count 326 150 - 450 10e9/L    Diff Method Automated Method     % Neutrophils 73.7 %    % Lymphocytes 16.7 %    % Monocytes 7.6 %    % Eosinophils 0.3 %    % Basophils 0.3 %    % Immature Granulocytes 1.4 %    Nucleated RBCs 0 0 /100    Absolute Neutrophil 4.7 1.6 - 8.3 10e9/L    Absolute Lymphocytes 1.1 0.8 - 5.3 10e9/L    Absolute Monocytes 0.5 0.0 - 1.3 10e9/L    Absolute Eosinophils 0.0 0.0 - 0.7 10e9/L    Absolute Basophils 0.0 0.0 - 0.2 10e9/L    Abs Immature Granulocytes 0.1 0 - 0.4 10e9/L    Absolute Nucleated RBC 0.0    Comprehensive metabolic panel    Collection Time: 05/02/18  7:53 AM   Result Value Ref Range    Sodium 139 133 - 144 mmol/L    Potassium 4.3 3.4 - 5.3 mmol/L    Chloride 105 94 - 109 mmol/L    Carbon Dioxide 27 20 - 32 mmol/L    Anion Gap 7 3 - 14 mmol/L    Glucose 93 70 - 99 mg/dL    Urea Nitrogen 17 7 - 30 mg/dL    Creatinine 0.90 0.66 - 1.25 mg/dL    GFR Estimate 84 >60 mL/min/1.7m2    GFR Estimate If Black >90 >60 mL/min/1.7m2    Calcium 9.2 8.5 - 10.1 mg/dL    Bilirubin Total 0.5 0.2 - 1.3 mg/dL    Albumin 3.7 3.4 - 5.0 g/dL    Protein Total 7.4 6.8 - 8.8 g/dL    Alkaline Phosphatase 113 40 - 150 U/L    ALT 48 0 - 70 U/L    AST 24 0 - 45 U/L   Magnesium    Collection Time: 05/02/18  7:53 AM   Result Value Ref Range    Magnesium 1.9 1.6 - 2.3 mg/dL   Phosphorus    Collection Time: 05/02/18  7:53 AM   Result Value Ref Range    Phosphorus 3.0 2.5 - 4.5 mg/dL   Lactate Dehydrogenase    Collection Time: 05/02/18  7:53 AM   Result Value Ref Range    Lactate Dehydrogenase 121 85 - 227 U/L   Uric acid    Collection Time: 05/02/18   7:53 AM   Result Value Ref Range    Uric Acid 5.1 3.5 - 7.2 mg/dL   TSH    Collection Time: 05/02/18  7:53 AM   Result Value Ref Range    TSH 1.20 0.40 - 4.00 mU/L                 Follow-ups after your visit        Your next 10 appointments already scheduled     May 09, 2018  4:00 PM CDT   [facility] Lab Draw with [facility]IC LAB DRAW   [facility] Mason[facility] Lab Draw ([facility])    [address] [place] [address]  [address]  [facility] [address]   [phone]            May 09, 2018  5:15 PM CDT   (Arrive by 5:00 PM)   Cystoscopy with Bebeto Shea MD   [facility]rology and [facility]rolog[facility] ([facility])    [address] [place] [address]  4th Floor  [facility] [address]   [phone]            May 16, 2018  8:15 AM CDT   M[facility] Lab Draw with UC [facility] LAB DRAW   [facility] Lab Draw ([facility])    [address] [place] [address]  [address]  [facility] [address]   [phone]            May 16, 2018  8:45 AM CDT   (Arrive by 8:30 AM)   Return Visit with Magalie Espinal MD   [facility] ([facility])    [address] [place] [address]  [address]  [facility] [address]   [phone]            May 16, 2018  9:30 AM CDT   Infusion 360 with UC ONCOLOGY INFUSION, UC 16 ATC   [facility] ([facility])    [address] [place] [address]  [address]  [facility] [address]   [phone]            May 23, 2018  7:45 AM CDT   [facility] Lab Draw with [facility]ON[facility] LAB DRAW   [facility] Lab Draw ([facility])    [address] [place] [address]  [address]  [facility] [address]   [phone]            May 23, 2018  8:15 AM CDT   (Arrive by 8:00 AM)   Return Visit with Magalie Espinal MD   [facility] ([facility])    [address] [place] [address]  [address]  [facility] [address]   [phone]            May 23,  2018  9:00 AM CDT   Infusion 120 with UC ONCOLOGY INFUSION, UC 25 ATC   [facility] ([facility])    [address]  [address]  [facility] [address] [phone]              Future tests that were ordered for you today     Open Future Orders        Priority Expected Expires Ordered    *CBC with platelets differential Routine 5/9/2018 5/2/2019 5/2/2018            Who to contact     If you have questions or need follow up information about today's clinic visit or your schedule please contact [facility] directly at [phone].  Normal or non-critical lab and imaging results will be communicated to you by BeiBeihart, letter or phone within 4 business days after the clinic has received the results. If you do not hear from us within 7 days, please contact the clinic through [employer] or phone. If you have a critical or abnormal lab result, we will notify you by phone as soon as possible.  Submit refill requests through [email or web address] or call your pharmacy and they will forward the refill request to us. Please allow 3 business days for your refill to be completed.          Additional Information About Your Visit        BeiBeihart Information     [email or web address] gives you secure access to your electronic health record. If you see a primary care provider, you can also send messages to your care team and make appointments. If you have questions, please call your primary care clinic.  If you do not have a primary care provider, please call [phone] and they will assist you.        Care EveryWhere ID     This is your Care EveryWhere ID. This could be used by other organizations to access your [place] medical records  [id number]         Blood Pressure from Last 3 Encounters:   05/02/18 112/77   04/25/18 113/73   04/25/18 113/73    Weight from Last 3 Encounters:   05/02/18 71.2 kg (156 lb 14.4 oz)   04/25/18 71.4 kg (157 lb 4.8 oz)   04/25/18 71.3 kg (157 lb 4 oz)               We Performed the Following     CBC with platelets differential     Comprehensive metabolic panel     Lactate Dehydrogenase     Magnesium     Phosphorus     TSH     Uric acid          Today's Medication Changes          These changes are accurate as of 5/2/18 11:20 AM.  If you have any questions, ask your nurse or doctor.               These medicines have changed or have updated prescriptions.        Dose/Directions    amLODIPine 5 MG tablet   Commonly known as:  NORVASC   This may have changed:  when to take this   Used for:  Hypertension goal BP (blood pressure) < 140/90        Dose:  5 mg   Take 1 tablet (5 mg) by mouth daily   Quantity:  90 tablet   Refills:  3       codeine 30 MG tablet   This may have changed:  when to take this   Used for:  Hemophilic arthropathy        Dose:  60 mg   Take 2 tablets (60 mg) by mouth every 6 hours as needed for moderate to severe pain (up to 8 tabs in 24 hours)   Quantity:  168 tablet   Refills:  0       lisinopril 40 MG tablet   Commonly known as:  PRINIVIL/ZESTRIL   This may have changed:  when to take this   Used for:  Hypertension goal BP (blood pressure) < 140/90        Dose:  40 mg   Take 1 tablet (40 mg) by mouth daily   Quantity:  90 tablet   Refills:  3                Primary Care Provider Office Phone # Fax #    Katie Ramos [phone]       [address]  [facility] [address]        Equal Access to Services     ANDREW FLANAGAN AH: Hadgeeta irbyo Somaicoali, waaxda luqadaha, qaybta kaalmada adeegyada, lloyd blum. So [facility] [phone].    ATENCIÓN: Si habla español, tiene a hernadez disposición servicios gratuitos de asistencia lingüística. Llame al [phone].    We comply with applicable federal civil rights laws and [place] laws. We do not discriminate on the basis of race, color, national origin, age, disability, sex, sexual orientation, or gender identity.            Thank you!     Thank you for  [facility]  for your care. Our goal is always to provide you with excellent care. Hearing back from our patients is one way we can continue to improve our services. Please take a few minutes to complete the written survey that you may receive in the mail after your visit with us. Thank you!             Your Updated Medication List - Protect others around you: Learn how to safely use, store and throw away your medicines at www.disposemymeds.org.          This list is accurate as of 5/2/18 11:20 AM.  Always use your most recent med list.                   Brand Name Dispense Instructions for use Diagnosis    amLODIPine 5 MG tablet    NORVASC    90 tablet    Take 1 tablet (5 mg) by mouth daily    Hypertension goal BP (blood pressure) < 140/90       ATIVAN PO      Take 0.5 mg by mouth every 6 hours as needed for anxiety        codeine 30 MG tablet     168 tablet    Take 2 tablets (60 mg) by mouth every 6 hours as needed for moderate to severe pain (up to 8 tabs in 24 hours)    Hemophilic arthropathy       * dexamethasone 4 MG tablet    DECADRON    24 tablet    Take 1 tablet (4 mg) by mouth 2 times daily (with meals)    Bladder tumor       * dexamethasone 4 MG tablet    DECADRON    18 tablet    Take 2 tablets (8 mg) by mouth daily Take for 3 days, starting the day after cisplatin (Day 2).    Urothelial carcinoma of bladder (H)       diazepam 5 MG tablet    VALIUM    30 tablet    Take up to one tablet by mouth daily as needed for anxiety    Depressive disorder       KOGENATE FS 1000 units Kit     65081 each    Infuse 3000 units every other day    Severe hemophilia A (H), Personal history of malignant neoplasm of bladder       lisinopril 40 MG tablet    PRINIVIL/ZESTRIL    90 tablet    Take 1 tablet (40 mg) by mouth daily    Hypertension goal BP (blood pressure) < 140/90       mirtazapine 45 MG tablet    REMERON    37 tablet    Take 1 tablet (45 mg) by mouth At Bedtime And may take 1/2 tablet  for insomnia up to 3 times a week.    Generalized anxiety disorder       ondansetron 8 MG ODT tab    ZOFRAN-ODT    30 tablet    Take 1 tablet (8 mg) by mouth every 8 hours as needed for nausea    Bladder tumor       * prochlorperazine 10 MG tablet    COMPAZINE    30 tablet    Take 0.5 tablets (5 mg) by mouth every 6 hours as needed for nausea or vomiting    Bladder tumor       * prochlorperazine 10 MG tablet    COMPAZINE    30 tablet    Take 1 tablet (10 mg) by mouth every 6 hours as needed (Nausea/Vomiting)    Urothelial carcinoma of bladder (H)       tamsulosin 0.4 MG capsule    FLOMAX    45 capsule    Take 1 capsule (0.4 mg) by mouth daily    Incomplete bladder emptying       TYLENOL PO      Take 500 mg by mouth every 4 hours as needed for mild pain or fever        * Notice:  This list has 4 medication(s) that are the same as other medications prescribed for you. Read the directions carefully, and ask your doctor or other care provider to review them with you.

## 2018-05-02 NOTE — PROGRESS NOTES
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   3/26/18: Consented for the Nivolumab-Gemcitabine-Cisplatin study [id number]    3/28/18: Screening visit for study.   4/4/18; c1d1 gemcitabine  4/11/18: c1d8 gemcitabine-nivolumab  4/25/18: C2D1 gemcitabine-Cisplatin  5/2/18: c2d8 gemcitabine-nivolumab    HPI:    68 year old male with PMH of hemophilia, chronic hepatis C who was seen by DR. Burns for evaluation of neoadjuvant therapy with Gemcitabine Cisplatin and Nivolumab.  I will be taking over his care while on study.     Patient has a history of hemophilia and has undergone several surgeries in right hip replacement, knee replacement. Hemophilia managed by Dr. Marley.   He reports recurrent hematuria for a year, initially attributed to kidney stones, cystoscopy in Feb 2018 revealed:  Bladder tumor, transurethral resection:   - Invasive high grade urothelial carcinoma   - Extent of invasion: Muscularis propria, multifocal   - Lymphovascular invasion: Present     PET-CT scan - which has revealed muscle invasive bladder right lateral wall of bladder and right  obturator lymphnode, 1.1 cm short axis, suspicious for metastatic disease.  He has been treated for HCV in the 1990s and has not had any complications. His LFTs have remained normal.   His hemophilia is very well controlled on current regimen of factor replacement.   He does not have any autoimmune disease and is not on immunesuppressants or steroids.   His diabetes is diet controlled and he has not required any medications.     Interval History:  Nausea and fatigue are much better.  Denies vomiting, hearing loss, neuropathy, fever, chills.  Denies any immune related AEs   No bleeding issues.      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      Bladder tumor      Depression      DM II (diabetes mellitus, type II), controlled  (H)     diet controlled     Hemophilia (H)     Type A     Hepatitis C, chronic (H)     treated wt interferon/ribavirin     HTN (hypertension)           CURRENT OUTPATIENT MEDICATIONS     Current Outpatient Prescriptions   Medication Sig     Acetaminophen (TYLENOL PO) Take 500 mg by mouth every 4 hours as needed for mild pain or fever     amLODIPine (NORVASC) 5 MG tablet Take 1 tablet (5 mg) by mouth daily (Patient taking differently: Take 5 mg by mouth every morning )     Antihemophilic Factor, Recomb, (KOGENATE FS) 1000 units KIT Infuse 3000 units every other day     codeine 30 MG tablet Take 2 tablets (60 mg) by mouth every 6 hours as needed for moderate to severe pain (up to 8 tabs in 24 hours) (Patient taking differently: Take 60 mg by mouth as needed for moderate to severe pain (up to 8 tabs in 24 hours) )     dexamethasone (DECADRON) 4 MG tablet Take 1 tablet (4 mg) by mouth 2 times daily (with meals)     dexamethasone (DECADRON) 4 MG tablet Take 2 tablets (8 mg) by mouth daily Take for 3 days, starting the day after cisplatin (Day 2).     diazepam (VALIUM) 5 MG tablet Take up to one tablet by mouth daily as needed for anxiety     lisinopril (PRINIVIL/ZESTRIL) 40 MG tablet Take 1 tablet (40 mg) by mouth daily (Patient taking differently: Take 40 mg by mouth every morning )     LORazepam (ATIVAN PO) Take 0.5 mg by mouth every 6 hours as needed for anxiety     mirtazapine (REMERON) 45 MG tablet Take 1 tablet (45 mg) by mouth At Bedtime And may take 1/2 tablet for insomnia up to 3 times a week.     ondansetron (ZOFRAN-ODT) 8 MG ODT tab Take 1 tablet (8 mg) by mouth every 8 hours as needed for nausea     prochlorperazine (COMPAZINE) 10 MG tablet Take 0.5 tablets (5 mg) by mouth every 6 hours as needed for nausea or vomiting     prochlorperazine (COMPAZINE) 10 MG tablet Take 1 tablet (10 mg) by mouth every 6 hours as needed (Nausea/Vomiting)     tamsulosin (FLOMAX) 0.4 MG capsule Take 1 capsule (0.4 mg) by mouth  daily     No current facility-administered medications for this visit.         ALLERGIES      Allergies   Allergen Reactions     Aspirin      This drug inhibits platelets and is contraindicated due to hemophilia diagnosis.        CBC RESULTS:   Recent Labs   Lab Test  05/02/18   0753   WBC  6.3   RBC  4.13*   HGB  11.1*   HCT  34.6*   MCV  84   MCH  26.9   MCHC  32.1   RDW  16.2*   PLT  326     Recent Labs   Lab Test  05/02/18   0753  04/25/18   0748   NA  139  140   POTASSIUM  4.3  4.0   CHLORIDE  105  108   CO2  27  25   ANIONGAP  7  8   GLC  93  117*   BUN  17  17   CR  0.90  1.07   MARLA  9.2  9.5           Magnesum; 1.9    ASSESSMENT/PLAN:     68 year old male with T2N1(solitary node)  urothelial carcinoma,currently enrolled on the clinical trial of Nivolumab with Gemcitabine and Cisplatin for MIBC.  He is planned for radical cystectomy by Dr. Shea within 6-8 weeks after completion of neoadjuvant therapy.     Plan:   Proceed with C2D8 gemcitabine-nivolumab today.   Will get neulasta with each cycle.  Hemophilia; management of factor replacement per Dr. Marley.    RTC per study protocol treatment schedule.    DM: diet controlled, encouraged to monitor sugars closely.  Fatigue: Much better.    Magalie Espinal MD  Hematology Oncology and Transplantation  [facility]

## 2018-05-02 NOTE — NURSING NOTE
done by RN, pt tolerated well, labs collected and sent, VS taken and pt checked in for next appt.   Arcelia Ambrose

## 2018-05-03 DIAGNOSIS — D66 SEVERE HEMOPHILIA A (H): Primary | ICD-10-CM

## 2018-05-03 DIAGNOSIS — C67.9 UROTHELIAL CARCINOMA OF BLADDER (H): ICD-10-CM

## 2018-05-09 ENCOUNTER — OFFICE VISIT (OUTPATIENT)
Dept: UROLOGY | Facility: CLINIC | Age: 68
End: 2018-05-09
Payer: MEDICARE

## 2018-05-09 VITALS
SYSTOLIC BLOOD PRESSURE: 115 MMHG | HEART RATE: 109 BPM | OXYGEN SATURATION: 97 % | BODY MASS INDEX: 20.85 KG/M2 | WEIGHT: 153.9 LBS | DIASTOLIC BLOOD PRESSURE: 84 MMHG | HEIGHT: 72 IN

## 2018-05-09 DIAGNOSIS — C67.9 UROTHELIAL CARCINOMA OF BLADDER (H): ICD-10-CM

## 2018-05-09 DIAGNOSIS — C67.9 MALIGNANT NEOPLASM OF URINARY BLADDER, UNSPECIFIED SITE (H): Primary | ICD-10-CM

## 2018-05-09 DIAGNOSIS — D66 SEVERE HEMOPHILIA A (H): ICD-10-CM

## 2018-05-09 DIAGNOSIS — D66 HEMOPHILIA A (H): ICD-10-CM

## 2018-05-09 LAB
ACANTHOCYTES BLD QL SMEAR: SLIGHT
ANISOCYTOSIS BLD QL SMEAR: SLIGHT
BASOPHILS # BLD AUTO: 0 10E9/L (ref 0–0.2)
BASOPHILS NFR BLD AUTO: 0 %
DIFFERENTIAL METHOD BLD: ABNORMAL
EOSINOPHIL # BLD AUTO: 0.1 10E9/L (ref 0–0.7)
EOSINOPHIL NFR BLD AUTO: 0.9 %
ERYTHROCYTE [DISTWIDTH] IN BLOOD BY AUTOMATED COUNT: 17.8 % (ref 10–15)
HCT VFR BLD AUTO: 34.5 % (ref 40–53)
HGB BLD-MCNC: 10.8 G/DL (ref 13.3–17.7)
LYMPHOCYTES # BLD AUTO: 1.4 10E9/L (ref 0.8–5.3)
LYMPHOCYTES NFR BLD AUTO: 10.6 %
MCH RBC QN AUTO: 27.1 PG (ref 26.5–33)
MCHC RBC AUTO-ENTMCNC: 31.3 G/DL (ref 31.5–36.5)
MCV RBC AUTO: 87 FL (ref 78–100)
MONOCYTES # BLD AUTO: 0.9 10E9/L (ref 0–1.3)
MONOCYTES NFR BLD AUTO: 7.1 %
MYELOCYTES # BLD: 0.3 10E9/L
MYELOCYTES NFR BLD MANUAL: 2.6 %
NEUTROPHILS # BLD AUTO: 10.2 10E9/L (ref 1.6–8.3)
NEUTROPHILS NFR BLD AUTO: 78.8 %
PLATELET # BLD AUTO: 134 10E9/L (ref 150–450)
PLATELET # BLD EST: ABNORMAL 10*3/UL
POIKILOCYTOSIS BLD QL SMEAR: ABNORMAL
RBC # BLD AUTO: 3.99 10E12/L (ref 4.4–5.9)
WBC # BLD AUTO: 12.9 10E9/L (ref 4–11)

## 2018-05-09 PROCEDURE — 85240 CLOT FACTOR VIII AHG 1 STAGE: CPT | Performed by: INTERNAL MEDICINE

## 2018-05-09 PROCEDURE — 00000401 ZZHCL STATISTIC THROMBIN TIME NC: Performed by: INTERNAL MEDICINE

## 2018-05-09 PROCEDURE — 00000167 ZZHCL STATISTIC INR NC: Performed by: INTERNAL MEDICINE

## 2018-05-09 PROCEDURE — 85025 COMPLETE CBC W/AUTO DIFF WBC: CPT | Performed by: INTERNAL MEDICINE

## 2018-05-09 PROCEDURE — 00000328 ZZHCL STATISTIC PTT NC: Performed by: INTERNAL MEDICINE

## 2018-05-09 ASSESSMENT — PAIN SCALES - GENERAL: PAINLEVEL: NO PAIN (0)

## 2018-05-09 NOTE — NURSING NOTE
Chief Complaint   Patient presents with     Cystoscopy     bladder cancer         John Rockwell MA  Invasive Procedure Safety Checklist:  Cystoscopy  Procedure:     Action: Complete sections and checkboxes as appropriate.    Pre-procedure:  1. Patient ID Verified with 2 identifiers (Daisy and  or MRN) : YES    2. Procedure and site verified with patient/designee (when able) : YES    3. Accurate consent documentation in medical record : YES    4. H&P (or appropriate assessment) documented in medical record : NO  H&P must be up to 30 days prior to procedure an updated within 24 hours of                 Procedure as applicable.     5. Relevant diagnostic and radiology test results appropriately labeled and displayed as applicable : NO    6. Blood products, implants, devices, and/or special equipment available for the procedure as applicable : NO    7. Procedure site(s) marked with provider initials [Exclusions: yes] : YES    8. Marking not required. Reason : Yes  Procedure does not require site marking    Time Out:     Time-Out performed immediately prior to starting procedure, including verbal and active participation of all team members addressing: YES    1. Correct patient identity.  2. Confirmed that the correct side and site are marked.  3. An accurate procedure to be done.  4. Agreement on the procedure to be done.  5. Correct patient position.  6. Relevant images and results are properly labeled and appropriately displayed.  7. The need to administer antibiotics or fluids for irrigation purposes during the procedure as applicable.  8. Safety precautions based on patient history or medication use.    During Procedure: Verification of correct person, site, and procedure occurs any time the responsibility for care of the patient is transferred to another member of the care team.  The following medication was given:     MEDICATION:  LIDOCAINE HYDROCHLORIDE JELLY USP, 2%  ROUTE: THROUGH URETHAL  SITE:  URETHAL  DOSE: 10ML  LOT #: [id number]  : IMS,LIMITED  EXPIRATION DATE: 1/20  NDC#: [address]-2   Was there drug waste? No      John Rockwell CMA  May 9, 2018

## 2018-05-09 NOTE — NURSING NOTE
Chief Complaint   Patient presents with     Blood Draw     Labs drawn via  by IRON Alejandra, RN

## 2018-05-09 NOTE — PROGRESS NOTES
Pre-procedure diagnosis: Bladder cancer on NAC  Post procedure diagnosis: abnormal cystoscopy  Procedure performed: cystoscopy  Surgeon: USAMA Shea MD  Anesthesia: local    Indications for procedure: Patient is a 68 year old male with a history of bladder cancer on chemotherapy.  Here today for surveillance cysto    Description of procedure: After fully informed voluntary consent was obtained patient was brought into the procedure room, identified and placed in a supine position on the cysto table.  The groin/scrotum were prepped and draped in a sterile fashion with betadine.  A 15F flexible cystoscope was inserted into the urethra and the bladder and urethra examined in a systematic manner.  There was some necrotic tumor and debris arising from the right lateral wall adjacent to the UO.  No stones or diverticula.  Ureteric orifices were normal in position and number and effluxing clear urine.  The prostate was 3 cm long and showed bilobar hypertrophy.  There was no median lobe.  Distal urethra was normal.  The patient tolerated the procedure well and there were no complications.      Assessment/Plan: Patient with a history of bladder cancer on NAC responding to chemo.  Will wait for him to finish chemo and then schedule cystectomy

## 2018-05-09 NOTE — LETTER
5/9/2018       RE: Orlin Alcantar  [address]   [place] [address]     Dear Colleague,    Thank you for referring your patient, Orlin Alcantar, to the [facility] UROLOGY AND [facility] FOR PROSTATE AND UROLOGIC CANCERS at [facility]. Please see a copy of my visit note below.        Pre-procedure diagnosis: Bladder cancer on NAC  Post procedure diagnosis: abnormal cystoscopy  Procedure performed: cystoscopy  Surgeon: USAMA Shea MD  Anesthesia: local    Indications for procedure: Patient is a 68 year old male with a history of bladder cancer on chemotherapy.  Here today for surveillance cysto    Description of procedure: After fully informed voluntary consent was obtained patient was brought into the procedure room, identified and placed in a supine position on the cysto table.  The groin/scrotum were prepped and draped in a sterile fashion with betadine.  A 15F flexible cystoscope was inserted into the urethra and the bladder and urethra examined in a systematic manner.  There was some necrotic tumor and debris arising from the right lateral wall adjacent to the UO.  No stones or diverticula.  Ureteric orifices were normal in position and number and effluxing clear urine.  The prostate was 3 cm long and showed bilobar hypertrophy.  There was no median lobe.  Distal urethra was normal.  The patient tolerated the procedure well and there were no complications.      Assessment/Plan: Patient with a history of bladder cancer on NAC responding to chemo.  Will wait for him to finish chemo and then schedule cystectomy    Again, thank you for allowing me to participate in the care of your patient.      Sincerely,    Bebeto Shea MD

## 2018-05-09 NOTE — MR AVS SNAPSHOT
"              After Visit Summary   5/9/2018    Orlin Alcantar    MRN: [id number]           Patient Information     Date Of Birth          1950        Visit Information        Provider Department      5/9/2018 5:15 PM Bebeto Shea MD [facility] Urology and [facility] for Prostate and Urologic Cancers        Today's Diagnoses     Malignant neoplasm of urinary bladder, unspecified site (H)    -  1      Care Instructions      AFTER YOUR CYSTOSCOPY        You have just completed a cystoscopy, or \"cysto\", which allowed your physician to learn more about your bladder (or to remove a stent placed after surgery). We suggest that you continue to avoid caffeine, fruit juice, and alcohol for the next 24 hours, however, you are encouraged to return to your normal activities.         A few things that are considered normal after your cystoscopy:     * Small amount of bleeding (or spotting) that clears within the next 24 hours     * Slight burning sensation with urination     * Sensation to of needing to avoid more frequently     * The feeling of \"air\" in your urine     * Mild discomfort that is relieved with Tylenol        Please contact our office promptly if you:     * Develop a fever above 101 degrees     * Are unable to urinate     * Develop bright red blood that does not stop     * Severe pain or swelling         Please contact our office with any concerns or questions @[facility].          Follow-ups after your visit        Follow-up notes from your care team     Return in 12 months (on 5/9/2019).      Your next 10 appointments already scheduled     May 16, 2018  8:15 AM CDT   [facility] Lab Draw with  [facility] LAB DRAW   [facility] Draw ([facility])    [address] [place] [address]  [address] [place] [id number] [phone]            May 16, 2018  8:45 AM CDT   (Arrive by 8:30 AM)   Return Visit with Magalie Espinal MD   [facility] ([facility]) "    [address]  [address]  [facility] [address]   [phone]            May 16, 2018  9:30 AM CDT   Infusion 360 with UC ONCOLOGY INFUSION, UC 16 ATC   [facility] ([facility])    [address] [place] [address]  [address]  [facility] [address]   [phone]            May 23, 2018  7:45 AM CDT   [facility] Lab Draw with [facility] LAB DRAW   [facility] Draw ([facility])    [address] [place] [address]  [address]  [facility] [address]   [phone]            May 23, 2018  8:15 AM CDT   (Arrive by 8:00 AM)   Return Visit with Magalie Espinal MD   [facility] ([facility])    [address] [place] [address]  [address]  [facility] [address]   [phone]            May 23, 2018  9:00 AM CDT   Infusion 120 with UC ONCOLOGY INFUSION, UC 25 ATC   [facility] ([facility])    [address] [place] [address]  [address]  [facility] [address]   [phone]            Jun 06, 2018  9:00 AM CDT   [facility] Lab Draw with [facility] LAB DRAW   [facility] Draw ([facility])    [address] [place] [address]  [address]  [facility] [address]   [phone]            Jun 06, 2018  9:30 AM CDT   (Arrive by 9:15 AM)   Return Visit with Brianna Harris PA-C   [facility] ([facility])    [address] [place] [address]  [address]  [facility] [address]   [phone]              Who to contact     Please call your clinic at [phone] to:    Ask questions about your health    Make or cancel appointments    Discuss your medicines    Learn about your test results    Speak to your doctor            Additional Information About Your Visit        MyChart Information     MyChart gives you secure access to your electronic health record. If you see a primary care provider, you can also send messages to your care team  and make appointments. If you have questions, please call your primary care clinic.  If you do not have a primary care provider, please call [phone] and they will assist you.      [employer] is an electronic gateway that provides easy, online access to your medical records. With [employer], you can request a clinic appointment, read your test results, renew a prescription or communicate with your care team.     To access your existing account, please contact your [facility] or call [phone] for assistance.        Care EveryWhere ID     This is your Care EveryWhere ID. This could be used by other organizations to access your [place] medical records  [id number]        Your Vitals Were     Pulse Height Pulse Oximetry BMI (Body Mass Index)          109 1.829 m (6') 97% 20.87 kg/m2         Blood Pressure from Last 3 Encounters:   05/09/18 115/84   05/02/18 112/77   04/25/18 113/73    Weight from Last 3 Encounters:   05/09/18 69.8 kg (153 lb 14.4 oz)   05/02/18 71.2 kg (156 lb 14.4 oz)   04/25/18 71.4 kg (157 lb 4.8 oz)              We Performed the Following     CYSTOURETHROSCOPY     Cytology non gyn          Today's Medication Changes          These changes are accurate as of 5/9/18  6:27 PM.  If you have any questions, ask your nurse or doctor.               These medicines have changed or have updated prescriptions.        Dose/Directions    amLODIPine 5 MG tablet   Commonly known as:  NORVASC   This may have changed:  when to take this   Used for:  Hypertension goal BP (blood pressure) < 140/90        Dose:  5 mg   Take 1 tablet (5 mg) by mouth daily   Quantity:  90 tablet   Refills:  3       codeine 30 MG tablet   This may have changed:  when to take this   Used for:  Hemophilic arthropathy        Dose:  60 mg   Take 2 tablets (60 mg) by mouth every 6 hours as needed for moderate to severe pain (up to 8 tabs in 24 hours)   Quantity:  168 tablet   Refills:  0       lisinopril 40  MG tablet   Commonly known as:  PRINIVIL/ZESTRIL   This may have changed:  when to take this   Used for:  Hypertension goal BP (blood pressure) < 140/90        Dose:  40 mg   Take 1 tablet (40 mg) by mouth daily   Quantity:  90 tablet   Refills:  3                Primary Care Provider Office Phone # Fax #    Katie Ramos [phone]       [address]  [facility] [address]        Equal Access to Services     ANDREW FLANAGAN : Hadii aad ku hadasho Soomaali, waaxda luqadaha, qaybta kaalmada adeegyada, waxay idiin hayaan adeeg vikramadelsoaneesh lajustyn . So [facility] [phone].    ATENCIÓN: Si habla español, tiene a hernadez disposición servicios gratuitos de asistencia lingüística. [facility] [phone].    We comply with applicable federal civil rights laws and [place] laws. We do not discriminate on the basis of race, color, national origin, age, disability, sex, sexual orientation, or gender identity.            Thank you!     Thank you for choosing [facility] UROLOGY AND [facility] FOR PROSTATE AND UROLOGIC CANCERS  for your care. Our goal is always to provide you with excellent care. Hearing back from our patients is one way we can continue to improve our services. Please take a few minutes to complete the written survey that you may receive in the mail after your visit with us. Thank you!             Your Updated Medication List - Protect others around you: Learn how to safely use, store and throw away your medicines at www.disposemymeds.org.          This list is accurate as of 5/9/18  6:27 PM.  Always use your most recent med list.                   Brand Name Dispense Instructions for use Diagnosis    amLODIPine 5 MG tablet    NORVASC    90 tablet    Take 1 tablet (5 mg) by mouth daily    Hypertension goal BP (blood pressure) < 140/90       ATIVAN PO      Take 0.5 mg by mouth every 6 hours as needed for anxiety        codeine 30 MG tablet     168 tablet    Take 2 tablets (60 mg) by mouth every 6 hours as needed  for moderate to severe pain (up to 8 tabs in 24 hours)    Hemophilic arthropathy       * dexamethasone 4 MG tablet    DECADRON    24 tablet    Take 1 tablet (4 mg) by mouth 2 times daily (with meals)    Bladder tumor       * dexamethasone 4 MG tablet    DECADRON    18 tablet    Take 2 tablets (8 mg) by mouth daily Take for 3 days, starting the day after cisplatin (Day 2).    Urothelial carcinoma of bladder (H)       diazepam 5 MG tablet    VALIUM    30 tablet    Take up to one tablet by mouth daily as needed for anxiety    Depressive disorder       KOGENATE FS 1000 units Kit     01882 each    Infuse 3000 units every other day    Severe hemophilia A (H), Personal history of malignant neoplasm of bladder       lisinopril 40 MG tablet    PRINIVIL/ZESTRIL    90 tablet    Take 1 tablet (40 mg) by mouth daily    Hypertension goal BP (blood pressure) < 140/90       mirtazapine 45 MG tablet    REMERON    37 tablet    Take 1 tablet (45 mg) by mouth At Bedtime And may take 1/2 tablet for insomnia up to 3 times a week.    Generalized anxiety disorder       ondansetron 8 MG ODT tab    ZOFRAN-ODT    30 tablet    Take 1 tablet (8 mg) by mouth every 8 hours as needed for nausea    Bladder tumor       * prochlorperazine 10 MG tablet    COMPAZINE    30 tablet    Take 0.5 tablets (5 mg) by mouth every 6 hours as needed for nausea or vomiting    Bladder tumor       * prochlorperazine 10 MG tablet    COMPAZINE    30 tablet    Take 1 tablet (10 mg) by mouth every 6 hours as needed (Nausea/Vomiting)    Urothelial carcinoma of bladder (H)       tamsulosin 0.4 MG capsule    FLOMAX    45 capsule    Take 1 capsule (0.4 mg) by mouth daily    Incomplete bladder emptying       TYLENOL PO      Take 500 mg by mouth every 4 hours as needed for mild pain or fever        * Notice:  This list has 4 medication(s) that are the same as other medications prescribed for you. Read the directions carefully, and ask your doctor or other care provider to  review them with you.

## 2018-05-10 LAB — FACT VIII ACT/NOR PPP: 86 % (ref 55–200)

## 2018-05-14 DIAGNOSIS — Z85.51 PERSONAL HISTORY OF MALIGNANT NEOPLASM OF BLADDER: ICD-10-CM

## 2018-05-14 DIAGNOSIS — D66 SEVERE HEMOPHILIA A (H): ICD-10-CM

## 2018-05-14 LAB — COPATH REPORT: NORMAL

## 2018-05-14 RX ORDER — ANTIHEMOPHILIC FACTOR (RECOMBINANT) 1000 (+/-)
KIT INTRAVENOUS
Qty: 15000 EACH | Refills: 1 | Status: SHIPPED | OUTPATIENT
Start: 2018-05-14 | End: 2018-06-01

## 2018-05-16 ENCOUNTER — ONCOLOGY VISIT (OUTPATIENT)
Dept: ONCOLOGY | Facility: CLINIC | Age: 68
End: 2018-05-16
Attending: INTERNAL MEDICINE
Payer: MEDICARE

## 2018-05-16 ENCOUNTER — APPOINTMENT (OUTPATIENT)
Dept: LAB | Facility: CLINIC | Age: 68
End: 2018-05-16
Attending: INTERNAL MEDICINE
Payer: MEDICARE

## 2018-05-16 ENCOUNTER — RESEARCH ENCOUNTER (OUTPATIENT)
Dept: ONCOLOGY | Facility: CLINIC | Age: 68
End: 2018-05-16

## 2018-05-16 VITALS
BODY MASS INDEX: 21.31 KG/M2 | HEIGHT: 72 IN | DIASTOLIC BLOOD PRESSURE: 77 MMHG | RESPIRATION RATE: 18 BRPM | WEIGHT: 157.3 LBS | OXYGEN SATURATION: 97 % | SYSTOLIC BLOOD PRESSURE: 114 MMHG | HEART RATE: 102 BPM | TEMPERATURE: 97.9 F

## 2018-05-16 DIAGNOSIS — C67.9 UROTHELIAL CARCINOMA OF BLADDER (H): Primary | ICD-10-CM

## 2018-05-16 DIAGNOSIS — C67.9 UROTHELIAL CARCINOMA OF BLADDER (H): ICD-10-CM

## 2018-05-16 LAB
ALBUMIN SERPL-MCNC: 4 G/DL (ref 3.4–5)
ALP SERPL-CCNC: 153 U/L (ref 40–150)
ALT SERPL W P-5'-P-CCNC: 18 U/L (ref 0–70)
ANION GAP SERPL CALCULATED.3IONS-SCNC: 8 MMOL/L (ref 3–14)
ANISOCYTOSIS BLD QL SMEAR: ABNORMAL
AST SERPL W P-5'-P-CCNC: 16 U/L (ref 0–45)
BASOPHILS # BLD AUTO: 0.6 10E9/L (ref 0–0.2)
BASOPHILS NFR BLD AUTO: 3.5 %
BILIRUB SERPL-MCNC: 0.2 MG/DL (ref 0.2–1.3)
BUN SERPL-MCNC: 10 MG/DL (ref 7–30)
CALCIUM SERPL-MCNC: 9.4 MG/DL (ref 8.5–10.1)
CHLORIDE SERPL-SCNC: 108 MMOL/L (ref 94–109)
CO2 SERPL-SCNC: 24 MMOL/L (ref 20–32)
CREAT SERPL-MCNC: 0.93 MG/DL (ref 0.66–1.25)
DIFFERENTIAL METHOD BLD: ABNORMAL
EOSINOPHIL # BLD AUTO: 0.3 10E9/L (ref 0–0.7)
EOSINOPHIL NFR BLD AUTO: 1.8 %
ERYTHROCYTE [DISTWIDTH] IN BLOOD BY AUTOMATED COUNT: 19.9 % (ref 10–15)
GFR SERPL CREATININE-BSD FRML MDRD: 81 ML/MIN/1.7M2
GLUCOSE SERPL-MCNC: 94 MG/DL (ref 70–99)
HCT VFR BLD AUTO: 36.8 % (ref 40–53)
HGB BLD-MCNC: 11.5 G/DL (ref 13.3–17.7)
LYMPHOCYTES # BLD AUTO: 1.7 10E9/L (ref 0.8–5.3)
LYMPHOCYTES NFR BLD AUTO: 10.4 %
MAGNESIUM SERPL-MCNC: 2 MG/DL (ref 1.6–2.3)
MCH RBC QN AUTO: 27.7 PG (ref 26.5–33)
MCHC RBC AUTO-ENTMCNC: 31.3 G/DL (ref 31.5–36.5)
MCV RBC AUTO: 89 FL (ref 78–100)
METAMYELOCYTES # BLD: 0.3 10E9/L
METAMYELOCYTES NFR BLD MANUAL: 1.7 %
MONOCYTES # BLD AUTO: 0.6 10E9/L (ref 0–1.3)
MONOCYTES NFR BLD AUTO: 3.5 %
MYELOCYTES # BLD: 0.4 10E9/L
MYELOCYTES NFR BLD MANUAL: 2.6 %
NEUTROPHILS # BLD AUTO: 12.5 10E9/L (ref 1.6–8.3)
NEUTROPHILS NFR BLD AUTO: 76.5 %
PLATELET # BLD AUTO: 330 10E9/L (ref 150–450)
PLATELET # BLD EST: ABNORMAL 10*3/UL
POIKILOCYTOSIS BLD QL SMEAR: SLIGHT
POLYCHROMASIA BLD QL SMEAR: SLIGHT
POTASSIUM SERPL-SCNC: 4.5 MMOL/L (ref 3.4–5.3)
PROT SERPL-MCNC: 7.5 G/DL (ref 6.8–8.8)
RBC # BLD AUTO: 4.15 10E12/L (ref 4.4–5.9)
SODIUM SERPL-SCNC: 141 MMOL/L (ref 133–144)
WBC # BLD AUTO: 16.3 10E9/L (ref 4–11)

## 2018-05-16 PROCEDURE — 96413 CHEMO IV INFUSION 1 HR: CPT

## 2018-05-16 PROCEDURE — G0463 HOSPITAL OUTPT CLINIC VISIT: HCPCS | Mod: ZF

## 2018-05-16 PROCEDURE — 83735 ASSAY OF MAGNESIUM: CPT | Performed by: INTERNAL MEDICINE

## 2018-05-16 PROCEDURE — 96417 CHEMO IV INFUS EACH ADDL SEQ: CPT

## 2018-05-16 PROCEDURE — 96415 CHEMO IV INFUSION ADDL HR: CPT

## 2018-05-16 PROCEDURE — 80053 COMPREHEN METABOLIC PANEL: CPT | Performed by: INTERNAL MEDICINE

## 2018-05-16 PROCEDURE — 25000128 H RX IP 250 OP 636: Mod: ZF | Performed by: INTERNAL MEDICINE

## 2018-05-16 PROCEDURE — 96366 THER/PROPH/DIAG IV INF ADDON: CPT

## 2018-05-16 PROCEDURE — 40000141 ZZH STATISTIC PERIPHERAL IV START W/O US GUIDANCE: Mod: ZF

## 2018-05-16 PROCEDURE — 96375 TX/PRO/DX INJ NEW DRUG ADDON: CPT

## 2018-05-16 PROCEDURE — 96367 TX/PROPH/DG ADDL SEQ IV INF: CPT

## 2018-05-16 PROCEDURE — 99213 OFFICE O/P EST LOW 20 MIN: CPT | Mod: ZP | Performed by: INTERNAL MEDICINE

## 2018-05-16 PROCEDURE — 85025 COMPLETE CBC W/AUTO DIFF WBC: CPT | Performed by: INTERNAL MEDICINE

## 2018-05-16 PROCEDURE — 36415 COLL VENOUS BLD VENIPUNCTURE: CPT

## 2018-05-16 PROCEDURE — 96361 HYDRATE IV INFUSION ADD-ON: CPT

## 2018-05-16 RX ORDER — METHYLPREDNISOLONE SODIUM SUCCINATE 125 MG/2ML
125 INJECTION, POWDER, LYOPHILIZED, FOR SOLUTION INTRAMUSCULAR; INTRAVENOUS
Status: CANCELLED
Start: 2018-05-16

## 2018-05-16 RX ORDER — EPINEPHRINE 0.3 MG/.3ML
0.3 INJECTION SUBCUTANEOUS EVERY 5 MIN PRN
Status: CANCELLED | OUTPATIENT
Start: 2018-05-16

## 2018-05-16 RX ORDER — DIPHENHYDRAMINE HYDROCHLORIDE 50 MG/ML
50 INJECTION INTRAMUSCULAR; INTRAVENOUS
Status: CANCELLED
Start: 2018-05-23

## 2018-05-16 RX ORDER — SODIUM CHLORIDE 9 MG/ML
1000 INJECTION, SOLUTION INTRAVENOUS CONTINUOUS PRN
Status: CANCELLED
Start: 2018-05-16

## 2018-05-16 RX ORDER — DIPHENHYDRAMINE HYDROCHLORIDE 50 MG/ML
50 INJECTION INTRAMUSCULAR; INTRAVENOUS
Status: CANCELLED
Start: 2018-05-16

## 2018-05-16 RX ORDER — PALONOSETRON 0.05 MG/ML
0.25 INJECTION, SOLUTION INTRAVENOUS ONCE
Status: CANCELLED
Start: 2018-05-16

## 2018-05-16 RX ORDER — PALONOSETRON 0.05 MG/ML
0.25 INJECTION, SOLUTION INTRAVENOUS ONCE
Status: COMPLETED | OUTPATIENT
Start: 2018-05-16 | End: 2018-05-16

## 2018-05-16 RX ORDER — METHYLPREDNISOLONE SODIUM SUCCINATE 125 MG/2ML
125 INJECTION, POWDER, LYOPHILIZED, FOR SOLUTION INTRAMUSCULAR; INTRAVENOUS
Status: CANCELLED
Start: 2018-05-23

## 2018-05-16 RX ORDER — MEPERIDINE HYDROCHLORIDE 25 MG/ML
25 INJECTION INTRAMUSCULAR; INTRAVENOUS; SUBCUTANEOUS EVERY 30 MIN PRN
Status: CANCELLED | OUTPATIENT
Start: 2018-05-16

## 2018-05-16 RX ORDER — ALBUTEROL SULFATE 90 UG/1
1-2 AEROSOL, METERED RESPIRATORY (INHALATION)
Status: CANCELLED
Start: 2018-05-23

## 2018-05-16 RX ORDER — LORAZEPAM 2 MG/ML
0.5 INJECTION INTRAMUSCULAR EVERY 4 HOURS PRN
Status: CANCELLED
Start: 2018-05-16

## 2018-05-16 RX ORDER — ALBUTEROL SULFATE 90 UG/1
1-2 AEROSOL, METERED RESPIRATORY (INHALATION)
Status: CANCELLED
Start: 2018-05-16

## 2018-05-16 RX ORDER — SODIUM CHLORIDE 9 MG/ML
1000 INJECTION, SOLUTION INTRAVENOUS CONTINUOUS PRN
Status: CANCELLED
Start: 2018-05-23

## 2018-05-16 RX ORDER — EPINEPHRINE 1 MG/ML
0.3 INJECTION, SOLUTION INTRAMUSCULAR; SUBCUTANEOUS EVERY 5 MIN PRN
Status: CANCELLED | OUTPATIENT
Start: 2018-05-23

## 2018-05-16 RX ORDER — EPINEPHRINE 1 MG/ML
0.3 INJECTION, SOLUTION INTRAMUSCULAR; SUBCUTANEOUS EVERY 5 MIN PRN
Status: CANCELLED | OUTPATIENT
Start: 2018-05-16

## 2018-05-16 RX ORDER — ALBUTEROL SULFATE 0.83 MG/ML
2.5 SOLUTION RESPIRATORY (INHALATION)
Status: CANCELLED | OUTPATIENT
Start: 2018-05-16

## 2018-05-16 RX ORDER — LORAZEPAM 2 MG/ML
0.5 INJECTION INTRAMUSCULAR EVERY 4 HOURS PRN
Status: CANCELLED
Start: 2018-05-23

## 2018-05-16 RX ORDER — ALBUTEROL SULFATE 0.83 MG/ML
2.5 SOLUTION RESPIRATORY (INHALATION)
Status: CANCELLED | OUTPATIENT
Start: 2018-05-23

## 2018-05-16 RX ORDER — EPINEPHRINE 0.3 MG/.3ML
0.3 INJECTION SUBCUTANEOUS EVERY 5 MIN PRN
Status: CANCELLED | OUTPATIENT
Start: 2018-05-23

## 2018-05-16 RX ORDER — MEPERIDINE HYDROCHLORIDE 25 MG/ML
25 INJECTION INTRAMUSCULAR; INTRAVENOUS; SUBCUTANEOUS EVERY 30 MIN PRN
Status: CANCELLED | OUTPATIENT
Start: 2018-05-23

## 2018-05-16 RX ADMIN — CISPLATIN 135 MG: 1 INJECTION, SOLUTION INTRAVENOUS at 11:07

## 2018-05-16 RX ADMIN — DEXAMETHASONE SODIUM PHOSPHATE: 10 INJECTION, SOLUTION INTRAMUSCULAR; INTRAVENOUS at 09:45

## 2018-05-16 RX ADMIN — PALONOSETRON HYDROCHLORIDE 0.25 MG: 0.25 INJECTION INTRAVENOUS at 09:40

## 2018-05-16 RX ADMIN — GEMCITABINE 1930 MG: 38 INJECTION, SOLUTION INTRAVENOUS at 10:28

## 2018-05-16 RX ADMIN — SODIUM CHLORIDE 1000 ML: 9 INJECTION, SOLUTION INTRAVENOUS at 09:23

## 2018-05-16 RX ADMIN — MAGNESIUM SULFATE HEPTAHYDRATE: 500 INJECTION, SOLUTION INTRAMUSCULAR; INTRAVENOUS at 14:11

## 2018-05-16 ASSESSMENT — PAIN SCALES - GENERAL: PAINLEVEL: NO PAIN (0)

## 2018-05-16 NOTE — PATIENT INSTRUCTIONS
Contact Numbers    [facility] Main Line: [phone]  [facility] Triage:  [phone]    Call triage with chills and/or temperature greater than or equal to 100.5, uncontrolled nausea/vomiting, diarrhea, constipation, dizziness, shortness of breath, chest pain, bleeding, unexplained bruising, or any new/concerning symptoms, questions/concerns.     If you are having any concerning symptoms or wish to speak to a provider before your next infusion visit, please call your care coordinator or triage to notify them so we can adequately serve you.       After Hours: [phone]    If after hours, weekends, or holidays, call main hospital  and ask for Oncology doctor on call.           May 2018   Ricky Monday Tuesday Wednesday Thursday Friday Saturday             1     2     [facility] DRAW    7:45 AM   (15 min.)    [facility] DRAW   [facility]     [facility] RETURN    8:00 AM   (30 min.)   Magalie Espinal MD   [facility] ONC INFUSION 120    9:00 AM   (120 min.)   UC ONCOLOGY INFUSION   [facility] 3     4     5       6     7     8     9     [facility] LAB DRAW    4:00 PM   (15 min.)    [facility] LAB DRAW   [facility] Lab Draw     [facility] CYSTOSCOPY    5:00 PM   (20 min.)   Bebeto Shea MD   [facility] and Inst for Prostate and Urologic Cancers 10     11     12       13     14     15     16     [facility] DRAW    8:15 AM   (15 min.)   [facility] DRAW   [facility] Lab Draw     [facility] RETURN    8:30 AM   (30 min.)   Magalie Espinal MD   [facility] 360    9:30 AM   (360 min.)   [facility] ONCOLOGY INFUSION   [facility] 17     18     19       20     21     22     23     [facility] DRAW    7:45 AM   (15 min.)   [facility] DRAW   [facility] Lab Draw     [facility] RETURN    8:00 AM   (30 min.)   Magalie Espinal MD   [facility] 120    9:00 AM    (120 min.)    ONCOLOGY INFUSION   [facility] 24     25     26       27     28     29     30     31 June 2018 Sunday Monday Tuesday Wednesday Thursday Friday Saturday                            1     2       3     4     5     6     [facility] DRAW    9:00 AM   (15 min.)    [facility] LAB DRAW   [facility]     [facility] RETURN    9:15 AM   (50 min.)   Brianna Harris PA-C   [facility]   10:30 AM   (360 min.)    ONCOLOGY INFUSION   [facility] 7     8     9       10     11     12     13     [facility] DRAW    7:15 AM   (15 min.)    [facility] LAB DRAW   [facility] Lab Draw     [facility] RETURN    7:30 AM   (30 min.)   Magalie Espinal MD   [facility]    9:00 AM   (120 min.)    ONCOLOGY INFUSION   [facility] 14     15     16       17     18     19     20     21     22     23       24     25     26     27     28     29     30                  Lab Results:  Recent Results (from the past 12 hour(s))   CBC with platelets differential    Collection Time: 05/16/18  8:15 AM   Result Value Ref Range    WBC 16.3 (H) 4.0 - 11.0 10e9/L    RBC Count 4.15 (L) 4.4 - 5.9 10e12/L    Hemoglobin 11.5 (L) 13.3 - 17.7 g/dL    Hematocrit 36.8 (L) 40.0 - 53.0 %    MCV 89 78 - 100 fl    MCH 27.7 26.5 - 33.0 pg    MCHC 31.3 (L) 31.5 - 36.5 g/dL    RDW 19.9 (H) 10.0 - 15.0 %    Platelet Count 330 150 - 450 10e9/L    Diff Method Manual Differential     % Neutrophils 76.5 %    % Lymphocytes 10.4 %    % Monocytes 3.5 %    % Eosinophils 1.8 %    % Basophils 3.5 %    % Metamyelocytes 1.7 %    % Myelocytes 2.6 %    Absolute Neutrophil 12.5 (H) 1.6 - 8.3 10e9/L    Absolute Lymphocytes 1.7 0.8 - 5.3 10e9/L    Absolute Monocytes 0.6 0.0 - 1.3 10e9/L    Absolute Eosinophils 0.3 0.0 - 0.7 10e9/L    Absolute Basophils 0.6 (H) 0.0 - 0.2 10e9/L    Absolute  Metamyelocytes 0.3 (H) 0 10e9/L    Absolute Myelocytes 0.4 (H) 0 10e9/L    Anisocytosis Moderate     Poikilocytosis Slight     Polychromasia Slight     Platelet Estimate Confirming automated cell count    Comprehensive metabolic panel    Collection Time: 05/16/18  8:15 AM   Result Value Ref Range    Sodium 141 133 - 144 mmol/L    Potassium 4.5 3.4 - 5.3 mmol/L    Chloride 108 94 - 109 mmol/L    Carbon Dioxide 24 20 - 32 mmol/L    Anion Gap 8 3 - 14 mmol/L    Glucose 94 70 - 99 mg/dL    Urea Nitrogen 10 7 - 30 mg/dL    Creatinine 0.93 0.66 - 1.25 mg/dL    GFR Estimate 81 >60 mL/min/1.7m2    GFR Estimate If Black >90 >60 mL/min/1.7m2    Calcium 9.4 8.5 - 10.1 mg/dL    Bilirubin Total 0.2 0.2 - 1.3 mg/dL    Albumin 4.0 3.4 - 5.0 g/dL    Protein Total 7.5 6.8 - 8.8 g/dL    Alkaline Phosphatase 153 (H) 40 - 150 U/L    ALT 18 0 - 70 U/L    AST 16 0 - 45 U/L   Magnesium    Collection Time: 05/16/18  8:15 AM   Result Value Ref Range    Magnesium 2.0 1.6 - 2.3 mg/dL

## 2018-05-16 NOTE — LETTER
5/16/2018       RE: Orlin Alcantar  [address]   [place] [address]     Dear Colleague,    Thank you for referring your patient, Orlin Alcantar, to the [facility]. Please see a copy of my visit note below.    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   3/26/18: Consented for the Nivolumab-Gemcitabine-Cisplatin study [id number]    3/28/18: Screening visit for study.   4/4/18; c1d1 gemcitabine  4/11/18: c1d8 gemcitabine-nivolumab  4/25/18: C2D1 gemcitabine-Cisplatin  5/2/18: C2d8 Gemcitabine-Nivolumab  5/16/18: C3d1 gemcitabine-Cisplatin    HPI:    68 year old male with PMH of hemophilia, chronic hepatis C who was seen by DR. Burns for evaluation of neoadjuvant therapy with Gemcitabine Cisplatin and Nivolumab.  I will be taking over his care while on study.     Patient has a history of hemophilia and has undergone several surgeries in right hip replacement, knee replacement. Hemophilia managed by Dr. Marley.   He reports recurrent hematuria for a year, initially attributed to kidney stones, cystoscopy in Feb 2018 revealed:  Bladder tumor, transurethral resection:   - Invasive high grade urothelial carcinoma   - Extent of invasion: Muscularis propria, multifocal   - Lymphovascular invasion: Present     PET-CT scan - which has revealed muscle invasive bladder right lateral wall of bladder and right  obturator lymphnode, 1.1 cm short axis, suspicious for metastatic disease.  He has been treated for HCV in the 1990s and has not had any complications. His LFTs have remained normal.   His hemophilia is very well controlled on current regimen of factor replacement.   He does not have any autoimmune disease and is not on immunesuppressants or steroids.   His diabetes is diet controlled and he has not required any medications.     Interval History:  Denies nausea, vomiting, hearing loss, neuropathy, fever,  chills.  Denies any immune related AEs   No bleeding issues.   Tolerating treatment very well.  Recent cystoscopy showed favorable response.     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      Bladder tumor      Depression      DM II (diabetes mellitus, type II), controlled (H)     diet controlled     Hemophilia (H)     Type A     Hepatitis C, chronic (H)     treated [facility] interferon/ribavirin     HTN (hypertension)           CURRENT OUTPATIENT MEDICATIONS     Current Outpatient Prescriptions   Medication Sig     Acetaminophen (TYLENOL PO) Take 500 mg by mouth every 4 hours as needed for mild pain or fever     amLODIPine (NORVASC) 5 MG tablet Take 1 tablet (5 mg) by mouth daily (Patient taking differently: Take 5 mg by mouth every morning )     Antihemophilic Factor, Recomb, (KOGENATE FS) 1000 units KIT Infuse 3000 units every other day     codeine 30 MG tablet Take 2 tablets (60 mg) by mouth every 6 hours as needed for moderate to severe pain (up to 8 tabs in 24 hours) (Patient taking differently: Take 60 mg by mouth as needed for moderate to severe pain (up to 8 tabs in 24 hours) )     dexamethasone (DECADRON) 4 MG tablet Take 1 tablet (4 mg) by mouth 2 times daily (with meals)     dexamethasone (DECADRON) 4 MG tablet Take 2 tablets (8 mg) by mouth daily Take for 3 days, starting the day after cisplatin (Day 2).     diazepam (VALIUM) 5 MG tablet Take up to one tablet by mouth daily as needed for anxiety     lisinopril (PRINIVIL/ZESTRIL) 40 MG tablet Take 1 tablet (40 mg) by mouth daily (Patient taking differently: Take 40 mg by mouth every morning )     LORazepam (ATIVAN PO) Take 0.5 mg by mouth every 6 hours as needed for anxiety     mirtazapine (REMERON) 45 MG tablet Take 1 tablet (45 mg) by mouth At Bedtime And may take 1/2 tablet for insomnia up to 3 times a week.     tamsulosin (FLOMAX) 0.4 MG capsule Take 1 capsule (0.4 mg) by mouth daily      ondansetron (ZOFRAN-ODT) 8 MG ODT tab Take 1 tablet (8 mg) by mouth every 8 hours as needed for nausea (Patient not taking: Reported on 5/16/2018)     prochlorperazine (COMPAZINE) 10 MG tablet Take 0.5 tablets (5 mg) by mouth every 6 hours as needed for nausea or vomiting (Patient not taking: Reported on 5/16/2018)     prochlorperazine (COMPAZINE) 10 MG tablet Take 1 tablet (10 mg) by mouth every 6 hours as needed (Nausea/Vomiting) (Patient not taking: Reported on 5/16/2018)     No current facility-administered medications for this visit.      Facility-Administered Medications Ordered in Other Visits   Medication     0.9% sodium chloride + KCl 20 mEq/L 1,000 mL with magnesium sulfate 2 g infusion     0.9% sodium chloride BOLUS     CISplatin (PLATINOL) 135 mg, mannitol 25 g in sodium chloride 0.9 % 1,300 mL CHEMOTHERAPY     fosaprepitant (EMEND) 150 mg, dexamethasone (DECADRON) 8 mg in sodium chloride 0.9 % 250 mL intermittent infusion     gemcitabine (GEMZAR) 1,930 mg in sodium chloride 0.9 % 326 mL CHEMOTHERAPY     palonosetron (ALOXI) injection 0.25 mg       CBC RESULTS:   Recent Labs   Lab Test  05/16/18   0815   WBC  16.3*   RBC  4.15*   HGB  11.5*   HCT  36.8*   MCV  89   MCH  27.7   MCHC  31.3*   RDW  19.9*   PLT  330     Recent Labs   Lab Test  05/16/18   0815  05/02/18   0753   NA  141  139   POTASSIUM  4.5  4.3   CHLORIDE  108  105   CO2  24  27   ANIONGAP  8  7   GLC  94  93   BUN  10  17   CR  0.93  0.90   MARLA  9.4  9.2     TSH   Date Value Ref Range Status   05/02/2018 1.20 0.40 - 4.00 mU/L Final   ]  ASSESSMENT/PLAN:     68 year old male with T2N1(solitary node)  urothelial carcinoma,currently enrolled on the clinical trial of Nivolumab with Gemcitabine and Cisplatin for MIBC.  He is planned for radical cystectomy by Dr. Shea within 6-8 weeks after completion of neoadjuvant therapy.     Plan:   Proceed with C3D1 cisplatin and gemcitabine today.   Will get neulasta with each cycle.  Patient had  questions about whether he needs cystectomy, I discussed and reinforced that he would need cystectomy after completion of neoadjuvant treatment and this is the plan per Dr. Shea as well according to SOC recommendations for MIBC.  Also, the current protocol mandates a radical cystectomy as SOC.  Hemophilia; management of factor replacement per Dr. Marley.No issues on current treatment.    RTC per study protocol treatment schedule.    DM: diet controlled, encouraged to monitor sugars closely.      Magalie Espinal MD  Hematology Oncology and Transplantation  [facility]

## 2018-05-16 NOTE — PROGRESS NOTES
Infusion Nursing Note:  Orlin Alcantar presents today for cycle 3, day 1 Cisplatin and Gemzar.    Patient seen by provider today: Yes: Dr. Espinal   present during visit today: Not Applicable.    Note: N/A.    Intravenous Access:  Peripheral IV placed.    Treatment Conditions:  Lab Results   Component Value Date    HGB 11.5 05/16/2018     Lab Results   Component Value Date    WBC 16.3 05/16/2018      Lab Results   Component Value Date    ANEU 12.5 05/16/2018     Lab Results   Component Value Date     05/16/2018      Lab Results   Component Value Date     05/16/2018                   Lab Results   Component Value Date    POTASSIUM 4.5 05/16/2018           Lab Results   Component Value Date    MAG 2.0 05/16/2018            Lab Results   Component Value Date    CR 0.93 05/16/2018                   Lab Results   Component Value Date    MARLA 9.4 05/16/2018                Lab Results   Component Value Date    BILITOTAL 0.2 05/16/2018           Lab Results   Component Value Date    ALBUMIN 4.0 05/16/2018                    Lab Results   Component Value Date    ALT 18 05/16/2018           Lab Results   Component Value Date    AST 16 05/16/2018     Results reviewed, labs MET treatment parameters, ok to proceed with treatment.    Post Infusion Assessment:  Patient tolerated infusion without incident.  Blood return noted pre and post infusion.  Site patent and intact, free from redness, edema or discomfort.  No evidence of extravasations.  Access discontinued per protocol.    Discharge Plan:   Patient declined prescription refills.  Discharge instructions reviewed with: Patient.  Patient and/or family verbalized understanding of discharge instructions and all questions answered.  Copy of AVS reviewed with patient and/or family.  Patient will return 5/23/2018 for next appointment.  Patient discharged in stable condition accompanied by: self.  Departure Mode: Ambulatory.    Rajiv Manuel RN

## 2018-05-16 NOTE — PROGRESS NOTES
"Phase II Trial of Neoadjuvant  Nivolumab with Cisplatin and Gemcitabine in Muscle-Invasive Bladder Cancer (MIBC) Patients undergoing Radical Cystectomy   [id number]  [id number]  PI and treating physician: Dr. Magalie Espinal      Cycle 3, Day 1, plan is for cis/gem today.      Dr. Espinal met with patient and writer met with patient in infusion prior to treatment. Conmeds, AEs, and labs reviewed. Patient does report intermittent \"queasiness\" and has taken lorazepam, zofran, and compazine since starting treatment. Upon further discussion with Dr. Espinal, this will be captured as grade 1 nausea. The prophylactic designation of the corresponding conmeds will be removed.    Patient reports he has been taking Flomax since TURBT. Per Dr. Espinal, does not need to continue at conclusion of script.    77mL/min (original formula)  83mL/min (weight adjusted)  Creatine clearance by Cockgraft-Gault   Age: 68 years old  Height: 6'  Weight: 71.4 kg  Creat: 0.93    Report given to infusion RN Rajiv. Patient had questions re: plan for cystectomy -- follow-up requested from Aileen Montoya care coordinator RN. CBC with dif for platelet check off-week will be next Friday due to holiday schedule and patient's preference and schedule - Dr. Marley updated via [employer].    Eric Srivastava RN  Clinical Research Coordinator  Clinical Trials Office  [facility]  Desk Phone: [phone]  Pager: [phone]      Performance Status     Subject name: Orlin Alcantar   Eastern Cooperative Oncology Group (ECOG) Performance Status  GRADE ECOG PERFORMANCE STATUS   0 Fully active, able to carry on all pre-disease performance without restriction   1 Restricted in physically strenuous activity but ambulatory and able to carry out work of a light   2 Ambulatory and capable of all selfcare but unable to carry out any work activities; up and about more than 50% of waking hours   3 Capable of only limited selfcare; " confined to bed or chair more than 50% of waking hours   4 Completely disabled; cannot carry on any selfcare; totally confined to bed or chair   5 Dead     The patient was assessed on 5/16/2018 using the ECOG scale. ECOG Score: 0

## 2018-05-16 NOTE — PROGRESS NOTES
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   3/26/18: Consented for the Nivolumab-Gemcitabine-Cisplatin study [id number]    3/28/18: Screening visit for study.   4/4/18; c1d1 gemcitabine  4/11/18: c1d8 gemcitabine-nivolumab  4/25/18: C2D1 gemcitabine-Cisplatin  5/2/18: C2d8 Gemcitabine-Nivolumab  5/16/18: C3d1 gemcitabine-Cisplatin    HPI:    68 year old male with PMH of hemophilia, chronic hepatis C who was seen by DR. Burns for evaluation of neoadjuvant therapy with Gemcitabine Cisplatin and Nivolumab.  I will be taking over his care while on study.     Patient has a history of hemophilia and has undergone several surgeries in right hip replacement, knee replacement. Hemophilia managed by Dr. Marley.   He reports recurrent hematuria for a year, initially attributed to kidney stones, cystoscopy in Feb 2018 revealed:  Bladder tumor, transurethral resection:   - Invasive high grade urothelial carcinoma   - Extent of invasion: Muscularis propria, multifocal   - Lymphovascular invasion: Present     PET-CT scan - which has revealed muscle invasive bladder right lateral wall of bladder and right  obturator lymphnode, 1.1 cm short axis, suspicious for metastatic disease.  He has been treated for HCV in the 1990s and has not had any complications. His LFTs have remained normal.   His hemophilia is very well controlled on current regimen of factor replacement.   He does not have any autoimmune disease and is not on immunesuppressants or steroids.   His diabetes is diet controlled and he has not required any medications.     Interval History:  Denies nausea, vomiting, hearing loss, neuropathy, fever, chills.  Denies any immune related AEs   No bleeding issues.   Tolerating treatment very well.  Recent cystoscopy showed favorable response.     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       Bladder tumor      Depression      DM II (diabetes mellitus, type II), controlled (H)     diet controlled     Hemophilia (H)     Type A     Hepatitis C, chronic (H)     treated [facility] interferon/ribavirin     HTN (hypertension)           CURRENT OUTPATIENT MEDICATIONS     Current Outpatient Prescriptions   Medication Sig     Acetaminophen (TYLENOL PO) Take 500 mg by mouth every 4 hours as needed for mild pain or fever     amLODIPine (NORVASC) 5 MG tablet Take 1 tablet (5 mg) by mouth daily (Patient taking differently: Take 5 mg by mouth every morning )     Antihemophilic Factor, Recomb, (KOGENATE FS) 1000 units KIT Infuse 3000 units every other day     codeine 30 MG tablet Take 2 tablets (60 mg) by mouth every 6 hours as needed for moderate to severe pain (up to 8 tabs in 24 hours) (Patient taking differently: Take 60 mg by mouth as needed for moderate to severe pain (up to 8 tabs in 24 hours) )     dexamethasone (DECADRON) 4 MG tablet Take 1 tablet (4 mg) by mouth 2 times daily (with meals)     dexamethasone (DECADRON) 4 MG tablet Take 2 tablets (8 mg) by mouth daily Take for 3 days, starting the day after cisplatin (Day 2).     diazepam (VALIUM) 5 MG tablet Take up to one tablet by mouth daily as needed for anxiety     lisinopril (PRINIVIL/ZESTRIL) 40 MG tablet Take 1 tablet (40 mg) by mouth daily (Patient taking differently: Take 40 mg by mouth every morning )     LORazepam (ATIVAN PO) Take 0.5 mg by mouth every 6 hours as needed for anxiety     mirtazapine (REMERON) 45 MG tablet Take 1 tablet (45 mg) by mouth At Bedtime And may take 1/2 tablet for insomnia up to 3 times a week.     tamsulosin (FLOMAX) 0.4 MG capsule Take 1 capsule (0.4 mg) by mouth daily     ondansetron (ZOFRAN-ODT) 8 MG ODT tab Take 1 tablet (8 mg) by mouth every 8 hours as needed for nausea (Patient not taking: Reported on 5/16/2018)     prochlorperazine (COMPAZINE) 10 MG tablet Take 0.5 tablets (5 mg) by mouth every 6 hours as needed for  nausea or vomiting (Patient not taking: Reported on 5/16/2018)     prochlorperazine (COMPAZINE) 10 MG tablet Take 1 tablet (10 mg) by mouth every 6 hours as needed (Nausea/Vomiting) (Patient not taking: Reported on 5/16/2018)     No current facility-administered medications for this visit.      Facility-Administered Medications Ordered in Other Visits   Medication     0.9% sodium chloride + KCl 20 mEq/L 1,000 mL with magnesium sulfate 2 g infusion     0.9% sodium chloride BOLUS     CISplatin (PLATINOL) 135 mg, mannitol 25 g in sodium chloride 0.9 % 1,300 mL CHEMOTHERAPY     fosaprepitant (EMEND) 150 mg, dexamethasone (DECADRON) 8 mg in sodium chloride 0.9 % 250 mL intermittent infusion     gemcitabine (GEMZAR) 1,930 mg in sodium chloride 0.9 % 326 mL CHEMOTHERAPY     palonosetron (ALOXI) injection 0.25 mg       CBC RESULTS:   Recent Labs   Lab Test  05/16/18   0815   WBC  16.3*   RBC  4.15*   HGB  11.5*   HCT  36.8*   MCV  89   MCH  27.7   MCHC  31.3*   RDW  19.9*   PLT  330     Recent Labs   Lab Test  05/16/18   0815  05/02/18   0753   NA  141  139   POTASSIUM  4.5  4.3   CHLORIDE  108  105   CO2  24  27   ANIONGAP  8  7   GLC  94  93   BUN  10  17   CR  0.93  0.90   MARLA  9.4  9.2     TSH   Date Value Ref Range Status   05/02/2018 1.20 0.40 - 4.00 mU/L Final   ]  ASSESSMENT/PLAN:     68 year old male with T2N1(solitary node)  urothelial carcinoma,currently enrolled on the clinical trial of Nivolumab with Gemcitabine and Cisplatin for MIBC.  He is planned for radical cystectomy by Dr. Shea within 6-8 weeks after completion of neoadjuvant therapy.     Plan:   Proceed with C3D1 cisplatin and gemcitabine today.   Will get neulasta with each cycle.  Patient had questions about whether he needs cystectomy, I discussed and reinforced that he would need cystectomy after completion of neoadjuvant treatment and this is the plan per Dr. Shea as well according to SOC recommendations for MIBC.  Also, the current protocol  mandates a radical cystectomy as SOC.  Hemophilia; management of factor replacement per Dr. Marley.No issues on current treatment.    RTC per study protocol treatment schedule.    DM: diet controlled, encouraged to monitor sugars closely.      Magalie Espinal MD  Hematology Oncology and Transplantation  [facility]

## 2018-05-16 NOTE — NURSING NOTE
Chief Complaint   Patient presents with     Blood Draw     Labs drawn via  by RN. VS Taken.     Kiya Garcia RN

## 2018-05-16 NOTE — MR AVS SNAPSHOT
After Visit Summary   5/16/2018    Orlin Alcantar    MRN: [id number]           Patient Information     Date Of Birth          1950        Visit Information        Provider Department      5/16/2018 8:45 AM Magalie Espinal MD [facility]        Today's Diagnoses     Urothelial carcinoma of bladder (H)           Follow-ups after your visit        Your next 10 appointments already scheduled     May 16, 2018  9:30 AM CDT   Infusion 360 with UC ONCOLOGY INFUSION, UC 16 ATC   [facility] ([facility])    [address] [place] [address]  [address]  [facility] [address]   [phone]            May 23, 2018  7:45 AM CDT   [facility] Draw with UC MASONIC LAB DRAW   [facility] ([facility])    [address] [place] [address]  [address]  [facility] [address]   [phone]            May 23, 2018  8:15 AM CDT   (Arrive by 8:00 AM)   Return Visit with Magalie Espinal MD   [facility] ([facility])    [address] [place] [address]  [address]  [facility] [address]   [phone]            May 23, 2018  9:00 AM CDT   Infusion 120 with UC ONCOLOGY INFUSION, UC 25 ATC   [facility] ([facility])    [address] [place] [address]  [address]  [facility] [address]   [phone]            Jun 06, 2018  9:00 AM CDT   [facility] Lab Draw with UC MASONIC LAB DRAW   [facility] Lab Draw ([facility])    [address] [place] [address]  [address]  [facility] [address]   [phone]            Jun 06, 2018  9:30 AM CDT   (Arrive by 9:15 AM)   Return Visit with Brianna Harris PA-C   [facility] ([facility])    [address] [place] [address]  [address]  [facility] [address]   [phone]            Jun 06, 2018 10:30 AM CDT   Infusion 360 with UC ONCOLOGY INFUSION, UC 21 ATC   [facility]  "Cancer Clinic ([facility])    [address]  [address]  [facility] [address] [phone]              Who to contact     If you have questions or need follow up information about today's clinic visit or your schedule please contact [facility] directly at [phone].  Normal or non-critical lab and imaging results will be communicated to you by MyChart, letter or phone within 4 business days after the clinic has received the results. If you do not hear from us within 7 days, please contact the clinic through [employer] or phone. If you have a critical or abnormal lab result, we will notify you by phone as soon as possible.  Submit refill requests through [employer] or call your pharmacy and they will forward the refill request to us. Please allow 3 business days for your refill to be completed.          Additional Information About Your Visit        [employer] Information     [employer] gives you secure access to your electronic health record. If you see a primary care provider, you can also send messages to your care team and make appointments. If you have questions, please call your primary care clinic.  If you do not have a primary care provider, please call [phone] and they will assist you.        Care EveryWhere ID     This is your Care EveryWhere ID. This could be used by other organizations to access your [place] medical records  [id number]        Your Vitals Were     Pulse Temperature Respirations Height Pulse Oximetry BMI (Body Mass Index)    102 97.9  F (36.6  C) (Oral) 18 1.829 m (6' 0.01\") 97% 21.33 kg/m2       Blood Pressure from Last 3 Encounters:   05/16/18 114/77   05/09/18 115/84   05/02/18 112/77    Weight from Last 3 Encounters:   05/16/18 71.4 kg (157 lb 4.8 oz)   05/09/18 69.8 kg (153 lb 14.4 oz)   05/02/18 71.2 kg (156 lb 14.4 oz)              Today, you had the following     No orders found for display         Today's Medication Changes "          These changes are accurate as of 5/16/18  9:21 AM.  If you have any questions, ask your nurse or doctor.               These medicines have changed or have updated prescriptions.        Dose/Directions    amLODIPine 5 MG tablet   Commonly known as:  NORVASC   This may have changed:  when to take this   Used for:  Hypertension goal BP (blood pressure) < 140/90        Dose:  5 mg   Take 1 tablet (5 mg) by mouth daily   Quantity:  90 tablet   Refills:  3       codeine 30 MG tablet   This may have changed:  when to take this   Used for:  Hemophilic arthropathy        Dose:  60 mg   Take 2 tablets (60 mg) by mouth every 6 hours as needed for moderate to severe pain (up to 8 tabs in 24 hours)   Quantity:  168 tablet   Refills:  0       lisinopril 40 MG tablet   Commonly known as:  PRINIVIL/ZESTRIL   This may have changed:  when to take this   Used for:  Hypertension goal BP (blood pressure) < 140/90        Dose:  40 mg   Take 1 tablet (40 mg) by mouth daily   Quantity:  90 tablet   Refills:  3                Primary Care Provider Office Phone # Fax #    Katie Ramos [phone]       [address] [facility] [address]        Equal Access to Services     [facility] Hospital[facility] AH: Hadii violette owens hadasho Sofaiza, waaxda luqadaha, qaybta kaalmada adeegyada, lloyd blum. So [facility] [phone].    ATENCIÓN: Si habla español, tiene a hernadez disposición servicios gratuitos de asistencia lingüística. Perry al [phone].    We comply with applicable federal civil rights laws and [place] laws. We do not discriminate on the basis of race, color, national origin, age, disability, sex, sexual orientation, or gender identity.            Thank you!     Thank you for choosing [facility]  for your care. Our goal is always to provide you with excellent care. Hearing back from our patients is one way we can continue to improve our services. Please take a few minutes to  complete the written survey that you may receive in the mail after your visit with us. Thank you!             Your Updated Medication List - Protect others around you: Learn how to safely use, store and throw away your medicines at www.disposemymeds.org.          This list is accurate as of 5/16/18  9:21 AM.  Always use your most recent med list.                   Brand Name Dispense Instructions for use Diagnosis    amLODIPine 5 MG tablet    NORVASC    90 tablet    Take 1 tablet (5 mg) by mouth daily    Hypertension goal BP (blood pressure) < 140/90       ATIVAN PO      Take 0.5 mg by mouth every 6 hours as needed for anxiety        codeine 30 MG tablet     168 tablet    Take 2 tablets (60 mg) by mouth every 6 hours as needed for moderate to severe pain (up to 8 tabs in 24 hours)    Hemophilic arthropathy       * dexamethasone 4 MG tablet    DECADRON    24 tablet    Take 1 tablet (4 mg) by mouth 2 times daily (with meals)    Bladder tumor       * dexamethasone 4 MG tablet    DECADRON    18 tablet    Take 2 tablets (8 mg) by mouth daily Take for 3 days, starting the day after cisplatin (Day 2).    Urothelial carcinoma of bladder (H)       diazepam 5 MG tablet    VALIUM    30 tablet    Take up to one tablet by mouth daily as needed for anxiety    Depressive disorder       KOGENATE FS 1000 units Kit     [address]17 each    Infuse 3000 units every other day    Severe hemophilia A (H), Personal history of malignant neoplasm of bladder       lisinopril 40 MG tablet    PRINIVIL/ZESTRIL    90 tablet    Take 1 tablet (40 mg) by mouth daily    Hypertension goal BP (blood pressure) < 140/90       mirtazapine 45 MG tablet    REMERON    37 tablet    Take 1 tablet (45 mg) by mouth At Bedtime And may take 1/2 tablet for insomnia up to 3 times a week.    Generalized anxiety disorder       ondansetron 8 MG ODT tab    ZOFRAN-ODT    30 tablet    Take 1 tablet (8 mg) by mouth every 8 hours as needed for nausea    Bladder tumor       *  prochlorperazine 10 MG tablet    COMPAZINE    30 tablet    Take 0.5 tablets (5 mg) by mouth every 6 hours as needed for nausea or vomiting    Bladder tumor       * prochlorperazine 10 MG tablet    COMPAZINE    30 tablet    Take 1 tablet (10 mg) by mouth every 6 hours as needed (Nausea/Vomiting)    Urothelial carcinoma of bladder (H)       tamsulosin 0.4 MG capsule    FLOMAX    45 capsule    Take 1 capsule (0.4 mg) by mouth daily    Incomplete bladder emptying       TYLENOL PO      Take 500 mg by mouth every 4 hours as needed for mild pain or fever        * Notice:  This list has 4 medication(s) that are the same as other medications prescribed for you. Read the directions carefully, and ask your doctor or other care provider to review them with you.

## 2018-05-16 NOTE — NURSING NOTE
"Oncology Rooming Note    May 16, 2018 8:22 AM   Orlin Alcantar is a 68 year old male who presents for:    Chief Complaint   Patient presents with     Blood Draw     Labs drawn via  by RN. VS Taken.     Oncology Clinic Visit     Return: Bladder CA     Initial Vitals: /77 (BP Location: Right arm, Patient Position: Sitting, Cuff Size: Adult Regular)  Pulse 102  Temp 97.9  F (36.6  C) (Oral)  Resp 18  Ht 1.829 m (6' 0.01\")  Wt 71.4 kg (157 lb 4.8 oz)  SpO2 97%  BMI 21.33 kg/m2 Estimated body mass index is 21.33 kg/(m^2) as calculated from the following:    Height as of this encounter: 1.829 m (6' 0.01\").    Weight as of this encounter: 71.4 kg (157 lb 4.8 oz). Body surface area is 1.9 meters squared.  No Pain (0) Comment: Data Unavailable   No LMP for male patient.  Allergies reviewed: Yes  Medications reviewed: Yes    Medications: Medication refills not needed today.  Pharmacy name entered into [employer]:    [facility] - [place], [place] - [address] [place] [address] PHARMACY - [facility] - [place], [place] - [address]  [facility] - [place], [place] - [address]    Clinical concerns: no new concerns     6 minutes for nursing intake (face to face time)     Sakina Ugalde CMA                "

## 2018-05-16 NOTE — MR AVS SNAPSHOT
After Visit Summary   5/16/2018    Orlin Alcantar    MRN: [id number]           Patient Information     Date Of Birth          1950        Visit Information        Provider Department      5/16/2018 9:30 AM  16 ATC; UC ONCOLOGY INFUSION [facility]        Today's Diagnoses     Urothelial carcinoma of bladder (H)    -  1      Care Instructions    Contact Numbers    [facility] Main Line: [phone]  [facility] Triage:  [phone]    Call triage with chills and/or temperature greater than or equal to 100.5, uncontrolled nausea/vomiting, diarrhea, constipation, dizziness, shortness of breath, chest pain, bleeding, unexplained bruising, or any new/concerning symptoms, questions/concerns.     If you are having any concerning symptoms or wish to speak to a provider before your next infusion visit, please call your care coordinator or triage to notify them so we can adequately serve you.       After Hours: [phone]    If after hours, weekends, or holidays, call main hospital  and ask for Oncology doctor on call.           May 2018   Ricky Monday Tuesday Wednesday Thursday Friday Saturday             1     2     [facility]    7:45 AM   (15 min.)    [facility]   [facility] Lab Draw     [facility] RETURN    8:00 AM   (30 min.)   Magalie Espinal MD   [facility]     [facility] ONC INFUSION 120    9:00 AM   (120 min.)    ONCOLOGY INFUSION   [facility] 3     4     5       6     7     8     9     [facility]    4:00 PM   (15 min.)    [facility] DRAW   [facility] Lab Draw     [facility] CYSTOSCOPY    5:00 PM   (20 min.)   Bebeto Shea MD   [facility] 10     11     12       13     14     15     16     [facility]    8:15 AM   (15 min.)    [facility] DRAW   [facility] Lab Draw     [facility] RETURN    8:30 AM   (30 min.)   Magalie Espinal MD   [facility]    9:30 AM   (360 min.)    ONCOLOGY INFUSION   [facility] 17     18     19       20     21     22     23     [facility]    7:45 AM   (15 min.)    [facility] DRAW   [facility] Draw     [facility] RETURN    8:00 AM   (30 min.)   Magalie Espinal MD   [facility]     [facility] ONC INFUSION 120    9:00 AM   (120 min.)   UC ONCOLOGY INFUSION   [facility] 24     25     26       27     28     29     30     31 June 2018 Sunday Monday Tuesday Wednesday Thursday Friday Saturday                            1     2       3     4     5     6     [facility]    9:00 AM   (15 min.)    [facility] DRAW   [facility] Draw     [facility] RETURN    9:15 AM   (50 min.)   Brianna Harris PA-C   [facility]   10:30 AM   (360 min.)    ONCOLOGY INFUSION   [facility] 7     8     9       10     11     12     13     [facility]    7:15 AM   (15 min.)    [facility] DRAW   [facility] Draw     [facility] RETURN    7:30 AM   (30 min.)   Magalie Espinal MD   [facility]    9:00 AM   (120 min.)    ONCOLOGY INFUSION   [facility] 14     15     16       17     18     19     20     21     22     23       24     25     26     27     28     29     30                  Lab Results:  Recent Results (from the past 12 hour(s))   CBC with platelets differential    Collection Time: 05/16/18  8:15 AM   Result Value Ref Range    WBC 16.3 (H) 4.0 - 11.0 10e9/L    RBC Count 4.15 (L) 4.4 - 5.9 10e12/L    Hemoglobin 11.5 (L) 13.3 - 17.7 g/dL    Hematocrit 36.8 (L) 40.0 - 53.0 %    MCV 89 78 - 100 fl    MCH 27.7 26.5 - 33.0 pg    MCHC 31.3 (L) 31.5 - 36.5 g/dL    RDW 19.9 (H) 10.0 - 15.0 %    Platelet Count 330 150 - 450 10e9/L    Diff Method Manual Differential     % Neutrophils  76.5 %    % Lymphocytes 10.4 %    % Monocytes 3.5 %    % Eosinophils 1.8 %    % Basophils 3.5 %    % Metamyelocytes 1.7 %    % Myelocytes 2.6 %    Absolute Neutrophil 12.5 (H) 1.6 - 8.3 10e9/L    Absolute Lymphocytes 1.7 0.8 - 5.3 10e9/L    Absolute Monocytes 0.6 0.0 - 1.3 10e9/L    Absolute Eosinophils 0.3 0.0 - 0.7 10e9/L    Absolute Basophils 0.6 (H) 0.0 - 0.2 10e9/L    Absolute Metamyelocytes 0.3 (H) 0 10e9/L    Absolute Myelocytes 0.4 (H) 0 10e9/L    Anisocytosis Moderate     Poikilocytosis Slight     Polychromasia Slight     Platelet Estimate Confirming automated cell count    Comprehensive metabolic panel    Collection Time: 05/16/18  8:15 AM   Result Value Ref Range    Sodium 141 133 - 144 mmol/L    Potassium 4.5 3.4 - 5.3 mmol/L    Chloride 108 94 - 109 mmol/L    Carbon Dioxide 24 20 - 32 mmol/L    Anion Gap 8 3 - 14 mmol/L    Glucose 94 70 - 99 mg/dL    Urea Nitrogen 10 7 - 30 mg/dL    Creatinine 0.93 0.66 - 1.25 mg/dL    GFR Estimate 81 >60 mL/min/1.7m2    GFR Estimate If Black >90 >60 mL/min/1.7m2    Calcium 9.4 8.5 - 10.1 mg/dL    Bilirubin Total 0.2 0.2 - 1.3 mg/dL    Albumin 4.0 3.4 - 5.0 g/dL    Protein Total 7.5 6.8 - 8.8 g/dL    Alkaline Phosphatase 153 (H) 40 - 150 U/L    ALT 18 0 - 70 U/L    AST 16 0 - 45 U/L   Magnesium    Collection Time: 05/16/18  8:15 AM   Result Value Ref Range    Magnesium 2.0 1.6 - 2.3 mg/dL               Follow-ups after your visit        Your next 10 appointments already scheduled     May 23, 2018  7:45 AM CDT   [facility] with  [facility]   [facility] ([facility])    [address] [address] [phone]            May 23, 2018  8:15 AM CDT   (Arrive by 8:00 AM)   Return Visit with Magalie Espinal MD   [facility] ([facility])    [address] [place] [address] [phone]            May 23, 2018  9:00 AM CDT    Infusion 120 with UC ONCOLOGY INFUSION, UC 25 ATC   [facility] ([facility])    [address]  [address]  [facility] [address]   [phone]            Jun 06, 2018  9:00 AM CDT   [facility] Draw with [facility] DRAW   [facility] Draw ([facility])    [address]  [address]  [facility] [address]   [phone]            Jun 06, 2018  9:30 AM CDT   (Arrive by 9:15 AM)   Return Visit with Brianna Harris PA-C   [facility] ([facility])    [address]  [address]  [facility] [address] [phone]            Jun 06, 2018 10:30 AM CDT   Infusion 360 with UC ONCOLOGY INFUSION, UC 21 ATC   [facility] ([facility])    [address] [place] [address]  [address]  [facility] [address]   [phone]            Jun 13, 2018  7:15 AM CDT   [facility] Draw with [facility] DRAW   [facility] ([facility])    [address]  [address]  [facility] [address] [phone]            Jun 13, 2018  7:45 AM CDT   (Arrive by 7:30 AM)   Return Visit with Magalie Espinal MD   [facility] ([facility])    [address] [place] [address]  [address]  [facility] [id number] [phone]              Who to contact     If you have questions or need follow up information about today's clinic visit or your schedule please contact [facility] directly at [phone].  Normal or non-critical lab and imaging results will be communicated to you by MyChart, letter or phone within 4 business days after the clinic has received the results. If you do not hear from us within 7 days, please contact the clinic through MyChart or phone. If you have a critical or abnormal lab result, we will notify you by phone as soon as possible.  Submit  refill requests through [employer] or call your pharmacy and they will forward the refill request to us. Please allow 3 business days for your refill to be completed.          Additional Information About Your Visit        boomtrainharYippy Information     [employer] gives you secure access to your electronic health record. If you see a primary care provider, you can also send messages to your care team and make appointments. If you have questions, please call your primary care clinic.  If you do not have a primary care provider, please call [phone] and they will assist you.        Care EveryWhere ID     This is your Care EveryWhere ID. This could be used by other organizations to access your [facility] medical records  [id number]         Blood Pressure from Last 3 Encounters:   05/16/18 114/77   05/09/18 115/84   05/02/18 112/77    Weight from Last 3 Encounters:   05/16/18 71.4 kg (157 lb 4.8 oz)   05/09/18 69.8 kg (153 lb 14.4 oz)   05/02/18 71.2 kg (156 lb 14.4 oz)              We Performed the Following     CBC with platelets differential     Comprehensive metabolic panel     Magnesium          Today's Medication Changes          These changes are accurate as of 5/16/18  2:50 PM.  If you have any questions, ask your nurse or doctor.               These medicines have changed or have updated prescriptions.        Dose/Directions    amLODIPine 5 MG tablet   Commonly known as:  NORVASC   This may have changed:  when to take this   Used for:  Hypertension goal BP (blood pressure) < 140/90        Dose:  5 mg   Take 1 tablet (5 mg) by mouth daily   Quantity:  90 tablet   Refills:  3       codeine 30 MG tablet   This may have changed:  when to take this   Used for:  Hemophilic arthropathy        Dose:  60 mg   Take 2 tablets (60 mg) by mouth every 6 hours as needed for moderate to severe pain (up to 8 tabs in 24 hours)   Quantity:  168 tablet   Refills:  0       lisinopril 40 MG tablet   Commonly known as:  PRINIVIL/ZESTRIL    This may have changed:  when to take this   Used for:  Hypertension goal BP (blood pressure) < 140/90        Dose:  40 mg   Take 1 tablet (40 mg) by mouth daily   Quantity:  90 tablet   Refills:  3                Primary Care Provider Office Phone # Fax #    Katie Ramos [phone] [address]  [facility] [address]        Equal Access to Services     ANDREW FLANAGAN : Hadii aad ku hadasho Soomaali, waaxda luqadaha, qaybta kaalmada adeegyada, waxay idiin hayaan adepeggy patel lasharonkeenan . So [facility] [phone].    ATENCIÓN: Si nathnala day, tiene a hernadez disposición servicios gratuitos de asistencia lingüística. Perry al [phone].    We comply with applicable federal civil rights laws and [place] laws. We do not discriminate on the basis of race, color, national origin, age, disability, sex, sexual orientation, or gender identity.            Thank you!     Thank you for choosing [facility]  for your care. Our goal is always to provide you with excellent care. Hearing back from our patients is one way we can continue to improve our services. Please take a few minutes to complete the written survey that you may receive in the mail after your visit with us. Thank you!             Your Updated Medication List - Protect others around you: Learn how to safely use, store and throw away your medicines at www.disposemymeds.org.          This list is accurate as of 5/16/18  2:50 PM.  Always use your most recent med list.                   Brand Name Dispense Instructions for use Diagnosis    amLODIPine 5 MG tablet    NORVASC    90 tablet    Take 1 tablet (5 mg) by mouth daily    Hypertension goal BP (blood pressure) < 140/90       ATIVAN PO      Take 0.5 mg by mouth every 6 hours as needed for anxiety        codeine 30 MG tablet     168 tablet    Take 2 tablets (60 mg) by mouth every 6 hours as needed for moderate to severe pain (up to 8 tabs in 24 hours)    Hemophilic  arthropathy       * dexamethasone 4 MG tablet    DECADRON    24 tablet    Take 1 tablet (4 mg) by mouth 2 times daily (with meals)    Bladder tumor       * dexamethasone 4 MG tablet    DECADRON    18 tablet    Take 2 tablets (8 mg) by mouth daily Take for 3 days, starting the day after cisplatin (Day 2).    Urothelial carcinoma of bladder (H)       diazepam 5 MG tablet    VALIUM    30 tablet    Take up to one tablet by mouth daily as needed for anxiety    Depressive disorder       KOGENATE FS 1000 units Kit     53475 each    Infuse 3000 units every other day    Severe hemophilia A (H), Personal history of malignant neoplasm of bladder       lisinopril 40 MG tablet    PRINIVIL/ZESTRIL    90 tablet    Take 1 tablet (40 mg) by mouth daily    Hypertension goal BP (blood pressure) < 140/90       mirtazapine 45 MG tablet    REMERON    37 tablet    Take 1 tablet (45 mg) by mouth At Bedtime And may take 1/2 tablet for insomnia up to 3 times a week.    Generalized anxiety disorder       ondansetron 8 MG ODT tab    ZOFRAN-ODT    30 tablet    Take 1 tablet (8 mg) by mouth every 8 hours as needed for nausea    Bladder tumor       * prochlorperazine 10 MG tablet    COMPAZINE    30 tablet    Take 0.5 tablets (5 mg) by mouth every 6 hours as needed for nausea or vomiting    Bladder tumor       * prochlorperazine 10 MG tablet    COMPAZINE    30 tablet    Take 1 tablet (10 mg) by mouth every 6 hours as needed (Nausea/Vomiting)    Urothelial carcinoma of bladder (H)       tamsulosin 0.4 MG capsule    FLOMAX    45 capsule    Take 1 capsule (0.4 mg) by mouth daily    Incomplete bladder emptying       TYLENOL PO      Take 500 mg by mouth every 4 hours as needed for mild pain or fever        * Notice:  This list has 4 medication(s) that are the same as other medications prescribed for you. Read the directions carefully, and ask your doctor or other care provider to review them with you.

## 2018-05-23 ENCOUNTER — ONCOLOGY VISIT (OUTPATIENT)
Dept: ONCOLOGY | Facility: CLINIC | Age: 68
End: 2018-05-23
Attending: INTERNAL MEDICINE
Payer: MEDICARE

## 2018-05-23 ENCOUNTER — RESEARCH ENCOUNTER (OUTPATIENT)
Dept: ONCOLOGY | Facility: CLINIC | Age: 68
End: 2018-05-23

## 2018-05-23 ENCOUNTER — TELEPHONE (OUTPATIENT)
Dept: UROLOGY | Facility: CLINIC | Age: 68
End: 2018-05-23

## 2018-05-23 ENCOUNTER — APPOINTMENT (OUTPATIENT)
Dept: LAB | Facility: CLINIC | Age: 68
End: 2018-05-23
Attending: INTERNAL MEDICINE
Payer: MEDICARE

## 2018-05-23 VITALS
HEIGHT: 72 IN | TEMPERATURE: 97.6 F | OXYGEN SATURATION: 100 % | RESPIRATION RATE: 22 BRPM | SYSTOLIC BLOOD PRESSURE: 108 MMHG | HEART RATE: 127 BPM | DIASTOLIC BLOOD PRESSURE: 70 MMHG | WEIGHT: 154 LBS | BODY MASS INDEX: 20.86 KG/M2

## 2018-05-23 DIAGNOSIS — D66 HEMOPHILIA A (H): Primary | ICD-10-CM

## 2018-05-23 DIAGNOSIS — C67.8 MALIGNANT NEOPLASM OF OVERLAPPING SITES OF BLADDER (H): ICD-10-CM

## 2018-05-23 DIAGNOSIS — C67.9 UROTHELIAL CARCINOMA OF BLADDER (H): Primary | ICD-10-CM

## 2018-05-23 LAB
ALBUMIN SERPL-MCNC: 4 G/DL (ref 3.4–5)
ALP SERPL-CCNC: 124 U/L (ref 40–150)
ALT SERPL W P-5'-P-CCNC: 47 U/L (ref 0–70)
ANION GAP SERPL CALCULATED.3IONS-SCNC: 8 MMOL/L (ref 3–14)
AST SERPL W P-5'-P-CCNC: 30 U/L (ref 0–45)
BASOPHILS # BLD AUTO: 0 10E9/L (ref 0–0.2)
BASOPHILS NFR BLD AUTO: 1.1 %
BILIRUB SERPL-MCNC: 0.4 MG/DL (ref 0.2–1.3)
BUN SERPL-MCNC: 13 MG/DL (ref 7–30)
CALCIUM SERPL-MCNC: 9.6 MG/DL (ref 8.5–10.1)
CHLORIDE SERPL-SCNC: 106 MMOL/L (ref 94–109)
CO2 SERPL-SCNC: 26 MMOL/L (ref 20–32)
CREAT SERPL-MCNC: 0.9 MG/DL (ref 0.66–1.25)
DIFFERENTIAL METHOD BLD: ABNORMAL
EOSINOPHIL # BLD AUTO: 0 10E9/L (ref 0–0.7)
EOSINOPHIL NFR BLD AUTO: 0.8 %
ERYTHROCYTE [DISTWIDTH] IN BLOOD BY AUTOMATED COUNT: 18.6 % (ref 10–15)
GFR SERPL CREATININE-BSD FRML MDRD: 84 ML/MIN/1.7M2
GLUCOSE SERPL-MCNC: 108 MG/DL (ref 70–99)
HCT VFR BLD AUTO: 34.4 % (ref 40–53)
HGB BLD-MCNC: 11.1 G/DL (ref 13.3–17.7)
IMM GRANULOCYTES # BLD: 0 10E9/L (ref 0–0.4)
IMM GRANULOCYTES NFR BLD: 0.8 %
LDH SERPL L TO P-CCNC: 123 U/L (ref 85–227)
LYMPHOCYTES # BLD AUTO: 0.8 10E9/L (ref 0.8–5.3)
LYMPHOCYTES NFR BLD AUTO: 21.8 %
MAGNESIUM SERPL-MCNC: 1.8 MG/DL (ref 1.6–2.3)
MCH RBC QN AUTO: 27.8 PG (ref 26.5–33)
MCHC RBC AUTO-ENTMCNC: 32.3 G/DL (ref 31.5–36.5)
MCV RBC AUTO: 86 FL (ref 78–100)
MONOCYTES # BLD AUTO: 0.4 10E9/L (ref 0–1.3)
MONOCYTES NFR BLD AUTO: 11.3 %
NEUTROPHILS # BLD AUTO: 2.3 10E9/L (ref 1.6–8.3)
NEUTROPHILS NFR BLD AUTO: 64.2 %
NRBC # BLD AUTO: 0 10*3/UL
NRBC BLD AUTO-RTO: 0 /100
PHOSPHATE SERPL-MCNC: 3.1 MG/DL (ref 2.5–4.5)
PLATELET # BLD AUTO: 236 10E9/L (ref 150–450)
POTASSIUM SERPL-SCNC: 4.4 MMOL/L (ref 3.4–5.3)
PROT SERPL-MCNC: 7.6 G/DL (ref 6.8–8.8)
RBC # BLD AUTO: 3.99 10E12/L (ref 4.4–5.9)
SODIUM SERPL-SCNC: 140 MMOL/L (ref 133–144)
TSH SERPL DL<=0.005 MIU/L-ACNC: 1.62 MU/L (ref 0.4–4)
URATE SERPL-MCNC: 5.3 MG/DL (ref 3.5–7.2)
WBC # BLD AUTO: 3.6 10E9/L (ref 4–11)

## 2018-05-23 PROCEDURE — 40000141 ZZH STATISTIC PERIPHERAL IV START W/O US GUIDANCE: Mod: ZF

## 2018-05-23 PROCEDURE — 85025 COMPLETE CBC W/AUTO DIFF WBC: CPT | Performed by: INTERNAL MEDICINE

## 2018-05-23 PROCEDURE — 96413 CHEMO IV INFUSION 1 HR: CPT

## 2018-05-23 PROCEDURE — 83735 ASSAY OF MAGNESIUM: CPT | Performed by: INTERNAL MEDICINE

## 2018-05-23 PROCEDURE — G0463 HOSPITAL OUTPT CLINIC VISIT: HCPCS | Mod: ZF

## 2018-05-23 PROCEDURE — 84100 ASSAY OF PHOSPHORUS: CPT | Performed by: INTERNAL MEDICINE

## 2018-05-23 PROCEDURE — 99214 OFFICE O/P EST MOD 30 MIN: CPT | Mod: ZP | Performed by: INTERNAL MEDICINE

## 2018-05-23 PROCEDURE — 84550 ASSAY OF BLOOD/URIC ACID: CPT | Performed by: INTERNAL MEDICINE

## 2018-05-23 PROCEDURE — 96375 TX/PRO/DX INJ NEW DRUG ADDON: CPT

## 2018-05-23 PROCEDURE — 83615 LACTATE (LD) (LDH) ENZYME: CPT | Performed by: INTERNAL MEDICINE

## 2018-05-23 PROCEDURE — 96417 CHEMO IV INFUS EACH ADDL SEQ: CPT

## 2018-05-23 PROCEDURE — 80053 COMPREHEN METABOLIC PANEL: CPT | Performed by: INTERNAL MEDICINE

## 2018-05-23 PROCEDURE — 96377 APPLICATON ON-BODY INJECTOR: CPT | Mod: 59

## 2018-05-23 PROCEDURE — 84443 ASSAY THYROID STIM HORMONE: CPT | Performed by: INTERNAL MEDICINE

## 2018-05-23 PROCEDURE — 25000128 H RX IP 250 OP 636: Mod: ZF | Performed by: INTERNAL MEDICINE

## 2018-05-23 RX ORDER — ONDANSETRON 2 MG/ML
8 INJECTION INTRAMUSCULAR; INTRAVENOUS ONCE
Status: COMPLETED | OUTPATIENT
Start: 2018-05-23 | End: 2018-05-23

## 2018-05-23 RX ORDER — LORAZEPAM 0.5 MG/1
0.5 TABLET ORAL EVERY 6 HOURS PRN
Qty: 60 TABLET | Refills: 0 | Status: SHIPPED | OUTPATIENT
Start: 2018-05-23 | End: 2018-07-25

## 2018-05-23 RX ADMIN — ONDANSETRON 8 MG: 2 INJECTION INTRAMUSCULAR; INTRAVENOUS at 08:53

## 2018-05-23 RX ADMIN — GEMCITABINE 1930 MG: 38 INJECTION, SOLUTION INTRAVENOUS at 09:38

## 2018-05-23 RX ADMIN — SODIUM CHLORIDE 250 ML: 9 INJECTION, SOLUTION INTRAVENOUS at 08:53

## 2018-05-23 RX ADMIN — PEGFILGRASTIM 6 MG: KIT SUBCUTANEOUS at 10:46

## 2018-05-23 ASSESSMENT — PAIN SCALES - GENERAL: PAINLEVEL: NO PAIN (0)

## 2018-05-23 NOTE — MR AVS SNAPSHOT
After Visit Summary   5/23/2018    Orlin Alcantar    MRN: [id number]           Patient Information     Date Of Birth          1950        Visit Information        Provider Department      5/23/2018 8:15 AM Magalie Espinal MD [facility]        Today's Diagnoses     Hemophilia A (H)    -  1    Malignant neoplasm of overlapping sites of bladder (H)           Follow-ups after your visit        Your next 10 appointments already scheduled     Jun 01, 2018 10:00 AM CDT   [facility] Draw with [facility] DRAW   [facility] ([facility])    [address] [place] [address]  [address]  [facility] [address]   [phone]            Jun 06, 2018  9:00 AM CDT   [facility] Draw with [facility] DRAW   [facility] ([facility])    [address] [place] [address]  [address]  [facility] [address]   [phone]            Jun 06, 2018  9:30 AM CDT   (Arrive by 9:15 AM)   Return Visit with Brianna Harris PA-C   [facility] ([facility])    [address] [place] [address]  [address]  [facility] [address]   [phone]            Jun 06, 2018 10:30 AM CDT   Infusion 360 with UC ONCOLOGY INFUSION, UC 21 ATC   [facility] ([facility])    [address] [place] [address]  [address]  [facility] [address]   [phone]            Jun 13, 2018  7:15 AM CDT   [facility] Lab Draw with [facility] LAB DRAW   [facility] Draw ([facility])    [address] [place] [address]  [address]  [facility] [address]   [phone]            Jun 13, 2018  7:45 AM CDT   (Arrive by 7:30 AM)   Return Visit with Magalie Espinal MD   [facility] ([facility])    [address] [place] [address]  [address]  [facility] [address]   [phone]            Jun 13, 2018  9:00 AM CDT   Infusion 120 with UC ONCOLOGY INFUSION,  UC 25 ATC   [facility] ([facility])    [address]  [address]  [facility] [id number] [phone]            Jul 09, 2018 12:45 PM CDT   PET ONCOLOGY WHOLE BODY with UUPET1   [facility] [place] [facility] ([facility])    [address] [id number] [phone]           Tell your doctor:   If there is any chance you may be pregnant or if you are breastfeeding.   If you have problems lying in small spaces (claustrophobia). If you do, your doctor may give you medicine to help you relax. If you have diabetes:   Have your exam early in the morning. Your blood glucose will go up as the day goes by.   Your glucose level must be 180 or less at the start of the exam. Please take any medicines you need to ensure this blood glucose level.   If you are taking insulin in the morning take with breakfast by 6 am and schedule procedure between 12 and 2:15 pm.   If you are taking insulin at night take nightly dose, fast overnight, schedule procedure before 10 am.   If you take insulin both morning and night take morning dose by 6am and schedule procedure between 12 and 2:15 pm.   24 hours before your scan: Don t do any heavy exercise. (No jogging, aerobics or other workouts.) Exercise will make your pictures less accurate.  At least 7 hours before your scan, or the evening before if you have an early appointment: Eat a low carb, high protein meal (Lean meats, seafood, beans, soy, low-fat dairy, eggs, nuts & seeds). 6 hours before your scan:   Stop all food and liquids (except water).   Do not chew gum or suck on mints.   If you need to take medicine with food, you may take it with a few crackers.  Please call your Imaging Department at your exam site with any questions.              Future tests that were ordered for you today     Open Future Orders        Priority Expected Expires Ordered    *CBC with platelets  differential Routine 6/1/2018 5/23/2019 5/23/2018    PET Oncology Whole Body Routine 7/5/2018 5/23/2019 5/23/2018    CBC with platelets differential Routine 6/1/2018 5/23/2019 5/23/2018            Who to contact     If you have questions or need follow up information about today's clinic visit or your schedule please contact [facility] [place] directly at [phone].  Normal or non-critical lab and imaging results will be communicated to you by [employer], letter or phone within 4 business days after the clinic has received the results. If you do not hear from us within 7 days, please contact the clinic through [employer] or phone. If you have a critical or abnormal lab result, we will notify you by phone as soon as possible.  Submit refill requests through [employer] or call your pharmacy and they will forward the refill request to us. Please allow 3 business days for your refill to be completed.          Additional Information About Your Visit        [employer] Information     [employer] gives you secure access to your electronic health record. If you see a primary care provider, you can also send messages to your care team and make appointments. If you have questions, please call your primary care clinic.  If you do not have a primary care provider, please call [phone] and they will assist you.        Care EveryWhere ID     This is your Care EveryWhere ID. This could be used by other organizations to access your [place] medical records  [id number]        Your Vitals Were     Pulse Temperature Respirations Height Pulse Oximetry BMI (Body Mass Index)    127 97.6  F (36.4  C) (Oral) 22 1.829 m (6') 100% 20.89 kg/m2       Blood Pressure from Last 3 Encounters:   05/23/18 108/70   05/16/18 114/77   05/09/18 115/84    Weight from Last 3 Encounters:   05/23/18 69.9 kg (154 lb)   05/16/18 71.4 kg (157 lb 4.8 oz)   05/09/18 69.8 kg (153 lb 14.4 oz)                 Today's Medication Changes          These changes  are accurate as of 5/23/18  1:20 PM.  If you have any questions, ask your nurse or doctor.               These medicines have changed or have updated prescriptions.        Dose/Directions    amLODIPine 5 MG tablet   Commonly known as:  NORVASC   This may have changed:  when to take this   Used for:  Hypertension goal BP (blood pressure) < 140/90        Dose:  5 mg   Take 1 tablet (5 mg) by mouth daily   Quantity:  90 tablet   Refills:  3       codeine 30 MG tablet   This may have changed:  when to take this   Used for:  Hemophilic arthropathy        Dose:  60 mg   Take 2 tablets (60 mg) by mouth every 6 hours as needed for moderate to severe pain (up to 8 tabs in 24 hours)   Quantity:  168 tablet   Refills:  0       lisinopril 40 MG tablet   Commonly known as:  PRINIVIL/ZESTRIL   This may have changed:  when to take this   Used for:  Hypertension goal BP (blood pressure) < 140/90        Dose:  40 mg   Take 1 tablet (40 mg) by mouth daily   Quantity:  90 tablet   Refills:  3       LORazepam 0.5 MG tablet   Commonly known as:  ATIVAN   This may have changed:  medication strength   Used for:  Malignant neoplasm of overlapping sites of bladder (H)   Changed by:  Magalie Espinal MD        Dose:  0.5 mg   Take 1 tablet (0.5 mg) by mouth every 6 hours as needed for anxiety   Quantity:  60 tablet   Refills:  0            Where to get your medicines      Some of these will need a paper prescription and others can be bought over the counter.  Ask your nurse if you have questions.     Bring a paper prescription for each of these medications     LORazepam 0.5 MG tablet                Primary Care Provider Office Phone # Fax #    Katie Ramos [phone] [address] [place] [address]        Equal Access to Services     ANDREW FLANAGAN : wilber Mora, lloyd muniz. So [facility] [phone].    ATENCIÓN: Cami coto  español, tiene a hernadez disposición servicios gratuitos de asistencia lingüística. Perry benites [phone].    We comply with applicable federal civil rights laws and [place] laws. We do not discriminate on the basis of race, color, national origin, age, disability, sex, sexual orientation, or gender identity.            Thank you!     Thank you for choosing [facility]  for your care. Our goal is always to provide you with excellent care. Hearing back from our patients is one way we can continue to improve our services. Please take a few minutes to complete the written survey that you may receive in the mail after your visit with us. Thank you!             Your Updated Medication List - Protect others around you: Learn how to safely use, store and throw away your medicines at www.disposemymeds.org.          This list is accurate as of 5/23/18  1:20 PM.  Always use your most recent med list.                   Brand Name Dispense Instructions for use Diagnosis    amLODIPine 5 MG tablet    NORVASC    90 tablet    Take 1 tablet (5 mg) by mouth daily    Hypertension goal BP (blood pressure) < 140/90       codeine 30 MG tablet     168 tablet    Take 2 tablets (60 mg) by mouth every 6 hours as needed for moderate to severe pain (up to 8 tabs in 24 hours)    Hemophilic arthropathy       * dexamethasone 4 MG tablet    DECADRON    24 tablet    Take 1 tablet (4 mg) by mouth 2 times daily (with meals)    Bladder tumor       * dexamethasone 4 MG tablet    DECADRON    18 tablet    Take 2 tablets (8 mg) by mouth daily Take for 3 days, starting the day after cisplatin (Day 2).    Urothelial carcinoma of bladder (H)       diazepam 5 MG tablet    VALIUM    30 tablet    Take up to one tablet by mouth daily as needed for anxiety    Depressive disorder       KOGENATE FS 1000 units Kit     36984 each    Infuse 3000 units every other day    Severe hemophilia A (H), Personal history of malignant neoplasm of bladder        lisinopril 40 MG tablet    PRINIVIL/ZESTRIL    90 tablet    Take 1 tablet (40 mg) by mouth daily    Hypertension goal BP (blood pressure) < 140/90       LORazepam 0.5 MG tablet    ATIVAN    60 tablet    Take 1 tablet (0.5 mg) by mouth every 6 hours as needed for anxiety    Malignant neoplasm of overlapping sites of bladder (H)       mirtazapine 45 MG tablet    REMERON    37 tablet    Take 1 tablet (45 mg) by mouth At Bedtime And may take 1/2 tablet for insomnia up to 3 times a week.    Generalized anxiety disorder       ondansetron 8 MG ODT tab    ZOFRAN-ODT    30 tablet    Take 1 tablet (8 mg) by mouth every 8 hours as needed for nausea    Bladder tumor       * prochlorperazine 10 MG tablet    COMPAZINE    30 tablet    Take 0.5 tablets (5 mg) by mouth every 6 hours as needed for nausea or vomiting    Bladder tumor       * prochlorperazine 10 MG tablet    COMPAZINE    30 tablet    Take 1 tablet (10 mg) by mouth every 6 hours as needed (Nausea/Vomiting)    Urothelial carcinoma of bladder (H)       tamsulosin 0.4 MG capsule    FLOMAX    45 capsule    Take 1 capsule (0.4 mg) by mouth daily    Incomplete bladder emptying       TYLENOL PO      Take 500 mg by mouth every 4 hours as needed for mild pain or fever        * Notice:  This list has 4 medication(s) that are the same as other medications prescribed for you. Read the directions carefully, and ask your doctor or other care provider to review them with you.

## 2018-05-23 NOTE — LETTER
5/23/2018       RE: Orlin Alcantar  [address]  [place] [address]     Dear Colleague,    Thank you for referring your patient, Orlin Alcantar, to the [facility]. Please see a copy of my visit note below.    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   3/26/18: Consented for the Nivolumab-Gemcitabine-Cisplatin study [id number]    3/28/18: Screening visit for study.   4/4/18; c1d1 gemcitabine  4/11/18: c1d8 gemcitabine-nivolumab  4/25/18: C2D1 Gemcitabine-Cisplatin  5/2/18: C2D8 Gemcitabine-Nivolumab  5/16/18: C3D1 Gemcitabine-Cisplatin  5/23/18: C3D8 Gemcitabine-Nivolumab    HPI:    68 year old male with PMH of hemophilia, chronic hepatis C who was seen by DR. Burns for evaluation of neoadjuvant therapy with Gemcitabine Cisplatin and Nivolumab.  I will be taking over his care while on study.     Patient has a history of hemophilia and has undergone several surgeries in right hip replacement, knee replacement. Hemophilia managed by Dr. Marley.   He reports recurrent hematuria for a year, initially attributed to kidney stones, cystoscopy in Feb 2018 revealed:  Bladder tumor, transurethral resection:   - Invasive high grade urothelial carcinoma   - Extent of invasion: Muscularis propria, multifocal   - Lymphovascular invasion: Present     PET-CT scan - which has revealed muscle invasive bladder right lateral wall of bladder and right  obturator lymphnode, 1.1 cm short axis, suspicious for metastatic disease.  He has been treated for HCV in the 1990s and has not had any complications. His LFTs have remained normal.   His hemophilia is very well controlled on current regimen of factor replacement.   He does not have any autoimmune disease and is not on immunesuppressants or steroids.   His diabetes is diet controlled and he has not required any medications.     Interval History:  Has mild intermittent nasuea,  no vomiting, hearing loss, neuropathy, fever, chills.  Denies any immune related AEs   No bleeding issues.   Tolerating treatment very well.       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      Bladder tumor      Depression      DM II (diabetes mellitus, type II), controlled (H)     diet controlled     Hemophilia (H)     Type A     Hepatitis C, chronic (H)     treated [facility] interferon/ribavirin     HTN (hypertension)           CURRENT OUTPATIENT MEDICATIONS     Current Outpatient Prescriptions   Medication Sig     Acetaminophen (TYLENOL PO) Take 500 mg by mouth every 4 hours as needed for mild pain or fever     amLODIPine (NORVASC) 5 MG tablet Take 1 tablet (5 mg) by mouth daily (Patient taking differently: Take 5 mg by mouth every morning )     Antihemophilic Factor, Recomb, (KOGENATE FS) 1000 units KIT Infuse 3000 units every other day     codeine 30 MG tablet Take 2 tablets (60 mg) by mouth every 6 hours as needed for moderate to severe pain (up to 8 tabs in 24 hours) (Patient taking differently: Take 60 mg by mouth as needed for moderate to severe pain (up to 8 tabs in 24 hours) )     dexamethasone (DECADRON) 4 MG tablet Take 1 tablet (4 mg) by mouth 2 times daily (with meals)     diazepam (VALIUM) 5 MG tablet Take up to one tablet by mouth daily as needed for anxiety     lisinopril (PRINIVIL/ZESTRIL) 40 MG tablet Take 1 tablet (40 mg) by mouth daily (Patient taking differently: Take 40 mg by mouth every morning )     LORazepam (ATIVAN) 0.5 MG tablet Take 1 tablet (0.5 mg) by mouth every 6 hours as needed for anxiety     mirtazapine (REMERON) 45 MG tablet Take 1 tablet (45 mg) by mouth At Bedtime And may take 1/2 tablet for insomnia up to 3 times a week.     tamsulosin (FLOMAX) 0.4 MG capsule Take 1 capsule (0.4 mg) by mouth daily     dexamethasone (DECADRON) 4 MG tablet Take 2 tablets (8 mg) by mouth daily Take for 3 days, starting the day after  cisplatin (Day 2). (Patient not taking: Reported on 5/23/2018)     ondansetron (ZOFRAN-ODT) 8 MG ODT tab Take 1 tablet (8 mg) by mouth every 8 hours as needed for nausea (Patient not taking: Reported on 5/16/2018)     prochlorperazine (COMPAZINE) 10 MG tablet Take 1 tablet (10 mg) by mouth every 6 hours as needed (Nausea/Vomiting) (Patient not taking: Reported on 5/16/2018)     prochlorperazine (COMPAZINE) 10 MG tablet Take 0.5 tablets (5 mg) by mouth every 6 hours as needed for nausea or vomiting (Patient not taking: Reported on 5/16/2018)     No current facility-administered medications for this visit.      CBC RESULTS:   Recent Labs   Lab Test  05/23/18   0754   WBC  3.6*   RBC  3.99*   HGB  11.1*   HCT  34.4*   MCV  86   MCH  27.8   MCHC  32.3   RDW  18.6*   PLT  236     Recent Labs   Lab Test  05/23/18   0754  05/16/18   0815   NA  140  141   POTASSIUM  4.4  4.5   CHLORIDE  106  108   CO2  26  24   ANIONGAP  8  8   GLC  108*  94   BUN  13  10   CR  0.90  0.93   AMRLA  9.6  9.4         TSH   Date Value Ref Range Status   05/23/2018 1.62 0.40 - 4.00 mU/L Final   ]  ASSESSMENT/PLAN:     68 year old male with T2N1(solitary node)  urothelial carcinoma,currently enrolled on the clinical trial of Nivolumab with Gemcitabine and Cisplatin for MIBC.  He is planned for radical cystectomy by Dr. Shea within 6-8 weeks after completion of neoadjuvant therapy.     Plan:   Proceed with C3D8 gemcitabine and nivolumab today.   Will get neulasta with each cycle.  On his last visit, he  had questions about whether he needs cystectomy, I had discussed and reinforced that he would need cystectomy after completion of neoadjuvant treatment and this is the plan per Dr. Shea as well according to SOC recommendations for MIBC.  The current protocol mandates a radical cystectomy as SOC.  Hemophilia; management of factor replacement per Dr. Marley.No issues on current treatment. We will do CBC next week per DR. Marley's  recommendation.    RTC per study protocol treatment schedule.    He will be seen by Dr. Shea to discuss surgery.    DM: diet controlled, encouraged to monitor sugars closely.      Magalie Espinal MD  Hematology Oncology and Transplantation  [facility]

## 2018-05-23 NOTE — PROGRESS NOTES
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   3/26/18: Consented for the Nivolumab-Gemcitabine-Cisplatin study [id number]    3/28/18: Screening visit for study.   4/4/18; c1d1 gemcitabine  4/11/18: c1d8 gemcitabine-nivolumab  4/25/18: C2D1 Gemcitabine-Cisplatin  5/2/18: C2D8 Gemcitabine-Nivolumab  5/16/18: C3D1 Gemcitabine-Cisplatin  5/23/18: C3D8 Gemcitabine-Nivolumab    HPI:    68 year old male with PMH of hemophilia, chronic hepatis C who was seen by DR. Burns for evaluation of neoadjuvant therapy with Gemcitabine Cisplatin and Nivolumab.  I will be taking over his care while on study.     Patient has a history of hemophilia and has undergone several surgeries in right hip replacement, knee replacement. Hemophilia managed by Dr. Marley.   He reports recurrent hematuria for a year, initially attributed to kidney stones, cystoscopy in Feb 2018 revealed:  Bladder tumor, transurethral resection:   - Invasive high grade urothelial carcinoma   - Extent of invasion: Muscularis propria, multifocal   - Lymphovascular invasion: Present     PET-CT scan - which has revealed muscle invasive bladder right lateral wall of bladder and right  obturator lymphnode, 1.1 cm short axis, suspicious for metastatic disease.  He has been treated for HCV in the 1990s and has not had any complications. His LFTs have remained normal.   His hemophilia is very well controlled on current regimen of factor replacement.   He does not have any autoimmune disease and is not on immunesuppressants or steroids.   His diabetes is diet controlled and he has not required any medications.     Interval History:  Has mild intermittent nasuea, no vomiting, hearing loss, neuropathy, fever, chills.  Denies any immune related AEs   No bleeding issues.   Tolerating treatment very well.       PAST MEDICAL HISTORY     Past Medical History:   Diagnosis Date     Anxiety      Arthropathy in  hemophilia      Bladder tumor      Depression      DM II (diabetes mellitus, type II), controlled (H)     diet controlled     Hemophilia (H)     Type A     Hepatitis C, chronic (H)     treated [facility] interferon/ribavirin     HTN (hypertension)           CURRENT OUTPATIENT MEDICATIONS     Current Outpatient Prescriptions   Medication Sig     Acetaminophen (TYLENOL PO) Take 500 mg by mouth every 4 hours as needed for mild pain or fever     amLODIPine (NORVASC) 5 MG tablet Take 1 tablet (5 mg) by mouth daily (Patient taking differently: Take 5 mg by mouth every morning )     Antihemophilic Factor, Recomb, (KOGENATE FS) 1000 units KIT Infuse 3000 units every other day     codeine 30 MG tablet Take 2 tablets (60 mg) by mouth every 6 hours as needed for moderate to severe pain (up to 8 tabs in 24 hours) (Patient taking differently: Take 60 mg by mouth as needed for moderate to severe pain (up to 8 tabs in 24 hours) )     dexamethasone (DECADRON) 4 MG tablet Take 1 tablet (4 mg) by mouth 2 times daily (with meals)     diazepam (VALIUM) 5 MG tablet Take up to one tablet by mouth daily as needed for anxiety     lisinopril (PRINIVIL/ZESTRIL) 40 MG tablet Take 1 tablet (40 mg) by mouth daily (Patient taking differently: Take 40 mg by mouth every morning )     LORazepam (ATIVAN) 0.5 MG tablet Take 1 tablet (0.5 mg) by mouth every 6 hours as needed for anxiety     mirtazapine (REMERON) 45 MG tablet Take 1 tablet (45 mg) by mouth At Bedtime And may take 1/2 tablet for insomnia up to 3 times a week.     tamsulosin (FLOMAX) 0.4 MG capsule Take 1 capsule (0.4 mg) by mouth daily     dexamethasone (DECADRON) 4 MG tablet Take 2 tablets (8 mg) by mouth daily Take for 3 days, starting the day after cisplatin (Day 2). (Patient not taking: Reported on 5/23/2018)     ondansetron (ZOFRAN-ODT) 8 MG ODT tab Take 1 tablet (8 mg) by mouth every 8 hours as needed for nausea (Patient not taking: Reported on 5/16/2018)     prochlorperazine  (COMPAZINE) 10 MG tablet Take 1 tablet (10 mg) by mouth every 6 hours as needed (Nausea/Vomiting) (Patient not taking: Reported on 5/16/2018)     prochlorperazine (COMPAZINE) 10 MG tablet Take 0.5 tablets (5 mg) by mouth every 6 hours as needed for nausea or vomiting (Patient not taking: Reported on 5/16/2018)     No current facility-administered medications for this visit.      CBC RESULTS:   Recent Labs   Lab Test  05/23/18   0754   WBC  3.6*   RBC  3.99*   HGB  11.1*   HCT  34.4*   MCV  86   MCH  27.8   MCHC  32.3   RDW  18.6*   PLT  236     Recent Labs   Lab Test  05/23/18   0754  05/16/18   0815   NA  140  141   POTASSIUM  4.4  4.5   CHLORIDE  106  108   CO2  26  24   ANIONGAP  8  8   GLC  108*  94   BUN  13  10   CR  0.90  0.93   MARLA  9.6  9.4         TSH   Date Value Ref Range Status   05/23/2018 1.62 0.40 - 4.00 mU/L Final   ]  ASSESSMENT/PLAN:     68 year old male with T2N1(solitary node)  urothelial carcinoma,currently enrolled on the clinical trial of Nivolumab with Gemcitabine and Cisplatin for MIBC.  He is planned for radical cystectomy by Dr. Shea within 6-8 weeks after completion of neoadjuvant therapy.     Plan:   Proceed with C3D8 gemcitabine and nivolumab today.   Will get neulasta with each cycle.  On his last visit, he  had questions about whether he needs cystectomy, I had discussed and reinforced that he would need cystectomy after completion of neoadjuvant treatment and this is the plan per Dr. Shea as well according to SOC recommendations for MIBC.  The current protocol mandates a radical cystectomy as SOC.  Hemophilia; management of factor replacement per Dr. Marley.No issues on current treatment. We will do CBC next week per DR. Marley's recommendation.    RTC per study protocol treatment schedule.    He will be seen by Dr. Shea to discuss surgery.    DM: diet controlled, encouraged to monitor sugars closely.      Magalie Espinal MD  Hematology Oncology and Transplantation  [place]  [facility]

## 2018-05-23 NOTE — TELEPHONE ENCOUNTER
Called patient and left message regarding setting up surgery   I will ask dr portillo for orders and patient can call back with questions. Radha Hardy, ZULMAN Staff Nurse

## 2018-05-23 NOTE — MR AVS SNAPSHOT
After Visit Summary   5/23/2018    Orlin Alcantar    MRN: [id number]           Patient Information     Date Of Birth          1950        Visit Information        Provider Department      5/23/2018 9:00 AM  25 ATC;  ONCOLOGY INFUSION [facility]        Today's Diagnoses     Urothelial carcinoma of bladder (H)    -  1      Care Instructions    Contact Numbers    [facility] [place] [place] Main Line: [phone]  [facility] [place] [place] Triage and after hours / weekends / holidays:  [phone]      Please call the triage or after hours line if you experience a temperature greater than or equal to 100.5, shaking chills, have uncontrolled nausea, vomiting and/or diarrhea, dizziness, shortness of breath, chest pain, bleeding, unexplained bruising, or if you have any other new/concerning symptoms, questions or concerns.      If you are having any concerning symptoms or wish to speak to a provider before your next infusion visit, please call your care coordinator or triage to notify them so we can adequately serve you.     If you need a refill on a narcotic prescription or other medication, please call before your infusion appointment.           May 2018   Ricky Monday Tuesday Wednesday Thursday Friday Saturday             1     2     [facility] LAB DRAW    7:45 AM   (15 min.)    [facility]     [facility] RETURN    8:00 AM   (30 min.)   Magal[facility] Espinal [facility]   [facility]    9:00 AM   (120 min.)    ONCOLOGY INFUSION   [facility] 3     4     5       6     7     8     9     P [facility] LAB DRAW    4:00 PM   (15 min.)    [facility]     P CYSTOSCOPY    5:00 PM   (20 min.)   Bebeto Shea MD   [facility] 10     11     12       13     14     15     16     [facility] LAB DRAW    8:15 AM   (15 min.)    [facility]RAW   [facility]  Draw     [facility] RETURN    8:30 AM   (30 min.)   Magalie Espinal MD   [facility]     [facility] ONC INFUSION 360    9:30 AM   (360 min.)    ONCOLOGY INFUSION   [facility] 17     18     19       20     21     22     23     [facility] LAB DRAW    7:45 AM   (15 min.)   [facility] LAB DRAW   [facility] Masonic Lab Draw     UMP RETURN    8:00 AM   (30 min.)   Magalie Espinal MD   [facility]     [facility] ONC INFUSION 120    9:00 AM   (120 min.)   [facility] ONCOLOGY INFUSION   [facility] 24     25     26       27     28     29     30     31 June 2018 Sunday Monday Tuesday Wednesday Thursday Friday Saturday                            1     [facility] LAB DRAW   10:00 AM   (15 min.)    MAS[facility]IC LAB DRAW   [facility] Lab Draw 2       3     4     5     6     [facility] LAB DRAW    9:00 AM   (15 min.)    M[facility]IC LAB DRAW   [facility] Mason[facility] Lab Draw     [facility] RETURN    9:15 AM   (50 min.)   Brianna Harris PA-C   [facility]     [facility] ONC INFUSION 360   10:30 AM   (360 min.)    ONCOLOGY INFUSION   [facility] 7     8     9       10     11     12     13     [facility] LAB DRAW    7:15 AM   (15 min.)    MASONIC LAB DRAW   [facility] Lab Draw     [facility] RETURN    7:30 AM   (30 min.)   Magalie Espinal MD   [facility]     [facility] ONC INFUSION 120    9:00 AM   (120 min.)    ONCOLOGY INFUSION   [facility] 14     15     16       17     18     19     20     21     22     23       24     25     26     27     28     29     30                  Lab Results:  Recent Results (from the past 12 hour(s))   CBC with platelets differential    Collection Time: 05/23/18  7:54 AM   Result Value Ref Range    WBC 3.6 (L) 4.0 - 11.0 10e9/L    RBC Count 3.99 (L) 4.4 - 5.9 10e12/L    Hemoglobin 11.1 (L) 13.3 - 17.7 g/dL    Hematocrit 34.4 (L) 40.0 - 53.0 %    MCV 86 78 - 100 fl     MCH 27.8 26.5 - 33.0 pg    MCHC 32.3 31.5 - 36.5 g/dL    RDW 18.6 (H) 10.0 - 15.0 %    Platelet Count 236 150 - 450 10e9/L    Diff Method Automated Method     % Neutrophils 64.2 %    % Lymphocytes 21.8 %    % Monocytes 11.3 %    % Eosinophils 0.8 %    % Basophils 1.1 %    % Immature Granulocytes 0.8 %    Nucleated RBCs 0 0 /100    Absolute Neutrophil 2.3 1.6 - 8.3 10e9/L    Absolute Lymphocytes 0.8 0.8 - 5.3 10e9/L    Absolute Monocytes 0.4 0.0 - 1.3 10e9/L    Absolute Eosinophils 0.0 0.0 - 0.7 10e9/L    Absolute Basophils 0.0 0.0 - 0.2 10e9/L    Abs Immature Granulocytes 0.0 0 - 0.4 10e9/L    Absolute Nucleated RBC 0.0    Comprehensive metabolic panel    Collection Time: 05/23/18  7:54 AM   Result Value Ref Range    Sodium 140 133 - 144 mmol/L    Potassium 4.4 3.4 - 5.3 mmol/L    Chloride 106 94 - 109 mmol/L    Carbon Dioxide 26 20 - 32 mmol/L    Anion Gap 8 3 - 14 mmol/L    Glucose 108 (H) 70 - 99 mg/dL    Urea Nitrogen 13 7 - 30 mg/dL    Creatinine 0.90 0.66 - 1.25 mg/dL    GFR Estimate 84 >60 mL/min/1.7m2    GFR Estimate If Black >90 >60 mL/min/1.7m2    Calcium 9.6 8.5 - 10.1 mg/dL    Bilirubin Total 0.4 0.2 - 1.3 mg/dL    Albumin 4.0 3.4 - 5.0 g/dL    Protein Total 7.6 6.8 - 8.8 g/dL    Alkaline Phosphatase 124 40 - 150 U/L    ALT 47 0 - 70 U/L    AST 30 0 - 45 U/L   Magnesium    Collection Time: 05/23/18  7:54 AM   Result Value Ref Range    Magnesium 1.8 1.6 - 2.3 mg/dL   Phosphorus    Collection Time: 05/23/18  7:54 AM   Result Value Ref Range    Phosphorus 3.1 2.5 - 4.5 mg/dL   Lactate Dehydrogenase    Collection Time: 05/23/18  7:54 AM   Result Value Ref Range    Lactate Dehydrogenase 123 85 - 227 U/L   Uric acid    Collection Time: 05/23/18  7:54 AM   Result Value Ref Range    Uric Acid 5.3 3.5 - 7.2 mg/dL   TSH    Collection Time: 05/23/18  7:54 AM   Result Value Ref Range    TSH 1.62 0.40 - 4.00 mU/L               Follow-ups after your visit        Your next 10 appointments already scheduled     Jun 01,  2018 10:00 AM CDT   [facility] Lab Draw with UC MASONIC LAB DRAW   [facility] Mason[facility] Lab Draw ([facility])    [address]  [address]  [facility] [address]   [phone]            Jun 06, 2018  9:00 AM CDT   [facility] Lab Draw with UC [facility]ON[facility] LAB DRAW   [facility] Mason[facility] Lab Draw ([facility])    [address]  [address]  [facility] [address]   [phone]            Jun 06, 2018  9:30 AM CDT   (Arrive by 9:15 AM)   Return Visit with Brianna Harris PA-C   [facility] ([facility])    [address]  [address]  [facility] [address]   [phone]            Jun 06, 2018 10:30 AM CDT   Infusion 360 with UC ONCOLOGY INFUSION, UC 21 ATC   [facility] ([facility])    [address]  [address]  [facility] [address]   [phone]            Jun 13, 2018  7:15 AM CDT   [facility] Lab Draw with UC MASON[facility] LAB DRAW   [facility] Masonic Lab Draw ([facility])    [address]  [address]  [facility] [address]   [phone]            Jun 13, 2018  7:45 AM CDT   (Arrive by 7:30 AM)   Return Visit with Magalie Espinal MD   [facility] ([facility])    [address]  [address]  [facility] [address]   [phone]            Jun 13, 2018  9:00 AM CDT   Infusion 120 with UC ONCOLOGY INFUSION, UC 25 ATC   [facility] ([facility])    [address] [place] [address]  [address]  [facility] [address]   [phone]            Jul 09, 2018 12:45 PM CDT   PET ONCOLOGY WHOLE BODY with UUPET1   [facility] PET CT ([facility])    [address] [address]   [phone]           Tell your doctor:   If there is any chance you may be pregnant or if you are breastfeeding.    If you have problems lying in small spaces (claustrophobia). If you do, your doctor may give you medicine to help you relax. If you have diabetes:   Have your exam early in the morning. Your blood glucose will go up as the day goes by.   Your glucose level must be 180 or less at the start of the exam. Please take any medicines you need to ensure this blood glucose level.   If you are taking insulin in the morning take with breakfast by 6 am and schedule procedure between 12 and 2:15 pm.   If you are taking insulin at night take nightly dose, fast overnight, schedule procedure before 10 am.   If you take insulin both morning and night take morning dose by 6am and schedule procedure between 12 and 2:15 pm.   24 hours before your scan: Don t do any heavy exercise. (No jogging, aerobics or other workouts.) Exercise will make your pictures less accurate.  At least 7 hours before your scan, or the evening before if you have an early appointment: Eat a low carb, high protein meal (Lean meats, seafood, beans, soy, low-fat dairy, eggs, nuts & seeds). 6 hours before your scan:   Stop all food and liquids (except water).   Do not chew gum or suck on mints.   If you need to take medicine with food, you may take it with a few crackers.  Please call your Imaging Department at your exam site with any questions.              Future tests that were ordered for you today     Open Future Orders        Priority Expected Expires Ordered    *CBC with platelets differential Routine 6/1/2018 5/23/2019 5/23/2018    PET Oncology Whole Body Routine 7/5/2018 5/23/2019 5/23/2018    CBC with platelets differential Routine 6/1/2018 5/23/2019 5/23/2018            Who to contact     If you have questions or need follow up information about today's clinic visit or your schedule please contact [facility] directly at [phone].  Normal or non-critical lab and imaging results will be communicated to you by MyChart, letter or  phone within 4 business days after the clinic has received the results. If you do not hear from us within 7 days, please contact the clinic through [employer] [employer] or phone. If you have a critical or abnormal lab result, we will notify you by phone as soon as possible.  Submit refill requests through [employer] or call your pharmacy and they will forward the refill request to us. Please allow 3 business days for your refill to be completed.          Additional Information About Your Visit        [employer] Information     [employer] gives you secure access to your electronic health record. If you see a primary care provider, you can also send messages to your care team and make appointments. If you have questions, please call your primary care clinic.  If you do not have a primary care provider, please call [phone] and they will assist you.        Care EveryWhere ID     This is your Care EveryWhere ID. This could be used by other organizations to access your [facility] medical records  [id number]         Blood Pressure from Last 3 Encounters:   05/23/18 108/70   05/16/18 114/77   05/09/18 115/84    Weight from Last 3 Encounters:   05/23/18 69.9 kg (154 lb)   05/16/18 71.4 kg (157 lb 4.8 oz)   05/09/18 69.8 kg (153 lb 14.4 oz)              We Performed the Following     CBC with platelets differential     Comprehensive metabolic panel     Lactate Dehydrogenase     Magnesium     Phosphorus     TSH     Uric acid          Today's Medication Changes          These changes are accurate as of 5/23/18 10:38 AM.  If you have any questions, ask your nurse or doctor.               These medicines have changed or have updated prescriptions.        Dose/Directions    amLODIPine 5 MG tablet   Commonly known as:  NORVASC   This may have changed:  when to take this   Used for:  Hypertension goal BP (blood pressure) < 140/90        Dose:  5 mg   Take 1 tablet (5 mg) by mouth daily   Quantity:  90 tablet   Refills:  3       codeine 30 MG tablet    This may have changed:  when to take this   Used for:  Hemophilic arthropathy        Dose:  60 mg   Take 2 tablets (60 mg) by mouth every 6 hours as needed for moderate to severe pain (up to 8 tabs in 24 hours)   Quantity:  168 tablet   Refills:  0       lisinopril 40 MG tablet   Commonly known as:  PRINIVIL/ZESTRIL   This may have changed:  when to take this   Used for:  Hypertension goal BP (blood pressure) < 140/90        Dose:  40 mg   Take 1 tablet (40 mg) by mouth daily   Quantity:  90 tablet   Refills:  3       LORazepam 0.5 MG tablet   Commonly known as:  ATIVAN   This may have changed:  medication strength   Used for:  Malignant neoplasm of overlapping sites of bladder (H)   Changed by:  Magalie Espinal MD        Dose:  0.5 mg   Take 1 tablet (0.5 mg) by mouth every 6 hours as needed for anxiety   Quantity:  60 tablet   Refills:  0            Where to get your medicines      Some of these will need a paper prescription and others can be bought over the counter.  Ask your nurse if you have questions.     Bring a paper prescription for each of these medications     LORazepam 0.5 MG tablet                Primary Care Provider Office Phone # Fax #    Katie Ramos [phone]       [address] [facility] [address]        Equal Access to Services     ANDREW FLANAGAN AH: Suleman irbyo Sofaiza, waaxda luqadaha, qaybta kaalmada adeegyada, lloyd blum. So [facility] [phone].    ATENCIÓN: Si habla español, tiene a hernadez disposición servicios gratuitos de asistencia lingüística. Llame al [phone].    We comply with applicable federal civil rights laws and [place] laws. We do not discriminate on the basis of race, color, national origin, age, disability, sex, sexual orientation, or gender identity.            Thank you!     Thank you for choosing [facility]  for your care. Our goal is always to provide you with excellent care. Hearing  back from our patients is one way we can continue to improve our services. Please take a few minutes to complete the written survey that you may receive in the mail after your visit with us. Thank you!             Your Updated Medication List - Protect others around you: Learn how to safely use, store and throw away your medicines at www.disposemymeds.org.          This list is accurate as of 5/23/18 10:38 AM.  Always use your most recent med list.                   Brand Name Dispense Instructions for use Diagnosis    amLODIPine 5 MG tablet    NORVASC    90 tablet    Take 1 tablet (5 mg) by mouth daily    Hypertension goal BP (blood pressure) < 140/90       codeine 30 MG tablet     168 tablet    Take 2 tablets (60 mg) by mouth every 6 hours as needed for moderate to severe pain (up to 8 tabs in 24 hours)    Hemophilic arthropathy       * dexamethasone 4 MG tablet    DECADRON    24 tablet    Take 1 tablet (4 mg) by mouth 2 times daily (with meals)    Bladder tumor       * dexamethasone 4 MG tablet    DECADRON    18 tablet    Take 2 tablets (8 mg) by mouth daily Take for 3 days, starting the day after cisplatin (Day 2).    Urothelial carcinoma of bladder (H)       diazepam 5 MG tablet    VALIUM    30 tablet    Take up to one tablet by mouth daily as needed for anxiety    Depressive disorder       KOGENATE FS 1000 units Kit     92794 each    Infuse 3000 units every other day    Severe hemophilia A (H), Personal history of malignant neoplasm of bladder       lisinopril 40 MG tablet    PRINIVIL/ZESTRIL    90 tablet    Take 1 tablet (40 mg) by mouth daily    Hypertension goal BP (blood pressure) < 140/90       LORazepam 0.5 MG tablet    ATIVAN    60 tablet    Take 1 tablet (0.5 mg) by mouth every 6 hours as needed for anxiety    Malignant neoplasm of overlapping sites of bladder (H)       mirtazapine 45 MG tablet    REMERON    37 tablet    Take 1 tablet (45 mg) by mouth At Bedtime And may take 1/2 tablet for insomnia up  to 3 times a week.    Generalized anxiety disorder       ondansetron 8 MG ODT tab    ZOFRAN-ODT    30 tablet    Take 1 tablet (8 mg) by mouth every 8 hours as needed for nausea    Bladder tumor       * prochlorperazine 10 MG tablet    COMPAZINE    30 tablet    Take 0.5 tablets (5 mg) by mouth every 6 hours as needed for nausea or vomiting    Bladder tumor       * prochlorperazine 10 MG tablet    COMPAZINE    30 tablet    Take 1 tablet (10 mg) by mouth every 6 hours as needed (Nausea/Vomiting)    Urothelial carcinoma of bladder (H)       tamsulosin 0.4 MG capsule    FLOMAX    45 capsule    Take 1 capsule (0.4 mg) by mouth daily    Incomplete bladder emptying       TYLENOL PO      Take 500 mg by mouth every 4 hours as needed for mild pain or fever        * Notice:  This list has 4 medication(s) that are the same as other medications prescribed for you. Read the directions carefully, and ask your doctor or other care provider to review them with you.

## 2018-05-23 NOTE — TELEPHONE ENCOUNTER
[facility] Call Center    Phone Message    May a detailed message be left on voicemail: yes    Reason for Call: Other: Please call PT to schedule a FU ASAP with Dr Shea to discuss a cystectomy.     Action Taken: Message routed to:  [facility] ([facility] Urology

## 2018-05-23 NOTE — NURSING NOTE
Oncology Rooming Note    May 23, 2018 8:08 AM   Orlin Alcantar is a 68 year old male who presents for:    Chief Complaint   Patient presents with     Blood Draw     Labs and vitals are done by MA     Oncology Clinic Visit     Return: Bladder CA     Initial Vitals: /70 (BP Location: Left arm, Patient Position: Chair, Cuff Size: Adult Regular)  Pulse 127  Temp 97.6  F (36.4  C) (Oral)  Resp 22  Ht 1.829 m (6')  Wt 69.9 kg (154 lb)  SpO2 100%  BMI 20.89 kg/m2 Estimated body mass index is 20.89 kg/(m^2) as calculated from the following:    Height as of this encounter: 1.829 m (6').    Weight as of this encounter: 69.9 kg (154 lb). Body surface area is 1.88 meters squared.  No Pain (0) Comment: Data Unavailable   No LMP for male patient.  Allergies reviewed: Yes  Medications reviewed: Yes    Medications: MEDICATION REFILLS NEEDED TODAY. Provider was notified.  Ativan    Pharmacy name entered into [facility]:    [facility] - [place], [place] - [address] [facility] - [place], [place] - [address]  [facility] [place], [place] - [address]    Clinical concerns: no new concerns today Dr. Espinal was notified.    10 minutes for nursing intake (face to face time)     Elizabeth Norman CMA

## 2018-05-23 NOTE — PROGRESS NOTES
"Phase II Trial of Neoadjuvant  Nivolumab with Cisplatin and Gemcitabine in Muscle-Invasive Bladder Cancer (MIBC) Patients undergoing Radical Cystectomy   [id number]  [id number]  PI and treating physician: Dr. Magalie Espinal      Cycle 3, Day 8. Plan is for gem/nivo today.      Dr. Espinal met with patient prior to writer. Writer met with patient prior to infusion d/t scheduling. Physical exam per Dr. Espinal. Conmeds, AEs, and labs reviewed. Reports continued intermittent fatigue gr 1, starting 4/7/18. Reports rare Zofran and lorazepam PRN for \"queasiness.\" Anemia grade 1 - baseline, continues. Platelets 236 today - patient will return to clinic next Friday (due to Memorial Day weekend and patient schedule) for CBC w/ dif to monitor platelets.      78mL/min (original formula)   Creatine clearance by Cockgraft-Gault   Age: 68 years old  Height: 6'  Weight: 69.9 kg  Creat: 0.9 mg/dL      Report given to infusion RN Rajiv. Discussed follow-up schedule with patient who verbalized understanding.      Patient will return to clinic 6/6/18 for Cycle 4 Day 1.      Eric Srivastava RN  Clinical Research Coordinator  Clinical Trials Office  [facility]  Desk Phone: [phone]  Pager: [phone]  "

## 2018-05-23 NOTE — PATIENT INSTRUCTIONS
Contact Numbers    [facility]: [phone]  [facility] [place] and after hours / weekends / holidays:  [phone]      Please call the triage or after hours line if you experience a temperature greater than or equal to 100.5, shaking chills, have uncontrolled nausea, vomiting and/or diarrhea, dizziness, shortness of breath, chest pain, bleeding, unexplained bruising, or if you have any other new/concerning symptoms, questions or concerns.      If you are having any concerning symptoms or wish to speak to a provider before your next infusion visit, please call your care coordinator or triage to notify them so we can adequately serve you.     If you need a refill on a narcotic prescription or other medication, please call before your infusion appointment.           May 2018   Ricky Monday Tuesday Wednesday Thursday Friday Saturday             1     2     [facility]    7:45 AM   (15 min.)    [facility] LAB DRAW   [facility] Lab Draw     [facility] RETURN    8:00 AM   (30 min.)   Magalie Espinal MD   [facility] ONC INFUSION 120    9:00 AM   (120 min.)    ONCOLOGY INFUSION   [facility] 3     4     5       6     7     8     9     [facility]    4:00 PM   (15 min.)    [facility] LAB DRAW   [facility] Lab Draw     [facility] CYSTOSCOPY    5:00 PM   (20 min.)   Bebeto Shea MD   [facility] 10     11     12       13     14     15     16     [facility]    8:15 AM   (15 min.)   [facility]   [facility] Draw     [facility] RETURN    8:30 AM   (30 min.)   Magal[facility]inal MD   [facility]    9:30 AM   (360 min.)    ONCOLOGY INFUSION   [facility] 17     18     19       20     21     22     23     [facility]    7:45 AM   (15 min.)   [facility]     [facility] RETURN    8:00 AM   (30 min.)    [facility] Espinal MD   [facility]    9:00 AM   (120 min.)   [facility] ONCOLOGY INFUSION   [facility] 24     25     26       27     28     29     30     31 June 2018 Sunday Monday Tuesday Wednesday Thursday Friday Saturday                            1     [facility] D[facility]   10:00 AM   (15 min.)   [facility] DRAW   [facility] Draw 2       3     4     5     6     [facility] DRAW    9:00 AM   (15 min.)   [facility] DRAW   [facility] Draw     [facility] RETURN    9:15 AM   (50 min.)   Brianna Harris PA-C   [facility] 360   10:30 AM   (360 min.)    ONCOLOGY INFUSION   [facility] 7     8     9       10     11     12     13     [facility] D[facility]    7:15 AM   (15 min.)    [facility] LAB DRAW   [facility] Draw     [facility] RETURN    7:30 AM   (30 min.)   Magalie Espinal MD   [facility] 120    9:00 AM   (120 min.)    ONCOLOGY INFUSION   [facility] 14     15     16       17     18     19     20     21     22     23       24     25     26     27     28     29     30                  Lab Results:  Recent Results (from the past 12 hour(s))   CBC with platelets differential    Collection Time: 05/23/18  7:54 AM   Result Value Ref Range    WBC 3.6 (L) 4.0 - 11.0 10e9/L    RBC Count 3.99 (L) 4.4 - 5.9 10e12/L    Hemoglobin 11.1 (L) 13.3 - 17.7 g/dL    Hematocrit 34.4 (L) 40.0 - 53.0 %    MCV 86 78 - 100 fl    MCH 27.8 26.5 - 33.0 pg    MCHC 32.3 31.5 - 36.5 g/dL    RDW 18.6 (H) 10.0 - 15.0 %    Platelet Count 236 150 - 450 10e9/L    Diff Method Automated Method     % Neutrophils 64.2 %    % Lymphocytes 21.8 %    % Monocytes 11.3 %    % Eosinophils 0.8 %    % Basophils 1.1 %    % Immature Granulocytes 0.8 %    Nucleated RBCs 0 0 /100    Absolute Neutrophil 2.3 1.6 - 8.3 10e9/L    Absolute  Lymphocytes 0.8 0.8 - 5.3 10e9/L    Absolute Monocytes 0.4 0.0 - 1.3 10e9/L    Absolute Eosinophils 0.0 0.0 - 0.7 10e9/L    Absolute Basophils 0.0 0.0 - 0.2 10e9/L    Abs Immature Granulocytes 0.0 0 - 0.4 10e9/L    Absolute Nucleated RBC 0.0    Comprehensive metabolic panel    Collection Time: 05/23/18  7:54 AM   Result Value Ref Range    Sodium 140 133 - 144 mmol/L    Potassium 4.4 3.4 - 5.3 mmol/L    Chloride 106 94 - 109 mmol/L    Carbon Dioxide 26 20 - 32 mmol/L    Anion Gap 8 3 - 14 mmol/L    Glucose 108 (H) 70 - 99 mg/dL    Urea Nitrogen 13 7 - 30 mg/dL    Creatinine 0.90 0.66 - 1.25 mg/dL    GFR Estimate 84 >60 mL/min/1.7m2    GFR Estimate If Black >90 >60 mL/min/1.7m2    Calcium 9.6 8.5 - 10.1 mg/dL    Bilirubin Total 0.4 0.2 - 1.3 mg/dL    Albumin 4.0 3.4 - 5.0 g/dL    Protein Total 7.6 6.8 - 8.8 g/dL    Alkaline Phosphatase 124 40 - 150 U/L    ALT 47 0 - 70 U/L    AST 30 0 - 45 U/L   Magnesium    Collection Time: 05/23/18  7:54 AM   Result Value Ref Range    Magnesium 1.8 1.6 - 2.3 mg/dL   Phosphorus    Collection Time: 05/23/18  7:54 AM   Result Value Ref Range    Phosphorus 3.1 2.5 - 4.5 mg/dL   Lactate Dehydrogenase    Collection Time: 05/23/18  7:54 AM   Result Value Ref Range    Lactate Dehydrogenase 123 85 - 227 U/L   Uric acid    Collection Time: 05/23/18  7:54 AM   Result Value Ref Range    Uric Acid 5.3 3.5 - 7.2 mg/dL   TSH    Collection Time: 05/23/18  7:54 AM   Result Value Ref Range    TSH 1.62 0.40 - 4.00 mU/L

## 2018-05-23 NOTE — NURSING NOTE
Chief Complaint   Patient presents with     Blood Draw     Labs and vitals are done by LUCÍA Samuel CMA

## 2018-05-23 NOTE — PROGRESS NOTES
Infusion Nursing Note:  Orlin Alcantar presents today for cycle 3, day 8 Gemzar and study - Nivolumab (IDS #5182).    Patient seen by provider today: Yes: Dr. Espinal   present during visit today: Not Applicable.    Note:      Neulasta Onpro On-Body injector applied to right arm at 1046 with light facing down.  Writer discussed Neulasta injection would start on 5/24/2018 at 1346, approximately 27 hours after application applied today.  Written and Verbal instruction reviewed with patient.  Pt instructed when the dose delivery starts, it will take about 45 minutes to complete.  Pt aware Neulasta Onpro On-Body should have green flashing light and to call triage or on-call MD if injector flashes red or appears to be leaking. Pt aware to keep Onpro On-Body Neulasta 4 inches away from electrical equipment and to avoid showering 4 hours prior to injection.   Neulasta Onpro Lot number: [id number]    Intravenous Access:  Peripheral IV placed.    Treatment Conditions:  Lab Results   Component Value Date    HGB 11.1 05/23/2018     Lab Results   Component Value Date    WBC 3.6 05/23/2018      Lab Results   Component Value Date    ANEU 2.3 05/23/2018     Lab Results   Component Value Date     05/23/2018      Lab Results   Component Value Date     05/23/2018                   Lab Results   Component Value Date    POTASSIUM 4.4 05/23/2018           Lab Results   Component Value Date    MAG 1.8 05/23/2018            Lab Results   Component Value Date    CR 0.90 05/23/2018                   Lab Results   Component Value Date    MARLA 9.6 05/23/2018                Lab Results   Component Value Date    BILITOTAL 0.4 05/23/2018           Lab Results   Component Value Date    ALBUMIN 4.0 05/23/2018                    Lab Results   Component Value Date    ALT 47 05/23/2018           Lab Results   Component Value Date    AST 30 05/23/2018     Results reviewed, labs MET treatment parameters, ok to proceed with  treatment.    Post Infusion Assessment:  Patient tolerated infusion without incident.  Blood return noted pre and post infusion.  Site patent and intact, free from redness, edema or discomfort.  No evidence of extravasations.  Access discontinued per protocol.    Discharge Plan:   Patient declined prescription refills.  Discharge instructions reviewed with: Patient.  Patient and/or family verbalized understanding of discharge instructions and all questions answered.  Copy of AVS reviewed with patient and/or family.  Patient will return 6/6/2018 for next appointment.  Patient discharged in stable condition accompanied by: self.  Departure Mode: Ambulatory.    Rajiv Manuel RN

## 2018-05-30 ENCOUNTER — TELEPHONE (OUTPATIENT)
Dept: UROLOGY | Facility: CLINIC | Age: 68
End: 2018-05-30

## 2018-05-30 NOTE — TELEPHONE ENCOUNTER
[facility] Call Center    Phone Message    May a detailed message be left on voicemail: yes    Reason for Call: Other: Pt needs to reschedule his appointment on 6/6, however Dr. Shea doesn't have anything until September. Please five him a call back.     Action Taken: Message routed to:  [facility] ([facility] Urology

## 2018-06-01 ENCOUNTER — PRE VISIT (OUTPATIENT)
Dept: UROLOGY | Facility: CLINIC | Age: 68
End: 2018-06-01

## 2018-06-01 DIAGNOSIS — Z85.51 PERSONAL HISTORY OF MALIGNANT NEOPLASM OF BLADDER: ICD-10-CM

## 2018-06-01 DIAGNOSIS — C67.9 UROTHELIAL CARCINOMA OF BLADDER (H): ICD-10-CM

## 2018-06-01 DIAGNOSIS — D66 HEMOPHILIA A (H): ICD-10-CM

## 2018-06-01 DIAGNOSIS — D66 SEVERE HEMOPHILIA A (H): ICD-10-CM

## 2018-06-01 LAB
ANISOCYTOSIS BLD QL SMEAR: ABNORMAL
BASOPHILS # BLD AUTO: 0.1 10E9/L (ref 0–0.2)
BASOPHILS NFR BLD AUTO: 0.9 %
DIFFERENTIAL METHOD BLD: ABNORMAL
EOSINOPHIL # BLD AUTO: 0 10E9/L (ref 0–0.7)
EOSINOPHIL NFR BLD AUTO: 0 %
ERYTHROCYTE [DISTWIDTH] IN BLOOD BY AUTOMATED COUNT: 21.1 % (ref 10–15)
HCT VFR BLD AUTO: 36.3 % (ref 40–53)
HGB BLD-MCNC: 11.5 G/DL (ref 13.3–17.7)
LYMPHOCYTES # BLD AUTO: 0.8 10E9/L (ref 0.8–5.3)
LYMPHOCYTES NFR BLD AUTO: 5.2 %
MCH RBC QN AUTO: 27.8 PG (ref 26.5–33)
MCHC RBC AUTO-ENTMCNC: 31.7 G/DL (ref 31.5–36.5)
MCV RBC AUTO: 88 FL (ref 78–100)
MONOCYTES # BLD AUTO: 0.9 10E9/L (ref 0–1.3)
MONOCYTES NFR BLD AUTO: 6 %
NEUTROPHILS # BLD AUTO: 13.5 10E9/L (ref 1.6–8.3)
NEUTROPHILS NFR BLD AUTO: 87.9 %
PLATELET # BLD AUTO: 123 10E9/L (ref 150–450)
PLATELET # BLD EST: ABNORMAL 10*3/UL
RBC # BLD AUTO: 4.13 10E12/L (ref 4.4–5.9)
WBC # BLD AUTO: 15.4 10E9/L (ref 4–11)

## 2018-06-01 PROCEDURE — 85025 COMPLETE CBC W/AUTO DIFF WBC: CPT | Performed by: INTERNAL MEDICINE

## 2018-06-01 RX ORDER — ANTIHEMOPHILIC FACTOR (RECOMBINANT) 1000 (+/-)
KIT INTRAVENOUS
Qty: 15000 EACH | Refills: 1 | Status: SHIPPED | OUTPATIENT
Start: 2018-06-01 | End: 2018-06-18

## 2018-06-01 RX ORDER — ANTIHEMOPHILIC FACTOR (RECOMBINANT) 1000 (+/-)
KIT INTRAVENOUS
Qty: 15000 EACH | Refills: 1 | Status: SHIPPED | OUTPATIENT
Start: 2018-06-01 | End: 2018-06-01

## 2018-06-01 NOTE — TELEPHONE ENCOUNTER
Reason for visit: bladder cancer - discuss surgical plan    Relevant information: cystectomy consult    Records/imaging/labs: all records available    Pt called: No need for a call    Rooming: regular

## 2018-06-01 NOTE — NURSING NOTE
Chief Complaint   Patient presents with     Labs Only     labs drawn via venipuncture by RN     There were no vitals taken for this visit.    Labs collected and sent from right antecubital venipuncture. Pt tolerated well.    Shannon Rushing RN

## 2018-06-05 ENCOUNTER — TELEPHONE (OUTPATIENT)
Dept: UROLOGY | Facility: CLINIC | Age: 68
End: 2018-06-05

## 2018-06-05 NOTE — TELEPHONE ENCOUNTER
Patient upset about his appointment being changed again   He states he cannot keep moving this around it has been too many changes. Radha Hardy, JESSIKA Staff Nurse

## 2018-06-06 ENCOUNTER — INFUSION THERAPY VISIT (OUTPATIENT)
Dept: ONCOLOGY | Facility: CLINIC | Age: 68
End: 2018-06-06
Attending: INTERNAL MEDICINE
Payer: MEDICARE

## 2018-06-06 ENCOUNTER — ONCOLOGY VISIT (OUTPATIENT)
Dept: ONCOLOGY | Facility: CLINIC | Age: 68
End: 2018-06-06
Attending: PHYSICIAN ASSISTANT
Payer: MEDICARE

## 2018-06-06 ENCOUNTER — RESEARCH ENCOUNTER (OUTPATIENT)
Dept: ONCOLOGY | Facility: CLINIC | Age: 68
End: 2018-06-06

## 2018-06-06 VITALS
DIASTOLIC BLOOD PRESSURE: 82 MMHG | TEMPERATURE: 98.2 F | HEIGHT: 72 IN | SYSTOLIC BLOOD PRESSURE: 119 MMHG | WEIGHT: 154.6 LBS | RESPIRATION RATE: 18 BRPM | HEART RATE: 116 BPM | BODY MASS INDEX: 20.94 KG/M2 | OXYGEN SATURATION: 99 %

## 2018-06-06 DIAGNOSIS — C67.9 UROTHELIAL CARCINOMA OF BLADDER (H): Primary | ICD-10-CM

## 2018-06-06 LAB
ALBUMIN SERPL-MCNC: 3.8 G/DL (ref 3.4–5)
ALP SERPL-CCNC: 145 U/L (ref 40–150)
ALT SERPL W P-5'-P-CCNC: 15 U/L (ref 0–70)
ANION GAP SERPL CALCULATED.3IONS-SCNC: 9 MMOL/L (ref 3–14)
AST SERPL W P-5'-P-CCNC: 10 U/L (ref 0–45)
BASOPHILS # BLD AUTO: 0.1 10E9/L (ref 0–0.2)
BASOPHILS NFR BLD AUTO: 0.4 %
BILIRUB SERPL-MCNC: 0.4 MG/DL (ref 0.2–1.3)
BUN SERPL-MCNC: 14 MG/DL (ref 7–30)
CALCIUM SERPL-MCNC: 9.5 MG/DL (ref 8.5–10.1)
CHLORIDE SERPL-SCNC: 105 MMOL/L (ref 94–109)
CO2 SERPL-SCNC: 24 MMOL/L (ref 20–32)
CREAT SERPL-MCNC: 0.88 MG/DL (ref 0.66–1.25)
DIFFERENTIAL METHOD BLD: ABNORMAL
EOSINOPHIL # BLD AUTO: 0.1 10E9/L (ref 0–0.7)
EOSINOPHIL NFR BLD AUTO: 0.9 %
ERYTHROCYTE [DISTWIDTH] IN BLOOD BY AUTOMATED COUNT: 21.1 % (ref 10–15)
GFR SERPL CREATININE-BSD FRML MDRD: 87 ML/MIN/1.7M2
GLUCOSE SERPL-MCNC: 106 MG/DL (ref 70–99)
HCT VFR BLD AUTO: 35.5 % (ref 40–53)
HGB BLD-MCNC: 11.4 G/DL (ref 13.3–17.7)
IMM GRANULOCYTES # BLD: 0.4 10E9/L (ref 0–0.4)
IMM GRANULOCYTES NFR BLD: 2.7 %
LYMPHOCYTES # BLD AUTO: 1.2 10E9/L (ref 0.8–5.3)
LYMPHOCYTES NFR BLD AUTO: 8.1 %
MAGNESIUM SERPL-MCNC: 2.1 MG/DL (ref 1.6–2.3)
MCH RBC QN AUTO: 28.3 PG (ref 26.5–33)
MCHC RBC AUTO-ENTMCNC: 32.1 G/DL (ref 31.5–36.5)
MCV RBC AUTO: 88 FL (ref 78–100)
MONOCYTES # BLD AUTO: 1.6 10E9/L (ref 0–1.3)
MONOCYTES NFR BLD AUTO: 10.6 %
NEUTROPHILS # BLD AUTO: 11.5 10E9/L (ref 1.6–8.3)
NEUTROPHILS NFR BLD AUTO: 77.3 %
NRBC # BLD AUTO: 0 10*3/UL
NRBC BLD AUTO-RTO: 0 /100
PLATELET # BLD AUTO: 338 10E9/L (ref 150–450)
POTASSIUM SERPL-SCNC: 4.2 MMOL/L (ref 3.4–5.3)
PROT SERPL-MCNC: 7.8 G/DL (ref 6.8–8.8)
RBC # BLD AUTO: 4.03 10E12/L (ref 4.4–5.9)
SODIUM SERPL-SCNC: 138 MMOL/L (ref 133–144)
WBC # BLD AUTO: 14.9 10E9/L (ref 4–11)

## 2018-06-06 PROCEDURE — 96367 TX/PROPH/DG ADDL SEQ IV INF: CPT

## 2018-06-06 PROCEDURE — 25000128 H RX IP 250 OP 636: Mod: ZF | Performed by: PHYSICIAN ASSISTANT

## 2018-06-06 PROCEDURE — 80053 COMPREHEN METABOLIC PANEL: CPT | Performed by: INTERNAL MEDICINE

## 2018-06-06 PROCEDURE — 85025 COMPLETE CBC W/AUTO DIFF WBC: CPT | Performed by: INTERNAL MEDICINE

## 2018-06-06 PROCEDURE — 96417 CHEMO IV INFUS EACH ADDL SEQ: CPT

## 2018-06-06 PROCEDURE — 96366 THER/PROPH/DIAG IV INF ADDON: CPT

## 2018-06-06 PROCEDURE — 83735 ASSAY OF MAGNESIUM: CPT | Performed by: INTERNAL MEDICINE

## 2018-06-06 PROCEDURE — 96413 CHEMO IV INFUSION 1 HR: CPT

## 2018-06-06 PROCEDURE — G0463 HOSPITAL OUTPT CLINIC VISIT: HCPCS | Mod: ZF

## 2018-06-06 PROCEDURE — 99214 OFFICE O/P EST MOD 30 MIN: CPT | Mod: ZP | Performed by: PHYSICIAN ASSISTANT

## 2018-06-06 PROCEDURE — 40000141 ZZH STATISTIC PERIPHERAL IV START W/O US GUIDANCE: Mod: ZF

## 2018-06-06 PROCEDURE — 96375 TX/PRO/DX INJ NEW DRUG ADDON: CPT

## 2018-06-06 PROCEDURE — 96415 CHEMO IV INFUSION ADDL HR: CPT

## 2018-06-06 RX ORDER — METHYLPREDNISOLONE SODIUM SUCCINATE 125 MG/2ML
125 INJECTION, POWDER, LYOPHILIZED, FOR SOLUTION INTRAMUSCULAR; INTRAVENOUS
Status: CANCELLED
Start: 2018-06-06

## 2018-06-06 RX ORDER — DIPHENHYDRAMINE HYDROCHLORIDE 50 MG/ML
50 INJECTION INTRAMUSCULAR; INTRAVENOUS
Status: CANCELLED
Start: 2018-06-06

## 2018-06-06 RX ORDER — EPINEPHRINE 1 MG/ML
0.3 INJECTION, SOLUTION INTRAMUSCULAR; SUBCUTANEOUS EVERY 5 MIN PRN
Status: CANCELLED | OUTPATIENT
Start: 2018-06-06

## 2018-06-06 RX ORDER — EPINEPHRINE 0.3 MG/.3ML
0.3 INJECTION SUBCUTANEOUS EVERY 5 MIN PRN
Status: CANCELLED | OUTPATIENT
Start: 2018-06-13

## 2018-06-06 RX ORDER — MEPERIDINE HYDROCHLORIDE 25 MG/ML
25 INJECTION INTRAMUSCULAR; INTRAVENOUS; SUBCUTANEOUS EVERY 30 MIN PRN
Status: CANCELLED | OUTPATIENT
Start: 2018-06-13

## 2018-06-06 RX ORDER — ALBUTEROL SULFATE 90 UG/1
1-2 AEROSOL, METERED RESPIRATORY (INHALATION)
Status: CANCELLED
Start: 2018-06-13

## 2018-06-06 RX ORDER — EPINEPHRINE 0.3 MG/.3ML
0.3 INJECTION SUBCUTANEOUS EVERY 5 MIN PRN
Status: CANCELLED | OUTPATIENT
Start: 2018-06-06

## 2018-06-06 RX ORDER — SODIUM CHLORIDE 9 MG/ML
1000 INJECTION, SOLUTION INTRAVENOUS CONTINUOUS PRN
Status: CANCELLED
Start: 2018-06-06

## 2018-06-06 RX ORDER — LORAZEPAM 2 MG/ML
0.5 INJECTION INTRAMUSCULAR EVERY 4 HOURS PRN
Status: CANCELLED
Start: 2018-06-06

## 2018-06-06 RX ORDER — ALBUTEROL SULFATE 0.83 MG/ML
2.5 SOLUTION RESPIRATORY (INHALATION)
Status: CANCELLED | OUTPATIENT
Start: 2018-06-13

## 2018-06-06 RX ORDER — LORAZEPAM 2 MG/ML
0.5 INJECTION INTRAMUSCULAR EVERY 4 HOURS PRN
Status: CANCELLED
Start: 2018-06-13

## 2018-06-06 RX ORDER — PALONOSETRON 0.05 MG/ML
0.25 INJECTION, SOLUTION INTRAVENOUS ONCE
Status: COMPLETED | OUTPATIENT
Start: 2018-06-06 | End: 2018-06-06

## 2018-06-06 RX ORDER — ALBUTEROL SULFATE 0.83 MG/ML
2.5 SOLUTION RESPIRATORY (INHALATION)
Status: CANCELLED | OUTPATIENT
Start: 2018-06-06

## 2018-06-06 RX ORDER — MEPERIDINE HYDROCHLORIDE 25 MG/ML
25 INJECTION INTRAMUSCULAR; INTRAVENOUS; SUBCUTANEOUS EVERY 30 MIN PRN
Status: CANCELLED | OUTPATIENT
Start: 2018-06-06

## 2018-06-06 RX ORDER — SODIUM CHLORIDE 9 MG/ML
1000 INJECTION, SOLUTION INTRAVENOUS CONTINUOUS PRN
Status: CANCELLED
Start: 2018-06-13

## 2018-06-06 RX ORDER — ALBUTEROL SULFATE 90 UG/1
1-2 AEROSOL, METERED RESPIRATORY (INHALATION)
Status: CANCELLED
Start: 2018-06-06

## 2018-06-06 RX ORDER — PALONOSETRON 0.05 MG/ML
0.25 INJECTION, SOLUTION INTRAVENOUS ONCE
Status: CANCELLED
Start: 2018-06-06

## 2018-06-06 RX ORDER — DIPHENHYDRAMINE HYDROCHLORIDE 50 MG/ML
50 INJECTION INTRAMUSCULAR; INTRAVENOUS
Status: CANCELLED
Start: 2018-06-13

## 2018-06-06 RX ORDER — EPINEPHRINE 1 MG/ML
0.3 INJECTION, SOLUTION INTRAMUSCULAR; SUBCUTANEOUS EVERY 5 MIN PRN
Status: CANCELLED | OUTPATIENT
Start: 2018-06-13

## 2018-06-06 RX ORDER — METHYLPREDNISOLONE SODIUM SUCCINATE 125 MG/2ML
125 INJECTION, POWDER, LYOPHILIZED, FOR SOLUTION INTRAMUSCULAR; INTRAVENOUS
Status: CANCELLED
Start: 2018-06-13

## 2018-06-06 RX ADMIN — DEXAMETHASONE SODIUM PHOSPHATE: 10 INJECTION, SOLUTION INTRAMUSCULAR; INTRAVENOUS at 11:00

## 2018-06-06 RX ADMIN — GEMCITABINE 1930 MG: 38 INJECTION, SOLUTION INTRAVENOUS at 11:33

## 2018-06-06 RX ADMIN — PALONOSETRON HYDROCHLORIDE 0.25 MG: 0.25 INJECTION INTRAVENOUS at 11:00

## 2018-06-06 RX ADMIN — SODIUM CHLORIDE 1000 ML: 9 INJECTION, SOLUTION INTRAVENOUS at 10:38

## 2018-06-06 RX ADMIN — CISPLATIN 135 MG: 1 INJECTION, SOLUTION INTRAVENOUS at 12:06

## 2018-06-06 RX ADMIN — MAGNESIUM SULFATE HEPTAHYDRATE: 500 INJECTION, SOLUTION INTRAMUSCULAR; INTRAVENOUS at 12:07

## 2018-06-06 ASSESSMENT — PAIN SCALES - GENERAL: PAINLEVEL: NO PAIN (0)

## 2018-06-06 NOTE — MR AVS SNAPSHOT
After Visit Summary   6/6/2018    Orlin Alcantar    MRN: [id number]           Patient Information     Date Of Birth          1950        Visit Information        Provider Department      6/6/2018 9:30 AM Brianna Harris PA-C [facility]        Today's Diagnoses     Urothelial carcinoma of bladder (H)    -  1       Follow-ups after your visit        Your next 10 appointments already scheduled     Jun 13, 2018  7:15 AM CDT   [facility] Draw with [facility] DRAW   [facility] Lab Draw ([facility])    [address]  [address]  [facility] [address]   [phone]            Jun 13, 2018  7:45 AM CDT   (Arrive by 7:30 AM)   Return Visit with Magalie Espinal MD   [facility] ([facility])    [address] [place] [address]  [address]  [facility] [address]   [phone]            Jun 13, 2018  9:00 AM CDT   Infusion 120 with  ONCOLOGY INFUSION, UC 25 ATC   [facility] ([facility])    [address] [place] [address]  [address]  [facility] [address]   [phone]            Jul 09, 2018 12:45 PM CDT   PET ONCOLOGY WHOLE BODY with UUPET1   [facility] [place] [facility] ([facility])    [address] [address]   [phone]           Tell your doctor:   If there is any chance you may be pregnant or if you are breastfeeding.   If you have problems lying in small spaces (claustrophobia). If you do, your doctor may give you medicine to help you relax. If you have diabetes:   Have your exam early in the morning. Your blood glucose will go up as the day goes by.   Your glucose level must be 180 or less at the start of the exam. Please take any medicines you need to ensure this blood glucose level.   If you are taking insulin in the morning take with breakfast by 6 am and schedule procedure between 12 and 2:15  pm.   If you are taking insulin at night take nightly dose, fast overnight, schedule procedure before 10 am.   If you take insulin both morning and night take morning dose by 6am and schedule procedure between 12 and 2:15 pm.   24 hours before your scan: Don t do any heavy exercise. (No jogging, aerobics or other workouts.) Exercise will make your pictures less accurate.  At least 7 hours before your scan, or the evening before if you have an early appointment: Eat a low carb, high protein meal (Lean meats, seafood, beans, soy, low-fat dairy, eggs, nuts & seeds). 6 hours before your scan:   Stop all food and liquids (except water).   Do not chew gum or suck on mints.   If you need to take medicine with food, you may take it with a few crackers.  Please call your Imaging Department at your exam site with any questions.            Jul 11, 2018  8:00 AM CDT   Infusion 120 with UC SPEC INFUSION, UC 44 ATC   [facility] ([facility])    [address]  [address]  [facility] [address]   [phone]            Jul 11, 2018 11:00 AM CDT   (Arrive by 10:45 AM)   Return Visit with Magalie Espinal MD   [facility] ([facility])    [address] [place] [address]  [address]  [facility] [address]   [phone]            Jul 12, 2018  8:20 AM CDT   (Arrive by 8:05 AM)   Return Visit with Bebeto Shea MD   [facility]state [facility]rologic Cancers ([facility])    [address] [place] [address]  4th Floor  [facility] [address]   [phone]            Sep 11, 2018 10:15 AM CDT   Adult Med Follow UP with JELLY Chapa CNS   [facility] ([facility])    [place] [address]  [address] [id number] [phone]              Who to contact     If you have questions or need follow up information about  "today's clinic visit or your schedule please contact [facility] directly at [phone].  Normal or non-critical lab and imaging results will be communicated to you by Phenomixhart, letter or phone within 4 business days after the clinic has received the results. If you do not hear from us within 7 days, please contact the clinic through Phenomixhart or phone. If you have a critical or abnormal lab result, we will notify you by phone as soon as possible.  Submit refill requests through YES.TAP or call your pharmacy and they will forward the refill request to us. Please allow 3 business days for your refill to be completed.          Additional Information About Your Visit        PhenomixharFonality Information     YES.TAP gives you secure access to your electronic health record. If you see a primary care provider, you can also send messages to your care team and make appointments. If you have questions, please call your primary care clinic.  If you do not have a primary care provider, please call [phone] and they will assist you.        Care EveryWhere ID     This is your Care EveryWhere ID. This could be used by other organizations to access your [place] medical records  [id number]        Your Vitals Were     Pulse Temperature Respirations Height Pulse Oximetry BMI (Body Mass Index)    116 98.2  F (36.8  C) (Oral) 18 1.829 m (6' 0.01\") 99% 20.96 kg/m2       Blood Pressure from Last 3 Encounters:   06/06/18 119/82   05/23/18 108/70   05/16/18 114/77    Weight from Last 3 Encounters:   06/06/18 70.1 kg (154 lb 9.6 oz)   05/23/18 69.9 kg (154 lb)   05/16/18 71.4 kg (157 lb 4.8 oz)              Today, you had the following     No orders found for display         Today's Medication Changes          These changes are accurate as of 6/6/18 10:50 AM.  If you have any questions, ask your nurse or doctor.               These medicines have changed or have updated prescriptions.        Dose/Directions    amLODIPine 5 MG " tablet   Commonly known as:  NORVASC   This may have changed:  when to take this   Used for:  Hypertension goal BP (blood pressure) < 140/90        Dose:  5 mg   Take 1 tablet (5 mg) by mouth daily   Quantity:  90 tablet   Refills:  3       codeine 30 MG tablet   This may have changed:  when to take this   Used for:  Hemophilic arthropathy        Dose:  60 mg   Take 2 tablets (60 mg) by mouth every 6 hours as needed for moderate to severe pain (up to 8 tabs in 24 hours)   Quantity:  168 tablet   Refills:  0       lisinopril 40 MG tablet   Commonly known as:  PRINIVIL/ZESTRIL   This may have changed:  when to take this   Used for:  Hypertension goal BP (blood pressure) < 140/90        Dose:  40 mg   Take 1 tablet (40 mg) by mouth daily   Quantity:  90 tablet   Refills:  3                Primary Care Provider Office Phone # Fax #    Katie Ramos [phone]       [address] [address]        Equal Access to Services     ANDREW FLANAGAN AH: Hadii violette owens hadasho Soomaali, waaxda luqadaha, qaybta kaalmada adeegyada, waxay annin irene rene . So [facility] [phone].    ATENCIÓN: Si habla español, tiene a hernadez disposición servicios gratuitos de asistencia lingüística. [facility] [phone].    We comply with applicable federal civil rights laws and [place] laws. We do not discriminate on the basis of race, color, national origin, age, disability, sex, sexual orientation, or gender identity.            Thank you!     Thank you for choosing [facility]  for your care. Our goal is always to provide you with excellent care. Hearing back from our patients is one way we can continue to improve our services. Please take a few minutes to complete the written survey that you may receive in the mail after your visit with us. Thank you!             Your Updated Medication List - Protect others around you: Learn how to safely use, store and throw away your medicines at  www.disposemymeds.org.          This list is accurate as of 6/6/18 10:50 AM.  Always use your most recent med list.                   Brand Name Dispense Instructions for use Diagnosis    amLODIPine 5 MG tablet    NORVASC    90 tablet    Take 1 tablet (5 mg) by mouth daily    Hypertension goal BP (blood pressure) < 140/90       codeine 30 MG tablet     168 tablet    Take 2 tablets (60 mg) by mouth every 6 hours as needed for moderate to severe pain (up to 8 tabs in 24 hours)    Hemophilic arthropathy       * dexamethasone 4 MG tablet    DECADRON    24 tablet    Take 1 tablet (4 mg) by mouth 2 times daily (with meals)    Bladder tumor       * dexamethasone 4 MG tablet    DECADRON    18 tablet    Take 2 tablets (8 mg) by mouth daily Take for 3 days, starting the day after cisplatin (Day 2).    Urothelial carcinoma of bladder (H)       diazepam 5 MG tablet    VALIUM    30 tablet    Take up to one tablet by mouth daily as needed for anxiety    Depressive disorder       KOGENATE FS 1000 units Kit     86822 each    Infuse 3000 units every other day    Severe hemophilia A (H), Personal history of malignant neoplasm of bladder       lisinopril 40 MG tablet    PRINIVIL/ZESTRIL    90 tablet    Take 1 tablet (40 mg) by mouth daily    Hypertension goal BP (blood pressure) < 140/90       LORazepam 0.5 MG tablet    ATIVAN    60 tablet    Take 1 tablet (0.5 mg) by mouth every 6 hours as needed for anxiety    Malignant neoplasm of overlapping sites of bladder (H)       mirtazapine 45 MG tablet    REMERON    37 tablet    Take 1 tablet (45 mg) by mouth At Bedtime And may take 1/2 tablet for insomnia up to 3 times a week.    Generalized anxiety disorder       ondansetron 8 MG ODT tab    ZOFRAN-ODT    30 tablet    Take 1 tablet (8 mg) by mouth every 8 hours as needed for nausea    Bladder tumor       * prochlorperazine 10 MG tablet    COMPAZINE    30 tablet    Take 0.5 tablets (5 mg) by mouth every 6 hours as needed for nausea or  vomiting    Bladder tumor       * prochlorperazine 10 MG tablet    COMPAZINE    30 tablet    Take 1 tablet (10 mg) by mouth every 6 hours as needed (Nausea/Vomiting)    Urothelial carcinoma of bladder (H)       tamsulosin 0.4 MG capsule    FLOMAX    45 capsule    Take 1 capsule (0.4 mg) by mouth daily    Incomplete bladder emptying       TYLENOL PO      Take 500 mg by mouth every 4 hours as needed for mild pain or fever        * Notice:  This list has 4 medication(s) that are the same as other medications prescribed for you. Read the directions carefully, and ask your doctor or other care provider to review them with you.

## 2018-06-06 NOTE — PROGRESS NOTES
[id number]: Study Visit Note  Subject name: Orlin Cavanaugh     Visit: C3D8    Did the study visit occur within the appropriate window allowed by the protocol? Yes      Since the last study visit, Orlin has been doing about the same and feeling generally well with minimal S/es and nothing new.     I have personally interviewed Orlin and reviewed his medical record for adverse events and concomitant medications and these have been recorded on the corresponding logs in Victor Manuel research file.    Labs checked and ok to proceed with treatment today. Pt seen by BARBBindu     Orlin was given the opportunity to ask any trial related questions.  Please see provider progress note for physical exam and other clinical information.    Iwona Bailey RN   [phone]

## 2018-06-06 NOTE — MR AVS SNAPSHOT
After Visit Summary   6/6/2018    Orlin Alcantar    MRN: [id number]           Patient Information     Date Of Birth          1950        Visit Information        Provider Department      6/6/2018 10:30 AM  21 ATC;  ONCOLOGY INFUSION [facility]        Today's Diagnoses     Urothelial carcinoma of bladder (H)    -  1      Care Instructions    Contact Numbers  [facility]: [phone]    After Hours:  [phone]  Triage: [phone]    Please call the [facility] Triage line if you experience a temperature greater than or equal to 100.5, shaking chills, have uncontrolled nausea, vomiting and/or diarrhea, dizziness, shortness of breath, chest pain, bleeding, unexplained bruising, or if you have any other new/concerning symptoms, questions or concerns.     If it is after hours, weekends, or holidays, please call the main hospital  at  [phone] and ask to speak to the Oncology doctor on call.     If you are having any concerning symptoms or wish to speak to a provider before your next infusion visit, please call your care coordinator or triage to notify them so we can adequately serve you.     If you need a refill on a narcotic prescription or other medication, please call triage before your infusion appointment.         June 2018 Sunday Monday Tuesday Wednesday Thursday Friday Saturday                            1     [facility] DRAW   10:00 AM   (15 min.)    [facility]   [facility]       3     4     5     6     [facility] DRAW    9:00 AM   (15 min.)   [facility]   [facility] Lab Draw     [facility] RETURN    9:15 AM   (50 min.)   Brianna Harris PA-C   [facility] ONC INFUSION 360   10:30 AM   (360 min.)    ONCOLOGY INFUSION   [facility] 7     8     9       10     11     12     13     [facility] DRAW    7:15 AM   (15 min.)   [facility] DRAW   [facility]  [facility] Lab Draw     [facility] RETURN    7:30 AM   (30 min.)   Magalie Espinal MD   [facility]     UM ONC INFUSION 120    9:00 AM   (120 min.)   UC ONCOLOGY INFUSION   [facility] 14     15     16       17     18     19     20     21     22     23       24     25     26     27     28     29     30                July 2018 Sunday Monday Tuesday Wednesday Thursday Friday Saturday   1     2     3     4     5     6     7       8     9     PET ONCOLOGY WHOLE BODY   12:45 PM   (45 min.)   UUPET1   [facility] PET CT 10     11     [facility] SPEC INFUSION 120    8:00 AM   (120 min.)   [facility] SPEC INFUSION   [facility] Specialty and Procedure     [facility] RETURN   10:45 AM   (30 min.)   Magalie Espinal MD   [facility] 12     [facility] RETURN    8:05 AM   (20 min.)   Bebeto Shea MD   [facility] 13     14       15     16     17     18     19     20     21       22     23     24     25     26     27     28       29     30     31                                     Recent Results (from the past 24 hour(s))   CBC with platelets differential    Collection Time: 06/06/18  9:04 AM   Result Value Ref Range    WBC 14.9 (H) 4.0 - 11.0 10e9/L    RBC Count 4.03 (L) 4.4 - 5.9 10e12/L    Hemoglobin 11.4 (L) 13.3 - 17.7 g/dL    Hematocrit 35.5 (L) 40.0 - 53.0 %    MCV 88 78 - 100 fl    MCH 28.3 26.5 - 33.0 pg    MCHC 32.1 31.5 - 36.5 g/dL    RDW 21.1 (H) 10.0 - 15.0 %    Platelet Count 338 150 - 450 10e9/L    Diff Method Automated Method     % Neutrophils 77.3 %    % Lymphocytes 8.1 %    % Monocytes 10.6 %    % Eosinophils 0.9 %    % Basophils 0.4 %    % Immature Granulocytes 2.7 %    Nucleated RBCs 0 0 /100    Absolute Neutrophil 11.5 (H) 1.6 - 8.3 10e9/L    Absolute Lymphocytes 1.2 0.8 - 5.3 10e9/L    Absolute Monocytes 1.6 (H) 0.0 - 1.3 10e9/L    Absolute Eosinophils 0.1 0.0 - 0.7 10e9/L    Absolute Basophils 0.1 0.0 -  0.2 10e9/L    Abs Immature Granulocytes 0.4 0 - 0.4 10e9/L    Absolute Nucleated RBC 0.0    Comprehensive metabolic panel    Collection Time: 06/06/18  9:04 AM   Result Value Ref Range    Sodium 138 133 - 144 mmol/L    Potassium 4.2 3.4 - 5.3 mmol/L    Chloride 105 94 - 109 mmol/L    Carbon Dioxide 24 20 - 32 mmol/L    Anion Gap 9 3 - 14 mmol/L    Glucose 106 (H) 70 - 99 mg/dL    Urea Nitrogen 14 7 - 30 mg/dL    Creatinine 0.88 0.66 - 1.25 mg/dL    GFR Estimate 87 >60 mL/min/1.7m2    GFR Estimate If Black >90 >60 mL/min/1.7m2    Calcium 9.5 8.5 - 10.1 mg/dL    Bilirubin Total 0.4 0.2 - 1.3 mg/dL    Albumin 3.8 3.4 - 5.0 g/dL    Protein Total 7.8 6.8 - 8.8 g/dL    Alkaline Phosphatase 145 40 - 150 U/L    ALT 15 0 - 70 U/L    AST 10 0 - 45 U/L   Magnesium    Collection Time: 06/06/18  9:04 AM   Result Value Ref Range    Magnesium 2.1 1.6 - 2.3 mg/dL                 Follow-ups after your visit        Your next 10 appointments already scheduled     Jun 13, 2018  7:15 AM CDT   [facility] Lab Draw with  [facility] LAB DRAW   [facility] Lab Draw ([facility])    [address] [place] [address]  [address]  [facility] [address]   [phone]            Jun 13, 2018  7:45 AM CDT   (Arrive by 7:30 AM)   Return Visit with Magalie Espinal MD   [facility] ([facility])    [address] [place] [address]  [address]  [facility] [address] [phone]            Jun 13, 2018  9:00 AM CDT   Infusion 120 with UC ONCOLOGY INFUSION, UC 25 ATC   [facility] ([facility])    [address] [place] [address]  [address]  [facility] [address]   [phone]            Jul 09, 2018 12:45 PM CDT   PET ONCOLOGY WHOLE BODY with UUPET1   [facility]    [address] [facility] [id number] [phone]           Tell your doctor:   If there is any chance you may be pregnant  or if you are breastfeeding.   If you have problems lying in small spaces (claustrophobia). If you do, your doctor may give you medicine to help you relax. If you have diabetes:   Have your exam early in the morning. Your blood glucose will go up as the day goes by.   Your glucose level must be 180 or less at the start of the exam. Please take any medicines you need to ensure this blood glucose level.   If you are taking insulin in the morning take with breakfast by 6 am and schedule procedure between 12 and 2:15 pm.   If you are taking insulin at night take nightly dose, fast overnight, schedule procedure before 10 am.   If you take insulin both morning and night take morning dose by 6am and schedule procedure between 12 and 2:15 pm.   24 hours before your scan: Don t do any heavy exercise. (No jogging, aerobics or other workouts.) Exercise will make your pictures less accurate.  At least 7 hours before your scan, or the evening before if you have an early appointment: Eat a low carb, high protein meal (Lean meats, seafood, beans, soy, low-fat dairy, eggs, nuts & seeds). 6 hours before your scan:   Stop all food and liquids (except water).   Do not chew gum or suck on mints.   If you need to take medicine with food, you may take it with a few crackers.  Please call your Imaging Department at your exam site with any questions.            Jul 11, 2018  8:00 AM CDT   Infusion 120 with UC SPEC INFUSION, UC 44 ATC   [facility] Specialty [facility] Procedure ([facility])    [address]  [address]  [facility] [address] [phone]            Jul 11, 2018 11:00 AM CDT   (Arrive by 10:45 AM)   Return Visit with Magalie Espinal MD   [facility] ([facility])    [address]  [address]  [facility] [address] [phone]            Jul 12, 2018  8:20 AM CDT   (Arrive by 8:05 AM)   Return Visit with Bebeto HODGSON  MD Shabbir   [facility] Urologic Cancers ([facility])    [address]  4th Floor  [facility] [place] [id number] [phone]            Sep 11, 2018 10:15 AM CDT   Adult Med Follow UP with JELLY Chapa CNS   Psychiatry Clinic ([facility])    [address]  [address]  [facility] [place] [address] [phone]              Who to contact     If you have questions or need follow up information about today's clinic visit or your schedule please contact [facility] CANCER CLINIC directly at [phone].  Normal or non-critical lab and imaging results will be communicated to you by [employer], letter or phone within 4 business days after the clinic has received the results. If you do not hear from us within 7 days, please contact the clinic through [employer] or phone. If you have a critical or abnormal lab result, we will notify you by phone as soon as possible.  Submit refill requests through trinket or call your pharmacy and they will forward the refill request to us. Please allow 3 business days for your refill to be completed.          Additional Information About Your Visit        trinket Information     trinket gives you secure access to your electronic health record. If you see a primary care provider, you can also send messages to your care team and make appointments. If you have questions, please call your primary care clinic.  If you do not have a primary care provider, please call [phone] and they will assist you.        Care EveryWhere ID     This is your Care EveryWhere ID. This could be used by other organizations to access your [place] medical records  [id number]         Blood Pressure from Last 3 Encounters:   06/06/18 119/82   05/23/18 108/70   05/16/18 114/77    Weight from Last 3 Encounters:   06/06/18 70.1 kg (154 lb 9.6 oz)   05/23/18 69.9 kg (154 lb)   05/16/18 71.4 kg (157  lb 4.8 oz)              We Performed the Following     CBC with platelets differential     Comprehensive metabolic panel     Magnesium          Today's Medication Changes          These changes are accurate as of 6/6/18  4:53 PM.  If you have any questions, ask your nurse or doctor.               These medicines have changed or have updated prescriptions.        Dose/Directions    amLODIPine 5 MG tablet   Commonly known as:  NORVASC   This may have changed:  when to take this   Used for:  Hypertension goal BP (blood pressure) < 140/90        Dose:  5 mg   Take 1 tablet (5 mg) by mouth daily   Quantity:  90 tablet   Refills:  3       codeine 30 MG tablet   This may have changed:  when to take this   Used for:  Hemophilic arthropathy        Dose:  60 mg   Take 2 tablets (60 mg) by mouth every 6 hours as needed for moderate to severe pain (up to 8 tabs in 24 hours)   Quantity:  168 tablet   Refills:  0       lisinopril 40 MG tablet   Commonly known as:  PRINIVIL/ZESTRIL   This may have changed:  when to take this   Used for:  Hypertension goal BP (blood pressure) < 140/90        Dose:  40 mg   Take 1 tablet (40 mg) by mouth daily   Quantity:  90 tablet   Refills:  3                Primary Care Provider Office Phone # Fax #    Katie Ramos [phone]       [address]  [facility] [place] [address]        Equal Access to Services     ANDREW FLANAGAN AH: Hadgeeta irbyo Somaicoali, waaxda luqadaha, qaybta kaalmada adeegyada, lloyd blum. So [facility] [phone].    ATENCIÓN: Si habla español, tiene a hernadez disposición servicios gratuitos de asistencia lingüística. Llame al [phone].    We comply with applicable federal civil rights laws and [place] laws. We do not discriminate on the basis of race, color, national origin, age, disability, sex, sexual orientation, or gender identity.            Thank you!     Thank you for choosing [facility]  for your  care. Our goal is always to provide you with excellent care. Hearing back from our patients is one way we can continue to improve our services. Please take a few minutes to complete the written survey that you may receive in the mail after your visit with us. Thank you!             Your Updated Medication List - Protect others around you: Learn how to safely use, store and throw away your medicines at www.disposemymeds.org.          This list is accurate as of 6/6/18  4:53 PM.  Always use your most recent med list.                   Brand Name Dispense Instructions for use Diagnosis    amLODIPine 5 MG tablet    NORVASC    90 tablet    Take 1 tablet (5 mg) by mouth daily    Hypertension goal BP (blood pressure) < 140/90       codeine 30 MG tablet     168 tablet    Take 2 tablets (60 mg) by mouth every 6 hours as needed for moderate to severe pain (up to 8 tabs in 24 hours)    Hemophilic arthropathy       * dexamethasone 4 MG tablet    DECADRON    24 tablet    Take 1 tablet (4 mg) by mouth 2 times daily (with meals)    Bladder tumor       * dexamethasone 4 MG tablet    DECADRON    18 tablet    Take 2 tablets (8 mg) by mouth daily Take for 3 days, starting the day after cisplatin (Day 2).    Urothelial carcinoma of bladder (H)       diazepam 5 MG tablet    VALIUM    30 tablet    Take up to one tablet by mouth daily as needed for anxiety    Depressive disorder       KOGENATE FS 1000 units Kit     04970 each    Infuse 3000 units every other day    Severe hemophilia A (H), Personal history of malignant neoplasm of bladder       lisinopril 40 MG tablet    PRINIVIL/ZESTRIL    90 tablet    Take 1 tablet (40 mg) by mouth daily    Hypertension goal BP (blood pressure) < 140/90       LORazepam 0.5 MG tablet    ATIVAN    60 tablet    Take 1 tablet (0.5 mg) by mouth every 6 hours as needed for anxiety    Malignant neoplasm of overlapping sites of bladder (H)       mirtazapine 45 MG tablet    REMERON    37 tablet    Take 1 tablet  (45 mg) by mouth At Bedtime And may take 1/2 tablet for insomnia up to 3 times a week.    Generalized anxiety disorder       ondansetron 8 MG ODT tab    ZOFRAN-ODT    30 tablet    Take 1 tablet (8 mg) by mouth every 8 hours as needed for nausea    Bladder tumor       * prochlorperazine 10 MG tablet    COMPAZINE    30 tablet    Take 0.5 tablets (5 mg) by mouth every 6 hours as needed for nausea or vomiting    Bladder tumor       * prochlorperazine 10 MG tablet    COMPAZINE    30 tablet    Take 1 tablet (10 mg) by mouth every 6 hours as needed (Nausea/Vomiting)    Urothelial carcinoma of bladder (H)       tamsulosin 0.4 MG capsule    FLOMAX    45 capsule    Take 1 capsule (0.4 mg) by mouth daily    Incomplete bladder emptying       TYLENOL PO      Take 500 mg by mouth every 4 hours as needed for mild pain or fever        * Notice:  This list has 4 medication(s) that are the same as other medications prescribed for you. Read the directions carefully, and ask your doctor or other care provider to review them with you.

## 2018-06-06 NOTE — PROGRESS NOTES
"[facility] CANCER CLINIC  FOLLOW-UP VISIT NOTE  Date of visit: Jun 6, 2018            REASON FOR VISIT: bladder CA, here for assessment prior to C4 Day 1 Cis-gem + Opdivo    HPI: 68 year old male with PMH of hemophilia, chronic hepatis C who was seen by DR. Burns for evaluation of neoadjuvant therapy with Gemcitabine Cisplatin and Nivolumab.  He will be seeing Dr Espinal while on trial.     Patient has a history of hemophilia and has undergone several surgeries in right hip replacement, knee replacement. Hemophilia managed by Dr. Marley.   He reports recurrent hematuria for a year, initially attributed to kidney stones, cystoscopy in Feb 2018 revealed: urothelial cancer with muscle involvement. PET-CT scan - which has revealed muscle invasive bladder right lateral wall of bladder and right  obturator lymphnode, 1.1 cm short axis, suspicious for metastatic disease.  He has been treated for HCV in the 1990s and has not had any complications. His LFTs have remained normal. His hemophilia is very well controlled on current regimen of factor replacement. He does not have any autoimmune disease and is not on immunesuppressants or steroids.     INTERVAL HISTORY: Orlin is here alone today for C4.  He feels he has been tolerating chemotherapy well.  he denies tinnitus or neuropathy from chemotherapy.  He has some mild fatigue and mild nausea for about 2-3 days after chemo which subsides.     ROS: no recent f/s/c. No chest pain/cough/dyspnea.  No new GI issues.  No bleeding issues.      EXAM:  /82 (BP Location: Right arm, Patient Position: Sitting, Cuff Size: Adult Regular)  Pulse 116  Temp 98.2  F (36.8  C) (Oral)  Resp 18  Ht 1.829 m (6' 0.01\")  Wt 70.1 kg (154 lb 9.6 oz)  SpO2 99%  BMI 20.96 kg/m2  Wt Readings from Last 10 Encounters:   06/06/18 70.1 kg (154 lb 9.6 oz)   05/23/18 69.9 kg (154 lb)   05/16/18 71.4 kg (157 lb 4.8 oz)   05/09/18 69.8 kg (153 lb 14.4 oz)   05/02/18 71.2 kg (156 " lb 14.4 oz)   04/25/18 71.4 kg (157 lb 4.8 oz)   04/25/18 71.3 kg (157 lb 4 oz)   04/11/18 72.6 kg (160 lb 1.6 oz)   04/04/18 72.1 kg (158 lb 14.4 oz)   03/28/18 73 kg (160 lb 14.4 oz)     Vital signs were reviewed.   Patient alert and oriented x3.   PERRLA. EOMI. No scleral icterus noted. OP without thrush/sores.  Neck exam: No palpable cervical, supraclavicular or axillary nodes bilaterally.   Heart: RRR no murmurs noted.   Lungs: clear to auscultation bilaterally.  No crackles or wheezing.   Abd: positive bowel sounds in all four quadrants.  No tenderness to palpation.  No hepatomegaly.   Extremities: No lower extremity edema. Elbows stiff with mild arthritis changes noted in elbows and knees   Neuro: grossly intact.   Mood and affect is stable.     LABS:      6/6/2018 09:04   Sodium 138   Potassium 4.2   Chloride 105   Carbon Dioxide 24   Urea Nitrogen 14   Creatinine 0.88   GFR Estimate 87   GFR Estimate If Black >90   Calcium 9.5   Anion Gap 9   Magnesium 2.1   Albumin 3.8   Protein Total 7.8   Bilirubin Total 0.4   Alkaline Phosphatase 145   ALT 15   AST 10   Glucose 106 (H)   WBC 14.9 (H)   Hemoglobin 11.4 (L)   Hematocrit 35.5 (L)   Platelet Count 338   RBC Count 4.03 (L)   MCV 88   MCH 28.3   MCHC 32.1   RDW 21.1 (H)   Diff Method Automated Method   % Neutrophils 77.3   % Lymphocytes 8.1   % Monocytes 10.6   % Eosinophils 0.9   % Basophils 0.4   % Immature Granulocytes 2.7   Nucleated RBCs 0   Absolute Neutrophil 11.5 (H)     ASSESSMENT/PLAN: 68 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 Treatment will be repeated every 21 days for total of 4 cycles with Neulasta support.  Currently here for C4 today.     --Orlin showing no s/s of immunotoxicity, neuropathy, nephropathy or ototoxicity.  OK for C4 today  --Orlin had  "some notion that cystectomy was \"optional\".  I reviewed the data for him today that with muscle invasive disease, this was the standard of care.  He agreed to the cystectomy    HEME: hemophilia give himself alternating 3000 units vs 1000 units. No bleeding issues thus far.    ID: no currently infections.  Initial HCV showed reactive, however his DNA quant is negative, indicating an old infection that is cleared.      Pain: taking 1-2 codiene prn for hemophilia arthritis     Sleep: taking Remeron 45 mg qhs    Coping: doing well today, takes Valium once a week for anxiety    CV: HTN controlled with Norvasc 5 mg, lisinopril 40 mg daily    : had PNT placed end of FEb, would be due to switch out, but could consider delaying till surgery in July, will review with Dr Shea.    Monica Harris PA-C    AMEND: ECOG 1      "

## 2018-06-06 NOTE — NURSING NOTE
Chief Complaint   Patient presents with     Blood Draw     Labs drawn via /PIV placed by IRON Call. VS taken.     Kiya Garcia RN

## 2018-06-06 NOTE — PATIENT INSTRUCTIONS
Contact Numbers  [facility]: [phone]    After Hours:  [phone]  Triage: [phone]    Please call the [facility] Triage line if you experience a temperature greater than or equal to 100.5, shaking chills, have uncontrolled nausea, vomiting and/or diarrhea, dizziness, shortness of breath, chest pain, bleeding, unexplained bruising, or if you have any other new/concerning symptoms, questions or concerns.     If it is after hours, weekends, or holidays, please call the main hospital  at  [phone] and ask to speak to the Oncology doctor on call.     If you are having any concerning symptoms or wish to speak to a provider before your next infusion visit, please call your care coordinator or triage to notify them so we can adequately serve you.     If you need a refill on a narcotic prescription or other medication, please call triage before your infusion appointment.         June 2018 Sunday Monday Tuesday Wednesday Thursday Friday Saturday                            1     [facility] LAB DRAW   10:00 AM   (15 min.)    [facility] LAB DRAW   [facility] Lab Draw 2       3     4     5     6     [facility] LAB DRAW    9:00 AM   (15 min.)   [facility]   [facility] Lab Draw     [facility] RETURN    9:15 AM   (50 min.)   Brianna Harris PA-C   [facility] ONC INFUSION 360   10:30 AM   (360 min.)    ONCOLOGY INFUSION   [facility] 7     8     9       10     11     12     13     [facility] LAB DRAW    7:15 AM   (15 min.)   [facility] DRAW   [facility] Lab Draw     [facility] RETURN    7:30 AM   (30 min.)   Magaile Espinal MD   [facility] ONC INFUSION 120    9:00 AM   (120 min.)    ONCOLOGY INFUSION   [facility] 14     15     16       17     18     19     20     21     22     23       24     25     26     27     28     29     30                July 2018 Sunday Monday Tuesday  Wednesday Thursday Friday Saturday   1     2     3     4     5     6     7       8     9     PET ONCOLOGY WHOLE BODY   12:45 PM   (45 min.)   UUPET1   [facility] PET CT 10     11     [facility] SPEC INFUSION 120    8:00 AM   (120 min.)   [facility] SPEC INFUSION   [facility] Specialty and Procedure     [facility] RETURN   10:45 AM   (30 min.)   Magalie Espinal MD   [facility] 12     [facility] RETURN    8:05 AM   (20 min.)   Bebeto Shea MD   [facility] Urology and Inst for Prostate and Urologic Cancers 13     14       15     16     17     18     19     20     21       22     23     24     25     26     27     28       29     30     31                                     Recent Results (from the past 24 hour(s))   CBC with platelets differential    Collection Time: 06/06/18  9:04 AM   Result Value Ref Range    WBC 14.9 (H) 4.0 - 11.0 10e9/L    RBC Count 4.03 (L) 4.4 - 5.9 10e12/L    Hemoglobin 11.4 (L) 13.3 - 17.7 g/dL    Hematocrit 35.5 (L) 40.0 - 53.0 %    MCV 88 78 - 100 fl    MCH 28.3 26.5 - 33.0 pg    MCHC 32.1 31.5 - 36.5 g/dL    RDW 21.1 (H) 10.0 - 15.0 %    Platelet Count 338 150 - 450 10e9/L    Diff Method Automated Method     % Neutrophils 77.3 %    % Lymphocytes 8.1 %    % Monocytes 10.6 %    % Eosinophils 0.9 %    % Basophils 0.4 %    % Immature Granulocytes 2.7 %    Nucleated RBCs 0 0 /100    Absolute Neutrophil 11.5 (H) 1.6 - 8.3 10e9/L    Absolute Lymphocytes 1.2 0.8 - 5.3 10e9/L    Absolute Monocytes 1.6 (H) 0.0 - 1.3 10e9/L    Absolute Eosinophils 0.1 0.0 - 0.7 10e9/L    Absolute Basophils 0.1 0.0 - 0.2 10e9/L    Abs Immature Granulocytes 0.4 0 - 0.4 10e9/L    Absolute Nucleated RBC 0.0    Comprehensive metabolic panel    Collection Time: 06/06/18  9:04 AM   Result Value Ref Range    Sodium 138 133 - 144 mmol/L    Potassium 4.2 3.4 - 5.3 mmol/L    Chloride 105 94 - 109 mmol/L    Carbon Dioxide 24 20 - 32 mmol/L    Anion Gap 9 3 - 14 mmol/L    Glucose 106 (H) 70 - 99 mg/dL     Urea Nitrogen 14 7 - 30 mg/dL    Creatinine 0.88 0.66 - 1.25 mg/dL    GFR Estimate 87 >60 mL/min/1.7m2    GFR Estimate If Black >90 >60 mL/min/1.7m2    Calcium 9.5 8.5 - 10.1 mg/dL    Bilirubin Total 0.4 0.2 - 1.3 mg/dL    Albumin 3.8 3.4 - 5.0 g/dL    Protein Total 7.8 6.8 - 8.8 g/dL    Alkaline Phosphatase 145 40 - 150 U/L    ALT 15 0 - 70 U/L    AST 10 0 - 45 U/L   Magnesium    Collection Time: 06/06/18  9:04 AM   Result Value Ref Range    Magnesium 2.1 1.6 - 2.3 mg/dL

## 2018-06-06 NOTE — NURSING NOTE
"Oncology Rooming Note    June 6, 2018 9:16 AM   Orlin Alcantar is a 68 year old male who presents for:    Chief Complaint   Patient presents with     Blood Draw     Labs drawn via /PIV placed by IRON Call. VS taken.     Oncology Clinic Visit     Return visit related to Bladder Cancer     Initial Vitals: /82 (BP Location: Right arm, Patient Position: Sitting, Cuff Size: Adult Regular)  Pulse 116  Temp 98.2  F (36.8  C) (Oral)  Resp 18  Ht 1.829 m (6' 0.01\")  Wt 70.1 kg (154 lb 9.6 oz)  SpO2 99%  BMI 20.96 kg/m2 Estimated body mass index is 20.96 kg/(m^2) as calculated from the following:    Height as of this encounter: 1.829 m (6' 0.01\").    Weight as of this encounter: 70.1 kg (154 lb 9.6 oz). Body surface area is 1.89 meters squared.  No Pain (0) Comment: Data Unavailable   No LMP for male patient.  Allergies reviewed: Yes  Medications reviewed: Yes    Medications: Medication refills not needed today.  Pharmacy name entered into [employer]:    [facility] - [place], [place] - [address] [place] [address] PHARMACY [place], [place] - [address]  [facility] [place] [facility] [place], [place] - [address]    Clinical concerns: No new concerns. Provider was notified.    10 minutes for nursing intake (face to face time)     Shiela Adhikari LPN            "

## 2018-06-06 NOTE — PROGRESS NOTES
Infusion Nursing Note:  Orlin Alcantar presents today for C4D1 Gemzar-Cisplatin.    Patient seen by provider today: Yes: FAITH Angel    Treatment Conditions:  Lab Results   Component Value Date    HGB 11.4 06/06/2018     Lab Results   Component Value Date    WBC 14.9 06/06/2018      Lab Results   Component Value Date    ANEU 11.5 06/06/2018     Lab Results   Component Value Date     06/06/2018      Lab Results   Component Value Date     06/06/2018                   Lab Results   Component Value Date    POTASSIUM 4.2 06/06/2018           Lab Results   Component Value Date    MAG 2.1 06/06/2018            Lab Results   Component Value Date    CR 0.88 06/06/2018                   Lab Results   Component Value Date    MARLA 9.5 06/06/2018                Lab Results   Component Value Date    BILITOTAL 0.4 06/06/2018           Lab Results   Component Value Date    ALBUMIN 3.8 06/06/2018                    Lab Results   Component Value Date    ALT 15 06/06/2018           Lab Results   Component Value Date    AST 10 06/06/2018       Results reviewed, labs MET treatment parameters, ok to proceed with treatment.    Intravenous Access:  Peripheral IV placed in lab.  Access dc'd at time of discharge.      Note:   Results reviewed, copy given to patient.  Proceed with treatment.    Copy of AVS given to patient. + Blood return from PIV pre and post infusion.  Tolerated infusion without incident. No Prescriptions filled today, pt reporting he does not take DEX.   D/C in care of self.  Pt will return 6/13 for next appointment.       Melisa Torres RN

## 2018-06-13 ENCOUNTER — ONCOLOGY VISIT (OUTPATIENT)
Dept: ONCOLOGY | Facility: CLINIC | Age: 68
End: 2018-06-13
Attending: INTERNAL MEDICINE
Payer: MEDICARE

## 2018-06-13 ENCOUNTER — APPOINTMENT (OUTPATIENT)
Dept: LAB | Facility: CLINIC | Age: 68
End: 2018-06-13
Attending: INTERNAL MEDICINE
Payer: MEDICARE

## 2018-06-13 ENCOUNTER — RESEARCH ENCOUNTER (OUTPATIENT)
Dept: ONCOLOGY | Facility: CLINIC | Age: 68
End: 2018-06-13

## 2018-06-13 VITALS
OXYGEN SATURATION: 97 % | BODY MASS INDEX: 20.97 KG/M2 | WEIGHT: 154.8 LBS | SYSTOLIC BLOOD PRESSURE: 124 MMHG | RESPIRATION RATE: 16 BRPM | HEIGHT: 72 IN | TEMPERATURE: 97.8 F | DIASTOLIC BLOOD PRESSURE: 81 MMHG | HEART RATE: 95 BPM

## 2018-06-13 DIAGNOSIS — C67.9 UROTHELIAL CARCINOMA OF BLADDER (H): Primary | ICD-10-CM

## 2018-06-13 LAB
ALBUMIN SERPL-MCNC: 3.5 G/DL (ref 3.4–5)
ALP SERPL-CCNC: 113 U/L (ref 40–150)
ALT SERPL W P-5'-P-CCNC: 42 U/L (ref 0–70)
ANION GAP SERPL CALCULATED.3IONS-SCNC: 8 MMOL/L (ref 3–14)
AST SERPL W P-5'-P-CCNC: 24 U/L (ref 0–45)
BASOPHILS # BLD AUTO: 0 10E9/L (ref 0–0.2)
BASOPHILS NFR BLD AUTO: 0.6 %
BILIRUB SERPL-MCNC: 0.3 MG/DL (ref 0.2–1.3)
BUN SERPL-MCNC: 14 MG/DL (ref 7–30)
CALCIUM SERPL-MCNC: 8.7 MG/DL (ref 8.5–10.1)
CHLORIDE SERPL-SCNC: 106 MMOL/L (ref 94–109)
CO2 SERPL-SCNC: 25 MMOL/L (ref 20–32)
CREAT SERPL-MCNC: 0.88 MG/DL (ref 0.66–1.25)
DIFFERENTIAL METHOD BLD: ABNORMAL
EOSINOPHIL # BLD AUTO: 0 10E9/L (ref 0–0.7)
EOSINOPHIL NFR BLD AUTO: 1.2 %
ERYTHROCYTE [DISTWIDTH] IN BLOOD BY AUTOMATED COUNT: 20.3 % (ref 10–15)
GFR SERPL CREATININE-BSD FRML MDRD: 86 ML/MIN/1.7M2
GLUCOSE SERPL-MCNC: 95 MG/DL (ref 70–99)
HCT VFR BLD AUTO: 34.4 % (ref 40–53)
HGB BLD-MCNC: 11 G/DL (ref 13.3–17.7)
IMM GRANULOCYTES # BLD: 0 10E9/L (ref 0–0.4)
IMM GRANULOCYTES NFR BLD: 0.6 %
LDH SERPL L TO P-CCNC: 108 U/L (ref 85–227)
LYMPHOCYTES # BLD AUTO: 1.1 10E9/L (ref 0.8–5.3)
LYMPHOCYTES NFR BLD AUTO: 31.9 %
MAGNESIUM SERPL-MCNC: 1.8 MG/DL (ref 1.6–2.3)
MCH RBC QN AUTO: 28.3 PG (ref 26.5–33)
MCHC RBC AUTO-ENTMCNC: 32 G/DL (ref 31.5–36.5)
MCV RBC AUTO: 88 FL (ref 78–100)
MONOCYTES # BLD AUTO: 0.3 10E9/L (ref 0–1.3)
MONOCYTES NFR BLD AUTO: 9.9 %
NEUTROPHILS # BLD AUTO: 1.9 10E9/L (ref 1.6–8.3)
NEUTROPHILS NFR BLD AUTO: 55.8 %
NRBC # BLD AUTO: 0 10*3/UL
NRBC BLD AUTO-RTO: 0 /100
PHOSPHATE SERPL-MCNC: 2.9 MG/DL (ref 2.5–4.5)
PLATELET # BLD AUTO: 298 10E9/L (ref 150–450)
POTASSIUM SERPL-SCNC: 4.4 MMOL/L (ref 3.4–5.3)
PROT SERPL-MCNC: 7.1 G/DL (ref 6.8–8.8)
RBC # BLD AUTO: 3.89 10E12/L (ref 4.4–5.9)
SODIUM SERPL-SCNC: 139 MMOL/L (ref 133–144)
TSH SERPL DL<=0.005 MIU/L-ACNC: 2.08 MU/L (ref 0.4–4)
URATE SERPL-MCNC: 5.4 MG/DL (ref 3.5–7.2)
WBC # BLD AUTO: 3.4 10E9/L (ref 4–11)

## 2018-06-13 PROCEDURE — 84100 ASSAY OF PHOSPHORUS: CPT | Performed by: PHYSICIAN ASSISTANT

## 2018-06-13 PROCEDURE — 40000556 ZZH STATISTIC PERIPHERAL IV START W US GUIDANCE: Mod: ZF

## 2018-06-13 PROCEDURE — 84443 ASSAY THYROID STIM HORMONE: CPT | Performed by: PHYSICIAN ASSISTANT

## 2018-06-13 PROCEDURE — 80053 COMPREHEN METABOLIC PANEL: CPT | Performed by: PHYSICIAN ASSISTANT

## 2018-06-13 PROCEDURE — 83735 ASSAY OF MAGNESIUM: CPT | Performed by: PHYSICIAN ASSISTANT

## 2018-06-13 PROCEDURE — 84550 ASSAY OF BLOOD/URIC ACID: CPT | Performed by: PHYSICIAN ASSISTANT

## 2018-06-13 PROCEDURE — G0463 HOSPITAL OUTPT CLINIC VISIT: HCPCS | Mod: ZF

## 2018-06-13 PROCEDURE — 96377 APPLICATON ON-BODY INJECTOR: CPT | Mod: 59

## 2018-06-13 PROCEDURE — 99215 OFFICE O/P EST HI 40 MIN: CPT | Mod: ZP | Performed by: INTERNAL MEDICINE

## 2018-06-13 PROCEDURE — 25000128 H RX IP 250 OP 636: Mod: ZF | Performed by: INTERNAL MEDICINE

## 2018-06-13 PROCEDURE — 96375 TX/PRO/DX INJ NEW DRUG ADDON: CPT

## 2018-06-13 PROCEDURE — 96413 CHEMO IV INFUSION 1 HR: CPT

## 2018-06-13 PROCEDURE — 96417 CHEMO IV INFUS EACH ADDL SEQ: CPT

## 2018-06-13 PROCEDURE — 85025 COMPLETE CBC W/AUTO DIFF WBC: CPT | Performed by: PHYSICIAN ASSISTANT

## 2018-06-13 PROCEDURE — 83615 LACTATE (LD) (LDH) ENZYME: CPT | Performed by: PHYSICIAN ASSISTANT

## 2018-06-13 PROCEDURE — 25000128 H RX IP 250 OP 636: Mod: ZF | Performed by: PHYSICIAN ASSISTANT

## 2018-06-13 RX ORDER — ONDANSETRON 2 MG/ML
8 INJECTION INTRAMUSCULAR; INTRAVENOUS ONCE
Status: COMPLETED | OUTPATIENT
Start: 2018-06-13 | End: 2018-06-13

## 2018-06-13 RX ADMIN — ONDANSETRON 8 MG: 2 INJECTION INTRAMUSCULAR; INTRAVENOUS at 08:44

## 2018-06-13 RX ADMIN — SODIUM CHLORIDE 250 ML: 9 INJECTION, SOLUTION INTRAVENOUS at 08:44

## 2018-06-13 RX ADMIN — PEGFILGRASTIM 6 MG: KIT SUBCUTANEOUS at 10:57

## 2018-06-13 RX ADMIN — GEMCITABINE 1930 MG: 38 INJECTION, SOLUTION INTRAVENOUS at 08:54

## 2018-06-13 ASSESSMENT — PAIN SCALES - GENERAL: PAINLEVEL: NO PAIN (0)

## 2018-06-13 NOTE — PATIENT INSTRUCTIONS
Colorectal Cancer Screening: During our visit today, we discussed that it is recommended you receive colorectal cancer screening. Please call or make an appointment with your primary care provider to discuss this. You may also call the "[facility] [employer] scheduling line ([phone]) to set up a colonoscopy appointment.

## 2018-06-13 NOTE — MR AVS SNAPSHOT
After Visit Summary   6/13/2018    Orlin Alcantar    MRN: [id number]           Patient Information     Date Of Birth          1950        Visit Information        Provider Department      6/13/2018 9:00 AM  25 ATC;  ONCOLOGY INFUSION [facility]        Today's Diagnoses     Urothelial carcinoma of bladder (H)    -  1      Care Instructions    Contact Numbers    [facility] Main Line: [phone]  [facility] Triage and after hours / weekends / holidays:  [phone]      Please call the triage or after hours line if you experience a temperature greater than or equal to 100.5, shaking chills, have uncontrolled nausea, vomiting and/or diarrhea, dizziness, shortness of breath, chest pain, bleeding, unexplained bruising, or if you have any other new/concerning symptoms, questions or concerns.      If you are having any concerning symptoms or wish to speak to a provider before your next infusion visit, please call your care coordinator or triage to notify them so we can adequately serve you.     If you need a refill on a narcotic prescription or other medication, please call before your infusion appointment.           June 2018 Sunday Monday Tuesday Wednesday Thursday Friday Saturday                            1     [facility] DRAW   10:00 AM   (15 min.)    [facility] LAB DRAW   [facility] Lab Draw 2       3     4     5     6     P [facility] LAB DRAW    9:00 AM   (15 min.)    [facility]   [facility]     [facility] RETURN    9:15 AM   (50 min.)   Brianna Harris PA-C   [facility] ONC INFUSION 360   10:30 AM   (360 min.)    ONCOLOGY INFUSION   [facility] 7     8     9       10     11     12     13     P [facility] LAB DRAW    7:15 AM   (15 min.)    [facility]   [facility]     [facility] RETURN    7:30 AM   (30 min.)   Magalie Espinal MD   [facility] ONC INFUSION 120    9:00  AM   (120 min.)   [facility] ONCOLOGY INFUSION   [facility] 14     15     16       17     18     19     20     21     22     23       24     25     26     27     28     29     30                July 2018 Sunday Monday Tuesday Wednesday Thursday Friday Saturday   1     2     3     4     5     6     7       8     9     PET ONCOLOGY WHOLE BODY   12:45 PM   (45 min.)   UUPET1   [facility] [place] PET CT 10     11     [facility] SPEC INFUSION 120    8:00 AM   (120 min.)   [facility] SPEC INFUSION   [facility] Specialty and Procedure     [facility] RETURN   10:45 AM   (30 min.)   Magalie Espinal MD   [facility] 12     [facility] RETURN    8:05 AM   (20 min.)   Bebeto Shea MD   [facility] for Prostate and Urologic Cancers 13     14       15     16     17     18     19     20     21       22     23     24     25     26     27     28       29     30     31                                     Recent Results (from the past 24 hour(s))   CBC with platelets differential    Collection Time: 06/13/18  7:25 AM   Result Value Ref Range    WBC 3.4 (L) 4.0 - 11.0 10e9/L    RBC Count 3.89 (L) 4.4 - 5.9 10e12/L    Hemoglobin 11.0 (L) 13.3 - 17.7 g/dL    Hematocrit 34.4 (L) 40.0 - 53.0 %    MCV 88 78 - 100 fl    MCH 28.3 26.5 - 33.0 pg    MCHC 32.0 31.5 - 36.5 g/dL    RDW 20.3 (H) 10.0 - 15.0 %    Platelet Count 298 150 - 450 10e9/L    Diff Method Automated Method     % Neutrophils 55.8 %    % Lymphocytes 31.9 %    % Monocytes 9.9 %    % Eosinophils 1.2 %    % Basophils 0.6 %    % Immature Granulocytes 0.6 %    Nucleated RBCs 0 0 /100    Absolute Neutrophil 1.9 1.6 - 8.3 10e9/L    Absolute Lymphocytes 1.1 0.8 - 5.3 10e9/L    Absolute Monocytes 0.3 0.0 - 1.3 10e9/L    Absolute Eosinophils 0.0 0.0 - 0.7 10e9/L    Absolute Basophils 0.0 0.0 - 0.2 10e9/L    Abs Immature Granulocytes 0.0 0 - 0.4 10e9/L    Absolute Nucleated RBC 0.0    Comprehensive metabolic panel    Collection Time: 06/13/18   7:25 AM   Result Value Ref Range    Sodium 139 133 - 144 mmol/L    Potassium 4.4 3.4 - 5.3 mmol/L    Chloride 106 94 - 109 mmol/L    Carbon Dioxide 25 20 - 32 mmol/L    Anion Gap 8 3 - 14 mmol/L    Glucose 95 70 - 99 mg/dL    Urea Nitrogen 14 7 - 30 mg/dL    Creatinine 0.88 0.66 - 1.25 mg/dL    GFR Estimate 86 >60 mL/min/1.7m2    GFR Estimate If Black >90 >60 mL/min/1.7m2    Calcium 8.7 8.5 - 10.1 mg/dL    Bilirubin Total 0.3 0.2 - 1.3 mg/dL    Albumin 3.5 3.4 - 5.0 g/dL    Protein Total 7.1 6.8 - 8.8 g/dL    Alkaline Phosphatase 113 40 - 150 U/L    ALT 42 0 - 70 U/L    AST 24 0 - 45 U/L   Magnesium    Collection Time: 06/13/18  7:25 AM   Result Value Ref Range    Magnesium 1.8 1.6 - 2.3 mg/dL   Phosphorus    Collection Time: 06/13/18  7:25 AM   Result Value Ref Range    Phosphorus 2.9 2.5 - 4.5 mg/dL   Lactate Dehydrogenase    Collection Time: 06/13/18  7:25 AM   Result Value Ref Range    Lactate Dehydrogenase 108 85 - 227 U/L   Uric acid    Collection Time: 06/13/18  7:25 AM   Result Value Ref Range    Uric Acid 5.4 3.5 - 7.2 mg/dL   TSH    Collection Time: 06/13/18  7:25 AM   Result Value Ref Range    TSH 2.08 0.40 - 4.00 mU/L                 Follow-ups after your visit        Your next 10 appointments already scheduled     Jul 09, 2018 12:45 PM CDT   PET ONCOLOGY WHOLE BODY with UUPET1   [facility] PET CT ([facility] [place])    [address] [place] [facility] [address] [phone]           Tell your doctor:   If there is any chance you may be pregnant or if you are breastfeeding.   If you have problems lying in small spaces (claustrophobia). If you do, your doctor may give you medicine to help you relax. If you have diabetes:   Have your exam early in the morning. Your blood glucose will go up as the day goes by.   Your glucose level must be 180 or less at the start of the exam. Please take any medicines you need to ensure this blood glucose level.   If you are  taking insulin in the morning take with breakfast by 6 am and schedule procedure between 12 and 2:15 pm.   If you are taking insulin at night take nightly dose, fast overnight, schedule procedure before 10 am.   If you take insulin both morning and night take morning dose by 6am and schedule procedure between 12 and 2:15 pm.   24 hours before your scan: Don t do any heavy exercise. (No jogging, aerobics or other workouts.) Exercise will make your pictures less accurate.  At least 7 hours before your scan, or the evening before if you have an early appointment: Eat a low carb, high protein meal (Lean meats, seafood, beans, soy, low-fat dairy, eggs, nuts & seeds). 6 hours before your scan:   Stop all food and liquids (except water).   Do not chew gum or suck on mints.   If you need to take medicine with food, you may take it with a few crackers.  Please call your Imaging Department at your exam site with any questions.            Jul 11, 2018  8:00 AM CDT   Infusion 120 with UC SPEC INFUSION, UC 44 ATC   [facility] Specialty and Procedure ([facility])    [address]  [address]  [facility] [address]   [phone]            Jul 11, 2018 11:00 AM CDT   (Arrive by 10:45 AM)   Return Visit with Magalie Espinal MD   [facility] ([facility])    [address]  [address]  [place] [address]   [phone]            Jul 12, 2018  8:20 AM CDT   (Arrive by 8:05 AM)   Return Visit with Bebeto Shea MD   [facility] Urology and Inst for Prostate and Urologic Cancers ([facility])    [address]  [address]th [address]  [facility] [address]   [phone]            Sep 11, 2018 10:15 AM CDT   Adult Med Follow UP with JELLY Chapa CNS   Psychiatry Clinic ([facility])    [address]  [address] [address]    [phone]              Who to contact     If you have questions or need follow up information about today's clinic visit or your schedule please contact [facility] CAN[facility]ER CLINIC directly at [phone].  Normal or non-critical lab and imaging results will be communicated to you by MyChart, letter or phone within 4 business days after the clinic has received the results. If you do not hear from us within 7 days, please contact the clinic through [email or web address] or phone. If you have a critical or abnormal lab result, we will notify you by phone as soon as possible.  Submit refill requests through [employer] or call your pharmacy and they will forward the refill request to us. Please allow 3 business days for your refill to be completed.          Additional Information About Your Visit        [employer] Information     [employer] gives you secure access to your electronic health record. If you see a primary care provider, you can also send messages to your care team and make appointments. If you have questions, please call your primary care clinic.  If you do not have a primary care provider, please call [phone] and they will assist you.        Care EveryWhere ID     This is your Care EveryWhere ID. This could be used by other organizations to access your [place] medical records  [id number]         Blood Pressure from Last 3 Encounters:   06/13/18 124/81   06/06/18 119/82   05/23/18 108/70    Weight from Last 3 Encounters:   06/13/18 70.2 kg (154 lb 12.8 oz)   06/06/18 70.1 kg (154 lb 9.6 oz)   05/23/18 69.9 kg (154 lb)              We Performed the Following     CBC with platelets differential     Comprehensive metabolic panel     Lactate Dehydrogenase     Magnesium     Phosphorus     TSH     Uric acid          Today's Medication Changes          These changes are accurate as of 6/13/18  9:14 AM.  If you have any questions, ask your nurse or doctor.               These medicines have changed or have updated  prescriptions.        Dose/Directions    amLODIPine 5 MG tablet   Commonly known as:  NORVASC   This may have changed:  when to take this   Used for:  Hypertension goal BP (blood pressure) < 140/90        Dose:  5 mg   Take 1 tablet (5 mg) by mouth daily   Quantity:  90 tablet   Refills:  3       codeine 30 MG tablet   This may have changed:  when to take this   Used for:  Hemophilic arthropathy        Dose:  60 mg   Take 2 tablets (60 mg) by mouth every 6 hours as needed for moderate to severe pain (up to 8 tabs in 24 hours)   Quantity:  168 tablet   Refills:  0       lisinopril 40 MG tablet   Commonly known as:  PRINIVIL/ZESTRIL   This may have changed:  when to take this   Used for:  Hypertension goal BP (blood pressure) < 140/90        Dose:  40 mg   Take 1 tablet (40 mg) by mouth daily   Quantity:  90 tablet   Refills:  3                Primary Care Provider Office Phone # Fax #    Katie Ramos [phone]       [address] [place] [address]        Equal Access to Services     ANDREW FLANAGAN AH: Hadii violette owens hadasho Soomaali, waaxda luqadaha, qaybta kaalmada adeegyada, waxay annin haymarj rene . So [facility] [phone].    ATENCIÓN: Si habla español, tiene a hernadez disposición servicios gratuitos de asistencia lingüística. [facility] [phone].    We comply with applicable federal civil rights laws and [place] laws. We do not discriminate on the basis of race, color, national origin, age, disability, sex, sexual orientation, or gender identity.            Thank you!     Thank you for choosing [facility]  for your care. Our goal is always to provide you with excellent care. Hearing back from our patients is one way we can continue to improve our services. Please take a few minutes to complete the written survey that you may receive in the mail after your visit with us. Thank you!             Your Updated Medication List - Protect others around you: Learn  how to safely use, store and throw away your medicines at www.disposemymeds.org.          This list is accurate as of 6/13/18  9:14 AM.  Always use your most recent med list.                   Brand Name Dispense Instructions for use Diagnosis    amLODIPine 5 MG tablet    NORVASC    90 tablet    Take 1 tablet (5 mg) by mouth daily    Hypertension goal BP (blood pressure) < 140/90       codeine 30 MG tablet     168 tablet    Take 2 tablets (60 mg) by mouth every 6 hours as needed for moderate to severe pain (up to 8 tabs in 24 hours)    Hemophilic arthropathy       * dexamethasone 4 MG tablet    DECADRON    24 tablet    Take 1 tablet (4 mg) by mouth 2 times daily (with meals)    Bladder tumor       * dexamethasone 4 MG tablet    DECADRON    18 tablet    Take 2 tablets (8 mg) by mouth daily Take for 3 days, starting the day after cisplatin (Day 2).    Urothelial carcinoma of bladder (H)       diazepam 5 MG tablet    VALIUM    30 tablet    Take up to one tablet by mouth daily as needed for anxiety    Depressive disorder       KOGENATE FS 1000 units Kit     23144 each    Infuse 3000 units every other day    Severe hemophilia A (H), Personal history of malignant neoplasm of bladder       lisinopril 40 MG tablet    PRINIVIL/ZESTRIL    90 tablet    Take 1 tablet (40 mg) by mouth daily    Hypertension goal BP (blood pressure) < 140/90       LORazepam 0.5 MG tablet    ATIVAN    60 tablet    Take 1 tablet (0.5 mg) by mouth every 6 hours as needed for anxiety    Malignant neoplasm of overlapping sites of bladder (H)       mirtazapine 45 MG tablet    REMERON    37 tablet    Take 1 tablet (45 mg) by mouth At Bedtime And may take 1/2 tablet for insomnia up to 3 times a week.    Generalized anxiety disorder       ondansetron 8 MG ODT tab    ZOFRAN-ODT    30 tablet    Take 1 tablet (8 mg) by mouth every 8 hours as needed for nausea    Bladder tumor       * prochlorperazine 10 MG tablet    COMPAZINE    30 tablet    Take 0.5 tablets  (5 mg) by mouth every 6 hours as needed for nausea or vomiting    Bladder tumor       * prochlorperazine 10 MG tablet    COMPAZINE    30 tablet    Take 1 tablet (10 mg) by mouth every 6 hours as needed (Nausea/Vomiting)    Urothelial carcinoma of bladder (H)       tamsulosin 0.4 MG capsule    FLOMAX    45 capsule    Take 1 capsule (0.4 mg) by mouth daily    Incomplete bladder emptying       TYLENOL PO      Take 500 mg by mouth every 4 hours as needed for mild pain or fever        * Notice:  This list has 4 medication(s) that are the same as other medications prescribed for you. Read the directions carefully, and ask your doctor or other care provider to review them with you.

## 2018-06-13 NOTE — PATIENT INSTRUCTIONS
Contact Numbers    [facility] Main Line: [phone]  [facility] Triage and after hours / weekends / holidays:  [phone]      Please call the triage or after hours line if you experience a temperature greater than or equal to 100.5, shaking chills, have uncontrolled nausea, vomiting and/or diarrhea, dizziness, shortness of breath, chest pain, bleeding, unexplained bruising, or if you have any other new/concerning symptoms, questions or concerns.      If you are having any concerning symptoms or wish to speak to a provider before your next infusion visit, please call your care coordinator or triage to notify them so we can adequately serve you.     If you need a refill on a narcotic prescription or other medication, please call before your infusion appointment.           June 2018 Sunday Monday Tuesday Wednesday Thursday Friday Saturday                            1     [facility] D[facility]   10:00 AM   (15 min.)    [facility]       3     4     5     6     [facility] DRAW    9:00 AM   (15 min.)    [facility] DRAW   [facility] Lab Draw     [facility] RETURN    9:15 AM   (50 min.)   Brianna Harris PA-C   [facility] ONC INFUSION 360   10:30 AM   (360 min.)    ONCOLOGY INFUSION   [facility] 7     8     9       10     11     12     13     [facility] DRAW    7:15 AM   (15 min.)    [facility] DRAW   [facility] Lab Draw     [facility] RETURN    7:30 AM   (30 min.)   Magalie Espinal MD   [facility] ONC INFUSION 120    9:00 AM   (120 min.)    ONCOLOGY INFUSION   [facility] 14     15     16       17     18     19     20     21     22     23       24     25     26     27     28     29     30                July 2018 Sunday Monday Tuesday Wednesday Thursday Friday Saturday   1     2     3     4     5     6     7       8     9     PET ONCOLOGY WHOLE BODY   12:45 PM   (45 min.)    UUPET1   [facility] [place] PET CT 10     11     P SPEC INFUSION 120    8:00 AM   (120 min.)   UC SPEC INFUSION   [facility] Special[facility] Procedure     [facility] RETURN   10:45 AM   (30 min.)   Magalie Espinal MD   [facility] 12     [facility] RETURN    8:05 AM   (20 min.)   Bebeto Shea MD   [facility] for Prostate and Urologic Cancers 13     14       15     16     17     18     19     20     21       22     23     24     25     26     27     28       29     30     31                                     Recent Results (from the past 24 hour(s))   CBC with platelets differential    Collection Time: 06/13/18  7:25 AM   Result Value Ref Range    WBC 3.4 (L) 4.0 - 11.0 10e9/L    RBC Count 3.89 (L) 4.4 - 5.9 10e12/L    Hemoglobin 11.0 (L) 13.3 - 17.7 g/dL    Hematocrit 34.4 (L) 40.0 - 53.0 %    MCV 88 78 - 100 fl    MCH 28.3 26.5 - 33.0 pg    MCHC 32.0 31.5 - 36.5 g/dL    RDW 20.3 (H) 10.0 - 15.0 %    Platelet Count 298 150 - 450 10e9/L    Diff Method Automated Method     % Neutrophils 55.8 %    % Lymphocytes 31.9 %    % Monocytes 9.9 %    % Eosinophils 1.2 %    % Basophils 0.6 %    % Immature Granulocytes 0.6 %    Nucleated RBCs 0 0 /100    Absolute Neutrophil 1.9 1.6 - 8.3 10e9/L    Absolute Lymphocytes 1.1 0.8 - 5.3 10e9/L    Absolute Monocytes 0.3 0.0 - 1.3 10e9/L    Absolute Eosinophils 0.0 0.0 - 0.7 10e9/L    Absolute Basophils 0.0 0.0 - 0.2 10e9/L    Abs Immature Granulocytes 0.0 0 - 0.4 10e9/L    Absolute Nucleated RBC 0.0    Comprehensive metabolic panel    Collection Time: 06/13/18  7:25 AM   Result Value Ref Range    Sodium 139 133 - 144 mmol/L    Potassium 4.4 3.4 - 5.3 mmol/L    Chloride 106 94 - 109 mmol/L    Carbon Dioxide 25 20 - 32 mmol/L    Anion Gap 8 3 - 14 mmol/L    Glucose 95 70 - 99 mg/dL    Urea Nitrogen 14 7 - 30 mg/dL    Creatinine 0.88 0.66 - 1.25 mg/dL    GFR Estimate 86 >60 mL/min/1.7m2    GFR Estimate If Black >90 >60 mL/min/1.7m2     Calcium 8.7 8.5 - 10.1 mg/dL    Bilirubin Total 0.3 0.2 - 1.3 mg/dL    Albumin 3.5 3.4 - 5.0 g/dL    Protein Total 7.1 6.8 - 8.8 g/dL    Alkaline Phosphatase 113 40 - 150 U/L    ALT 42 0 - 70 U/L    AST 24 0 - 45 U/L   Magnesium    Collection Time: 06/13/18  7:25 AM   Result Value Ref Range    Magnesium 1.8 1.6 - 2.3 mg/dL   Phosphorus    Collection Time: 06/13/18  7:25 AM   Result Value Ref Range    Phosphorus 2.9 2.5 - 4.5 mg/dL   Lactate Dehydrogenase    Collection Time: 06/13/18  7:25 AM   Result Value Ref Range    Lactate Dehydrogenase 108 85 - 227 U/L   Uric acid    Collection Time: 06/13/18  7:25 AM   Result Value Ref Range    Uric Acid 5.4 3.5 - 7.2 mg/dL   TSH    Collection Time: 06/13/18  7:25 AM   Result Value Ref Range    TSH 2.08 0.40 - 4.00 mU/L

## 2018-06-13 NOTE — PROGRESS NOTES
Phase II Trial of Neoadjuvant  Nivolumab with Cisplatin and Gemcitabine in Muscle-Invasive Bladder Cancer (MIBC) Patients undergoing Radical Cystectomy   [id number]  [id number]  PI and treating physician: Dr. Magalie Espinal    [id number]: Study Visit Note   Subject name: Orlin Alcantar     Visit: Cycle 4 Day 8    Did the study visit occur within the appropriate window allowed by the protocol? yes    If no, why? n/a    Since the last study visit, He has been doing well. Reviewed AEs, conmeds, vitals, and labs. WBC decreased, gr 1. Anemia, gr 1, baseline. Patient is okay to dose today. Patient does not want to check labs next week -- platelets have been above 100 10e9/L through neoadjuvant treatment, Dr. Marley updated. Report given to infusion RN.    I have personally interviewed Orlin Alcantar and reviewed his medical record for adverse events and concomitant medications and these have been recorded on the corresponding logs in Orlin Alcantars research file.     Orlin Alcantar was given the opportunity to ask any trial related questions. Please see provider progress note for physical exam and other clinical information including ECOG.    Patient will return July 9-11 for follow-up prior to surgery.    Eric Srivastava RN  Clinical Research Coordinator  Clinical Trials Office  [facility]  Desk Phone: [phone]  Pager: [phone]

## 2018-06-13 NOTE — MR AVS SNAPSHOT
After Visit Summary   6/13/2018    Orlin Alcantar    MRN: [id number]           Patient Information     Date Of Birth          1950        Visit Information        Provider Department      6/13/2018 7:45 AM Magalie Espinal MD [facility]        Today's Diagnoses     Urothelial carcinoma of bladder (H)    -  1      Care Instructions    Colorectal Cancer Screening: During our visit today, we discussed that it is recommended you receive colorectal cancer screening. Please call or make an appointment with your primary care provider to discuss this. You may also call the [facility] scheduling line ([phone]) to set up a colonoscopy appointment.            Follow-ups after your visit        Your next 10 appointments already scheduled     Jun 13, 2018  9:00 AM CDT   Infusion 120 with UC ONCOLOGY INFUSION, UC 25 ATC   [facility] ([facility])    [address]  [address]  [place] [address] [phone]            Jul 09, 2018 12:45 PM CDT   PET ONCOLOGY WHOLE BODY with UUPET1   [facility] [place] PET CT ([facility] [place])    [address] [address] [phone]           Tell your doctor:   If there is any chance you may be pregnant or if you are breastfeeding.   If you have problems lying in small spaces (claustrophobia). If you do, your doctor may give you medicine to help you relax. If you have diabetes:   Have your exam early in the morning. Your blood glucose will go up as the day goes by.   Your glucose level must be 180 or less at the start of the exam. Please take any medicines you need to ensure this blood glucose level.   If you are taking insulin in the morning take with breakfast by 6 am and schedule procedure between 12 and 2:15 pm.   If you are taking insulin at night take nightly dose, fast overnight, schedule procedure before 10 am.   If you take  insulin both morning and night take morning dose by 6am and schedule procedure between 12 and 2:15 pm.   24 hours before your scan: Don t do any heavy exercise. (No jogging, aerobics or other workouts.) Exercise will make your pictures less accurate.  At least 7 hours before your scan, or the evening before if you have an early appointment: Eat a low carb, high protein meal (Lean meats, seafood, beans, soy, low-fat dairy, eggs, nuts & seeds). 6 hours before your scan:   Stop all food and liquids (except water).   Do not chew gum or suck on mints.   If you need to take medicine with food, you may take it with a few crackers.  Please call your Imaging Department at your exam site with any questions.            Jul 11, 2018  8:00 AM CDT   Infusion 120 with UC SPEC INFUSION, UC 44 ATC   [facility] [place] Specialty and Procedure ([facility])    [address]  [address]  [facility] [id number] [phone]            Jul 11, 2018 11:00 AM CDT   (Arrive by 10:45 AM)   Return Visit with Magalie Espinal MD   [facility] ([facility])    [address]  [address]  [facility] [address] [phone]            Jul 12, 2018  8:20 AM CDT   (Arrive by 8:05 AM)   Return Visit with Bebeto Shea MD   [facility]rology and Inst for Prostate and Urologic Cancers ([facility])    [address]  [address]  [facility] [id number] [phone]            Sep 11, 2018 10:15 AM CDT   Adult Med Follow UP with JELLY Chapa CNS   [facility] ([facility])    [address]  [address] [id number] [phone]              Who to contact     If you have questions or need follow up information about today's clinic visit or your schedule please contact [facility] directly at [phone].  Normal or  non-critical lab and imaging results will be communicated to you by [employer], letter or phone within 4 business days after the clinic has received the results. If you do not hear from us within 7 days, please contact the clinic through [employer] or phone. If you have a critical or abnormal lab result, we will notify you by phone as soon as possible.  Submit refill requests through [employer] or call your pharmacy and they will forward the refill request to us. Please allow 3 business days for your refill to be completed.          Additional Information About Your Visit        [employer] Information     [employer] gives you secure access to your electronic health record. If you see a primary care provider, you can also send messages to your care team and make appointments. If you have questions, please call your primary care clinic.  If you do not have a primary care provider, please call [phone] and they will assist you.        Care EveryWhere ID     This is your Care EveryWhere ID. This could be used by other organizations to access your [facility] medical records  [id number]        Your Vitals Were     Pulse Temperature Respirations Height Pulse Oximetry BMI (Body Mass Index)    95 97.8  F (36.6  C) 16 1.829 m (6') 97% 20.99 kg/m2       Blood Pressure from Last 3 Encounters:   06/13/18 124/81   06/06/18 119/82   05/23/18 108/70    Weight from Last 3 Encounters:   06/13/18 70.2 kg (154 lb 12.8 oz)   06/06/18 70.1 kg (154 lb 9.6 oz)   05/23/18 69.9 kg (154 lb)              Today, you had the following     No orders found for display         Today's Medication Changes          These changes are accurate as of 6/13/18  8:00 AM.  If you have any questions, ask your nurse or doctor.               These medicines have changed or have updated prescriptions.        Dose/Directions    amLODIPine 5 MG tablet   Commonly known as:  NORVASC   This may have changed:  when to take this   Used for:  Hypertension goal BP (blood pressure)  < 140/90        Dose:  5 mg   Take 1 tablet (5 mg) by mouth daily   Quantity:  90 tablet   Refills:  3       codeine 30 MG tablet   This may have changed:  when to take this   Used for:  Hemophilic arthropathy        Dose:  60 mg   Take 2 tablets (60 mg) by mouth every 6 hours as needed for moderate to severe pain (up to 8 tabs in 24 hours)   Quantity:  168 tablet   Refills:  0       lisinopril 40 MG tablet   Commonly known as:  PRINIVIL/ZESTRIL   This may have changed:  when to take this   Used for:  Hypertension goal BP (blood pressure) < 140/90        Dose:  40 mg   Take 1 tablet (40 mg) by mouth daily   Quantity:  90 tablet   Refills:  3                Primary Care Provider Office Phone # Fax #    Katie Ramos [phone] [address]  [facility] [address]        Equal Access to Services     [facility] : Suleman gar Sofaiza, waaxondina eastman, slim jessicaalstephanie huddleston, lloyd rene . So [facility] [phone].    ATENCIÓN: Si habla español, tiene a hernadez disposición servicios gratuitos de asistencia lingüística. Llame al [phone].    We comply with applicable federal civil rights laws and [place] laws. We do not discriminate on the basis of race, color, national origin, age, disability, sex, sexual orientation, or gender identity.            Thank you!     Thank you for choosing [facility]  for your care. Our goal is always to provide you with excellent care. Hearing back from our patients is one way we can continue to improve our services. Please take a few minutes to complete the written survey that you may receive in the mail after your visit with us. Thank you!             Your Updated Medication List - Protect others around you: Learn how to safely use, store and throw away your medicines at www.disposemymeds.org.          This list is accurate as of 6/13/18  8:00 AM.  Always use your most recent med list.                    Brand Name Dispense Instructions for use Diagnosis    amLODIPine 5 MG tablet    NORVASC    90 tablet    Take 1 tablet (5 mg) by mouth daily    Hypertension goal BP (blood pressure) < 140/90       codeine 30 MG tablet     168 tablet    Take 2 tablets (60 mg) by mouth every 6 hours as needed for moderate to severe pain (up to 8 tabs in 24 hours)    Hemophilic arthropathy       * dexamethasone 4 MG tablet    DECADRON    24 tablet    Take 1 tablet (4 mg) by mouth 2 times daily (with meals)    Bladder tumor       * dexamethasone 4 MG tablet    DECADRON    18 tablet    Take 2 tablets (8 mg) by mouth daily Take for 3 days, starting the day after cisplatin (Day 2).    Urothelial carcinoma of bladder (H)       diazepam 5 MG tablet    VALIUM    30 tablet    Take up to one tablet by mouth daily as needed for anxiety    Depressive disorder       KOGENATE FS 1000 units Kit     86752 each    Infuse 3000 units every other day    Severe hemophilia A (H), Personal history of malignant neoplasm of bladder       lisinopril 40 MG tablet    PRINIVIL/ZESTRIL    90 tablet    Take 1 tablet (40 mg) by mouth daily    Hypertension goal BP (blood pressure) < 140/90       LORazepam 0.5 MG tablet    ATIVAN    60 tablet    Take 1 tablet (0.5 mg) by mouth every 6 hours as needed for anxiety    Malignant neoplasm of overlapping sites of bladder (H)       mirtazapine 45 MG tablet    REMERON    37 tablet    Take 1 tablet (45 mg) by mouth At Bedtime And may take 1/2 tablet for insomnia up to 3 times a week.    Generalized anxiety disorder       ondansetron 8 MG ODT tab    ZOFRAN-ODT    30 tablet    Take 1 tablet (8 mg) by mouth every 8 hours as needed for nausea    Bladder tumor       * prochlorperazine 10 MG tablet    COMPAZINE    30 tablet    Take 0.5 tablets (5 mg) by mouth every 6 hours as needed for nausea or vomiting    Bladder tumor       * prochlorperazine 10 MG tablet    COMPAZINE    30 tablet    Take 1 tablet (10 mg) by mouth every 6  hours as needed (Nausea/Vomiting)    Urothelial carcinoma of bladder (H)       tamsulosin 0.4 MG capsule    FLOMAX    45 capsule    Take 1 capsule (0.4 mg) by mouth daily    Incomplete bladder emptying       TYLENOL PO      Take 500 mg by mouth every 4 hours as needed for mild pain or fever        * Notice:  This list has 4 medication(s) that are the same as other medications prescribed for you. Read the directions carefully, and ask your doctor or other care provider to review them with you.

## 2018-06-13 NOTE — LETTER
6/13/2018       RE: Orlin Alcantar  [address]  [place] [address]     Dear Colleague,    Thank you for referring your patient, Orlin Alcantar, to the [facility]. Please see a copy of my visit note below.    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   3/26/18: Consented for the Nivolumab-Gemcitabine-Cisplatin study [id number]    3/28/18: Screening visit for study.   4/4/18; c1d1 gemcitabine  4/11/18: c1d8 gemcitabine-nivolumab  4/25/18: C2D1 Gemcitabine-Cisplatin  5/2/18: C2D8 Gemcitabine-Nivolumab  5/16/18: C3D1 Gemcitabine-Cisplatin  5/23/18: C3D8 Gemcitabine-Nivolumab  6/6/18: C4d1 Gemcitabine-Cisplatin  6/13/18: C4D8 Gemcitabine    HPI:    68 year old male with PMH of hemophilia, chronic hepatis C who was seen by DR. Burns for evaluation of neoadjuvant therapy with Gemcitabine Cisplatin and Nivolumab.  I will be taking over his care while on study.     Patient has a history of hemophilia and has undergone several surgeries in right hip replacement, knee replacement. Hemophilia managed by Dr. Marley.   He reports recurrent hematuria for a year, initially attributed to kidney stones, cystoscopy in Feb 2018 revealed:  Bladder tumor, transurethral resection:   - Invasive high grade urothelial carcinoma   - Extent of invasion: Muscularis propria, multifocal   - Lymphovascular invasion: Present     PET-CT scan - which has revealed muscle invasive bladder right lateral wall of bladder and right  obturator lymphnode, 1.1 cm short axis, suspicious for metastatic disease.  He has been treated for HCV in the 1990s and has not had any complications. His LFTs have remained normal.   His hemophilia is very well controlled on current regimen of factor replacement.   He does not have any autoimmune disease and is not on immunesuppressants or steroids.   His diabetes is diet controlled and he has not required any  medications.     Interval History:  Denies any immune related AEs, nausea, vomiting, hearing loss or neuropathy.  No bleeding issues.   Tolerating treatment very well.       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      Bladder tumor      Depression      DM II (diabetes mellitus, type II), controlled (H)     diet controlled     Hemophilia (H)     Type A     Hepatitis C, chronic (H)     treated wt interferon/ribavirin     HTN (hypertension)           CURRENT OUTPATIENT MEDICATIONS     Current Outpatient Prescriptions   Medication Sig     Acetaminophen (TYLENOL PO) Take 500 mg by mouth every 4 hours as needed for mild pain or fever     amLODIPine (NORVASC) 5 MG tablet Take 1 tablet (5 mg) by mouth daily (Patient taking differently: Take 5 mg by mouth every morning )     Antihemophilic Factor, Recomb, (KOGENATE FS) 1000 units KIT Infuse 3000 units every other day     codeine 30 MG tablet Take 2 tablets (60 mg) by mouth every 6 hours as needed for moderate to severe pain (up to 8 tabs in 24 hours) (Patient taking differently: Take 60 mg by mouth as needed for moderate to severe pain (up to 8 tabs in 24 hours) )     dexamethasone (DECADRON) 4 MG tablet Take 1 tablet (4 mg) by mouth 2 times daily (with meals)     lisinopril (PRINIVIL/ZESTRIL) 40 MG tablet Take 1 tablet (40 mg) by mouth daily (Patient taking differently: Take 40 mg by mouth every morning )     LORazepam (ATIVAN) 0.5 MG tablet Take 1 tablet (0.5 mg) by mouth every 6 hours as needed for anxiety     mirtazapine (REMERON) 45 MG tablet Take 1 tablet (45 mg) by mouth At Bedtime And may take 1/2 tablet for insomnia up to 3 times a week.     ondansetron (ZOFRAN-ODT) 8 MG ODT tab Take 1 tablet (8 mg) by mouth every 8 hours as needed for nausea     dexamethasone (DECADRON) 4 MG tablet Take 2 tablets (8 mg) by mouth daily Take for 3 days, starting the day after cisplatin (Day 2). (Patient not taking:  Reported on 2018)     diazepam (VALIUM) 5 MG tablet Take up to one tablet by mouth daily as needed for anxiety (Patient not taking: Reported on 2018)     prochlorperazine (COMPAZINE) 10 MG tablet Take 1 tablet (10 mg) by mouth every 6 hours as needed (Nausea/Vomiting) (Patient not taking: Reported on 2018)     prochlorperazine (COMPAZINE) 10 MG tablet Take 0.5 tablets (5 mg) by mouth every 6 hours as needed for nausea or vomiting (Patient not taking: Reported on 2018)     tamsulosin (FLOMAX) 0.4 MG capsule Take 1 capsule (0.4 mg) by mouth daily (Patient not taking: Reported on 2018)     No current facility-administered medications for this visit.      CBC RESULTS:   Recent Labs   Lab Test  18   0725   WBC  3.4*   RBC  3.89*   HGB  11.0*   HCT  34.4*   MCV  88   MCH  28.3   MCHC  32.0   RDW  20.3*   PLT  298     Recent Labs   Lab Test  18   0904  18   0754   NA  138  140   POTASSIUM  4.2  4.4   CHLORIDE  105  106   CO2  24  26   ANIONGAP  9  8   GLC  106*  108*   BUN  14  13   CR  0.88  0.90   MARLA  9.5  9.6     Ma.1    ASSESSMENT/PLAN:     68 year old male with T2N1(solitary node)  urothelial carcinoma,currently enrolled on the clinical trial of Nivolumab with Gemcitabine and Cisplatin for MIBC.  He is planned for radical cystectomy by Dr. Shea within 6-8 weeks after completion of neoadjuvant therapy.     Plan:   Proceed with C4D8 gemcitabine and nivolumab today with neulasta.  Patient understands that he would need cystectomy after completion of neoadjuvant treatment and this is the plan per Dr. Shea as well according to SOC recommendations for MIBC.  The current protocol mandates a radical cystectomy as SOC.  Hemophilia; management of factor replacement per Dr. Marley.No issues on current treatment. Patient does not want labs to be drawn next week.   RTC per study protocol treatment schedule.    He will be seen by Dr. Shea on  to discuss surgery.      Magalie  MD Teddy  Hematology Oncology and Transplantation  [facility]

## 2018-06-13 NOTE — PROGRESS NOTES
Infusion Nursing Note:  Orlin Alcantar presents today for cycle 4, day 8 gemzar, study nivolumab (IDS 5182)   Patient seen by provider today: Yes: Dr Espinal   present during visit today: Not Applicable.    Note: N/A.    Intravenous Access:  Peripheral IV placed by vascular access with ultrasound    Treatment Conditions:  Lab Results   Component Value Date    HGB 11.0 06/13/2018     Lab Results   Component Value Date    WBC 3.4 06/13/2018      Lab Results   Component Value Date    ANEU 1.9 06/13/2018     Lab Results   Component Value Date     06/13/2018      Lab Results   Component Value Date     06/13/2018                   Lab Results   Component Value Date    POTASSIUM 4.4 06/13/2018           Lab Results   Component Value Date    MAG 1.8 06/13/2018            Lab Results   Component Value Date    CR 0.88 06/13/2018                   Lab Results   Component Value Date    MARLA 8.7 06/13/2018                Lab Results   Component Value Date    BILITOTAL 0.3 06/13/2018           Lab Results   Component Value Date    ALBUMIN 3.5 06/13/2018                    Lab Results   Component Value Date    ALT 42 06/13/2018           Lab Results   Component Value Date    AST 24 06/13/2018       Results reviewed, labs MET treatment parameters, ok to proceed with treatment.      Post Infusion Assessment:  Patient tolerated infusion without incident.  Patient observed for 45 minutes post gemzar per study order  Blood return noted pre and post infusion.  Site patent and intact, free from redness, edema or discomfort.  No evidence of extravasations.  Access discontinued per protocol.    Neulasta Onpro On-Body injector applied to right arm at 1100 with light facing down.  Writer discussed Neulasta injection would start tomorrow at 1400, approximately 27 hours after application applied today.  Written and Verbal instruction reviewed with patient.  Pt instructed when the dose delivery starts, it will take about 45  minutes to complete.  Pt aware Neulasta Onpro On-Body should have green flashing light and to call triage or on-call MD if injector flashes red or appears to be leaking. Pt aware to keep Onpro On-Body Neulasta 4 inches away from electrical equipment and to avoid showering 4 hours prior to injection.         Discharge Plan:   Patient declined prescription refills.  Discharge instructions reviewed with: Patient.  Patient and/or family verbalized understanding of discharge instructions and all questions answered.  Copy of AVS reviewed with patient and/or family.  Patient will return 7/11 for next appointment.  Patient discharged in stable condition accompanied by: self.  Departure Mode: Ambulatory.  Face to Face time: 0.    Analilia Michel RN

## 2018-06-13 NOTE — PROGRESS NOTES
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   3/26/18: Consented for the Nivolumab-Gemcitabine-Cisplatin study [id number]    3/28/18: Screening visit for study.   4/4/18; c1d1 gemcitabine  4/11/18: c1d8 gemcitabine-nivolumab  4/25/18: C2D1 Gemcitabine-Cisplatin  5/2/18: C2D8 Gemcitabine-Nivolumab  5/16/18: C3D1 Gemcitabine-Cisplatin  5/23/18: C3D8 Gemcitabine-Nivolumab  6/6/18: C4d1 Gemcitabine-Cisplatin  6/13/18: C4D8 Gemcitabine    HPI:    68 year old male with PMH of hemophilia, chronic hepatis C who was seen by DR. Burns for evaluation of neoadjuvant therapy with Gemcitabine Cisplatin and Nivolumab.  I will be taking over his care while on study.     Patient has a history of hemophilia and has undergone several surgeries in right hip replacement, knee replacement. Hemophilia managed by Dr. Marley.   He reports recurrent hematuria for a year, initially attributed to kidney stones, cystoscopy in Feb 2018 revealed:  Bladder tumor, transurethral resection:   - Invasive high grade urothelial carcinoma   - Extent of invasion: Muscularis propria, multifocal   - Lymphovascular invasion: Present     PET-CT scan - which has revealed muscle invasive bladder right lateral wall of bladder and right  obturator lymphnode, 1.1 cm short axis, suspicious for metastatic disease.  He has been treated for HCV in the 1990s and has not had any complications. His LFTs have remained normal.   His hemophilia is very well controlled on current regimen of factor replacement.   He does not have any autoimmune disease and is not on immunesuppressants or steroids.   His diabetes is diet controlled and he has not required any medications.     Interval History:  Denies any immune related AEs, nausea, vomiting, hearing loss or neuropathy.  No bleeding issues.   Tolerating treatment very well.       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      Bladder tumor      Depression      DM II (diabetes mellitus, type II), controlled (H)     diet controlled     Hemophilia (H)     Type A     Hepatitis C, chronic (H)     treated [facility] interferon/ribavirin     HTN (hypertension)           CURRENT OUTPATIENT MEDICATIONS     Current Outpatient Prescriptions   Medication Sig     Acetaminophen (TYLENOL PO) Take 500 mg by mouth every 4 hours as needed for mild pain or fever     amLODIPine (NORVASC) 5 MG tablet Take 1 tablet (5 mg) by mouth daily (Patient taking differently: Take 5 mg by mouth every morning )     Antihemophilic Factor, Recomb, (KOGENATE FS) 1000 units KIT Infuse 3000 units every other day     codeine 30 MG tablet Take 2 tablets (60 mg) by mouth every 6 hours as needed for moderate to severe pain (up to 8 tabs in 24 hours) (Patient taking differently: Take 60 mg by mouth as needed for moderate to severe pain (up to 8 tabs in 24 hours) )     dexamethasone (DECADRON) 4 MG tablet Take 1 tablet (4 mg) by mouth 2 times daily (with meals)     lisinopril (PRINIVIL/ZESTRIL) 40 MG tablet Take 1 tablet (40 mg) by mouth daily (Patient taking differently: Take 40 mg by mouth every morning )     LORazepam (ATIVAN) 0.5 MG tablet Take 1 tablet (0.5 mg) by mouth every 6 hours as needed for anxiety     mirtazapine (REMERON) 45 MG tablet Take 1 tablet (45 mg) by mouth At Bedtime And may take 1/2 tablet for insomnia up to 3 times a week.     ondansetron (ZOFRAN-ODT) 8 MG ODT tab Take 1 tablet (8 mg) by mouth every 8 hours as needed for nausea     dexamethasone (DECADRON) 4 MG tablet Take 2 tablets (8 mg) by mouth daily Take for 3 days, starting the day after cisplatin (Day 2). (Patient not taking: Reported on 6/6/2018)     diazepam (VALIUM) 5 MG tablet Take up to one tablet by mouth daily as needed for anxiety (Patient not taking: Reported on 6/13/2018)     prochlorperazine (COMPAZINE) 10 MG tablet Take 1 tablet (10 mg) by mouth every  6 hours as needed (Nausea/Vomiting) (Patient not taking: Reported on 2018)     prochlorperazine (COMPAZINE) 10 MG tablet Take 0.5 tablets (5 mg) by mouth every 6 hours as needed for nausea or vomiting (Patient not taking: Reported on 2018)     tamsulosin (FLOMAX) 0.4 MG capsule Take 1 capsule (0.4 mg) by mouth daily (Patient not taking: Reported on 2018)     No current facility-administered medications for this visit.      CBC RESULTS:   Recent Labs   Lab Test  18   0725   WBC  3.4*   RBC  3.89*   HGB  11.0*   HCT  34.4*   MCV  88   MCH  28.3   MCHC  32.0   RDW  20.3*   PLT  298     Recent Labs   Lab Test  18   0904  18   0754   NA  138  140   POTASSIUM  4.2  4.4   CHLORIDE  105  106   CO2  24  26   ANIONGAP  9  8   GLC  106*  108*   BUN  14  13   CR  0.88  0.90   MARLA  9.5  9.6     Ma.1    ASSESSMENT/PLAN:     68 year old male with T2N1(solitary node)  urothelial carcinoma,currently enrolled on the clinical trial of Nivolumab with Gemcitabine and Cisplatin for MIBC.  He is planned for radical cystectomy by Dr. Shea within 6-8 weeks after completion of neoadjuvant therapy.     Plan:   Proceed with C4D8 gemcitabine and nivolumab today with neulasta.  Patient understands that he would need cystectomy after completion of neoadjuvant treatment and this is the plan per Dr. Shea as well according to SOC recommendations for MIBC.  The current protocol mandates a radical cystectomy as SOC.  Hemophilia; management of factor replacement per Dr. Marley.No issues on current treatment. Patient does not want labs to be drawn next week.   RTC per study protocol treatment schedule.    He will be seen by Dr. Shea on  to discuss surgery.      Magalie Espinal MD  Hematology Oncology and Transplantation  [facility]

## 2018-06-13 NOTE — NURSING NOTE
Oncology Rooming Note    June 13, 2018 7:37 AM   Orlin Alcantar is a 68 year old male who presents for:    Chief Complaint   Patient presents with     Labs Only     venipuncture, vitals checked     Oncology Clinic Visit     Return Bladder Ca     Initial Vitals: /81  Pulse 95  Temp 97.8  F (36.6  C)  Resp 16  Ht 1.829 m (6')  Wt 70.2 kg (154 lb 12.8 oz)  SpO2 97%  BMI 20.99 kg/m2 Estimated body mass index is 20.99 kg/(m^2) as calculated from the following:    Height as of this encounter: 1.829 m (6').    Weight as of this encounter: 70.2 kg (154 lb 12.8 oz). Body surface area is 1.89 meters squared.  No Pain (0) Comment: Data Unavailable   No LMP for male patient.  Allergies reviewed: Yes  Medications reviewed: Yes    Medications: Medication refills not needed today.  Pharmacy name entered into [employer]:    [facility] - [place], [place] - [address] [place] [address] PHARMACY [place], [place] - [facility] [address]  [facility] [place], [place] - [address]    Clinical concerns: no new concerns; colonoscopy due, see AVS.     6 minutes for nursing intake (face to face time)     Louann Palacios [facility]

## 2018-06-18 DIAGNOSIS — Z85.51 PERSONAL HISTORY OF MALIGNANT NEOPLASM OF BLADDER: ICD-10-CM

## 2018-06-18 DIAGNOSIS — D66 SEVERE HEMOPHILIA A (H): ICD-10-CM

## 2018-06-18 RX ORDER — ANTIHEMOPHILIC FACTOR (RECOMBINANT) 1000 (+/-)
KIT INTRAVENOUS
Qty: 15000 EACH | Refills: 1 | Status: SHIPPED | OUTPATIENT
Start: 2018-06-18 | End: 2018-06-29

## 2018-06-29 DIAGNOSIS — D66 SEVERE HEMOPHILIA A (H): ICD-10-CM

## 2018-06-29 DIAGNOSIS — Z85.51 PERSONAL HISTORY OF MALIGNANT NEOPLASM OF BLADDER: ICD-10-CM

## 2018-06-29 RX ORDER — ANTIHEMOPHILIC FACTOR (RECOMBINANT) 1000 (+/-)
KIT INTRAVENOUS
Qty: 15000 EACH | Refills: 1 | Status: SHIPPED | OUTPATIENT
Start: 2018-06-29 | End: 2018-07-13

## 2018-07-09 ENCOUNTER — RADIANT APPOINTMENT (OUTPATIENT)
Dept: PET IMAGING | Facility: CLINIC | Age: 68
End: 2018-07-09
Attending: INTERNAL MEDICINE
Payer: MEDICARE

## 2018-07-09 ENCOUNTER — RADIANT APPOINTMENT (OUTPATIENT)
Dept: GENERAL RADIOLOGY | Facility: CLINIC | Age: 68
End: 2018-07-09
Attending: INTERNAL MEDICINE
Payer: MEDICARE

## 2018-07-09 DIAGNOSIS — C67.8 MALIGNANT NEOPLASM OF OVERLAPPING SITES OF BLADDER (H): ICD-10-CM

## 2018-07-09 DIAGNOSIS — C67.9 BLADDER CANCER (H): ICD-10-CM

## 2018-07-09 LAB — GLUCOSE SERPL-MCNC: 95 MG/DL (ref 70–99)

## 2018-07-09 RX ORDER — FUROSEMIDE 10 MG/ML
40 INJECTION INTRAMUSCULAR; INTRAVENOUS ONCE
Status: COMPLETED | OUTPATIENT
Start: 2018-07-09 | End: 2018-07-09

## 2018-07-09 RX ORDER — ETHYL CHLORIDE 100 %
AEROSOL, SPRAY (ML) TOPICAL ONCE
Status: SHIPPED | OUTPATIENT
Start: 2018-07-09

## 2018-07-09 RX ADMIN — FUROSEMIDE 40 MG: 10 INJECTION INTRAMUSCULAR; INTRAVENOUS at 13:00

## 2018-07-09 NOTE — DISCHARGE INSTRUCTIONS

## 2018-07-10 RX ORDER — COSYNTROPIN 0.25 MG/ML
250 INJECTION, POWDER, FOR SOLUTION INTRAMUSCULAR; INTRAVENOUS ONCE
Status: CANCELLED | OUTPATIENT
Start: 2018-07-11 | End: 2018-07-13

## 2018-07-11 ENCOUNTER — ONCOLOGY VISIT (OUTPATIENT)
Dept: ONCOLOGY | Facility: CLINIC | Age: 68
End: 2018-07-11
Attending: INTERNAL MEDICINE
Payer: MEDICARE

## 2018-07-11 ENCOUNTER — INFUSION THERAPY VISIT (OUTPATIENT)
Dept: INFUSION THERAPY | Facility: CLINIC | Age: 68
End: 2018-07-11
Attending: INTERNAL MEDICINE
Payer: MEDICARE

## 2018-07-11 ENCOUNTER — RESEARCH ENCOUNTER (OUTPATIENT)
Dept: ONCOLOGY | Facility: CLINIC | Age: 68
End: 2018-07-11

## 2018-07-11 VITALS
OXYGEN SATURATION: 100 % | SYSTOLIC BLOOD PRESSURE: 109 MMHG | DIASTOLIC BLOOD PRESSURE: 77 MMHG | HEIGHT: 72 IN | BODY MASS INDEX: 20.59 KG/M2 | RESPIRATION RATE: 18 BRPM | HEART RATE: 88 BPM | TEMPERATURE: 97.9 F | WEIGHT: 152 LBS

## 2018-07-11 VITALS
SYSTOLIC BLOOD PRESSURE: 109 MMHG | OXYGEN SATURATION: 95 % | RESPIRATION RATE: 16 BRPM | HEART RATE: 90 BPM | TEMPERATURE: 97.2 F | DIASTOLIC BLOOD PRESSURE: 77 MMHG

## 2018-07-11 DIAGNOSIS — C67.9 UROTHELIAL CARCINOMA OF BLADDER (H): Primary | ICD-10-CM

## 2018-07-11 LAB
ALBUMIN SERPL-MCNC: 3.7 G/DL (ref 3.4–5)
ALP SERPL-CCNC: 99 U/L (ref 40–150)
ALT SERPL W P-5'-P-CCNC: 15 U/L (ref 0–70)
ANION GAP SERPL CALCULATED.3IONS-SCNC: 8 MMOL/L (ref 3–14)
AST SERPL W P-5'-P-CCNC: 16 U/L (ref 0–45)
BASOPHILS # BLD AUTO: 0.1 10E9/L (ref 0–0.2)
BASOPHILS NFR BLD AUTO: 1.2 %
BILIRUB SERPL-MCNC: 0.4 MG/DL (ref 0.2–1.3)
BUN SERPL-MCNC: 18 MG/DL (ref 7–30)
CALCIUM SERPL-MCNC: 9.3 MG/DL (ref 8.5–10.1)
CHLORIDE SERPL-SCNC: 106 MMOL/L (ref 94–109)
CO2 SERPL-SCNC: 25 MMOL/L (ref 20–32)
CORTICOSTER 1H P 250 UG ACTH SERPL-SCNC: 28.1 UG/DL
CORTICOSTER 30M P 250 UG ACTH SERPL-SCNC: 23.2 UG/DL
CORTICOSTER SERPL-MCNC: 15 UG/DL (ref 4–22)
CREAT SERPL-MCNC: 0.88 MG/DL (ref 0.66–1.25)
DIFFERENTIAL METHOD BLD: ABNORMAL
EOSINOPHIL # BLD AUTO: 0.1 10E9/L (ref 0–0.7)
EOSINOPHIL NFR BLD AUTO: 2 %
ERYTHROCYTE [DISTWIDTH] IN BLOOD BY AUTOMATED COUNT: 19.2 % (ref 10–15)
GFR SERPL CREATININE-BSD FRML MDRD: 86 ML/MIN/1.7M2
GLUCOSE SERPL-MCNC: 98 MG/DL (ref 70–99)
HCT VFR BLD AUTO: 32.8 % (ref 40–53)
HGB BLD-MCNC: 10.7 G/DL (ref 13.3–17.7)
IMM GRANULOCYTES # BLD: 0 10E9/L (ref 0–0.4)
IMM GRANULOCYTES NFR BLD: 0.3 %
LYMPHOCYTES # BLD AUTO: 0.9 10E9/L (ref 0.8–5.3)
LYMPHOCYTES NFR BLD AUTO: 13 %
MCH RBC QN AUTO: 29.2 PG (ref 26.5–33)
MCHC RBC AUTO-ENTMCNC: 32.6 G/DL (ref 31.5–36.5)
MCV RBC AUTO: 90 FL (ref 78–100)
MONOCYTES # BLD AUTO: 0.7 10E9/L (ref 0–1.3)
MONOCYTES NFR BLD AUTO: 11.2 %
NEUTROPHILS # BLD AUTO: 4.7 10E9/L (ref 1.6–8.3)
NEUTROPHILS NFR BLD AUTO: 72.3 %
NRBC # BLD AUTO: 0 10*3/UL
NRBC BLD AUTO-RTO: 0 /100
PLATELET # BLD AUTO: 298 10E9/L (ref 150–450)
POTASSIUM SERPL-SCNC: 3.8 MMOL/L (ref 3.4–5.3)
PROT SERPL-MCNC: 7.4 G/DL (ref 6.8–8.8)
RBC # BLD AUTO: 3.66 10E12/L (ref 4.4–5.9)
SODIUM SERPL-SCNC: 139 MMOL/L (ref 133–144)
WBC # BLD AUTO: 6.5 10E9/L (ref 4–11)

## 2018-07-11 PROCEDURE — 40000141 ZZH STATISTIC PERIPHERAL IV START W/O US GUIDANCE: Mod: ZF

## 2018-07-11 PROCEDURE — 80053 COMPREHEN METABOLIC PANEL: CPT | Performed by: INTERNAL MEDICINE

## 2018-07-11 PROCEDURE — 85025 COMPLETE CBC W/AUTO DIFF WBC: CPT | Performed by: INTERNAL MEDICINE

## 2018-07-11 PROCEDURE — 25000128 H RX IP 250 OP 636: Mod: ZF | Performed by: INTERNAL MEDICINE

## 2018-07-11 PROCEDURE — 99215 OFFICE O/P EST HI 40 MIN: CPT | Mod: ZP | Performed by: INTERNAL MEDICINE

## 2018-07-11 PROCEDURE — 84244 ASSAY OF RENIN: CPT | Performed by: INTERNAL MEDICINE

## 2018-07-11 PROCEDURE — 82024 ASSAY OF ACTH: CPT | Performed by: INTERNAL MEDICINE

## 2018-07-11 PROCEDURE — G0463 HOSPITAL OUTPT CLINIC VISIT: HCPCS | Mod: ZF

## 2018-07-11 PROCEDURE — 82533 TOTAL CORTISOL: CPT | Mod: 91 | Performed by: INTERNAL MEDICINE

## 2018-07-11 RX ORDER — COSYNTROPIN 0.25 MG/ML
250 INJECTION, POWDER, FOR SOLUTION INTRAMUSCULAR; INTRAVENOUS ONCE
Status: COMPLETED | OUTPATIENT
Start: 2018-07-11 | End: 2018-07-11

## 2018-07-11 RX ADMIN — COSYNTROPIN 250 MCG: 0.25 INJECTION, POWDER, LYOPHILIZED, FOR SOLUTION INTRAMUSCULAR; INTRAVENOUS at 08:42

## 2018-07-11 ASSESSMENT — PAIN SCALES - GENERAL: PAINLEVEL: NO PAIN (0)

## 2018-07-11 NOTE — PATIENT INSTRUCTIONS
Preventive Care:     Colorectal Cancer Screening: During our visit today, we discussed that it is recommended you receive colorectal cancer screening. Please call or make an appointment with your primary care provider to discuss this. You may also call the [facility] scheduling line ([phone]) to set up a colonoscopy appointment.

## 2018-07-11 NOTE — PROGRESS NOTES
[id number]: Study Visit Note   Subject name: Orlin Cavanaugh    Visit: F/U    Did the study visit occur within the appropriate window allowed by the protocol? Yes    Since the last study visit, Orlin has been doing .     I have personally interviewed Orlin and reviewed his medical record for adverse events and concomitant medications and these have been recorded on the corresponding logs in Orlin's research file.     Patient's 7/5/18 PET scan was reviewed by Dr. Espinal -- patient will continue to surgery and return to med onc for follow-up. Evaluation of scans will be further documented in a separate research document.    Orlin was given the opportunity to ask any trial related questions.  Please see provider progress note for physical exam and other clinical information. Labs were reviewed - any significant lab values were addressed and reviewed.    Iwona Bailey RN  [phone]

## 2018-07-11 NOTE — MR AVS SNAPSHOT
After Visit Summary   7/11/2018    Orlin Alcantar    MRN: [id number]           Patient Information     Date Of Birth          1950        Visit Information        Provider Department      7/11/2018 11:00 AM Magalie Espinal MD [facility]        Today's Diagnoses     Urothelial carcinoma of bladder (H)    -  1      Care Instructions    Preventive Care:     Colorectal Cancer Screening: During our visit today, we discussed that it is recommended you receive colorectal cancer screening. Please call or make an appointment with your primary care provider to discuss this. You may also call the [facility] scheduling line ([phone]) to set up a colonoscopy appointment.          Follow-ups after your visit        Your next 10 appointments already scheduled     Jul 30, 2018   Procedure with Bebeto Shea MD   [facility] [place], Same Day Surgery (--)    [address] [facility] [id number] [phone]            Sep 11, 2018 10:15 AM CDT   Adult Med Follow UP with JELLY Chapa CNS   Psychiatry Clinic ([facility])    [place] [id number]  [address] [place] [id number] [phone]              Who to contact     If you have questions or need follow up information about today's clinic visit or your schedule please contact [facility] directly at [phone].  Normal or non-critical lab and imaging results will be communicated to you by MyChart, letter or phone within 4 business days after the clinic has received the results. If you do not hear from us within 7 days, please contact the clinic through MyChart or phone. If you have a critical or abnormal lab result, we will notify you by phone as soon as possible.  Submit refill requests through [employer] or call your pharmacy and they will forward the refill request to us. Please allow 3 business days for your refill to be completed.          Additional Information  About Your Visit        [employer] Information     [employer] gives you secure access to your electronic health record. If you see a primary care provider, you can also send messages to your care team and make appointments. If you have questions, please call your primary care clinic.  If you do not have a primary care provider, please call [phone] and they will assist you.        Care EveryWhere ID     This is your Care EveryWhere ID. This could be used by other organizations to access your [place] medical records  [id number]        Your Vitals Were     Pulse Temperature Respirations Height Pulse Oximetry BMI (Body Mass Index)    88 97.9  F (36.6  C) 18 1.829 m (6') 100% 20.61 kg/m2       Blood Pressure from Last 3 Encounters:   07/12/18 130/89   07/11/18 109/77   07/11/18 109/77    Weight from Last 3 Encounters:   07/12/18 68.9 kg (152 lb)   07/11/18 68.9 kg (152 lb)   06/13/18 70.2 kg (154 lb 12.8 oz)              We Performed the Following     CBC with platelets differential     Comprehensive metabolic panel          Today's Medication Changes          These changes are accurate as of 7/11/18 11:59 PM.  If you have any questions, ask your nurse or doctor.               These medicines have changed or have updated prescriptions.        Dose/Directions    amLODIPine 5 MG tablet   Commonly known as:  NORVASC   This may have changed:  when to take this   Used for:  Hypertension goal BP (blood pressure) < 140/90        Dose:  5 mg   Take 1 tablet (5 mg) by mouth daily   Quantity:  90 tablet   Refills:  3       codeine 30 MG tablet   This may have changed:  when to take this   Used for:  Hemophilic arthropathy        Dose:  60 mg   Take 2 tablets (60 mg) by mouth every 6 hours as needed for moderate to severe pain (up to 8 tabs in 24 hours)   Quantity:  168 tablet   Refills:  0       lisinopril 40 MG tablet   Commonly known as:  PRINIVIL/ZESTRIL   This may have changed:  when to take this   Used for:  Hypertension  goal BP (blood pressure) < 140/90        Dose:  40 mg   Take 1 tablet (40 mg) by mouth daily   Quantity:  90 tablet   Refills:  3                Primary Care Provider Office Phone # Fax #    Katie Ramos [phone] [address]  [facility] [address]        Equal Access to Services     ANDREW FLANAGAN : Hadii aad ku hadasho Soomaali, waaxda luqadaha, qaybta kaalmada adeegyada, lloyd huerta haystantonn adepeggy beckaneesh rene . So [facility] [phone].    ATENCIÓN: Si habla español, tiene a hernadez disposición servicios gratuitos de asistencia lingüística. Perry al [phone].    We comply with applicable federal civil rights laws and [place] laws. We do not discriminate on the basis of race, color, national origin, age, disability, sex, sexual orientation, or gender identity.            Thank you!     Thank you for choosing [facility]  for your care. Our goal is always to provide you with excellent care. Hearing back from our patients is one way we can continue to improve our services. Please take a few minutes to complete the written survey that you may receive in the mail after your visit with us. Thank you!             Your Updated Medication List - Protect others around you: Learn how to safely use, store and throw away your medicines at www.disposemymeds.org.          This list is accurate as of 7/11/18 11:59 PM.  Always use your most recent med list.                   Brand Name Dispense Instructions for use Diagnosis    amLODIPine 5 MG tablet    NORVASC    90 tablet    Take 1 tablet (5 mg) by mouth daily    Hypertension goal BP (blood pressure) < 140/90       codeine 30 MG tablet     168 tablet    Take 2 tablets (60 mg) by mouth every 6 hours as needed for moderate to severe pain (up to 8 tabs in 24 hours)    Hemophilic arthropathy       * dexamethasone 4 MG tablet    DECADRON    24 tablet    Take 1 tablet (4 mg) by mouth 2 times daily (with meals)    Bladder tumor       *  dexamethasone 4 MG tablet    DECADRON    18 tablet    Take 2 tablets (8 mg) by mouth daily Take for 3 days, starting the day after cisplatin (Day 2).    Urothelial carcinoma of bladder (H)       diazepam 5 MG tablet    VALIUM    30 tablet    Take up to one tablet by mouth daily as needed for anxiety    Depressive disorder       lisinopril 40 MG tablet    PRINIVIL/ZESTRIL    90 tablet    Take 1 tablet (40 mg) by mouth daily    Hypertension goal BP (blood pressure) < 140/90       LORazepam 0.5 MG tablet    ATIVAN    60 tablet    Take 1 tablet (0.5 mg) by mouth every 6 hours as needed for anxiety    Malignant neoplasm of overlapping sites of bladder (H)       mirtazapine 45 MG tablet    REMERON    37 tablet    Take 1 tablet (45 mg) by mouth At Bedtime And may take 1/2 tablet for insomnia up to 3 times a week.    Generalized anxiety disorder       ondansetron 8 MG ODT tab    ZOFRAN-ODT    30 tablet    Take 1 tablet (8 mg) by mouth every 8 hours as needed for nausea    Bladder tumor       * prochlorperazine 10 MG tablet    COMPAZINE    30 tablet    Take 0.5 tablets (5 mg) by mouth every 6 hours as needed for nausea or vomiting    Bladder tumor       * prochlorperazine 10 MG tablet    COMPAZINE    30 tablet    Take 1 tablet (10 mg) by mouth every 6 hours as needed (Nausea/Vomiting)    Urothelial carcinoma of bladder (H)       tamsulosin 0.4 MG capsule    FLOMAX    45 capsule    Take 1 capsule (0.4 mg) by mouth daily    Incomplete bladder emptying       TYLENOL PO      Take 500 mg by mouth every 4 hours as needed for mild pain or fever        * Notice:  This list has 4 medication(s) that are the same as other medications prescribed for you. Read the directions carefully, and ask your doctor or other care provider to review them with you.

## 2018-07-11 NOTE — MR AVS SNAPSHOT
After Visit Summary   7/11/2018    Orlin Alcantar    MRN: [id number]           Patient Information     Date Of Birth          1950        Visit Information        Provider Department      7/11/2018 8:00 AM UC 44 ATC; UC SPEC INFUSION [facility] Specialty [facility] Procedure        Today's Diagnoses     Urothelial carcinoma of bladder (H)    -  1      Care Instructions    Dear Orlin Alcantar    Thank you for choosing [facility] ([facility]) for your infusion.  The following information is a summary of our appointment as well as important reminders.        ACTH (Blood)  Does this test have other names?  Adrenocorticotropic hormone blood test, corticotropin  What is this test?  This is a blood test that measures the amount of adrenocorticotropic hormone (ACTH) the pituitary gland produces. This gland is a tiny organ that sits just below your brain. It secretes adrenocorticotropic hormone, which controls the production of another hormone called cortisol.  Cortisol is produced by your adrenal glands, which are located at the top of your kidneys. Cortisol helps break down protein, sugar, and fat in your food. It also helps to regulate your blood pressure and your body's ability to fight infection. Cortisol is also one of the hormones that helps you deal with stress. Cortisol levels should peak in the morning and be at their lowest in the evening.  Why do I need this test?  You might have this test if your healthcare provider suspects you have hormone problems. This test is used along with other tests to diagnose conditions, including:    Cushing disease, in which the pituitary gland makes too much ACTH    Cushing syndrome, in which the adrenal glands make too much cortisol    [place] disease, in which the adrenal glands don't make enough cortisol    Hypopituitarism, a disorder of the pituitary gland that keeps it from producing  enough vital hormones  What other tests might I have along with this test?  Your healthcare provider also might order tests to measure your cortisol levels, including:    Adrenocorticotropic hormone stimulation test to determine why your adrenal glands aren't working properly    Dexamethasone suppression test to find an underlying reason for Cushing    Adrenocorticotropic hormone stimulation with metyrapone test also to find an underlying reason for Cushing  What do my test results mean?  Many things may affect your lab test results. These include the method each lab uses to do the test. Even if your test results are different from the normal value, you may not have a problem. To learn what the results mean for you, talk with your healthcare provider.  ACTH is measured in picograms per milliliter (pg/mL). Test results are influenced by the time of day the test was done. Normal results are:    Adults: 6-76 pg/ml (1.3-16.7 pmol/L)  If your ACTH level is low or high you may have Cushing syndrome. If your ACTH level is high you may have [place] disease. A low ACTH level can also be an indication of hypopituitarism.  How is this test done?  The test requires a blood sample, which is drawn through a needle from a vein in your arm.  You may have blood drawn in the morning and in the afternoon or evening. This is to check for variations in your levels of ACTH hormone.  Does this test pose any risks?  Taking a blood sample with a needle carries risks that include bleeding, infection, bruising, or feeling dizzy. When the needle pricks your arm, you may feel a slight stinging sensation or pain. Afterward, the site may be slightly sore.  What might affect my test results?  Your test results might be affected if you:    Are under a great deal of stress    Recently experienced a trauma    Are menstruating or pregnant    Are taking certain drugs, including steroids, hormones, or insulin    Did not sleep well the night before the  test  How do I get ready for this test?  Don't eat after midnight on the day of your test. Get a good night's sleep. Follow any other directions from your healthcare provider. Be sure your healthcare provider knows about all medicines, herbs, vitamins, and supplements you are taking. This includes medicines that don't need a prescription and any illicit drugs you may use.    2034-1729 [employer]. [address] [place] [address], [place] [address]. All rights reserved. This information is not intended as a substitute for professional medical care. Always follow your healthcare professional's instructions.      We look forward in seeing you on your next appointment here at [facility].  Please don t hesitate to call us at [phone] to reschedule any of your appointments or to speak with one of the [facility] registered nurses.  It was a pleasure taking care of you today.    Sincerely,    [facility]  Specialty Infusion & Procedure Center  [address] [place]  [address]  Phone:  [phone]            Follow-ups after your visit        Your next 10 appointments already scheduled     Jul 30, 2018   Procedure with Bebeto Shea MD   [facility], [place], Same Day Surgery (--)    [address] [facility] [address] [phone]            Sep 11, 2018 10:15 AM CDT   Adult Med Follow UP with JELLY Chapa CNS   Psychiatry Clinic ([facility])    [place] [id number] [address] [address] [phone]              Who to contact     If you have questions or need follow up information about today's clinic visit or your schedule please contact [facility] PROCED[facility] directly at [phone].  Normal or non-critical lab and imaging results will be communicated to you by MyChart, letter or phone within 4 business days after the clinic has received the results. If you do not hear from us  within 7 days, please contact the clinic through [email or web address] or phone. If you have a critical or abnormal lab result, we will notify you by phone as soon as possible.  Submit refill requests through [email or web address] or call your pharmacy and they will forward the refill request to us. Please allow 3 business days for your refill to be completed.          Additional Information About Your Visit        [employer] Information     [email or web address] gives you secure access to your electronic health record. If you see a primary care provider, you can also send messages to your care team and make appointments. If you have questions, please call your primary care clinic.  If you do not have a primary care provider, please call [phone] and they will assist you.        Care EveryWhere ID     This is your Care EveryWhere ID. This could be used by other organizations to access your [facility] medical records  [id number]        Your Vitals Were     Pulse Temperature Respirations Pulse Oximetry          90 97.2  F (36.2  C) (Oral) 16 95%         Blood Pressure from Last 3 Encounters:   07/12/18 130/89   07/11/18 109/77   07/11/18 109/77    Weight from Last 3 Encounters:   07/12/18 68.9 kg (152 lb)   07/11/18 68.9 kg (152 lb)   06/13/18 70.2 kg (154 lb 12.8 oz)              We Performed the Following     Adrenal corticotropin     Cosyntropin stimulation study baseline     Cosyntropin stimulation study post 30     Cosyntropin Stimulation Study Post 60     Renin activity          Today's Medication Changes          These changes are accurate as of 7/11/18 11:59 PM.  If you have any questions, ask your nurse or doctor.               These medicines have changed or have updated prescriptions.        Dose/Directions    amLODIPine 5 MG tablet   Commonly known as:  NORVASC   This may have changed:  when to take this   Used for:  Hypertension goal BP (blood pressure) < 140/90        Dose:  5 mg   Take 1 tablet (5 mg) by mouth daily   Quantity:  90 tablet    Refills:  3       codeine 30 MG tablet   This may have changed:  when to take this   Used for:  Hemophilic arthropathy        Dose:  60 mg   Take 2 tablets (60 mg) by mouth every 6 hours as needed for moderate to severe pain (up to 8 tabs in 24 hours)   Quantity:  168 tablet   Refills:  0       lisinopril 40 MG tablet   Commonly known as:  PRINIVIL/ZESTRIL   This may have changed:  when to take this   Used for:  Hypertension goal BP (blood pressure) < 140/90        Dose:  40 mg   Take 1 tablet (40 mg) by mouth daily   Quantity:  90 tablet   Refills:  3                Primary Care Provider Office Phone # Fax #    Katie Ramos [phone] [address] [facility] [address]        Equal Access to Services     ANDREW FLANAGAN : Suleman gar Sofaiza, waaxda luqadaha, qaybta kaalmada nnekayaondina, lloyd rene . So [facility] [phone].    ATENCIÓN: Si habla español, tiene a hernadez disposición servicios gratuitos de asistencia lingüística. [facility] [phone].    We comply with applicable federal civil rights laws and [place] laws. We do not discriminate on the basis of race, color, national origin, age, disability, sex, sexual orientation, or gender identity.            Thank you!     Thank you for choosing [facility] SPECIALTY AND PROCEDURE  for your care. Our goal is always to provide you with excellent care. Hearing back from our patients is one way we can continue to improve our services. Please take a few minutes to complete the written survey that you may receive in the mail after your visit with us. Thank you!             Your Updated Medication List - Protect others around you: Learn how to safely use, store and throw away your medicines at www.disposemymeds.org.          This list is accurate as of 7/11/18 11:59 PM.  Always use your most recent med list.                   Brand Name Dispense Instructions for use Diagnosis     amLODIPine 5 MG tablet    NORVASC    90 tablet    Take 1 tablet (5 mg) by mouth daily    Hypertension goal BP (blood pressure) < 140/90       codeine 30 MG tablet     168 tablet    Take 2 tablets (60 mg) by mouth every 6 hours as needed for moderate to severe pain (up to 8 tabs in 24 hours)    Hemophilic arthropathy       * dexamethasone 4 MG tablet    DECADRON    24 tablet    Take 1 tablet (4 mg) by mouth 2 times daily (with meals)    Bladder tumor       * dexamethasone 4 MG tablet    DECADRON    18 tablet    Take 2 tablets (8 mg) by mouth daily Take for 3 days, starting the day after cisplatin (Day 2).    Urothelial carcinoma of bladder (H)       diazepam 5 MG tablet    VALIUM    30 tablet    Take up to one tablet by mouth daily as needed for anxiety    Depressive disorder       KOGENATE FS 1000 units Kit     47133 each    Infuse 3000 iu (-5/+10%) every other day    Severe hemophilia A (H), Personal history of malignant neoplasm of bladder       lisinopril 40 MG tablet    PRINIVIL/ZESTRIL    90 tablet    Take 1 tablet (40 mg) by mouth daily    Hypertension goal BP (blood pressure) < 140/90       LORazepam 0.5 MG tablet    ATIVAN    60 tablet    Take 1 tablet (0.5 mg) by mouth every 6 hours as needed for anxiety    Malignant neoplasm of overlapping sites of bladder (H)       mirtazapine 45 MG tablet    REMERON    37 tablet    Take 1 tablet (45 mg) by mouth At Bedtime And may take 1/2 tablet for insomnia up to 3 times a week.    Generalized anxiety disorder       ondansetron 8 MG ODT tab    ZOFRAN-ODT    30 tablet    Take 1 tablet (8 mg) by mouth every 8 hours as needed for nausea    Bladder tumor       * prochlorperazine 10 MG tablet    COMPAZINE    30 tablet    Take 0.5 tablets (5 mg) by mouth every 6 hours as needed for nausea or vomiting    Bladder tumor       * prochlorperazine 10 MG tablet    COMPAZINE    30 tablet    Take 1 tablet (10 mg) by mouth every 6 hours as needed (Nausea/Vomiting)    Urothelial  carcinoma of bladder (H)       tamsulosin 0.4 MG capsule    FLOMAX    45 capsule    Take 1 capsule (0.4 mg) by mouth daily    Incomplete bladder emptying       TYLENOL PO      Take 500 mg by mouth every 4 hours as needed for mild pain or fever        * Notice:  This list has 4 medication(s) that are the same as other medications prescribed for you. Read the directions carefully, and ask your doctor or other care provider to review them with you.

## 2018-07-11 NOTE — PROGRESS NOTES
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   3/26/18: Consented for the Nivolumab-Gemcitabine-Cisplatin study [id number]    3/28/18: Screening visit for study.   4/4/18; c1d1 gemcitabine  4/11/18: c1d8 gemcitabine-nivolumab  4/25/18: C2D1 Gemcitabine-Cisplatin  5/2/18: C2D8 Gemcitabine-Nivolumab  5/16/18: C3D1 Gemcitabine-Cisplatin  5/23/18: C3D8 Gemcitabine-Nivolumab  6/6/18: C4d1 Gemcitabine-Cisplatin  6/13/18: C4D8 Gemcitabine    HPI:    68 year old male with PMH of hemophilia, chronic hepatis C who was seen by DR. Burns for evaluation of neoadjuvant therapy with Gemcitabine Cisplatin and Nivolumab.  I will be taking over his care while on study.     Patient has a history of hemophilia and has undergone several surgeries in right hip replacement, knee replacement. Hemophilia managed by Dr. Marley.   He reports recurrent hematuria for a year, initially attributed to kidney stones, cystoscopy in Feb 2018 revealed:  Bladder tumor, transurethral resection:   - Invasive high grade urothelial carcinoma   - Extent of invasion: Muscularis propria, multifocal   - Lymphovascular invasion: Present     PET-CT scan - which has revealed muscle invasive bladder right lateral wall of bladder and right  obturator lymphnode, 1.1 cm short axis.  He has been treated for HCV in the 1990s and has not had any complications. His LFTs have remained normal.   His hemophilia is very well controlled on current regimen of factor replacement.   He does not have any autoimmune disease and is not on immunesuppressants or steroids.   His diabetes is diet controlled and he has not required any medications.     Interval History:  Denies any immune related AEs, nausea, vomiting, hearing loss or neuropathy.  No bleeding issues.   No new complaints.       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      Bladder  tumor      Depression      DM II (diabetes mellitus, type II), controlled (H)     diet controlled     Hemophilia (H)     Type A     Hepatitis C, chronic (H)     treated [facility] interferon/ribavirin     HTN (hypertension)           CURRENT OUTPATIENT MEDICATIONS     Current Outpatient Prescriptions   Medication Sig     Acetaminophen (TYLENOL PO) Take 500 mg by mouth every 4 hours as needed for mild pain or fever     amLODIPine (NORVASC) 5 MG tablet Take 1 tablet (5 mg) by mouth daily (Patient taking differently: Take 5 mg by mouth every morning )     Antihemophilic Factor, Recomb, (KOGENATE FS) 1000 units KIT Infuse 3000 iu (-5/+10%) every other day     codeine 30 MG tablet Take 2 tablets (60 mg) by mouth every 6 hours as needed for moderate to severe pain (up to 8 tabs in 24 hours) (Patient taking differently: Take 60 mg by mouth as needed for moderate to severe pain (up to 8 tabs in 24 hours) )     dexamethasone (DECADRON) 4 MG tablet Take 2 tablets (8 mg) by mouth daily Take for 3 days, starting the day after cisplatin (Day 2).     dexamethasone (DECADRON) 4 MG tablet Take 1 tablet (4 mg) by mouth 2 times daily (with meals)     diazepam (VALIUM) 5 MG tablet Take up to one tablet by mouth daily as needed for anxiety     lisinopril (PRINIVIL/ZESTRIL) 40 MG tablet Take 1 tablet (40 mg) by mouth daily (Patient taking differently: Take 40 mg by mouth every morning )     LORazepam (ATIVAN) 0.5 MG tablet Take 1 tablet (0.5 mg) by mouth every 6 hours as needed for anxiety     mirtazapine (REMERON) 45 MG tablet Take 1 tablet (45 mg) by mouth At Bedtime And may take 1/2 tablet for insomnia up to 3 times a week.     ondansetron (ZOFRAN-ODT) 8 MG ODT tab Take 1 tablet (8 mg) by mouth every 8 hours as needed for nausea     prochlorperazine (COMPAZINE) 10 MG tablet Take 1 tablet (10 mg) by mouth every 6 hours as needed (Nausea/Vomiting)     prochlorperazine (COMPAZINE) 10 MG tablet Take 0.5 tablets (5 mg) by mouth every 6 hours as  needed for nausea or vomiting     tamsulosin (FLOMAX) 0.4 MG capsule Take 1 capsule (0.4 mg) by mouth daily     No current facility-administered medications for this visit.      Facility-Administered Medications Ordered in Other Visits   Medication     ethyl chloride spray     CBC RESULTS:   Recent Labs   Lab Test  07/11/18   0840   WBC  6.5   RBC  3.66*   HGB  10.7*   HCT  32.8*   MCV  90   MCH  29.2   MCHC  32.6   RDW  19.2*   PLT  298     Recent Labs   Lab Test  07/11/18   0840  07/09/18   1151  06/13/18   0725   NA  139   --   139   POTASSIUM  3.8   --   4.4   CHLORIDE  106   --   106   CO2  25   --   25   ANIONGAP  8   --   8   GLC  98  95  95   BUN  18   --   14   CR  0.88   --   0.88   MARLA  9.3   --   8.7     ACTH stim test: normal at 30 and 60 min     .PET-CT:Treatment response of the ill-defined right-sided bladder mass, small amount of hypermetabolism  remains post treatment inflammation versus residual disease.  2. Positive morphologic and metabolic treatment response of the right  obturator lymphadenopathy.    ASSESSMENT/PLAN:     68 year old male with T2N1(solitary node)  urothelial carcinoma,currently enrolled on the clinical trial of Nivolumab with Gemcitabine and Cisplatin for MIBC, completed C4D8 on 6/13/18.   He is being planned for radical cystectomy by Dr. Shea this month.     I ill see him back post surgery with scans per protocol.   He will be seen by Dr. Shea on 7/11 to discuss surgery options and dates.  Hemophilia; plan per Dr. Marley prior to surgery.      Magalie Espinal MD  Hematology Oncology and Transplantation  [facility]

## 2018-07-11 NOTE — PROGRESS NOTES
Cosyntropin Stimulation Study Nursing Note  Orlin Alcantar comes to [facility] today for a ACTH timed test. Pt's vitals recorded  and entered into flow sheet. Medications recorded on MAR. Access recorded in flow sheet.    Progress Note  Vitals were reviewed.     Patient tolerated the procedure well    Note:   RN provided patient with educational handout regarding timed test.  RN confirmed patient did not take steroids (hydrocortisone) on the morning of the test. PIV placed by vascular access RN and  baseline labs drawn. RN administered Cortrosyn per IV push followed by 2 mls NS. Cortisol labs drawn again at 30 minutes and 60 minutes. Following test patient instructed to take usual dose of hydrocortisone for the day.    Medication given:  Administrations This Visit     cosyntropin (CORTROSYN) injection 250 mcg     Admin Date Action Dose Route Administered By             07/11/2018 Given 250 mcg Intravenous Lucille Etienne, IRON                        Discharge Plan  Discharge instructions reviewed with patient: YES  Patient/Representative verbalized understanding, all questions answered: YES    Discharged from unit in stable condition.    Lucille Etienne RN     Report given to Kiara PERDUE at 0925 for remainder of visit.

## 2018-07-11 NOTE — PATIENT INSTRUCTIONS
Dear Orlin Alcantar    Thank you for choosing [facility] Specialty Infusion [facility] Procedure Center ([facility]) for your infusion.  The following information is a summary of our appointment as well as important reminders.        ACTH (Blood)  Does this test have other names?  Adrenocorticotropic hormone blood test, corticotropin  What is this test?  This is a blood test that measures the amount of adrenocorticotropic hormone (ACTH) the pituitary gland produces. This gland is a tiny organ that sits just below your brain. It secretes adrenocorticotropic hormone, which controls the production of another hormone called cortisol.  Cortisol is produced by your adrenal glands, which are located at the top of your kidneys. Cortisol helps break down protein, sugar, and fat in your food. It also helps to regulate your blood pressure and your body's ability to fight infection. Cortisol is also one of the hormones that helps you deal with stress. Cortisol levels should peak in the morning and be at their lowest in the evening.  Why do I need this test?  You might have this test if your healthcare provider suspects you have hormone problems. This test is used along with other tests to diagnose conditions, including:    Cushing disease, in which the pituitary gland makes too much ACTH    Cushing syndrome, in which the adrenal glands make too much cortisol    [place] disease, in which the adrenal glands don't make enough cortisol    Hypopituitarism, a disorder of the pituitary gland that keeps it from producing enough vital hormones  What other tests might I have along with this test?  Your healthcare provider also might order tests to measure your cortisol levels, including:    Adrenocorticotropic hormone stimulation test to determine why your adrenal glands aren't working properly    Dexamethasone suppression test to find an underlying reason for Cushing    Adrenocorticotropic hormone stimulation with metyrapone  test also to find an underlying reason for Cushing  What do my test results mean?  Many things may affect your lab test results. These include the method each lab uses to do the test. Even if your test results are different from the normal value, you may not have a problem. To learn what the results mean for you, talk with your healthcare provider.  ACTH is measured in picograms per milliliter (pg/mL). Test results are influenced by the time of day the test was done. Normal results are:    Adults: 6-76 pg/ml (1.3-16.7 pmol/L)  If your ACTH level is low or high you may have Cushing syndrome. If your ACTH level is high you may have [place] disease. A low ACTH level can also be an indication of hypopituitarism.  How is this test done?  The test requires a blood sample, which is drawn through a needle from a vein in your arm.  You may have blood drawn in the morning and in the afternoon or evening. This is to check for variations in your levels of ACTH hormone.  Does this test pose any risks?  Taking a blood sample with a needle carries risks that include bleeding, infection, bruising, or feeling dizzy. When the needle pricks your arm, you may feel a slight stinging sensation or pain. Afterward, the site may be slightly sore.  What might affect my test results?  Your test results might be affected if you:    Are under a great deal of stress    Recently experienced a trauma    Are menstruating or pregnant    Are taking certain drugs, including steroids, hormones, or insulin    Did not sleep well the night before the test  How do I get ready for this test?  Don't eat after midnight on the day of your test. Get a good night's sleep. Follow any other directions from your healthcare provider. Be sure your healthcare provider knows about all medicines, herbs, vitamins, and supplements you are taking. This includes medicines that don't need a prescription and any illicit drugs you may use.    3593-7886 [employer]  [employer]. [address] [place] [address]. All rights reserved. This information is not intended as a substitute for professional medical care. Always follow your healthcare professional's instructions.      We look forward in seeing you on your next appointment here at [facility].  Please don t hesitate to call us at [phone] to reschedule any of your appointments or to speak with one of the [facility] registered nurses.  It was a pleasure taking care of you today.    Sincerely,    [facility]  Specialty Infusion & Procedure Center  [address] [place] [address]  [address]  Phone:  [phone]

## 2018-07-11 NOTE — LETTER
7/11/2018       RE: Orlin Alcantar  [address]  [place] [address]     Dear Colleague,    Thank you for referring your patient, Orlin Alcantar, to the [facility]. Please see a copy of my visit note below.    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   3/26/18: Consented for the Nivolumab-Gemcitabine-Cisplatin study [id number]    3/28/18: Screening visit for study.   4/4/18; c1d1 gemcitabine  4/11/18: c1d8 gemcitabine-nivolumab  4/25/18: C2D1 Gemcitabine-Cisplatin  5/2/18: C2D8 Gemcitabine-Nivolumab  5/16/18: C3D1 Gemcitabine-Cisplatin  5/23/18: C3D8 Gemcitabine-Nivolumab  6/6/18: C4d1 Gemcitabine-Cisplatin  6/13/18: C4D8 Gemcitabine    HPI:    68 year old male with PMH of hemophilia, chronic hepatis C who was seen by DR. Burns for evaluation of neoadjuvant therapy with Gemcitabine Cisplatin and Nivolumab.  I will be taking over his care while on study.     Patient has a history of hemophilia and has undergone several surgeries in right hip replacement, knee replacement. Hemophilia managed by Dr. Marley.   He reports recurrent hematuria for a year, initially attributed to kidney stones, cystoscopy in Feb 2018 revealed:  Bladder tumor, transurethral resection:   - Invasive high grade urothelial carcinoma   - Extent of invasion: Muscularis propria, multifocal   - Lymphovascular invasion: Present     PET-CT scan - which has revealed muscle invasive bladder right lateral wall of bladder and right  obturator lymphnode, 1.1 cm short axis.  He has been treated for HCV in the 1990s and has not had any complications. His LFTs have remained normal.   His hemophilia is very well controlled on current regimen of factor replacement.   He does not have any autoimmune disease and is not on immunesuppressants or steroids.   His diabetes is diet controlled and he has not required any medications.     Interval  History:  Denies any immune related AEs, nausea, vomiting, hearing loss or neuropathy.  No bleeding issues.   No new complaints.       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      Bladder tumor      Depression      DM II (diabetes mellitus, type II), controlled (H)     diet controlled     Hemophilia (H)     Type A     Hepatitis C, chronic (H)     treated [facility] interferon/ribavirin     HTN (hypertension)           CURRENT OUTPATIENT MEDICATIONS     Current Outpatient Prescriptions   Medication Sig     Acetaminophen (TYLENOL PO) Take 500 mg by mouth every 4 hours as needed for mild pain or fever     amLODIPine (NORVASC) 5 MG tablet Take 1 tablet (5 mg) by mouth daily (Patient taking differently: Take 5 mg by mouth every morning )     Antihemophilic Factor, Recomb, (KOGENATE FS) 1000 units KIT Infuse 3000 iu (-5/+10%) every other day     codeine 30 MG tablet Take 2 tablets (60 mg) by mouth every 6 hours as needed for moderate to severe pain (up to 8 tabs in 24 hours) (Patient taking differently: Take 60 mg by mouth as needed for moderate to severe pain (up to 8 tabs in 24 hours) )     dexamethasone (DECADRON) 4 MG tablet Take 2 tablets (8 mg) by mouth daily Take for 3 days, starting the day after cisplatin (Day 2).     dexamethasone (DECADRON) 4 MG tablet Take 1 tablet (4 mg) by mouth 2 times daily (with meals)     diazepam (VALIUM) 5 MG tablet Take up to one tablet by mouth daily as needed for anxiety     lisinopril (PRINIVIL/ZESTRIL) 40 MG tablet Take 1 tablet (40 mg) by mouth daily (Patient taking differently: Take 40 mg by mouth every morning )     LORazepam (ATIVAN) 0.5 MG tablet Take 1 tablet (0.5 mg) by mouth every 6 hours as needed for anxiety     mirtazapine (REMERON) 45 MG tablet Take 1 tablet (45 mg) by mouth At Bedtime And may take 1/2 tablet for insomnia up to 3 times a week.     ondansetron (ZOFRAN-ODT) 8 MG ODT tab Take 1 tablet (8 mg) by  mouth every 8 hours as needed for nausea     prochlorperazine (COMPAZINE) 10 MG tablet Take 1 tablet (10 mg) by mouth every 6 hours as needed (Nausea/Vomiting)     prochlorperazine (COMPAZINE) 10 MG tablet Take 0.5 tablets (5 mg) by mouth every 6 hours as needed for nausea or vomiting     tamsulosin (FLOMAX) 0.4 MG capsule Take 1 capsule (0.4 mg) by mouth daily     No current facility-administered medications for this visit.      Facility-Administered Medications Ordered in Other Visits   Medication     ethyl chloride spray     CBC RESULTS:   Recent Labs   Lab Test  07/11/18   0840   WBC  6.5   RBC  3.66*   HGB  10.7*   HCT  32.8*   MCV  90   MCH  29.2   MCHC  32.6   RDW  19.2*   PLT  298     Recent Labs   Lab Test  07/11/18   0840  07/09/18   1151  06/13/18   0725   NA  139   --   139   POTASSIUM  3.8   --   4.4   CHLORIDE  106   --   106   CO2  25   --   25   ANIONGAP  8   --   8   GLC  98  95  95   BUN  18   --   14   CR  0.88   --   0.88   MARLA  9.3   --   8.7     ACTH stim test: normal at 30 and 60 min     .PET-CT:Treatment response of the ill-defined right-sided bladder mass, small amount of hypermetabolism  remains post treatment inflammation versus residual disease.  2. Positive morphologic and metabolic treatment response of the right  obturator lymphadenopathy.    ASSESSMENT/PLAN:     68 year old male with T2N1(solitary node)  urothelial carcinoma,currently enrolled on the clinical trial of Nivolumab with Gemcitabine and Cisplatin for MIBC, completed C4D8 on 6/13/18.   He is being planned for radical cystectomy by Dr. Shea this month.     I ill see him back post surgery with scans per protocol.   He will be seen by Dr. Shea on 7/11 to discuss surgery options and dates.  Hemophilia; plan per Dr. Marley prior to surgery.      Magalie Espinal MD  Hematology Oncology and Transplantation  [facility]

## 2018-07-11 NOTE — NURSING NOTE
Oncology Rooming Note    July 11, 2018 10:13 AM   Orlin Alcantar is a 68 year old male who presents for:    Chief Complaint   Patient presents with     Oncology Clinic Visit     Return-Bladder CA     Initial Vitals: /77 (BP Location: Right arm, Patient Position: Chair, Cuff Size: Adult Regular)  Pulse 88  Temp 97.9  F (36.6  C)  Resp 18  Ht 1.829 m (6')  Wt 68.9 kg (152 lb)  SpO2 100%  BMI 20.61 kg/m2 Estimated body mass index is 20.61 kg/(m^2) as calculated from the following:    Height as of this encounter: 1.829 m (6').    Weight as of this encounter: 68.9 kg (152 lb). Body surface area is 1.87 meters squared.  No Pain (0) Comment: Data Unavailable   No LMP for male patient.  Allergies reviewed: Yes  Medications reviewed: Yes    Medications: Medication refills not needed today.  Pharmacy name entered into [email or web address]:    [facility] - [place], [place] - [address] [place] [address] PHARMACY [facility] - [place], [place] - [facility] [address]  [facility] [place], [place] - [address]    Clinical concerns: None     6 minutes for nursing intake (face to face time)     Jimmie Cuellar [facility]

## 2018-07-12 ENCOUNTER — OFFICE VISIT (OUTPATIENT)
Dept: UROLOGY | Facility: CLINIC | Age: 68
End: 2018-07-12
Payer: MEDICARE

## 2018-07-12 VITALS
HEIGHT: 72 IN | HEART RATE: 81 BPM | SYSTOLIC BLOOD PRESSURE: 130 MMHG | WEIGHT: 152 LBS | DIASTOLIC BLOOD PRESSURE: 89 MMHG | BODY MASS INDEX: 20.59 KG/M2 | OXYGEN SATURATION: 96 %

## 2018-07-12 DIAGNOSIS — C67.8 MALIGNANT NEOPLASM OF OVERLAPPING SITES OF BLADDER (H): Primary | ICD-10-CM

## 2018-07-12 LAB — ACTH PLAS-MCNC: 33 PG/ML

## 2018-07-12 RX ORDER — CEFAZOLIN SODIUM 1 G/50ML
1 INJECTION, SOLUTION INTRAVENOUS SEE ADMIN INSTRUCTIONS
Status: CANCELLED | OUTPATIENT
Start: 2018-07-12

## 2018-07-12 RX ORDER — HEPARIN SODIUM 5000 [USP'U]/.5ML
5000 INJECTION, SOLUTION INTRAVENOUS; SUBCUTANEOUS ONCE
Status: CANCELLED | OUTPATIENT
Start: 2018-07-12 | End: 2018-07-12

## 2018-07-12 RX ORDER — ALVIMOPAN 12 MG/1
12 CAPSULE ORAL ONCE
Status: CANCELLED | OUTPATIENT
Start: 2018-07-12 | End: 2018-07-12

## 2018-07-12 RX ORDER — ERTAPENEM 1 G/1
1 INJECTION, POWDER, LYOPHILIZED, FOR SOLUTION INTRAMUSCULAR; INTRAVENOUS EVERY 24 HOURS
Status: CANCELLED | OUTPATIENT
Start: 2018-07-12

## 2018-07-12 ASSESSMENT — PAIN SCALES - GENERAL: PAINLEVEL: NO PAIN (0)

## 2018-07-12 NOTE — PATIENT INSTRUCTIONS
Patient scheduled surgery.      John Rockwell MA    It was a pleasure meeting with you today.  Thank you for allowing me and my team the privilege of caring for you today.  YOU are the reason we are here, and I truly hope we provided you with the excellent service you deserve.  Please let us know if there is anything else we can do for you so that we can be sure you are leaving completely satisfied with your care experience.

## 2018-07-12 NOTE — NURSING NOTE
Chief Complaint   Patient presents with     Follow Up For     bladder cancer         John Rockwell MA

## 2018-07-12 NOTE — MR AVS SNAPSHOT
After Visit Summary   7/12/2018    Orlin Alcantar    MRN: [id number]           Patient Information     Date Of Birth          1950        Visit Information        Provider Department      7/12/2018 8:20 AM Bebeto Shea MD [facility] and [facility]        Care Instructions    Patient scheduled surgery.      John Rockwell MA    It was a pleasure meeting with you today.  Thank you for allowing me and my team the privilege of caring for you today.  YOU are the reason we are here, and I truly hope we provided you with the excellent service you deserve.  Please let us know if there is anything else we can do for you so that we can be sure you are leaving completely satisfied with your care experience.                Follow-ups after your visit        Your next 10 appointments already scheduled     Sep 11, 2018 10:15 AM CDT   Adult Med Follow UP with JELLY Chapa CNS   Psychiatry Clinic ([facility])    [place] [id number] [address] [phone]              Who to contact     Please call your clinic at [phone] to:    Ask questions about your health    Make or cancel appointments    Discuss your medicines    Learn about your test results    Speak to your doctor            Additional Information About Your Visit        MyChart Information     [employer] gives you secure access to your electronic health record. If you see a primary care provider, you can also send messages to your care team and make appointments. If you have questions, please call your primary care clinic.  If you do not have a primary care provider, please call [phone] and they will assist you.      [employer] is an electronic gateway that provides easy, online access to your medical records. With [employer], you can request a clinic appointment, read your test results, renew a prescription or communicate with  your care team.     To access your existing account, please contact your [facility] or call [phone] for assistance.        Care EveryWhere ID     This is your Care EveryWhere ID. This could be used by other organizations to access your [facility] medical records  [id number]        Your Vitals Were     Pulse Height Pulse Oximetry BMI (Body Mass Index)          81 1.829 m (6') 96% 20.61 kg/m2         Blood Pressure from Last 3 Encounters:   07/12/18 130/89   07/11/18 109/77   07/11/18 109/77    Weight from Last 3 Encounters:   07/12/18 68.9 kg (152 lb)   07/11/18 68.9 kg (152 lb)   06/13/18 70.2 kg (154 lb 12.8 oz)              Today, you had the following     No orders found for display         Today's Medication Changes          These changes are accurate as of 7/12/18  9:22 AM.  If you have any questions, ask your nurse or doctor.               These medicines have changed or have updated prescriptions.        Dose/Directions    amLODIPine 5 MG tablet   Commonly known as:  NORVASC   This may have changed:  when to take this   Used for:  Hypertension goal BP (blood pressure) < 140/90        Dose:  5 mg   Take 1 tablet (5 mg) by mouth daily   Quantity:  90 tablet   Refills:  3       codeine 30 MG tablet   This may have changed:  when to take this   Used for:  Hemophilic arthropathy        Dose:  60 mg   Take 2 tablets (60 mg) by mouth every 6 hours as needed for moderate to severe pain (up to 8 tabs in 24 hours)   Quantity:  168 tablet   Refills:  0       lisinopril 40 MG tablet   Commonly known as:  PRINIVIL/ZESTRIL   This may have changed:  when to take this   Used for:  Hypertension goal BP (blood pressure) < 140/90        Dose:  40 mg   Take 1 tablet (40 mg) by mouth daily   Quantity:  90 tablet   Refills:  3                Primary Care Provider Office Phone # Fax #    Katie Ramos [phone] [address] [facility] [address]        Equal Access  to Services     ANDREW FLANAGAN : Suleman Colmenares, walashon eastman, qaandrewdhara jessicaterrencelloyd mars. So [facility] [phone].    ATENCIÓN: Si nnamdi bernstein, tiene a hernadez disposición servicios gratuitos de asistencia lingüística. Llame al [phone].    We comply with applicable federal civil rights laws and [place] laws. We do not discriminate on the basis of race, color, national origin, age, disability, sex, sexual orientation, or gender identity.            Thank you!     Thank you for choosing [facility] UROLOGY AND [facility] FOR PROSTATE AND UROLOGIC CANCERS  for your care. Our goal is always to provide you with excellent care. Hearing back from our patients is one way we can continue to improve our services. Please take a few minutes to complete the written survey that you may receive in the mail after your visit with us. Thank you!             Your Updated Medication List - Protect others around you: Learn how to safely use, store and throw away your medicines at www.disposemymeds.org.          This list is accurate as of 7/12/18  9:22 AM.  Always use your most recent med list.                   Brand Name Dispense Instructions for use Diagnosis    amLODIPine 5 MG tablet    NORVASC    90 tablet    Take 1 tablet (5 mg) by mouth daily    Hypertension goal BP (blood pressure) < 140/90       codeine 30 MG tablet     168 tablet    Take 2 tablets (60 mg) by mouth every 6 hours as needed for moderate to severe pain (up to 8 tabs in 24 hours)    Hemophilic arthropathy       * dexamethasone 4 MG tablet    DECADRON    24 tablet    Take 1 tablet (4 mg) by mouth 2 times daily (with meals)    Bladder tumor       * dexamethasone 4 MG tablet    DECADRON    18 tablet    Take 2 tablets (8 mg) by mouth daily Take for 3 days, starting the day after cisplatin (Day 2).    Urothelial carcinoma of bladder (H)       diazepam 5 MG tablet    VALIUM    30 tablet    Take up to one tablet by mouth  daily as needed for anxiety    Depressive disorder       KOGENATE FS 1000 units Kit     33253 each    Infuse 3000 iu (-5/+10%) every other day    Severe hemophilia A (H), Personal history of malignant neoplasm of bladder       lisinopril 40 MG tablet    PRINIVIL/ZESTRIL    90 tablet    Take 1 tablet (40 mg) by mouth daily    Hypertension goal BP (blood pressure) < 140/90       LORazepam 0.5 MG tablet    ATIVAN    60 tablet    Take 1 tablet (0.5 mg) by mouth every 6 hours as needed for anxiety    Malignant neoplasm of overlapping sites of bladder (H)       mirtazapine 45 MG tablet    REMERON    37 tablet    Take 1 tablet (45 mg) by mouth At Bedtime And may take 1/2 tablet for insomnia up to 3 times a week.    Generalized anxiety disorder       ondansetron 8 MG ODT tab    ZOFRAN-ODT    30 tablet    Take 1 tablet (8 mg) by mouth every 8 hours as needed for nausea    Bladder tumor       * prochlorperazine 10 MG tablet    COMPAZINE    30 tablet    Take 0.5 tablets (5 mg) by mouth every 6 hours as needed for nausea or vomiting    Bladder tumor       * prochlorperazine 10 MG tablet    COMPAZINE    30 tablet    Take 1 tablet (10 mg) by mouth every 6 hours as needed (Nausea/Vomiting)    Urothelial carcinoma of bladder (H)       tamsulosin 0.4 MG capsule    FLOMAX    45 capsule    Take 1 capsule (0.4 mg) by mouth daily    Incomplete bladder emptying       TYLENOL PO      Take 500 mg by mouth every 4 hours as needed for mild pain or fever        * Notice:  This list has 4 medication(s) that are the same as other medications prescribed for you. Read the directions carefully, and ask your doctor or other care provider to review them with you.

## 2018-07-12 NOTE — PROGRESS NOTES
Urology Clinic Note      Date: 7/12/2018  Time: 8:44 AM  Patient: Orlin Alcantar  MRN: [id number]    HPI/Subjective: Orlin Alcantar is a 68 year old male with hemophilia A who presents with newly diagnosed MIBC. He underwent TURBT on 2/19/18 with Dr Liao. The R UO was involved in the resection and so a R PNT was placed. He has completed neoadjuvant chemotherapy and is now here to discuss cystectomy.    Mr Alcantar feels well. He tolerated chemo well. He denies hematuria, dysuria, fever/chills.     Objective:  /89 (BP Location: Left arm, Patient Position: Sitting, Cuff Size: Adult Regular)  Pulse 81  Ht 1.829 m (6')  Wt 68.9 kg (152 lb)  SpO2 96%  BMI 20.61 kg/m2  Gen: In NAD, conversant.  Resp: Breathing non-labored  CV: Extremities warm.  Abd: Soft, non-distended, non-tender.    PET 7/9/18  IMPRESSION: In this patient with high-grade muscular invasive bladder cancer;  1. Positive morphologic and metabolic treatment response of the  ill-defined right-sided bladder mass, small amount of hypermetabolism  remains post treatment inflammation versus residual disease.  2. Positive morphologic and metabolic treatment response of the right  obturator lymphadenopathy.  3. New enlarged FDG avid right hilar lymph node. Accompanying multiple  small nodular opacities in the right middle lobe. Findings could  represent infectious process versus metastatic disease, however given  the positive treatment response in the primary tumor site, an  infectious process is favored.  The avidity of the right hilar node  may be related to the patient's immune therapy.    Assessment & Plan: Orlin Alcantar is a 68 year old male with MIBC     - schedule for radical cystoprostatectomy with ileal conduit    - hematology and medicine pre-op given his hemophilia    - risks and benefits explained.  He understands and agrees to proceed         This patient was seen & discussed with Dr Shabbir Paula MD  Urology PGY-2    Patient  seen and examined with the resident.  Visit time 15 minutes and >50% spent in counseling.  I agree with the resident's note and plan of care.       Bebeto Shea MD  Urology Staff      CC  Patient Care Team:  Katie Rangel MD as PCP - General (Family Practice)  Kaylin Rajan APRN CNS as Nurse Practitioner (Clinical Nurse Specialist)  Isadora Sood, RN as Nurse Coordinator (Psychiatry)  Mely Smith PA-C as Physician Assistant (Physician Assistant)  Cassidy Liao MD as MD (Urology)  Ligia Ram, RN as Registered Nurse (Urology)  Katie Rangel MD as Referring Physician (Family Practice)  Ivania Reece, RN as Nurse Coordinator (Oncology)  Magalie Espinal MD as MD (Oncology)  Eric Srivastava, RN as Registered Nurse  KATIE RANGEL    Copy to patient  HIREN GREENBERG  [address]  [place] [address]

## 2018-07-12 NOTE — LETTER
7/12/2018       RE: Orlin Alcantar  [address]  [place] [address]     Dear Colleague,    Thank you for referring your patient, Orlin Alcantar, to the [facility] UROLOGY AND INST FOR PROSTATE AND UROLOGIC CANCERS at [facility]. Please see a copy of my visit note below.    Urology Clinic Note      Date: 7/12/2018  Time: 8:44 AM  Patient: Orlin Alcantar  MRN: [id number]    HPI/Subjective: Orlin Alcantar is a 68 year old male with hemophilia A who presents with newly diagnosed MIBC. He underwent TURBT on 2/19/18 with Dr Liao. The R UO was involved in the resection and so a R PNT was placed. He has completed neoadjuvant chemotherapy and is now here to discuss cystectomy.    Mr Alcantar feels well. He tolerated chemo well. He denies hematuria, dysuria, fever/chills.     Objective:  /89 (BP Location: Left arm, Patient Position: Sitting, Cuff Size: Adult Regular)  Pulse 81  Ht 1.829 m (6')  Wt 68.9 kg (152 lb)  SpO2 96%  BMI 20.61 kg/m2  Gen: In NAD, conversant.  Resp: Breathing non-labored  CV: Extremities warm.  Abd: Soft, non-distended, non-tender.    PET 7/9/18  IMPRESSION: In this patient with high-grade muscular invasive bladder cancer;  1. Positive morphologic and metabolic treatment response of the  ill-defined right-sided bladder mass, small amount of hypermetabolism  remains post treatment inflammation versus residual disease.  2. Positive morphologic and metabolic treatment response of the right  obturator lymphadenopathy.  3. New enlarged FDG avid right hilar lymph node. Accompanying multiple  small nodular opacities in the right middle lobe. Findings could  represent infectious process versus metastatic disease, however given  the positive treatment response in the primary tumor site, an  infectious process is favored.  The avidity of the right hilar node  may be related to the patient's immune therapy.    Assessment & Plan: Orlin Alcantar is a 68  year old male with MIBC     - schedule for radical cystoprostatectomy with ileal conduit    - hematology and medicine pre-op given his hemophilia    - risks and benefits explained.  He understands and agrees to proceed         This patient was seen & discussed with Dr Shabbir Paula MD  Urology PGY-2    Patient seen and examined with the resident.  Visit time 15 minutes and >50% spent in counseling.  I agree with the resident's note and plan of care.       Bebeto Shea MD  Urology Staff      CC  Patient Care Team:  Katie Rangel MD as PCP - General (Family Practice)  Kaylin Rajan APRN CNS as Nurse Practitioner (Clinical Nurse Specialist)  Isadora Sood, RN as Nurse Coordinator (Psychiatry)  Mely Smith PA-C as Physician Assistant (Physician Assistant)  Cassidy Liao MD as MD (Urology)  Ligia Ram, RN as Registered Nurse (Urology)  Katie Rangel MD as Referring Physician (Family Practice)  Ivania Reece, RN as Nurse Coordinator (Oncology)  Magalie Espinal MD as MD (Oncology)  Eric Srivastava RN as Registered Nurse  KATIE RANGEL    Copy to patient  HIREN GREENBERG  [address]  [place] [address]

## 2018-07-13 DIAGNOSIS — Z85.51 PERSONAL HISTORY OF MALIGNANT NEOPLASM OF BLADDER: ICD-10-CM

## 2018-07-13 DIAGNOSIS — D66 SEVERE HEMOPHILIA A (H): ICD-10-CM

## 2018-07-13 LAB — RENIN PLAS-CCNC: 1.1 NG/ML/HR

## 2018-07-13 RX ORDER — ANTIHEMOPHILIC FACTOR (RECOMBINANT) 1000 (+/-)
KIT INTRAVENOUS
Qty: 15000 EACH | Refills: 1 | Status: SHIPPED | OUTPATIENT
Start: 2018-07-16 | End: 2018-08-13

## 2018-07-16 ENCOUNTER — TELEPHONE (OUTPATIENT)
Dept: UROLOGY | Facility: CLINIC | Age: 68
End: 2018-07-16

## 2018-07-16 DIAGNOSIS — C67.9 BLADDER CANCER (H): ICD-10-CM

## 2018-07-16 DIAGNOSIS — Z01.818 PRE-OP EXAM: Primary | ICD-10-CM

## 2018-07-16 NOTE — TELEPHONE ENCOUNTER
Called to schedule surgery with Dr Shea on 7/30/18 @ 7:45 am @ [facility].  Surgery packet mailed on 7/16/18.

## 2018-07-17 ENCOUNTER — TELEPHONE (OUTPATIENT)
Dept: UROLOGY | Facility: CLINIC | Age: 68
End: 2018-07-17

## 2018-07-17 NOTE — TELEPHONE ENCOUNTER
[facility] Call Center    Phone Message    May a detailed message be left on voicemail: yes    Reason for Call: Other: Patient calling to speak with Dr. Shea or his nurse, Aileen. Pt is looking for results from PET scan that was completed on 07/09/18. He would like to know the most recent results compared to the scan four months ago. He also wants to confirm if his cystoprostatectomy surgery (07/30) is absolutely necessary. Please try call on his home number first, but if no answer there, try his cell phone at [phone]. OK to leave a message on cell. Thank you.     Action Taken: Message routed to:  [facility] Urology

## 2018-07-18 NOTE — TELEPHONE ENCOUNTER
[facility] Call Center    Phone Message    May a detailed message be left on voicemail: yes    Reason for Call: Other: Patient is calling back to follow up on the message he sent yesterday. Please follow up with patient.     Action Taken: Message routed to:  [facility] Urology

## 2018-07-18 NOTE — TELEPHONE ENCOUNTER
Contacted patient and informed him of Dr. Ferreira's message. Patient stated understanding and will see Aileen Montoya RNCC, next Wednesday for surgery teaching.

## 2018-07-23 DIAGNOSIS — C67.9 BLADDER CANCER (H): Primary | ICD-10-CM

## 2018-07-24 DIAGNOSIS — C67.9 BLADDER CANCER (H): Primary | ICD-10-CM

## 2018-07-25 ENCOUNTER — OFFICE VISIT (OUTPATIENT)
Dept: SURGERY | Facility: CLINIC | Age: 68
End: 2018-07-25
Payer: MEDICARE

## 2018-07-25 ENCOUNTER — ALLIED HEALTH/NURSE VISIT (OUTPATIENT)
Dept: UROLOGY | Facility: CLINIC | Age: 68
End: 2018-07-25
Payer: MEDICARE

## 2018-07-25 ENCOUNTER — OFFICE VISIT (OUTPATIENT)
Dept: WOUND CARE | Facility: CLINIC | Age: 68
End: 2018-07-25
Payer: MEDICARE

## 2018-07-25 ENCOUNTER — ALLIED HEALTH/NURSE VISIT (OUTPATIENT)
Dept: SURGERY | Facility: CLINIC | Age: 68
End: 2018-07-25
Payer: MEDICARE

## 2018-07-25 ENCOUNTER — OFFICE VISIT (OUTPATIENT)
Dept: HEMATOLOGY | Facility: CLINIC | Age: 68
End: 2018-07-25
Attending: PHYSICIAN ASSISTANT
Payer: COMMERCIAL

## 2018-07-25 ENCOUNTER — APPOINTMENT (OUTPATIENT)
Dept: SURGERY | Facility: CLINIC | Age: 68
End: 2018-07-25
Payer: MEDICARE

## 2018-07-25 ENCOUNTER — ANESTHESIA EVENT (OUTPATIENT)
Dept: SURGERY | Facility: CLINIC | Age: 68
DRG: 653 | End: 2018-07-25
Payer: MEDICARE

## 2018-07-25 ENCOUNTER — RADIANT APPOINTMENT (OUTPATIENT)
Dept: ULTRASOUND IMAGING | Facility: CLINIC | Age: 68
End: 2018-07-25
Attending: UROLOGY
Payer: MEDICARE

## 2018-07-25 VITALS
SYSTOLIC BLOOD PRESSURE: 120 MMHG | HEIGHT: 72 IN | BODY MASS INDEX: 20.79 KG/M2 | OXYGEN SATURATION: 98 % | DIASTOLIC BLOOD PRESSURE: 77 MMHG | WEIGHT: 153.5 LBS | RESPIRATION RATE: 16 BRPM | HEART RATE: 102 BPM | TEMPERATURE: 97.9 F

## 2018-07-25 DIAGNOSIS — C67.9 BLADDER CANCER (H): ICD-10-CM

## 2018-07-25 DIAGNOSIS — Z71.89 OSTOMY NURSE CONSULTATION: Primary | ICD-10-CM

## 2018-07-25 DIAGNOSIS — Z01.818 PRE-OP EXAM: ICD-10-CM

## 2018-07-25 DIAGNOSIS — Z01.818 PREOP EXAMINATION: Primary | ICD-10-CM

## 2018-07-25 DIAGNOSIS — C67.9 BLADDER CANCER (H): Primary | ICD-10-CM

## 2018-07-25 DIAGNOSIS — D66 SEVERE HEMOPHILIA A (H): Primary | ICD-10-CM

## 2018-07-25 DIAGNOSIS — C67.9 UROTHELIAL CARCINOMA OF BLADDER (H): ICD-10-CM

## 2018-07-25 DIAGNOSIS — C67.9 MALIGNANT NEOPLASM OF URINARY BLADDER, UNSPECIFIED SITE (H): ICD-10-CM

## 2018-07-25 LAB
ALBUMIN UR-MCNC: 30 MG/DL
APPEARANCE UR: ABNORMAL
BACTERIA #/AREA URNS HPF: ABNORMAL /HPF
BILIRUB UR QL STRIP: NEGATIVE
COLOR UR AUTO: YELLOW
GLUCOSE UR STRIP-MCNC: NEGATIVE MG/DL
HBA1C MFR BLD: 6.9 % (ref 0–5.6)
HGB UR QL STRIP: ABNORMAL
KETONES UR STRIP-MCNC: NEGATIVE MG/DL
LEUKOCYTE ESTERASE UR QL STRIP: ABNORMAL
MUCOUS THREADS #/AREA URNS LPF: PRESENT /LPF
NITRATE UR QL: NEGATIVE
PH UR STRIP: 5 PH (ref 5–7)
RBC #/AREA URNS AUTO: 20 /HPF (ref 0–2)
SOURCE: ABNORMAL
SP GR UR STRIP: 1.02 (ref 1–1.03)
UROBILINOGEN UR STRIP-MCNC: 0 MG/DL (ref 0–2)
WBC #/AREA URNS AUTO: >182 /HPF (ref 0–5)
WBC CLUMPS #/AREA URNS HPF: PRESENT /HPF

## 2018-07-25 PROCEDURE — 99213 OFFICE O/P EST LOW 20 MIN: CPT | Performed by: PHYSICIAN ASSISTANT

## 2018-07-25 ASSESSMENT — LIFESTYLE VARIABLES: TOBACCO_USE: 1

## 2018-07-25 NOTE — PATIENT INSTRUCTIONS
Preparing for Your Surgery      Name:  Orlin Alcantar   MRN:  [id number]   :  1950   Today's Date:  2018     Arriving for surgery:  Surgery date:  18  Arrival time:  5:30AM  Please come to:       [facility] [address]  [address] [place]  [address]    -   parking is available in front of the hospital from 5:15 am to 8:00 pm    -  Stop at the Information Desk in the lobby    -   Inform the information person that you are here for surgery. An escort to 3c will be provided. If you would not like an escort, please proceed to 3C on the 3rd floor. [phone]     What can I eat or drink?  -  You may have solid food or milk products until 1 day prior to your surgery.     - Begin Clear liquid diet on 18.  Keep well hydrated drinking 8-10 ounces of Gatorade or Clear Ensure (not dairy type) the evening prior to surgery and on the way to the hospital the day of surgery.  Stop Clear Liquids  2 hours prior to your surgery. 5:30AM    Which medicines can I take?   Stop Aspirin, vitamins and supplements one week prior to surgery.   Hold Ibuprofen and Naproxen for 24 hours prior to surgery.   -  Do NOT take these medications in the morning, the day of surgery:  Please do not take lisinopril the morning of surgery.     -  Please take these medications the day of surgery:  Please take the amlopdipine the morning of surgery.  You may take acetaminophen or Codeine as needed.      How do I prepare myself?  -  Take two showers: one the night before surgery; and one the morning of surgery.         Use Scrubcare or Hibiclens to wash from neck down.  You may use your own     shampoo and conditioner. No other hair products.   -  Do NOT use lotion, powder, deodorant, or antiperspirant the day of your surgery.  -  Do NOT wear any jewelry.  -  Begin using Incentive Spirometer 1 week prior to surgery.  Use 4 times per day, up    to 5 breaths each time.  Bring Incentive  Spirometer to hospital.  - Do not bring your own medications to the hospital, except for inhalers and eye   drops.  -  Bring your ID and insurance card.    Questions or Concerns:  -If you have questions or concerns regarding the day of surgery, please call [phone].     -For questions after surgery please call your surgeons office.           AFTER YOUR SURGERY  Breathing exercises   Breathing exercises help you recover faster. Take deep breaths and let the air out slowly. This will:     Help you wake up after surgery.    Help prevent complications like pneumonia.  Preventing complications will help you go home sooner.   We may give you a breathing device (incentive spirometer) to encourage you to breathe deeply.   Nausea and vomiting   You may feel sick to your stomach after surgery; if so, let your nurse know.    Pain control:  After surgery, you may have pain. Our goal is to help you manage your pain. Pain medicine will help you feel comfortable enough to do activities that will help you heal.  These activities may include breathing exercises, walking and physical therapy.   To help your health care team treat your pain we will ask: 1) If you have pain  2) where it is located 3) describe your pain in your words  Methods of pain control include medications given by mouth, vein or by nerve block for some surgeries.  We may give you a pain control pump that will:  1) Deliver the medicine through a tube placed in your vein  2) Control the amount of medicine you receive  3) Allow you to push a button to deliver a dose of pain medicine  Sequential Compression Device (SCD) or Pneumo Boots:  You may need to wear SCD S on your legs or feet. These are wraps connected to a machine that pumps in air and releases it. The repeated pumping helps prevent blood clots from forming.     Enhanced Recovery After Surgery     This is a team effort, including you, to get you back on your feet, eating and drinking normally and out of  the hospital as quickly as possible.  The goals are: 1) NO INFECTIONS and   2) RETURN TO NORMAL DIET    How can we achieve these goals?  1) STAY ACTIVE: Walk every day before your surgery; try to increase the amount every day.  Walk after surgery as much as you can-the nurses will help you.  Walking speeds healing and gets you home quicker, you heal better at home and have less risk of infection.     2) INCENTIVE SPIROMETER: Practice your incentive spirometer 4 times per day with 5-10 repetitions each time.  Using the incentive spirometer can strengthen your muscles between your ribs and help you have a strong cough after surgery.  A more effective cough can help prevent problems with your lungs.    3) STAY HYDRATED: Drink clear liquids up until 2 hours before your surgery. We would like you to purchase a drink such as Gatorade or Ensure Clear (not the milkshake type).  Drink this before bedtime and on the way into the hospital, drink between 8-12 ounces or until you feel hydrated.  Keeping well hydrated leads to your veins being plump, you wake up faster, and you are less likely to be nauseated. Start drinking water as soon as you can after surgery and advance to clear liquids and food as tolerated.  IV fluids contain salt, drinking fluids will minimize the amount of IV fluids you need and decrease the amount of salt you get.    The most common reason for the patient to be readmitted is dehydration. Staying hydrated after you go home from the hospital is very important.  Ensure or Ensure Clear are good options to keep you hydrated.     4) PAIN MANAGEMENT: If we minimize the amount of opioids and narcotics, and use regional blocks (which numb the area where your surgery is) along with oral pain medications; you will have less side effects of nausea and constipation. Narcotics can slow down your bowels and cause you to stay in the hospital longer.     Our goal is to keep you comfortable; eating and drinking normally  and back home safely.     Using an Incentive Spirometer    An incentive spirometer is a device that helps you do deep breathing exercises. These exercises expand your lungs, aid in circulation, and help prevent pneumonia. Deep breathing exercises also help you breathe better and improve the function of your lungs by:    Keeping your lungs clear    Strengthening your breathing muscles    Helping prevent respiratory complications or problems  The incentive spirometer gives you a way to take an active part in your care. A nurse or therapist will teach you breathing exercises. To do these exercises, you will breathe in through your mouth and not your nose. The incentive spirometer only works correctly if you breathe in through your mouth.    Steps to clear lungs  Step 1. Exhale normally. Then, inhale normally.    Relax and breathe out.  Step 2. Place your lips tightly around the mouthpiece.    Make sure the device is upright and not tilted.  Step 3. Inhale as much air as you can through the mouthpiece (don't breath through your nose).    Inhale slowly and deeply.    Hold your breath long enough to keep the balls or disk raised for at least 3 to 5 seconds, or as instructed by your healthcare provider.  Step 4. Repeat the exercise regularly.  Begin using the Incentive Spirometer one week prior to your surgery, 4 times per day-5 times each.

## 2018-07-25 NOTE — MR AVS SNAPSHOT
After Visit Summary   7/25/2018    Orlin Alcantar    MRN: [id number]           Patient Information     Date Of Birth          1950        Visit Information        Provider Department      7/25/2018 8:15 AM Nurse, [facility]rolog[facility]        Today's Diagnoses     Bladder cancer (H)    -  1       Follow-ups after your visit        Your next 10 appointments already scheduled     Jul 25, 2018 11:30 AM CDT   (Arrive by 11:15 AM)   PAC EVALUATION with Yumiko Ceron PA-C   [facility] Pre[facility] Assessment [facility] ([facility])    [address]  [address]th [facility] [address]   [phone]            Jul 25, 2018 12:30 PM CDT   (Arrive by 12:15 PM)   PAC RN ASSESSMENT with Gloria Pac Rn   [facility] Preoperative Assessment [place] ([facility])    [address] [place] [id number] [phone]            Jul 25, 2018  1:10 PM CDT   (Arrive by 12:55 PM)   PAC Anesthesia Consult with Gloria Pac Anesthesiologist   [facility] ([facility])    [address] [place] [id number] [phone]            Jul 25, 2018  1:30 PM CDT   RETURN BLEEDING DISORDER with Clement Griffin PA-C   [facility] ([facility])    [address]  [address]  [facility] [address]   [phone]            Jul 30, 2018   Procedure with Bebeto Shea MD   [facility] [place], Same Day Surgery (--)    [address] [facility] [address]   [phone]            Aug 22, 2018  1:30 PM CDT   POST OP OSTOMY NURSE with Orlin Ball, IRON   [facility] Wound Ostomy ([facility])    [address] [place] [address]   [phone]            Aug 22, 2018  3:00 PM CDT   (Arrive by 2:45 PM)    Post-Op with Bebeto Shea MD   [facility]rolog[facility] ([facility])    [address]  4th [address]  [facility] [id number] [phone]            Sep 11, 2018 10:15 AM CDT   Adult Med Follow UP with JELLY Chapa CNS   [facility] ([facility])    [address]  [address]  [facility] [id number] [phone]              Future tests that were ordered for you today     Open Future Orders        Priority Expected Expires Ordered    UA with Microscopic reflex to Culture Routine  7/24/2019 7/24/2018            Who to contact     Please call your clinic at [phone] to:    Ask questions about your health    Make or cancel appointments    Discuss your medicines    Learn about your test results    Speak to your doctor            Additional Information About Your Visit        [employer] Information     [employer] gives you secure access to your electronic health record. If you see a primary care provider, you can also send messages to your care team and make appointments. If you have questions, please call your primary care clinic.  If you do not have a primary care provider, please call [phone] and they will assist you.      [employer] is an electronic gateway that provides easy, online access to your medical records. With [employer], you can request a clinic appointment, read your test results, renew a prescription or communicate with your care team.     To access your existing account, please contact your [facility] or call [phone] for assistance.        Care EveryWhere ID     This is your Care EveryWhere ID. This could be used by other organizations to access your [place] medical records  [id number]         Blood Pressure from Last 3 Encounters:   07/12/18 130/89   07/11/18 109/77   07/11/18 109/77    Weight from Last 3 Encounters:    07/12/18 68.9 kg (152 lb)   07/11/18 68.9 kg (152 lb)   06/13/18 70.2 kg (154 lb 12.8 oz)              Today, you had the following     No orders found for display         Today's Medication Changes          These changes are accurate as of 7/25/18 10:10 AM.  If you have any questions, ask your nurse or doctor.               These medicines have changed or have updated prescriptions.        Dose/Directions    amLODIPine 5 MG tablet   Commonly known as:  NORVASC   This may have changed:  when to take this   Used for:  Hypertension goal BP (blood pressure) < 140/90        Dose:  5 mg   Take 1 tablet (5 mg) by mouth daily   Quantity:  90 tablet   Refills:  3       codeine 30 MG tablet   This may have changed:  when to take this   Used for:  Hemophilic arthropathy        Dose:  60 mg   Take 2 tablets (60 mg) by mouth every 6 hours as needed for moderate to severe pain (up to 8 tabs in 24 hours)   Quantity:  168 tablet   Refills:  0       lisinopril 40 MG tablet   Commonly known as:  PRINIVIL/ZESTRIL   This may have changed:  when to take this   Used for:  Hypertension goal BP (blood pressure) < 140/90        Dose:  40 mg   Take 1 tablet (40 mg) by mouth daily   Quantity:  90 tablet   Refills:  3                Primary Care Provider Office Phone # Fax #    Katie Ramos [phone] [address]  [facility] [address]        Equal Access to Services     ANDREW FLANAGAN AH: Hadii violette owens hadjayo Sofaiza, waaxda luqadaha, qaybta kaalmada adepeggyyada, lloyd rene . So [facility] [phone].    ATENCIÓN: Si habla español, tiene a hernadez disposición servicios gratuitos de asistencia lingüística. Llame al [phone].    We comply with applicable federal civil rights laws and [place] laws. We do not discriminate on the basis of race, color, national origin, age, disability, sex, sexual orientation, or gender identity.            Thank you!     Thank you for choosing [facility]  for your care. Our goal is always to provide you with excellent care. Hearing back from our patients is one way we can continue to improve our services. Please take a few minutes to complete the written survey that you may receive in the mail after your visit with us. Thank you!             Your Updated Medication List - Protect others around you: Learn how to safely use, store and throw away your medicines at www.disposemymeds.org.          This list is accurate as of 7/25/18 10:10 AM.  Always use your most recent med list.                   Brand Name Dispense Instructions for use Diagnosis    amLODIPine 5 MG tablet    NORVASC    90 tablet    Take 1 tablet (5 mg) by mouth daily    Hypertension goal BP (blood pressure) < 140/90       codeine 30 MG tablet     168 tablet    Take 2 tablets (60 mg) by mouth every 6 hours as needed for moderate to severe pain (up to 8 tabs in 24 hours)    Hemophilic arthropathy       * dexamethasone 4 MG tablet    DECADRON    24 tablet    Take 1 tablet (4 mg) by mouth 2 times daily (with meals)    Bladder tumor       * dexamethasone 4 MG tablet    DECADRON    18 tablet    Take 2 tablets (8 mg) by mouth daily Take for 3 days, starting the day after cisplatin (Day 2).    Urothelial carcinoma of bladder (H)       diazepam 5 MG tablet    VALIUM    30 tablet    Take up to one tablet by mouth daily as needed for anxiety    Depressive disorder       KOGENATE FS 1000 units Kit     73706 each    Infuse 3000 iu (-5/+10%) every other day    Severe hemophilia A (H), Personal history of malignant neoplasm of bladder       lisinopril 40 MG tablet    PRINIVIL/ZESTRIL    90 tablet    Take 1 tablet (40 mg) by mouth daily    Hypertension goal BP (blood pressure) < 140/90       LORazepam 0.5 MG tablet    ATIVAN    60 tablet    Take 1 tablet (0.5 mg) by mouth every 6 hours as needed for anxiety    Malignant neoplasm of overlapping sites of bladder (H)        mirtazapine 45 MG tablet    REMERON    37 tablet    Take 1 tablet (45 mg) by mouth At Bedtime And may take 1/2 tablet for insomnia up to 3 times a week.    Generalized anxiety disorder       ondansetron 8 MG ODT tab    ZOFRAN-ODT    30 tablet    Take 1 tablet (8 mg) by mouth every 8 hours as needed for nausea    Bladder tumor       * prochlorperazine 10 MG tablet    COMPAZINE    30 tablet    Take 0.5 tablets (5 mg) by mouth every 6 hours as needed for nausea or vomiting    Bladder tumor       * prochlorperazine 10 MG tablet    COMPAZINE    30 tablet    Take 1 tablet (10 mg) by mouth every 6 hours as needed (Nausea/Vomiting)    Urothelial carcinoma of bladder (H)       tamsulosin 0.4 MG capsule    FLOMAX    45 capsule    Take 1 capsule (0.4 mg) by mouth daily    Incomplete bladder emptying       TYLENOL PO      Take 500 mg by mouth every 4 hours as needed for mild pain or fever        * Notice:  This list has 4 medication(s) that are the same as other medications prescribed for you. Read the directions carefully, and ask your doctor or other care provider to review them with you.

## 2018-07-25 NOTE — NURSING NOTE
Pre Op Teaching Flowsheet       Pre and Post op Patient Education  Relevant Diagnosis:  Bladder cancer  Surgical procedure:  radical cystoprostatectomy ileal conduit bilateral pelvic lymphadenectomy  Teaching Topic:  Pre and post op teaching  Person Involved in teaching: Orlin Alcantar    Motivation Level:  Asks Questions: Yes  Eager to Learn:  Yes  Cooperative: Yes  Receptive (willing/able to accept information):  Yes    Patient demonstrates understanding of the following:  Date of surgery:  7/30/18  Location of surgery:  [address] [place] [address]  History and Physical and any other testing necessary prior to surgery: Yes  Required time line for completion of History and Physical and any pre-op testing: Yes    Patient demonstrates understanding of the following:  Pre-op bowel prep:  Clear liquids day prior with Fleets enema that night  Pre-op showering/scrub information with PCMX Soap: Yes  Blood thinner medications discussed and when to stop (if applicable):  Yes      Infection Prevention:   Patient demonstrates understanding of the following:  Surgical procedure site care taught: at time of discharge  Signs and symptoms of infection taught:  Yes      Post-op follow-up:  Discussed how to contact the hospital, nurse, and clinic scheduling staff if necessary.    Instructional materials used/given/mailed:  [place] [facility] Booklet, post op teaching sheet, Map, Soap, and arrival/location information.    Surgical instructions packet mailed to patient.    Patient given nutritional drink.  Patient does not have a  to bring him home, plans on taking a cab.  He also does not live with anyone.  I will reach out to the .

## 2018-07-25 NOTE — MR AVS SNAPSHOT
After Visit Summary   7/25/2018    Orlin Alcantar    MRN: [id number]           Patient Information     Date Of Birth          1950        Visit Information        Provider Department      7/25/2018 1:30 PM Clement Griffin PA-C [facility]        Today's Diagnoses     Severe hemophilia A (H)    -  1    Malignant neoplasm of urinary bladder, unspecified site (H)           Follow-ups after your visit        Follow-up notes from your care team     Return in about 3 weeks (around 8/13/2018).      Your next 10 appointments already scheduled     Jul 30, 2018   Procedure with Bebeto Shea MD   [facility], [place], Same Day Surgery (--)    [address] [facility] [id number] [phone]            Aug 22, 2018  1:30 PM CDT   POST OP OSTOMY NURSE with Orlin Ball RN   [facility] Wound Ostomy ([facility] [place])    [address] [place] [id number] [phone]            Aug 22, 2018  3:00 PM CDT   (Arrive by 2:45 PM)   Post-Op with Bebeto Shea MD   [facility] Inst for Prostate and Urologic Cancers ([facility] [place])    [address] [place] [id number] [phone]            Sep 11, 2018 10:15 AM CDT   Adult Med Follow UP with JELLY Chapa CNS   [facility] ([facility])    [place] [id number]  [address] [id number] [phone]              Who to contact     If you have questions or need follow up information about today's clinic visit or your schedule please contact [facility] directly at [phone].  Normal or non-critical lab and imaging results will be communicated to you by MyChart, letter or phone within 4 business days after the clinic has received the results. If you do not hear from us within 7 days, please contact the clinic through MyChart or  phone. If you have a critical or abnormal lab result, we will notify you by phone as soon as possible.  Submit refill requests through [employer] or call your pharmacy and they will forward the refill request to us. Please allow 3 business days for your refill to be completed.          Additional Information About Your Visit        [employer] Information     [employer] gives you secure access to your electronic health record. If you see a primary care provider, you can also send messages to your care team and make appointments. If you have questions, please call your primary care clinic.  If you do not have a primary care provider, please call [phone] and they will assist you.        Care EveryWhere ID     This is your Care EveryWhere ID. This could be used by other organizations to access your [place] medical records  [id number]         Blood Pressure from Last 3 Encounters:   07/25/18 120/77   07/12/18 130/89   07/11/18 109/77    Weight from Last 3 Encounters:   07/25/18 69.6 kg (153 lb 8 oz)   07/12/18 68.9 kg (152 lb)   07/11/18 68.9 kg (152 lb)              Today, you had the following     No orders found for display         Today's Medication Changes          These changes are accurate as of 7/25/18  3:21 PM.  If you have any questions, ask your nurse or doctor.               These medicines have changed or have updated prescriptions.        Dose/Directions    amLODIPine 5 MG tablet   Commonly known as:  NORVASC   This may have changed:  when to take this   Used for:  Hypertension goal BP (blood pressure) < 140/90        Dose:  5 mg   Take 1 tablet (5 mg) by mouth daily   Quantity:  90 tablet   Refills:  3       codeine 30 MG tablet   This may have changed:  when to take this   Used for:  Hemophilic arthropathy        Dose:  60 mg   Take 2 tablets (60 mg) by mouth every 6 hours as needed for moderate to severe pain (up to 8 tabs in 24 hours)   Quantity:  168 tablet   Refills:  0       diazepam 5 MG tablet    Commonly known as:  VALIUM   This may have changed:    - how much to take  - how to take this  - when to take this  - reasons to take this  - additional instructions   Used for:  Depressive disorder        Take up to one tablet by mouth daily as needed for anxiety   Quantity:  30 tablet   Refills:  5       KOGENATE FS 1000 units Kit   This may have changed:  additional instructions   Used for:  Severe hemophilia A (H), Personal history of malignant neoplasm of bladder        Infuse 3000 iu (-5/+10%) every other day   Quantity:  57040 each   Refills:  1       lisinopril 40 MG tablet   Commonly known as:  PRINIVIL/ZESTRIL   This may have changed:  when to take this   Used for:  Hypertension goal BP (blood pressure) < 140/90        Dose:  40 mg   Take 1 tablet (40 mg) by mouth daily   Quantity:  90 tablet   Refills:  3                Primary Care Provider Office Phone # Fax #    Katie Ramos [phone]       [address] [facility] [address]        Equal Access to Services     ANDREW FLANAGAN : Hadii aad ku hadasho Sofaiza, waaxda luqadaha, qaybta kaalmada adeegyada, lloyd rene . So [facility] [phone].    ATENCIÓN: Si habla español, tiene a hernadez disposición servicios gratuitos de asistencia lingüística. [facility] [phone].    We comply with applicable federal civil rights laws and [place] laws. We do not discriminate on the basis of race, color, national origin, age, disability, sex, sexual orientation, or gender identity.            Thank you!     Thank you for choosing [facility]  for your care. Our goal is always to provide you with excellent care. Hearing back from our patients is one way we can continue to improve our services. Please take a few minutes to complete the written survey that you may receive in the mail after your visit with us. Thank you!             Your Updated Medication List - Protect others around you:  Learn how to safely use, store and throw away your medicines at www.disposemymeds.org.          This list is accurate as of 7/25/18  3:21 PM.  Always use your most recent med list.                   Brand Name Dispense Instructions for use Diagnosis    amLODIPine 5 MG tablet    NORVASC    90 tablet    Take 1 tablet (5 mg) by mouth daily    Hypertension goal BP (blood pressure) < 140/90       codeine 30 MG tablet     168 tablet    Take 2 tablets (60 mg) by mouth every 6 hours as needed for moderate to severe pain (up to 8 tabs in 24 hours)    Hemophilic arthropathy       diazepam 5 MG tablet    VALIUM    30 tablet    Take up to one tablet by mouth daily as needed for anxiety    Depressive disorder       KOGENATE FS 1000 units Kit     18249 each    Infuse 3000 iu (-5/+10%) every other day    Severe hemophilia A (H), Personal history of malignant neoplasm of bladder       lisinopril 40 MG tablet    PRINIVIL/ZESTRIL    90 tablet    Take 1 tablet (40 mg) by mouth daily    Hypertension goal BP (blood pressure) < 140/90       mirtazapine 45 MG tablet    REMERON    37 tablet    Take 1 tablet (45 mg) by mouth At Bedtime And may take 1/2 tablet for insomnia up to 3 times a week.    Generalized anxiety disorder       TYLENOL PO      Take 500 mg by mouth as needed for mild pain or fever

## 2018-07-25 NOTE — H&P
Pre-Operative H & P     CC:  Preoperative exam to assess for increased cardiopulmonary risk while undergoing surgery and anesthesia.    Date of Encounter: July 25, 2018   Primary Care Physician:  Katie Ramos   Reason for Visit/Surgery:  Urothelial carcinoma of bladder (H) [C67.9]      HPI  Orlin Alcantar is a 68 year old male who presents for pre-operative H & P in preparation for a Radical Cystoprostatectomy, Ileal Conduit, Bilateral Pelvic Lymphadenectomy, on 7/30/18 with Dr. Shea for bladder cancer at the [facility].     Mr. Alcantar has high-grade muscular invasive bladder cancer diagnosed this past January 2018 and underwent TURBT on 2/19/18.   He has completed neoadjuvant chemotherapy and the next step in care is the above surgery.  He tolerated chemo well.  He denies any CP, FARRIS, orthopnea, syncope or palpitations.    His PMH is significant for severe hemophilia and is followed closely by Dr. Marley.  The patient has no known cardiopulmonary disease.          Objective:  /89 (BP Location: Left arm, Patient Position: Sitting, Cuff Size: Adult Regular)  Pulse 81  Ht 1.829 m (6')  Wt 68.9 kg (152 lb)  SpO2 96%  BMI 20.61 kg/m2  Gen: In NAD, conversant.  Resp: Breathing non-labored  CV: Extremities warm.  Abd: Soft, non-distended, non-tender.     PET 7/9/18  IMPRESSION: In this patient with     History is obtained from the patient and  medical record including Care Everywhere.        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      Bladder tumor      Depression      DM II (diabetes mellitus, type II), controlled (H)     diet controlled     Hemophilia (H)     Type A     History of hepatitis C     treated successfully     HTN (hypertension)         Past Surgical History  Past Surgical History:   Procedure Laterality Date     ARTHROPLASTY REVISION HIP  4/26/2012    Procedure:ARTHROPLASTY  REVISION HIP; Surgeon:ALEJANDRA RAYMOND; Location:[facility] OR     CYSTOSCOPY, TRANSURETHRAL RESECTION (TUR) TUMOR BLADDER, COMBINED N/A 2/19/2018    Procedure: COMBINED CYSTOSCOPY, TRANSURETHRAL RESECTION (TUR) TUMOR BLADDER;  Cystoscopy, Transurethral Resection Of Bladder Tumor ;  Surgeon: Cassidy Liao MD;  Location: [facility] OR     INJECT STEROID (LOCATION)      right hip x 2, 2 weeks before.      left total knee arthroplasty  07/1992     pins in left hip      hip fracture     radioactive synovectomy  03/2003     right total hip arthroplasty  12/1992     SYNOVECTOMY HIP  4/26/2012    Procedure:SYNOVECTOMY HIP; Right Hip Open Synovectomy, Right Total Hip Revision with Exchange of Poly Liner and Head; Surgeon:ALEJANDRA RAYMOND; Location:[facility] OR       Hx of Blood transfusions/reactions: No reaction to previous blood transfusion      Personal or FH with difficulty with Anesthesia:  None    Prior to Admission Medications  Current Outpatient Prescriptions   Medication Sig Dispense Refill     Acetaminophen (TYLENOL PO) Take 500 mg by mouth as needed for mild pain or fever        amLODIPine (NORVASC) 5 MG tablet Take 1 tablet (5 mg) by mouth daily (Patient taking differently: Take 5 mg by mouth every morning ) 90 tablet 3     Antihemophilic Factor, Recomb, (KOGENATE FS) 1000 units KIT Infuse 3000 iu (-5/+10%) every other day (Patient taking differently: Infuse 3000 iu  Sun, Mon, Wed, Fri  Infuse 1000 units  Tue, Thur, Sat) [address] each 1     codeine 30 MG tablet Take 2 tablets (60 mg) by mouth every 6 hours as needed for moderate to severe pain (up to 8 tabs in 24 hours) (Patient taking differently: Take 60 mg by mouth as needed for moderate to severe pain (up to 8 tabs in 24 hours) ) 168 tablet 0     diazepam (VALIUM) 5 MG tablet Take up to one tablet by mouth daily as needed for anxiety (Patient taking differently: Take 5 mg by mouth as needed Take up to one tablet by mouth daily as needed for anxiety) 30 tablet 5      lisinopril (PRINIVIL/ZESTRIL) 40 MG tablet Take 1 tablet (40 mg) by mouth daily (Patient taking differently: Take 40 mg by mouth every morning ) 90 tablet 3     mirtazapine (REMERON) 45 MG tablet Take 1 tablet (45 mg) by mouth At Bedtime And may take 1/2 tablet for insomnia up to 3 times a week. 37 tablet 5         Allergies  Aspirin     Social History  Social History     Social History     Marital status: Single     Spouse name: N/A     Number of children: 0     Years of education: N/A     Occupational History     Not on file.     Social History Main Topics     Smoking status: Former Smoker     Packs/day: 1.00     Years: 20.00     Quit date: 6/30/2013     Smokeless tobacco: Never Used      Comment: e cig      Alcohol use No     Drug use: No     Sexual activity: Yes     Partners: Female     Birth control/ protection: Condom     Other Topics Concern     Parent/Sibling W/ Cabg, Mi Or Angioplasty Before 65f 55m? No     Social History Narrative          Family History  No family history of bleeding, clotting disorders or complications with anesthesia.    ROS/MED HX     ENT/Pulmonary:     (+)tobacco use, Past use 1 PPD for 20 years, quit 2013 packs/day  , . .    Neurologic:  - neg neurologic ROS     Cardiovascular:     (+) hypertension----. : . . . :. . Previous cardiac testing date:results:date: results:ECG reviewed date:2/7/2018 results:NSR, possible LVH date: results:        METS/Exercise Tolerance:  3 - Able to walk 1-2 blocks without stopping   Hematologic:     (+) Other Hematologic Disorder-Hemophillia    Musculoskeletal:   (+) , , other musculoskeletal- hemophilic arthropathy Left TKA, SUNG    GI/Hepatic:  - neg GI/hepatic ROS     Renal/Genitourinary:     (+) Other Renal/ Genitourinary, blader tumor    Endo:  - neg endo ROS   (+) type II DM Last HgA1c: 5.9 date: 2/21/18 Not using insulin not previously admitted for DM/DKA .   (-) Type I DM   Psychiatric:  - neg psychiatric ROS   (+) psychiatric history anxiety  and depression    Infectious Disease:  - neg infectious disease ROS     Malignancy:   (+) Malignancy History of Other  Other CA bladder Active status post       Other:    (+) no H/O Chronic Pain,         Cardiology Tests: (personally reviewed):   Review Results Below in A/P    Labs: (personally reviewed):  Lab Results   Component Value Date    WBC 6.5 07/11/2018    HGB 10.7 (L) 07/11/2018    HCT 32.8 (L) 07/11/2018     07/11/2018    INR 1.02 03/28/2018    PTT 36 03/28/2018     07/11/2018    POTASSIUM 3.8 07/11/2018    MARLA 9.3 07/11/2018    GLC 98 07/11/2018    CR 0.88 07/11/2018    GFR 87 03/19/2018    BUN 18 07/11/2018    CO2 25 07/11/2018    ALT 15 07/11/2018    AST 16 07/11/2018    BILITOTAL 0.4 07/11/2018    ALKPHOS 99 07/11/2018          Physical Exam:  No LMP for male patient.   Vital signs:  /77  Pulse 102  Temp 97.9  F (36.6  C) (Oral)  Resp 16  Ht 1.829 m (6')  Wt 69.6 kg (153 lb 8 oz)  SpO2 98%  BMI 20.82 kg/m2    Constitutional: Awake, alert, cooperative, no apparent distress, and appears stated age.  Eyes: Pupils equal, round and reactive to light, sclera clear, conjunctiva normal.  HENT: Normocephalic, oral pharynx with moist mucus membranes. No goiter appreciated.   Respiratory: Clear to auscultation bilaterally, no crackles or wheezing.  Cardiovascular: Regular rate and rhythm and no overt murmur noted.  No carotid bruits auscultated. No edema. Palpable pulses to radial  DP and PT arteries.   GI: Normal bowel sounds, soft, non-distended, non-tender, no masses palpated  Skin: Warm and dry.  No rashes at anticipated surgical site.   Musculoskeletal: Full extension of the neck.  No redness, warmth, or swelling of exposed joints noted. Gross motor strength is normal.    Neurologic: Awake, alert, oriented to name, place and time.  Gait is normal.   Neuropsychiatric: Calm, cooperative. Normal affect.     Assessment/Plan  Orlin Alcantar is a 68 year old male who presents for  pre-operative H & P in preparation for a Radical Cystoprostatectomy, Ileal Conduit, Bilateral Pelvic Lymphadenectomy, on 7/30/18 with Dr. Shea for bladder cancer at the [facility].    PAC referral for risk assessment and optimization for anesthesia with comorbid conditions of:    Pre-operative considerations:  1.  Cardiac:  Functional status METS =3   Risk of Major Adverse Cardiac event: 0.9%  -HTN well controlled on amlodipine(cont DOS), lisinopril (hold DOS)  -EKG 2/7/18:  NSR, possibly LVH  2.  Pulm:   LILLIE risk:  Intermediate  -Former smoker 20 pack years, quit 2013  -No known pulmonary disease    3.  GI:  Risk of PONV score = 2 .  If > 2, anti-emetic intervention recommended.  4.  Heme:  -Severe hemophilia followed by Dr. Marley who will provide instruction regarding surgery      Patient is optimized and is an acceptable candidate for the proposed procedure.  No further diagnostic evaluation is needed.      AVS given to patient regarding medication instructions,  surgery time/arrival time and NPO status.  Yumiko Ceron MS PA-C   Preoperative Assessment Center  [facility]  Phone: [phone]  Fax: [phone]

## 2018-07-25 NOTE — PROGRESS NOTES
WOC Preoperative Ostomy Consult    Referral from Dr. Shea  Consult attended by patient   Diagnosis: Bladder Cancer Date of Surgery: 7/30/18  Type of Surgery: Radical Cystoprostatectomy, Ileal Conduit, Bilateral Pelvic Lymphadenectomy  Emotional readiness for surgery: no acute distress  Physical Limitations: With the following limitations:  Many creases  Abdomen assessed for site by: Patient's ability to visiualize area, avoidance of skin creases and scars, palpating for rectus abdominus muscle and clothing fit  Teaching: Anatomy/picture of stoma, stoma/bowel function postop, intro to pouches, returning to work/lifting, written/media offered and role of WOC/postop followup explained.  Stoma site marking:  Marking pen and tegaderm   Location chosen: RLQ  [employer]'s Colostomy packet given to patient      Dr. Baca was available for supervision of care if needed or if questions should arise and regarding plan of care.      Orlin Ball RN CWON

## 2018-07-25 NOTE — MR AVS SNAPSHOT
After Visit Summary   2018    Orlin Alcantar    MRN: [id number]           Patient Information     Date Of Birth          1950        Visit Information        Provider Department      2018 12:30 PM Rn, [facility] Preoperative [facility]        Care Instructions    Preparing for Your Surgery      Name:  Orlin Alcantar   MRN:  [id number]   :  1950   Today's Date:  2018     Arriving for surgery:  Surgery date:  18  Arrival time:  5:30AM  Please come to:       [facility] [address]  [address] [place]  [address]    -  [facility] [facility] parking is available in front of the hospital from 5:15 am to 8:00 pm    -  Stop at the Information Desk in the lobby    -   Inform the information person that you are here for surgery. An escort to 3c will be provided. If you would not like an escort, please proceed to 3C on the 3rd floor. [phone]     What can I eat or drink?  -  You may have solid food or milk products until 1 day prior to your surgery.     - Begin Clear liquid diet on 18.  Keep well hydrated drinking 8-10 ounces of Gatorade or Clear Ensure (not dairy type) the evening prior to surgery and on the way to the hospital the day of surgery.  Stop Clear Liquids  2 hours prior to your surgery. 5:30AM    Which medicines can I take?   Stop Aspirin, vitamins and supplements one week prior to surgery.   Hold Ibuprofen and Naproxen for 24 hours prior to surgery.   -  Do NOT take these medications in the morning, the day of surgery:  Please do not take lisinopril the morning of surgery.     -  Please take these medications the day of surgery:  Please take the amlopdipine the morning of surgery.  You may take acetaminophen or Codeine as needed.      How do I prepare myself?  -  Take two showers: one the night before surgery; and one the morning of surgery.         Use Scrubcare or Hibiclens to wash from neck down.  You may use your own      shampoo and conditioner. No other hair products.   -  Do NOT use lotion, powder, deodorant, or antiperspirant the day of your surgery.  -  Do NOT wear any jewelry.  -  Begin using Incentive Spirometer 1 week prior to surgery.  Use 4 times per day, up    to 5 breaths each time.  Bring Incentive Spirometer to hospital.  - Do not bring your own medications to the hospital, except for inhalers and eye   drops.  -  Bring your ID and insurance card.    Questions or Concerns:  -If you have questions or concerns regarding the day of surgery, please call [phone].     -For questions after surgery please call your surgeons office.           AFTER YOUR SURGERY  Breathing exercises   Breathing exercises help you recover faster. Take deep breaths and let the air out slowly. This will:     Help you wake up after surgery.    Help prevent complications like pneumonia.  Preventing complications will help you go home sooner.   We may give you a breathing device (incentive spirometer) to encourage you to breathe deeply.   Nausea and vomiting   You may feel sick to your stomach after surgery; if so, let your nurse know.    Pain control:  After surgery, you may have pain. Our goal is to help you manage your pain. Pain medicine will help you feel comfortable enough to do activities that will help you heal.  These activities may include breathing exercises, walking and physical therapy.   To help your health care team treat your pain we will ask: 1) If you have pain  2) where it is located 3) describe your pain in your words  Methods of pain control include medications given by mouth, vein or by nerve block for some surgeries.  We may give you a pain control pump that will:  1) Deliver the medicine through a tube placed in your vein  2) Control the amount of medicine you receive  3) Allow you to push a button to deliver a dose of pain medicine  Sequential Compression Device (SCD) or Pneumo Boots:  You may need to wear SCD S on your  legs or feet. These are wraps connected to a machine that pumps in air and releases it. The repeated pumping helps prevent blood clots from forming.     Enhanced Recovery After Surgery     This is a team effort, including you, to get you back on your feet, eating and drinking normally and out of the hospital as quickly as possible.  The goals are: 1) NO INFECTIONS and   2) RETURN TO NORMAL DIET    How can we achieve these goals?  1) STAY ACTIVE: Walk every day before your surgery; try to increase the amount every day.  Walk after surgery as much as you can-the nurses will help you.  Walking speeds healing and gets you home quicker, you heal better at home and have less risk of infection.     2) INCENTIVE SPIROMETER: Practice your incentive spirometer 4 times per day with 5-10 repetitions each time.  Using the incentive spirometer can strengthen your muscles between your ribs and help you have a strong cough after surgery.  A more effective cough can help prevent problems with your lungs.    3) STAY HYDRATED: Drink clear liquids up until 2 hours before your surgery. We would like you to purchase a drink such as Gatorade or Ensure Clear (not the milkshake type).  Drink this before bedtime and on the way into the hospital, drink between 8-12 ounces or until you feel hydrated.  Keeping well hydrated leads to your veins being plump, you wake up faster, and you are less likely to be nauseated. Start drinking water as soon as you can after surgery and advance to clear liquids and food as tolerated.  IV fluids contain salt, drinking fluids will minimize the amount of IV fluids you need and decrease the amount of salt you get.    The most common reason for the patient to be readmitted is dehydration. Staying hydrated after you go home from the hospital is very important.  Ensure or Ensure Clear are good options to keep you hydrated.     4) PAIN MANAGEMENT: If we minimize the amount of opioids and narcotics, and use regional  blocks (which numb the area where your surgery is) along with oral pain medications; you will have less side effects of nausea and constipation. Narcotics can slow down your bowels and cause you to stay in the hospital longer.     Our goal is to keep you comfortable; eating and drinking normally and back home safely.     Using an Incentive Spirometer    An incentive spirometer is a device that helps you do deep breathing exercises. These exercises expand your lungs, aid in circulation, and help prevent pneumonia. Deep breathing exercises also help you breathe better and improve the function of your lungs by:    Keeping your lungs clear    Strengthening your breathing muscles    Helping prevent respiratory complications or problems  The incentive spirometer gives you a way to take an active part in your care. A nurse or therapist will teach you breathing exercises. To do these exercises, you will breathe in through your mouth and not your nose. The incentive spirometer only works correctly if you breathe in through your mouth.    Steps to clear lungs  Step 1. Exhale normally. Then, inhale normally.    Relax and breathe out.  Step 2. Place your lips tightly around the mouthpiece.    Make sure the device is upright and not tilted.  Step 3. Inhale as much air as you can through the mouthpiece (don't breath through your nose).    Inhale slowly and deeply.    Hold your breath long enough to keep the balls or disk raised for at least 3 to 5 seconds, or as instructed by your healthcare provider.  Step 4. Repeat the exercise regularly.  Begin using the Incentive Spirometer one week prior to your surgery, 4 times per day-5 times each.          Follow-ups after your visit        Your next 10 appointments already scheduled     Jul 25, 2018 12:30 PM CDT   (Arrive by 12:15 PM)   PAC RN ASSESSMENT with Gloria Pac Rn   [facility] Preoperative Assessment Center ([facility])    [address] [place] [address]  [facility] [address]   [phone]            Jul 25, 2018  1:10 PM CDT   (Arrive by 12:55 PM)   PAC Anesthesia Consult with  Pac Anesthesiologist   [facility] Preoperative Assessment Center ([facility])    [address] [place] [id number] [phone]            Jul 25, 2018  1:30 PM CDT   RETURN BLEEDING DISORDER with Clement Griffin PA-C   [facility] ([facility])    [address]  [address]  [facility] [address] [phone]            Jul 30, 2018   Procedure with Bebeto Shea MD   [facility] [place], Same Day Surgery (--)    [address] [facility] [address] [phone]            Aug 22, 2018  1:30 PM CDT   POST OP OSTOMY NURSE with Orlin Ball RN   [facility] Wound Ostomy ([facility])    [address] [place] [id number] [phone]            Aug 22, 2018  3:00 PM CDT   (Arrive by 2:45 PM)   Post-Op with Bebeto Shea MD   [facility] Urology and Inst for Prostate and Urologic Cancers ([facility])    [address] [place] [address] [phone]            Sep 11, 2018 10:15 AM CDT   Adult Med Follow UP with JELLY Chapa CNS   [facility]iatry Clinic ([facility])    [address]  [address] [place] [id number] [phone]              Future tests that were ordered for you today     Open Future Orders        Priority Expected Expires Ordered    ABO/Rh type and screen Routine 7/25/2018 8/24/2018 7/25/2018    Hemoglobin A1c Routine 7/25/2018 8/24/2018 7/25/2018    UA with Microscopic reflex to Culture Routine  7/24/2019 7/24/2018            Who to contact     Please call your clinic at [phone] to:    Ask questions about your health    Make or cancel appointments    Discuss your  medicines    Learn about your test results    Speak to your doctor            Additional Information About Your Visit        [employer] Information     weendy gives you secure access to your electronic health record. If you see a primary care provider, you can also send messages to your care team and make appointments. If you have questions, please call your primary care clinic.  If you do not have a primary care provider, please call [phone] and they will assist you.      weendy is an electronic gateway that provides easy, online access to your medical records. With weendy, you can request a clinic appointment, read your test results, renew a prescription or communicate with your care team.     To access your existing account, please contact your [facility] or call [phone] for assistance.        Care EveryWhere ID     This is your Care EveryWhere ID. This could be used by other organizations to access your [place] medical records  [id number]         Blood Pressure from Last 3 Encounters:   07/25/18 120/77   07/12/18 130/89   07/11/18 109/77    Weight from Last 3 Encounters:   07/25/18 69.6 kg (153 lb 8 oz)   07/12/18 68.9 kg (152 lb)   07/11/18 68.9 kg (152 lb)              Today, you had the following     No orders found for display         Today's Medication Changes          These changes are accurate as of 7/25/18 12:06 PM.  If you have any questions, ask your nurse or doctor.               These medicines have changed or have updated prescriptions.        Dose/Directions    amLODIPine 5 MG tablet   Commonly known as:  NORVASC   This may have changed:  when to take this   Used for:  Hypertension goal BP (blood pressure) < 140/90        Dose:  5 mg   Take 1 tablet (5 mg) by mouth daily   Quantity:  90 tablet   Refills:  3       codeine 30 MG tablet   This may have changed:  when to take this   Used for:  Hemophilic arthropathy        Dose:  60 mg   Take 2 tablets (60  mg) by mouth every 6 hours as needed for moderate to severe pain (up to 8 tabs in 24 hours)   Quantity:  168 tablet   Refills:  0       diazepam 5 MG tablet   Commonly known as:  VALIUM   This may have changed:    - how much to take  - how to take this  - when to take this  - reasons to take this  - additional instructions   Used for:  Depressive disorder        Take up to one tablet by mouth daily as needed for anxiety   Quantity:  30 tablet   Refills:  5       KOGENATE FS 1000 units Kit   This may have changed:  additional instructions   Used for:  Severe hemophilia A (H), Personal history of malignant neoplasm of bladder        Infuse 3000 iu (-5/+10%) every other day   Quantity:  [address] each   Refills:  1       lisinopril 40 MG tablet   Commonly known as:  PRINIVIL/ZESTRIL   This may have changed:  when to take this   Used for:  Hypertension goal BP (blood pressure) < 140/90        Dose:  40 mg   Take 1 tablet (40 mg) by mouth daily   Quantity:  90 tablet   Refills:  3                Primary Care Provider Office Phone # Fax #    Katie Ramos [phone] [address] [place], [place] [id number]        Equal Access to Services     [facility] : Hadgeeta gar Sofaiza, wapamda lusarah, qaybta kaalmaondina huddleston, lloyd rene . So [facility] [phone].    ATENCIÓN: Si habla español, tiene a hernadez disposición servicios gratuitos de asistencia lingüística. [facility] [phone].    We comply with applicable federal civil rights laws and [place] laws. We do not discriminate on the basis of race, color, national origin, age, disability, sex, sexual orientation, or gender identity.            Thank you!     Thank you for choosing [facility] PREOPERATIVE ASSESSMENT [facility]ENTER  for your care. Our goal is always to provide you with excellent care. Hearing back from our patients is one way we can continue to improve our services. Please take a few minutes to complete the  written survey that you may receive in the mail after your visit with us. Thank you!             Your Updated Medication List - Protect others around you: Learn how to safely use, store and throw away your medicines at www.disposemymeds.org.          This list is accurate as of 7/25/18 12:06 PM.  Always use your most recent med list.                   Brand Name Dispense Instructions for use Diagnosis    amLODIPine 5 MG tablet    NORVASC    90 tablet    Take 1 tablet (5 mg) by mouth daily    Hypertension goal BP (blood pressure) < 140/90       codeine 30 MG tablet     168 tablet    Take 2 tablets (60 mg) by mouth every 6 hours as needed for moderate to severe pain (up to 8 tabs in 24 hours)    Hemophilic arthropathy       diazepam 5 MG tablet    VALIUM    30 tablet    Take up to one tablet by mouth daily as needed for anxiety    Depressive disorder       KOGENATE FS 1000 units Kit     42995 each    Infuse 3000 iu (-5/+10%) every other day    Severe hemophilia A (H), Personal history of malignant neoplasm of bladder       lisinopril 40 MG tablet    PRINIVIL/ZESTRIL    90 tablet    Take 1 tablet (40 mg) by mouth daily    Hypertension goal BP (blood pressure) < 140/90       mirtazapine 45 MG tablet    REMERON    37 tablet    Take 1 tablet (45 mg) by mouth At Bedtime And may take 1/2 tablet for insomnia up to 3 times a week.    Generalized anxiety disorder       TYLENOL PO      Take 500 mg by mouth as needed for mild pain or fever

## 2018-07-25 NOTE — MR AVS SNAPSHOT
After Visit Summary   7/25/2018    Orlin Alcantar    MRN: [id number]           Patient Information     Date Of Birth          1950        Visit Information        Provider Department      7/25/2018 8:30 AM Orlin Ball RN [facility] Wound Ostomy        Today's Diagnoses     Ostomy nurse consultation    -  1       Follow-ups after your visit        Your next 10 appointments already scheduled     Jul 30, 2018   Procedure with Bebeto Shea MD   [facility] [place], Same Day Surgery (--)    [address]  [facility] [id number] [phone]            Aug 22, 2018  1:30 PM CDT   POST OP OSTOMY NURSE with Orlin Ball RN   [facility] Wound Ostomy ([facility])    [address] [place] [address] [phone]            Aug 22, 2018  3:00 PM CDT   (Arrive by 2:45 PM)   Post-Op with Bebeto Shea MD   [facility] Urology and Inst for Prostate and Urologic Cancers ([facility])    [address] [place] [id number] [phone]            Sep 11, 2018 10:15 AM CDT   Adult Med Follow UP with JELLY Chapa CNS   Psychiatry Clinic ([facility])    [place] [id number]  [address] [place] [id number] [phone]              Who to contact     Please call your clinic at [phone] to:    Ask questions about your health    Make or cancel appointments    Discuss your medicines    Learn about your test results    Speak to your doctor            Additional Information About Your Visit        MyChart Information     [employer] gives you secure access to your electronic health record. If you see a primary care provider, you can also send messages to your care team and make appointments. If you have questions, please call your primary care clinic.  If you do not have a primary care provider, please call [phone] and they will assist you.       [facility] is an electronic gateway that provides easy, online access to your medical records. With [facility], you can request a clinic appointment, read your test results, renew a prescription or communicate with your care team.     To access your existing account, please contact your [facility] or call [phone] for assistance.        Care EveryWhere ID     This is your Care EveryWhere ID. This could be used by other organizations to access your [place] medical records  [id number]         Blood Pressure from Last 3 Encounters:   07/25/18 120/77   07/12/18 130/89   07/11/18 109/77    Weight from Last 3 Encounters:   07/25/18 69.6 kg (153 lb 8 oz)   07/12/18 68.9 kg (152 lb)   07/11/18 68.9 kg (152 lb)              Today, you had the following     No orders found for display         Today's Medication Changes          These changes are accurate as of 7/25/18  4:57 PM.  If you have any questions, ask your nurse or doctor.               These medicines have changed or have updated prescriptions.        Dose/Directions    amLODIPine 5 MG tablet   Commonly known as:  NORVASC   This may have changed:  when to take this   Used for:  Hypertension goal BP (blood pressure) < 140/90        Dose:  5 mg   Take 1 tablet (5 mg) by mouth daily   Quantity:  90 tablet   Refills:  3       codeine 30 MG tablet   This may have changed:  when to take this   Used for:  Hemophilic arthropathy        Dose:  60 mg   Take 2 tablets (60 mg) by mouth every 6 hours as needed for moderate to severe pain (up to 8 tabs in 24 hours)   Quantity:  168 tablet   Refills:  0       diazepam 5 MG tablet   Commonly known as:  VALIUM   This may have changed:    - how much to take  - how to take this  - when to take this  - reasons to take this  - additional instructions   Used for:  Depressive disorder        Take up to one tablet by mouth daily as needed for anxiety   Quantity:  30 tablet   Refills:  5       KOGENATE FS 1000  units Kit   This may have changed:  additional instructions   Used for:  Severe hemophilia A (H), Personal history of malignant neoplasm of bladder        Infuse 3000 iu (-5/+10%) every other day   Quantity:  [address] each   Refills:  1       lisinopril 40 MG tablet   Commonly known as:  PRINIVIL/ZESTRIL   This may have changed:  when to take this   Used for:  Hypertension goal BP (blood pressure) < 140/90        Dose:  40 mg   Take 1 tablet (40 mg) by mouth daily   Quantity:  90 tablet   Refills:  3                Primary Care Provider Office Phone # Fax #    Katie Ramos [phone] [address] [facility] [address]        Equal Access to Services     ANDREW FLANAGAN : Suleman Colmenares, walashon eastman, qanatalio kaalmada karo, lloyd blum. So [facility] [phone].    ATENCIÓN: Si habla español, tiene a hernadez disposición servicios gratuitos de asistencia lingüística. Llame al [phone].    We comply with applicable federal civil rights laws and [place] laws. We do not discriminate on the basis of race, color, national origin, age, disability, sex, sexual orientation, or gender identity.            Thank you!     Thank you for choosing [facility] OSTOMY  for your care. Our goal is always to provide you with excellent care. Hearing back from our patients is one way we can continue to improve our services. Please take a few minutes to complete the written survey that you may receive in the mail after your visit with us. Thank you!             Your Updated Medication List - Protect others around you: Learn how to safely use, store and throw away your medicines at www.disposemymeds.org.          This list is accurate as of 7/25/18  4:57 PM.  Always use your most recent med list.                   Brand Name Dispense Instructions for use Diagnosis    amLODIPine 5 MG tablet    NORVASC    90 tablet    Take 1 tablet (5 mg) by mouth daily     Hypertension goal BP (blood pressure) < 140/90       codeine 30 MG tablet     168 tablet    Take 2 tablets (60 mg) by mouth every 6 hours as needed for moderate to severe pain (up to 8 tabs in 24 hours)    Hemophilic arthropathy       diazepam 5 MG tablet    VALIUM    30 tablet    Take up to one tablet by mouth daily as needed for anxiety    Depressive disorder       KOGENATE FS 1000 units Kit     61426 each    Infuse 3000 iu (-5/+10%) every other day    Severe hemophilia A (H), Personal history of malignant neoplasm of bladder       lisinopril 40 MG tablet    PRINIVIL/ZESTRIL    90 tablet    Take 1 tablet (40 mg) by mouth daily    Hypertension goal BP (blood pressure) < 140/90       mirtazapine 45 MG tablet    REMERON    37 tablet    Take 1 tablet (45 mg) by mouth At Bedtime And may take 1/2 tablet for insomnia up to 3 times a week.    Generalized anxiety disorder       TYLENOL PO      Take 500 mg by mouth as needed for mild pain or fever

## 2018-07-25 NOTE — ANESTHESIA PREPROCEDURE EVALUATION
Anesthesia Evaluation     . Pt has had prior anesthetic. Type: General and MAC    No history of anesthetic complications          ROS/MED HX    ENT/Pulmonary:     (+)tobacco use, Past use 1 PPD for 20 years, quit 2013 packs/day  , . .    Neurologic:  - neg neurologic ROS     Cardiovascular:     (+) hypertension----. : . . . :. . Previous cardiac testing date:results:date: results:ECG reviewed date:2/7/2018 results:NSR, possible LVH date: results:          METS/Exercise Tolerance:  3 - Able to walk 1-2 blocks without stopping   Hematologic:     (+) Other Hematologic Disorder-Hemophillia      Musculoskeletal:   (+) , , other musculoskeletal- hemophilic arthropathy Left TKA, SUNG      GI/Hepatic:  - neg GI/hepatic ROS       Renal/Genitourinary:     (+) Other Renal/ Genitourinary, blader tumor      Endo:  - neg endo ROS   (+) type II DM Last HgA1c: 5.9 date: 2/21/18 Not using insulin not previously admitted for DM/DKA .   (-) Type I DM   Psychiatric:  - neg psychiatric ROS   (+) psychiatric history anxiety and depression      Infectious Disease:  - neg infectious disease ROS       Malignancy:   (+) Malignancy History of Other  Other CA bladder Active status post         Other:    (+) no H/O Chronic Pain,                   Physical Exam  Normal systems: cardiovascular, pulmonary and dental    Airway   Mallampati: II  TM distance: >3 FB  Neck ROM: full    Dental     Cardiovascular   Rhythm and rate: regular and normal      Pulmonary    breath sounds clear to auscultation               PAC Discussion and Assessment    ASA Classification: 3  Case is suitable for: [place]  Anesthetic techniques and relevant risks discussed:   Invasive monitoring and risk discussed:   Types:   Possibility and Risk of blood transfusion discussed:   NPO instructions given:   Additional anesthetic preparation and risks discussed:   Needs early admission to pre-op area:   Other:     PAC Resident/NP Anesthesia Assessment:  Orlin Alcantar is a 68  year old male who presents for pre-operative H & P in preparation for a Radical Cystoprostatectomy, Ileal Conduit, Bilateral Pelvic Lymphadenectomy, on 7/30/18 with Dr. Shea for bladder cancer at the [facility].    PAC referral for risk assessment and optimization for anesthesia with comorbid conditions of:    Pre-operative considerations:  1.  Cardiac:  Functional status METS =3   Risk of Major Adverse Cardiac event: 0.9%  -HTN well controlled on amlodipine(cont DOS), lisinopril (hold DOS)  -EKG 2/7/18:  NSR, possibly LVH  2.  Pulm:   LILLIE risk:  Intermediate  -Former smoker 20 pack years, quit 2013  -No known pulmonary disease    3.  GI:  Risk of PONV score = 2 .  If > 2, anti-emetic intervention recommended.  4.  Heme:  -Severe hemophilia followed by Dr. Marley who will provide instruction regarding surgery      Patient is optimized and is an acceptable candidate for the proposed procedure.  No further diagnostic evaluation is needed.          Mid-Level Provider/Resident:   Date:   Time:     Attending Anesthesiologist Anesthesia Assessment:        Anesthesiologist:   Date:   Time:   Pass/Fail:   Disposition:     PAC Pharmacist Assessment:        Pharmacist:   Date:   Time:      Anesthesia Plan      History & Physical Review  History and physical reviewed and following examination; no interval change.    ASA Status:  3 .    NPO Status:  > 8 hours    Plan for General with Intravenous induction. Maintenance will be Inhalation and Balanced.    PONV prophylaxis:  Ondansetron (or other 5HT-3) and Dexamethasone or Solumedrol  Additional equipment: 2nd IV and Arterial Line      Postoperative Care  Postoperative pain management:  IV analgesics and Peripheral nerve block (Single Shot).      Consents  Anesthetic plan, risks, benefits and alternatives discussed with:  Patient.  Use of blood products discussed: Yes.   Use of blood products discussed with Patient.  Consented to  blood products.  .          I have seen and evaluated the patient and agree with the above assessment and plan.  Gia Kim                   .

## 2018-07-25 NOTE — PROGRESS NOTES
[facility]  [address] [place] [address] [address], [place] [address]  Main: [phone], Fax: [phone]    Patient seen at: [facility] at [address] [place] [address].    Outpatient Visit Note:    Patient: Orlin Alcantar  MRN: [id number]  : 1950  MAR: 2018    Reason:  Severe Hemophilia A. Here for Pre complicated urological surgery hemophilia treatment plan consultation.    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 Shea.     Orlin last hemophilia comprehensive clinic visit with this clinic was back in 2017. At the time, he was seen by Dr. Jorge Jiang, staff hematologist.     Orlin is currently on a rather aggressive prophylactic Factor VIII replacement therapy, especially since he was diagnosed with bladder cancer. Currently he reports that he is self infusing Kogenate at 3000 Units every , ,  and Sundays. He also reports that he is self infusing Kogenate at 1000 Units every ,  and . With this regimen, he is not reporting any bleeding issues.     PmHx, Medications, Allergies:  All reviewed by myself in the electronic medical record.    Social History and Family History:  Deferred.     Objective:  Pleasant in no acute distress.  Vitals: /77, P 102, RR 16, Temp 97.9  Exam:  Complete exam is not performed today.     Assessment:  In summary, Orlin is a 68 year old male with a history of Severe  Hemophilia A with his baseline Factor VIII level of <1% with no history of Factor VIII inhibitor, who was found to have bladder cancer in Jan 2018 S/P transurethral resection of bladder tumor in Feb 2018 and neoadjuvant chemotherapy, scheduled to undergo radical cystoprostatectomy, ileal conduit, and bilateral pelvic lymphadenectomy on 7/30/2018 by Dr. Shea, presents to clinic today to discuss hemophilia treatment plan for his upcoming surgery.     Diagnosis:  1. Severe Hemophilia A.  2. Bladder Cancer S/P transurethral resection of bladder tumor in Feb 2018 and neoadjuvant chemotherapy.    Plan:  For his upcoming complex urological surgical procedure for which it is anticipated that he will be hospitalized for about one week, the hemophilia treatment plan will be as follow:  1. He is instructed to stop Kogenate infusion after his Friday 7/27/2018 dose of 3000 Units.  2. At Pre-op area on Monday 7/30/2018, obtain a Factor VIII level just prior to the bolus Kogenate infusion.  3. Then infuse Kogenate at 50 Units/kg (about 3500 Units) IV bolus at 10 mL/min just 30-60 minutes prior to his scheduled surgery.  4. Then obtain a Factor VIII level (peak level) 5-10 minutes after the end of the bolus Kogenate infusion.  5. Then start Kogenate at 4 Units/kg/hour (280 Units/hour) continuous IV infusion after the peak Factor VIII level has been collected. Anticipate that this continuous infusion is to be continued throughout the pre-operative, intra-operative and post operative period and for the duration of the remainder of the hospitalization.   6. Please place an in-patient hematology consult and specify that the hemophilia team to follow the patient once he is admitted.     Above necessary orders are placed in EPIC.     I discussed with Orlin that he will need to return after his surgery for routine hemophilia comprehensive clinic visit. He is in agreement with this.     Total Time Spent:  18 minutes, all 18 minutes was  spent on face-to-face consultation with the patient and coordination of care in regard to his Severe Hemophilia A and hemophilia treatment recommendation for his upcoming complex urological surgery.     Time IN: 13:18  Time OUT: 13:36      Clement Griffin PA-C, MPAS  Physician Assistant  [facility].

## 2018-07-27 LAB
BACTERIA SPEC CULT: ABNORMAL
Lab: ABNORMAL
SPECIMEN SOURCE: ABNORMAL

## 2018-07-30 ENCOUNTER — ANESTHESIA (OUTPATIENT)
Dept: SURGERY | Facility: CLINIC | Age: 68
DRG: 653 | End: 2018-07-30
Payer: MEDICARE

## 2018-07-30 ENCOUNTER — HOSPITAL ENCOUNTER (INPATIENT)
Facility: CLINIC | Age: 68
LOS: 7 days | Discharge: HOME HEALTH | DRG: 653 | End: 2018-08-06
Attending: UROLOGY | Admitting: UROLOGY
Payer: MEDICARE

## 2018-07-30 DIAGNOSIS — I10 HYPERTENSION GOAL BP (BLOOD PRESSURE) < 140/90: ICD-10-CM

## 2018-07-30 DIAGNOSIS — F32.A DEPRESSION, UNSPECIFIED DEPRESSION TYPE: ICD-10-CM

## 2018-07-30 DIAGNOSIS — C67.8 MALIGNANT NEOPLASM OF OVERLAPPING SITES OF BLADDER (H): ICD-10-CM

## 2018-07-30 DIAGNOSIS — D66 SEVERE HEMOPHILIA A (H): ICD-10-CM

## 2018-07-30 DIAGNOSIS — N39.0 URINARY TRACT INFECTION WITHOUT HEMATURIA, SITE UNSPECIFIED: Primary | ICD-10-CM

## 2018-07-30 DIAGNOSIS — C67.9 MALIGNANT NEOPLASM OF URINARY BLADDER, UNSPECIFIED SITE (H): ICD-10-CM

## 2018-07-30 LAB
ABO + RH BLD: NORMAL
ABO + RH BLD: NORMAL
ANGLE RATE OF CLOT GROWTH: 65.7 DEG (ref 59–74)
ANION GAP SERPL CALCULATED.3IONS-SCNC: 12 MMOL/L (ref 3–14)
APTT PPP: 30 SEC (ref 22–37)
BASE DEFICIT BLDA-SCNC: 5.2 MMOL/L
BLD GP AB SCN SERPL QL: NORMAL
BLD PROD TYP BPU: NORMAL
BLD UNIT ID BPU: 0
BLD UNIT ID BPU: 0
BLOOD BANK CMNT PATIENT-IMP: NORMAL
BLOOD BANK CMNT PATIENT-IMP: NORMAL
BLOOD PRODUCT CODE: NORMAL
BLOOD PRODUCT CODE: NORMAL
BPU ID: NORMAL
BPU ID: NORMAL
BUN SERPL-MCNC: 20 MG/DL (ref 7–30)
CA-I BLD-MCNC: 4.6 MG/DL (ref 4.4–5.2)
CALCIUM SERPL-MCNC: 7.7 MG/DL (ref 8.5–10.1)
CHLORIDE SERPL-SCNC: 106 MMOL/L (ref 94–109)
CI HYPOCOAGULATION INDEX: 0.1 RATIO (ref 0–3)
CLOT LYSIS 30M P MA LENFR BLD TEG: 0 % (ref 0–8)
CLOT STRENGTH BLD TEG: 11.4 KD/SC (ref 5.3–13.2)
CO2 SERPL-SCNC: 21 MMOL/L (ref 20–32)
CREAT SERPL-MCNC: 0.87 MG/DL (ref 0.66–1.25)
ERYTHROCYTE [DISTWIDTH] IN BLOOD BY AUTOMATED COUNT: 15.8 % (ref 10–15)
FACT VIII ACT/NOR PPP: 127 % (ref 55–200)
FACT VIII ACT/NOR PPP: 9 % (ref 55–200)
FACT VIII ACT/NOR PPP: 96 % (ref 55–200)
GFR SERPL CREATININE-BSD FRML MDRD: 87 ML/MIN/1.7M2
GLUCOSE BLD-MCNC: 139 MG/DL (ref 70–99)
GLUCOSE BLDC GLUCOMTR-MCNC: 160 MG/DL (ref 70–99)
GLUCOSE BLDC GLUCOMTR-MCNC: 162 MG/DL (ref 70–99)
GLUCOSE BLDC GLUCOMTR-MCNC: 173 MG/DL (ref 70–99)
GLUCOSE BLDC GLUCOMTR-MCNC: 96 MG/DL (ref 70–99)
GLUCOSE SERPL-MCNC: 162 MG/DL (ref 70–99)
HCO3 BLD-SCNC: 20 MMOL/L (ref 21–28)
HCT VFR BLD AUTO: 37.7 % (ref 40–53)
HGB BLD-MCNC: 10.4 G/DL (ref 13.3–17.7)
HGB BLD-MCNC: 11.8 G/DL (ref 13.3–17.7)
HGB BLD-MCNC: 8.7 G/DL (ref 13.3–17.7)
INR PPP: 1.02 (ref 0.86–1.14)
INR PPP: 1.19 (ref 0.86–1.14)
K TIME TO SPEC CLOT STRENGTH: 1.8 MIN (ref 1–3)
LY60 LYSIS AT 60 MINUTES: 0.2 % (ref 0–15)
MA MAXIMUM CLOT STRENGTH: 69.5 MM (ref 55–74)
MCH RBC QN AUTO: 28.6 PG (ref 26.5–33)
MCHC RBC AUTO-ENTMCNC: 31.3 G/DL (ref 31.5–36.5)
MCV RBC AUTO: 91 FL (ref 78–100)
NUM BPU REQUESTED: 2
O2/TOTAL GAS SETTING VFR VENT: 60 %
PCO2 BLD: 35 MM HG (ref 35–45)
PH BLD: 7.36 PH (ref 7.35–7.45)
PLATELET # BLD AUTO: 172 10E9/L (ref 150–450)
PO2 BLD: 229 MM HG (ref 80–105)
POTASSIUM BLD-SCNC: 3.7 MMOL/L (ref 3.4–5.3)
POTASSIUM SERPL-SCNC: 4.2 MMOL/L (ref 3.4–5.3)
R TIME UNTIL CLOT FORMS: 7.9 MIN (ref 4–9)
RBC # BLD AUTO: 4.13 10E12/L (ref 4.4–5.9)
SODIUM BLD-SCNC: 140 MMOL/L (ref 133–144)
SODIUM SERPL-SCNC: 139 MMOL/L (ref 133–144)
SPECIMEN EXP DATE BLD: NORMAL
TRANSFUSION STATUS PATIENT QL: NORMAL
WBC # BLD AUTO: 5.9 10E9/L (ref 4–11)

## 2018-07-30 PROCEDURE — 07TC0ZZ RESECTION OF PELVIS LYMPHATIC, OPEN APPROACH: ICD-10-PCS | Performed by: UROLOGY

## 2018-07-30 PROCEDURE — A9270 NON-COVERED ITEM OR SERVICE: HCPCS | Mod: GY | Performed by: STUDENT IN AN ORGANIZED HEALTH CARE EDUCATION/TRAINING PROGRAM

## 2018-07-30 PROCEDURE — 71000014 ZZH RECOVERY PHASE 1 LEVEL 2 FIRST HR: Performed by: UROLOGY

## 2018-07-30 PROCEDURE — 25000128 H RX IP 250 OP 636: Performed by: STUDENT IN AN ORGANIZED HEALTH CARE EDUCATION/TRAINING PROGRAM

## 2018-07-30 PROCEDURE — 40000275 ZZH STATISTIC RCP TIME EA 10 MIN

## 2018-07-30 PROCEDURE — P9041 ALBUMIN (HUMAN),5%, 50ML: HCPCS | Performed by: ANESTHESIOLOGY

## 2018-07-30 PROCEDURE — 85610 PROTHROMBIN TIME: CPT | Performed by: ANESTHESIOLOGY

## 2018-07-30 PROCEDURE — 40000141 ZZH STATISTIC PERIPHERAL IV START W/O US GUIDANCE

## 2018-07-30 PROCEDURE — 88309 TISSUE EXAM BY PATHOLOGIST: CPT | Performed by: UROLOGY

## 2018-07-30 PROCEDURE — C9399 UNCLASSIFIED DRUGS OR BIOLOG: HCPCS | Performed by: STUDENT IN AN ORGANIZED HEALTH CARE EDUCATION/TRAINING PROGRAM

## 2018-07-30 PROCEDURE — 25000125 ZZHC RX 250: Performed by: UROLOGY

## 2018-07-30 PROCEDURE — 27210995 ZZH RX 272: Performed by: UROLOGY

## 2018-07-30 PROCEDURE — 25000132 ZZH RX MED GY IP 250 OP 250 PS 637: Mod: GY | Performed by: UROLOGY

## 2018-07-30 PROCEDURE — A9270 NON-COVERED ITEM OR SERVICE: HCPCS | Mod: GY | Performed by: ANESTHESIOLOGY

## 2018-07-30 PROCEDURE — 85730 THROMBOPLASTIN TIME PARTIAL: CPT | Performed by: ANESTHESIOLOGY

## 2018-07-30 PROCEDURE — 0T1807C BYPASS BILATERAL URETERS TO ILEOCUTANEOUS WITH AUTOLOGOUS TISSUE SUBSTITUTE, OPEN APPROACH: ICD-10-PCS | Performed by: UROLOGY

## 2018-07-30 PROCEDURE — 80048 BASIC METABOLIC PNL TOTAL CA: CPT | Performed by: ANESTHESIOLOGY

## 2018-07-30 PROCEDURE — 36415 COLL VENOUS BLD VENIPUNCTURE: CPT | Performed by: PHYSICIAN ASSISTANT

## 2018-07-30 PROCEDURE — 25000566 ZZH SEVOFLURANE, EA 15 MIN: Performed by: UROLOGY

## 2018-07-30 PROCEDURE — 85240 CLOT FACTOR VIII AHG 1 STAGE: CPT | Performed by: PHYSICIAN ASSISTANT

## 2018-07-30 PROCEDURE — 82947 ASSAY GLUCOSE BLOOD QUANT: CPT | Performed by: UROLOGY

## 2018-07-30 PROCEDURE — 82803 BLOOD GASES ANY COMBINATION: CPT | Performed by: UROLOGY

## 2018-07-30 PROCEDURE — 30233V1 TRANSFUSION OF NONAUTOLOGOUS ANTIHEMOPHILIC FACTORS INTO PERIPHERAL VEIN, PERCUTANEOUS APPROACH: ICD-10-PCS | Performed by: UROLOGY

## 2018-07-30 PROCEDURE — 25000132 ZZH RX MED GY IP 250 OP 250 PS 637: Mod: GY | Performed by: STUDENT IN AN ORGANIZED HEALTH CARE EDUCATION/TRAINING PROGRAM

## 2018-07-30 PROCEDURE — 82330 ASSAY OF CALCIUM: CPT | Performed by: UROLOGY

## 2018-07-30 PROCEDURE — 88331 PATH CONSLTJ SURG 1 BLK 1SPC: CPT | Performed by: UROLOGY

## 2018-07-30 PROCEDURE — 40000014 ZZH STATISTIC ARTERIAL MONITORING DAILY

## 2018-07-30 PROCEDURE — 85610 PROTHROMBIN TIME: CPT | Performed by: UROLOGY

## 2018-07-30 PROCEDURE — 80048 BASIC METABOLIC PNL TOTAL CA: CPT | Performed by: STUDENT IN AN ORGANIZED HEALTH CARE EDUCATION/TRAINING PROGRAM

## 2018-07-30 PROCEDURE — 88332 PATH CONSLTJ SURG EA ADD BLK: CPT | Performed by: UROLOGY

## 2018-07-30 PROCEDURE — 0VT00ZZ RESECTION OF PROSTATE, OPEN APPROACH: ICD-10-PCS | Performed by: UROLOGY

## 2018-07-30 PROCEDURE — 36415 COLL VENOUS BLD VENIPUNCTURE: CPT | Performed by: ANESTHESIOLOGY

## 2018-07-30 PROCEDURE — 84295 ASSAY OF SERUM SODIUM: CPT | Performed by: UROLOGY

## 2018-07-30 PROCEDURE — 25000555 ZZHC RX FACTOR IP 250 OP 636: Performed by: UROLOGY

## 2018-07-30 PROCEDURE — 00000146 ZZHCL STATISTIC GLUCOSE BY METER IP

## 2018-07-30 PROCEDURE — 12000008 ZZH R&B INTERMEDIATE UMMC

## 2018-07-30 PROCEDURE — 25000128 H RX IP 250 OP 636: Performed by: UROLOGY

## 2018-07-30 PROCEDURE — 25000125 ZZHC RX 250: Performed by: ANESTHESIOLOGY

## 2018-07-30 PROCEDURE — 85396 CLOTTING ASSAY WHOLE BLOOD: CPT | Performed by: UROLOGY

## 2018-07-30 PROCEDURE — P9041 ALBUMIN (HUMAN),5%, 50ML: HCPCS | Performed by: STUDENT IN AN ORGANIZED HEALTH CARE EDUCATION/TRAINING PROGRAM

## 2018-07-30 PROCEDURE — 37000009 ZZH ANESTHESIA TECHNICAL FEE, EACH ADDTL 15 MIN: Performed by: UROLOGY

## 2018-07-30 PROCEDURE — 71000015 ZZH RECOVERY PHASE 1 LEVEL 2 EA ADDTL HR: Performed by: UROLOGY

## 2018-07-30 PROCEDURE — 25000132 ZZH RX MED GY IP 250 OP 250 PS 637: Mod: GY | Performed by: ANESTHESIOLOGY

## 2018-07-30 PROCEDURE — 00000401 ZZHCL STATISTIC THROMBIN TIME NC: Performed by: PHYSICIAN ASSISTANT

## 2018-07-30 PROCEDURE — 36000070 ZZH SURGERY LEVEL 5 EA 15 ADDTL MIN - UMMC: Performed by: UROLOGY

## 2018-07-30 PROCEDURE — 27210794 ZZH OR GENERAL SUPPLY STERILE: Performed by: UROLOGY

## 2018-07-30 PROCEDURE — 25000128 H RX IP 250 OP 636: Performed by: ANESTHESIOLOGY

## 2018-07-30 PROCEDURE — P9016 RBC LEUKOCYTES REDUCED: HCPCS | Performed by: PHYSICIAN ASSISTANT

## 2018-07-30 PROCEDURE — C9290 INJ, BUPIVACAINE LIPOSOME: HCPCS | Performed by: STUDENT IN AN ORGANIZED HEALTH CARE EDUCATION/TRAINING PROGRAM

## 2018-07-30 PROCEDURE — 88307 TISSUE EXAM BY PATHOLOGIST: CPT | Performed by: UROLOGY

## 2018-07-30 PROCEDURE — 00000167 ZZHCL STATISTIC INR NC: Performed by: PHYSICIAN ASSISTANT

## 2018-07-30 PROCEDURE — 84132 ASSAY OF SERUM POTASSIUM: CPT | Performed by: UROLOGY

## 2018-07-30 PROCEDURE — C2617 STENT, NON-COR, TEM W/O DEL: HCPCS | Performed by: UROLOGY

## 2018-07-30 PROCEDURE — 40000171 ZZH STATISTIC PRE-PROCEDURE ASSESSMENT III: Performed by: UROLOGY

## 2018-07-30 PROCEDURE — 00000328 ZZHCL STATISTIC PTT NC: Performed by: PHYSICIAN ASSISTANT

## 2018-07-30 PROCEDURE — 0TTB0ZZ RESECTION OF BLADDER, OPEN APPROACH: ICD-10-PCS | Performed by: UROLOGY

## 2018-07-30 PROCEDURE — A9270 NON-COVERED ITEM OR SERVICE: HCPCS | Mod: GY | Performed by: UROLOGY

## 2018-07-30 PROCEDURE — 85018 HEMOGLOBIN: CPT | Performed by: ANESTHESIOLOGY

## 2018-07-30 PROCEDURE — 85027 COMPLETE CBC AUTOMATED: CPT | Performed by: ANESTHESIOLOGY

## 2018-07-30 PROCEDURE — 25000125 ZZHC RX 250: Performed by: STUDENT IN AN ORGANIZED HEALTH CARE EDUCATION/TRAINING PROGRAM

## 2018-07-30 PROCEDURE — 37000008 ZZH ANESTHESIA TECHNICAL FEE, 1ST 30 MIN: Performed by: UROLOGY

## 2018-07-30 PROCEDURE — 36000068 ZZH SURGERY LEVEL 5 1ST 30 MIN - UMMC: Performed by: UROLOGY

## 2018-07-30 DEVICE — STENT URINARY DIVERSION PERCFLEX SET 7FRX80CM M0061602100: Type: IMPLANTABLE DEVICE | Site: URETER | Status: FUNCTIONAL

## 2018-07-30 RX ORDER — ERTAPENEM 1 G/1
1 INJECTION, POWDER, LYOPHILIZED, FOR SOLUTION INTRAMUSCULAR; INTRAVENOUS EVERY 24 HOURS
Status: DISCONTINUED | OUTPATIENT
Start: 2018-07-30 | End: 2018-07-30 | Stop reason: HOSPADM

## 2018-07-30 RX ORDER — SODIUM CHLORIDE, SODIUM LACTATE, POTASSIUM CHLORIDE, CALCIUM CHLORIDE 600; 310; 30; 20 MG/100ML; MG/100ML; MG/100ML; MG/100ML
INJECTION, SOLUTION INTRAVENOUS CONTINUOUS
Status: DISCONTINUED | OUTPATIENT
Start: 2018-07-30 | End: 2018-07-30 | Stop reason: HOSPADM

## 2018-07-30 RX ORDER — ANTIHEMOPHILIC FACTOR (RECOMBINANT) 2000 (+/-)
4 KIT INTRAVENOUS CONTINUOUS
Status: DISCONTINUED | OUTPATIENT
Start: 2018-07-30 | End: 2018-07-30

## 2018-07-30 RX ORDER — SODIUM CHLORIDE 9 MG/ML
INJECTION, SOLUTION INTRAVENOUS CONTINUOUS
Status: CANCELLED | OUTPATIENT
Start: 2018-07-30

## 2018-07-30 RX ORDER — ONDANSETRON 4 MG/1
4 TABLET, ORALLY DISINTEGRATING ORAL EVERY 6 HOURS PRN
Status: CANCELLED | OUTPATIENT
Start: 2018-07-30

## 2018-07-30 RX ORDER — LIDOCAINE HYDROCHLORIDE 20 MG/ML
INJECTION, SOLUTION INFILTRATION; PERINEURAL PRN
Status: DISCONTINUED | OUTPATIENT
Start: 2018-07-30 | End: 2018-07-30

## 2018-07-30 RX ORDER — SODIUM CHLORIDE 9 MG/ML
INJECTION, SOLUTION INTRAVENOUS CONTINUOUS
Status: DISCONTINUED | OUTPATIENT
Start: 2018-07-30 | End: 2018-08-03

## 2018-07-30 RX ORDER — NALOXONE HYDROCHLORIDE 0.4 MG/ML
.1-.4 INJECTION, SOLUTION INTRAMUSCULAR; INTRAVENOUS; SUBCUTANEOUS
Status: DISCONTINUED | OUTPATIENT
Start: 2018-07-30 | End: 2018-08-06 | Stop reason: HOSPADM

## 2018-07-30 RX ORDER — HEPARIN SODIUM 5000 [USP'U]/.5ML
5000 INJECTION, SOLUTION INTRAVENOUS; SUBCUTANEOUS ONCE
Status: DISCONTINUED | OUTPATIENT
Start: 2018-07-30 | End: 2018-07-30 | Stop reason: HOSPADM

## 2018-07-30 RX ORDER — ONDANSETRON 2 MG/ML
4 INJECTION INTRAMUSCULAR; INTRAVENOUS EVERY 6 HOURS PRN
Status: CANCELLED | OUTPATIENT
Start: 2018-07-30

## 2018-07-30 RX ORDER — ANTIHEMOPHILIC FACTOR (RECOMBINANT) 2000 (+/-)
4348 KIT INTRAVENOUS CONTINUOUS
Status: DISCONTINUED | OUTPATIENT
Start: 2018-07-30 | End: 2018-07-30

## 2018-07-30 RX ORDER — DEXAMETHASONE 4 MG/1
TABLET ORAL
Refills: 0 | Status: ON HOLD | COMMUNITY
Start: 2018-04-25 | End: 2018-08-06

## 2018-07-30 RX ORDER — LISINOPRIL 20 MG/1
40 TABLET ORAL DAILY
Status: DISCONTINUED | OUTPATIENT
Start: 2018-07-30 | End: 2018-07-31 | Stop reason: CLARIF

## 2018-07-30 RX ORDER — TAMSULOSIN HYDROCHLORIDE 0.4 MG/1
CAPSULE ORAL
Refills: 0 | Status: ON HOLD | COMMUNITY
Start: 2018-03-28 | End: 2018-08-06

## 2018-07-30 RX ORDER — LIDOCAINE 40 MG/G
CREAM TOPICAL
Status: CANCELLED | OUTPATIENT
Start: 2018-07-30

## 2018-07-30 RX ORDER — ACETAMINOPHEN 325 MG/1
975 TABLET ORAL ONCE
Status: DISCONTINUED | OUTPATIENT
Start: 2018-07-30 | End: 2018-07-30 | Stop reason: HOSPADM

## 2018-07-30 RX ORDER — ACETAMINOPHEN 325 MG/1
975 TABLET ORAL EVERY 8 HOURS
Status: CANCELLED | OUTPATIENT
Start: 2018-07-30 | End: 2018-08-02

## 2018-07-30 RX ORDER — LABETALOL HYDROCHLORIDE 5 MG/ML
10 INJECTION, SOLUTION INTRAVENOUS
Status: DISCONTINUED | OUTPATIENT
Start: 2018-07-30 | End: 2018-07-30 | Stop reason: HOSPADM

## 2018-07-30 RX ORDER — OXYCODONE HYDROCHLORIDE 5 MG/1
5-10 TABLET ORAL EVERY 4 HOURS PRN
Status: CANCELLED | OUTPATIENT
Start: 2018-07-30

## 2018-07-30 RX ORDER — PROPOFOL 10 MG/ML
INJECTION, EMULSION INTRAVENOUS PRN
Status: DISCONTINUED | OUTPATIENT
Start: 2018-07-30 | End: 2018-07-30

## 2018-07-30 RX ORDER — ONDANSETRON 8 MG/1
TABLET, ORALLY DISINTEGRATING ORAL
Refills: 0 | Status: ON HOLD | COMMUNITY
Start: 2018-04-08 | End: 2018-08-06

## 2018-07-30 RX ORDER — DIPHENHYDRAMINE HCL 12.5MG/5ML
12.5 LIQUID (ML) ORAL EVERY 6 HOURS PRN
Status: CANCELLED | OUTPATIENT
Start: 2018-07-30

## 2018-07-30 RX ORDER — ANTIHEMOPHILIC FACTOR (RECOMBINANT) 2000 (+/-)
4348 KIT INTRAVENOUS CONTINUOUS
Status: DISCONTINUED | OUTPATIENT
Start: 2018-07-30 | End: 2018-07-30 | Stop reason: HOSPADM

## 2018-07-30 RX ORDER — NALOXONE HYDROCHLORIDE 0.4 MG/ML
.1-.4 INJECTION, SOLUTION INTRAMUSCULAR; INTRAVENOUS; SUBCUTANEOUS
Status: DISCONTINUED | OUTPATIENT
Start: 2018-07-30 | End: 2018-07-30

## 2018-07-30 RX ORDER — SODIUM CHLORIDE, SODIUM LACTATE, POTASSIUM CHLORIDE, CALCIUM CHLORIDE 600; 310; 30; 20 MG/100ML; MG/100ML; MG/100ML; MG/100ML
INJECTION, SOLUTION INTRAVENOUS CONTINUOUS PRN
Status: DISCONTINUED | OUTPATIENT
Start: 2018-07-30 | End: 2018-07-30

## 2018-07-30 RX ORDER — BUPIVACAINE HYDROCHLORIDE 2.5 MG/ML
INJECTION, SOLUTION EPIDURAL; INFILTRATION; INTRACAUDAL PRN
Status: DISCONTINUED | OUTPATIENT
Start: 2018-07-30 | End: 2018-07-30

## 2018-07-30 RX ORDER — NALOXONE HYDROCHLORIDE 0.4 MG/ML
.1-.4 INJECTION, SOLUTION INTRAMUSCULAR; INTRAVENOUS; SUBCUTANEOUS
Status: DISCONTINUED | OUTPATIENT
Start: 2018-07-30 | End: 2018-07-30 | Stop reason: HOSPADM

## 2018-07-30 RX ORDER — ALBUMIN, HUMAN INJ 5% 5 %
12.5 SOLUTION INTRAVENOUS ONCE
Status: COMPLETED | OUTPATIENT
Start: 2018-07-30 | End: 2018-07-30

## 2018-07-30 RX ORDER — LIDOCAINE 40 MG/G
CREAM TOPICAL
Status: DISCONTINUED | OUTPATIENT
Start: 2018-07-30 | End: 2018-07-30

## 2018-07-30 RX ORDER — ACETAMINOPHEN 325 MG/1
650 TABLET ORAL EVERY 4 HOURS PRN
Status: DISCONTINUED | OUTPATIENT
Start: 2018-08-02 | End: 2018-07-30

## 2018-07-30 RX ORDER — GABAPENTIN 300 MG/1
300 CAPSULE ORAL ONCE
Status: COMPLETED | OUTPATIENT
Start: 2018-07-30 | End: 2018-07-30

## 2018-07-30 RX ORDER — DEXAMETHASONE SODIUM PHOSPHATE 4 MG/ML
INJECTION, SOLUTION INTRA-ARTICULAR; INTRALESIONAL; INTRAMUSCULAR; INTRAVENOUS; SOFT TISSUE PRN
Status: DISCONTINUED | OUTPATIENT
Start: 2018-07-30 | End: 2018-07-30

## 2018-07-30 RX ORDER — ANTIHEMOPHILIC FACTOR (RECOMBINANT) 2000 (+/-)
3261 KIT INTRAVENOUS ONCE
Status: COMPLETED | OUTPATIENT
Start: 2018-07-30 | End: 2018-07-30

## 2018-07-30 RX ORDER — ALVIMOPAN 12 MG/1
12 CAPSULE ORAL 2 TIMES DAILY
Status: DISCONTINUED | OUTPATIENT
Start: 2018-07-31 | End: 2018-08-03

## 2018-07-30 RX ORDER — FENTANYL CITRATE 50 UG/ML
25-50 INJECTION, SOLUTION INTRAMUSCULAR; INTRAVENOUS
Status: DISCONTINUED | OUTPATIENT
Start: 2018-07-30 | End: 2018-07-30 | Stop reason: HOSPADM

## 2018-07-30 RX ORDER — ALBUMIN, HUMAN INJ 5% 5 %
SOLUTION INTRAVENOUS CONTINUOUS PRN
Status: DISCONTINUED | OUTPATIENT
Start: 2018-07-30 | End: 2018-07-30

## 2018-07-30 RX ORDER — ANTIHEMOPHILIC FACTOR (RECOMBINANT) 2000 (+/-)
3500 KIT INTRAVENOUS ONCE
Status: DISCONTINUED | OUTPATIENT
Start: 2018-07-30 | End: 2018-07-30

## 2018-07-30 RX ORDER — EPHEDRINE SULFATE 50 MG/ML
INJECTION, SOLUTION INTRAMUSCULAR; INTRAVENOUS; SUBCUTANEOUS PRN
Status: DISCONTINUED | OUTPATIENT
Start: 2018-07-30 | End: 2018-07-30

## 2018-07-30 RX ORDER — ACETAMINOPHEN 325 MG/1
975 TABLET ORAL EVERY 8 HOURS
Status: DISPENSED | OUTPATIENT
Start: 2018-07-30 | End: 2018-08-02

## 2018-07-30 RX ORDER — MIRTAZAPINE 45 MG/1
45 TABLET, FILM COATED ORAL AT BEDTIME
Status: DISCONTINUED | OUTPATIENT
Start: 2018-07-30 | End: 2018-08-06 | Stop reason: HOSPADM

## 2018-07-30 RX ORDER — ONDANSETRON 2 MG/ML
INJECTION INTRAMUSCULAR; INTRAVENOUS PRN
Status: DISCONTINUED | OUTPATIENT
Start: 2018-07-30 | End: 2018-07-30

## 2018-07-30 RX ORDER — AMLODIPINE BESYLATE 5 MG/1
5 TABLET ORAL DAILY
Status: DISCONTINUED | OUTPATIENT
Start: 2018-07-31 | End: 2018-07-31

## 2018-07-30 RX ORDER — NALOXONE HYDROCHLORIDE 0.4 MG/ML
.1-.4 INJECTION, SOLUTION INTRAMUSCULAR; INTRAVENOUS; SUBCUTANEOUS
Status: CANCELLED | OUTPATIENT
Start: 2018-07-30

## 2018-07-30 RX ORDER — ANTIHEMOPHILIC FACTOR (RECOMBINANT) 2000 (+/-)
4 KIT INTRAVENOUS CONTINUOUS
Status: CANCELLED | OUTPATIENT
Start: 2018-07-30

## 2018-07-30 RX ORDER — DIPHENHYDRAMINE HYDROCHLORIDE 50 MG/ML
12.5 INJECTION INTRAMUSCULAR; INTRAVENOUS EVERY 6 HOURS PRN
Status: CANCELLED | OUTPATIENT
Start: 2018-07-30

## 2018-07-30 RX ORDER — SODIUM CHLORIDE 9 MG/ML
INJECTION, SOLUTION INTRAVENOUS CONTINUOUS
Status: DISCONTINUED | OUTPATIENT
Start: 2018-07-30 | End: 2018-07-30

## 2018-07-30 RX ORDER — OXYCODONE HYDROCHLORIDE 5 MG/1
5-10 TABLET ORAL EVERY 4 HOURS PRN
Status: DISCONTINUED | OUTPATIENT
Start: 2018-07-30 | End: 2018-07-30

## 2018-07-30 RX ORDER — ALVIMOPAN 12 MG/1
12 CAPSULE ORAL ONCE
Status: COMPLETED | OUTPATIENT
Start: 2018-07-30 | End: 2018-07-30

## 2018-07-30 RX ORDER — FLUMAZENIL 0.1 MG/ML
0.2 INJECTION, SOLUTION INTRAVENOUS
Status: DISCONTINUED | OUTPATIENT
Start: 2018-07-30 | End: 2018-07-30 | Stop reason: HOSPADM

## 2018-07-30 RX ORDER — HYDROMORPHONE HYDROCHLORIDE 1 MG/ML
.3-.5 INJECTION, SOLUTION INTRAMUSCULAR; INTRAVENOUS; SUBCUTANEOUS EVERY 5 MIN PRN
Status: DISCONTINUED | OUTPATIENT
Start: 2018-07-30 | End: 2018-07-30 | Stop reason: HOSPADM

## 2018-07-30 RX ORDER — ACETAMINOPHEN 325 MG/1
650 TABLET ORAL EVERY 4 HOURS PRN
Status: DISCONTINUED | OUTPATIENT
Start: 2018-08-02 | End: 2018-08-06 | Stop reason: HOSPADM

## 2018-07-30 RX ORDER — ACETAMINOPHEN 325 MG/1
650 TABLET ORAL EVERY 4 HOURS PRN
Status: CANCELLED | OUTPATIENT
Start: 2018-08-02

## 2018-07-30 RX ORDER — ACETAMINOPHEN 325 MG/1
975 TABLET ORAL EVERY 8 HOURS
Status: DISCONTINUED | OUTPATIENT
Start: 2018-07-30 | End: 2018-07-30

## 2018-07-30 RX ORDER — ONDANSETRON 2 MG/ML
4 INJECTION INTRAMUSCULAR; INTRAVENOUS EVERY 30 MIN PRN
Status: DISCONTINUED | OUTPATIENT
Start: 2018-07-30 | End: 2018-07-30 | Stop reason: HOSPADM

## 2018-07-30 RX ORDER — ERTAPENEM 1 G/1
1 INJECTION, POWDER, LYOPHILIZED, FOR SOLUTION INTRAMUSCULAR; INTRAVENOUS EVERY 24 HOURS
Status: DISCONTINUED | OUTPATIENT
Start: 2018-07-31 | End: 2018-07-31 | Stop reason: ALTCHOICE

## 2018-07-30 RX ORDER — MINERAL OIL
OIL (ML) MISCELLANEOUS PRN
Status: DISCONTINUED | OUTPATIENT
Start: 2018-07-30 | End: 2018-07-30 | Stop reason: HOSPADM

## 2018-07-30 RX ORDER — ANTIHEMOPHILIC FACTOR (RECOMBINANT) 2000 (+/-)
4348 KIT INTRAVENOUS CONTINUOUS
Status: DISCONTINUED | OUTPATIENT
Start: 2018-07-30 | End: 2018-07-31

## 2018-07-30 RX ORDER — CEFAZOLIN SODIUM 1 G/3ML
1 INJECTION, POWDER, FOR SOLUTION INTRAMUSCULAR; INTRAVENOUS SEE ADMIN INSTRUCTIONS
Status: DISCONTINUED | OUTPATIENT
Start: 2018-07-30 | End: 2018-07-30 | Stop reason: HOSPADM

## 2018-07-30 RX ORDER — LORAZEPAM 0.5 MG/1
TABLET ORAL
Refills: 0 | Status: ON HOLD | COMMUNITY
Start: 2018-05-23 | End: 2018-08-06

## 2018-07-30 RX ORDER — CEFAZOLIN SODIUM 2 G/100ML
2 INJECTION, SOLUTION INTRAVENOUS
Status: DISCONTINUED | OUTPATIENT
Start: 2018-07-30 | End: 2018-07-30 | Stop reason: HOSPADM

## 2018-07-30 RX ORDER — ONDANSETRON 4 MG/1
4 TABLET, ORALLY DISINTEGRATING ORAL EVERY 30 MIN PRN
Status: DISCONTINUED | OUTPATIENT
Start: 2018-07-30 | End: 2018-07-30 | Stop reason: HOSPADM

## 2018-07-30 RX ORDER — LIDOCAINE 40 MG/G
CREAM TOPICAL
Status: DISCONTINUED | OUTPATIENT
Start: 2018-07-30 | End: 2018-07-30 | Stop reason: HOSPADM

## 2018-07-30 RX ORDER — FENTANYL CITRATE 50 UG/ML
INJECTION, SOLUTION INTRAMUSCULAR; INTRAVENOUS PRN
Status: DISCONTINUED | OUTPATIENT
Start: 2018-07-30 | End: 2018-07-30

## 2018-07-30 RX ORDER — HYDROMORPHONE HYDROCHLORIDE 1 MG/ML
.3-.5 INJECTION, SOLUTION INTRAMUSCULAR; INTRAVENOUS; SUBCUTANEOUS
Status: DISCONTINUED | OUTPATIENT
Start: 2018-07-30 | End: 2018-07-30

## 2018-07-30 RX ORDER — ERTAPENEM 1 G/1
1 INJECTION, POWDER, LYOPHILIZED, FOR SOLUTION INTRAMUSCULAR; INTRAVENOUS EVERY 24 HOURS
Status: DISCONTINUED | OUTPATIENT
Start: 2018-07-30 | End: 2018-07-30

## 2018-07-30 RX ORDER — PROCHLORPERAZINE MALEATE 10 MG
TABLET ORAL
Refills: 0 | Status: ON HOLD | COMMUNITY
Start: 2018-04-03 | End: 2018-08-06

## 2018-07-30 RX ADMIN — ROCURONIUM BROMIDE 50 MG: 10 INJECTION INTRAVENOUS at 08:30

## 2018-07-30 RX ADMIN — SODIUM CHLORIDE: 9 INJECTION, SOLUTION INTRAVENOUS at 16:30

## 2018-07-30 RX ADMIN — FENTANYL CITRATE 25 MCG: 50 INJECTION INTRAMUSCULAR; INTRAVENOUS at 16:00

## 2018-07-30 RX ADMIN — Medication: at 20:51

## 2018-07-30 RX ADMIN — FENTANYL CITRATE 50 MCG: 50 INJECTION INTRAMUSCULAR; INTRAVENOUS at 16:23

## 2018-07-30 RX ADMIN — PHENYLEPHRINE HYDROCHLORIDE 50 MCG: 10 INJECTION, SOLUTION INTRAMUSCULAR; INTRAVENOUS; SUBCUTANEOUS at 11:25

## 2018-07-30 RX ADMIN — Medication 5 MG: at 13:06

## 2018-07-30 RX ADMIN — CEFAZOLIN 2 G: 1 INJECTION, POWDER, FOR SOLUTION INTRAMUSCULAR; INTRAVENOUS at 09:05

## 2018-07-30 RX ADMIN — SODIUM CHLORIDE, POTASSIUM CHLORIDE, SODIUM LACTATE AND CALCIUM CHLORIDE: 600; 310; 30; 20 INJECTION, SOLUTION INTRAVENOUS at 08:15

## 2018-07-30 RX ADMIN — FENTANYL CITRATE 50 MCG: 50 INJECTION, SOLUTION INTRAMUSCULAR; INTRAVENOUS at 09:46

## 2018-07-30 RX ADMIN — PHENYLEPHRINE HYDROCHLORIDE 50 MCG: 10 INJECTION, SOLUTION INTRAMUSCULAR; INTRAVENOUS; SUBCUTANEOUS at 14:28

## 2018-07-30 RX ADMIN — PHENYLEPHRINE HYDROCHLORIDE 50 MCG: 10 INJECTION, SOLUTION INTRAMUSCULAR; INTRAVENOUS; SUBCUTANEOUS at 11:27

## 2018-07-30 RX ADMIN — ROCURONIUM BROMIDE 20 MG: 10 INJECTION INTRAVENOUS at 09:09

## 2018-07-30 RX ADMIN — MIDAZOLAM 1 MG: 1 INJECTION INTRAMUSCULAR; INTRAVENOUS at 08:15

## 2018-07-30 RX ADMIN — HYDROMORPHONE HYDROCHLORIDE 0.5 MG: 1 INJECTION, SOLUTION INTRAMUSCULAR; INTRAVENOUS; SUBCUTANEOUS at 14:59

## 2018-07-30 RX ADMIN — CEFAZOLIN 1 G: 1 INJECTION, POWDER, FOR SOLUTION INTRAMUSCULAR; INTRAVENOUS at 13:13

## 2018-07-30 RX ADMIN — CEFAZOLIN 1 G: 1 INJECTION, POWDER, FOR SOLUTION INTRAMUSCULAR; INTRAVENOUS at 11:18

## 2018-07-30 RX ADMIN — SODIUM CHLORIDE, POTASSIUM CHLORIDE, SODIUM LACTATE AND CALCIUM CHLORIDE: 600; 310; 30; 20 INJECTION, SOLUTION INTRAVENOUS at 10:58

## 2018-07-30 RX ADMIN — BUPIVACAINE 20 ML: 13.3 INJECTION, SUSPENSION, LIPOSOMAL INFILTRATION at 07:00

## 2018-07-30 RX ADMIN — PROPOFOL 150 MG: 10 INJECTION, EMULSION INTRAVENOUS at 08:30

## 2018-07-30 RX ADMIN — FENTANYL CITRATE 50 MCG: 50 INJECTION, SOLUTION INTRAMUSCULAR; INTRAVENOUS at 06:56

## 2018-07-30 RX ADMIN — ROCURONIUM BROMIDE 20 MG: 10 INJECTION INTRAVENOUS at 10:37

## 2018-07-30 RX ADMIN — BUPIVACAINE HYDROCHLORIDE 20 ML: 2.5 INJECTION, SOLUTION EPIDURAL; INFILTRATION; INTRACAUDAL at 07:00

## 2018-07-30 RX ADMIN — ANTIHEMOPHILIC FACTOR (RECOMBINANT) 3261 UNITS: KIT at 07:36

## 2018-07-30 RX ADMIN — OXYCODONE HYDROCHLORIDE 5 MG: 5 TABLET ORAL at 18:12

## 2018-07-30 RX ADMIN — FENTANYL CITRATE 50 MCG: 50 INJECTION, SOLUTION INTRAMUSCULAR; INTRAVENOUS at 08:30

## 2018-07-30 RX ADMIN — ROCURONIUM BROMIDE 20 MG: 10 INJECTION INTRAVENOUS at 12:14

## 2018-07-30 RX ADMIN — PHENYLEPHRINE HYDROCHLORIDE 100 MCG: 10 INJECTION, SOLUTION INTRAMUSCULAR; INTRAVENOUS; SUBCUTANEOUS at 12:13

## 2018-07-30 RX ADMIN — PHENYLEPHRINE HYDROCHLORIDE 100 MCG: 10 INJECTION, SOLUTION INTRAMUSCULAR; INTRAVENOUS; SUBCUTANEOUS at 11:49

## 2018-07-30 RX ADMIN — ALBUMIN HUMAN: 0.05 INJECTION, SOLUTION INTRAVENOUS at 11:28

## 2018-07-30 RX ADMIN — SUGAMMADEX 200 MG: 100 INJECTION, SOLUTION INTRAVENOUS at 14:59

## 2018-07-30 RX ADMIN — ALBUMIN HUMAN 12.5 G: 0.05 INJECTION, SOLUTION INTRAVENOUS at 15:45

## 2018-07-30 RX ADMIN — ROCURONIUM BROMIDE 30 MG: 10 INJECTION INTRAVENOUS at 09:46

## 2018-07-30 RX ADMIN — ALVIMOPAN 12 MG: 12 CAPSULE ORAL at 07:21

## 2018-07-30 RX ADMIN — FENTANYL CITRATE 50 MCG: 50 INJECTION, SOLUTION INTRAMUSCULAR; INTRAVENOUS at 12:54

## 2018-07-30 RX ADMIN — HYDROMORPHONE HYDROCHLORIDE 0.5 MG: 1 INJECTION, SOLUTION INTRAMUSCULAR; INTRAVENOUS; SUBCUTANEOUS at 14:22

## 2018-07-30 RX ADMIN — PHENYLEPHRINE HYDROCHLORIDE 50 MCG: 10 INJECTION, SOLUTION INTRAMUSCULAR; INTRAVENOUS; SUBCUTANEOUS at 14:31

## 2018-07-30 RX ADMIN — PHENYLEPHRINE HYDROCHLORIDE 0.2 MCG/KG/MIN: 10 INJECTION, SOLUTION INTRAMUSCULAR; INTRAVENOUS; SUBCUTANEOUS at 12:10

## 2018-07-30 RX ADMIN — ROCURONIUM BROMIDE 20 MG: 10 INJECTION INTRAVENOUS at 11:51

## 2018-07-30 RX ADMIN — PHENYLEPHRINE HYDROCHLORIDE 50 MCG: 10 INJECTION, SOLUTION INTRAMUSCULAR; INTRAVENOUS; SUBCUTANEOUS at 10:34

## 2018-07-30 RX ADMIN — Medication 10 MG: at 08:47

## 2018-07-30 RX ADMIN — ANTIHEMOPHILIC FACTOR (RECOMBINANT) 4348 UNITS: KIT at 08:04

## 2018-07-30 RX ADMIN — FENTANYL CITRATE 25 MCG: 50 INJECTION INTRAMUSCULAR; INTRAVENOUS at 16:12

## 2018-07-30 RX ADMIN — PHENYLEPHRINE HYDROCHLORIDE 100 MCG: 10 INJECTION, SOLUTION INTRAMUSCULAR; INTRAVENOUS; SUBCUTANEOUS at 12:10

## 2018-07-30 RX ADMIN — Medication 0.3 MG: at 16:30

## 2018-07-30 RX ADMIN — ERTAPENEM SODIUM 1 G: 1 INJECTION, POWDER, LYOPHILIZED, FOR SOLUTION INTRAMUSCULAR; INTRAVENOUS at 09:05

## 2018-07-30 RX ADMIN — SODIUM CHLORIDE, POTASSIUM CHLORIDE, SODIUM LACTATE AND CALCIUM CHLORIDE: 600; 310; 30; 20 INJECTION, SOLUTION INTRAVENOUS at 12:17

## 2018-07-30 RX ADMIN — FENTANYL CITRATE 50 MCG: 50 INJECTION, SOLUTION INTRAMUSCULAR; INTRAVENOUS at 14:13

## 2018-07-30 RX ADMIN — PHENYLEPHRINE HYDROCHLORIDE 100 MCG: 10 INJECTION, SOLUTION INTRAMUSCULAR; INTRAVENOUS; SUBCUTANEOUS at 12:39

## 2018-07-30 RX ADMIN — SODIUM CHLORIDE, POTASSIUM CHLORIDE, SODIUM LACTATE AND CALCIUM CHLORIDE: 600; 310; 30; 20 INJECTION, SOLUTION INTRAVENOUS at 11:36

## 2018-07-30 RX ADMIN — ALBUMIN HUMAN: 0.05 INJECTION, SOLUTION INTRAVENOUS at 10:34

## 2018-07-30 RX ADMIN — DEXAMETHASONE SODIUM PHOSPHATE 6 MG: 4 INJECTION, SOLUTION INTRA-ARTICULAR; INTRALESIONAL; INTRAMUSCULAR; INTRAVENOUS; SOFT TISSUE at 14:20

## 2018-07-30 RX ADMIN — GABAPENTIN 300 MG: 300 CAPSULE ORAL at 07:21

## 2018-07-30 RX ADMIN — ROCURONIUM BROMIDE 10 MG: 10 INJECTION INTRAVENOUS at 13:07

## 2018-07-30 RX ADMIN — ROCURONIUM BROMIDE 10 MG: 10 INJECTION INTRAVENOUS at 11:19

## 2018-07-30 RX ADMIN — ONDANSETRON 4 MG: 2 INJECTION INTRAMUSCULAR; INTRAVENOUS at 14:41

## 2018-07-30 RX ADMIN — LIDOCAINE HYDROCHLORIDE 100 MG: 20 INJECTION, SOLUTION INFILTRATION; PERINEURAL at 08:30

## 2018-07-30 RX ADMIN — OXYCODONE HYDROCHLORIDE 5 MG: 5 TABLET ORAL at 19:28

## 2018-07-30 RX ADMIN — MIRTAZAPINE 45 MG: 45 TABLET, FILM COATED ORAL at 22:02

## 2018-07-30 RX ADMIN — MIDAZOLAM 1 MG: 1 INJECTION INTRAMUSCULAR; INTRAVENOUS at 06:56

## 2018-07-30 ASSESSMENT — ACTIVITIES OF DAILY LIVING (ADL): ADLS_ACUITY_SCORE: 9

## 2018-07-30 NOTE — OP NOTE
PREPROCEDURE DIAGNOSIS: Bladder cancer   POSTPROCEDURE DIAGNOSIS: same  PROCEDURE PERFORMED:   1. Radical cystoprostatectomy.   2. Bilateral pelvic lymph node dissection and ileal conduit.   SURGEON: Bebeto Shea MD   ASSISTANT: Boom Newsome MD   SECOND ASSISTANT: , MD.   ANESTHESIA: General with endotracheal intubation.   EBL: 800cc  Findings: negative margins at ureter and urethra  Drains: 19F Barrie  Indications: Patient is a 67yo male with h/o of bladder cancer.  Was counseled regarding treatment options and decided to go ahead with cystectomy.  DESCRIPTION OF PROCEDURE: After fully informed voluntary consent was obtained, the patient was brought into the operating room, properly identified and placed in a supine position on the operating table. The general anesthesia was induced, endotracheal intubation performed, IV imipenem administered. Abdomen was shaved, prepped and draped in the usual sterile fashion with Betadine. Gordon catheter was placed in the bladder in a sterile manner. Midline infraumbilical incision was made, taken through the anterior rectus fascia and the space of Retzius was entered. Bladder was mobilized off the pelvic sidewall on either side. The vas deferens was divided on both sides and the ureter was dissected down first on the left side all the way to the bladder and then on the right side.  We then divided the obliterated umbilical arteries on both sides. The endopelvic fascia was also incised on both sides. The posterior and lateral pedicles of the prostate were then divided with the LigaSure electrocautery, first on the left side and then on the right side. Once we were near the base of the prostate, we divided the lateral prostatic pedicles and then commenced with retrograde dissection. The Dorsal vein was divided using the ligasure and oversewn with 0 vicryl suture. The urethra was exposed and divided. The anterior urethra was incised and the Gordon catheter was pulled up into  the field and cut and the posterior urethra was then divided. The prostatic apex was mobilized off the anterior surface of the rectum and the the right sided pedicles were all taken with ligasure. Ureters were then clipped, divided and the proximal ends packed away and the paracolic gutter on either side.The urachus was then dissected out and divided at the level of the umbilicus and specimen handed off. Once we were satisfied with dissection, the wound was irrigated with copious amounts of sterile water and all bleeding points carefully controlled. We then performed a pelvic lymph node dissection on both sides with removing all lymph node tissue between the external iliac artery, obturator nerve distally down to the inguinal ligament and cephalad up to the bifurcation of common iliac vessels. We found a lot of dense adhesions and scarring around the bladder on both sides and particularly on the right side.  We then selected a segment of bowel for the ileal conduit starting about 15 cm from the ileocecal valve which was the distal end. We then measured out 25 cm of small bowel and made mesenteric windows of the distal and proximal ends of the small bowel. The small bowel was then divided using AUSTIN 80 stapler. We then restored bowel continuity performing a side-to-side small bowel anastomosis in a standard fashion using a single vertical fire of the AUSTIN 80 stapler and horizontal fire of a TA 55 stapler. Reinforcing sutures of 3.0 vicryl were placed below the TA55 staple line in an interrupted manner. The mesenteric trap was closed using 3-0 silk suture. We placed a horizontal mattress suture below the staple line at the butt end of the conduit. The left ureter was mobilized and brought under the sigmoid colon to the right side. We then performed both the ureteric anastomosis in a standard end to side Cornelia fashion into the conduit. A 7 Bahamian urinary diversion stent was placed in both ureters prior to completing  the ureteric anastomosis. Stents were irrigated and urine was dripping from the distal end of the ureteric stent. Both anastomosis were tested for leaks by irrigation and no leaks were found. An additional few 4.0 silk reinforcing sutures were placed from ureter to bowel serosa. The butt end of the conduit was secured to the posterior peritoneum with interrupted 3.0 silk. The stents were secured in place to the inside of the conduit using 4.0 chormic suture. A 22F ramirez was left in the pelvis through the urethra as a drain. We then excised a circular disk of skin a little lateral to the umbilicus on the right side. We incised the fascia in a cruciate manner and the posterior rectus fascia was also incised in a vertical manner. Through this opening, we pulled out the stomal end of the ileal conduit and fashioned a standard rosebud stoma. The fascial sutures of 3-0 Vicryl were also placed through the serosa of the bowel and the rectus fascia. Once an everted stoma had been fashioned, we again inspected the abdominal cavity carefully and placed some surgicel in the pelvis around the urethra after suture ligating some more bleeding. A 16F red rubber was left in the conduit.  We again inspected the bowel anastomosis and all other areas and everything found to be intact. We irrigated the abdomen and closed the fascia with running #1 PDS suture. The subcutaneous tissue was reapproximated using 3-0 chromic interrupted suture and skin was closed using staples. The patient tolerated the procedure well. There were no complications. I was present and involved the entire procedure. All sponge, needle and instrument counts were correct and doubly verified prior to closure. The patient was taken to recovery room in stable condition.

## 2018-07-30 NOTE — OR NURSING
SBP less than 90. Pt asymptomatic. Placed in trendelenburg and Dr. Ernst notified. Order for hemoglobin to be sent and additional 250 ml of albumin to be administered in PACU. Will continue to monitor.

## 2018-07-30 NOTE — OR NURSING
STAT PACU labs resulted. Dr. Newsome notified. No new orders received. Pt ok to transfer to  per MD. Will continue to monitor.

## 2018-07-30 NOTE — ANESTHESIA POSTPROCEDURE EVALUATION
Patient: Orlin Alcantar    Procedure(s):  Radical Cystoprostatectomy, Ileal Conduit, Bilateral Pelvic Lymphadenectomy - Wound Class: II-Clean Contaminated   - Wound Class: I-Clean    Diagnosis:Malignant Neoplasm Of Overlapping Sites Of Bladder   Diagnosis Additional Information: No value filed.    Anesthesia Type:  General    Note:  Anesthesia Post Evaluation    Patient location during evaluation: PACU  Patient participation: Able to fully participate in evaluation  Level of consciousness: awake and alert  Pain management: adequate  Airway patency: patent  Cardiovascular status: acceptable  Respiratory status: acceptable  Hydration status: acceptable  PONV: none     Anesthetic complications: None          Last vitals:  Vitals:    07/30/18 1615 07/30/18 1630 07/30/18 1645   BP: 109/72 106/65    Pulse:      Resp: 18 18 18   Temp:   37  C (98.6  F)   SpO2: 98%           Electronically Signed By: Zeferino Ernst MD  July 30, 2018  5:00 PM

## 2018-07-30 NOTE — OR NURSING
Writer spoke with Dr. Kim (anesthesia) and states that pre op subcutaneous heparin should not be given d/t patient's hemophilia. Dose of heparin charted as not given in MAR.

## 2018-07-30 NOTE — ANESTHESIA PROCEDURE NOTES
Peripheral Nerve Block Procedure Note    Staff:     Anesthesiologist:  TONY SALCIDO    Resident/CRNA:  JORGE ACOSTA    Block performed by resident/CRNA in the presence of a teaching physician    Location: Pre-op  Procedure Start/Stop TImes:      7/30/2018 6:55 AM     7/30/2018 7:05 AM    patient identified, IV checked, site marked, risks and benefits discussed, informed consent, monitors and equipment checked, pre-op evaluation, at physician/surgeon's request and post-op pain management      Correct Patient: Yes      Correct Position: Yes      Correct Site: Yes      Correct Procedure: Yes      Correct Laterality:  Yes    Site Marked:  Yes  Procedure details:     Procedure:  TAP    ASA:  3    Diagnosis:  Post op analgesia    Laterality:  Bilateral    Position:  Supine    Sterile Prep: chloraprep, mask and sterile gloves      Local skin infiltration:  None    Needle:  Insulated    Needle gauge:  21    Needle length (inches):  4    Ultrasound: Yes      Ultrasound used to identify targeted nerve, plexus, or vascular structure and placed a needle adjacent to it      Permanent Image entered into patiient's record      Abnormal pain on injection: No      Blood Aspirated: No      Paresthesias:  No    Bleeding at site: No      Bolus via:  Needle    Infusion Method:  Single Shot    Complications:  None  Assessment/Narrative:     Injection made incrementally with aspirations every (mL):  5

## 2018-07-30 NOTE — IP AVS SNAPSHOT
MRN:[id number]                      After Visit Summary   7/30/2018    Orlin Alcantar    MRN: [id number]           Thank you!     Thank you for choosing [facility] for your care. Our goal is always to provide you with excellent care. Hearing back from our patients is one way we can continue to improve our services. Please take a few minutes to complete the written survey that you may receive in the mail after you visit with us. Thank you!        Patient Information     Date Of Birth          1950        About your hospital stay     You were admitted on:  July 30, 2018 You last received care in the:  [address] [facility] [facility]    You were discharged on:  August 6, 2018        Reason for your hospital stay       On 7/30/18 Mr. Alcantar underwent radical cystoprostatectomy with bilateral pelvic lymph node dissection and ileal conduit.   Dr. USAMA Shea (Urology)                  Who to Call     For medical emergencies, please call 911.  For non-urgent questions about your medical care, please call your primary care provider or clinic, [phone]  For questions related to your surgery, please call your surgery clinic        Attending Provider     Provider Specialty    Bebeto Shea MD Urology       Primary Care Provider Office Phone # Fax #    Katie Ramos [phone]      After Care Instructions     Activity       Your activity upon discharge: See discharge instructions            Diet       Follow this diet upon discharge:Regular / home diet            Discharge Instructions       Diet:   - Regular diet. Eat small frequent meals. Consider adding nutrition shakes several times per day such as Boost or Ensure. Add a multivitamin.   - The most common reason for hospital readmission after bladder cancer surgery is dehydration. Drink plenty of fluids - at least eight 8 ounce glasses of water per day - or aim to keep your urine color pale yellow.     Activity:   - No strenuous  exercise for  weeks.   - No lifting, pushing, pulling more than 10 pounds for  weeks. Take care when pushing with your arms to stand up.  - Do not strain your belly area.  When you bend, sit up or twice, you could strain the area around your incision.    - Do not strain with bowel movements.    - Do not drive until you can press the brake pedal quickly and fully without pain.   - Do not operate a motor vehicle while taking narcotic pain medications.     Medications:   1) PAIN: Oxycodone is a narcotic medication that has been prescribed for pain.  Narcotics will cause sleepiness and constipation and can become addictive, therefore it is best to stop or reduce them as soon as you can.  Any left over narcotics should be disposed of with an Authorized  for unneeded medications.  Contact your [facility] or [facility] [employer]'s household trash and recycling service to learn about medication disposal options and guidelines for your area.  If you decide to store this medication at home it should be kept in a locked cabinet to prevent access to children or visitors. To reduce your narcotic use, take Tylenol (acetaminophen 625mg) every 4-6 hours as needed.  This has been prescribed for you.  Do not take more than 4,000mg of Tylenol (acetaminophen/ APAP) from all sources in any 24 hour period since this can cause liver damage.  Do not take more than 2400mg of ibuprofen in any 24 hour period since this can cause kidney damage. Never drive, operate machinery or drink alcoholic beverages while you are taking narcotic pain medications.      2) CONSTIPATION: Miralax (polyethylene glycol) can be taken daily for prevention of constipation.  Surgery and pain medications can make you constipated.  Please reduce or stop Miralax if you develop loose stools. Other over the counter solutions such as prune juice, miralax, fiber products, senna, and dulcolax can also be used. If you are taking the Miralax but still have not had a bowel  movement in 3 days, start over-the-counter Milk of Magnesia taken twice daily until you have a nice bowel movement.  Call the office with any concerns.      3) BLOOD PRESSURE MEDICATIONS:  - While you were in the hospital your lisinopril was held (not given) because your blood pressure was well-controlled.  Follow up with your primary care physician within 7-10 days of discharge to discuss your blood pressure management.     Incisions:   - Daily showering is important, and you may get incisions wet starting 48 hours after surgery.    - Do not scrub incisions or soak in a bath, pool, hot tub, etc. until advised by Dr. Shea  - The purple skin glue on your incisions will flake off with time and should not be scrubbed.  Also avoid applying lotions or ointments to these areas.  - If steri-strips were used you may wash over them normally, as you would wash your remaining skin.  Steri-strips should fall off on their own within 5-10 days.   - Staples should be removed in 7-10 days.  We will schedule a urology nurse appointment for you to have this done.   - It is preferable for the incision to be left uncovered, but you may cover with gauze if needed for comfort or to protect clothing from drainage.     Urostomy / Ileal Conduit  - Follow standard instructions as provided by the Wound Ostomy Care Nurses  - Measure daily urine output and record values on a diary.  Bring this information with you when you followup with Dr. Shea.  You should be making at least 1000mL of urine each 24 hour period.  If you are not meeting this goal you should increase your fluid intake.     Supplies:  1) Ostomy supplies to get started  2) Drain sponges for drain site  3) Drain cares                  Follow-up Appointments     Adult [facility] Follow-up and recommended labs and tests       Follow-Up:   - Call your primary care provider to touch base regarding your recent admission. You should be seen in clinic within 7-10 days of discharge to  discuss your blood pressures and for a postoperative checkup.   - You will need a urology nurse visit to have your staples removed in about 7-10 days.  The Urology Clinic should be calling you with this appointment.    - Follow up with Dr. Shea as scheduled on 8/22/18 for a postop check and to discuss your pathology results  - Call or return sooner than your regularly scheduled visit if you develop any of the following:  Fever (greater than 101.3F) or flu-like symptoms, uncontrolled pain, uncontrolled nausea or vomiting, as well as increased redness, swelling, or drainage from your wound.    Phone numbers:  - Nursing phone helpline at the Urology Clinic (8A-5P M-F):  [phone].    - Nights or weekends, call [phone] and ask the  to page the urology resident on call.   - For emergencies, always call 911      Appointments on [place] and/or [facility] (with [facility] or [facility] provider or service). Call [phone] if you haven't heard regarding these appointments within 7 days of discharge.                  Your next 10 appointments already scheduled     Aug 22, 2018  1:30 PM CDT   POST OP OSTOMY NURSE with Orlin Ball RN   [facility] Wound Ostomy ([facility])    [address] [place] [address] [id number] [phone]            Aug 22, 2018  3:00 PM CDT   (Arrive by 2:45 PM)   Post-Op with Bebeto Shea MD   [facility] Urology and Inst for Prostate and Urologic Cancers ([facility])    [address] [place] [address] [id number] [phone]            Sep 11, 2018 10:15 AM CDT   Adult Med Follow UP with JELLY Chapa CNS   Psychiatry Clinic ([facility])    [place] [id number]  [address] [id number] [phone]              Additional Services     Home care nursing referral       _______________________  [facility]  Phone   [phone]  Fax  [phone]  ______________________   Skilled RN for initial home safety evaluation and to assist with MD team plans of care as outlined in discharge orders.       Skilled home care RN 1-3 times a week to assist with management and education reinforcement with new ostomy, nutrition and hydration evaluation, medication management, endurance evaluation and general status evaluation after hospitalization for surgery.       Skilled home care RN to re evaluate patient's understanding about how to order ostomy supplies as instructed inpatient by the WOC (Wound Ostomy Continence) Nurse Consult Team      Your provider has ordered home care nursing services. If you have not been contacted within 2 days of your discharge please call the inpatient department phone number at [phone] .                  Further instructions from your care team         [facility]     Name: Orlin Alcantar  Date: 8/3/18    To order your ostomy supplies    The ostomy Supplier needs this supply list  to process your order. You will need to fax/deliver this list, along with your Insurance information. Your home care nurse can assist with this process.                 List of Ostomy Distributors [facility] [employer]  Ph. [phone] [phone] Fax # [phone]  [facility]   Ph. [phone] [phone]  [facility]   Ph. [phone]  [facility]   Ph. [phone] [phone]  Or Call your insurance provider for their preferred supplier    Your Medical Supplier will need your surgeon's name, phone and fax number    Clinic:                     Phone                            Fax  Urology Surgery:              [phone]  Verbal Order for ostomy supplies for 1 Month per:     Ivania Arroyo RN [facility]OCN      Authorizing MD: Dr. Bebeto Shea MD     Request the following supplies:      Buckley    2 piece flat wafer 57mm  # [id number]     Urine pouch 57mm- #  "[id number]  Microskin  Accessories  2  barrier ring #[id number]     Adapt powder #7906    No sting film barrier # [id number]                           Bard urine drainage bag #[id number]                                 Coloplast leg bag #[id number]    Bannish II liquid urine deoderizer #[id number]             or Urikleen  # [id number]    Night drainage bottle # [id number]     or Sayra 1/2 gallon Bottle with 6\" tubing                Change your pouch twice a week, more often if leaking.    If you are cutting a hole in the wafer of your pouch, recheck stoma size and adjust pouch opening as needed every week    . Call the Ostomy Nurse at [facility]       [address] [place] [address], [place] : [phone]   Schedule a follow-up visit in 4 to 6 weeks after your surgery, sooner if having problems Bring a complete set of pouch-changing supplies to this visit      Problems you should Report  - The stoma turns blue or darker in color.  - Cuts or sores around the stoma.  - Red, raw or painful skin around the stoma.  - Any bulging of the skin around the stoma.  - A pouch that leaks every day.  - Problems making the right size hole in the pouch wafer.    Please call with any questions or concerns.      Pending Results     No orders found from 7/28/2018 to 7/31/2018.            Statement of Approval     Ordered          08/06/18 0750  I have reviewed and agree with all the recommendations and orders detailed in this document.  EFFECTIVE NOW     Approved and electronically signed by:  Roberta Dumont PA             Admission Information     Date & Time Provider Department Dept. Phone    7/30/2018 Bebeto Shea MD [address] [facility] [place] [phone]      Your Vitals Were     Blood Pressure Pulse Temperature Respirations Height Weight    128/80 (BP Location: Left arm) 92 96.4  F (35.8  C) (Oral) 18 1.829 m (6') 69.7 kg (153 lb 11.2 oz)    Pulse Oximetry BMI (Body Mass Index)                97% 20.85 kg/m2          MyChart " Information     [facility] gives you secure access to your electronic health record. If you see a primary care provider, you can also send messages to your care team and make appointments. If you have questions, please call your primary care clinic.  If you do not have a primary care provider, please call [phone] and they will assist you.        Care EveryWhere ID     This is your Care EveryWhere ID. This could be used by other organizations to access your [facility] medical records  [id number]        Equal Access to Services     ANDREW FLANAGAN : Hadii aad ku hadasho Soomaali, waaxda luqadaha, qaybta kaalmada adeegyada, waxay annin haymarj rene . So [facility] [phone].    ATENCIÓN: Si habla español, tiene a hernadez disposición servicios gratuitos de asistencia lingüística. Llame al [phone].    We comply with applicable federal civil rights laws and [place] laws. We do not discriminate on the basis of race, color, national origin, age, disability, sex, sexual orientation, or gender identity.               Review of your medicines      START taking        Dose / Directions    order for DME   Used for:  Severe hemophilia A (H)        Equipment being ordered: Walker Wheels () and Walker () Treatment Diagnosis: gait instability   Quantity:  1 each   Refills:  0       oxyCODONE IR 5 MG tablet   Commonly known as:  ROXICODONE        Dose:  5 mg   Take 1 tablet (5 mg) by mouth every 4 hours as needed for moderate to severe pain   Quantity:  12 tablet   Refills:  0       polyethylene glycol Packet   Commonly known as:  MIRALAX/GLYCOLAX        Dose:  17 g   Take 17 g by mouth daily (to prevent constipation)   Quantity:  30 packet   Refills:  1         CONTINUE these medicines which may have CHANGED, or have new prescriptions. If we are uncertain of the size of tablets/capsules you have at home, strength may be listed as something that might have changed.        Dose / Directions    acetaminophen 325  MG tablet   Commonly known as:  TYLENOL   This may have changed:    - medication strength  - how much to take  - when to take this        Dose:  650 mg   Take 2 tablets (650 mg) by mouth every 4 hours as needed for mild pain or fever   Quantity:  100 tablet   Refills:  1       amLODIPine 5 MG tablet   Commonly known as:  NORVASC   This may have changed:  when to take this   Used for:  Hypertension goal BP (blood pressure) < 140/90        Dose:  5 mg   Take 1 tablet (5 mg) by mouth daily   Quantity:  90 tablet   Refills:  3       diazepam 5 MG tablet   Commonly known as:  VALIUM   This may have changed:    - how much to take  - how to take this  - when to take this  - reasons to take this  - additional instructions   Used for:  Depressive disorder        Take up to one tablet by mouth daily as needed for anxiety   Quantity:  30 tablet   Refills:  5       * KOGENATE FS 1000 units Kit   This may have changed:  additional instructions   Used for:  Severe hemophilia A (H), Personal history of malignant neoplasm of bladder        Infuse 3000 iu (-5/+10%) every other day   Quantity:  27780 each   Refills:  1       * KOGENATE FS 3000 units Kit   This may have changed:  You were already taking a medication with the same name, and this prescription was added. Make sure you understand how and when to take each.   Used for:  Severe hemophilia A (H), Hemophilic arthropathy        Infuse Kogenate at 3000 Units +/- 10% IV Q 24 hours until 8/13/2018.   Quantity:  73448 Units   Refills:  0       lisinopril 40 MG tablet   Commonly known as:  PRINIVIL/ZESTRIL   This may have changed:    - when to take this  - additional instructions   Used for:  Hypertension goal BP (blood pressure) < 140/90        Dose:  40 mg   Take 1 tablet (40 mg) by mouth every morning (HOLD UNTIL FOLLOW-UP with Primary Care Provider)   Quantity:  30 tablet   Refills:  0       * Notice:  This list has 2 medication(s) that are the same as other medications  prescribed for you. Read the directions carefully, and ask your doctor or other care provider to review them with you.      CONTINUE these medicines which have NOT CHANGED        Dose / Directions    mirtazapine 45 MG tablet   Commonly known as:  REMERON   Used for:  Generalized anxiety disorder        Dose:  45 mg   Take 1 tablet (45 mg) by mouth At Bedtime And may take 1/2 tablet for insomnia up to 3 times a week.   Quantity:  37 tablet   Refills:  5         STOP taking     codeine 30 MG tablet           dexamethasone 4 MG tablet   Commonly known as:  DECADRON           LORazepam 0.5 MG tablet   Commonly known as:  ATIVAN           ondansetron 8 MG ODT tab   Commonly known as:  ZOFRAN-ODT           prochlorperazine 10 MG tablet   Commonly known as:  COMPAZINE           tamsulosin 0.4 MG capsule   Commonly known as:  FLOMAX                Where to get your medicines      These medications were sent to [facility] [place] [id number]     Phone:  [phone]     KOGENATE FS 3000 units Kit         These medications were sent to [facility] [place], [place] - [address]   [address] [place] [id number]     Phone:  [phone]     acetaminophen 325 MG tablet    polyethylene glycol Packet         Some of these will need a paper prescription and others can be bought over the counter. Ask your nurse if you have questions.     Bring a paper prescription for each of these medications     order for DME    oxyCODONE IR 5 MG tablet                Protect others around you: Learn how to safely use, store and throw away your medicines at www.disposemymeds.org.        Information about OPIOIDS     PRESCRIPTION OPIOIDS: WHAT YOU NEED TO KNOW   We gave you an opioid (narcotic) pain medicine. It is important to manage your pain, but opioids are not always the best choice. You should first try all  the other options your care team gave you. Take this medicine for as short a time (and as few doses) as possible.     These medicines have risks:    DO NOT drive when on new or higher doses of pain medicine. These medicines can affect your alertness and reaction times, and you could be arrested for driving under the influence (DUI). If you need to use opioids long-term, talk to your care team about driving.    DO NOT operate heave machinery    DO NOT do any other dangerous activities while taking these medicines.     DO NOT drink any alcohol while taking these medicines.      If the opioid prescribed includes acetaminophen, DO NOT take with any other medicines that contain acetaminophen. Read all labels carefully. Look for the word  acetaminophen  or  Tylenol.  Ask your pharmacist if you have questions or are unsure.    You can get addicted to pain medicines, especially if you have a history of addiction (chemical, alcohol or substance dependence). Talk to your care team about ways to reduce this risk.    Store your pills in a secure place, locked if possible. We will not replace any lost or stolen medicine. If you don t finish your medicine, please throw away (dispose) as directed by your pharmacist. The [place] Pollution Control Agency has more information about safe disposal: https://www.pca.[facility].[place].us/living-green/managing-unwanted-medications.     All opioids tend to cause constipation. Drink plenty of water and eat foods that have a lot of fiber, such as fruits, vegetables, prune juice, apple juice and high-fiber cereal. Take a laxative (Miralax, milk of magnesia, Colace, Senna) if you don t move your bowels at least every other day.              Medication List: This is a list of all your medications and when to take them. Check marks below indicate your daily home schedule. Keep this list as a reference.      Medications           Morning Afternoon Evening Bedtime As Needed    acetaminophen 325 MG tablet    Commonly known as:  TYLENOL   Take 2 tablets (650 mg) by mouth every 4 hours as needed for mild pain or fever   Last time this was given:  650 mg on 8/6/2018  6:38 AM                                amLODIPine 5 MG tablet   Commonly known as:  NORVASC   Take 1 tablet (5 mg) by mouth daily   Last time this was given:  5 mg on 8/6/2018  7:41 AM                                diazepam 5 MG tablet   Commonly known as:  VALIUM   Take up to one tablet by mouth daily as needed for anxiety                                * KOGENATE FS 1000 units Kit   Infuse 3000 iu (-5/+10%) every other day   Last time this was given:  4,448 Units on 8/5/2018  8:38 PM                                * KOGENATE FS 3000 units Kit   Infuse Kogenate at 3000 Units +/- 10% IV Q 24 hours until 8/13/2018.   Last time this was given:  4,448 Units on 8/5/2018  8:38 PM                                lisinopril 40 MG tablet   Commonly known as:  PRINIVIL/ZESTRIL   Take 1 tablet (40 mg) by mouth every morning (HOLD UNTIL FOLLOW-UP with Primary Care Provider)                                mirtazapine 45 MG tablet   Commonly known as:  REMERON   Take 1 tablet (45 mg) by mouth At Bedtime And may take 1/2 tablet for insomnia up to 3 times a week.   Last time this was given:  45 mg on 8/5/2018 10:58 PM                                order for DME   Equipment being ordered: Walker Wheels () and Walker () Treatment Diagnosis: gait instability                                oxyCODONE IR 5 MG tablet   Commonly known as:  ROXICODONE   Take 1 tablet (5 mg) by mouth every 4 hours as needed for moderate to severe pain   Last time this was given:  5 mg on 8/6/2018  6:38 AM                                polyethylene glycol Packet   Commonly known as:  MIRALAX/GLYCOLAX   Take 17 g by mouth daily (to prevent constipation)   Last time this was given:  17 g on 8/6/2018  7:41 AM                                * Notice:  This list has 2 medication(s) that are  the same as other medications prescribed for you. Read the directions carefully, and ask your doctor or other care provider to review them with you.

## 2018-07-30 NOTE — ANESTHESIA CARE TRANSFER NOTE
Patient: Orlin Alcantar    Procedure(s):  Radical Cystoprostatectomy, Ileal Conduit, Bilateral Pelvic Lymphadenectomy - Wound Class: II-Clean Contaminated   - Wound Class: I-Clean    Diagnosis: Malignant Neoplasm Of Overlapping Sites Of Bladder   Diagnosis Additional Information: No value filed.    Anesthesia Type:   General     Note:  Airway :Face Mask  Patient transferred to:PACU  Comments: Pt is hermodynamically stable,  awake , breathing comfortably on 6L O2. SpO2 100%Handoff Report: Identifed the Patient, Identified the Reponsible Provider, Reviewed the pertinent medical history, Discussed the surgical course, Reviewed Intra-OP anesthesia mangement and issues during anesthesia, Set expectations for post-procedure period and Allowed opportunity for questions and acknowledgement of understanding      Vitals: (Last set prior to Anesthesia Care Transfer)    CRNA VITALS  7/30/2018 1448 - 7/30/2018 1528      7/30/2018             Resp Rate (observed): (!)  1    Resp Rate (set): 10                Electronically Signed By: Dinorah Card MD  July 30, 2018  3:28 PM

## 2018-07-30 NOTE — ANESTHESIA PROCEDURE NOTES
Arterial Line Procedure Note  Staff:     Anesthesiologist:  BLAKE WALLACE    Resident/CRNA:  JORGE ACOSTA    Arterial line performed by resident/CRNA in presence of a teaching physician    Location: In OR After Induction  Procedure Start/Stop Times:     patient identified, IV checked, site marked, risks and benefits discussed, informed consent, monitors and equipment checked, pre-op evaluation and at physician/surgeon's request      Correct Patient: Yes      Correct Position: Yes      Correct Site: Yes      Correct Procedure: Yes      Correct Laterality:  Yes    Site Marked:  Yes  Line Placement:     Procedure:  Arterial Line    Arterial catheter insertion site: axillary.    Insertion laterality:  Left    Skin Prep: Chloraprep      Patient Prep: patient draped, mask, sterile gloves, hat and hand hygiene      Local skin infiltration:  None    Ultrasound Guided?: Yes      Artery evaluated via ultrasound confirming patency.   Using realtime imaging, the artery was punctured and the needle was observed entering the artery.      A permanent image is entered into patient's chart.      Catheter size:  20 gauge, 12 cm    Cath secured with: suture      Dressing:  Tegaderm    Complications:  None obvious    Arterial waveform: Yes      IBP within 10% of NIBP: Yes

## 2018-07-31 ENCOUNTER — APPOINTMENT (OUTPATIENT)
Dept: PHYSICAL THERAPY | Facility: CLINIC | Age: 68
DRG: 653 | End: 2018-07-31
Attending: UROLOGY
Payer: MEDICARE

## 2018-07-31 ENCOUNTER — APPOINTMENT (OUTPATIENT)
Dept: OCCUPATIONAL THERAPY | Facility: CLINIC | Age: 68
DRG: 653 | End: 2018-07-31
Attending: UROLOGY
Payer: MEDICARE

## 2018-07-31 LAB
ANION GAP SERPL CALCULATED.3IONS-SCNC: 6 MMOL/L (ref 3–14)
BUN SERPL-MCNC: 21 MG/DL (ref 7–30)
CALCIUM SERPL-MCNC: 8.1 MG/DL (ref 8.5–10.1)
CHLORIDE SERPL-SCNC: 110 MMOL/L (ref 94–109)
CO2 SERPL-SCNC: 26 MMOL/L (ref 20–32)
CREAT SERPL-MCNC: 0.93 MG/DL (ref 0.66–1.25)
ERYTHROCYTE [DISTWIDTH] IN BLOOD BY AUTOMATED COUNT: 15.4 % (ref 10–15)
FACT VIII ACT/NOR PPP: 130 % (ref 55–200)
GFR SERPL CREATININE-BSD FRML MDRD: 81 ML/MIN/1.7M2
GLUCOSE SERPL-MCNC: 116 MG/DL (ref 70–99)
HCT VFR BLD AUTO: 29 % (ref 40–53)
HGB BLD-MCNC: 9.2 G/DL (ref 13.3–17.7)
MCH RBC QN AUTO: 28.8 PG (ref 26.5–33)
MCHC RBC AUTO-ENTMCNC: 31.7 G/DL (ref 31.5–36.5)
MCV RBC AUTO: 91 FL (ref 78–100)
PLATELET # BLD AUTO: 160 10E9/L (ref 150–450)
POTASSIUM SERPL-SCNC: 4.4 MMOL/L (ref 3.4–5.3)
RBC # BLD AUTO: 3.19 10E12/L (ref 4.4–5.9)
SODIUM SERPL-SCNC: 141 MMOL/L (ref 133–144)
WBC # BLD AUTO: 13.5 10E9/L (ref 4–11)

## 2018-07-31 PROCEDURE — 25000555 ZZHC RX FACTOR IP 250 OP 636: Performed by: UROLOGY

## 2018-07-31 PROCEDURE — A9270 NON-COVERED ITEM OR SERVICE: HCPCS | Mod: GY | Performed by: UROLOGY

## 2018-07-31 PROCEDURE — 80048 BASIC METABOLIC PNL TOTAL CA: CPT | Performed by: STUDENT IN AN ORGANIZED HEALTH CARE EDUCATION/TRAINING PROGRAM

## 2018-07-31 PROCEDURE — 85027 COMPLETE CBC AUTOMATED: CPT | Performed by: STUDENT IN AN ORGANIZED HEALTH CARE EDUCATION/TRAINING PROGRAM

## 2018-07-31 PROCEDURE — 99231 SBSQ HOSP IP/OBS SF/LOW 25: CPT | Performed by: NURSE PRACTITIONER

## 2018-07-31 PROCEDURE — 97530 THERAPEUTIC ACTIVITIES: CPT | Mod: GP

## 2018-07-31 PROCEDURE — 36415 COLL VENOUS BLD VENIPUNCTURE: CPT | Performed by: STUDENT IN AN ORGANIZED HEALTH CARE EDUCATION/TRAINING PROGRAM

## 2018-07-31 PROCEDURE — 97535 SELF CARE MNGMENT TRAINING: CPT | Mod: GO

## 2018-07-31 PROCEDURE — 97530 THERAPEUTIC ACTIVITIES: CPT | Mod: GO

## 2018-07-31 PROCEDURE — A9270 NON-COVERED ITEM OR SERVICE: HCPCS | Mod: GY | Performed by: STUDENT IN AN ORGANIZED HEALTH CARE EDUCATION/TRAINING PROGRAM

## 2018-07-31 PROCEDURE — 25000132 ZZH RX MED GY IP 250 OP 250 PS 637: Mod: GY | Performed by: STUDENT IN AN ORGANIZED HEALTH CARE EDUCATION/TRAINING PROGRAM

## 2018-07-31 PROCEDURE — 25000128 H RX IP 250 OP 636: Performed by: STUDENT IN AN ORGANIZED HEALTH CARE EDUCATION/TRAINING PROGRAM

## 2018-07-31 PROCEDURE — 97161 PT EVAL LOW COMPLEX 20 MIN: CPT | Mod: GP

## 2018-07-31 PROCEDURE — 00000328 ZZHCL STATISTIC PTT NC: Performed by: STUDENT IN AN ORGANIZED HEALTH CARE EDUCATION/TRAINING PROGRAM

## 2018-07-31 PROCEDURE — 40000193 ZZH STATISTIC PT WARD VISIT

## 2018-07-31 PROCEDURE — 97165 OT EVAL LOW COMPLEX 30 MIN: CPT | Mod: GO

## 2018-07-31 PROCEDURE — 12000008 ZZH R&B INTERMEDIATE UMMC

## 2018-07-31 PROCEDURE — 00000167 ZZHCL STATISTIC INR NC: Performed by: STUDENT IN AN ORGANIZED HEALTH CARE EDUCATION/TRAINING PROGRAM

## 2018-07-31 PROCEDURE — 40000133 ZZH STATISTIC OT WARD VISIT

## 2018-07-31 PROCEDURE — 97116 GAIT TRAINING THERAPY: CPT | Mod: GP

## 2018-07-31 PROCEDURE — 85240 CLOT FACTOR VIII AHG 1 STAGE: CPT | Performed by: STUDENT IN AN ORGANIZED HEALTH CARE EDUCATION/TRAINING PROGRAM

## 2018-07-31 PROCEDURE — 25000132 ZZH RX MED GY IP 250 OP 250 PS 637: Mod: GY | Performed by: UROLOGY

## 2018-07-31 PROCEDURE — 40000901 ZZH STATISTIC WOC PT EDUCATION, 0-15 MIN

## 2018-07-31 PROCEDURE — 00000401 ZZHCL STATISTIC THROMBIN TIME NC: Performed by: STUDENT IN AN ORGANIZED HEALTH CARE EDUCATION/TRAINING PROGRAM

## 2018-07-31 RX ORDER — AMLODIPINE BESYLATE 5 MG/1
5 TABLET ORAL
Status: DISCONTINUED | OUTPATIENT
Start: 2018-07-31 | End: 2018-08-03

## 2018-07-31 RX ORDER — ANTIHEMOPHILIC FACTOR (RECOMBINANT) 2000 (+/-)
4448 KIT INTRAVENOUS CONTINUOUS
Status: DISCONTINUED | OUTPATIENT
Start: 2018-07-31 | End: 2018-08-06 | Stop reason: HOSPADM

## 2018-07-31 RX ORDER — CIPROFLOXACIN 2 MG/ML
400 INJECTION, SOLUTION INTRAVENOUS EVERY 12 HOURS
Status: DISCONTINUED | OUTPATIENT
Start: 2018-08-01 | End: 2018-08-03

## 2018-07-31 RX ADMIN — ACETAMINOPHEN 975 MG: 325 TABLET, FILM COATED ORAL at 05:26

## 2018-07-31 RX ADMIN — ANTIHEMOPHILIC FACTOR (RECOMBINANT) 4348 UNITS: KIT at 01:12

## 2018-07-31 RX ADMIN — ERTAPENEM SODIUM 1 G: 1 INJECTION, POWDER, LYOPHILIZED, FOR SOLUTION INTRAMUSCULAR; INTRAVENOUS at 09:01

## 2018-07-31 RX ADMIN — ACETAMINOPHEN 975 MG: 325 TABLET, FILM COATED ORAL at 14:03

## 2018-07-31 RX ADMIN — ALVIMOPAN 12 MG: 12 CAPSULE ORAL at 20:08

## 2018-07-31 RX ADMIN — Medication: at 05:22

## 2018-07-31 RX ADMIN — SODIUM CHLORIDE: 9 INJECTION, SOLUTION INTRAVENOUS at 22:49

## 2018-07-31 RX ADMIN — ALVIMOPAN 12 MG: 12 CAPSULE ORAL at 09:03

## 2018-07-31 RX ADMIN — ANTIHEMOPHILIC FACTOR (RECOMBINANT) 4448 UNITS: KIT at 17:17

## 2018-07-31 RX ADMIN — AMLODIPINE BESYLATE 5 MG: 5 TABLET ORAL at 09:04

## 2018-07-31 RX ADMIN — MIRTAZAPINE 45 MG: 45 TABLET, FILM COATED ORAL at 22:04

## 2018-07-31 RX ADMIN — ACETAMINOPHEN 975 MG: 325 TABLET, FILM COATED ORAL at 22:04

## 2018-07-31 ASSESSMENT — ACTIVITIES OF DAILY LIVING (ADL)
ADLS_ACUITY_SCORE: 9

## 2018-07-31 NOTE — PROGRESS NOTES
Urology  Progress Note    No acute events overnight  SBP in low 100's, BP meds held  Pain moderately controlled     Exam  /72 (BP Location: Left arm)  Pulse 103  Temp 96.5  F (35.8  C) (Oral)  Resp 16  Ht 1.829 m (6')  Wt 69.3 kg (152 lb 12.5 oz)  SpO2 96%  BMI 20.72 kg/m2  No acute distress  Unlabored breathing  Abdomen soft, nontender, nondistended. Incisions covered with minimally saturated dresings. Stoma pink and well perfused, 2 stents and RRC in place.  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 Hx severe hemophilia A.     Neuro: tylenol, dilaudid PCA for pain control  CV: HDS. Home amlodipine, lisinopril.  Pulm: incentive spirometry while awake  FEN/GI: CLD ADAT, MIVF @ 100/hr. Entereg  Endo: ARDEN  : Gordon in place. Monitor urine output.   Heme: Hgb stable. On Kogenate (antihemophilic factor). Heme following, 7/30 recs:   -Continue Kogenate infusion at current rate.   -Check FVIII level in am (has already been ordered).    -Will adjust Kogenate infusion based on level in am.  ID: afebrile, no leukocystosis. Ertapenem.   Activity: up ad mely  PPx: SCDs    Seen and examined with the chief resident. Will discuss with Dr. Shea.    Jamir Walsh, PGY-3  Urology Resident     Contacting the Urology Team     Please use the following job codes to reach the Urology Team. Note that you must use an in house phone and that job codes cannot receive text pages.     On weekdays, dial [phone] (or star-star-star [phone] on the new [employer] telephones) then [phone] to reach the Adult Urology resident or PA on call    On weekdays, dial [phone] (or star-star-star [phone] on the new [employer] telephones) then [phone] to reach the Pediatric Urology resident    On weeknights and weekends, dial [phone] (or star-star-star [phone] on the new  [email or web address] telephones) then [phone] to reach the Urology resident on call (for both Adult and Pediatrics)

## 2018-07-31 NOTE — PLAN OF CARE
Problem: Patient Care Overview  Goal: Plan of Care/Patient Progress Review  Discharge Planner PT   Patient plan for discharge: open to TCU  Current status: Pt Elizabeth for bed mobility, modA sit<>stand, and ambulates 2x75' with RW SBA, VSS throughout. He demonstrates orthopedic restrictions at baseline secondary to hemophilia arthropathies, but reports no recent hemophilia complications with his overall health. Pt very eager to continue PT and will need reinforcement of abdominal precautions.  Barriers to return to prior living situation: decreased caregiver support, reduced activity tolerance, medical needs  Recommendations for discharge: TCU  Rationale for recommendations: Pt is not at baseline mobility and lives alone. He would benefit from ongoing therapy to address strength, balance, and activity tolerance deficits to progress towards PLOF and promote IND mobility.       Entered by: Keshia Domingo 07/31/2018 12:57 PM

## 2018-07-31 NOTE — PROGRESS NOTES
"  WOC Nurse Inpatient [facility]   WO Nurse Inpatient Adult     Initial Assessment   Assessment of new end Urostomy Stoma complication(s) none   Mucocutaneous junction; not visualized, barrier intact    Peristomal complication(s) none   Pouch wear time:TBA   Following today's visit:Patient is able to demonstrate;       1. How to empty their pouch? no      2. How to change their pouch?  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 Hx severe hemophilia A  Current Diet/Nutrition:   Active Diet Order      Advance Diet as Tolerated: Clear Liquid Diet   TPN no   I/O last 3 completed shifts:  In: 4720.42 [I.V.:3920.42]  Out: 1425 [Urine:1335; Drains:90]  Labs:    Recent Labs  Lab 07/31/18  0710  07/30/18  1409  07/25/18  1248   HGB 9.2*  < >  --   < >  --    INR  --   --  1.19*  < >  --    WBC 13.5*  --   --   < >  --    A1C  --   --   --   --  6.9*   < > = values in this interval not displayed.     Psychosocial:  Pt lives alone.  Has practiced cutting pouches with the preop kit. Previous PNT tube care experience    Physical Exam:  Current pouching system:[place]   Reason for pouch change today: pouch not changed today  Stoma appearance: pink-red, round, moist and protruberant  Stoma size; approximately 1 1/4\" with minimal protrusion  Peristomal skin: not visualized (barrier in place)  Stoma output :clear and pink   Abdominal  Assessment  firm , NG still in place? No  Surgical Site: dressing dry and intact with quarter sized area of shadow drainage   Is patient still on a PCA Yes    Interventions:  Patient's chart evaluated.  Focus of today's visit: verbal instruction : supply ordering, samples, ostomy clinic f/u POC  Participant of teaching session today patient   Orders: Reviewed  Change made with ostomy management today: No  Patient/family: alert, asking appropriate questions "   Supplies:Gathered    Plan:  Learning needs: initial fitting, pouch change return demonstration, verbal instruction , diet and hydration , pouch emptying, output measurement, odor/flatus management, lifestyle adjustments and discharge instructions  Preparation for discharge:   Needs:  Supplies ordered, Completed supply list, Registered for samples from , Prescriptions or note left on chart for MD to sign/complete, Prepared for discharge to home with homecare, Discussed making a WOC Nurse outpatient appointment upon discharge, Discussed when to follow up with a WOC Nurse in the future and Discussed how/ where to order supplies  Recommend home care? TBA     Discussed plan of care with Patient  Nursing to notify the Provider(s) and re-consult the WOC Nurse if new ostomy concerns or discharge planned before next planned WOC visit.    WOC Nurse will return: lois   Face to face time: 15 minutes

## 2018-07-31 NOTE — PROGRESS NOTES
" 07/31/18 1327   Quick Adds   Type of Visit Initial Occupational Therapy Evaluation   Living Environment   Lives With alone   Living Arrangements apartment   Home Accessibility tub/shower is not walk in;grab bars present (bathtub);grab bars present (toilet)  (4\" step into shower)   Number of Stairs to Enter Home 0   Number of Stairs Within Home 0   Stair Railings at Home none   Transportation Available car;taxi  (taxi to get to hospital, has private car)   Living Environment Comment Pt lives at a senior living apartment   Self-Care   Dominant Hand right   Usual Activity Tolerance moderate   Current Activity Tolerance fair   Regular Exercise no   Equipment Currently Used at Home crutches  (PRN)   Activity/Exercise/Self-Care Comment Use crutches PRN 2/2 hemophilia episodes, has not had need for them recently. Pt states he spends time reading history/ bibliographies and listening to music.    Functional Level Prior   Ambulation 0-->independent   Transferring 0-->independent   Toileting 0-->independent   Bathing 0-->independent   Dressing 0-->independent   Eating 0-->independent   Communication 0-->understands/communicates without difficulty   Swallowing 0-->swallows foods/liquids without difficulty   Cognition 0 - no cognition issues reported   Fall history within last six months no   Which of the above functional risks had a recent onset or change? ambulation;transferring;bathing;dressing   Prior Functional Level Comment PLOF IND with all cares   General Information   Onset of Illness/Injury or Date of Surgery - Date 07/30/18   Referring Physician Jamir Walsh MD   Patient/Family Goals Statement Pt would like to return home   Additional Occupational Profile Info/Pertinent History of Current Problem Indications: Patient is a 67yo male with h/o of bladder cancer.  Was counseled regarding treatment options and decided to go ahead with cystectomy.   Precautions/Limitations abdominal precautions;fall precautions "   Weight-Bearing Status - LUE partial weight-bearing (% in comments)  (10 lbs)   Weight-Bearing Status - RUE partial weight-bearing (% in comments)  (10 lbs)   Weight-Bearing Status - LLE full weight-bearing   Weight-Bearing Status - RLE full weight-bearing   General Info Comments Activity orders: 4 times a day or more often as patient tolerates when patient alert, stable, and steady on feet.  Ambulate with assist as indicated    Cognitive Status Examination   Orientation orientation to person, place and time   Level of Consciousness alert   Able to Follow Commands WNL/WFL   Personal Safety (Cognitive) WNL/WFL   Visual Perception   Visual Perception Wears glasses   Sensory Examination   Sensory Comments no n/t reported   Range of Motion (ROM)   ROM Comment B UE WFL  (does have elbow contractures, pt states no limit to function)   Strength   Strength Comments unable to formally assess 2/2 precaution, grossly 3/5 at least   Hand Strength   Hand Strength Comments fair  bilaterally   Instrumental Activities of Daily Living (IADL)   Previous Responsibilities housekeeping;laundry;finances;driving;medication management   Activities of Daily Living Analysis   Impairments Contributing to Impaired Activities of Daily Living pain;post surgical precautions;ROM decreased;strength decreased   General Therapy Interventions   Planned Therapy Interventions ADL retraining;IADL retraining;strengthening;progressive activity/exercise;transfer training;risk factor education;bed mobility training;home program guidelines   Clinical Impression   Criteria for Skilled Therapeutic Interventions Met yes, treatment indicated   OT Diagnosis decreased IND with ADLs/IADLs   Influenced by the following impairments post-surgical precautions, pain, fatigue   Assessment of Occupational Performance 3-5 Performance Deficits   Identified Performance Deficits functional mobility, dressing, h/g, home mgmt   Clinical Decision Making (Complexity) Low  "complexity   Therapy Frequency other (see comments)  (6x/week)   Predicted Duration of Therapy Intervention (days/wks) 1 week   Anticipated Discharge Disposition Home with Assist;Transitional Care Facility   Risks and Benefits of Treatment have been explained. Yes   Patient, Family & other staff in agreement with plan of care Yes   Clinical Impression Comments Pt to benefit from skilled OT to address the above deficts, see daily flow sheet for tx provided.    [facility] [employer] TM \"6 Clicks\"   2016, Trustees of [facility], under license to [employer].  All rights reserved.   6 Clicks Short Forms Daily Activity Inpatient Short Form   [facility] AM-PAC  \"6 Clicks\" Daily Activity Inpatient Short Form   1. Putting on and taking off regular lower body clothing? 3 - A Little   2. Bathing (including washing, rinsing, drying)? 3 - A Little   3. Toileting, which includes using toilet, bedpan or urinal? 3 - A Little   4. Putting on and taking off regular upper body clothing? 4 - None   5. Taking care of personal grooming such as brushing teeth? 4 - None   6. Eating meals? 4 - None   Daily Activity Raw Score (Score out of 24.Lower scores equate to lower levels of function) 21   Total Evaluation Time   Total Evaluation Time (Minutes) 9     "

## 2018-07-31 NOTE — PROGRESS NOTES
"REGIONAL ANESTHESIA PAIN SERVICE  SUBJECTIVE  Interval history: Patient reports postop pain controlled with current analgesics (see below) and nerve block at rest, and increased pain with movement so he is would like to know what he can do to prepare for activity OOB expected later today.  Currently rating pain 0/10 at rest and 5/10 with activity.  Patient tolerating clear liquid diet. Denies nausea, weakness, paresthesias, circumoral numbness, metallic taste or tinnitus. Urine output via iIleal conduit     Clinically Aligned Pain Assessment (CAPA):  Comfort (How is your pain?): Tolerable with discomfort  Change in Pain (Since your last medication/intervention?): About the same  Pain Control (How are your pain treatments working?):  Partially effective pain control    Medications related to Pain Management (Future)    Start     Dose/Rate Route Frequency Ordered Stop    08/02/18 0000  acetaminophen (TYLENOL) tablet 650 mg      650 mg Oral EVERY 4 HOURS PRN 07/30/18 1718      07/31/18 0800  alvimopan (ENTEREG) capsule 12 mg      12 mg Oral 2 TIMES DAILY 07/30/18 2134 08/07/18 0759    07/30/18 1845  HYDROmorphone (DILAUDID) PCA 1 mg/mL OPIOID NAIVE       Intravenous CONTINUOUS 07/30/18 1835      07/30/18 1730  acetaminophen (TYLENOL) tablet 975 mg      975 mg Oral EVERY 8 HOURS 07/30/18 1718 08/02/18 1359    07/30/18 1530  bupivacaine liposome (EXPAREL) LONG ACTING injection was administered into the infiltration site to produce postsurgical analgesia. Duration of action is up to 72 hours, and other \"lyndon\" medications should not be given for 96 hours with the exception of the lidocaine 5% patch (LIDODERM) and the lidocaine 10mg in potassium infusions. This entry is for INFORMATION ONLY.       Does not apply CONTINUOUS PRN 07/30/18 1530 08/03/18 1529          OBJECTIVE:    BP (P) 113/78 (BP Location: Left arm)  Pulse 103  Temp 97.1  F (36.2  C) (Oral)  Resp 18  Ht 1.829 m (6')  Wt 69.3 kg (152 lb 12.5 oz)  SpO2 " 97%  BMI 20.72 kg/m2  Exam:  General: alert and no distress  Neuro: Strength 5/5 BLE    ASSESSMENT/PLAN:    Orlin Alcantar is a 68 year old male with bladder tumor, now POD #1 s/p  CYSTOPROSTATECTOMY  LYMPHADENECTOMY ABDOMINAL with single shot injection bilateral transversus abdominis plane (TAP) nerve block.  Total bupivacaine 20 mL and liposomal (long-acting) bupivacaine (Exparel) 1.3% 20 mL administered 7/30/18 for postop pain control.  No weakness or paresthesias.  No evidence of adverse side effects associated with nerve block injections.  Pt acheiving adequate pain control, with nerve block, opioid and nonopioid analgesics.  Anticipate 48-72 hours of pain control with long-acting local anesthetic. Additionally, pt will continue to require multimodal analgesia for visceral/muscle pain not controlled with long-acting local anesthetic.      - NO other local anesthetic use within 96 hours of liposomal bupivacaine (Exparel), unless approved by [facility]  - patient received verbal and written instructions about liposomal bupivacaine and counseling about pharmacologic and nonpharmacologic measures for acute postoperative pain management  - please call [facility] if questions or concerns    JELLY Valdivia [facility]  Regional Anesthesia Pain Service ([facility])  7/31/2018 11:01 AM    [facility] Contact Info (for in-house use only):  Job code ID: [id number]   [place] 0599  [facility] [address]  [employer] phone: dial [phone], enter jobcode ID, then enter call-back number.    Text: Use [employer] on the Intranet <Paging/Directory> tab and enter Jobcode ID.   If no call back at any time, contact the hospital  and ask for [facility] attending or backup

## 2018-07-31 NOTE — PROGRESS NOTES
"CLINICAL NUTRITION SERVICES - ASSESSMENT NOTE     Nutrition Prescription    RECOMMENDATIONS FOR MDs/PROVIDERS TO ORDER:  Diet adv v nutrition support within 2-3 days.    Malnutrition Status:    Patient does not meet two of the above criteria necessary for diagnosing malnutrition    Recommendations already ordered by Registered Dietitian (RD):  Boost Breeze ([place]) @ 2pm daily     Future/Additional Recommendations:  1. IF TPN is indicated/initiated per plan of care, recommend as follows:   --Use dosing weight 69 kg  --Begin CPN, goal volume 2000 ml/day with initial  150 g Dex daily (GIR 1.5), 85 g AA daily (340 kcal), and 250 ml 20% IV lipids daily.  Micro/Rx: Infuvite daily  --ONLY once pt receives ~100% of initial continuous PN volume with K+/Mg++/Phos WNL, advance PN dex by 50 g every 1-2 days (pending lytes/Glu and Pharm D/RD discretion) to goal of 260 g Dex to increase provisions to 1725 kcals (25 kcal/kg/day), 1.2 g PRO/kg/day, GIR 2.65 with 29 % kcals from Fat.       REASON FOR ASSESSMENT  Orlin Alcantar is a/an 68 year old male assessed by the dietitian for Provider Order - \"Dietitian to Assess and Order per Nutrition Protocol. Provider is responsible for nutrition oversight\"    NUTRITION HISTORY  Information obtained from patient: (vague historian)   - Pt's appetite is great at baseline. Stated his appetite has decreased (poor-fair) starting on Sunday.   - Expressed eating a light breakfast, lunch, and dinner. Likes to eat \"lots of chinese food,\" fish, pasta.   - Within the past few months, the patient has been trying to intentionally lose weight because \"most of his life he has been around 145#\" and feels more comfortable at this weight.     CURRENT NUTRITION ORDERS  Diet: Clear Liquid    LABS  Labs reviewed    MEDICATIONS  Medications reviewed    ANTHROPOMETRICS  Height: 182.9 cm (6' 0\")  Most Recent Weight: 69.3 kg (152 lb 12.5 oz)    IBW: 78 kg  BMI: Normal BMI  Weight History: 3% wt loss in 2 months.  Wt " Readings from Last 15 Encounters:   07/30/18 69.3 kg (152 lb 12.5 oz)   07/25/18 69.6 kg (153 lb 8 oz)   07/12/18 68.9 kg (152 lb)   07/11/18 68.9 kg (152 lb)   06/13/18 70.2 kg (154 lb 12.8 oz)   06/06/18 70.1 kg (154 lb 9.6 oz)   05/23/18 69.9 kg (154 lb)   05/16/18 71.4 kg (157 lb 4.8 oz)   05/09/18 69.8 kg (153 lb 14.4 oz)   05/02/18 71.2 kg (156 lb 14.4 oz)   04/25/18 71.4 kg (157 lb 4.8 oz)   04/25/18 71.3 kg (157 lb 4 oz)   04/11/18 72.6 kg (160 lb 1.6 oz)   04/04/18 72.1 kg (158 lb 14.4 oz)   03/28/18 73 kg (160 lb 14.4 oz)       Dosing Weight: 69 kg (current wt on admission-7/30)     ASSESSED NUTRITION NEEDS  Estimated Energy Needs: 1725 - 2070 kcals/day (25 - 30 kcals/kg)  Justification: Maintenance  Estimated Protein Needs: 83 - 104 grams protein/day (1.2 - 1.5 grams of pro/kg)  Justification: Repletion  Estimated Fluid Needs: 1 mL/kcal   Justification: Maintenance and Per provider pending fluid status    PHYSICAL FINDINGS  See malnutrition section below.     MALNUTRITION  % Intake: Decreased intake does not meet criteria  % Weight Loss: Weight loss does not meet criteria  Subcutaneous Fat Loss: Facial region and Thoracic/intercostal: Mild   Muscle Loss: Temporal:  Mild   Fluid Accumulation/Edema: None noted  Malnutrition Diagnosis: Patient does not meet two of the above criteria necessary for diagnosing malnutrition    NUTRITION DIAGNOSIS  Inadequate nutrient intake (energy-protein) related to decreased appetite as evidenced by patient with great appetite at baseline with change in appetite (poor-fair) for the past 2-3 days, current clear liquid diet meeting </= 50% of estimated needs with potential for slow diet advancement.     INTERVENTIONS  Implementation  Nutrition Education: Provided education on available nutrition supplements, role of RD, and importance of protein intake.    Medical food supplement therapy- see above      Goals  Diet adv v nutrition support within 2-3 days.      Monitoring/Evaluation  Progress toward goals will be monitored and evaluated per protocol.    Ivania Sheets RD, LD    floor pager [phone]

## 2018-07-31 NOTE — CONSULTS
Patient with hemophilia A, s/p radical cystectomy today. Per op note and report by anesthesia, procedure was uneventful. He received a bolus of Kogenate (FVIII) prior to procedure, and has since been on a continuous infusion at ~ 4U/kg/hr. The FVIII activity was 96% at 15:49.   -Continue Kogenate infusion at current rate.  -Check FVIII level in am (has already been ordered).   -Will adjust Kogenate infusion based on level in am.  -For routine questions during weekday hours, page Clement Griffin PA-C from [facility].   -If urgent questions nights and weekends, page hematology attending on call.   Claudia Perez MD  Hematology Consult attending

## 2018-07-31 NOTE — PROGRESS NOTES
[facility]  [address] [place] [address] [address] [address]  Main: [phone], Fax: [phone]    Patient seen at: [facility].    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 Shea.     Patient received a bolus dose of Kogenate at 50 Units/kg dose prior to surgery and is placed on Kogenate continuous infusion at 4 Units/kg/hour dose pre-operatively, intra-operatively and post-operatively.    Subjective:  Today is POD #1, Orlin reports that he is doing well. He reports pain is under good control. He reports that he was able to sit up at bedside yesterday (2018) and did experience some pain. Otherwise, he denies any pain while laying in bed. He has three drains in place: 1) Left abdomen suction bulb, 2) Pelvic drain to ramirez bag, 3) Drain out of stoma.     Patient denies any fever. No shortness of breath. No chest pain. Denies any N/V.     Currently on Kogenate at 4 Units/kg/hour (278.4 Units/hour) IV continuous infusion.     Objective:  In no acute distress.  Vitals: /64 (BP Location: Left arm)  Pulse 103  Temp 97.1  F (36.2  C) (Oral)  Resp 18  Ht 1.829 m (6')  Wt 69.3 kg (152 lb 12.5 oz)  SpO2 97%  BMI 20.72 kg/m2  Exam:  Lungs: Clear to auscultation bilaterally.  Card: S1 S2. No murmur rubs or gallops.  Abd: Non tender. Non distended. Positive bowel sound. Soft.  Ext: Pneumoboot in place. No swelling of the lower extremities  noted. No swelling of the upper extremity.  Drains: All drains has small amount of bloody fluid. Total drainage from the suction bulb was about 70 mL total yesterday (7/30/2018).    Labs:  Component      Latest Ref Rng & Units 7/30/2018 7/30/2018 7/30/2018           5:58 AM  7:56 AM  3:49 PM   Factor 8 Assay      55 - 200 % 9 (LL) 127 96     Component      Latest Ref Rng & Units 7/30/2018 7/31/2018   WBC      4.0 - 11.0 10e9/L 5.9 13.5 (H)   RBC Count      4.4 - 5.9 10e12/L 4.13 (L) 3.19 (L)   Hemoglobin      13.3 - 17.7 g/dL 11.8 (L) 9.2 (L)   Hematocrit      40.0 - 53.0 % 37.7 (L) 29.0 (L)   MCV      78 - 100 fl 91 91   MCH      26.5 - 33.0 pg 28.6 28.8   MCHC      31.5 - 36.5 g/dL 31.3 (L) 31.7   RDW      10.0 - 15.0 % 15.8 (H) 15.4 (H)   Platelet Count      150 - 450 10e9/L 172 160     Component      Latest Ref Rng & Units 7/31/2018   Sodium      133 - 144 mmol/L 141   Potassium      3.4 - 5.3 mmol/L 4.4   Chloride      94 - 109 mmol/L 110 (H)   Carbon Dioxide      20 - 32 mmol/L 26   Anion Gap      3 - 14 mmol/L 6   Glucose      70 - 99 mg/dL 116 (H)   Urea Nitrogen      7 - 30 mg/dL 21   Creatinine      0.66 - 1.25 mg/dL 0.93   GFR Estimate      >60 mL/min/1.7m2 81   GFR Estimate If Black      >60 mL/min/1.7m2 >90   Calcium      8.5 - 10.1 mg/dL 8.1 (L)     Assessment:  In summary, Orlin is a 68 year old male with a 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 Shea. Today is POD #1, Orlin is doing well without any unexpected bleeding complications.    Diagnosis:  1. Severe Hemophilia A.  2. Bladder Cancer S/P Transurethral resection of bladder tumor in Feb 2018 and neoadjuvant chemotherapy.    3. S/P radical cystoprostatectomy, ileal conduit and bilateral pelvic lymphadenectomy on 7/30/2018. Doing well without any unexpected bleeding complications.    Plan:  1. Await Factor VIII level from this morning. Will adjust Kogenate dosing if necessary.  2. Plan to continue with Kogenate continuous infusion throughout his hospitalization.   3. Check daily Factor VIII levels.   4. Plan for daily Factor VIII infusions for a total of 14 days post operatively (until 8/13/2018).  5. We will continue to follow.      Clement Griffin PA-C, CHARLY  Physician Assistant  [facility].     Addendum 7/31/2018 at 11:30am:    Component      Latest Ref Rng & Units 7/31/2018   Factor 8 Assay      55 - 200 % 130     Will continue with current Kogenate dosing at 4 Units/kg/hour. Recheck Factor 8 level tomorrow morning.      Clement Griffin PA-C, CHARLY  Physician Assistant  [facility].

## 2018-07-31 NOTE — PLAN OF CARE
Problem: Patient Care Overview  Goal: Plan of Care/Patient Progress Review  Outcome: Improving  Vital signs:  Temp: 96.5  F (35.8  C) Temp src: Oral BP: 105/72 Pulse: 103 Heart Rate: 89 Resp: 16 SpO2: 96 % O2 Device: None (Room air)   VSS. Midline incision covered with primapore, marked- no change in drainage. L JOSE E with bloody/bright red output, leaking at the site, changed x 1. Urostomy with 2 stents and red joyce, adequate red urine output. Gordon in place as pelvic drain, no output. Pt states pain is well controlled with PCA at 0.1 and tylenol. Kogenate infusing at 0.64 mL/hr. Appears to rest between cares.

## 2018-07-31 NOTE — PLAN OF CARE
Problem: Patient Care Overview  Goal: Plan of Care/Patient Progress Review  Outcome: No Change  /78 (BP Location: Left arm)  Pulse 103  Temp 97.1  F (36.2  C) (Oral)  Resp 18  Ht 1.829 m (6')  Wt 69.3 kg (152 lb 12.5 oz)  SpO2 97%  BMI 20.72 kg/m2     Neuro: A&O X 4  Cardiac: VSS, slightly hypotensive, MD aware and lisinopril held  Respiratory: lung sounds clear bilaterally, 97% on RA, IS done with encouragement  GI/: 600 mL output from urostomy salmon colored, 140 mL output from GANGA, 75 mL output from pelvic drain  Diet/Appetite: tolerating clear no n/v reported, nurse advanced to full liquid diet  Skin: incisions, scars and midline incision with primapore marked and not extended, nephrostomy dressing CDI  LDA: nephrostomy clamped, ganga to bulb suction, ganga with copious amt of leakage, dressing changed, pelvic drain to gravity  Activity: ambulated in cobb with PT  Pain: PCA 1 mg total for shift with decrease in pain  Plan: progress towards baseline, tolerate diet, continue plan of care

## 2018-07-31 NOTE — PLAN OF CARE
Problem: Patient Care Overview  Goal: Plan of Care/Patient Progress Review  Outcome: No Change    /78  Pulse 103  Temp 95.5  F (35.3  C) (Oral)  Resp 21  Ht 1.829 m (6')  Wt 69.3 kg (152 lb 12.5 oz)  SpO2 95%  BMI 20.72 kg/m2    Time: 5793-5626.   Reason for admission: POD #0 for cystoprostatectomy, lymph node dissection, & ileal conduit.   VS: VSS on RA except slightly soft BPs 100s/70s, scheduled BP meds held this evening. O2 sats in mid 90s on RA, weaned off of 2L NC. Capno with WDL, IPI 10. Afebrile.   Activity: No OOB activity on this shift, dangled at bed & tolerated well except slight nausea with movement, resolved within a few minutes. Calls appropriately.   Neuros: A&Ox4. C/o incisional pain managed well with dilaudid PCA 0.1mg with 10 minute lockout.   Cardiac: WDL, denies chest pain.   Respiratory: WDL, denies SOB. IS encouraged.   GI/: Urostomy with red ngozi & 2 stents  with adequate output. No BM on this shift, -gas, hypoactive BS. Denies nausea or vomiting.   Diet: Clear liquids ordered, only taking sips of water this evening.   Skin: Midline incision covered with primapore, scant drainage noted, outlined with no change.   Lines: Right PIV infusing NS at 100 mL/hr with Kogenate gtt at 0.64 mL/hr. Left PIV infusing NS TKO with dilaudid PCA. Pelvic drain to ramirez bag with very scant drainage noted in tubing. Old right nephrostomy tube clamped, site UTV, covered with primapore. Left JOSE E with bright red output, leaking at site, dressing changed x1. Urostomy with adequate output.   Labs: WDL. BG stable at 160 & 176. Per hematology note, factor 8 lab to be drawn tomorrow morning.   New changes this shift: Pt arrived from PACU to unit around 1730.   Plan: Continue to monitor pain & VS. Encourage ambulation tomorrow & IS use.   Will continue to monitor & follow POC.   07/30/18 0799   OTHER   Plan Of Care Reviewed With patient   Plan of Care Review   Progress no change

## 2018-07-31 NOTE — PLAN OF CARE
Discharge Planner OT 7B  Patient plan for discharge: home vs rehab  Current status: Educated pt on abdominal precautions, pt verbalizing understanding. Pt completed log roll, supine>EOB SBA. Pt Min A STS from bed, ambulated ~15 ft to bathroom. Assessed pt's activity tolerance and IND with ADLs during a standing h/g task. Pt stood for ~10 min with no LOB or pain. Pt noted fatigue with activity. STS to bed CGA, EOB>supine Min A. VSS on RA.   Barriers to return to prior living situation: post-surgical precautions, pain, decrease ADL/IADL IND  Recommendations for discharge: home with A vs. TCU pending on progression with therapy  Rationale for recommendations: Pt lives alone in senior living. Will need A to manage heavy lifting during ADLs/IADLs. Pt to benefit from further rehab to increase IND with ADLs/IADLs.        Entered by: Mignon Bowman 07/31/2018 2:30 PM

## 2018-08-01 ENCOUNTER — APPOINTMENT (OUTPATIENT)
Dept: OCCUPATIONAL THERAPY | Facility: CLINIC | Age: 68
DRG: 653 | End: 2018-08-01
Attending: UROLOGY
Payer: MEDICARE

## 2018-08-01 ENCOUNTER — APPOINTMENT (OUTPATIENT)
Dept: PHYSICAL THERAPY | Facility: CLINIC | Age: 68
DRG: 653 | End: 2018-08-01
Attending: UROLOGY
Payer: MEDICARE

## 2018-08-01 LAB
ANION GAP SERPL CALCULATED.3IONS-SCNC: 9 MMOL/L (ref 3–14)
BUN SERPL-MCNC: 16 MG/DL (ref 7–30)
CALCIUM SERPL-MCNC: 8 MG/DL (ref 8.5–10.1)
CHLORIDE SERPL-SCNC: 113 MMOL/L (ref 94–109)
CO2 SERPL-SCNC: 21 MMOL/L (ref 20–32)
CREAT SERPL-MCNC: 0.81 MG/DL (ref 0.66–1.25)
ERYTHROCYTE [DISTWIDTH] IN BLOOD BY AUTOMATED COUNT: 15.5 % (ref 10–15)
FACT VIII ACT/NOR PPP: 155 % (ref 55–200)
GFR SERPL CREATININE-BSD FRML MDRD: >90 ML/MIN/1.7M2
GLUCOSE BLDC GLUCOMTR-MCNC: 101 MG/DL (ref 70–99)
GLUCOSE SERPL-MCNC: 98 MG/DL (ref 70–99)
HCT VFR BLD AUTO: 28.7 % (ref 40–53)
HGB BLD-MCNC: 8.9 G/DL (ref 13.3–17.7)
MCH RBC QN AUTO: 29.1 PG (ref 26.5–33)
MCHC RBC AUTO-ENTMCNC: 31 G/DL (ref 31.5–36.5)
MCV RBC AUTO: 94 FL (ref 78–100)
PLATELET # BLD AUTO: 141 10E9/L (ref 150–450)
POTASSIUM SERPL-SCNC: 3.9 MMOL/L (ref 3.4–5.3)
RBC # BLD AUTO: 3.06 10E12/L (ref 4.4–5.9)
SODIUM SERPL-SCNC: 144 MMOL/L (ref 133–144)
WBC # BLD AUTO: 10.4 10E9/L (ref 4–11)

## 2018-08-01 PROCEDURE — 25000128 H RX IP 250 OP 636: Performed by: PHYSICIAN ASSISTANT

## 2018-08-01 PROCEDURE — 25000128 H RX IP 250 OP 636: Performed by: STUDENT IN AN ORGANIZED HEALTH CARE EDUCATION/TRAINING PROGRAM

## 2018-08-01 PROCEDURE — A9270 NON-COVERED ITEM OR SERVICE: HCPCS | Mod: GY | Performed by: UROLOGY

## 2018-08-01 PROCEDURE — 25000132 ZZH RX MED GY IP 250 OP 250 PS 637: Mod: GY | Performed by: UROLOGY

## 2018-08-01 PROCEDURE — 97116 GAIT TRAINING THERAPY: CPT | Mod: GP

## 2018-08-01 PROCEDURE — 85027 COMPLETE CBC AUTOMATED: CPT | Performed by: STUDENT IN AN ORGANIZED HEALTH CARE EDUCATION/TRAINING PROGRAM

## 2018-08-01 PROCEDURE — 25000555 ZZHC RX FACTOR IP 250 OP 636: Performed by: UROLOGY

## 2018-08-01 PROCEDURE — 36415 COLL VENOUS BLD VENIPUNCTURE: CPT | Performed by: STUDENT IN AN ORGANIZED HEALTH CARE EDUCATION/TRAINING PROGRAM

## 2018-08-01 PROCEDURE — 40000193 ZZH STATISTIC PT WARD VISIT

## 2018-08-01 PROCEDURE — 00000328 ZZHCL STATISTIC PTT NC: Performed by: STUDENT IN AN ORGANIZED HEALTH CARE EDUCATION/TRAINING PROGRAM

## 2018-08-01 PROCEDURE — 00000167 ZZHCL STATISTIC INR NC: Performed by: STUDENT IN AN ORGANIZED HEALTH CARE EDUCATION/TRAINING PROGRAM

## 2018-08-01 PROCEDURE — 97530 THERAPEUTIC ACTIVITIES: CPT | Mod: GP

## 2018-08-01 PROCEDURE — 00000146 ZZHCL STATISTIC GLUCOSE BY METER IP

## 2018-08-01 PROCEDURE — 12000008 ZZH R&B INTERMEDIATE UMMC

## 2018-08-01 PROCEDURE — 40000903 ZZH STATISTIC WOC PT EDUCATION, 31-45 MIN

## 2018-08-01 PROCEDURE — 00000401 ZZHCL STATISTIC THROMBIN TIME NC: Performed by: STUDENT IN AN ORGANIZED HEALTH CARE EDUCATION/TRAINING PROGRAM

## 2018-08-01 PROCEDURE — 85240 CLOT FACTOR VIII AHG 1 STAGE: CPT | Performed by: STUDENT IN AN ORGANIZED HEALTH CARE EDUCATION/TRAINING PROGRAM

## 2018-08-01 PROCEDURE — A9270 NON-COVERED ITEM OR SERVICE: HCPCS | Mod: GY | Performed by: STUDENT IN AN ORGANIZED HEALTH CARE EDUCATION/TRAINING PROGRAM

## 2018-08-01 PROCEDURE — 80048 BASIC METABOLIC PNL TOTAL CA: CPT | Performed by: STUDENT IN AN ORGANIZED HEALTH CARE EDUCATION/TRAINING PROGRAM

## 2018-08-01 PROCEDURE — 97535 SELF CARE MNGMENT TRAINING: CPT | Mod: GO

## 2018-08-01 PROCEDURE — 25000132 ZZH RX MED GY IP 250 OP 250 PS 637: Mod: GY | Performed by: STUDENT IN AN ORGANIZED HEALTH CARE EDUCATION/TRAINING PROGRAM

## 2018-08-01 PROCEDURE — 40000133 ZZH STATISTIC OT WARD VISIT

## 2018-08-01 RX ORDER — HYDROMORPHONE HYDROCHLORIDE 1 MG/ML
.3-.5 INJECTION, SOLUTION INTRAMUSCULAR; INTRAVENOUS; SUBCUTANEOUS
Status: DISCONTINUED | OUTPATIENT
Start: 2018-08-01 | End: 2018-08-06 | Stop reason: HOSPADM

## 2018-08-01 RX ORDER — OXYCODONE HYDROCHLORIDE 5 MG/1
5-10 TABLET ORAL EVERY 4 HOURS PRN
Status: DISCONTINUED | OUTPATIENT
Start: 2018-08-01 | End: 2018-08-06 | Stop reason: HOSPADM

## 2018-08-01 RX ADMIN — ALVIMOPAN 12 MG: 12 CAPSULE ORAL at 09:08

## 2018-08-01 RX ADMIN — ACETAMINOPHEN 975 MG: 325 TABLET, FILM COATED ORAL at 06:06

## 2018-08-01 RX ADMIN — SODIUM CHLORIDE: 9 INJECTION, SOLUTION INTRAVENOUS at 09:08

## 2018-08-01 RX ADMIN — SODIUM CHLORIDE: 9 INJECTION, SOLUTION INTRAVENOUS at 19:12

## 2018-08-01 RX ADMIN — OXYCODONE HYDROCHLORIDE 5 MG: 5 TABLET ORAL at 20:45

## 2018-08-01 RX ADMIN — ANTIHEMOPHILIC FACTOR (RECOMBINANT) 4448 UNITS: KIT at 10:18

## 2018-08-01 RX ADMIN — CIPROFLOXACIN 400 MG: 2 INJECTION, SOLUTION INTRAVENOUS at 20:46

## 2018-08-01 RX ADMIN — ACETAMINOPHEN 975 MG: 325 TABLET, FILM COATED ORAL at 14:23

## 2018-08-01 RX ADMIN — CIPROFLOXACIN 400 MG: 2 INJECTION, SOLUTION INTRAVENOUS at 09:07

## 2018-08-01 RX ADMIN — ALVIMOPAN 12 MG: 12 CAPSULE ORAL at 20:45

## 2018-08-01 RX ADMIN — MIRTAZAPINE 45 MG: 45 TABLET, FILM COATED ORAL at 22:24

## 2018-08-01 ASSESSMENT — ACTIVITIES OF DAILY LIVING (ADL)
ADLS_ACUITY_SCORE: 9

## 2018-08-01 NOTE — PROGRESS NOTES
"  WOC Nurse Inpatient [facility]   WO Nurse Inpatient Adult     Follow Up Assessment   Assessment of new end Ileal Conduit Stoma complication(s) none   Mucocutaneous junction; intact   Peristomal complication(s) none   Pouch wear time:24-48 hours  Following today's visit:Patient /   is  able to demonstrate;       1. How to empty their pouch? Yes, needs practice       2. How to change their pouch?  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 Hx severe hemophilia A  Current Diet/Nutrition:   Active Diet Order      Advance Diet as Tolerated: Full Liquid Diet   TPN no   I/O last 3 completed shifts:  In: 1040 [P.O.:240; I.V.:800]  Out: 2935 [Urine:2450; Drains:485]  Labs:    Recent Labs  Lab 08/01/18  0642  07/30/18  1409   HGB 8.9*  < >  --    INR  --   --  1.19*   WBC 10.4  < >  --    < > = values in this interval not displayed.     Physical Exam:  Current pouching system:George 2 piece with increased melting at 3 o'clock crease   Reason for pouch change today: ostomy education and routine schedule  Stoma appearance: red, round and moist  Stoma size; 1 3/8\" with good protrusion.  Retracting into adipose pad   Peristomal skin: intact  Stoma output :clear and pink   Abdominal  Assessment  soft , NG still in place? No  Surgical Site: staples intact  Pain: Dull ache    Interventions:  Patient's chart evaluated.  Focus of today's visit: initial fitting, pouch change return demonstration and verbal instruction    Participant of teaching session today patient   Orders: Reviewed  Change made with ostomy management today: Yes  Patient/family: active participation  Supplies:Gathered and at bedside    Plan:  Learning needs: pouch change return demonstration, verbal instruction , diet and hydration , pouch emptying, output measurement, odor/flatus management, lifestyle adjustments and " discharge instructions  Preparation for discharge:   Needs:  Supplies ordered, Completed supply list, Registered for samples from , Prescriptions or note left on chart for MD to sign/complete, Prepared for discharge to home with homecare, Discussed making a WOC Nurse outpatient appointment upon discharge, Discussed when to follow up with a WOC Nurse in the future and Discussed how/ where to order supplies  Recommend home care? yes    Discussed plan of care with Patient  Nursing to notify the Provider(s) and re-consult the WOC Nurse if new ostomy concerns or discharge planned before next planned WOC visit.    WOC Nurse will return: lois.   Face to face time: 35 minutes

## 2018-08-01 NOTE — PLAN OF CARE
Problem: Patient Care Overview  Goal: Plan of Care/Patient Progress Review  Vitals:    07/31/18 0752 07/31/18 1020 07/31/18 1613 07/31/18 2228   BP: 108/64 113/78 123/71 117/71   BP Location: Left arm Left arm Left arm Left arm   Pulse:       Resp: 18 20 20   Temp: 97.1  F (36.2  C)  98.1  F (36.7  C) 97.9  F (36.6  C)   TempSrc: Oral  Oral Oral   SpO2: 97%  97% 98%   Weight:       Height:         aox3 denied pain for prn pain medications, PCA in place and scheduled tylenol administered. Rogenate FS Injection is due in 3 hours. L JOSE E draining dark pink output and ileal conduit draining moderate dark pink urine. No bm. Cont with poc

## 2018-08-01 NOTE — PLAN OF CARE
Problem: Patient Care Overview  Goal: Plan of Care/Patient Progress Review  OT 7B:   Discharge Planner OT   Patient plan for discharge: home   Current status: Educated pt on abdominal precautions, implications for I/ADLs, and appropriate modifications. Pt with good participation in collaborating with therapist to maximize IND in pt's home environment. Supine > sitting EOB CGA; sitting > supine min A for LEs. Requiring VCs for bed mobility to adhere to abdominal precautions pt attempting to long sit causing increased abdominal strain. CGA for STS and SBA for functional mobility in room with FWW for standing ADLs at sink.   Barriers to return to prior living situation: post-op deconditioning, lives alone with minimal support, abdominal precautions   Recommendations for discharge: TCU vs home   Rationale for recommendations: Pt progressing with therapy but continues to be limited by deconditioning and requiring cues for adherence to abdominal precautions. Would benefit from strengthening and endurance with therapy. Pending LOS, pt may progress to level of IND for safe discharge home.        Entered by: Mely Dangelo 08/01/2018 4:26 PM

## 2018-08-01 NOTE — PLAN OF CARE
Problem: Patient Care Overview  Goal: Plan of Care/Patient Progress Review  Discharge Planner PT   Patient plan for discharge: Home   Current status: Pt ambulates 2x225' with RW, SBA; HR up to 130 bpm with activity, oxygen >95% throughout. He performs x5 STS and standing LE exercises at RW for functional strengthening. Pt requires SBA for supine>sit and Elizabeth sit>supine for LE management and can verbalize abdominal precautions. He improves to SBA for sit<>stand today. Dicussed TCU vs Home for mobility discharge planning and pt believes he will be independent enough at time of discharge (another ~4 days per pt report) to return home.  Barriers to return to prior living situation: decreased caregiver support  Recommendations for discharge: Home +  PT vs TCU pending pt progress  Rationale for recommendations: Pt currently with limited activity tolerance and requires assist for mobility, but with potential to improve to return home with continued acute care PT.       Entered by: Keshia Domingo 08/01/2018 9:59 AM

## 2018-08-01 NOTE — PLAN OF CARE
Problem: Patient Care Overview  Goal: Plan of Care/Patient Progress Review  Outcome: Improving  /63 (BP Location: Left arm)  Pulse 103  Temp 97.5  F (36.4  C) (Oral)  Resp 18  Ht 1.829 m (6')  Wt 69.3 kg (152 lb 12.5 oz)  SpO2 99%  BMI 20.72 kg/m2     Neuro: A&O X 4  Cardiac: VSS  Respiratory: lung sounds clear bilaterally, 99% on RA  GI/: AUOP from urostomy, no gas, no bm  Diet/Appetite: advanced to regular diet this am, tolerating, no n/v reported  Skin: incision CDI with staples, neph dressing CDI  LDA: 70 mL ouput from pelvic drain to gravity, 130 mL output from JOSE E, dressing with scant drainage  Activity: ambulated in cobb with OT  Pain: Pain controlled with PCA during the day, c/o pain approx 1800 and requested oxy, reports prefers oxy to dilaudid, provider notified    Kogenate syringe changed 1018, rate changed at 1422 from 0.63 to 0.45/hr    Plan: progress towards baseline, adequate pain control, return of bowel function

## 2018-08-01 NOTE — PROGRESS NOTES
Urology  Progress Note    No acute events overnight   Pain well controlled  Tolerating current diet   Ambulating   No flatus/BM  Patient had cut while having yesterday with bleeding; has resolved    Exam  /71 (BP Location: Left arm)  Pulse 103  Temp 97.9  F (36.6  C) (Oral)  Resp 20  Ht 1.829 m (6')  Wt 69.3 kg (152 lb 12.5 oz)  SpO2 98%  BMI 20.72 kg/m2  No acute distress  Unlabored breathing  Abdomen soft, nontender, nondistended. Incisions covered with minimally saturated dresings. Stoma pink and well perfused, 2 stents and RRC in place.  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 Hx severe hemophilia A.     Neuro: tylenol, dilaudid PCA for pain control  CV: HDS. Home amlodipine, lisinopril.  Pulm: incentive spirometry while awake  FEN/GI: CLD ADAT, MIVF @ 100/hr. Entereg  Endo: ARDEN  : Gordon in place. Monitor urine output.   Heme: Hgb stable. On Kogenate (antihemophilic factor). Heme following, 7/31 recs:  1. Await Factor VIII level from this morning. Will adjust Kogenate dosing if necessary.  2. Plan to continue with Kogenate continuous infusion throughout his hospitalization.   3. Check daily Factor VIII levels.   4. Plan for daily Factor VIII infusions for a total of 14 days post operatively (until 8/13/2018).  5. We will continue to follow.   ID: afebrile, no leukocystosis. Ertapenem.   Activity: up ad mely  PPx: SCDs    Seen and examined with the chief resident. Will discuss with Dr. Shea.    Jamir Walsh, PGY-3  Urology Resident     Contacting the Urology Team     Please use the following job codes to reach the Urology Team. Note that you must use an in house phone and that job codes cannot receive text pages.     On weekdays, dial [phone] (or  star-star-star [phone] on the new To telephones) then [phone] to reach the Adult Urology resident or PA on call    On weekdays, dial [phone] (or star-star-star [phone] on the new To telephones) then [phone] to reach the Pediatric Urology resident    On weeknights and weekends, dial [phone] (or star-star-star 777 on the new [place] telephones) then [phone] to reach the Urology resident on call (for both Adult and Pediatrics)

## 2018-08-01 NOTE — PROGRESS NOTES
[facility]  [address] [place] [address] [address], [place] [address]  Main: [phone], Fax: [phone]    Patient seen at: [facility]    Inpatient Visit Note:    Patient: Orlin Alcantar  MRN: [id number]  : 1950  MAR: 2018  Time: 09:10    Subjective:  No acute events overnight. He just finished with walking the hallways with PT. Pain is under good control. No complaints of shortness of breath or chest pain. Denies any fever. No chills/rigors. Denies any other bleeding issues.    Currently on Kogenate continuous infusion at 278.4 Units/hour IV (this is at 4 Units/kg/hour).    Objective:  Pleasant in no acute distress.  Vitals: /78 (BP Location: Left arm)  Pulse 103  Temp 98.1  F (36.7  C) (Oral)  Resp 18  Ht 1.829 m (6')  Wt 69.3 kg (152 lb 12.5 oz)  SpO2 97%  BMI 20.72 kg/m2  Exam:  Lungs: Clear to auscultation bilaterally.  Card: S1 S2, RRR, No murmurs rubs or gallops.  Abd: Soft. Non tender. Non distended. Incision looks good with staples in place. Stoma looks good.   Ext: No swelling of both lower extremities noted.  JOSE E drains with serosanguinous pink fluid.   Urotomy with light pink urine  Pelvic drain with bloody fluid.      Labs:  Component      Latest Ref Rng & Units 2018           5:58 AM  7:56 AM  3:49 PM    Factor 8 Assay      55 - 200 % 9 (LL) 127 96 130     Component      Latest Ref Rng & Units 2018   WBC      4.0 - 11.0 10e9/L 13.5 (H) 10.4   RBC Count      4.4 - 5.9 10e12/L 3.19 (L) 3.06 (L)   Hemoglobin      13.3 - 17.7 g/dL 9.2 (L) 8.9 (L)   Hematocrit      40.0 - 53.0 % 29.0 (L) 28.7 (L)   MCV      78 - 100 fl 91 94   MCH      26.5 - 33.0 pg 28.8 29.1   MCHC      31.5 - 36.5 g/dL 31.7 31.0 (L)   RDW      10.0 - 15.0 % 15.4 (H) 15.5 (H)   Platelet Count      150 - 450 10e9/L 160 141 (L)     Assessment:  In summary, Orlin is a 68 year old male with a history of Severe Hemophilia A  with his baseline Factor VIII level of <1% with no history of Factor VIII inhibitor, who was found to have bladder cancer in Jan 2018 S/P transurethral resection of bladder tumor in Feb 2018 and neoadjuvant chemotherapy, underwent radical cystoprostatectomy, ileal conduit, and bilateral pelvic lymphadenectomy on 7/30/2018 by Dr. Shea. Today is POD #2, Orlin is doing well without any unexpected bleeding complications.    Diagnosis:  1. Severe Hemophilia A.  2. Bladder Cancer S/P Transurethral resection of bladder tumor in Feb 2018 and neoadjuvant chemotherapy.   3. S/P radical cystoprostatectomy, ileal conduit and bilateral pelvic lymphadenectomy on 7/30/2018. Doing well without any unexpected bleeding complications.    Plan:  1. We will continue to monitor daily Factor VIII levels and adjust the Kogenate continuous infusion dose and/or rate as needed. Currently on 4 Units/kg/hour (278.4 Units/hour) IV continuous infusion. Plan continuous infusion during this hospitalization and have him change to daily bolus infusions when he is discharged. Note that he is able to do self home infusions via self venipunctures.   2. Plan on daily Factor VIII replacement for 14 days total post surgery (until 8/13/2018).   3. Hopefully he is able to be discharged home as Factor VIII replacement coverage might be an issue if he were to be discharged to a Transitional Care Unit or Rehab.       Clement Griffin PA-C, CHARLY  Physician Assistant  [facility].     Addendum 8/1/2018 at 12:50 pm:  Component      Latest Ref Rng & Units 8/1/2018   Factor 8 Assay      55 - 200 % 155     Factor VIII today is at 155%. Will decrease his Kogenate continuous infusion from 278 Units/hour to 200 Units/hour. Recheck Factor VIII level on 8/2/2018 morning.      Clement Griffin PA-C, CHARLY  Physician Assistant  [facility].

## 2018-08-01 NOTE — PLAN OF CARE
Problem: Patient Care Overview  Goal: Plan of Care/Patient Progress Review  /71 (BP Location: Left arm)  Pulse 103  Temp 98.1  F (36.7  C) (Oral)  Resp 20  Ht 1.829 m (6')  Wt 69.3 kg (152 lb 12.5 oz)  SpO2 97%  BMI 20.72 kg/m2    POD 1. JOSE E drain on L with moderate amount of red serosanguinous drainage. Urostomy on R with moderate amount of red urine. Gordon pelvic drain with scant amounts of red drainage. Clamped nephrostomy on R. IV running continuous at 100mL/hr. Continuous Actigall infusion running at 0.63. Hx of severe hemophilia A, cut self shaving early in shift. MD made aware. Encouraged ambulation but refused for shift. Encouraged use of incentive spirometry. No BM but passing flatus. Pain managed with PCA pump and PO tylenol. Will continue with POC.

## 2018-08-02 ENCOUNTER — APPOINTMENT (OUTPATIENT)
Dept: PHYSICAL THERAPY | Facility: CLINIC | Age: 68
DRG: 653 | End: 2018-08-02
Attending: UROLOGY
Payer: MEDICARE

## 2018-08-02 ENCOUNTER — APPOINTMENT (OUTPATIENT)
Dept: OCCUPATIONAL THERAPY | Facility: CLINIC | Age: 68
DRG: 653 | End: 2018-08-02
Attending: UROLOGY
Payer: MEDICARE

## 2018-08-02 DIAGNOSIS — D66 SEVERE HEMOPHILIA A (H): Primary | ICD-10-CM

## 2018-08-02 DIAGNOSIS — D66 HEMOPHILIC ARTHROPATHY (H): ICD-10-CM

## 2018-08-02 DIAGNOSIS — C67.9 MALIGNANT NEOPLASM OF URINARY BLADDER, UNSPECIFIED SITE (H): ICD-10-CM

## 2018-08-02 DIAGNOSIS — M36.2 HEMOPHILIC ARTHROPATHY (H): ICD-10-CM

## 2018-08-02 LAB
ANION GAP SERPL CALCULATED.3IONS-SCNC: 8 MMOL/L (ref 3–14)
BUN SERPL-MCNC: 13 MG/DL (ref 7–30)
CALCIUM SERPL-MCNC: 8.4 MG/DL (ref 8.5–10.1)
CHLORIDE SERPL-SCNC: 112 MMOL/L (ref 94–109)
CO2 SERPL-SCNC: 22 MMOL/L (ref 20–32)
CREAT SERPL-MCNC: 0.7 MG/DL (ref 0.66–1.25)
ERYTHROCYTE [DISTWIDTH] IN BLOOD BY AUTOMATED COUNT: 15.1 % (ref 10–15)
FACT VIII ACT/NOR PPP: 155 % (ref 55–200)
GFR SERPL CREATININE-BSD FRML MDRD: >90 ML/MIN/1.7M2
GLUCOSE BLDC GLUCOMTR-MCNC: 139 MG/DL (ref 70–99)
GLUCOSE SERPL-MCNC: 104 MG/DL (ref 70–99)
HCT VFR BLD AUTO: 28.1 % (ref 40–53)
HGB BLD-MCNC: 8.7 G/DL (ref 13.3–17.7)
MCH RBC QN AUTO: 28.8 PG (ref 26.5–33)
MCHC RBC AUTO-ENTMCNC: 31 G/DL (ref 31.5–36.5)
MCV RBC AUTO: 93 FL (ref 78–100)
PLATELET # BLD AUTO: 164 10E9/L (ref 150–450)
POTASSIUM SERPL-SCNC: 3.6 MMOL/L (ref 3.4–5.3)
RBC # BLD AUTO: 3.02 10E12/L (ref 4.4–5.9)
SODIUM SERPL-SCNC: 141 MMOL/L (ref 133–144)
WBC # BLD AUTO: 9.5 10E9/L (ref 4–11)

## 2018-08-02 PROCEDURE — A9270 NON-COVERED ITEM OR SERVICE: HCPCS | Mod: GY | Performed by: STUDENT IN AN ORGANIZED HEALTH CARE EDUCATION/TRAINING PROGRAM

## 2018-08-02 PROCEDURE — 25000132 ZZH RX MED GY IP 250 OP 250 PS 637: Mod: GY | Performed by: STUDENT IN AN ORGANIZED HEALTH CARE EDUCATION/TRAINING PROGRAM

## 2018-08-02 PROCEDURE — 85027 COMPLETE CBC AUTOMATED: CPT | Performed by: STUDENT IN AN ORGANIZED HEALTH CARE EDUCATION/TRAINING PROGRAM

## 2018-08-02 PROCEDURE — 25000555 ZZHC RX FACTOR IP 250 OP 636: Performed by: PHYSICIAN ASSISTANT

## 2018-08-02 PROCEDURE — 85240 CLOT FACTOR VIII AHG 1 STAGE: CPT | Performed by: STUDENT IN AN ORGANIZED HEALTH CARE EDUCATION/TRAINING PROGRAM

## 2018-08-02 PROCEDURE — 00000328 ZZHCL STATISTIC PTT NC: Performed by: STUDENT IN AN ORGANIZED HEALTH CARE EDUCATION/TRAINING PROGRAM

## 2018-08-02 PROCEDURE — 40000903 ZZH STATISTIC WOC PT EDUCATION, 31-45 MIN

## 2018-08-02 PROCEDURE — 00000401 ZZHCL STATISTIC THROMBIN TIME NC: Performed by: STUDENT IN AN ORGANIZED HEALTH CARE EDUCATION/TRAINING PROGRAM

## 2018-08-02 PROCEDURE — 97110 THERAPEUTIC EXERCISES: CPT | Mod: GO

## 2018-08-02 PROCEDURE — 97535 SELF CARE MNGMENT TRAINING: CPT | Mod: GO

## 2018-08-02 PROCEDURE — A9270 NON-COVERED ITEM OR SERVICE: HCPCS | Mod: GY | Performed by: UROLOGY

## 2018-08-02 PROCEDURE — 00000167 ZZHCL STATISTIC INR NC: Performed by: STUDENT IN AN ORGANIZED HEALTH CARE EDUCATION/TRAINING PROGRAM

## 2018-08-02 PROCEDURE — 40000133 ZZH STATISTIC OT WARD VISIT

## 2018-08-02 PROCEDURE — 40000193 ZZH STATISTIC PT WARD VISIT

## 2018-08-02 PROCEDURE — 25000132 ZZH RX MED GY IP 250 OP 250 PS 637: Mod: GY | Performed by: UROLOGY

## 2018-08-02 PROCEDURE — 36415 COLL VENOUS BLD VENIPUNCTURE: CPT | Performed by: STUDENT IN AN ORGANIZED HEALTH CARE EDUCATION/TRAINING PROGRAM

## 2018-08-02 PROCEDURE — 25000128 H RX IP 250 OP 636: Performed by: STUDENT IN AN ORGANIZED HEALTH CARE EDUCATION/TRAINING PROGRAM

## 2018-08-02 PROCEDURE — 80048 BASIC METABOLIC PNL TOTAL CA: CPT | Performed by: STUDENT IN AN ORGANIZED HEALTH CARE EDUCATION/TRAINING PROGRAM

## 2018-08-02 PROCEDURE — 97116 GAIT TRAINING THERAPY: CPT | Mod: GP

## 2018-08-02 PROCEDURE — 97530 THERAPEUTIC ACTIVITIES: CPT | Mod: GO

## 2018-08-02 PROCEDURE — 25000128 H RX IP 250 OP 636: Performed by: PHYSICIAN ASSISTANT

## 2018-08-02 PROCEDURE — 97530 THERAPEUTIC ACTIVITIES: CPT | Mod: GP

## 2018-08-02 PROCEDURE — 12000008 ZZH R&B INTERMEDIATE UMMC

## 2018-08-02 RX ORDER — ANTIHEMOPHILIC FACTOR (RECOMBINANT) 3000 (+/-)
KIT INTRAVENOUS
Qty: 30000 UNITS | Refills: 0 | Status: SHIPPED | OUTPATIENT
Start: 2018-08-02 | End: 2018-09-11

## 2018-08-02 RX ORDER — SIMETHICONE 80 MG
80 TABLET,CHEWABLE ORAL EVERY 6 HOURS PRN
Status: DISCONTINUED | OUTPATIENT
Start: 2018-08-02 | End: 2018-08-06 | Stop reason: HOSPADM

## 2018-08-02 RX ADMIN — SODIUM CHLORIDE: 9 INJECTION, SOLUTION INTRAVENOUS at 16:33

## 2018-08-02 RX ADMIN — SIMETHICONE CHEW TAB 80 MG 80 MG: 80 TABLET ORAL at 22:40

## 2018-08-02 RX ADMIN — ALVIMOPAN 12 MG: 12 CAPSULE ORAL at 08:10

## 2018-08-02 RX ADMIN — OXYCODONE HYDROCHLORIDE 10 MG: 5 TABLET ORAL at 12:12

## 2018-08-02 RX ADMIN — OXYCODONE HYDROCHLORIDE 10 MG: 5 TABLET ORAL at 20:22

## 2018-08-02 RX ADMIN — ACETAMINOPHEN 975 MG: 325 TABLET, FILM COATED ORAL at 08:07

## 2018-08-02 RX ADMIN — ACETAMINOPHEN 650 MG: 325 TABLET, FILM COATED ORAL at 00:32

## 2018-08-02 RX ADMIN — OXYCODONE HYDROCHLORIDE 10 MG: 5 TABLET ORAL at 00:32

## 2018-08-02 RX ADMIN — OXYCODONE HYDROCHLORIDE 10 MG: 5 TABLET ORAL at 08:07

## 2018-08-02 RX ADMIN — SIMETHICONE CHEW TAB 80 MG 80 MG: 80 TABLET ORAL at 16:48

## 2018-08-02 RX ADMIN — CIPROFLOXACIN 400 MG: 2 INJECTION, SOLUTION INTRAVENOUS at 19:30

## 2018-08-02 RX ADMIN — ANTIHEMOPHILIC FACTOR (RECOMBINANT) 4448 UNITS: KIT at 07:30

## 2018-08-02 RX ADMIN — CIPROFLOXACIN 400 MG: 2 INJECTION, SOLUTION INTRAVENOUS at 08:06

## 2018-08-02 ASSESSMENT — ACTIVITIES OF DAILY LIVING (ADL)
ADLS_ACUITY_SCORE: 9

## 2018-08-02 NOTE — PLAN OF CARE
Problem: Patient Care Overview  Goal: Plan of Care/Patient Progress Review  Discharge Planner PT   Patient plan for discharge: TBD  Current status: Pt able to get out of bed with SBA however today needing CGA-Elizabeth at LE's to get back into bed, continue to encourage performance of bed mobility with head of bed flat to practice as per home set up.  Pt ambulated 100' x 2 with use of 1 UE support on IV pole and CGA, working on stability with ambulation.   Barriers to return to prior living situation: Need for assist with ambulation, impaired strength/balance  Recommendations for discharge: Home with home therapies vs TCU  Rationale for recommendations: At this time if pt were to stay a few more days in-patient and he continues to make progress as he has this week he should be safe to discharge home with home therapies and a walker, will continue to monitor.        Entered by: Sakina Mak 08/02/2018 1:06 PM

## 2018-08-02 NOTE — PLAN OF CARE
Discharge Planner OT 7B  Patient plan for discharge: home  Current status: Re-educated pt on abdominal precautions, pt unable to recall from prior sessions. Educated pt on maintaining precautions throughout ADLs/IADLs. Educated pt on AE use during LB dressing, pt demonstrating understanding with vc's for technique. Facilitated functional transfer and mobility to increase IND with ADLs/IADLs. Pt STS SBA with vc's to maintain precautions, ambulated ~400 ft SBA with FWW. Pt noted fatigue during activity.   Barriers to return to prior living situation: Post-surgical precautions, lives alone, decreased endurance/activity tolerance  Recommendations for discharge: TCU vs. Home with HH OT pending progression in therapy  Rationale for recommendations: Pt is progressing in therapy but continues to show decreased endurance, activity tolerance, and requiring cues to adhere to post-surgical precautions. Would benefit from continued endurance and strength thraining with therapy.        Entered by: Mignon Bowman 08/02/2018 11:30 AM        *Pt stated that he would like to buy a sock aid prior to discharge

## 2018-08-02 NOTE — PLAN OF CARE
VSS. Pain controlled well by oxycodone 10mg. Kogenate at 0.45ml/hr and NS at 100ml/hr. Cipro infused. Midline incision with staples open to air. Left JOSE E with serosanguinous output. Right urostomy with UOP. Pelvic drain discontinued this morning. Passing gas, hypoactive bowel sounds, no BM. Ambulated with PT. Continue to monitor.

## 2018-08-02 NOTE — PROGRESS NOTES
Care Coordinator Progress Note    Admission Date/Time:  7/30/2018  Attending MD:  Bebeto Shea MD    Data  Chart reviewed, discussed with interdisciplinary team.   Patient was admitted for:    Severe hemophilia A (H)  Malignant neoplasm of urinary bladder, unspecified site (H)  Malignant neoplasm of overlapping sites of bladder (H)  Depression, unspecified depression type  Hypertension goal BP (blood pressure) < 140/90  Urinary tract infection without hematuria, site unspecified.    Concerns with insurance coverage for discharge needs: None.  Current Living Situation: Patient lives alone.  Support System: Uninvolved   Patient reports he has nieces that live in the area that he can call for support if needed, but he does not like to bother them.    Services Involved: no services involved prior to admission  Transportation at Discharge: Taxi  Transportation to Medical Appointments:   - Not applicable, patient takes cabs to appts  Barriers to Discharge: lives alone and physical impairment    Coordination of Care and Referrals: Provided patient/family with options for Home Care.        Assessment  Patient is a 68 year old male with past medical history of Hemophilia type A, diabetes type 2, hypertension, and muscular invasive bladder cancer who is now s/p cystoprostatectomy, ileal conduit, and bilateral pelvic lymphadenectomy.  PT/OT is working with the patient and recommending home therapy.  WOC RN is also working with the patient and recommending home RN visits.  Met with the patient at bedside to introduce RNCC role and discuss discharge planning.  Discussed home care f/u for continued therapies and nursing visits.  Patient strongly declines home care at this time.  Discussed benefits of home care.  He reports that he feels confident that he will be able to manage his new ostomy independently.  Patient reports he would be agreeable to outpatient clinic f/u and outpatient therapy.  Explained to patient that  RNCC will check in with him closer to discharge to make sure he does not want home care f/u.  Patient thankful for the visit and does not have any outstanding concerns.  RNCC to continue to follow and assist with discharge planning as needed.         Plan  Anticipated Discharge Date: 2-3 days  Anticipated Discharge Plan:  Home with OP f/u    Jessy Monsivais, JOY  [phone]

## 2018-08-02 NOTE — CONSULTS
Brief Social Work Note:    Sw consult for discharge planning received and acknowledged, however per conversation with OT staff this morning a discharge to home is being recommended for pt (and per chart recommendations have been home vs TCU). RNCC to assist with discharge to home so sw will sign off at this time. Please re-consult if other sw needs arise.     MISSY Longoria, MSW  7B   [phone] (pager) [phone]  8/2/2018

## 2018-08-02 NOTE — PROGRESS NOTES
Urology  Progress Note    No acute events overnight   Pain well controlled  Tolerating current diet   Ambulating   Passing flatus, no BM    Exam  /84 (BP Location: Left arm)  Pulse 103  Temp 97.4  F (36.3  C) (Oral)  Resp 16  Ht 1.829 m (6')  Wt 69.3 kg (152 lb 12.5 oz)  SpO2 96%  BMI 20.72 kg/m2  No acute distress  Unlabored breathing  Abdomen soft, nontender, nondistended. Incisions covered with minimally saturated dresings. Stoma pink and well perfused, 2 stents and RRC in place.  R PNT clamped  JOSE E serosanguinous  Urostomy with light pink urine in tubing  Pelvic drain with minimal output     UOP 3575/950  JOSE E 250/30  Pelvic drain 125/15    Labs  AM labs pending    Assessment/Plan  68 year old y/o male POD#3 s/p cystectomy and ileal conduit creation for MIBC. Hx severe hemophilia A.     Neuro: tylenol, dilaudid, oxycodone for pain control  CV: HDS. Home amlodipine, lisinopril.  Pulm: incentive spirometry while awake  FEN/GI: regular diet, MIVF @ 100/hr. Entereg.  Endo: ARDEN  : R PNT capped. Monitor urine output. Pelvic drain removed during rounds.   Heme: Hgb stable. On Kogenate (antihemophilic factor). Heme following, 8/1 recs:  1. We will continue to monitor daily Factor VIII levels and adjust the Kogenate continuous infusion dose and/or rate as needed. Currently on 4 Units/kg/hour (278.4 Units/hour) IV continuous infusion. Plan continuous infusion during this hospitalization and have him change to daily bolus infusions when he is discharged. Note that he is able to do self home infusions via self venipunctures.   2. Plan on daily Factor VIII replacement for 14 days total post surgery (until 8/13/2018).   3. Hopefully he is able to be discharged home as Factor VIII replacement coverage might be an issue if he were to be discharged to a Transitional Care Unit or Rehab.    ID: afebrile, no leukocystosis. IV cipro.    Activity: up ad mely  PPx: SCDs  Dispo: 7B. PT rec's TCU v home with aide.     Seen  and examined with the chief resident. Will discuss with Dr. Shea.    Judi Paula MD  Urology PGY-2       Contacting the Urology Team     Please use the following job codes to reach the Urology Team. Note that you must use an in house phone and that job codes cannot receive text pages.     On weekdays, dial [phone] (or star-star-star [phone] on the new [place] telephones) then [phone] to reach the Adult Urology resident or PA on call    On weekdays, dial [phone] (or star-star-star [phone] on the new To telephones) then [phone] to reach the Pediatric Urology resident    On weeknights and weekends, dial [phone] (or star-star-star 777 on the new To telephones) then [phone] to reach the Urology resident on call (for both Adult and Pediatrics)

## 2018-08-02 NOTE — PLAN OF CARE
Problem: Patient Care Overview  Goal: Plan of Care/Patient Progress Review  Outcome: No Change  Vital signs:  Temp: 97.4  F (36.3  C) Temp src: Oral BP: 144/84   Heart Rate: 84 Resp: 16 SpO2: 96 % O2 Device: None (Room air)     4615-6341. VSS, however, BP slightly elevated. On RA. Kogenate @ 0.45mL/hr and NS @100mL/hr via PIV. Cipro given per orders. Left JOSE E with 80 mL serosanguinous output. Right Urostomy with adequate macarena colored UOP. Pelvic drain with 30 mL dark red output. Right Nephrostomy capped. Midline incision with staples, SANNA, no drainage. Pt c/o pain, oxy given x2, tylenol given x1 with some relief. Denies nausea. +BS, passing flatus, no BM. Sleeping between cares.

## 2018-08-02 NOTE — PROGRESS NOTES
"  WOC Nurse Inpatient [facility]   WO Nurse Inpatient Adult     Initial Assessment   Assessment of new end Urostomy Stoma complication(s) none   Mucocutaneous junction; not visualized, barrier intact    Peristomal complication(s)not visualized, barrier intact    Pouch wear time:24-48 hours    Objective data:  Patient history according to medical record: 68 year old y/o male POD#3 7/30/18 s/p cystectomy and ileal conduit creation for MIBC. Hx severe hemophilia ACurrent Diet/Nutrition:   Active Diet Order      Advance Diet as Tolerated: Regular Diet Adult     I/O last 3 completed shifts:  In: 2580 [P.O.:580; I.V.:2000]  Out: 3020 [Urine:2750; Drains:270]  Labs:    Recent Labs  Lab 08/02/18  0752  07/30/18  1409   HGB 8.7*  < >  --    INR  --   --  1.19*   WBC 9.5  < >  --    < > = values in this interval not displayed.     Physical Exam:  Current pouching system:Julian 2 piece flat with barrier ring to fill in crease   Reason for pouch change today: pouch not changed today  Stoma appearance: red, round and moist  Stoma size; 1 3/8\" with good protrusion ,    Peristomal skin: not visualized (barrier in place)  Stoma output :clear and pink   Abdominal  Assessment  soft , NG still in place? No  Surgical Site: open to air  Pain: Dull ache  Is patient still on a PCA No    Interventions:  Patient's chart evaluated.  Focus of today's visit: verbal instruction    Participant of teaching session today patient   Orders: Reviewed  Change made with ostomy management today: No  Patient/family: active participation  Supplies:Ordered  night drainage bottle and convex barriers   Teaching:   how to order supplies, preventing UTIs and hernias, diet, hydration, lifestyle changes     Plan:  Learning needs: pouch change return demonstration and discharge instructions  Preparation for discharge: Needs:  Completed supply list, Registered for samples from  and Prescriptions or note left on chart for MD to " sign/complete  Recommend home care? yes    Discussed plan of care with Patient  Nursing to notify the Provider(s) and re-consult the WOC Nurse if new ostomy concerns or discharge planned before next planned WOC visit.    WOC Nurse will return: lois for pouch change pracctice  Face to face time: 45 minutes

## 2018-08-02 NOTE — PROGRESS NOTES
[facility]  [address] [place] [id number]  Main: [phone], Fax: [phone]    Patient seen at: [facility] Unit 7B    Inpatient Visit Note    Patient: Orlin Alcantar  MRN: [id number]  : 1950  MAR: 2018    Subjective:  POD #3. Doing well. Pain is in good control. Denies shortness of breath. No chest pain. Denies any lower extremity pain. No issues with leg swelling. No other bleeding issues. Noted that PNT tube is capped and Pelvic drain is removed this morning. JOSE E drain in place.     Factor VIII (Kogenate) continuous infusion rate was adjusted down to 200 Units/hour yesterday afternoon as his Factor VIII level yesterday morning was 155%.    Objective:  Pleasant in no acute distress. Sitting comfortably at bedside after returning from the bathroom.  Vitals: /77 (BP Location: Left arm)  Pulse 103  Temp 97.8  F (36.6  C) (Oral)  Resp 16  Ht 1.829 m (6')  Wt 69.3 kg (152 lb 12.5 oz)  SpO2 95%  BMI 20.72 kg/m2  Exam:  Lungs Clear to auscultation bilaterally.  Card: S1 S2. RRR. No murmur rubs or gallops.  Abd: Non tender. Non distended. Positive bowel sound. Soft.  Ext: No swelling noted.     Labs:  Component      Latest Ref Rng & Units 2018   WBC      4.0 - 11.0 10e9/L 9.5   RBC Count      4.4 - 5.9 10e12/L 3.02 (L)   Hemoglobin      13.3 - 17.7 g/dL 8.7 (L)   Hematocrit      40.0 - 53.0 % 28.1 (L)   MCV      78 - 100 fl 93   MCH      26.5 - 33.0 pg 28.8   MCHC      31.5 - 36.5 g/dL 31.0 (L)   RDW      10.0 - 15.0 % 15.1 (H)   Platelet Count      150 - 450 10e9/L 164     Component      Latest Ref Rng & Units 2018           5:58 AM  7:56 AM  3:49 PM    Factor 8 Assay      55 - 200 % 9 (LL) 127 96 130     Component      Latest Ref Rng & Units 2018             Factor 8 Assay      55 - 200 % 155     Factor 8 Assay from this morning is pending.    Assessment:  In summary, Orlin is a 68 year old  male with a 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 Shea. Today is POD #3, Orlin is doing well without any unexpected bleeding complications.     Diagnosis:  1. Severe Hemophilia A.  2. Bladder Cancer S/P Transurethral resection of bladder tumor in Feb 2018 and neoadjuvant chemotherapy.   3. S/P radical cystoprostatectomy, ileal conduit and bilateral pelvic lymphadenectomy on 7/30/2018. Doing well without any unexpected bleeding complications.    Plan:  1. Continue with Kogenate IV continuous infusion while hospitalized. Will adjust dose as necessary to keep his Factor VIII level at around %.   2. Cleared from a hemophilia standpoint to be discharged. Preferably that he is to be discharged home as coverage for Factor (Kogenate) infusions can be an issue if he is to go to a rehab facility or TCU.   3. Patient can do self home venipuncture Kogenate infusions. When he is discharged to home, he is instructed to do self home Kogenate infusion of around 3000 Units IV Q 24 hours until 8/13/2018 (total of 14 days post op). Then he can return to his routine prophylactic Factor VIII replacement therapy as he was doing prior to his surgery. We will arrange for a shipment of his Kogenate to his home when he is ready to be discharged home.   4. Return to clinic to see us for his overdue hemophilia comprehensive clinic visit in 1-2 months. We will arrange and schedule him for this visit.       Clement Griffin PA-C, MPAS  Physician Assistant  [facility].     Addendum on 8/2/2018 at 14:00:  Component      Latest Ref Rng & Units 8/2/2018   Factor 8  Assay      55 - 200 % 155     Keep him at current Kogenate infusion rate at 200 Units/hour continuous IV. Will recheck Factor VIII level on 8/3/2018 morning.     Spoke with Dr. Jorge Jiang, staff hematologist, he provided with the above recommendation.       Clement Griffin PA-C, MPAS  Physician Assistant  [facility].

## 2018-08-03 ENCOUNTER — APPOINTMENT (OUTPATIENT)
Dept: OCCUPATIONAL THERAPY | Facility: CLINIC | Age: 68
DRG: 653 | End: 2018-08-03
Attending: UROLOGY
Payer: MEDICARE

## 2018-08-03 ENCOUNTER — APPOINTMENT (OUTPATIENT)
Dept: PHYSICAL THERAPY | Facility: CLINIC | Age: 68
DRG: 653 | End: 2018-08-03
Attending: UROLOGY
Payer: MEDICARE

## 2018-08-03 LAB
ANION GAP SERPL CALCULATED.3IONS-SCNC: 9 MMOL/L (ref 3–14)
BUN SERPL-MCNC: 12 MG/DL (ref 7–30)
CALCIUM SERPL-MCNC: 8.1 MG/DL (ref 8.5–10.1)
CHLORIDE SERPL-SCNC: 111 MMOL/L (ref 94–109)
CO2 SERPL-SCNC: 21 MMOL/L (ref 20–32)
COPATH REPORT: NORMAL
CREAT FLD-MCNC: 0.7 MG/DL
CREAT SERPL-MCNC: 0.68 MG/DL (ref 0.66–1.25)
ERYTHROCYTE [DISTWIDTH] IN BLOOD BY AUTOMATED COUNT: 14.6 % (ref 10–15)
FACT VIII ACT/NOR PPP: 135 % (ref 55–200)
GFR SERPL CREATININE-BSD FRML MDRD: >90 ML/MIN/1.7M2
GLUCOSE SERPL-MCNC: 120 MG/DL (ref 70–99)
HCT VFR BLD AUTO: 27.4 % (ref 40–53)
HGB BLD-MCNC: 8.5 G/DL (ref 13.3–17.7)
MCH RBC QN AUTO: 28.7 PG (ref 26.5–33)
MCHC RBC AUTO-ENTMCNC: 31 G/DL (ref 31.5–36.5)
MCV RBC AUTO: 93 FL (ref 78–100)
PLATELET # BLD AUTO: 177 10E9/L (ref 150–450)
POTASSIUM SERPL-SCNC: 3.6 MMOL/L (ref 3.4–5.3)
RBC # BLD AUTO: 2.96 10E12/L (ref 4.4–5.9)
SODIUM SERPL-SCNC: 141 MMOL/L (ref 133–144)
SPECIMEN SOURCE FLD: NORMAL
WBC # BLD AUTO: 8.9 10E9/L (ref 4–11)

## 2018-08-03 PROCEDURE — 12000008 ZZH R&B INTERMEDIATE UMMC

## 2018-08-03 PROCEDURE — A9270 NON-COVERED ITEM OR SERVICE: HCPCS | Mod: GY | Performed by: UROLOGY

## 2018-08-03 PROCEDURE — 25000128 H RX IP 250 OP 636: Performed by: STUDENT IN AN ORGANIZED HEALTH CARE EDUCATION/TRAINING PROGRAM

## 2018-08-03 PROCEDURE — 80048 BASIC METABOLIC PNL TOTAL CA: CPT | Performed by: STUDENT IN AN ORGANIZED HEALTH CARE EDUCATION/TRAINING PROGRAM

## 2018-08-03 PROCEDURE — 97530 THERAPEUTIC ACTIVITIES: CPT | Mod: GP

## 2018-08-03 PROCEDURE — 97530 THERAPEUTIC ACTIVITIES: CPT | Mod: GO

## 2018-08-03 PROCEDURE — 40000133 ZZH STATISTIC OT WARD VISIT

## 2018-08-03 PROCEDURE — 25000132 ZZH RX MED GY IP 250 OP 250 PS 637: Mod: GY | Performed by: UROLOGY

## 2018-08-03 PROCEDURE — 40000904 ZZH STATISTIC WOC PT EDUCATION, 46-60 MIN

## 2018-08-03 PROCEDURE — 00000328 ZZHCL STATISTIC PTT NC: Performed by: STUDENT IN AN ORGANIZED HEALTH CARE EDUCATION/TRAINING PROGRAM

## 2018-08-03 PROCEDURE — 97110 THERAPEUTIC EXERCISES: CPT | Mod: GO

## 2018-08-03 PROCEDURE — 00000401 ZZHCL STATISTIC THROMBIN TIME NC: Performed by: STUDENT IN AN ORGANIZED HEALTH CARE EDUCATION/TRAINING PROGRAM

## 2018-08-03 PROCEDURE — 85240 CLOT FACTOR VIII AHG 1 STAGE: CPT | Performed by: STUDENT IN AN ORGANIZED HEALTH CARE EDUCATION/TRAINING PROGRAM

## 2018-08-03 PROCEDURE — 25000555 ZZHC RX FACTOR IP 250 OP 636: Performed by: UROLOGY

## 2018-08-03 PROCEDURE — 25000128 H RX IP 250 OP 636: Performed by: PHYSICIAN ASSISTANT

## 2018-08-03 PROCEDURE — 25000132 ZZH RX MED GY IP 250 OP 250 PS 637: Mod: GY | Performed by: STUDENT IN AN ORGANIZED HEALTH CARE EDUCATION/TRAINING PROGRAM

## 2018-08-03 PROCEDURE — A9270 NON-COVERED ITEM OR SERVICE: HCPCS | Mod: GY | Performed by: STUDENT IN AN ORGANIZED HEALTH CARE EDUCATION/TRAINING PROGRAM

## 2018-08-03 PROCEDURE — 36415 COLL VENOUS BLD VENIPUNCTURE: CPT | Performed by: STUDENT IN AN ORGANIZED HEALTH CARE EDUCATION/TRAINING PROGRAM

## 2018-08-03 PROCEDURE — A9270 NON-COVERED ITEM OR SERVICE: HCPCS | Mod: GY | Performed by: PHYSICIAN ASSISTANT

## 2018-08-03 PROCEDURE — 97116 GAIT TRAINING THERAPY: CPT | Mod: GP

## 2018-08-03 PROCEDURE — 00000167 ZZHCL STATISTIC INR NC: Performed by: STUDENT IN AN ORGANIZED HEALTH CARE EDUCATION/TRAINING PROGRAM

## 2018-08-03 PROCEDURE — 40000193 ZZH STATISTIC PT WARD VISIT

## 2018-08-03 PROCEDURE — 25000132 ZZH RX MED GY IP 250 OP 250 PS 637: Mod: GY | Performed by: PHYSICIAN ASSISTANT

## 2018-08-03 PROCEDURE — 82570 ASSAY OF URINE CREATININE: CPT | Performed by: PHYSICIAN ASSISTANT

## 2018-08-03 PROCEDURE — 25000555 ZZHC RX FACTOR IP 250 OP 636: Performed by: PHYSICIAN ASSISTANT

## 2018-08-03 PROCEDURE — 85027 COMPLETE CBC AUTOMATED: CPT | Performed by: STUDENT IN AN ORGANIZED HEALTH CARE EDUCATION/TRAINING PROGRAM

## 2018-08-03 RX ORDER — AMLODIPINE BESYLATE 5 MG/1
5 TABLET ORAL DAILY
Status: DISCONTINUED | OUTPATIENT
Start: 2018-08-03 | End: 2018-08-06 | Stop reason: HOSPADM

## 2018-08-03 RX ORDER — CIPROFLOXACIN 500 MG/1
500 TABLET, FILM COATED ORAL EVERY 12 HOURS SCHEDULED
Status: COMPLETED | OUTPATIENT
Start: 2018-08-03 | End: 2018-08-04

## 2018-08-03 RX ORDER — POLYETHYLENE GLYCOL 3350 17 G/17G
17 POWDER, FOR SOLUTION ORAL DAILY
Status: DISCONTINUED | OUTPATIENT
Start: 2018-08-03 | End: 2018-08-06 | Stop reason: HOSPADM

## 2018-08-03 RX ORDER — BISACODYL 10 MG
10 SUPPOSITORY, RECTAL RECTAL DAILY PRN
Status: DISCONTINUED | OUTPATIENT
Start: 2018-08-03 | End: 2018-08-06 | Stop reason: HOSPADM

## 2018-08-03 RX ORDER — BISACODYL 10 MG
10 SUPPOSITORY, RECTAL RECTAL ONCE
Status: DISCONTINUED | OUTPATIENT
Start: 2018-08-03 | End: 2018-08-06 | Stop reason: HOSPADM

## 2018-08-03 RX ADMIN — BISACODYL 10 MG: 10 SUPPOSITORY RECTAL at 07:49

## 2018-08-03 RX ADMIN — ALVIMOPAN 12 MG: 12 CAPSULE ORAL at 07:41

## 2018-08-03 RX ADMIN — OXYCODONE HYDROCHLORIDE 5 MG: 5 TABLET ORAL at 20:02

## 2018-08-03 RX ADMIN — ANTIHEMOPHILIC FACTOR (RECOMBINANT) 4448 UNITS: KIT at 06:58

## 2018-08-03 RX ADMIN — MIRTAZAPINE 45 MG: 45 TABLET, FILM COATED ORAL at 22:28

## 2018-08-03 RX ADMIN — CIPROFLOXACIN HYDROCHLORIDE 500 MG: 500 TABLET, FILM COATED ORAL at 19:52

## 2018-08-03 RX ADMIN — ANTIHEMOPHILIC FACTOR (RECOMBINANT) 4448 UNITS: KIT at 13:23

## 2018-08-03 RX ADMIN — SODIUM CHLORIDE: 9 INJECTION, SOLUTION INTRAVENOUS at 02:33

## 2018-08-03 RX ADMIN — CIPROFLOXACIN 400 MG: 2 INJECTION, SOLUTION INTRAVENOUS at 07:41

## 2018-08-03 RX ADMIN — AMLODIPINE BESYLATE 5 MG: 5 TABLET ORAL at 10:07

## 2018-08-03 RX ADMIN — POLYETHYLENE GLYCOL 3350 17 G: 17 POWDER, FOR SOLUTION ORAL at 10:07

## 2018-08-03 ASSESSMENT — ACTIVITIES OF DAILY LIVING (ADL)
ADLS_ACUITY_SCORE: 9

## 2018-08-03 NOTE — PLAN OF CARE
Problem: Patient Care Overview  Goal: Plan of Care/Patient Progress Review  Outcome: Improving  Afebrile,vss. Tolerated  A liquid diet. Has gas pains but no bm, did not want enterig yet. Ns at 100/hr. Factor at .45cc/hr. Pnt capped. Ic pulling adequate urine. Robin patent. Dressing changed. Oxycodone for pain x1.

## 2018-08-03 NOTE — PROGRESS NOTES
"  WOC Nurse Inpatient [facility]   WO Nurse Inpatient Adult     Follow Up Assessment   Assessment of new end Ileal Conduit Stoma complication(s) none   Mucocutaneous junction; intact   Peristomal complication(s) none   Pouch wear time:24-48 hours  Following today's visit:Patient /   is  able to demonstrate;       1. How to empty their pouch? Yes with minimal verbal cues      2. How to change their pouch?  Yes with minimal verbal cues         Objective data:  Patient history according to medical record: 68 year old y/o male POD#4 s/p cystectomy and ileal conduit creation for MIBC. Hx severe hemophilia A.   Current Diet/Nutrition:   Active Diet Order      Advance Diet as Tolerated: Regular Diet Adult   TPN no   I/O last 3 completed shifts:  In: 3478.57 [P.O.:840; I.V.:2638.57]  Out: 2770 [Urine:2575; Drains:195]  Labs:    Recent Labs  Lab 08/03/18  0803  07/30/18  1409   HGB 8.5*  < >  --    INR  --   --  1.19*   WBC 8.9  < >  --    < > = values in this interval not displayed.     Physical Exam:  Current pouching system:George 2 piece with barrier ring at 3 and 9 o'clock  Reason for pouch change today: ostomy education and routine schedule  Stoma appearance: red, round and moist  Stoma size; 1 3/8\" with good protrusion, pink, round.  3 o'clock Retracting into adipose pad   Peristomal skin: intact  Stoma output :clear and pink   Abdominal  Assessment  soft , NG still in place? No  Surgical Site: staples intact  Pain: Dull ache    Interventions:  Patient's chart evaluated.  Focus of today's visit: refitting of appliance, pouch change return demonstration, verbal instruction  and discharge instructions   Participant of teaching session today patient   Orders: Reviewed  Change made with ostomy management today: Yes  Patient/family: performed with minimal verbal cues  Supplies:Gathered and at bedside    Plan:  Learning needs: All education completed with patient, who reports not feeling competent but does " feel all information reviewed and okay to go home  Preparation for discharge:  Supplies ordered, Completed supply list,  Prescriptions or note left on chart for MD to sign/complete, Discussed making a WOC Nurse outpatient appointment upon discharge, Discussed when to follow up with a WOC Nurse in the future and Discussed how/ where to order supplies, Patient wished to registered for samples from  himself, elena, coloplast, and convatec info and phone numbers given to patient.     Recommend home care? yes Per patient will not have home care, faxed ostomy supply list to [employer].    Discussed plan of care with Patient  Nursing to notify the Provider(s) and re-consult the WOC Nurse if new ostomy concerns or discharge planned before next planned WOC visit.    WOC Nurse will return: Monday if still inpatient.   Face to face time: 50 minutes    Ivania DAS, RN, CWOCN     [facility]     Name: Orlin Alcantar  Date: 8/3/18    To order your ostomy supplies    The ostomy Supplier needs this supply list  to process your order. You will need to fax/deliver this list, along with your Insurance information. Your home care nurse can assist with this process.                 List of Ostomy Distributors [facility]  Ph. [phone] [phone] Fax # [phone]  [facility]   Ph. [phone]  [facility]   Ph. [phone]  [facility]   Ph. [phone]  Or Call your insurance provider for their preferred supplier    Your Medical Supplier will need your surgeon's name, phone and fax number    Clinic:                     Phone                            Fax  Urology Surgery:              [phone]  Verbal Order for ostomy supplies for 1 Month per:     Ivania Arroyo RN CWOCN      Authorizing MD: Dr. Bebeto Shea MD     Request the following supplies:      [place]    2 piece flat wafer 57mm  #  "[id number]     Urine pouch 57mm- # 16233  Microskin  Accessories  2  barrier ring #7805     Adapt powder #7906    No sting film barrier # 3345                           Bard urine drainage bag #[id number]                                 Coloplast leg bag #[id number]    Bannish II liquid urine deoderizer #[id number]             or Urikleen  # [id number]    Night drainage bottle # [id number]     or Sayra 1/2 gallon Bottle with 6\" tubing                Change your pouch twice a week, more often if leaking.    If you are cutting a hole in the wafer of your pouch, recheck stoma size and adjust pouch opening as needed every week    . Call the Ostomy Nurse at [facility]       [address] [place] [address], [place] : [phone]   Schedule a follow-up visit in 4 to 6 weeks after your surgery, sooner if having problems Bring a complete set of pouch-changing supplies to this visit      Problems you should Report  - The stoma turns blue or darker in color.  - Cuts or sores around the stoma.  - Red, raw or painful skin around the stoma.  - Any bulging of the skin around the stoma.  - A pouch that leaks every day.  - Problems making the right size hole in the pouch wafer.    Please call with any questions or concerns.      "

## 2018-08-03 NOTE — DISCHARGE INSTRUCTIONS
"  [facility]     Name: Orlin Alcantar  Date: 8/3/18    To order your ostomy supplies    The ostomy Supplier needs this supply list  to process your order. You will need to fax/deliver this list, along with your Insurance information. Your home care nurse can assist with this process.                 List of Ostomy Distributors [employer]  Ph. [phone] [phone] Fax # [phone]  [facility]   Ph. [phone]  [place] [facility]   Ph. [phone]  [facility]   Ph. [phone] [phone]  Or Call your insurance provider for their preferred supplier    Your Medical Supplier will need your surgeon's name, phone and fax number    Clinic:                     Phone                            Fax  Urology Surgery:              [phone]  Verbal Order for ostomy supplies for 1 Month per:     Ivania Arroyo RN CWOCN      Authorizing MD: Dr. Bebeto Shea MD     Request the following supplies:      [place]    2 piece flat wafer 57mm  # 13737     Urine pouch 57mm- # 14218  Microskin  Accessories  2  barrier ring #7805     Adapt powder #7906    No sting film barrier # 3345                           Bard urine drainage bag #[id number]                                 Coloplast leg bag #[id number]    Bannish II liquid urine deoderizer #[id number]             or Urikleen  # [id number]    Night drainage bottle # [id number]     or Sayra 1/2 gallon Bottle with 6\" tubing                Change your pouch twice a week, more often if leaking.    If you are cutting a hole in the wafer of your pouch, recheck stoma size and adjust pouch opening as needed every week    . Call the Ostomy Nurse at [facility] [address] : [phone]   Schedule a follow-up visit in 4 to 6 weeks after your surgery, sooner if having problems Bring a complete set of pouch-changing supplies to this visit      Problems " you should Report  - The stoma turns blue or darker in color.  - Cuts or sores around the stoma.  - Red, raw or painful skin around the stoma.  - Any bulging of the skin around the stoma.  - A pouch that leaks every day.  - Problems making the right size hole in the pouch wafer.    Please call with any questions or concerns.

## 2018-08-03 NOTE — PLAN OF CARE
Problem: Patient Care Overview  Goal: Plan of Care/Patient Progress Review  Outcome: No Change  Vitals:    08/02/18 2027 08/03/18 0010 08/03/18 0055 08/03/18 0754   BP:  (!) 161/95 158/66 157/85   BP Location:  Left arm Left arm Left arm   Pulse:       Resp:  20  20   Temp:  97.9  F (36.6  C)  96.8  F (36  C)   TempSrc:  Oral  Oral   SpO2:  98%  97%   Weight: 69.5 kg (153 lb 4.8 oz)      Height:       AVSS. No c/o pain. Good oral intake with no c/o nausea. On regular diet. Had a BM after a Dulcolax TN and miralax this am. OOB with PT and with nursing to BR. Urostomy with good uop. Right PNT clamped and intact. Robin site dressing changed x 2 due to drainage. Urostomy pouch changed by [facility] nurse. MIV at TKO for a Factor 8 drip at 0.34ml/pr. Last Factor 8 assay of this ct=687. Plan is for possible weekend or Monday discharge to home. Continue with current cares.

## 2018-08-03 NOTE — PROGRESS NOTES
[facility]  [address] [place] [address] [address], [place] [address]  Main: [phone], Fax: [phone]    Patient seen at: [facility] [address]    Inpatient Visit Note:    Patient: Orlin Alcantar  MRN: [id number]  : 1950  MAR: August 3, 2018  Time: 07:45 am    Subjective:  POD #4. Denies any pain. No issues with shortness of breath. No chest pain. Denies any other bleeding issues. No fever. Still has not had any BM but passing flatus.    Currently on Kogenate at 200 Units/hour IV continuous infusion.     Objective:  In no acute distress.  Vitals: /85 (BP Location: Left arm)  Pulse 103  Temp 96.8  F (36  C) (Oral)  Resp 20  Ht 1.829 m (6')  Wt 69.5 kg (153 lb 4.8 oz)  SpO2 97%  BMI 20.79 kg/m2  Exam:  Lungs: Clear to auscultation.  Card: S1 S2. No murmur rubs or gallops. RRR.  Abd: Non tender. Non distended. Soft. Positive bowel sound. Incision with staples in place. Looks dry, clean without drainage. Incision without surrounding erythema. Stoma, pink. Urine with slight pink tinge.   Ext: No obvious swelling in both lower extremities.    Labs:  Factor VIII this morning pending.    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 Shea. Today is POD #4, Orlin is doing well without any unexpected bleeding complications.      Diagnosis:  1. Severe Hemophilia A.  2. Bladder Cancer S/P Transurethral resection of bladder tumor in 2018 and neoadjuvant chemotherapy.   3. S/P radical cystoprostatectomy, ileal conduit and bilateral pelvic lymphadenectomy on 2018. Doing well without any unexpected  bleeding complications.    Plan:  1. Awaiting Factor VIII level from this morning. Will adjust Kogenate dosing as necessary.   2. Plan to have him continue with continuous Kogenate infusions for the remainder of his hospitalization. When he is ready to be discharged home and just prior to him leaving the hospital, give the remainder of the syringe of Kogenate as a bolus at 10 mL/min IV push. Then he can start his self home Kogenate infusions the next day following his hospital discharge.   3. Cleared from a hemophilia standpoint to be discharged. Preferably to home as Factor VIII coverage at a rehab or TCU facility can be an issue.  4. Plan daily Kogenate infusions at 3000 Units per dose (which is about 43 Units/kg) until 8/13/2018 (total of 14 days post op). [facility] is notified and prescriptions of Kogenate has been sent there. Pharmacy at our clinic will plan on shipping the product to his house when he is discharged from the hospital.   5. Plan that after 8/13/2018, he is to return to his routine prophylactic Factor VIII replacement therapy as he was doing prior to his surgery.  6. He is to return to our clinic for his overdue hemophilia comprehensive clinic visit in 1-2 months after his discharge. I spoke with the patient about this, but he said that once he has his follow up clinic visit with Dr. Shea on 8/22/2018, then he will call our center to schedule for the comprehensive clinic visit.    Dr. Charles Martinez, staff hematologist, is on-call this weekend. He is well aware of this patient. Please call him directly at his mobile phone at [phone], or pager at [phone], if there is any questions from a hemophilia management perspective.      Clement Griffin PA-C, MPAS  Physician Assistant  [facility].     Addendum 8/3/2018 at 12:25pm:  Component      Latest Ref Rng & Units 8/3/2018   Factor 8 Assay      55 - 200 % 135      Factor VIII level this morning is at 135%. Will decrease Kogenate continuous IV infusion rate from 200 Units/hour to 150 Units/hour. Recheck Factor VIII level in the morning (Saturday, 8/4/2018).      Clement Griffin PA-C, MPAS  Physician Assistant  [facility].

## 2018-08-03 NOTE — PROVIDER NOTIFICATION
Pt called for assistance with drainage from JOSE E after walking back from BR. Noted JOSE E with no suction. Removed dressing and found JOSE E completely out underneath dressing. Urology team updated. New dressing applied. Will continue to monitor drainage and may pouch if increased drainage continues.

## 2018-08-03 NOTE — PLAN OF CARE
Problem: Patient Care Overview  Goal: Plan of Care/Patient Progress Review  Outcome: No Change  Vitals:    08/02/18 1720 08/02/18 2027 08/03/18 0010 08/03/18 0055   BP: 136/85  (!) 161/95 158/66   BP Location: Left arm  Left arm Left arm   Pulse:       Resp: 16  20    Temp: 96.6  F (35.9  C)  97.9  F (36.6  C)    TempSrc: Oral  Oral    SpO2: 98%  98%    Weight:  69.5 kg (153 lb 4.8 oz)     Height:         Afeb, slightly HTN, recheck 158/66, OVSS. O2 sats 98% on RA. Denies SOB. LS clear. Denies pain, decline pain medication. Reports gas pain, decline simethicone. Up w/ SBA. Ileal conduit patent w/ adequate UOP. JOSE E w/ serosanguinous OP, dressing changed x1 for leaking. Abd incision staples, SANNA, CDI. PNT R capped. Reg diet, tolerating clears so far, denies N/V. + BS, - gas, attempted to have a BM but was unsuccessful. PIV w/ NS @ 100 mL/h & Kogenate @ 0.45 mL/h. Able to make needs known. Continue w/ POC.

## 2018-08-03 NOTE — PROGRESS NOTES
Urology  Progress Note    No acute events overnight   Pain well controlled  Tolerating current diet   Ambulating   Passing flatus, no BM  Pelvic drain removed    Exam  /66 (BP Location: Left arm)  Pulse 103  Temp 97.9  F (36.6  C) (Oral)  Resp 20  Ht 1.829 m (6')  Wt 69.5 kg (153 lb 4.8 oz)  SpO2 98%  BMI 20.79 kg/m2  No acute distress  Unlabored breathing  Abdomen soft, nontender, nondistended. Incisions covered with minimally saturated dresings. Stoma pink and well perfused, 2 stents and RRC in place.  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 Hx severe hemophilia A.     Neuro: tylenol, dilaudid, oxycodone for pain control  CV: HDS. Home amlodipine, lisinopril.  Pulm: incentive spirometry while awake  FEN/GI: regular diet, MIVF @ 100/hr. Entereg. Suppository today.  Endo: ARDEN  : R PNT capped. Monitor urine output.  Heme: Hgb stable. On Kogenate (antihemophilic factor). Heme following, 8/2 recs:  1. Continue with Kogenate IV continuous infusion while hospitalized. Will adjust dose as necessary to keep his Factor VIII level at around %.   2. Cleared from a hemophilia standpoint to be discharged. Preferably that he is to be discharged home as coverage for Factor (Kogenate) infusions can be an issue if he is to go to a rehab facility or TCU.   3. Patient can do self home venipuncture Kogenate infusions. When he is discharged to home, he is instructed to do self home Kogenate infusion of around 3000 Units IV Q 24 hours until 8/13/2018 (total of 14 days post op). Then he can return to his routine prophylactic Factor VIII replacement therapy as he was doing prior to his surgery. We will arrange for a shipment of his Kogenate to his home when he is ready to be discharged home.   4. Return to clinic to see us for his  radhaue hemophilia comprehensive clinic visit in 1-2 months. We will arrange and schedule him for this visit.    ID: afebrile, no leukocystosis. IV cipro.    Activity: up ad mely  PPx: SCDs  Dispo: 7B. PT rec's home with aide.     Seen and examined with the chief resident. Will discuss with Dr. Shea.    Judi Paula MD  Urology PGY-2       Contacting the Urology Team     Please use the following job codes to reach the Urology Team. Note that you must use an in house phone and that job codes cannot receive text pages.     On weekdays, dial [phone] (or star-star-star 777 on the new [employer] telephones) then 0817 to reach the Adult Urology resident or PA on call    On weekdays, dial 893 (or star-star-star 777 on the new [employer] telephones) then 0818 to reach the Pediatric Urology resident    On weeknights and weekends, dial 893 (or star-star-star 777 on the new [employer] telephones) then 0039 to reach the Urology resident on call (for both Adult and Pediatrics)

## 2018-08-03 NOTE — PLAN OF CARE
Problem: Patient Care Overview  Goal: Plan of Care/Patient Progress Review  Discharge Planner PT 7B  Patient plan for discharge: home, would like FWW  Current status: pt completes supine > sit transfer with SBA and HOB elevated ~ 15 degrees; requires CGA-Min A to complete sit > supine with log roll technique. Sit <> stand transfers with CGA up to IV pole for support. Ambulates x 200' with IV pole and CGA; slowed gait speed, appears unsteady at times however declines use of FWW today.   Barriers to return to prior living situation: balance, functional mobility status, assist at home  Recommendations for discharge: TCU vs home with HH PT/OT  Rationale for recommendations: pt would benefit from TCU stay to improve functional mobility and safety with independent mobility however with continued progress while IP, pt may be safe for discharge home with HH PT/OT; will need FWW for discharge.        Entered by: Michael Caba 08/03/2018 12:03 PM

## 2018-08-04 ENCOUNTER — APPOINTMENT (OUTPATIENT)
Dept: PHYSICAL THERAPY | Facility: CLINIC | Age: 68
DRG: 653 | End: 2018-08-04
Attending: UROLOGY
Payer: MEDICARE

## 2018-08-04 ENCOUNTER — APPOINTMENT (OUTPATIENT)
Dept: OCCUPATIONAL THERAPY | Facility: CLINIC | Age: 68
DRG: 653 | End: 2018-08-04
Attending: UROLOGY
Payer: MEDICARE

## 2018-08-04 LAB
ANION GAP SERPL CALCULATED.3IONS-SCNC: 10 MMOL/L (ref 3–14)
BUN SERPL-MCNC: 16 MG/DL (ref 7–30)
CALCIUM SERPL-MCNC: 8.4 MG/DL (ref 8.5–10.1)
CHLORIDE SERPL-SCNC: 111 MMOL/L (ref 94–109)
CO2 SERPL-SCNC: 20 MMOL/L (ref 20–32)
CREAT SERPL-MCNC: 0.84 MG/DL (ref 0.66–1.25)
ERYTHROCYTE [DISTWIDTH] IN BLOOD BY AUTOMATED COUNT: 14.8 % (ref 10–15)
FACT VIII ACT/NOR PPP: 99 % (ref 55–200)
GFR SERPL CREATININE-BSD FRML MDRD: >90 ML/MIN/1.7M2
GLUCOSE SERPL-MCNC: 105 MG/DL (ref 70–99)
HCT VFR BLD AUTO: 26.3 % (ref 40–53)
HGB BLD-MCNC: 8.2 G/DL (ref 13.3–17.7)
LACTATE BLD-SCNC: 0.9 MMOL/L (ref 0.7–2)
MCH RBC QN AUTO: 28.7 PG (ref 26.5–33)
MCHC RBC AUTO-ENTMCNC: 31.2 G/DL (ref 31.5–36.5)
MCV RBC AUTO: 92 FL (ref 78–100)
PLATELET # BLD AUTO: 197 10E9/L (ref 150–450)
POTASSIUM SERPL-SCNC: 3.7 MMOL/L (ref 3.4–5.3)
RBC # BLD AUTO: 2.86 10E12/L (ref 4.4–5.9)
SODIUM SERPL-SCNC: 140 MMOL/L (ref 133–144)
WBC # BLD AUTO: 7.7 10E9/L (ref 4–11)

## 2018-08-04 PROCEDURE — 83605 ASSAY OF LACTIC ACID: CPT | Performed by: UROLOGY

## 2018-08-04 PROCEDURE — 97530 THERAPEUTIC ACTIVITIES: CPT | Mod: GP | Performed by: REHABILITATION PRACTITIONER

## 2018-08-04 PROCEDURE — 85240 CLOT FACTOR VIII AHG 1 STAGE: CPT | Performed by: STUDENT IN AN ORGANIZED HEALTH CARE EDUCATION/TRAINING PROGRAM

## 2018-08-04 PROCEDURE — 25000132 ZZH RX MED GY IP 250 OP 250 PS 637: Mod: GY | Performed by: UROLOGY

## 2018-08-04 PROCEDURE — 85027 COMPLETE CBC AUTOMATED: CPT | Performed by: STUDENT IN AN ORGANIZED HEALTH CARE EDUCATION/TRAINING PROGRAM

## 2018-08-04 PROCEDURE — A9270 NON-COVERED ITEM OR SERVICE: HCPCS | Mod: GY | Performed by: STUDENT IN AN ORGANIZED HEALTH CARE EDUCATION/TRAINING PROGRAM

## 2018-08-04 PROCEDURE — A9270 NON-COVERED ITEM OR SERVICE: HCPCS | Mod: GY | Performed by: UROLOGY

## 2018-08-04 PROCEDURE — 40000193 ZZH STATISTIC PT WARD VISIT: Performed by: REHABILITATION PRACTITIONER

## 2018-08-04 PROCEDURE — 25000132 ZZH RX MED GY IP 250 OP 250 PS 637: Mod: GY | Performed by: STUDENT IN AN ORGANIZED HEALTH CARE EDUCATION/TRAINING PROGRAM

## 2018-08-04 PROCEDURE — 97535 SELF CARE MNGMENT TRAINING: CPT | Mod: GO | Performed by: OCCUPATIONAL THERAPIST

## 2018-08-04 PROCEDURE — 36415 COLL VENOUS BLD VENIPUNCTURE: CPT | Performed by: UROLOGY

## 2018-08-04 PROCEDURE — 40000133 ZZH STATISTIC OT WARD VISIT: Performed by: OCCUPATIONAL THERAPIST

## 2018-08-04 PROCEDURE — 25000555 ZZHC RX FACTOR IP 250 OP 636: Performed by: UROLOGY

## 2018-08-04 PROCEDURE — 80048 BASIC METABOLIC PNL TOTAL CA: CPT | Performed by: STUDENT IN AN ORGANIZED HEALTH CARE EDUCATION/TRAINING PROGRAM

## 2018-08-04 PROCEDURE — 12000008 ZZH R&B INTERMEDIATE UMMC

## 2018-08-04 PROCEDURE — A9270 NON-COVERED ITEM OR SERVICE: HCPCS | Mod: GY | Performed by: PHYSICIAN ASSISTANT

## 2018-08-04 PROCEDURE — 97116 GAIT TRAINING THERAPY: CPT | Mod: GP | Performed by: REHABILITATION PRACTITIONER

## 2018-08-04 PROCEDURE — 36415 COLL VENOUS BLD VENIPUNCTURE: CPT | Performed by: STUDENT IN AN ORGANIZED HEALTH CARE EDUCATION/TRAINING PROGRAM

## 2018-08-04 PROCEDURE — 97110 THERAPEUTIC EXERCISES: CPT | Mod: GP | Performed by: REHABILITATION PRACTITIONER

## 2018-08-04 PROCEDURE — 25000132 ZZH RX MED GY IP 250 OP 250 PS 637: Mod: GY | Performed by: PHYSICIAN ASSISTANT

## 2018-08-04 RX ORDER — CIPROFLOXACIN 500 MG/1
500 TABLET, FILM COATED ORAL ONCE
Status: DISCONTINUED | OUTPATIENT
Start: 2018-08-04 | End: 2018-08-04

## 2018-08-04 RX ADMIN — ACETAMINOPHEN 650 MG: 325 TABLET, FILM COATED ORAL at 19:48

## 2018-08-04 RX ADMIN — OXYCODONE HYDROCHLORIDE 5 MG: 5 TABLET ORAL at 02:12

## 2018-08-04 RX ADMIN — AMLODIPINE BESYLATE 5 MG: 5 TABLET ORAL at 08:56

## 2018-08-04 RX ADMIN — MIRTAZAPINE 45 MG: 45 TABLET, FILM COATED ORAL at 22:06

## 2018-08-04 RX ADMIN — CIPROFLOXACIN HYDROCHLORIDE 500 MG: 500 TABLET, FILM COATED ORAL at 08:56

## 2018-08-04 RX ADMIN — POLYETHYLENE GLYCOL 3350 17 G: 17 POWDER, FOR SOLUTION ORAL at 08:56

## 2018-08-04 RX ADMIN — OXYCODONE HYDROCHLORIDE 5 MG: 5 TABLET ORAL at 19:48

## 2018-08-04 RX ADMIN — CIPROFLOXACIN HYDROCHLORIDE 500 MG: 500 TABLET, FILM COATED ORAL at 19:48

## 2018-08-04 RX ADMIN — ANTIHEMOPHILIC FACTOR (RECOMBINANT) 4448 UNITS: KIT at 10:04

## 2018-08-04 ASSESSMENT — ACTIVITIES OF DAILY LIVING (ADL)
ADLS_ACUITY_SCORE: 9

## 2018-08-04 NOTE — PLAN OF CARE
Problem: Patient Care Overview  Goal: Plan of Care/Patient Progress Review  Discharge Planner PT 7B  Patient plan for discharge: home, would like FWW  Current status: pt completes supine <> sit transfer with SBA, significant extra time and mod verbal cues for log roll technique - Please encourage pt to practice good technique every time getting up today.. Sit <> stand transfers with CGA, FWW, very reliant on UEr support. Ambulates x 200'+ 100 ft  with FWW and close SBA, CGA for no AD x 100 ft with slowed gait speed, appears unsteady at times without FWW but no LOB.  Educated that outpatient PT to determine when pt safe to transition away from FWW, rather than just stopping it when feels like it.  Challenged by LE standing ex - issued written instructions for HEP  Barriers to return to prior living situation: balance, functional mobility status, no assist at home  Recommendations for discharge:  Anticipate home with outpatient PT and use off FWW at all times.  Ideally would rec TCU (declines due to $$ factor & reimbursement issues) vs home with HH PT/OT (declines home health),   Rationale for recommendations: to improve functional mobility and safety with independent mobility, will need FWW for discharge (it's entered on disch orders for adult FWW)

## 2018-08-04 NOTE — PLAN OF CARE
Problem: Patient Care Overview  Goal: Plan of Care/Patient Progress Review  Discharge Planner OT   Patient plan for discharge: home with OP PT; Pt declining home health or TCU  Current status: Pt able to recall log rolling technique for bed mobility, completes supine<>EOB with SBA and VCs. While seated EOB, pt demonstrates LB dressing techniques with use of AE and SBA. Educated in shower techniques and benefits of shower chair to increase ease with LB bathing and prevent falls. Pt declining use of shower chair for home. Pt reports no concern for discharge home and indep completion of ADLs.   Barriers to return to prior living situation: No assist at home/pt lives alone, balance, falls risk, post op precautions  Recommendations for discharge: Pt declining TCU due to financial restrictions; Would recommend HH OT/PT to ensure safety in home environment with functional mobility and ADLs. Pt likely to decline these services.   Rationale for recommendations: See above.        Entered by: Belia Trejo 08/04/2018 12:51 PM

## 2018-08-04 NOTE — PLAN OF CARE
Problem: Patient Care Overview  Goal: Plan of Care/Patient Progress Review  Outcome: No Change  B/P: 125/75, T: 97.9, P: 104, R: 18. C/O pain, 5mg oxycodone given with relief. Sepsis protocol triggered. Lactic was drawn and vitals taken. Lactic 0.9  Urostomy with adequate output. Old JOSE E site changed. PNT dressing CDI. MIV at TKO for a Kogenate FS drip at 0.34ml/hr. Appeared to sleep in between cares. Continue monitor and notify MD with any significant changes.

## 2018-08-04 NOTE — PLAN OF CARE
Problem: Patient Care Overview  Goal: Plan of Care/Patient Progress Review  Vitals:    08/04/18 0845 08/04/18 0944 08/04/18 1220 08/04/18 1553   BP: 124/74 119/80 110/69 121/78   BP Location: Left arm Left arm Left arm Left arm   Pulse: 98  103 105   Resp: 18 18 18   Temp: 97.8  F (36.6  C)  98.3  F (36.8  C) 95.6  F (35.3  C)   TempSrc: Oral  Oral Oral   SpO2: 97% 96% 99% 97%   Weight:       Height:       afebrile tachycardic, BP stable, sating 97% on room air, lungs clear, +BS, passing gas, voiding adequate,  Right side old JOSE E site intact, urostomy with two stent intact and patent, red joyce removed, right PNT capped,  Up and ambulated, tolerating intake. IV TKO, factor gtt at 0.34 ml/hr, new syringe changed this AM  Continue with plan of care,

## 2018-08-04 NOTE — PROGRESS NOTES
Urology  Progress Note    No acute events overnight   Pain well controlled  Tolerating regular diet  Had BM and passed bowel movement yesterday     Exam  /74 (BP Location: Left arm)  Pulse 98  Temp 97.8  F (36.6  C) (Oral)  Resp 18  Ht 1.829 m (6')  Wt 69.5 kg (153 lb 4.8 oz)  SpO2 97%  BMI 20.79 kg/m2  No acute distress  Unlabored breathing  Abdomen soft, nontender, nondistended. Incisions covered with minimally saturated dresings. Stoma pink and well perfused, 2 stents and RRC in place.  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 Hx severe hemophilia A.     Neuro: tylenol, dilaudid, oxycodone for pain control  CV: HDS. Home amlodipine, lisinopril.  Pulm: incentive spirometry while awake  FEN/GI: regular diet, MIVF stopped. Entereg stopped.   Endo: ARDEN  : R PNT capped. Monitor urine output.  Heme: Hgb stable. On Kogenate (antihemophilic factor). Heme following, 8/2 recs:  1. Continue with Kogenate IV continuous infusion while hospitalized. Will adjust dose as necessary to keep his Factor VIII level at around %.   2. Cleared from a hemophilia standpoint to be discharged. Preferably that he is to be discharged home as coverage for Factor (Kogenate) infusions can be an issue if he is to go to a rehab facility or TCU.   3. Patient can do self home venipuncture Kogenate infusions. When he is discharged to home, he is instructed to do self home Kogenate infusion of around 3000 Units IV Q 24 hours until 8/13/2018 (total of 14 days post op). Then he can return to his routine prophylactic Factor VIII replacement therapy as he was doing prior to his surgery. We will arrange for a shipment of his Kogenate to his home when he is ready to be discharged home.   4. Return to clinic to see us for his overdue hemophilia comprehensive clinic  visit in 1-2 months. We will arrange and schedule him for this visit.    ID: afebrile, no leukocystosis. IV cipro.    Activity: up ad mely  PPx: SCDs  Dispo: 7B. PT rec's home with aide.     Seen and examined with the chief resident with Dr Montero. Will discuss with Dr. Shea.  Debra Diggs MD  Urology PGY4         Contacting the Urology Team     Please use the following job codes to reach the Urology Team. Note that you must use an in house phone and that job codes cannot receive text pages.     On weekdays, dial [phone] (or star-star-star 777 on the new [employer] telephones) then 0817 to reach the Adult Urology resident or PA on call    On weekdays, dial 893 (or star-star-star 777 on the new [employer] telephones) then 0818 to reach the Pediatric Urology resident    On weeknights and weekends, dial 893 (or star-star-star 777 on the new [employer] telephones) then 0039 to reach the Urology resident on call (for both Adult and Pediatrics)

## 2018-08-04 NOTE — PLAN OF CARE
Problem: Patient Care Overview  Goal: Plan of Care/Patient Progress Review  Outcome: Improving  /73 (BP Location: Left arm)  Pulse 103  Temp 95.9  F (35.5  C) (Oral)  Resp 20  Ht 1.829 m (6')  Wt 69.5 kg (153 lb 4.8 oz)  SpO2 100%  BMI 20.79 kg/m2     Neuro: A& O X 4  Cardiac: VSS  Respiratory: lung sounds clear bilaterally, 100% on RA  GI/: 3 BM this shift, AUOP from ostomy  Diet/Appetite: tolerating regular diet, no n/v   Skin: incision CDI with staples, neph tube dressing CDI, j tube site dressing changed X 1 d/t copious drainage  Activity: ambulated to restroom with stand by assist  Pain: c/o pain X 1 and oxy given with relief  Plan: adequate pain control, progress towards baseline

## 2018-08-05 LAB
ANION GAP SERPL CALCULATED.3IONS-SCNC: 9 MMOL/L (ref 3–14)
BUN SERPL-MCNC: 23 MG/DL (ref 7–30)
CALCIUM SERPL-MCNC: 8.4 MG/DL (ref 8.5–10.1)
CHLORIDE SERPL-SCNC: 112 MMOL/L (ref 94–109)
CO2 SERPL-SCNC: 21 MMOL/L (ref 20–32)
CREAT SERPL-MCNC: 0.83 MG/DL (ref 0.66–1.25)
ERYTHROCYTE [DISTWIDTH] IN BLOOD BY AUTOMATED COUNT: 15 % (ref 10–15)
GFR SERPL CREATININE-BSD FRML MDRD: >90 ML/MIN/1.7M2
GLUCOSE SERPL-MCNC: 96 MG/DL (ref 70–99)
HCT VFR BLD AUTO: 27.3 % (ref 40–53)
HGB BLD-MCNC: 8.6 G/DL (ref 13.3–17.7)
MCH RBC QN AUTO: 29 PG (ref 26.5–33)
MCHC RBC AUTO-ENTMCNC: 31.5 G/DL (ref 31.5–36.5)
MCV RBC AUTO: 92 FL (ref 78–100)
PLATELET # BLD AUTO: 224 10E9/L (ref 150–450)
POTASSIUM SERPL-SCNC: 3.8 MMOL/L (ref 3.4–5.3)
RBC # BLD AUTO: 2.97 10E12/L (ref 4.4–5.9)
SODIUM SERPL-SCNC: 142 MMOL/L (ref 133–144)
WBC # BLD AUTO: 7.8 10E9/L (ref 4–11)

## 2018-08-05 PROCEDURE — A9270 NON-COVERED ITEM OR SERVICE: HCPCS | Mod: GY | Performed by: STUDENT IN AN ORGANIZED HEALTH CARE EDUCATION/TRAINING PROGRAM

## 2018-08-05 PROCEDURE — 25000132 ZZH RX MED GY IP 250 OP 250 PS 637: Mod: GY | Performed by: STUDENT IN AN ORGANIZED HEALTH CARE EDUCATION/TRAINING PROGRAM

## 2018-08-05 PROCEDURE — A9270 NON-COVERED ITEM OR SERVICE: HCPCS | Mod: GY | Performed by: PHYSICIAN ASSISTANT

## 2018-08-05 PROCEDURE — 25000555 ZZHC RX FACTOR IP 250 OP 636: Performed by: UROLOGY

## 2018-08-05 PROCEDURE — 85027 COMPLETE CBC AUTOMATED: CPT | Performed by: STUDENT IN AN ORGANIZED HEALTH CARE EDUCATION/TRAINING PROGRAM

## 2018-08-05 PROCEDURE — 80048 BASIC METABOLIC PNL TOTAL CA: CPT | Performed by: STUDENT IN AN ORGANIZED HEALTH CARE EDUCATION/TRAINING PROGRAM

## 2018-08-05 PROCEDURE — 12000008 ZZH R&B INTERMEDIATE UMMC

## 2018-08-05 PROCEDURE — 25000132 ZZH RX MED GY IP 250 OP 250 PS 637: Mod: GY | Performed by: PHYSICIAN ASSISTANT

## 2018-08-05 PROCEDURE — 36415 COLL VENOUS BLD VENIPUNCTURE: CPT | Performed by: STUDENT IN AN ORGANIZED HEALTH CARE EDUCATION/TRAINING PROGRAM

## 2018-08-05 RX ADMIN — MIRTAZAPINE 45 MG: 45 TABLET, FILM COATED ORAL at 22:58

## 2018-08-05 RX ADMIN — ANTIHEMOPHILIC FACTOR (RECOMBINANT) 4448 UNITS: KIT at 14:38

## 2018-08-05 RX ADMIN — AMLODIPINE BESYLATE 5 MG: 5 TABLET ORAL at 08:05

## 2018-08-05 RX ADMIN — POLYETHYLENE GLYCOL 3350 17 G: 17 POWDER, FOR SOLUTION ORAL at 08:06

## 2018-08-05 ASSESSMENT — ACTIVITIES OF DAILY LIVING (ADL)
ADLS_ACUITY_SCORE: 9

## 2018-08-05 NOTE — PROGRESS NOTES
Urology  Progress Note    No acute events overnight   Pain well controlled  Tolerating regular diet  Having BM and passing gas   Ambulating     Exam  /73 (BP Location: Left arm)  Pulse 80  Temp 96.3  F (35.7  C) (Oral)  Resp 18  Ht 1.829 m (6')  Wt 69.7 kg (153 lb 11.2 oz)  SpO2 100%  BMI 20.85 kg/m2  No acute distress  Unlabored breathing  Abdomen soft, nontender, nondistended. Incisions covered with minimally saturated dresings. 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 Hx severe hemophilia A.     Neuro: tylenol, dilaudid, oxycodone for pain control  CV: HDS. Home amlodipine, lisinopril.  Pulm: incentive spirometry while awake  FEN/GI: regular diet, MIVF stopped. Entereg stopped.   Endo: ARDEN  : R PNT capped. Remove second stent today, Monitor urine output.  Heme: Hgb stable. On Kogenate (antihemophilic factor). Heme following, 8/2 recs:  1. Continue with Kogenate IV continuous infusion while hospitalized. Will adjust dose as necessary to keep his Factor VIII level at around %.   2. Cleared from a hemophilia standpoint to be discharged. Preferably that he is to be discharged home as coverage for Factor (Kogenate) infusions can be an issue if he is to go to a rehab facility or TCU.   3. Patient can do self home venipuncture Kogenate infusions. When he is discharged to home, he is instructed to do self home Kogenate infusion of around 3000 Units IV Q 24 hours until 8/13/2018 (total of 14 days post op). Then he can return to his routine prophylactic Factor VIII replacement therapy as he was doing prior to his surgery. We will arrange for a shipment of his Kogenate to his home when he is ready to be discharged home.   4. Return to clinic to see us for his  overdue hemophilia comprehensive clinic visit in 1-2 months. We will arrange and schedule him for this visit.    ID: afebrile, no leukocystosis. PO cipro.    Activity: up ad mely  PPx: SCDs  Dispo: 7B. PT rec's home with aide, sofiaoible Dc tomorrow      Seen and examined with the chief resident with Dr Montero. Will discuss with Dr. Shea.  Debra Diggs MD  Urology PGY4         Contacting the Urology Team     Please use the following job codes to reach the Urology Team. Note that you must use an in house phone and that job codes cannot receive text pages.     On weekdays, dial [phone] (or star-star-star 777 on the new [email or web address] telephones) then 0817 to reach the Adult Urology resident or PA on call    On weekdays, dial 893 (or star-star-star 777 on the new [employer] telephones) then 0818 to reach the Pediatric Urology resident    On weeknights and weekends, dial 893 (or star-star-star 777 on the new [email or web address] telephones) then 0039 to reach the Urology resident on call (for both Adult and Pediatrics)

## 2018-08-05 NOTE — PLAN OF CARE
Problem: Patient Care Overview  Goal: Plan of Care/Patient Progress Review  Vitals:    08/04/18 1220 08/04/18 1553 08/04/18 1904 08/04/18 2344   BP: 110/69 121/78  116/75   BP Location: Left arm Left arm  Right arm   Pulse: 103 105  82   Resp: 18 18 18   Temp: 98.3  F (36.8  C) 95.6  F (35.3  C)  97.2  F (36.2  C)   TempSrc: Oral Oral  Oral   SpO2: 99% 97%  99%   Weight:   69.7 kg (153 lb 11.2 oz)    Height:         Avss. Denies pain and sound asleep all rounds. Factor gtt infusing at 0.34 mh/hr due for change after 6 am. R ileal conduit with one stent patent with mod pink output. Ls cl, + gas, + bs, LMB 8/4/18. Will cont with poc.

## 2018-08-05 NOTE — PLAN OF CARE
Problem: Patient Care Overview  Goal: Plan of Care/Patient Progress Review  7B PT - cancel, pt declined this AM due to needing dressing change, unsure of disch date.  Checked back with RN in PM who confirms plan switched to likely disch home tomorrow.

## 2018-08-05 NOTE — PROGRESS NOTES
Care Coordinator - Discharge Planning    Admission Date/Time:  7/30/2018  Attending MD:  Bebeto Shea MD     Data  Date of initial CC assessment:  Previous note  Chart reviewed, discussed with interdisciplinary team.   Patient was admitted for:   1. Urinary tract infection without hematuria, site unspecified    2. Severe hemophilia A (H)    3. Malignant neoplasm of urinary bladder, unspecified site (H)    4. Malignant neoplasm of overlapping sites of bladder (H)    5. Depression, unspecified depression type    6. Hypertension goal BP (blood pressure) < 140/90         Assessment   Full assessment completed in previous note    Coordination of Care and Referrals: Provided patient/family with options for Home Care.  This RNCC met with patient at bedside to discuss discharge planning, patient sates he will accept home care but  Refuses PT/OT. Patient states he is moving well and is able to continue at home with exercises provided while inpatient.  Reviewed options for home care providers, patient states he has not have a preference and gave permission to send referral to [facility]. Patient states that he took a cab to the hospital but will medi cab for discharge to home. Patient denied any other significant concerns related to discharge to home and feels confidence that he can manage his care.    Referral sent to [facility]      Plan  Anticipated Discharge Date:  8/6/18  Anticipated Discharge Plan:  Home with [facility]    CTS Handoff completed:  NO    Louann Brown RN-weekend coverage

## 2018-08-05 NOTE — PLAN OF CARE
Problem: Patient Care Overview  Goal: Plan of Care/Patient Progress Review  Outcome: Improving  [id number]. HR tachy. Prn oxycodone for lower abd pain. Old JOSE E site leaking, dressing changed x1. Midline with staples. IC with macarena urine, 1 stent in place. Factor gtt at 0.34. R PNT capped. Good appetite. Hopeful for discharge soon.

## 2018-08-06 VITALS
HEIGHT: 72 IN | BODY MASS INDEX: 20.82 KG/M2 | TEMPERATURE: 96.4 F | SYSTOLIC BLOOD PRESSURE: 128 MMHG | DIASTOLIC BLOOD PRESSURE: 80 MMHG | OXYGEN SATURATION: 97 % | WEIGHT: 153.7 LBS | HEART RATE: 92 BPM | RESPIRATION RATE: 18 BRPM

## 2018-08-06 LAB
ANION GAP SERPL CALCULATED.3IONS-SCNC: 9 MMOL/L (ref 3–14)
BUN SERPL-MCNC: 24 MG/DL (ref 7–30)
CALCIUM SERPL-MCNC: 8.7 MG/DL (ref 8.5–10.1)
CHLORIDE SERPL-SCNC: 112 MMOL/L (ref 94–109)
CO2 SERPL-SCNC: 21 MMOL/L (ref 20–32)
CREAT SERPL-MCNC: 0.8 MG/DL (ref 0.66–1.25)
ERYTHROCYTE [DISTWIDTH] IN BLOOD BY AUTOMATED COUNT: 14.9 % (ref 10–15)
GFR SERPL CREATININE-BSD FRML MDRD: >90 ML/MIN/1.7M2
GLUCOSE SERPL-MCNC: 106 MG/DL (ref 70–99)
HCT VFR BLD AUTO: 29.2 % (ref 40–53)
HGB BLD-MCNC: 9.2 G/DL (ref 13.3–17.7)
MCH RBC QN AUTO: 28.8 PG (ref 26.5–33)
MCHC RBC AUTO-ENTMCNC: 31.5 G/DL (ref 31.5–36.5)
MCV RBC AUTO: 92 FL (ref 78–100)
PLATELET # BLD AUTO: 258 10E9/L (ref 150–450)
POTASSIUM SERPL-SCNC: 3.8 MMOL/L (ref 3.4–5.3)
RBC # BLD AUTO: 3.19 10E12/L (ref 4.4–5.9)
SODIUM SERPL-SCNC: 141 MMOL/L (ref 133–144)
WBC # BLD AUTO: 8 10E9/L (ref 4–11)

## 2018-08-06 PROCEDURE — 36415 COLL VENOUS BLD VENIPUNCTURE: CPT | Performed by: STUDENT IN AN ORGANIZED HEALTH CARE EDUCATION/TRAINING PROGRAM

## 2018-08-06 PROCEDURE — 80048 BASIC METABOLIC PNL TOTAL CA: CPT | Performed by: STUDENT IN AN ORGANIZED HEALTH CARE EDUCATION/TRAINING PROGRAM

## 2018-08-06 PROCEDURE — 25000132 ZZH RX MED GY IP 250 OP 250 PS 637: Mod: GY | Performed by: STUDENT IN AN ORGANIZED HEALTH CARE EDUCATION/TRAINING PROGRAM

## 2018-08-06 PROCEDURE — A9270 NON-COVERED ITEM OR SERVICE: HCPCS | Mod: GY | Performed by: PHYSICIAN ASSISTANT

## 2018-08-06 PROCEDURE — 85027 COMPLETE CBC AUTOMATED: CPT | Performed by: STUDENT IN AN ORGANIZED HEALTH CARE EDUCATION/TRAINING PROGRAM

## 2018-08-06 PROCEDURE — 40000903 ZZH STATISTIC WOC PT EDUCATION, 31-45 MIN

## 2018-08-06 PROCEDURE — 25000132 ZZH RX MED GY IP 250 OP 250 PS 637: Mod: GY | Performed by: UROLOGY

## 2018-08-06 PROCEDURE — A9270 NON-COVERED ITEM OR SERVICE: HCPCS | Mod: GY | Performed by: UROLOGY

## 2018-08-06 PROCEDURE — A9270 NON-COVERED ITEM OR SERVICE: HCPCS | Mod: GY | Performed by: STUDENT IN AN ORGANIZED HEALTH CARE EDUCATION/TRAINING PROGRAM

## 2018-08-06 PROCEDURE — 40000893 ZZH STATISTIC PT IP EVAL DEFER

## 2018-08-06 PROCEDURE — 25000132 ZZH RX MED GY IP 250 OP 250 PS 637: Mod: GY | Performed by: PHYSICIAN ASSISTANT

## 2018-08-06 RX ORDER — ACETAMINOPHEN 325 MG/1
650 TABLET ORAL EVERY 4 HOURS PRN
Qty: 100 TABLET | Refills: 1 | Status: SHIPPED | OUTPATIENT
Start: 2018-08-06 | End: 2023-03-13

## 2018-08-06 RX ORDER — POLYETHYLENE GLYCOL 3350 17 G/17G
17 POWDER, FOR SOLUTION ORAL DAILY
Qty: 30 PACKET | Refills: 1 | Status: SHIPPED | OUTPATIENT
Start: 2018-08-06 | End: 2019-07-23

## 2018-08-06 RX ORDER — LISINOPRIL 40 MG/1
40 TABLET ORAL EVERY MORNING
Qty: 30 TABLET | Refills: 0 | COMMUNITY
Start: 2018-08-06 | End: 2019-04-22

## 2018-08-06 RX ORDER — OXYCODONE HYDROCHLORIDE 5 MG/1
5 TABLET ORAL EVERY 4 HOURS PRN
Qty: 12 TABLET | Refills: 0 | Status: SHIPPED | OUTPATIENT
Start: 2018-08-06 | End: 2018-09-11

## 2018-08-06 RX ADMIN — AMLODIPINE BESYLATE 5 MG: 5 TABLET ORAL at 07:41

## 2018-08-06 RX ADMIN — POLYETHYLENE GLYCOL 3350 17 G: 17 POWDER, FOR SOLUTION ORAL at 07:41

## 2018-08-06 RX ADMIN — OXYCODONE HYDROCHLORIDE 5 MG: 5 TABLET ORAL at 06:38

## 2018-08-06 RX ADMIN — ACETAMINOPHEN 650 MG: 325 TABLET, FILM COATED ORAL at 06:38

## 2018-08-06 ASSESSMENT — ACTIVITIES OF DAILY LIVING (ADL)
ADLS_ACUITY_SCORE: 9

## 2018-08-06 NOTE — PLAN OF CARE
Problem: Patient Care Overview  Goal: Plan of Care/Patient Progress Review  Occupational Therapy Discharge Summary    Reason for therapy discharge:    Discharged to home.    Progress towards therapy goal(s). See goals on Care Plan in [facility] electronic health record for goal details.  Goals partially met.  Barriers to achieving goals:   discharge from facility.    Therapy recommendation(s):    Continued therapy is recommended.  Rationale/Recommendations:  recommended home therapy, pt declined. Would recommend assist PRN for increased safety..

## 2018-08-06 NOTE — PLAN OF CARE
Problem: Patient Care Overview  Goal: Plan of Care/Patient Progress Review  Vitals:    08/04/18 1904 08/04/18 2344 08/05/18 0823 08/05/18 1527   BP:  116/75 131/73 109/70   BP Location:  Right arm Left arm Left arm   Pulse:  82 80 92   Resp:  18 18 18   Temp:  97.2  F (36.2  C) 96.3  F (35.7  C) 98  F (36.7  C)   TempSrc:  Oral Oral Oral   SpO2:  99% 100% 99%   Weight: 69.7 kg (153 lb 11.2 oz)      Height:       afebrile vital stable slight tachycardic, belly soft, PNT intact and capped, urostomy with one stent adequate.  Denies pain, abdominal staple intact, left old JOSE E site was leaking, new pouch placed.  Tolerating intake, discharge tomorrow, supply provided, continue with plan of care.

## 2018-08-06 NOTE — PLAN OF CARE
Problem: Patient Care Overview  Goal: Individualization & Mutuality  Outcome: No Change  Vitals:    08/05/18 0823 08/05/18 1527 08/05/18 2013 08/05/18 2218   BP: 131/73 109/70 124/71 111/73   BP Location: Left arm Left arm Left arm Left arm   Pulse: 80 92 87 92   Resp: 18 18 18 18   Temp: 96.3  F (35.7  C) 98  F (36.7  C) 96.8  F (36  C) 98.4  F (36.9  C)   TempSrc: Oral Oral Oral Oral   SpO2: 100% 99% 99% 99%   Weight:       Height:         Per 12 hours, patient afebrile. VSS. O2 sats high 90s on RA. Lung sounds clear. Urostomy intact, pink stoma, no stents, has adequate macarena urine output. Midline incision stapled, SANNA, no drainage. Left JOSE E site pouched and intact, has moderate serosanguinous drainage. PRN oxycodone 5mg x1 and PRN Tylenol x1 for HA this AM administered, otherwise denies pain. Tolerating regular diet, denies nausea. Patient requests walker prior to discharge, will pass onto next shift nurse. Slept in between cares. Continue POC.

## 2018-08-06 NOTE — PROGRESS NOTES
[facility]  [address] [place] [address] [address] [address]  Main: [phone], Fax: [phone]    Patient seen at: [facility] [address]    Inpatient Visit Note:    Patient: Orlin Alcantar  MRN: [id number]  : 1950  MAR: 2018  Time: 10:40    Subjective:  POD #7. Denies any pain. No shortness of breath. No chest pain. Denies any bleeding issues. No fever. Has had BM. Ready to be discharged home today.    Currently on Kogenate at 150 Units/hour IV continuous infusion.    Objective:  Pleasant in no acute distress.  Vitals: /80 (BP Location: Left arm)  Pulse 92  Temp 96.4  F (35.8  C) (Oral)  Resp 18  Ht 1.829 m (6')  Wt 69.7 kg (153 lb 11.2 oz)  SpO2 97%  BMI 20.85 kg/m2  Exam:  Lungs: Clear to auscultation bilaterally.  Card: S1 S2. No murmurs rubs or gallops. RRR.  Abd: Non tender. Non distended. Positive bowel sound. Soft. Stoma and incision not examined today.   Ext: No swelling noted.    Labs:  Component      Latest Ref Rng & Units 2018   Sodium      133 - 144 mmol/L  141   Potassium      3.4 - 5.3 mmol/L  3.8   Chloride      94 - 109 mmol/L  112 (H)   Carbon Dioxide      20 - 32 mmol/L  21   Anion Gap      3 - 14 mmol/L  9   Glucose      70 - 99 mg/dL  106 (H)   Urea Nitrogen      7 - 30 mg/dL  24   Creatinine      0.66 - 1.25 mg/dL  0.80   GFR Estimate      >60 mL/min/1.7m2  >90   GFR Estimate If Black      >60 mL/min/1.7m2  >90   Calcium      8.5 - 10.1 mg/dL  8.7   WBC      4.0 - 11.0 10e9/L  8.0   RBC Count      4.4 - 5.9 10e12/L  3.19 (L)   Hemoglobin      13.3 - 17.7 g/dL  9.2 (L)   Hematocrit      40.0 - 53.0 %  29.2 (L)   MCV      78 - 100 fl  92   MCH      26.5 - 33.0 pg  28.8   MCHC      31.5 - 36.5 g/dL  31.5   RDW      10.0 - 15.0 %  14.9   Platelet Count      150 - 450 10e9/L  258   Factor 8 Assay      55 - 200 % 99      Assessment:  In summary, Orlin is a 68 year old male with a 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 Shea. Today is POD #7, doing well without any unexpected bleeding complications. Ready to be discharged home today.    Diagnosis:  1. Severe Hemophilia A.  2. Bladder cancer S/P Transurethral resection of bladder tumor in Feb 2018 and neoadjuvant chemotherapy.  3. S/P Radical cystoprostatectomy, ileal conduit and bilateral pelvic lymphadenectomy on 7/30/2018. Doing well without any unexpected bleeding complications.    Plan:  1. Cleared from our standpoint to be discharged home.  2. Discontinue continuous Kogenate infusion after current syringe. Just prior to the patient leaving the hospital today, give remainder of the Kogenate syringe as IV bolus at 10 mL/min. I have communicated this to IRON Mcmahan taking care of the patient today.  3. Patient is instructed to do daily self venipuncture Kogenate infusions at 3000 Units per dose (which is about 43 Units/kg) until 8/13/2018 (total of 14 days post operatively). Patient plans to call our clinic's pharmacy to arrange for Factor VIII (Kogenate) home delivery tomorrow (8/7/2018) once he gets home tonight. He has 2 doses of Kogenate at around 3000 Units per dose currently at home that he can utilize.   4. After his daily Kogenate infusion until 8/13/2018, he is instructed to return to his routine prophylactic Factor VIII replacement therapy. I have asked him to do three times weekly infusions with Kogenate at 3000 Units per dose after 8/13/2018. He is in agreement with this.  5. I reminded him to call our center to schedule an appointment for his overdue routine hemophilia comprehensive clinic visit in 1-2 months. Orlin said that he will do so after his  follow up appointment with Dr. Shea on 8/22/2018. He is also agreeable that one of our nurse clinician to call him in 1-2 weeks to check on him.       Clement Griffin PA-C, MPAS  Physician Assistant  [facility].

## 2018-08-06 NOTE — PLAN OF CARE
Problem: Patient Care Overview  Goal: Plan of Care/Patient Progress Review  PT7B: cancel- Pt does not participate in therapy this date due to waiting on RW for immediate discharge. Time spent coordinating RW for discharge for pt.    Physical Therapy Discharge Summary    Reason for therapy discharge:    Discharged to home with home therapy.    Progress towards therapy goal(s). See goals on Care Plan in [facility] electronic health record for goal details.  Goals not met.  Barriers to achieving goals:   limited tolerance for therapy and discharge from facility.    Therapy recommendation(s):    Continued therapy is recommended.  Rationale/Recommendations:   PT recomended to address residual strength, balance, and activity tolerance deficits as pt is not as baseline and would benefit from further skilled therapy to progress towards PLOF..

## 2018-08-06 NOTE — DISCHARGE SUMMARY
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      Bladder tumor      Depression      DM II (diabetes mellitus, type II), controlled (H)     diet controlled     Hemophilia (H)     Type A     History of hepatitis C     treated successfully     HTN (hypertension)              Discharge Diagnosis:   Malignant Neoplasm Of Overlapping Sites Of Bladder   Klebsiella urinary tract infection (prior to admission)    Past Medical History:   Diagnosis Date     Anxiety      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 Radical cystoprostatectomy.   2. Bilateral pelvic lymph node dissection and ileal conduit.   Dr. USAMA Shea (Urology)            Medications Prior to Admission:     Prescriptions Prior to Admission   Medication Sig Dispense Refill Last Dose     amLODIPine (NORVASC) 5 MG tablet Take 1 tablet (5 mg) by mouth daily (Patient taking differently: Take 5 mg by mouth every morning ) 90 tablet 3 2018 at 0300     diazepam (VALIUM) 5 MG tablet Take up to one tablet by mouth daily as needed for anxiety (Patient taking differently: Take 5 mg by mouth as needed Take up to one tablet by mouth daily as needed for anxiety) 30 tablet 5 Past Week at Unknown time     mirtazapine (REMERON) 45 MG tablet Take 1 tablet (45 mg) by mouth At Bedtime And  may take 1/2 tablet for insomnia up to 3 times a week. 37 tablet 5 Past Week at Unknown time     Antihemophilic Factor, Recomb, (KOGENATE FS) 1000 units KIT Infuse 3000 iu (-5/+10%) every other day (Patient taking differently: Infuse 3000 iu  Sun, Mon, Wed, Fri  Infuse 1000 units  Tue, Thur, Sat) 66100 each 1 7/28/2018     [DISCONTINUED] codeine 30 MG tablet Take 2 tablets (60 mg) by mouth every 6 hours as needed for moderate to severe pain (up to 8 tabs in 24 hours) (Patient taking differently: Take 60 mg by mouth as needed for moderate to severe pain (up to 8 tabs in 24 hours) ) 168 tablet 0 7/26/2018     [DISCONTINUED] dexamethasone (DECADRON) 4 MG tablet   0 More than a month at Unknown time     [DISCONTINUED] LORazepam (ATIVAN) 0.5 MG tablet   0 More than a month at Unknown time     [DISCONTINUED] ondansetron (ZOFRAN-ODT) 8 MG ODT tab   0 More than a month at Unknown time     [DISCONTINUED] prochlorperazine (COMPAZINE) 10 MG tablet   0 More than a month at Unknown time     [DISCONTINUED] tamsulosin (FLOMAX) 0.4 MG capsule   0 More than a month at Unknown time             Discharge Medications:     Current Discharge Medication List      START taking these medications    Details   order for DME Equipment being ordered: Walker Wheels () and Walker ()  Treatment Diagnosis: gait instability  Qty: 1 each, Refills: 0    Associated Diagnoses: Severe hemophilia A (H)      oxyCODONE IR (ROXICODONE) 5 MG tablet Take 1 tablet (5 mg) by mouth every 4 hours as needed for moderate to severe pain  Qty: 12 tablet, Refills: 0    Associated Diagnoses: Malignant neoplasm of urinary bladder, unspecified site (H)      polyethylene glycol (MIRALAX/GLYCOLAX) Packet Take 17 g by mouth daily (to prevent constipation)  Qty: 30 packet, Refills: 1    Associated Diagnoses: Malignant neoplasm of urinary bladder, unspecified site (H)         CONTINUE these medications which have CHANGED    Details   acetaminophen (TYLENOL) 325 MG  tablet Take 2 tablets (650 mg) by mouth every 4 hours as needed for mild pain or fever  Qty: 100 tablet, Refills: 1    Associated Diagnoses: Malignant neoplasm of urinary bladder, unspecified site (H)      lisinopril (PRINIVIL/ZESTRIL) 40 MG tablet Take 1 tablet (40 mg) by mouth every morning (HOLD UNTIL FOLLOW-UP with Primary Care Provider)  Qty: 30 tablet, Refills: 0    Associated Diagnoses: Hypertension goal BP (blood pressure) < 140/90         CONTINUE these medications which have NOT CHANGED    Details   amLODIPine (NORVASC) 5 MG tablet Take 1 tablet (5 mg) by mouth daily  Qty: 90 tablet, Refills: 3    Associated Diagnoses: Hypertension goal BP (blood pressure) < 140/90      diazepam (VALIUM) 5 MG tablet Take up to one tablet by mouth daily as needed for anxiety  Qty: 30 tablet, Refills: 5    Comments: Phoned to [facility] Hiral Peres  Associated Diagnoses: Depressive disorder      mirtazapine (REMERON) 45 MG tablet Take 1 tablet (45 mg) by mouth At Bedtime And may take 1/2 tablet for insomnia up to 3 times a week.  Qty: 37 tablet, Refills: 5    Associated Diagnoses: Generalized anxiety disorder      !! Antihemophilic Factor, Recomb, (KOGENATE FS) 1000 units KIT Infuse 3000 iu (-5/+10%) every other day  Qty: 16513 each, Refills: 1    Associated Diagnoses: Severe hemophilia A (H); Personal history of malignant neoplasm of bladder      !! Antihemophilic Factor, Recomb, (KOGENATE FS) 3000 units KIT Infuse Kogenate at 3000 Units +/- 10% IV Q 24 hours until 8/13/2018.  Qty: 33552 Units, Refills: 0    Comments: Please dispense 10 doses of Kogenate at 3000 Units +/- 10% per dose.  Associated Diagnoses: Severe hemophilia A (H); Malignant neoplasm of urinary bladder, unspecified site (H); Hemophilic arthropathy       !! - Potential duplicate medications found. Please discuss with provider.      STOP taking these medications       codeine 30 MG tablet Comments:   Reason for Stopping:         dexamethasone  (DECADRON) 4 MG tablet Comments:   Reason for Stopping:         LORazepam (ATIVAN) 0.5 MG tablet Comments:   Reason for Stopping:         ondansetron (ZOFRAN-ODT) 8 MG ODT tab Comments:   Reason for Stopping:         prochlorperazine (COMPAZINE) 10 MG tablet Comments:   Reason for Stopping:         tamsulosin (FLOMAX) 0.4 MG capsule Comments:   Reason for Stopping:                     Consultations:   Consultation during this admission received: Hematology, PT, OT, Nutrition, WOC          Brief History of Illness:   Reason for admission requiring a surgical or invasive procedure:   Malignant Neoplasm Of Overlapping Sites Of Bladder    The patient underwent the following procedure(s):   See above   There were no immediate complications during this procedure.    Please refer to the full operative summary for details.           Hospital Course:   The patient's hospital course was unremarkable.  Orlin Alcantar recovered as anticipated and experienced no post-operative complications.      #Hemophilia A:  On HD#0 the Hematology service was consulted for assistance with managing Mr. Alcantar's continuous Factor 8 infusions.  Their team followed the patient throughout his entire hospital stay to monitor Factor 8 levels and adjust infusion rates.      #Urinary tract infection (prior to admission):  Mr. Alcantar received a dose of ertapenem on both POD#0 and POD#1 per standard cystectomy prophylaxis protocol.  He then received an additional 4 days of Ciprofloxacin (to complete a 6 day course of antibiotics) for a urine culture from preop (7/25/18) growing>100K Klebsiella oxytoca.     On POD #7 he was ambulating without assitance, tolerating the discharge diet, had pain controlled with PO medications to go home with, had full return of bowel function, and was requiring no IV fluids.  His ureteral stents had both been removed as had his abdominal drain.  Drain fluid creatinine levels were monitored and there was never concern for  a urine leak.  Mr. Alcantar was discharged home with his staples.  He was given appropriate contact information, follow-up and instructions as seen below in the discharge paperwork.  He was given instructions on home Factor 8 infusion instillation by the Hematology service.          Discharge Labs:     No results found for: PSA    Recent Labs  Lab 08/05/18  0716 08/04/18  0744 08/03/18  0803 08/02/18  0752   WBC 7.8 7.7 8.9 9.5   HGB 8.6* 8.2* 8.5* 8.7*    197 177 164       Recent Labs  Lab 08/05/18  0716 08/04/18  0744 08/03/18  0803 08/02/18  0752    140 141 141   POTASSIUM 3.8 3.7 3.6 3.6   CHLORIDE 112* 111* 111* 112*   CO2 21 20 21 22   BUN 23 16 12 13   CR 0.83 0.84 0.68 0.70   GLC 96 105* 120* 104*   MARLA 8.4* 8.4* 8.1* 8.4*     No lab results found in last 7 days.    Invalid input(s): URINEBLOOD  Results for orders placed or performed in visit on 07/25/18   Urine Culture Aerobic Bacterial   Result Value Ref Range    Specimen Description Midstream Urine     Special Requests Specimen received in preservative     Culture Micro >100,000 colonies/mL  Klebsiella oxytoca   (A)        Susceptibility    Klebsiella oxytoca - XIMENA     AMPICILLIN* >=32 Resistant ug/mL      * Intrinsically Resistant     CEFAZOLIN* 16 Intermediate ug/mL      * Cefazolin XIMENA breakpoints are for the treatment of uncomplicated urinary tract infections.  For the treatment of systemic infections, please contact the laboratory for additional testing.     CEFOXITIN <=4 Sensitive ug/mL     CEFTAZIDIME <=1 Sensitive ug/mL     CEFTRIAXONE <=1 Sensitive ug/mL     CIPROFLOXACIN <=0.25 Sensitive ug/mL     GENTAMICIN <=1 Sensitive ug/mL     LEVOFLOXACIN <=0.12 Sensitive ug/mL     NITROFURANTOIN <=16 Sensitive ug/mL     TOBRAMYCIN <=1 Sensitive ug/mL     Trimethoprim/Sulfa <=1/19 Sensitive ug/mL     AMPICILLIN/SULBACTAM 4 Sensitive ug/mL     Piperacillin/Tazo <=4 Sensitive ug/mL     CEFEPIME <=1 Sensitive ug/mL   Results for orders placed or  performed during the hospital encounter of 02/19/18   Urine Culture Aerobic Bacterial   Result Value Ref Range    Specimen Description Catheterized Urine Right Renal pelvic     Culture Micro No growth        Results for orders placed or performed during the hospital encounter of 07/30/18   Factor 8 assay   Result Value Ref Range    Factor 8 Assay 9 (LL) 55 - 200 %   Factor 8 assay   Result Value Ref Range    Factor 8 Assay 127 55 - 200 %   Glucose by meter   Result Value Ref Range    Glucose 96 70 - 99 mg/dL   INR   Result Value Ref Range    INR 1.02 0.86 - 1.14   CBC with platelets   Result Value Ref Range    WBC 5.9 4.0 - 11.0 10e9/L    RBC Count 4.13 (L) 4.4 - 5.9 10e12/L    Hemoglobin 11.8 (L) 13.3 - 17.7 g/dL    Hematocrit 37.7 (L) 40.0 - 53.0 %    MCV 91 78 - 100 fl    MCH 28.6 26.5 - 33.0 pg    MCHC 31.3 (L) 31.5 - 36.5 g/dL    RDW 15.8 (H) 10.0 - 15.0 %    Platelet Count 172 150 - 450 10e9/L   Partial thromboplastin time   Result Value Ref Range    PTT 30 22 - 37 sec   Surgical pathology exam   Result Value Ref Range    Copath Report       Patient Name: HIREN GREENBERG  MR#: [id number]  Specimen #: [id number]  Collected: 7/30/2018  Received: 7/30/2018  Reported: 8/3/2018 20:15  Ordering Phy(s): DONAL CRUZ    For improved result formatting, select 'View Enhanced Report Format' under   Linked Documents section.    ORIGINAL REPORT:    SPECIMEN(S):  A: No specimen rec'd from OR for A  B: No specimen rec'd from OR for B  C: Prostate, bladder, seminal vesicles  D: Lymph nodes, left pelvic  E: Lymph nodes, right pelvic    FINAL DIAGNOSIS:  C. Prostate, bladder, seminal vesicles, radical cystoprostatectomy:  - Bladder wall with chronic inflammation, edema, fibrosis and prior   surgical site changes  - No evidence of residual invasive or in-situ urothelial carcinoma  - Pathologic stage: [id number]  - Benign prostate and seminal vesicles    D. Lymph nodes, left pelvic, excision:  - Twelve benign lymph nodes    E.  "Lymph nodes, right pelvic, excision:  - Eleven benign lymph nodes  - See comment    COMMENT:  Large histiocytic aggre hess (predominantly right sided) are present in   the pelvic lymph nodes. These contain  metallic debris associated with the patient's known right hip   arthroplasty.    I have personally reviewed all specimens and/or slides, including the   listed special stains, and used them  with my medical judgement to determine or confirm the final diagnosis.    Electronically signed out by:    Maxime Coronado M.D., [facility]    CLINICAL HISTORY:  68 year old male with invasive urothelial carcinoma status post   chemotherapy.  GROSS:  C: The specimen is received fresh with proper patient identification,   labeled \"prostate, bladder, seminal  vesicles\".  The specimen consists of a cystoprostatectomy (15.2 cm from   superior inferior x 10.9 cm transverse  x 4.0 cm anterior to posterior).  The bladder measures 7.2 cm superior to   inferior and x 7.5 nearest  transverse 3.3 cm anterior-posterior.  The prostate measures 5.0 x 3.5 x   3.2 cm.  The right ureter is 1.5 cm  in length x 0.4 cm in diamet er and the left ureter is 1.9 cm in length x   0.7 cm in diameter.  The right  seminal vesicle is 4.4 x 1.5 x 0.7 cm and the left seminal vesicle is 5.1   x 1.5 x 0.7 cm.  The anterior  surface is inked with blue and the posterior surface is inked black.  The   right and left ureter margins and  urethral margins are submitted for frozen section and resubmitted for   permanent.    The right ureter is not probe patent.  The specimen is opened to reveal a   tan-brown mucosa and a 3.5 x 2.8 cm  area of firmness.  There are no grossly identifiable lesions.  The area of   firmness comes within 2.5 cm of the  left ureteral orifice and 1.0 cm the prostate.  The right ureteral orifice   is not identifiable.  The specimen  is serially sectioned to reveal no grossly identifiable lesion within the   bladder wall or underlying " "adipose  tissue.  The area of firmness comes within 1.5 cm of the right lateral   anterior wall and soft tissue margin.    Sectioning of the prostate reveals a tan-yellow smo oth cut surface with no   gross identifiable lesions or  nodules.  Sectioning of the attached adipose tissue reveals 1 tan-brown   polyp the lymph node measures 0.7 x  0.6 x 0.3 cm.  A gross photo is taken and representative sections are   submitted as follows:    Summary of Sections:  C1FS - urethral margin  C2FS - right ureter margin  C3FS - left ureter margin  C4 - left urethral orifice  C5 - right mid urethra  C6-C20 - blocked out area of firmness with right anterior margin and   bladder mucosa (C16 full thickness  section with posterior margin)  C21 - left lateral bladder wall  C22 - posterior bladder wall  C23 - anterior bladder wall  C24 - bladder dome  C25 - right prostate apex, perpendicular sections  C26 - left prostate apex, perpendicular sections  C27 - right anterior prostate, mid section  C28 - right posterior prostate, mid section  C29 - left anterior prostate midsection  C30 - left posterior prostate, mid section  C31 - right anterior prostate, base section  C32 - right  posterior prostate, base section  C33 - left anterior prostate, base section  C34 - left posterior prostate, base section  C35 - right seminal vesicle  C36 - left seminal vesicle  C37 - 1 intact possible lymph node    D: The specimen is received in formalin with proper patient   identification, labeled \"left pelvic lymph nodes\".   The specimen consists of multiple fragments of tan-yellow, lobulated   fibroadipose tissue that measures 4.8 x  4.7 x 1.8 cm in aggregate.  Sectioning of the fibroadipose tissue reveals   12 tan-pink possible lymph nodes  that range from 0.4-4.0 cm in greatest dimension.  All possible lymph   nodes are submitted as follows:    Summary of Sections:  D1 - 6 intact lymph nodes  D2 - 3 intact lymph nodes  D3 - 2 bisected possible lymph " "nodes (one differential inked black )  D4-D5 - largest lymph node, quadrisected    E: The specimen is received in formalin with proper patient   identification, labeled \"right pelvic lymph  nodes\".  The specimen consists of multip le tan-yellow, lobulated   fibroadipose tissue fragments that measure  5.9 x 5.8 x 1.1 cm in aggregate.  Sectioning of the fibroadipose tissue   reveals 15 tan-pink possible lymph  nodes that range from 0.5-4.5 cm in greatest dimension.  All possible   lymph nodes are submitted as follows:    Summary of Sections:  E1 - 6 intact possible lymph nodes  E2 - 5 intact possible lymph nodes  E3 - 1 bisected possible lymph node  E4 - 1 bisected possible lymph node  E5 - 1 bisected possible lymph node  E6-E7 - 1 quadrisected of the lymph node  (Dictated by: Larisa HARDIN 7/31/2018 09:45 AM)    INTRAOPERATIVE CONSULTATION:  Frozen section is performed on part C. Please refer to Epic Result History   for the Preliminary Intraoperative  Diagnosis.    MICROSCOPIC:  Microscopic examination was performed.    CPT Codes:  C: 77066-XTZ, 98790-RQV, 36715-AJ, 43051-OH2, 16707-VV7  D: 51696-QO6  E: 53618-QI7    TESTING LAB LOCATION:  [facility], [address]   [address] [phone]    COLLECTION SITE:  Client: [facility]  Location: [facility])       Arterial Panel   Result Value Ref Range    pH Arterial 7.36 7.35 - 7.45 pH    pCO2 Arterial 35 35 - 45 mm Hg    pO2 Arterial 229 (H) 80 - 105 mm Hg    Bicarbonate Arterial 20 (L) 21 - 28 mmol/L    Base Deficit Art 5.2 mmol/L    FIO2 60.0     Sodium 140 133 - 144 mmol/L    Potassium 3.7 3.4 - 5.3 mmol/L    Hemoglobin 8.7 (L) 13.3 - 17.7 g/dL    Glucose 139 (H) 70 - 99 mg/dL    Calcium Ionized Whole Blood 4.6 4.4 - 5.2 mg/dL   TEG without Heparinase Native   Result Value Ref Range    R time until clot forms 7.9 4 - 9 Min    K time to spec " clot strength 1.8 1 - 3 Min    Angle rate of clot growth 65.7 59 - 74 Deg    MA maximum clot strength 69.5 55 - 74 mm    CI hypocoagulation index 0.1 0.0 - 3.0 Ratio    G actual clot strength 11.4 5.3 - 13.2 Kd/sc    LY30 lysis at 30 minutes 0 0 - 8 %    LY60 lysis at 60 minutes 0.2 0 - 15 %   INR   Result Value Ref Range    INR 1.19 (H) 0.86 - 1.14   Factor 8 assay   Result Value Ref Range    Factor 8 Assay 96 55 - 200 %   Glucose by meter   Result Value Ref Range    Glucose 162 (H) 70 - 99 mg/dL   Hemoglobin   Result Value Ref Range    Hemoglobin 10.4 (L) 13.3 - 17.7 g/dL   Basic metabolic panel   Result Value Ref Range    Sodium 139 133 - 144 mmol/L    Potassium 4.2 3.4 - 5.3 mmol/L    Chloride 106 94 - 109 mmol/L    Carbon Dioxide 21 20 - 32 mmol/L    Anion Gap 12 3 - 14 mmol/L    Glucose 162 (H) 70 - 99 mg/dL    Urea Nitrogen 20 7 - 30 mg/dL    Creatinine 0.87 0.66 - 1.25 mg/dL    GFR Estimate 87 >60 mL/min/1.7m2    GFR Estimate If Black >90 >60 mL/min/1.7m2    Calcium 7.7 (L) 8.5 - 10.1 mg/dL   Glucose by meter   Result Value Ref Range    Glucose 160 (H) 70 - 99 mg/dL   Glucose by meter   Result Value Ref Range    Glucose 173 (H) 70 - 99 mg/dL   Factor 8 assay   Result Value Ref Range    Factor 8 Assay 130 55 - 200 %   Basic metabolic panel   Result Value Ref Range    Sodium 141 133 - 144 mmol/L    Potassium 4.4 3.4 - 5.3 mmol/L    Chloride 110 (H) 94 - 109 mmol/L    Carbon Dioxide 26 20 - 32 mmol/L    Anion Gap 6 3 - 14 mmol/L    Glucose 116 (H) 70 - 99 mg/dL    Urea Nitrogen 21 7 - 30 mg/dL    Creatinine 0.93 0.66 - 1.25 mg/dL    GFR Estimate 81 >60 mL/min/1.7m2    GFR Estimate If Black >90 >60 mL/min/1.7m2    Calcium 8.1 (L) 8.5 - 10.1 mg/dL   CBC with platelets   Result Value Ref Range    WBC 13.5 (H) 4.0 - 11.0 10e9/L    RBC Count 3.19 (L) 4.4 - 5.9 10e12/L    Hemoglobin 9.2 (L) 13.3 - 17.7 g/dL    Hematocrit 29.0 (L) 40.0 - 53.0 %    MCV 91 78 - 100 fl    MCH 28.8 26.5 - 33.0 pg    MCHC 31.7 31.5 - 36.5  g/dL    RDW 15.4 (H) 10.0 - 15.0 %    Platelet Count 160 150 - 450 10e9/L   Basic metabolic panel   Result Value Ref Range    Sodium 144 133 - 144 mmol/L    Potassium 3.9 3.4 - 5.3 mmol/L    Chloride 113 (H) 94 - 109 mmol/L    Carbon Dioxide 21 20 - 32 mmol/L    Anion Gap 9 3 - 14 mmol/L    Glucose 98 70 - 99 mg/dL    Urea Nitrogen 16 7 - 30 mg/dL    Creatinine 0.81 0.66 - 1.25 mg/dL    GFR Estimate >90 >60 mL/min/1.7m2    GFR Estimate If Black >90 >60 mL/min/1.7m2    Calcium 8.0 (L) 8.5 - 10.1 mg/dL   CBC with platelets   Result Value Ref Range    WBC 10.4 4.0 - 11.0 10e9/L    RBC Count 3.06 (L) 4.4 - 5.9 10e12/L    Hemoglobin 8.9 (L) 13.3 - 17.7 g/dL    Hematocrit 28.7 (L) 40.0 - 53.0 %    MCV 94 78 - 100 fl    MCH 29.1 26.5 - 33.0 pg    MCHC 31.0 (L) 31.5 - 36.5 g/dL    RDW 15.5 (H) 10.0 - 15.0 %    Platelet Count 141 (L) 150 - 450 10e9/L   Factor 8 assay   Result Value Ref Range    Factor 8 Assay 155 55 - 200 %   Glucose by meter   Result Value Ref Range    Glucose 101 (H) 70 - 99 mg/dL   Basic metabolic panel   Result Value Ref Range    Sodium 141 133 - 144 mmol/L    Potassium 3.6 3.4 - 5.3 mmol/L    Chloride 112 (H) 94 - 109 mmol/L    Carbon Dioxide 22 20 - 32 mmol/L    Anion Gap 8 3 - 14 mmol/L    Glucose 104 (H) 70 - 99 mg/dL    Urea Nitrogen 13 7 - 30 mg/dL    Creatinine 0.70 0.66 - 1.25 mg/dL    GFR Estimate >90 >60 mL/min/1.7m2    GFR Estimate If Black >90 >60 mL/min/1.7m2    Calcium 8.4 (L) 8.5 - 10.1 mg/dL   CBC with platelets   Result Value Ref Range    WBC 9.5 4.0 - 11.0 10e9/L    RBC Count 3.02 (L) 4.4 - 5.9 10e12/L    Hemoglobin 8.7 (L) 13.3 - 17.7 g/dL    Hematocrit 28.1 (L) 40.0 - 53.0 %    MCV 93 78 - 100 fl    MCH 28.8 26.5 - 33.0 pg    MCHC 31.0 (L) 31.5 - 36.5 g/dL    RDW 15.1 (H) 10.0 - 15.0 %    Platelet Count 164 150 - 450 10e9/L   Factor 8 assay   Result Value Ref Range    Factor 8 Assay 155 55 - 200 %   Glucose by meter   Result Value Ref Range    Glucose 139 (H) 70 - 99 mg/dL   Basic  metabolic panel   Result Value Ref Range    Sodium 141 133 - 144 mmol/L    Potassium 3.6 3.4 - 5.3 mmol/L    Chloride 111 (H) 94 - 109 mmol/L    Carbon Dioxide 21 20 - 32 mmol/L    Anion Gap 9 3 - 14 mmol/L    Glucose 120 (H) 70 - 99 mg/dL    Urea Nitrogen 12 7 - 30 mg/dL    Creatinine 0.68 0.66 - 1.25 mg/dL    GFR Estimate >90 >60 mL/min/1.7m2    GFR Estimate If Black >90 >60 mL/min/1.7m2    Calcium 8.1 (L) 8.5 - 10.1 mg/dL   CBC with platelets   Result Value Ref Range    WBC 8.9 4.0 - 11.0 10e9/L    RBC Count 2.96 (L) 4.4 - 5.9 10e12/L    Hemoglobin 8.5 (L) 13.3 - 17.7 g/dL    Hematocrit 27.4 (L) 40.0 - 53.0 %    MCV 93 78 - 100 fl    MCH 28.7 26.5 - 33.0 pg    MCHC 31.0 (L) 31.5 - 36.5 g/dL    RDW 14.6 10.0 - 15.0 %    Platelet Count 177 150 - 450 10e9/L   Factor 8 assay   Result Value Ref Range    Factor 8 Assay 135 55 - 200 %   CBC with platelets   Result Value Ref Range    WBC 7.7 4.0 - 11.0 10e9/L    RBC Count 2.86 (L) 4.4 - 5.9 10e12/L    Hemoglobin 8.2 (L) 13.3 - 17.7 g/dL    Hematocrit 26.3 (L) 40.0 - 53.0 %    MCV 92 78 - 100 fl    MCH 28.7 26.5 - 33.0 pg    MCHC 31.2 (L) 31.5 - 36.5 g/dL    RDW 14.8 10.0 - 15.0 %    Platelet Count 197 150 - 450 10e9/L   Basic metabolic panel   Result Value Ref Range    Sodium 140 133 - 144 mmol/L    Potassium 3.7 3.4 - 5.3 mmol/L    Chloride 111 (H) 94 - 109 mmol/L    Carbon Dioxide 20 20 - 32 mmol/L    Anion Gap 10 3 - 14 mmol/L    Glucose 105 (H) 70 - 99 mg/dL    Urea Nitrogen 16 7 - 30 mg/dL    Creatinine 0.84 0.66 - 1.25 mg/dL    GFR Estimate >90 >60 mL/min/1.7m2    GFR Estimate If Black >90 >60 mL/min/1.7m2    Calcium 8.4 (L) 8.5 - 10.1 mg/dL   Factor 8 assay   Result Value Ref Range    Factor 8 Assay 99 55 - 200 %   Lactic acid level STAT for sepsis protocol   Result Value Ref Range    Lactate for Sepsis Protocol 0.9 0.7 - 2.0 mmol/L   CBC with platelets   Result Value Ref Range    WBC 7.8 4.0 - 11.0 10e9/L    RBC Count 2.97 (L) 4.4 - 5.9 10e12/L    Hemoglobin 8.6  (L) 13.3 - 17.7 g/dL    Hematocrit 27.3 (L) 40.0 - 53.0 %    MCV 92 78 - 100 fl    MCH 29.0 26.5 - 33.0 pg    MCHC 31.5 31.5 - 36.5 g/dL    RDW 15.0 10.0 - 15.0 %    Platelet Count 224 150 - 450 10e9/L     *Note: Due to a large number of results and/or encounters for the requested time period, some results have not been displayed. A complete set of results can be found in Results Review.            Discharge Instructions and Follow-Up:   Diet:   - Regular diet. Eat small frequent meals. Consider adding nutrition shakes several times per day such as Boost or Ensure. Add a multivitamin.   - The most common reason for hospital readmission after bladder cancer surgery is dehydration. Drink plenty of fluids - at least eight 8 ounce glasses of water per day - or aim to keep your urine color pale yellow.     Activity:   - No strenuous exercise for  weeks.   - No lifting, pushing, pulling more than 10 pounds for  weeks. Take care when pushing with your arms to stand up.  - Do not strain your belly area.  When you bend, sit up or twice, you could strain the area around your incision.    - Do not strain with bowel movements.    - Do not drive until you can press the brake pedal quickly and fully without pain.   - Do not operate a motor vehicle while taking narcotic pain medications.     Medications:   1) PAIN: Oxycodone is a narcotic medication that has been prescribed for pain.  Narcotics will cause sleepiness and constipation and can become addictive, therefore it is best to stop or reduce them as soon as you can.  Any left over narcotics should be disposed of with an Authorized  for unneeded medications.  Contact your [facility] s or [facility] s household trash and recycling service to learn about medication disposal options and guidelines for your area.  If you decide to store this medication at home it should be kept in a locked cabinet to prevent access to children or visitors. To reduce your narcotic use,  take Tylenol (acetaminophen 625mg) every 4-6 hours as needed.  This has been prescribed for you.  Do not take more than 4,000mg of Tylenol (acetaminophen/ APAP) from all sources in any 24 hour period since this can cause liver damage.  Do not take more than 2400mg of ibuprofen in any 24 hour period since this can cause kidney damage. Never drive, operate machinery or drink alcoholic beverages while you are taking narcotic pain medications.      2) CONSTIPATION: Miralax (polyethylene glycol) can be taken daily for prevention of constipation.  Surgery and pain medications can make you constipated.  Please reduce or stop Miralax if you develop loose stools. Other over the counter solutions such as prune juice, miralax, fiber products, senna, and dulcolax can also be used. If you are taking the Miralax but still have not had a bowel movement in 3 days, start over-the-counter Milk of Magnesia taken twice daily until you have a nice bowel movement.  Call the office with any concerns.      3) BLOOD PRESSURE MEDICATIONS:  - While you were in the hospital your lisinopril was held (not given) because your blood pressure was well-controlled.  Follow up with your primary care physician within 7-10 days of discharge to discuss your blood pressure management.     Incisions:   - Daily showering is important, and you may get incisions wet starting 48 hours after surgery.    - Do not scrub incisions or soak in a bath, pool, hot tub, etc. until advised by Dr. Shea  - The purple skin glue on your incisions will flake off with time and should not be scrubbed.  Also avoid applying lotions or ointments to these areas.  - If steri-strips were used you may wash over them normally, as you would wash your remaining skin.  Steri-strips should fall off on their own within 5-10 days.   - Staples should be removed in 7-10 days.  We will schedule a urology nurse appointment for you to have this done.   - It is preferable for the incision to be  left uncovered, but you may cover with gauze if needed for comfort or to protect clothing from drainage.     Urostomy / Ileal Conduit  - Follow standard instructions as provided by the Wound Ostomy Care Nurses  - Measure daily urine output and record values on a diary.  Bring this information with you when you followup with Dr. Shea's team.  You should be making at least 1000mL of urine each 24 hour period.  If you are not meeting this goal you should increase your fluid intake.     Supplies:  1) Ostomy supplies to get started  2) Drain sponges for drain site  3) Drain cares    Follow-Up:   - Call your primary care provider to touch base regarding your recent admission. You should be seen in clinic within 7-10 days of discharge to discuss your blood pressures and for a postoperative checkup.   - You will need a urology nurse visit to have your staples removed in about 7-10 days.  The Urology Clinic should be calling you with this appointment.    - Follow up with Dr. Shea as scheduled on 8/22/18 for a postop check and to discuss your pathology results  - Call or return sooner than your regularly scheduled visit if you develop any of the following:  Fever (greater than 101.3F) or flu-like symptoms, uncontrolled pain, uncontrolled nausea or vomiting, as well as increased redness, swelling, or drainage from your wound.    Phone numbers:  - Nursing phone helpline at the Urology Clinic (8A-5P M-F):  [phone].    - Nights or weekends, call [phone] and ask the  to page the urology resident on call.   - For emergencies, always call 911         Discharge Disposition:   Discharged to home      Attestation: I have reviewed today's vital signs, notes, medications, labs and imaging.    ARVIN oCllierC  Urology Physician Assistant  Personal Pager: [phone]    Please call Job Code:   [phone] to reach the Urology resident or PA on call - Weekdays  [phone] to reach the Urology resident or PA on call -  Weeknights and weekends    August 6, 2018

## 2018-08-06 NOTE — PROGRESS NOTES
Urology  Progress Note    No acute events overnight   Pain well controlled  Tolerating regular diet  Having BM and passing gas   Ambulating     Exam  /73 (BP Location: Left arm)  Pulse 92  Temp 98.4  F (36.9  C) (Oral)  Resp 18  Ht 1.829 m (6')  Wt 69.7 kg (153 lb 11.2 oz)  SpO2 99%  BMI 20.85 kg/m2  No acute distress  Unlabored breathing  Abdomen soft, nontender, nondistended. Incisions covered with minimally saturated dresings. 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 Hx severe hemophilia A.     Neuro: tylenol, dilaudid, oxycodone for pain control  CV: HDS. Home amlodipine, lisinopril.  Pulm: incentive spirometry while awake  FEN/GI: regular diet, MIVF stopped. Entereg stopped.   Endo: ARDEN  : R PNT capped. Remove second stent today, Monitor urine output.  Heme: Hgb stable. On Kogenate (antihemophilic factor). Heme following, 8/2 recs:  1. Continue with Kogenate IV continuous infusion while hospitalized. Will adjust dose as necessary to keep his Factor VIII level at around %.   2. Cleared from a hemophilia standpoint to be discharged. Preferably that he is to be discharged home as coverage for Factor (Kogenate) infusions can be an issue if he is to go to a rehab facility or TCU.   3. Patient can do self home venipuncture Kogenate infusions. When he is discharged to home, he is instructed to do self home Kogenate infusion of around 3000 Units IV Q 24 hours until 8/13/2018 (total of 14 days post op). Then he can return to his routine prophylactic Factor VIII replacement therapy as he was doing prior to his surgery. We will arrange for a shipment of his Kogenate to his home when he is ready to be discharged home.   4. Return to clinic to see us for his overdue  hemophilia comprehensive clinic visit in 1-2 months. We will arrange and schedule him for this visit.    ID: afebrile, no leukocystosis. PO cipro.    Activity: up ad mely  PPx: SCDs  Dispo: 7B. PT rec's home with aide, anticipate DC home today     Seen and examined with the chief resident. Will discuss with Dr. Shea.           Contacting the Urology Team     Please use the following job codes to reach the Urology Team. Note that you must use an in house phone and that job codes cannot receive text pages.     On weekdays, dial [phone] (or star-star-star [phone] on the new [employer] telephones) then [phone] to reach the Adult Urology resident or PA on call    On weekdays, dial [phone] (or star-star-star [phone] on the new [employer] telephones) then [phone] to reach the Pediatric Urology resident    On weeknights and weekends, dial [phone] (or star-star-star [phone] on the new [employer] telephones) then [phone] to reach the Urology resident on call (for both Adult and Pediatrics)

## 2018-08-06 NOTE — PLAN OF CARE
Pt discharged home. Remainder of kegenate in syringe given IV push per recommendation from hematology PA. IV discontinued. Pt educated on AVS, incision care. No further questions. Pt denied need for pharmacy meds including miralax and oxycodone. Pharmacy updated. Received walker from PT.

## 2018-08-06 NOTE — PROGRESS NOTES
[facility]  Inpatient Wound Ostomy Continence Discharge Summary    Date of surgery: 7/30/18  Type of surgery: Radical cystoprostatectomy, and bilateral pelvic lymph node dissection and ileal conduit.   SURGEON: Bebeto Shea MD     Stoma type:Urostomy    Stoma Assessment:   Normal  Crease:  at 3 and 9 o'clock filled it barrier ring in these areas    Patient's pouching abilities: Fair/good and minimal verbal cues needed this pouch change    Patient's limitations: none    Pouching needs: Routine 2 times weekly    Effectiveness of pouch seal: Greater than 48 hours    Last pouch change: Monday, August 6, 2018    Discharge supplies: [place] Buck:  Level of convexity: none currently may need down the line,  Two piece and Cut to fit  Immediate supplies for discharge needs for 2 weeks of supplies. WOC ordered samples from  with patient's consent.    Post op Follow up: [facility] Wound Ostomy Continence care in 4 - 6 weeks Per patient will not have home care. Faxed ostomy supplies prescription and called [employer] on 8/3 to get patient set up. Called today to follow up and Dc reported not receiving fax, refaxed prescription today 8/6/18. Patient will call when home to obtain supplies.    Face to face time: 30-45 minutes    Ivania Arroyo

## 2018-08-07 ENCOUNTER — TELEPHONE (OUTPATIENT)
Dept: FAMILY MEDICINE | Facility: CLINIC | Age: 68
End: 2018-08-07

## 2018-08-07 NOTE — TELEPHONE ENCOUNTER
ED / Discharge Outreach Protocol    Patient Contact    Attempt # 1    Was call answered?  No.  Left message on voicemail with information to call me back.    Ronda Damon RN, BSN'

## 2018-08-08 ENCOUNTER — TELEPHONE (OUTPATIENT)
Dept: FAMILY MEDICINE | Facility: CLINIC | Age: 68
End: 2018-08-08

## 2018-08-08 NOTE — TELEPHONE ENCOUNTER
Deborah RN with  Homecare, calling back and needing to have verbal orders in order to see pt tomorrow. Please call at [phone]

## 2018-08-08 NOTE — TELEPHONE ENCOUNTER
Reason for Call:  Other call back    Detailed comments: Deborah RN from Homecare is requesting home care orders.    Nursing x2 for 2 weeks, x1 for 7 weeks, 3 PRN, and ostomy cares.Please Advise thank you    Phone Number Patient can be reached at: [phone]    Best Time: anytime    Can we leave a detailed message on this number? YES    Call taken on 8/8/2018 at 2:01 PM by Taniya Brito

## 2018-08-09 ENCOUNTER — CARE COORDINATION (OUTPATIENT)
Dept: UROLOGY | Facility: CLINIC | Age: 68
End: 2018-08-09

## 2018-08-10 NOTE — TELEPHONE ENCOUNTER
ED / Discharge Outreach Protocol     Patient Contact     Attempt # 2     Was call answered?  No.  Left message on voicemail with information to call me back.     SHILOH RiceN, RN  [facility]

## 2018-08-13 DIAGNOSIS — D66 SEVERE HEMOPHILIA A (H): ICD-10-CM

## 2018-08-13 DIAGNOSIS — Z85.51 PERSONAL HISTORY OF MALIGNANT NEOPLASM OF BLADDER: ICD-10-CM

## 2018-08-13 DIAGNOSIS — C67.9 BLADDER CANCER (H): Primary | ICD-10-CM

## 2018-08-13 DIAGNOSIS — C67.9 UROTHELIAL CARCINOMA OF BLADDER (H): ICD-10-CM

## 2018-08-13 DIAGNOSIS — Z00.6 EXAMINATION OF PARTICIPANT IN CLINICAL TRIAL: ICD-10-CM

## 2018-08-13 DIAGNOSIS — C67.8 MALIGNANT NEOPLASM OF OVERLAPPING SITES OF BLADDER (H): ICD-10-CM

## 2018-08-13 RX ORDER — ANTIHEMOPHILIC FACTOR (RECOMBINANT) 1000 (+/-)
KIT INTRAVENOUS
Qty: 12000 EACH | Refills: 1 | Status: SHIPPED | OUTPATIENT
Start: 2018-08-13 | End: 2018-08-27

## 2018-08-16 ENCOUNTER — ALLIED HEALTH/NURSE VISIT (OUTPATIENT)
Dept: UROLOGY | Facility: CLINIC | Age: 68
End: 2018-08-16
Payer: MEDICARE

## 2018-08-16 VITALS
HEIGHT: 72 IN | DIASTOLIC BLOOD PRESSURE: 76 MMHG | SYSTOLIC BLOOD PRESSURE: 108 MMHG | OXYGEN SATURATION: 97 % | TEMPERATURE: 97.7 F | HEART RATE: 128 BPM | BODY MASS INDEX: 20.28 KG/M2 | WEIGHT: 149.7 LBS

## 2018-08-16 DIAGNOSIS — C67.8 MALIGNANT NEOPLASM OF OVERLAPPING SITES OF BLADDER (H): Primary | ICD-10-CM

## 2018-08-16 ASSESSMENT — PAIN SCALES - GENERAL: PAINLEVEL: NO PAIN (1)

## 2018-08-16 NOTE — NURSING NOTE
Orlin Alcantar comes into clinic today at the request of Dr. Bebeto Shea Ordering Provider for staple removal/nephrostomy tube removal.    This service provided today was under the supervising provider of the day Mely Smith PA-C, who was available if needed.    Staple Removal:  Incision was dry, clean and intact, incision cleansed with wound cleanser and staples were removed. Pt tolerated the procedure. Benzoin and steristrips were placed per protocol and pt was instructed to leave them in place for 7-10 days. It is okay to shower with these in place, no need to cover. After this time the strerstrips will start loosen and should be removed.     Nephrostomy Tube removal:  Sutures were cut and nephrostomy tube was removed with ease.  Bandage was placed.  Patient tolerated procedure well.    Patient is to follow up with Dr. Shea on 8-22-18 for his post op and path review.    MOSHE Carranza

## 2018-08-16 NOTE — PATIENT INSTRUCTIONS
Nephrostomy tube and staples removed today.    Follow up with Dr. Shea on 8-22-18 for your post op and pathology review.    It was a pleasure meeting with you today.  Thank you for allowing me and my team the privilege of caring for you today.  YOU are the reason we are here, and I truly hope we provided you with the excellent service you deserve.  Please let us know if there is anything else we can do for you so that we can be sure you are leaving completely satisfied with your care experience.        MOSHE Carranza

## 2018-08-16 NOTE — MR AVS SNAPSHOT
After Visit Summary   8/16/2018    Orlin Alcantar    MRN: [id number]           Patient Information     Date Of Birth          1950        Visit Information        Provider Department      8/16/2018 10:20 AM Nurse,  [facility] Urology and [facility] for [facility] Urologic C[facility]        Care Instructions    Nephrostomy tube and staples removed today.    Follow up with Dr. Shea on 8-22-18 for your post op and pathology review.    It was a pleasure meeting with you today.  Thank you for allowing me and my team the privilege of caring for you today.  YOU are the reason we are here, and I truly hope we provided you with the excellent service you deserve.  Please let us know if there is anything else we can do for you so that we can be sure you are leaving completely satisfied with your care experience.        Louann Ball BHAVIN          Follow-ups after your visit        Your next 10 appointments already scheduled     Aug 22, 2018  1:30 PM CDT   POST OP OSTOMY NURSE with Orlin Ball RN   [facility] Wound Ostomy ([facility])    [address] [place] [id number]   [phone]            Aug 22, 2018  3:00 PM CDT   (Arrive by 2:45 PM)   Post-Op with Bebeto Shea MD   [facility] Urology and [facility]state [facility] Urologic [facility] ([facility])    [address] [place] [id number] [phone]            Aug 27, 2018  9:15 AM CDT   [facility] Draw with  [facility] DRAW   [facility] Lab Draw ([facility])    [address] [place] [address]  [address]  [facility] [address]   [phone]            Aug 27, 2018 10:30 AM CDT   (Arrive by 10:00 AM)   PET ONCOLOGY WHOLE BODY with U[facility]   [facility] ([facility])    [address] [address]   [phone]           Tell your doctor:   If there is any  chance you may be pregnant or if you are breastfeeding.   If you have problems lying in small spaces (claustrophobia). If you do, your doctor may give you medicine to help you relax. If you have diabetes:   Have your exam early in the morning. Your blood glucose will go up as the day goes by.   Your glucose level must be 180 or less at the start of the exam. Please take any oral diabetic medication you need to ensure this blood glucose level is below 180, but no insulin 4 hours prior to the exam   If you are taking insulin in the morning take with breakfast by 6 am and schedule procedure between 12 and 2:15 pm.   If you are taking insulin at night take nightly dose, fast overnight, schedule procedure before 10 am.   If you take insulin both morning and night take morning dose by 6am and schedule procedure between 12 and 2:15 pm.   24 hours before your scan: Don t do any heavy exercise. (No jogging, aerobics or other workouts.) Exercise will make your pictures less accurate.  Patients should eat a low carb, high protein meal 7 hours (or the last meal you eat) before the scan. Low carb, high protein meals consist of lean meats, seafood, beans, soy, low-fat dairy, eggs, nuts & seeds. 6 hours before your scan:   Stop all food and liquids (except water).   Do not chew gum or suck on mints.   If you need to take medicine with food, you may take it with a few crackers.  Please call your Imaging Department at your exam site with any questions.            Aug 29, 2018  8:45 AM CDT   (Arrive by 8:30 AM)   Return Visit with Magalie Espinal MD   [facility] ([facility])    [address]  [address]  [facility] [place] [id number] [phone]            Sep 11, 2018 10:15 AM CDT   Adult Med Follow UP with JELLY Chapa CNS   [facility]iatry Clinic ([facility])    [place] [id number]  [address] [id number] [phone]               Who to contact     Please call your clinic at [phone] to:    Ask questions about your health    Make or cancel appointments    Discuss your medicines    Learn about your test results    Speak to your doctor            Additional Information About Your Visit        SureFirehart Information     iPourit gives you secure access to your electronic health record. If you see a primary care provider, you can also send messages to your care team and make appointments. If you have questions, please call your primary care clinic.  If you do not have a primary care provider, please call [phone] and they will assist you.      iPourit is an electronic gateway that provides easy, online access to your medical records. With iPourit, you can request a clinic appointment, read your test results, renew a prescription or communicate with your care team.     To access your existing account, please contact your [facility] or call [phone] for assistance.        Care EveryWhere ID     This is your Care EveryWhere ID. This could be used by other organizations to access your [place] medical records  [id number]        Your Vitals Were     Pulse Temperature Pulse Oximetry BMI (Body Mass Index)          128 97.7  F (36.5  C) (Oral) 97% 20.3 kg/m2         Blood Pressure from Last 3 Encounters:   08/16/18 108/76   08/06/18 128/80   07/25/18 120/77    Weight from Last 3 Encounters:   08/16/18 67.9 kg (149 lb 11.2 oz)   08/04/18 69.7 kg (153 lb 11.2 oz)   07/25/18 69.6 kg (153 lb 8 oz)              Today, you had the following     No orders found for display         Today's Medication Changes          These changes are accurate as of 8/16/18 11:16 AM.  If you have any questions, ask your nurse or doctor.               These medicines have changed or have updated prescriptions.        Dose/Directions    amLODIPine 5 MG tablet   Commonly known as:  NORVASC   This may have changed:  when to take  this   Used for:  Hypertension goal BP (blood pressure) < 140/90        Dose:  5 mg   Take 1 tablet (5 mg) by mouth daily   Quantity:  90 tablet   Refills:  3       diazepam 5 MG tablet   Commonly known as:  VALIUM   This may have changed:    - how much to take  - how to take this  - when to take this  - reasons to take this  - additional instructions   Used for:  Depressive disorder        Take up to one tablet by mouth daily as needed for anxiety   Quantity:  30 tablet   Refills:  5                Primary Care Provider Office Phone # Fax #    Katie Ramos [phone] [address]  [facility] [address]        Equal Access to Services     [facility]: Hadii violette Colmenares, walashon eastman, slim highmaondina huddleston, lloyd rene . So [facility] [phone].    ATENCIÓN: Si habla español, tiene a hernadez disposición servicios gratuitos de asistencia lingüística. [facility] [phone].    We comply with applicable federal civil rights laws and [place] laws. We do not discriminate on the basis of race, color, national origin, age, disability, sex, sexual orientation, or gender identity.            Thank you!     Thank you for choosing [facility] UROLOGY AND [facility] FOR PROSTATE AND UROLOGIC CANCERS  for your care. Our goal is always to provide you with excellent care. Hearing back from our patients is one way we can continue to improve our services. Please take a few minutes to complete the written survey that you may receive in the mail after your visit with us. Thank you!             Your Updated Medication List - Protect others around you: Learn how to safely use, store and throw away your medicines at www.disposemymeds.org.          This list is accurate as of 8/16/18 11:16 AM.  Always use your most recent med list.                   Brand Name Dispense Instructions for use Diagnosis    acetaminophen 325 MG tablet    TYLENOL    100 tablet    Take 2 tablets  (650 mg) by mouth every 4 hours as needed for mild pain or fever    Malignant neoplasm of urinary bladder, unspecified site (H)       amLODIPine 5 MG tablet    NORVASC    90 tablet    Take 1 tablet (5 mg) by mouth daily    Hypertension goal BP (blood pressure) < 140/90       diazepam 5 MG tablet    VALIUM    30 tablet    Take up to one tablet by mouth daily as needed for anxiety    Depressive disorder       * KOGENATE FS 3000 units Kit     56869 Units    Infuse Kogenate at 3000 Units +/- 10% IV Q 24 hours until 8/13/2018.    Severe hemophilia A (H), Malignant neoplasm of urinary bladder, unspecified site (H), Hemophilic arthropathy       * KOGENATE FS 1000 units Kit     45059 each    Infuse 3000 iu (-5/+10%) IV every Mondays, Wednesdays and Fridays.    Severe hemophilia A (H), Personal history of malignant neoplasm of bladder       lisinopril 40 MG tablet    PRINIVIL/ZESTRIL    30 tablet    Take 1 tablet (40 mg) by mouth every morning (HOLD UNTIL FOLLOW-UP with Primary Care Provider)    Hypertension goal BP (blood pressure) < 140/90       mirtazapine 45 MG tablet    REMERON    37 tablet    Take 1 tablet (45 mg) by mouth At Bedtime And may take 1/2 tablet for insomnia up to 3 times a week.    Generalized anxiety disorder       order for DME     1 each    Equipment being ordered: Walker Wheels () and Walker () Treatment Diagnosis: gait instability    Severe hemophilia A (H)       oxyCODONE IR 5 MG tablet    ROXICODONE    12 tablet    Take 1 tablet (5 mg) by mouth every 4 hours as needed for moderate to severe pain    Malignant neoplasm of urinary bladder, unspecified site (H)       polyethylene glycol Packet    MIRALAX/GLYCOLAX    30 packet    Take 17 g by mouth daily (to prevent constipation)    Malignant neoplasm of urinary bladder, unspecified site (H)       * Notice:  This list has 2 medication(s) that are the same as other medications prescribed for you. Read the directions carefully, and ask your  doctor or other care provider to review them with you.

## 2018-08-20 ENCOUNTER — PRE VISIT (OUTPATIENT)
Dept: UROLOGY | Facility: CLINIC | Age: 68
End: 2018-08-20

## 2018-08-20 NOTE — TELEPHONE ENCOUNTER
Reason for visit: Post op      Relevant information: cystoprostatectomy with ileal conduit    Records/imaging/labs: all records available    Pt called: No need for a call    Rooming: regular

## 2018-08-22 ENCOUNTER — OFFICE VISIT (OUTPATIENT)
Dept: UROLOGY | Facility: CLINIC | Age: 68
End: 2018-08-22
Payer: MEDICARE

## 2018-08-22 ENCOUNTER — OFFICE VISIT (OUTPATIENT)
Dept: WOUND CARE | Facility: CLINIC | Age: 68
End: 2018-08-22
Payer: MEDICARE

## 2018-08-22 ENCOUNTER — PATIENT OUTREACH (OUTPATIENT)
Dept: CARE COORDINATION | Facility: CLINIC | Age: 68
End: 2018-08-22

## 2018-08-22 VITALS
SYSTOLIC BLOOD PRESSURE: 111 MMHG | OXYGEN SATURATION: 96 % | HEART RATE: 114 BPM | DIASTOLIC BLOOD PRESSURE: 85 MMHG | BODY MASS INDEX: 20.21 KG/M2 | HEIGHT: 72 IN | WEIGHT: 149.2 LBS

## 2018-08-22 DIAGNOSIS — C67.8 MALIGNANT NEOPLASM OF OVERLAPPING SITES OF BLADDER (H): Primary | ICD-10-CM

## 2018-08-22 DIAGNOSIS — Z71.89 OSTOMY NURSE CONSULTATION: Primary | ICD-10-CM

## 2018-08-22 ASSESSMENT — PAIN SCALES - GENERAL: PAINLEVEL: NO PAIN (0)

## 2018-08-22 NOTE — MR AVS SNAPSHOT
After Visit Summary   8/22/2018    Orlin Alcantar    MRN: [id number]           Patient Information     Date Of Birth          1950        Visit Information        Provider Department      8/22/2018 3:00 PM Bebeto Shea MD [facility] Urology and [facility] Prostate and Urologic C[facility]        Today's Diagnoses     Malignant neoplasm of overlapping sites of bladder (H)    -  1      Care Instructions    Return in 3 months with labs and imaging.    It was a pleasure meeting with you today.  Thank you for allowing me and my team the privilege of caring for you today.  YOU are the reason we are here, and I truly hope we provided you with the excellent service you deserve.  Please let us know if there is anything else we can do for you so that we can be sure you are leaving completely satisfied with your care experience.                  Follow-ups after your visit        Your next 10 appointments already scheduled     Nov 16, 2018  9:00 AM CST   CT CHEST/ABDOMEN/PELVIS W CONTRAST with UCCT2   [facility] CT ([facility])    [address] [place] [address] [phone]           How do I prepare for my exam? (Food and drink instructions) To prepare: Do not eat or drink for 2 hours before your exam. If you need to take medicine, you may take it with small sips of water. (We may ask you to take liquid medicine as well.)  How do I prepare for my exam? (Other instructions) Please arrive 30 minutes early for your CT.  Once in the department you might be asked to drink water 15-20 minutes prior to your exam.  If indicated you may be asked to drink an oral contrast in advance of your CT.  If this is the case, the imaging team will let you know or be in contact with you prior to your appointment  Patients over 70 or patients with diabetes or kidney problems: If you haven t had a blood test (creatinine test) within the last 30 days, the  Cardiologist/Radiologist may require you to get this test prior to your exam.  If you have diabetes:  Continue to take your metformin medication on the day of your exam  What should I wear: Please wear loose clothing, such as a sweat suit or jogging clothes. Avoid snaps, zippers and other metal. We may ask you to undress and put on a hospital gown.  How long does the exam take: Most scans take less than 20 minutes.  What should I bring: Please bring any scans or X-rays taken at other hospitals, if similar tests were done. Also bring a list of your medicines, including vitamins, minerals and over-the-counter drugs. It is safest to leave personal items at home.  Do I need a : No  is needed.  What do I need to tell my doctor? Be sure to tell your doctor: * If you have any allergies. * If there s any chance you are pregnant. * If you are breastfeeding.  What should I do after the exam: No restrictions, You may resume normal activities.  What is this test: A CT (computed tomography) scan is a series of pictures that allows us to look inside your body. The scanner creates images of the body in cross sections, much like slices of bread. This helps us see any problems more clearly. You may receive contrast (X-ray dye) before or during your scan. You will be asked to drink the contrast.  Who should I call with questions: If you have any questions, please call the Imaging Department where you will have your exam. Directions, parking instructions, and other information is available on our website, [email or web address].            Nov 19, 2018  9:15 AM CST   [facility] Lab Draw with  [facility] LAB DRAW   [facility] Lab Draw ([facility])    [address] [place] [address]  [address] [place] [address] [phone]            Nov 19, 2018  9:45 AM CST   (Arrive by 9:30 AM)   Return Visit with Magalie Espinal MD   [facility] ([facility])    [address]  [address]  [facility] [address]   [phone]            Dec 12, 2018 12:15 PM CST   LAB with UC LAB   [facility] ([facility])    [address] [address] [phone]           Please do not eat 10-12 hours before your appointment if you are coming in fasting for labs on lipids, cholesterol, or glucose (sugar). This does not apply to pregnant women. Water, hot tea and black coffee (with nothing added) are okay. Do not drink other fluids, diet soda or chew gum.            Dec 12, 2018 12:40 PM CST   CT ABDOMEN PELVIS W CONTRAST with UCCT2   [facility] CT ([facility])    [address] [address] [phone]           How do I prepare for my exam? (Food and drink instructions) To prepare: Do not eat or drink for 2 hours before your exam. If you need to take medicine, you may take it with small sips of water. (We may ask you to take liquid medicine as well.)  How do I prepare for my exam? (Other instructions) Please arrive 30 minutes early for your CT.  Once in the department you might be asked to drink water 15-20 minutes prior to your exam.  If indicated you may be asked to drink an oral contrast in advance of your CT.  If this is the case, the imaging team will let you know or be in contact with you prior to your appointment  Patients over 70 or patients with diabetes or kidney problems: If you haven t had a blood test (creatinine test) within the last 30 days, the Cardiologist/Radiologist may require you to get this test prior to your exam.  If you have diabetes:  Continue to take your metformin medication on the day of your exam  What should I wear: Please wear loose clothing, such as a sweat suit or jogging clothes. Avoid snaps, zippers and other metal. We may ask you to undress and put on a hospital gown.  How long does the exam take: Most scans take  less than 20 minutes.  What should I bring: Please bring any scans or X-rays taken at other hospitals, if similar tests were done. Also bring a list of your medicines, including vitamins, minerals and over-the-counter drugs. It is safest to leave personal items at home.  Do I need a : No  is needed.  What do I need to tell my doctor? Be sure to tell your doctor: * If you have any allergies. * If there s any chance you are pregnant. * If you are breastfeeding.  What should I do after the exam: No restrictions, You may resume normal activities.  What is this test: A CT (computed tomography) scan is a series of pictures that allows us to look inside your body. The scanner creates images of the body in cross sections, much like slices of bread. This helps us see any problems more clearly. You may receive contrast (X-ray dye) before or during your scan. You will be asked to drink the contrast.  Who should I call with questions: If you have any questions, please call the Imaging Department where you will have your exam. Directions, parking instructions, and other information is available on our website, [email or web address].            Dec 12, 2018  1:20 PM CST   (Arrive by 1:05 PM)   Return Visit with Bebeto Shea MD   [facility] Urology and [facility] for Prostate and Urologic Cancers ([facility])    [address] [id number] [phone]            Mar 26, 2019 10:15 AM CDT   Adult Med Follow UP with JELLY Chapa CNS   Psychiatry Clinic ([facility])    [place] [id number]  [address] [address] [phone]              Future tests that were ordered for you today     Open Future Orders        Priority Expected Expires Ordered    CT Chest/Abdomen/Pelvis w Contrast Routine 11/7/2018 9/13/2019 9/12/2018            Who to contact     Please call your clinic at [phone] to:    Ask  questions about your health    Make or cancel appointments    Discuss your medicines    Learn about your test results    Speak to your doctor            Additional Information About Your Visit        [employer] Information     [employer] gives you secure access to your electronic health record. If you see a primary care provider, you can also send messages to your care team and make appointments. If you have questions, please call your primary care clinic.  If you do not have a primary care provider, please call [phone] and they will assist you.      [employer] is an electronic gateway that provides easy, online access to your medical records. With [employer], you can request a clinic appointment, read your test results, renew a prescription or communicate with your care team.     To access your existing account, please contact your [facility] or call [phone] for assistance.        Care EveryWhere ID     This is your Care EveryWhere ID. This could be used by other organizations to access your [place] medical records  [id number]        Your Vitals Were     Pulse Height Pulse Oximetry BMI (Body Mass Index)          114 1.829 m (6') 96% 20.24 kg/m2         Blood Pressure from Last 3 Encounters:   09/11/18 101/72   08/29/18 109/75   08/22/18 111/85    Weight from Last 3 Encounters:   09/11/18 67.8 kg (149 lb 8 oz)   08/29/18 68.7 kg (151 lb 6 oz)   08/22/18 67.7 kg (149 lb 3.2 oz)                 Today's Medication Changes          These changes are accurate as of 8/22/18 11:59 PM.  If you have any questions, ask your nurse or doctor.               These medicines have changed or have updated prescriptions.        Dose/Directions    amLODIPine 5 MG tablet   Commonly known as:  NORVASC   This may have changed:  when to take this   Used for:  Hypertension goal BP (blood pressure) < 140/90        Dose:  5 mg   Take 1 tablet (5 mg) by mouth daily   Quantity:  90 tablet   Refills:  3                 Primary Care Provider Office Phone # Fax #    Katie Ramos [phone] [address]  [facility] [address]        Equal Access to Services     ELISABETHREENA PANCHITO : Hadii violette owens rin Colmenares, wapamda luqadaha, qaybta katerrenceda karo, lloyd corderomarj blum. So [facility] [phone].    ATENCIÓN: Si habla español, tiene a hernadez disposición servicios gratuitos de asistencia lingüística. Llame al [phone].    We comply with applicable federal civil rights laws and [place] laws. We do not discriminate on the basis of race, color, national origin, age, disability, sex, sexual orientation, or gender identity.            Thank you!     Thank you for choosing [facility] UROLOGY AND [facility] FOR PROSTATE AND UROLOGIC CANCERS  for your care. Our goal is always to provide you with excellent care. Hearing back from our patients is one way we can continue to improve our services. Please take a few minutes to complete the written survey that you may receive in the mail after your visit with us. Thank you!             Your Updated Medication List - Protect others around you: Learn how to safely use, store and throw away your medicines at www.disposemymeds.org.          This list is accurate as of 8/22/18 11:59 PM.  Always use your most recent med list.                   Brand Name Dispense Instructions for use Diagnosis    acetaminophen 325 MG tablet    TYLENOL    100 tablet    Take 2 tablets (650 mg) by mouth every 4 hours as needed for mild pain or fever    Malignant neoplasm of urinary bladder, unspecified site (H)       amLODIPine 5 MG tablet    NORVASC    90 tablet    Take 1 tablet (5 mg) by mouth daily    Hypertension goal BP (blood pressure) < 140/90       lisinopril 40 MG tablet    PRINIVIL/ZESTRIL    30 tablet    Take 1 tablet (40 mg) by mouth every morning (HOLD UNTIL FOLLOW-UP with Primary Care Provider)    Hypertension goal BP (blood pressure) < 140/90       order for DME      1 each    Equipment being ordered: Walker Wheels () and Walker () Treatment Diagnosis: gait instability    Severe hemophilia A (H)       polyethylene glycol Packet    MIRALAX/GLYCOLAX    30 packet    Take 17 g by mouth daily (to prevent constipation)    Malignant neoplasm of urinary bladder, unspecified site (H)

## 2018-08-22 NOTE — PROGRESS NOTES
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 During pre-op TURBT on 2/19/18 the right UO was involved in the resection, so a right PNT was placed. His post-op course was without complication and he was discharged on POD7 with right PNT and JOSE E drain in place. His right PNT and staples were both removed on 8/16/18 without difficulty. The JOSE E drain also fell out \"a while ago,\" and has healed over. He reports no pain, regular bowel movements, and good urine output without hematuria. He has been making a conscious effort to stay hydrated and consistently makes a minimum of 2L clear yellow urine overnight.    Objective:  /85 (BP Location: Right arm, Patient Position: Sitting, Cuff Size: Adult Regular)  Pulse 114  Ht 1.829 m (6')  Wt 67.7 kg (149 lb 3.2 oz)  SpO2 96%  BMI 20.24 kg/m2  Gen: In NAD, conversant.  Resp: Breathing non-labored on room air  Abd: Soft, non-distended, non-tender. Well-healed midline incision. Stoma to RLQ pink and well-perfused with clear yellow urine in the bag.     Current Meds:   Current Outpatient Prescriptions   Medication     acetaminophen (TYLENOL) 325 MG tablet     amLODIPine (NORVASC) 5 MG tablet     Antihemophilic Factor, Recomb, (KOGENATE FS) 1000 units KIT     Antihemophilic Factor, Recomb, (KOGENATE FS) 3000 units KIT     diazepam (VALIUM) 5 MG tablet     lisinopril (PRINIVIL/ZESTRIL) 40 MG tablet     mirtazapine (REMERON) 45 MG tablet     order for DME     oxyCODONE IR (ROXICODONE) 5 MG tablet     polyethylene glycol (MIRALAX/GLYCOLAX) Packet     No current facility-administered medications for this visit.      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 His post-op course has been uncomplicated and he is healing well.     - PET scan 8/13/18    - F/u with oncology (Dr. Espinal) 8/29/18    - Return to clinic in 3 months with CT scan, BMP, and CBC beforehand         As the medical student, I acted as a scribe for this note. All portions of the documented history and physical were personally performed by Dr. Shea as the attending physician.   --  Marianne Garcia, MS4    Patient seen and examined by me.  I agree with the medical student's documentation of the encounter.   I obtained the history and examined the patient and student only acted as a scribe.  Total visit time 15 minutes with >50% spent in counseling  Bebeto Shea MD

## 2018-08-22 NOTE — NURSING NOTE
Chief Complaint   Patient presents with     Surgical Followup     cystoprostatectomy with ileal conduit         John Rockwell MA

## 2018-08-22 NOTE — MR AVS SNAPSHOT
After Visit Summary   8/22/2018    Orlin Alcantar    MRN: [id number]           Patient Information     Date Of Birth          1950        Visit Information        Provider Department      8/22/2018 1:30 PM Orlin Ball RN [facility] Wound Ostomy        Today's Diagnoses     Ostomy nurse consultation    -  1       Follow-ups after your visit        Your next 10 appointments already scheduled     Aug 22, 2018  3:00 PM CDT   (Arrive by 2:45 PM)   Post-Op with Bebeto Shea MD   [facility] and [facility]rologic Cancers ([facility])    [address]  4th Floor  [facility] [address]   [phone]            Aug 27, 2018  9:15 AM CDT   [facility] Draw with  [facility] DRAW   [facility] Lab Draw ([facility])    [address]  [address]  [facility] [address] [phone]            Aug 27, 2018 10:30 AM CDT   (Arrive by 10:00 AM)   PET ONCOLOGY WHOLE BODY with UMPPET1   [facility] ([facility])    [address] [place] [facility] [address]   [phone]           Tell your doctor:   If there is any chance you may be pregnant or if you are breastfeeding.   If you have problems lying in small spaces (claustrophobia). If you do, your doctor may give you medicine to help you relax. If you have diabetes:   Have your exam early in the morning. Your blood glucose will go up as the day goes by.   Your glucose level must be 180 or less at the start of the exam. Please take any oral diabetic medication you need to ensure this blood glucose level is below 180, but no insulin 4 hours prior to the exam   If you are taking insulin in the morning take with breakfast by 6 am and schedule procedure between 12 and 2:15 pm.   If you are taking insulin at night take nightly dose, fast overnight, schedule procedure before 10 am.   If you take insulin both morning and night take  morning dose by 6am and schedule procedure between 12 and 2:15 pm.   24 hours before your scan: Don t do any heavy exercise. (No jogging, aerobics or other workouts.) Exercise will make your pictures less accurate.  Patients should eat a low carb, high protein meal 7 hours (or the last meal you eat) before the scan. Low carb, high protein meals consist of lean meats, seafood, beans, soy, low-fat dairy, eggs, nuts & seeds. 6 hours before your scan:   Stop all food and liquids (except water).   Do not chew gum or suck on mints.   If you need to take medicine with food, you may take it with a few crackers.  Please call your Imaging Department at your exam site with any questions.            Aug 29, 2018  8:45 AM CDT   (Arrive by 8:30 AM)   Return Visit with Magalie Espinal MD   [facility] ([facility])    [address]  [address]  [facility] [address] [phone]            Sep 11, 2018 10:15 AM CDT   Adult Med Follow UP with JELLY Chapa CNS   Psychiatry Clinic ([facility])    [place] [id number]  [address] [address] [phone]              Who to contact     Please call your clinic at [phone] to:    Ask questions about your health    Make or cancel appointments    Discuss your medicines    Learn about your test results    Speak to your doctor            Additional Information About Your Visit        [employer] Information     [employer] gives you secure access to your electronic health record. If you see a primary care provider, you can also send messages to your care team and make appointments. If you have questions, please call your primary care clinic.  If you do not have a primary care provider, please call [phone] and they will assist you.      [employer] is an electronic gateway that provides easy, online access to your medical records. With [employer], you can request a clinic  appointment, read your test results, renew a prescription or communicate with your care team.     To access your existing account, please contact your [facility] or call [phone] for assistance.        Care EveryWhere ID     This is your Care EveryWhere ID. This could be used by other organizations to access your [facility] medical records  [id number]         Blood Pressure from Last 3 Encounters:   08/16/18 108/76   08/06/18 128/80   07/25/18 120/77    Weight from Last 3 Encounters:   08/16/18 67.9 kg (149 lb 11.2 oz)   08/04/18 69.7 kg (153 lb 11.2 oz)   07/25/18 69.6 kg (153 lb 8 oz)              Today, you had the following     No orders found for display         Today's Medication Changes          These changes are accurate as of 8/22/18  1:51 PM.  If you have any questions, ask your nurse or doctor.               These medicines have changed or have updated prescriptions.        Dose/Directions    amLODIPine 5 MG tablet   Commonly known as:  NORVASC   This may have changed:  when to take this   Used for:  Hypertension goal BP (blood pressure) < 140/90        Dose:  5 mg   Take 1 tablet (5 mg) by mouth daily   Quantity:  90 tablet   Refills:  3       diazepam 5 MG tablet   Commonly known as:  VALIUM   This may have changed:    - how much to take  - how to take this  - when to take this  - reasons to take this  - additional instructions   Used for:  Depressive disorder        Take up to one tablet by mouth daily as needed for anxiety   Quantity:  30 tablet   Refills:  5                Primary Care Provider Office Phone # Fax #    Katie Ramos [phone] [address] [place] [address]        Equal Access to Services     ANDREW FLANAGAN : wilber Mora, llyod muniz. So [facility] [phone].    ATENCIÓN: Si habla español, tiene a hernadez disposición servicios  milady de asistencia lingüística. Perry benites [phone].    We comply with applicable federal civil rights laws and [place] laws. We do not discriminate on the basis of race, color, national origin, age, disability, sex, sexual orientation, or gender identity.            Thank you!     Thank you for choosing [facility] O[facility]  for your care. Our goal is always to provide you with excellent care. Hearing back from our patients is one way we can continue to improve our services. Please take a few minutes to complete the written survey that you may receive in the mail after your visit with us. Thank you!             Your Updated Medication List - Protect others around you: Learn how to safely use, store and throw away your medicines at www.disposemymeds.org.          This list is accurate as of 8/22/18  1:51 PM.  Always use your most recent med list.                   Brand Name Dispense Instructions for use Diagnosis    acetaminophen 325 MG tablet    TYLENOL    100 tablet    Take 2 tablets (650 mg) by mouth every 4 hours as needed for mild pain or fever    Malignant neoplasm of urinary bladder, unspecified site (H)       amLODIPine 5 MG tablet    NORVASC    90 tablet    Take 1 tablet (5 mg) by mouth daily    Hypertension goal BP (blood pressure) < 140/90       diazepam 5 MG tablet    VALIUM    30 tablet    Take up to one tablet by mouth daily as needed for anxiety    Depressive disorder       * KOGENATE FS 3000 units Kit     00942 Units    Infuse Kogenate at 3000 Units +/- 10% IV Q 24 hours until 8/13/2018.    Severe hemophilia A (H), Malignant neoplasm of urinary bladder, unspecified site (H), Hemophilic arthropathy       * KOGENATE FS 1000 units Kit     27636 each    Infuse 3000 iu (-5/+10%) IV every Mondays, Wednesdays and Fridays.    Severe hemophilia A (H), Personal history of malignant neoplasm of bladder       lisinopril 40 MG tablet    PRINIVIL/ZESTRIL    30 tablet    Take 1 tablet (40 mg) by mouth  every morning (HOLD UNTIL FOLLOW-UP with Primary Care Provider)    Hypertension goal BP (blood pressure) < 140/90       mirtazapine 45 MG tablet    REMERON    37 tablet    Take 1 tablet (45 mg) by mouth At Bedtime And may take 1/2 tablet for insomnia up to 3 times a week.    Generalized anxiety disorder       order for DME     1 each    Equipment being ordered: Walker Wheels () and Walker () Treatment Diagnosis: gait instability    Severe hemophilia A (H)       oxyCODONE IR 5 MG tablet    ROXICODONE    12 tablet    Take 1 tablet (5 mg) by mouth every 4 hours as needed for moderate to severe pain    Malignant neoplasm of urinary bladder, unspecified site (H)       polyethylene glycol Packet    MIRALAX/GLYCOLAX    30 packet    Take 17 g by mouth daily (to prevent constipation)    Malignant neoplasm of urinary bladder, unspecified site (H)       * Notice:  This list has 2 medication(s) that are the same as other medications prescribed for you. Read the directions carefully, and ask your doctor or other care provider to review them with you.

## 2018-08-22 NOTE — LETTER
"8/22/2018       RE: Orlin Alcantar  [address]  [place] [address]     Dear Colleague,    Thank you for referring your patient, Orlin Alcantar, to the [facility] UROLOGY AND INST FOR PROSTATE AND UROLOGIC CANCERS at [facility]. Please see a copy of my visit note below.    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 During pre-op TURBT on 2/19/18 the right UO was involved in the resection, so a right PNT was placed. His post-op course was without complication and he was discharged on POD7 with right PNT and JOSE E drain in place. His right PNT and staples were both removed on 8/16/18 without difficulty. The JOSE E drain also fell out \"a while ago,\" and has healed over. He reports no pain, regular bowel movements, and good urine output without hematuria. He has been making a conscious effort to stay hydrated and consistently makes a minimum of 2L clear yellow urine overnight.    Objective:  /85 (BP Location: Right arm, Patient Position: Sitting, Cuff Size: Adult Regular)  Pulse 114  Ht 1.829 m (6')  Wt 67.7 kg (149 lb 3.2 oz)  SpO2 96%  BMI 20.24 kg/m2  Gen: In NAD, conversant.  Resp: Breathing non-labored on room air  Abd: Soft, non-distended, non-tender. Well-healed midline incision. Stoma to RLQ pink and well-perfused with clear yellow urine in the bag.     Current Meds:   Current Outpatient Prescriptions   Medication     acetaminophen (TYLENOL) 325 MG tablet     amLODIPine (NORVASC) 5 MG tablet     Antihemophilic Factor, Recomb, (KOGENATE FS) 1000 units KIT     Antihemophilic Factor, Recomb, (KOGENATE FS) 3000 units KIT     diazepam (VALIUM) 5 MG tablet "     lisinopril (PRINIVIL/ZESTRIL) 40 MG tablet     mirtazapine (REMERON) 45 MG tablet     order for DME     oxyCODONE IR (ROXICODONE) 5 MG tablet     polyethylene glycol (MIRALAX/GLYCOLAX) Packet     No current facility-administered medications for this visit.      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 His post-op course has been uncomplicated and he is healing well.     - PET scan 8/13/18    - F/u with oncology (Dr. Espinal) 8/29/18    - Return to clinic in 3 months with CT scan, BMP, and CBC beforehand         As the medical student, I acted as a scribe for this note. All portions of the documented history and physical were personally performed by Dr. Shea as the attending physician.   --  Marianne Garcia, MS4    Patient seen and examined by me.  I agree with the medical student's documentation of the encounter.   I obtained the history and examined the patient and student only acted as a scribe.  Total visit time 15 minutes with >50% spent in counseling  Bebeto Shea MD

## 2018-08-22 NOTE — PROGRESS NOTES
WO Ostomy Assessment  Patient comes to clinic stating that he has had difficulty with pouch leaking. He stated that he had been getting 3-4 days of wear time the week before last, but that last week his pouches have been lasting only 1-2 days. He stated that he just put the pouch that he has on yesterday and it has been working well he therefore did not want me to take it off to examine his skin.      He is here with himself and ostomy care is provided by self   Procedure:   Dx related to ostomy: Bladder Cancer  Consulted per Dr Bindu Shea  Subjective:    Objective:  Type: Urostomy  Stoma:  viable, healthy, beefy red, round, moist and protruberant  Mucutaneous junction: not visualized barrier in place  Peristomal skin: not visualized (barrier in place)  and barrier is intact   Output: Yellow and clear urine  Location: right   Hernia Belt measurement done: No  Wear time average:1-2 days  Received home care WO assessment after discharge:Yes    Current pouch system/supplies: two piece, cut to fit, flat and barrier ring    Assessment:     Intervention/Plan: Removal of pouch, Applying appliance to abdomen and Peristomal skin care     Return to clinic  if not improving.      Dr Corbett was available for supervision of care if needed or if questions should arise and regarding plan of care.      Orlin Ball RN CWON

## 2018-08-22 NOTE — PATIENT INSTRUCTIONS
Return in 3 months with labs and imaging.    It was a pleasure meeting with you today.  Thank you for allowing me and my team the privilege of caring for you today.  YOU are the reason we are here, and I truly hope we provided you with the excellent service you deserve.  Please let us know if there is anything else we can do for you so that we can be sure you are leaving completely satisfied with your care experience.

## 2018-08-24 ENCOUNTER — TELEPHONE (OUTPATIENT)
Dept: FAMILY MEDICINE | Facility: CLINIC | Age: 68
End: 2018-08-24

## 2018-08-24 NOTE — TELEPHONE ENCOUNTER
Per the patients home care nurse the patient is being discharged from home care effective today, per patient request.

## 2018-08-27 DIAGNOSIS — Z00.6 EXAMINATION OF PARTICIPANT IN CLINICAL TRIAL: Primary | ICD-10-CM

## 2018-08-27 DIAGNOSIS — C67.8 MALIGNANT NEOPLASM OF OVERLAPPING SITES OF BLADDER (H): ICD-10-CM

## 2018-08-27 DIAGNOSIS — C67.9 MALIGNANT NEOPLASM OF URINARY BLADDER, UNSPECIFIED SITE (H): ICD-10-CM

## 2018-08-27 DIAGNOSIS — M36.2 HEMOPHILIC ARTHROPATHY (H): ICD-10-CM

## 2018-08-27 DIAGNOSIS — Z85.51 PERSONAL HISTORY OF MALIGNANT NEOPLASM OF BLADDER: ICD-10-CM

## 2018-08-27 DIAGNOSIS — D66 SEVERE HEMOPHILIA A (H): ICD-10-CM

## 2018-08-27 DIAGNOSIS — D66 HEMOPHILIC ARTHROPATHY (H): ICD-10-CM

## 2018-08-27 LAB
ANION GAP SERPL CALCULATED.3IONS-SCNC: 8 MMOL/L (ref 3–14)
BUN SERPL-MCNC: 18 MG/DL (ref 7–30)
CALCIUM SERPL-MCNC: 9.4 MG/DL (ref 8.5–10.1)
CHLORIDE SERPL-SCNC: 107 MMOL/L (ref 94–109)
CO2 SERPL-SCNC: 24 MMOL/L (ref 20–32)
CREAT SERPL-MCNC: 1 MG/DL (ref 0.66–1.25)
ERYTHROCYTE [DISTWIDTH] IN BLOOD BY AUTOMATED COUNT: 13.5 % (ref 10–15)
GFR SERPL CREATININE-BSD FRML MDRD: 75 ML/MIN/1.7M2
GLUCOSE SERPL-MCNC: 101 MG/DL (ref 70–99)
HCT VFR BLD AUTO: 34.9 % (ref 40–53)
HGB BLD-MCNC: 11 G/DL (ref 13.3–17.7)
MCH RBC QN AUTO: 28.1 PG (ref 26.5–33)
MCHC RBC AUTO-ENTMCNC: 31.5 G/DL (ref 31.5–36.5)
MCV RBC AUTO: 89 FL (ref 78–100)
PLATELET # BLD AUTO: 281 10E9/L (ref 150–450)
POTASSIUM SERPL-SCNC: 3.9 MMOL/L (ref 3.4–5.3)
RBC # BLD AUTO: 3.92 10E12/L (ref 4.4–5.9)
SODIUM SERPL-SCNC: 139 MMOL/L (ref 133–144)
WBC # BLD AUTO: 5.3 10E9/L (ref 4–11)

## 2018-08-27 PROCEDURE — 80048 BASIC METABOLIC PNL TOTAL CA: CPT | Performed by: UROLOGY

## 2018-08-27 PROCEDURE — 40000556 ZZH STATISTIC PERIPHERAL IV START W US GUIDANCE

## 2018-08-27 PROCEDURE — 85027 COMPLETE CBC AUTOMATED: CPT | Performed by: UROLOGY

## 2018-08-27 PROCEDURE — 84100 ASSAY OF PHOSPHORUS: CPT | Performed by: UROLOGY

## 2018-08-27 PROCEDURE — 83735 ASSAY OF MAGNESIUM: CPT | Performed by: UROLOGY

## 2018-08-27 PROCEDURE — 83615 LACTATE (LD) (LDH) ENZYME: CPT | Performed by: UROLOGY

## 2018-08-27 PROCEDURE — 84550 ASSAY OF BLOOD/URIC ACID: CPT | Performed by: UROLOGY

## 2018-08-27 PROCEDURE — 84443 ASSAY THYROID STIM HORMONE: CPT | Performed by: UROLOGY

## 2018-08-27 PROCEDURE — 80076 HEPATIC FUNCTION PANEL: CPT | Performed by: UROLOGY

## 2018-08-27 RX ORDER — ANTIHEMOPHILIC FACTOR (RECOMBINANT) 1000 (+/-)
KIT INTRAVENOUS
Qty: 12000 EACH | Refills: 3 | Status: SHIPPED | OUTPATIENT
Start: 2018-08-27 | End: 2018-09-27

## 2018-08-27 NOTE — NURSING NOTE
Chief Complaint   Patient presents with     Blood Draw     Labs drawn via PIV via RN Vascular Access     Labs drawn from PIV placed by RN Vascular Access. Line flushed with saline. Vitals taken. Pt checked in for appointment(s).    Selena BELL RN PHN BSN  BMT/Oncology Lab

## 2018-08-28 ENCOUNTER — RADIANT APPOINTMENT (OUTPATIENT)
Dept: PET IMAGING | Facility: CLINIC | Age: 68
End: 2018-08-28
Attending: INTERNAL MEDICINE
Payer: MEDICARE

## 2018-08-28 DIAGNOSIS — C67.8 MALIGNANT NEOPLASM OF OVERLAPPING SITES OF BLADDER (H): ICD-10-CM

## 2018-08-28 LAB
ALBUMIN SERPL-MCNC: 3.7 G/DL (ref 3.4–5)
ALP SERPL-CCNC: 113 U/L (ref 40–150)
ALT SERPL W P-5'-P-CCNC: 20 U/L (ref 0–70)
AST SERPL W P-5'-P-CCNC: 23 U/L (ref 0–45)
BILIRUB DIRECT SERPL-MCNC: 0.1 MG/DL (ref 0–0.2)
BILIRUB SERPL-MCNC: 0.3 MG/DL (ref 0.2–1.3)
GLUCOSE SERPL-MCNC: 107 MG/DL (ref 70–99)
LDH SERPL L TO P-CCNC: 233 U/L (ref 85–227)
MAGNESIUM SERPL-MCNC: 2.1 MG/DL (ref 1.6–2.3)
PHOSPHATE SERPL-MCNC: 3.4 MG/DL (ref 2.5–4.5)
PROT SERPL-MCNC: 7.8 G/DL (ref 6.8–8.8)
TSH SERPL DL<=0.005 MIU/L-ACNC: 0.7 MU/L (ref 0.4–4)
URATE SERPL-MCNC: 5.8 MG/DL (ref 3.5–7.2)

## 2018-08-28 RX ORDER — FUROSEMIDE 10 MG/ML
40 INJECTION INTRAMUSCULAR; INTRAVENOUS ONCE
Status: COMPLETED | OUTPATIENT
Start: 2018-08-28 | End: 2018-08-28

## 2018-08-28 RX ADMIN — FUROSEMIDE 40 MG: 10 INJECTION INTRAMUSCULAR; INTRAVENOUS at 10:30

## 2018-08-28 NOTE — DISCHARGE INSTRUCTIONS

## 2018-08-29 ENCOUNTER — ONCOLOGY VISIT (OUTPATIENT)
Dept: ONCOLOGY | Facility: CLINIC | Age: 68
End: 2018-08-29
Attending: INTERNAL MEDICINE
Payer: MEDICARE

## 2018-08-29 ENCOUNTER — APPOINTMENT (OUTPATIENT)
Dept: LAB | Facility: CLINIC | Age: 68
End: 2018-08-29
Attending: INTERNAL MEDICINE
Payer: MEDICARE

## 2018-08-29 ENCOUNTER — RESEARCH ENCOUNTER (OUTPATIENT)
Dept: ONCOLOGY | Facility: CLINIC | Age: 68
End: 2018-08-29

## 2018-08-29 VITALS
OXYGEN SATURATION: 98 % | HEIGHT: 72 IN | WEIGHT: 151.38 LBS | RESPIRATION RATE: 16 BRPM | DIASTOLIC BLOOD PRESSURE: 75 MMHG | HEART RATE: 93 BPM | TEMPERATURE: 97.6 F | BODY MASS INDEX: 20.5 KG/M2 | SYSTOLIC BLOOD PRESSURE: 109 MMHG

## 2018-08-29 DIAGNOSIS — Z53.9 DIAGNOSIS NOT YET DEFINED: Primary | ICD-10-CM

## 2018-08-29 DIAGNOSIS — C67.8 MALIGNANT NEOPLASM OF OVERLAPPING SITES OF BLADDER (H): Primary | ICD-10-CM

## 2018-08-29 DIAGNOSIS — C67.9 UROTHELIAL CARCINOMA OF BLADDER (H): ICD-10-CM

## 2018-08-29 DIAGNOSIS — Z00.6 EXAMINATION OF PARTICIPANT IN CLINICAL TRIAL: ICD-10-CM

## 2018-08-29 LAB
APTT PPP: 37 SEC (ref 22–37)
BASOPHILS # BLD AUTO: 0 10E9/L (ref 0–0.2)
BASOPHILS NFR BLD AUTO: 0.9 %
DIFFERENTIAL METHOD BLD: ABNORMAL
EOSINOPHIL # BLD AUTO: 0.8 10E9/L (ref 0–0.7)
EOSINOPHIL NFR BLD AUTO: 17.3 %
ERYTHROCYTE [DISTWIDTH] IN BLOOD BY AUTOMATED COUNT: 13.4 % (ref 10–15)
HCT VFR BLD AUTO: 35.8 % (ref 40–53)
HGB BLD-MCNC: 11.2 G/DL (ref 13.3–17.7)
IMM GRANULOCYTES # BLD: 0 10E9/L (ref 0–0.4)
IMM GRANULOCYTES NFR BLD: 0.2 %
INR PPP: 0.96 (ref 0.86–1.14)
LYMPHOCYTES # BLD AUTO: 0.9 10E9/L (ref 0.8–5.3)
LYMPHOCYTES NFR BLD AUTO: 20.2 %
MCH RBC QN AUTO: 28 PG (ref 26.5–33)
MCHC RBC AUTO-ENTMCNC: 31.3 G/DL (ref 31.5–36.5)
MCV RBC AUTO: 90 FL (ref 78–100)
MONOCYTES # BLD AUTO: 0.4 10E9/L (ref 0–1.3)
MONOCYTES NFR BLD AUTO: 9.8 %
NEUTROPHILS # BLD AUTO: 2.3 10E9/L (ref 1.6–8.3)
NEUTROPHILS NFR BLD AUTO: 51.6 %
NRBC # BLD AUTO: 0 10*3/UL
NRBC BLD AUTO-RTO: 0 /100
PLATELET # BLD AUTO: 243 10E9/L (ref 150–450)
RBC # BLD AUTO: 4 10E12/L (ref 4.4–5.9)
WBC # BLD AUTO: 4.4 10E9/L (ref 4–11)

## 2018-08-29 PROCEDURE — 84100 ASSAY OF PHOSPHORUS: CPT | Performed by: INTERNAL MEDICINE

## 2018-08-29 PROCEDURE — G0463 HOSPITAL OUTPT CLINIC VISIT: HCPCS

## 2018-08-29 PROCEDURE — 36415 COLL VENOUS BLD VENIPUNCTURE: CPT

## 2018-08-29 PROCEDURE — 99214 OFFICE O/P EST MOD 30 MIN: CPT | Mod: ZP | Performed by: INTERNAL MEDICINE

## 2018-08-29 PROCEDURE — 85610 PROTHROMBIN TIME: CPT | Performed by: INTERNAL MEDICINE

## 2018-08-29 PROCEDURE — G0463 HOSPITAL OUTPT CLINIC VISIT: HCPCS | Mod: ZF

## 2018-08-29 PROCEDURE — 84550 ASSAY OF BLOOD/URIC ACID: CPT | Performed by: INTERNAL MEDICINE

## 2018-08-29 PROCEDURE — 85730 THROMBOPLASTIN TIME PARTIAL: CPT | Performed by: INTERNAL MEDICINE

## 2018-08-29 ASSESSMENT — PAIN SCALES - GENERAL: PAINLEVEL: NO PAIN (0)

## 2018-08-29 NOTE — MR AVS SNAPSHOT
After Visit Summary   8/29/2018    Orlin Alcantar    MRN: [id number]           Patient Information     Date Of Birth          1950        Visit Information        Provider Department      8/29/2018 8:45 AM Magalie Espinal MD [facility]        Today's Diagnoses     Malignant neoplasm of overlapping sites of bladder (H)    -  1       Follow-ups after your visit        Your next 10 appointments already scheduled     Sep 11, 2018 10:15 AM CDT   Adult Med Follow UP with JELLY Chapa CNS   Psychiatry Clinic ([facility])    [address]  [address] [address] [phone]            Sep 11, 2018 11:30 AM CDT   COMPREHENSIVE CLINIC VISIT with Clement Griffin PA-C   [facility] ([facility])    [facility] [id number] [phone]              Who to contact     If you have questions or need follow up information about today's clinic visit or your schedule please contact [facility] directly at [phone].  Normal or non-critical lab and imaging results will be communicated to you by [employer], letter or phone within 4 business days after the clinic has received the results. If you do not hear from us within 7 days, please contact the clinic through [employer] or phone. If you have a critical or abnormal lab result, we will notify you by phone as soon as possible.  Submit refill requests through [employer] or call your pharmacy and they will forward the refill request to us. Please allow 3 business days for your refill to be completed.          Additional Information About Your Visit        MyChart Information     [employer] gives you secure access to your electronic health record. If you see a primary care provider, you can also send messages to your care team and make appointments. If you have  questions, please call your primary care clinic.  If you do not have a primary care provider, please call [phone] and they will assist you.        Care EveryWhere ID     This is your Care EveryWhere ID. This could be used by other organizations to access your [place] medical records  [id number]         Blood Pressure from Last 3 Encounters:   08/29/18 109/75   08/22/18 111/85   08/16/18 108/76    Weight from Last 3 Encounters:   08/29/18 68.7 kg (151 lb 6 oz)   08/22/18 67.7 kg (149 lb 3.2 oz)   08/16/18 67.9 kg (149 lb 11.2 oz)              Today, you had the following     No orders found for display         Today's Medication Changes          These changes are accurate as of 8/29/18 11:59 PM.  If you have any questions, ask your nurse or doctor.               These medicines have changed or have updated prescriptions.        Dose/Directions    amLODIPine 5 MG tablet   Commonly known as:  NORVASC   This may have changed:  when to take this   Used for:  Hypertension goal BP (blood pressure) < 140/90        Dose:  5 mg   Take 1 tablet (5 mg) by mouth daily   Quantity:  90 tablet   Refills:  3       diazepam 5 MG tablet   Commonly known as:  VALIUM   This may have changed:    - how much to take  - how to take this  - when to take this  - reasons to take this  - additional instructions   Used for:  Depressive disorder        Take up to one tablet by mouth daily as needed for anxiety   Quantity:  30 tablet   Refills:  5                Primary Care Provider Office Phone # Fax #    Katie Ramos [phone]       [address] [facility] [address]        Equal Access to Services     [facility] : Hadii violette Colmenares, wapamda luqadaha, qaybta kaallloyd aleman. So [facility] [phone].    ATENCIÓN: Si habla español, tiene a hernadez disposición servicios gratuitos de asistencia lingüística. Llame al [phone].    We comply with applicable  federal civil rights laws and [place] laws. We do not discriminate on the basis of race, color, national origin, age, disability, sex, sexual orientation, or gender identity.            Thank you!     Thank you for choosing [facility]IC  for your care. Our goal is always to provide you with excellent care. Hearing back from our patients is one way we can continue to improve our services. Please take a few minutes to complete the written survey that you may receive in the mail after your visit with us. Thank you!             Your Updated Medication List - Protect others around you: Learn how to safely use, store and throw away your medicines at www.disposemymeds.org.          This list is accurate as of 8/29/18 11:59 PM.  Always use your most recent med list.                   Brand Name Dispense Instructions for use Diagnosis    acetaminophen 325 MG tablet    TYLENOL    100 tablet    Take 2 tablets (650 mg) by mouth every 4 hours as needed for mild pain or fever    Malignant neoplasm of urinary bladder, unspecified site (H)       amLODIPine 5 MG tablet    NORVASC    90 tablet    Take 1 tablet (5 mg) by mouth daily    Hypertension goal BP (blood pressure) < 140/90       diazepam 5 MG tablet    VALIUM    30 tablet    Take up to one tablet by mouth daily as needed for anxiety    Depressive disorder       * KOGENATE FS 3000 units Kit     46648 Units    Infuse Kogenate at 3000 Units +/- 10% IV Q 24 hours until 8/13/2018.    Severe hemophilia A (H), Malignant neoplasm of urinary bladder, unspecified site (H), Hemophilic arthropathy       * KOGENATE FS 1000 units Kit     49843 each    Infuse 3000 iu (-5/+10%) IV every Mondays, Wednesdays and Fridays.    Severe hemophilia A (H), Personal history of malignant neoplasm of bladder       lisinopril 40 MG tablet    PRINIVIL/ZESTRIL    30 tablet    Take 1 tablet (40 mg) by mouth every morning (HOLD UNTIL FOLLOW-UP with Primary Care Provider)    Hypertension  goal BP (blood pressure) < 140/90       mirtazapine 45 MG tablet    REMERON    37 tablet    Take 1 tablet (45 mg) by mouth At Bedtime And may take 1/2 tablet for insomnia up to 3 times a week.    Generalized anxiety disorder       order for DME     1 each    Equipment being ordered: Walker Wheels () and Walker () Treatment Diagnosis: gait instability    Severe hemophilia A (H)       oxyCODONE IR 5 MG tablet    ROXICODONE    12 tablet    Take 1 tablet (5 mg) by mouth every 4 hours as needed for moderate to severe pain    Malignant neoplasm of urinary bladder, unspecified site (H)       polyethylene glycol Packet    MIRALAX/GLYCOLAX    30 packet    Take 17 g by mouth daily (to prevent constipation)    Malignant neoplasm of urinary bladder, unspecified site (H)       * Notice:  This list has 2 medication(s) that are the same as other medications prescribed for you. Read the directions carefully, and ask your doctor or other care provider to review them with you.

## 2018-08-29 NOTE — MR AVS SNAPSHOT
After Visit Summary   8/29/2018    Orlin Alcantar    MRN: [id number]           Patient Information     Date Of Birth          1950        Visit Information        Provider Department      8/29/2018 8:45 AM Magalie Espinal MD [facility]        Today's Diagnoses     Urothelial carcinoma of bladder (H)        Examination of participant in clinical trial           Follow-ups after your visit        Your next 10 appointments already scheduled     Nov 16, 2018  9:00 AM CST   CT CHEST/ABDOMEN/PELVIS W CONTRAST with UCCT2   [facility] [place] [facility] ([facility])    [address] [address] [phone]           How do I prepare for my exam? (Food and drink instructions) To prepare: Do not eat or drink for 2 hours before your exam. If you need to take medicine, you may take it with small sips of water. (We may ask you to take liquid medicine as well.)  How do I prepare for my exam? (Other instructions) Please arrive 30 minutes early for your CT.  Once in the department you might be asked to drink water 15-20 minutes prior to your exam.  If indicated you may be asked to drink an oral contrast in advance of your CT.  If this is the case, the imaging team will let you know or be in contact with you prior to your appointment  Patients over 70 or patients with diabetes or kidney problems: If you haven t had a blood test (creatinine test) within the last 30 days, the Cardiologist/Radiologist may require you to get this test prior to your exam.  If you have diabetes:  Continue to take your metformin medication on the day of your exam  What should I wear: Please wear loose clothing, such as a sweat suit or jogging clothes. Avoid snaps, zippers and other metal. We may ask you to undress and put on a hospital gown.  How long does the exam take: Most scans take less than 20 minutes.  What should I bring: Please bring any scans or X-rays  taken at other hospitals, if similar tests were done. Also bring a list of your medicines, including vitamins, minerals and over-the-counter drugs. It is safest to leave personal items at home.  Do I need a : No  is needed.  What do I need to tell my doctor? Be sure to tell your doctor: * If you have any allergies. * If there s any chance you are pregnant. * If you are breastfeeding.  What should I do after the exam: No restrictions, You may resume normal activities.  What is this test: A CT (computed tomography) scan is a series of pictures that allows us to look inside your body. The scanner creates images of the body in cross sections, much like slices of bread. This helps us see any problems more clearly. You may receive contrast (X-ray dye) before or during your scan. You will be asked to drink the contrast.  Who should I call with questions: If you have any questions, please call the Imaging Department where you will have your exam. Directions, parking instructions, and other information is available on our website, [email or web address].            Nov 19, 2018  9:15 AM CST   [facility] Draw with  [facility] DRAW   [facility])    [address] [place] [address]  [address]  [facility] [address]   [phone]            Nov 19, 2018  9:45 AM CST   (Arrive by 9:30 AM)   Return Visit with Magalie Espinal MD   [facility] ([facility])    [address] [place] [address]  [address]  [facility] [address]   [phone]            Dec 12, 2018  1:20 PM CST   (Arrive by 1:05 PM)   Return Visit with Bebeto Shea MD   [facility] ([facility])    [address] [place] [address]  4th Floor  [facility] [address]   [phone]            Mar 26, 2019 10:15 AM CDT   Adult Med Follow UP with JELLY Chapa CNS   Psychiatry Clinic ([facility])    [address]  [address] [id number] [phone]              Who to contact     If you have questions or need follow up information about today's clinic visit or your schedule please contact [facility] directly at [phone].  Normal or non-critical lab and imaging results will be communicated to you by MyChart, letter or phone within 4 business days after the clinic has received the results. If you do not hear from us within 7 days, please contact the clinic through [facility] or phone. If you have a critical or abnormal lab result, we will notify you by phone as soon as possible.  Submit refill requests through [facility] or call your pharmacy and they will forward the refill request to us. Please allow 3 business days for your refill to be completed.          Additional Information About Your Visit        MyChart Information     [facility] gives you secure access to your electronic health record. If you see a primary care provider, you can also send messages to your care team and make appointments. If you have questions, please call your primary care clinic.  If you do not have a primary care provider, please call [phone] and they will assist you.        Care EveryWhere ID     This is your Care EveryWhere ID. This could be used by other organizations to access your [place] medical records  [id number]        Your Vitals Were     Pulse Temperature Respirations Height Pulse Oximetry BMI (Body Mass Index)    93 97.6  F (36.4  C) (Oral) 16 1.829 m (6') 98% 20.53 kg/m2       Blood Pressure from Last 3 Encounters:   09/11/18 101/72   08/29/18 109/75   08/22/18 111/85    Weight from Last 3 Encounters:   09/11/18 67.8 kg (149 lb 8 oz)   08/29/18 68.7 kg (151 lb 6 oz)   08/22/18 67.7 kg (149 lb 3.2 oz)              We Performed the Following     CBC with platelets differential     INR     Partial thromboplastin time     TSH with free  T4 reflex          Today's Medication Changes          These changes are accurate as of 8/29/18 11:59 PM.  If you have any questions, ask your nurse or doctor.               These medicines have changed or have updated prescriptions.        Dose/Directions    amLODIPine 5 MG tablet   Commonly known as:  NORVASC   This may have changed:  when to take this   Used for:  Hypertension goal BP (blood pressure) < 140/90        Dose:  5 mg   Take 1 tablet (5 mg) by mouth daily   Quantity:  90 tablet   Refills:  3                Primary Care Provider Office Phone # Fax #    Katie Ramos [phone] [address]  [facility] [address]        Equal Access to Services     ANDREW FLANAGAN : Hadii violette Colmenares, walashon eastman, slim jessicaalmada karo, lloyd rene . So [facility] [phone].    ATENCIÓN: Si habla español, tiene a hernadez disposición servicios gratuitos de asistencia lingüística. [facility] [phone].    We comply with applicable federal civil rights laws and [place] laws. We do not discriminate on the basis of race, color, national origin, age, disability, sex, sexual orientation, or gender identity.            Thank you!     Thank you for choosing [facility]  for your care. Our goal is always to provide you with excellent care. Hearing back from our patients is one way we can continue to improve our services. Please take a few minutes to complete the written survey that you may receive in the mail after your visit with us. Thank you!             Your Updated Medication List - Protect others around you: Learn how to safely use, store and throw away your medicines at www.disposemymeds.org.          This list is accurate as of 8/29/18 11:59 PM.  Always use your most recent med list.                   Brand Name Dispense Instructions for use Diagnosis    acetaminophen 325 MG tablet    TYLENOL    100 tablet    Take 2 tablets (650 mg)  by mouth every 4 hours as needed for mild pain or fever    Malignant neoplasm of urinary bladder, unspecified site (H)       amLODIPine 5 MG tablet    NORVASC    90 tablet    Take 1 tablet (5 mg) by mouth daily    Hypertension goal BP (blood pressure) < 140/90       lisinopril 40 MG tablet    PRINIVIL/ZESTRIL    30 tablet    Take 1 tablet (40 mg) by mouth every morning (HOLD UNTIL FOLLOW-UP with Primary Care Provider)    Hypertension goal BP (blood pressure) < 140/90       order for DME     1 each    Equipment being ordered: Walker Wheels () and Walker () Treatment Diagnosis: gait instability    Severe hemophilia A (H)       polyethylene glycol Packet    MIRALAX/GLYCOLAX    30 packet    Take 17 g by mouth daily (to prevent constipation)    Malignant neoplasm of urinary bladder, unspecified site (H)

## 2018-08-29 NOTE — NURSING NOTE
Chief Complaint   Patient presents with     Blood Draw     Labs drawn via  by RN. VS taken by JEAN.      Kiya Garcia RN

## 2018-08-29 NOTE — PROGRESS NOTES
[id number] Study Visit Note   Subject name: Orlin Alcantar     Visit: Post op visit    Did the study visit occur within the appropriate window allowed by the protocol? yes    If no, why? n/a    He has been doing well since the last study visit. Patient had cystectomy on July 30, 2018 with Dr. Shea.    Reviewed AEs, conmeds, vitals, and labs.     Patient's post-op PET scan from 8/27/18 was reviewed by Dr. Espinal -- patient will enter study follow-up with q 3 months scans. Evaluation of scans will be further documented in a separate research document. Patient's AEs will be followed until September 21, 2018 which is 100 days since last nivo dose (June 13, 2018).     I have personally interviewed Orlin Alcantar and reviewed his medical record for adverse events and concomitant medications and these have been recorded on the corresponding logs in Orlin Alcantar's research file.     Orlin Alcantar was given the opportunity to ask any trial related questions.  Please see provider progress note for physical exam and other clinical information. Labs were reviewed - any significant lab values were addressed and reviewed.    Eric Srivastava RN  Clinical Research Coordinator  Clinical Trials Office  [facility]  Desk Phone: [phone]  Pager: [phone]

## 2018-08-29 NOTE — LETTER
8/29/2018       RE: Orlin Alcantar  [address]  [place] [address]     Dear Colleague,    Thank you for referring your patient, Orlin Alcantar, to the [facility]. Please see a copy of my visit note below.    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   3/26/18: Consented for the Nivolumab-Gemcitabine-Cisplatin study [id number]    3/28/18: Screening visit for study.   4/4/18; c1d1 gemcitabine  4/11/18: c1d8 gemcitabine-nivolumab  4/25/18: C2D1 Gemcitabine-Cisplatin  5/2/18: C2D8 Gemcitabine-Nivolumab  5/16/18: C3D1 Gemcitabine-Cisplatin  5/23/18: C3D8 Gemcitabine-Nivolumab  6/6/18: C4d1 Gemcitabine-Cisplatin  6/13/18: C4D8 Gemcitabine      HPI:    68 year old male with PMH of hemophilia, chronic hepatis C who was seen by DR. Burns for evaluation of neoadjuvant therapy with Gemcitabine Cisplatin and Nivolumab.  I will be taking over his care while on study.     Patient has a history of hemophilia and has undergone several surgeries in right hip replacement, knee replacement. Hemophilia managed by Dr. Marley.   He reports recurrent hematuria for a year, initially attributed to kidney stones, cystoscopy in Feb 2018 revealed:  Bladder tumor, transurethral resection:   - Invasive high grade urothelial carcinoma   - Extent of invasion: Muscularis propria, multifocal   - Lymphovascular invasion: Present     PET-CT scan - which has revealed muscle invasive bladder right lateral wall of bladder and right  obturator lymphnode, 1.1 cm short axis.  He has been treated for HCV in the 1990s and has not had any complications. His LFTs have remained normal.   His hemophilia is very well controlled on current regimen of factor replacement.   He does not have any autoimmune disease and is not on immunesuppressants or steroids.   His diabetes is diet controlled and he has not required any medications.     We enrolled him on our  neoadjuvant Nivolumab-[place]-Cis trial and he completed 4 cycles of treatment and then underwent  Radical cystoprostatectomy and Bilateral pelvic lymph node dissection and ileal conduit on 7/30/18.  Final pathology showed:   Prostate, bladder, seminal vesicles, radical cystoprostatectomy:   - Bladder wall with chronic inflammation, edema, fibrosis and prior   surgical site changes   - No evidence of residual invasive or in-situ urothelial carcinoma   - Pathologic stage: ypT0N0   - Benign prostate and seminal vesicles     D. Lymph nodes, left pelvic, excision:   - Twelve benign lymph nodes     E. Lymph nodes, right pelvic, excision:   - Eleven benign lymph nodes   - See comment     Interval History:  Patient tolerated surgery very well.  Denies any complaints post surgery.  Denies any signs and symptoms of immune related side effects.        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      Bladder tumor      Depression      DM II (diabetes mellitus, type II), controlled (H)     diet controlled     Hemophilia (H)     Type A     History of hepatitis C     treated successfully     HTN (hypertension)           CURRENT OUTPATIENT MEDICATIONS     Current Outpatient Prescriptions   Medication Sig     acetaminophen (TYLENOL) 325 MG tablet Take 2 tablets (650 mg) by mouth every 4 hours as needed for mild pain or fever     amLODIPine (NORVASC) 5 MG tablet Take 1 tablet (5 mg) by mouth daily (Patient taking differently: Take 5 mg by mouth every morning )     Antihemophilic Factor, Recomb, (KOGENATE FS) 1000 units KIT Infuse 3000 iu (-5/+10%) IV every Mondays, Wednesdays and Fridays.     Antihemophilic Factor, Recomb, (KOGENATE FS) 3000 units KIT Infuse Kogenate at 3000 Units +/- 10% IV Q 24 hours until 8/13/2018.     diazepam (VALIUM) 5 MG tablet Take up to one tablet by mouth daily as needed for anxiety (Patient taking differently: Take 5 mg by mouth as needed Take up  to one tablet by mouth daily as needed for anxiety)     lisinopril (PRINIVIL/ZESTRIL) 40 MG tablet Take 1 tablet (40 mg) by mouth every morning (HOLD UNTIL FOLLOW-UP with Primary Care Provider)     mirtazapine (REMERON) 45 MG tablet Take 1 tablet (45 mg) by mouth At Bedtime And may take 1/2 tablet for insomnia up to 3 times a week.     order for DME Equipment being ordered: Walker Wheels () and Walker ()  Treatment Diagnosis: gait instability     oxyCODONE IR (ROXICODONE) 5 MG tablet Take 1 tablet (5 mg) by mouth every 4 hours as needed for moderate to severe pain     polyethylene glycol (MIRALAX/GLYCOLAX) Packet Take 17 g by mouth daily (to prevent constipation)     No current facility-administered medications for this visit.      Facility-Administered Medications Ordered in Other Visits   Medication     ethyl chloride spray       CBC RESULTS:   Recent Labs   Lab Test  08/29/18   0817   WBC  4.4   RBC  4.00*   HGB  11.2*   HCT  35.8*   MCV  90   MCH  28.0   MCHC  31.3*   RDW  13.4   PLT  243     Recent Labs   Lab Test  08/28/18   0910  08/27/18   1010  08/06/18   0735   NA   --   139  141   POTASSIUM   --   3.9  3.8   CHLORIDE   --   107  112*   CO2   --   24  21   ANIONGAP   --   8  9   GLC  107*  101*  106*   BUN   --   18  24   CR   --   1.00  0.80   MARLA   --   9.4  8.7     Lab Results   Component Value Date    AST 23 08/27/2018     Lab Results   Component Value Date    ALT 20 08/27/2018     IMAGING: Reviewed images and report of PET CT  There is no suspicious FDG uptake in the abdomen or pelvis.  Postsurgical changes of cystoprostatectomy, bilateral pelvic node  dissection and ilial conduit urinary diversion    ASSESSMENT/PLAN:     68 year old male with T2N1(solitary node)  urothelial carcinoma,currently enrolled on the clinical trial of Nivolumab with Gemcitabine and Cisplatin for MIBC, completed C4D8 on 6/13/18.   He has had a complete response at radical cystectomy final pathology showing PT0 N0 of  note he was T2N1 prior to neoadjuvant therapy.  Patient was very pleased with the results and the fact that he is doing very well post cystectomy.    We will continue to see him every 3 months with labs and restaging scans.  Return to clinic to see me on November 19, 2018 with labs and restaging PET scan.    Hemophilia management: Per Dr. Ayaka Espinal MD  Hematology Oncology and Transplantation  [facility]

## 2018-08-29 NOTE — NURSING NOTE
Oncology Rooming Note    August 29, 2018 8:39 AM   Orlin Alcantar is a 68 year old male who presents for:    Chief Complaint   Patient presents with     Blood Draw     Labs drawn via  by RN. VS taken by JEAN.      Oncology Clinic Visit     Bladder Cancer     Initial Vitals: /75  Pulse 93  Temp 97.6  F (36.4  C) (Oral)  Resp 16  Ht 1.829 m (6')  Wt 68.7 kg (151 lb 6 oz)  SpO2 98%  BMI 20.53 kg/m2 Estimated body mass index is 20.53 kg/(m^2) as calculated from the following:    Height as of this encounter: 1.829 m (6').    Weight as of this encounter: 68.7 kg (151 lb 6 oz). Body surface area is 1.87 meters squared.  No Pain (0) Comment: Data Unavailable   No LMP for male patient.  Allergies reviewed: Yes  Medications reviewed: Yes    Medications: Medication refills not needed today.  Pharmacy name entered into [facility]:    [facility] - [place], [place] - [address] [place] [address] PHARMACY [facility] [place], [place] - [facility] [address]  [facility] [place], [place] - [address]    Clinical concerns: No New Concerns    5 minutes for nursing intake (face to face time)     MOSHE Crockett

## 2018-08-29 NOTE — LETTER
8/29/2018      RE: Orlin Alcantar  [address]  [place] [address]       See progress note dated 8/29 in separate encounter.    Magalie Espinal MD

## 2018-08-29 NOTE — LETTER
8/29/2018       RE: Orlin Alcantar  [address]  [place] [address]     Dear Colleague,    Thank you for referring your patient, Orlin Alcantar, to the [facility]. Please see a copy of my visit note below.    See progress note dated 8/29 in separate encounter.    Again, thank you for allowing me to participate in the care of your patient.      Sincerely,    Magalie Espinal MD

## 2018-09-07 ENCOUNTER — TELEPHONE (OUTPATIENT)
Dept: HEMATOLOGY | Facility: CLINIC | Age: 68
End: 2018-09-07

## 2018-09-07 NOTE — TELEPHONE ENCOUNTER
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       I did outline the plan for the visit and the providers that he would see.  I told him that I anticipate that the visit would take ~ 3 hours.    He plans to attend the visit as scheduled.  I wished him well and told him that we looked forward to seeing him at his visit.    Margot Reed RN - Nurse Clinician - [facility] - [phone]

## 2018-09-10 NOTE — PROGRESS NOTES
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   3/26/18: Consented for the Nivolumab-Gemcitabine-Cisplatin study [id number]    3/28/18: Screening visit for study.   4/4/18; c1d1 gemcitabine  4/11/18: c1d8 gemcitabine-nivolumab  4/25/18: C2D1 Gemcitabine-Cisplatin  5/2/18: C2D8 Gemcitabine-Nivolumab  5/16/18: C3D1 Gemcitabine-Cisplatin  5/23/18: C3D8 Gemcitabine-Nivolumab  6/6/18: C4d1 Gemcitabine-Cisplatin  6/13/18: C4D8 Gemcitabine      HPI:    68 year old male with PMH of hemophilia, chronic hepatis C who was seen by DR. Burns for evaluation of neoadjuvant therapy with Gemcitabine Cisplatin and Nivolumab.  I will be taking over his care while on study.     Patient has a history of hemophilia and has undergone several surgeries in right hip replacement, knee replacement. Hemophilia managed by Dr. Marley.   He reports recurrent hematuria for a year, initially attributed to kidney stones, cystoscopy in Feb 2018 revealed:  Bladder tumor, transurethral resection:   - Invasive high grade urothelial carcinoma   - Extent of invasion: Muscularis propria, multifocal   - Lymphovascular invasion: Present     PET-CT scan - which has revealed muscle invasive bladder right lateral wall of bladder and right  obturator lymphnode, 1.1 cm short axis.  He has been treated for HCV in the 1990s and has not had any complications. His LFTs have remained normal.   His hemophilia is very well controlled on current regimen of factor replacement.   He does not have any autoimmune disease and is not on immunesuppressants or steroids.   His diabetes is diet controlled and he has not required any medications.     We enrolled him on our neoadjuvant Nivolumab-[place]-Cis trial and he completed 4 cycles of treatment and then underwent  Radical cystoprostatectomy and Bilateral pelvic lymph node dissection and ileal conduit on 7/30/18.  Final pathology showed:   Prostate, bladder,  seminal vesicles, radical cystoprostatectomy:   - Bladder wall with chronic inflammation, edema, fibrosis and prior   surgical site changes   - No evidence of residual invasive or in-situ urothelial carcinoma   - Pathologic stage: [id number]   - Benign prostate and seminal vesicles     D. Lymph nodes, left pelvic, excision:   - Twelve benign lymph nodes     E. Lymph nodes, right pelvic, excision:   - Eleven benign lymph nodes   - See comment     Interval History:  Patient tolerated surgery very well.  Denies any complaints post surgery.  Denies any signs and symptoms of immune related side effects.        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      Bladder tumor      Depression      DM II (diabetes mellitus, type II), controlled (H)     diet controlled     Hemophilia (H)     Type A     History of hepatitis C     treated successfully     HTN (hypertension)           CURRENT OUTPATIENT MEDICATIONS     Current Outpatient Prescriptions   Medication Sig     acetaminophen (TYLENOL) 325 MG tablet Take 2 tablets (650 mg) by mouth every 4 hours as needed for mild pain or fever     amLODIPine (NORVASC) 5 MG tablet Take 1 tablet (5 mg) by mouth daily (Patient taking differently: Take 5 mg by mouth every morning )     Antihemophilic Factor, Recomb, (KOGENATE FS) 1000 units KIT Infuse 3000 iu (-5/+10%) IV every Mondays, Wednesdays and Fridays.     Antihemophilic Factor, Recomb, (KOGENATE FS) 3000 units KIT Infuse Kogenate at 3000 Units +/- 10% IV Q 24 hours until 8/13/2018.     diazepam (VALIUM) 5 MG tablet Take up to one tablet by mouth daily as needed for anxiety (Patient taking differently: Take 5 mg by mouth as needed Take up to one tablet by mouth daily as needed for anxiety)     lisinopril (PRINIVIL/ZESTRIL) 40 MG tablet Take 1 tablet (40 mg) by mouth every morning (HOLD UNTIL FOLLOW-UP with Primary Care Provider)     mirtazapine (REMERON) 45 MG tablet Take 1  tablet (45 mg) by mouth At Bedtime And may take 1/2 tablet for insomnia up to 3 times a week.     order for DME Equipment being ordered: Walker Wheels () and Walker ()  Treatment Diagnosis: gait instability     oxyCODONE IR (ROXICODONE) 5 MG tablet Take 1 tablet (5 mg) by mouth every 4 hours as needed for moderate to severe pain     polyethylene glycol (MIRALAX/GLYCOLAX) Packet Take 17 g by mouth daily (to prevent constipation)     No current facility-administered medications for this visit.      Facility-Administered Medications Ordered in Other Visits   Medication     ethyl chloride spray       CBC RESULTS:   Recent Labs   Lab Test  08/29/18   0817   WBC  4.4   RBC  4.00*   HGB  11.2*   HCT  35.8*   MCV  90   MCH  28.0   MCHC  31.3*   RDW  13.4   PLT  243     Recent Labs   Lab Test  08/28/18   0910  08/27/18   1010  08/06/18   0735   NA   --   139  141   POTASSIUM   --   3.9  3.8   CHLORIDE   --   107  112*   CO2   --   24  21   ANIONGAP   --   8  9   GLC  107*  101*  106*   BUN   --   18  24   CR   --   1.00  0.80   MARLA   --   9.4  8.7     Lab Results   Component Value Date    AST 23 08/27/2018     Lab Results   Component Value Date    ALT 20 08/27/2018     IMAGING: Reviewed images and report of PET CT  There is no suspicious FDG uptake in the abdomen or pelvis.  Postsurgical changes of cystoprostatectomy, bilateral pelvic node  dissection and ilial conduit urinary diversion    ASSESSMENT/PLAN:     68 year old male with T2N1(solitary node)  urothelial carcinoma,currently enrolled on the clinical trial of Nivolumab with Gemcitabine and Cisplatin for MIBC, completed C4D8 on 6/13/18.   He has had a complete response at radical cystectomy final pathology showing PT0 N0 of note he was T2N1 prior to neoadjuvant therapy.  Patient was very pleased with the results and the fact that he is doing very well post cystectomy.    We will continue to see him every 3 months with labs and restaging scans.  Return to  clinic to see me on November 19, 2018 with labs and restaging PET scan.    Hemophilia management: Per Dr. Ayaka Espinal MD  Hematology Oncology and Transplantation  [facility]

## 2018-09-11 ENCOUNTER — HOSPITAL ENCOUNTER (OUTPATIENT)
Dept: PHYSICAL THERAPY | Facility: CLINIC | Age: 68
Setting detail: THERAPIES SERIES
End: 2018-09-11
Attending: PHYSICAL THERAPIST
Payer: MEDICARE

## 2018-09-11 ENCOUNTER — OFFICE VISIT (OUTPATIENT)
Dept: HEMATOLOGY | Facility: CLINIC | Age: 68
End: 2018-09-11
Attending: PHYSICIAN ASSISTANT
Payer: MEDICARE

## 2018-09-11 ENCOUNTER — DOCUMENTATION ONLY (OUTPATIENT)
Dept: HEMATOLOGY | Facility: CLINIC | Age: 68
End: 2018-09-11

## 2018-09-11 ENCOUNTER — OFFICE VISIT (OUTPATIENT)
Dept: HEMATOLOGY | Facility: CLINIC | Age: 68
End: 2018-09-11

## 2018-09-11 ENCOUNTER — OFFICE VISIT (OUTPATIENT)
Dept: PSYCHIATRY | Facility: CLINIC | Age: 68
End: 2018-09-11
Attending: CLINICAL NURSE SPECIALIST
Payer: MEDICARE

## 2018-09-11 VITALS
WEIGHT: 150.2 LBS | DIASTOLIC BLOOD PRESSURE: 78 MMHG | BODY MASS INDEX: 20.37 KG/M2 | HEART RATE: 125 BPM | SYSTOLIC BLOOD PRESSURE: 111 MMHG

## 2018-09-11 VITALS
BODY MASS INDEX: 20.25 KG/M2 | HEART RATE: 97 BPM | TEMPERATURE: 98.7 F | RESPIRATION RATE: 12 BRPM | SYSTOLIC BLOOD PRESSURE: 101 MMHG | OXYGEN SATURATION: 98 % | DIASTOLIC BLOOD PRESSURE: 72 MMHG | HEIGHT: 72 IN | WEIGHT: 149.5 LBS

## 2018-09-11 DIAGNOSIS — F41.1 GENERALIZED ANXIETY DISORDER: ICD-10-CM

## 2018-09-11 DIAGNOSIS — F06.30 MOOD DISORDER IN CONDITIONS CLASSIFIED ELSEWHERE: ICD-10-CM

## 2018-09-11 DIAGNOSIS — D66 HEMOPHILIA A (H): Primary | ICD-10-CM

## 2018-09-11 DIAGNOSIS — Z71.9 ENCOUNTER FOR COUNSELING: Primary | ICD-10-CM

## 2018-09-11 DIAGNOSIS — D66 SEVERE HEMOPHILIA A (H): Primary | ICD-10-CM

## 2018-09-11 DIAGNOSIS — F32.A DEPRESSIVE DISORDER: ICD-10-CM

## 2018-09-11 DIAGNOSIS — F41.1 ANXIETY STATE: Primary | ICD-10-CM

## 2018-09-11 DIAGNOSIS — Z71.9 HEALTH EDUCATION/COUNSELING: ICD-10-CM

## 2018-09-11 DIAGNOSIS — R53.81 PHYSICAL DECONDITIONING: ICD-10-CM

## 2018-09-11 PROCEDURE — G0463 HOSPITAL OUTPT CLINIC VISIT: HCPCS | Mod: ZF

## 2018-09-11 PROCEDURE — G8979 MOBILITY GOAL STATUS: HCPCS | Mod: GP,CJ | Performed by: PHYSICAL THERAPIST

## 2018-09-11 PROCEDURE — G8980 MOBILITY D/C STATUS: HCPCS | Mod: GP,CJ | Performed by: PHYSICAL THERAPIST

## 2018-09-11 PROCEDURE — 97161 PT EVAL LOW COMPLEX 20 MIN: CPT | Mod: GP | Performed by: PHYSICAL THERAPIST

## 2018-09-11 PROCEDURE — G0463 HOSPITAL OUTPT CLINIC VISIT: HCPCS

## 2018-09-11 PROCEDURE — 40000176 ZZH STATISTIC PT HEMOPHILIA CLINIC VISIT: Performed by: PHYSICAL THERAPIST

## 2018-09-11 PROCEDURE — 99213 OFFICE O/P EST LOW 20 MIN: CPT | Performed by: INTERNAL MEDICINE

## 2018-09-11 PROCEDURE — G8978 MOBILITY CURRENT STATUS: HCPCS | Mod: GP,CJ | Performed by: PHYSICAL THERAPIST

## 2018-09-11 RX ORDER — DIAZEPAM 5 MG
5 TABLET ORAL DAILY PRN
Qty: 30 TABLET | Refills: 5 | Status: SHIPPED | OUTPATIENT
Start: 2018-09-11 | End: 2019-03-26

## 2018-09-11 RX ORDER — MIRTAZAPINE 45 MG/1
45 TABLET, FILM COATED ORAL AT BEDTIME
Qty: 37 TABLET | Refills: 5 | Status: SHIPPED | OUTPATIENT
Start: 2018-09-11 | End: 2019-03-26

## 2018-09-11 ASSESSMENT — PAIN SCALES - GENERAL
PAINLEVEL: NO PAIN (0)
PAINLEVEL: NO PAIN (0)

## 2018-09-11 NOTE — PROGRESS NOTES
HEMOPHILIA EMERGENCY TREATMENT RECOMMENDATIONS    Please CALL the [facility] [phone] if seen in the ER and for additional recommendations and follow-up treatment.    AFTER HOURS: Doctor consult line [phone] & ask for hematologist. [place] -[phone].     RECOMMENDATIONS : 2019    Diagnosis: Hemophilia A Factor VIII level:     Factor VIII antibody (inhibitor):   Weight: 76.8 kg  Drug Allergies:  No known drug allergies  Medications: amlodipine, diazepan prn, mirtazapine, polyethylene glycol, lisinopril  Hemophilia Home treatment:  Kogenate FS 3000 units M, W, F, [place]     (Avoid aspirin, all other non-steroidal anti-inflammatory drugs, and other drugs known to inhibit platelet function.)    TREATMENT PLAN - Factor MUST be infused PRIOR to IMAGING or SURGICAL procedures. Infuse bolus factor at 10ml/minute.    1. SUSPECTED INTRACRANIAL HEMORRHAGE OR LIFE THREATENING BLEEDS (Hemorrhages or Injuries to Neck, Thorax, or Gastrointestinal Bleeding)  A. Initial Dose: Give 50 units/kg recombinant factor VIII (approx. 2894-8369 units). 10 minutes after bolus dose draw factor VIII level.  B. Begin Factor VIII continuous infusion IMMEDIATELY after drawing Factor VIII level at 4-6 units/kg/hour (approx.400 units).    2. SERIOUS BLEEDS INTO JOINTS, MUSCLES, SOFT TISSUE, OR GROSS HEMATURIA OR MUCOUS MEMBRANE BLEEDING    A. Initial Dose: 30 units/kg recombinant Factor VIII (approx.2500 units)  B. Comment: Amicar may be used for mucous membrane bleeding. The dose is 3 gms TID x 5-10 days. Do not use Amicar if there is hematuria. Splinting and  Immobilization is often used for injured joints/muscles.  C. Follow-up dosing, please contact [facility].    Margot Reed RN - Nurse Clinician - [facility] - [phone]  Jorge Jiang M.D.  and Director of the [facility].

## 2018-09-11 NOTE — PATIENT INSTRUCTIONS
Thank you for coming to the [facility].    Lab Testing:  If you had lab testing today and your results are reassuring or normal they will be mailed to you or sent through [employer] within 7 days.   If the lab tests need quick action we will call you with the results.  The phone number we will call with results is # [phone] (home) . If this is not the best number please call our clinic and change the number.    Medication Refills:  If you need any refills please call your pharmacy and they will contact us. Our fax number for refills is [phone]. Please allow three business for refill processing.   If you need to  your refill at a new pharmacy, please contact the new pharmacy directly. The new pharmacy will help you get your medications transferred.     Scheduling:  If you have any concerns about today's visit or wish to schedule another appointment please call our office during normal business hours [phone] (8-5:00 M-F)    Contact Us:  Please call [phone] during business hours (8-5:00 M-F).  If after clinic hours, or on the weekend, please call  [phone].    Financial Assistance [phone]  [employer] Billing [phone]  [employer] Billing [phone]  Medical Records [phone]      MENTAL HEALTH CRISIS NUMBERS:  [facility]:   [facility] - [phone]   Crisis Residence [facility] - [phone]   Walk-In Counseling [facility] [phone]   [facility]E 24/7 [employer] Mobile Team for Adults - [[phone]]; Child - [[phone]]     Crisis Connection - [phone]     [facility]:   [facility] - [phone]   Walk-in counseling [facility] - [phone]   Walk-in counseling [facility] - [phone]   Crisis Residence [facility] - [phone]   Urgent Care Adult Mental Health:   --Drop-in, 24/7 crisis line, and [employer] Mobile Team [[phone]]    CRISIS TEXT  LINE: Text [phone] from anywhere, anytime, any crisis 24/7;    OR SEE www.crisistextline.org     Poison Control Center - [phone]    CHILD: [facility] needs assessment team - [phone]     [facility] - [phone]; or [employer] [facility] - [phone]    If you have a medical emergency please call 911or go to the nearest ER.                    _____________________________________________    Again thank you for choosing [facility] and please let us know how we can best partner with you to improve you and your family's health.  You may be receiving a survey in the mail regarding this appointment. We would love to have your feedback, both positive and negative, so please fill out the survey and return it using the provided envelope. The survey is done by an external company, so your answers are anonymous.

## 2018-09-11 NOTE — MR AVS SNAPSHOT
After Visit Summary   9/11/2018    Orlin Alcantar    MRN: [id number]           Patient Information     Date Of Birth          1950        Visit Information        Provider Department      9/11/2018 11:41 AM Analilia Jack LICSW [facility]        Today's Diagnoses     Encounter for counseling    -  1       Follow-ups after your visit        Your next 10 appointments already scheduled     Nov 16, 2018  9:00 AM CST   CT CHEST/ABDOMEN/PELVIS W CONTRAST with UCCT2   [facility] [place] [facility] ([facility])    [address] [address] [phone]           How do I prepare for my exam? (Food and drink instructions) To prepare: Do not eat or drink for 2 hours before your exam. If you need to take medicine, you may take it with small sips of water. (We may ask you to take liquid medicine as well.)  How do I prepare for my exam? (Other instructions) Please arrive 30 minutes early for your CT.  Once in the department you might be asked to drink water 15-20 minutes prior to your exam.  If indicated you may be asked to drink an oral contrast in advance of your CT.  If this is the case, the imaging team will let you know or be in contact with you prior to your appointment  Patients over 70 or patients with diabetes or kidney problems: If you haven t had a blood test (creatinine test) within the last 30 days, the Cardiologist/Radiologist may require you to get this test prior to your exam.  If you have diabetes:  Continue to take your metformin medication on the day of your exam  What should I wear: Please wear loose clothing, such as a sweat suit or jogging clothes. Avoid snaps, zippers and other metal. We may ask you to undress and put on a hospital gown.  How long does the exam take: Most scans take less than 20 minutes.  What should I bring: Please bring any scans or X-rays taken at other hospitals, if similar  tests were done. Also bring a list of your medicines, including vitamins, minerals and over-the-counter drugs. It is safest to leave personal items at home.  Do I need a : No  is needed.  What do I need to tell my doctor? Be sure to tell your doctor: * If you have any allergies. * If there s any chance you are pregnant. * If you are breastfeeding.  What should I do after the exam: No restrictions, You may resume normal activities.  What is this test: A CT (computed tomography) scan is a series of pictures that allows us to look inside your body. The scanner creates images of the body in cross sections, much like slices of bread. This helps us see any problems more clearly. You may receive contrast (X-ray dye) before or during your scan. You will be asked to drink the contrast.  Who should I call with questions: If you have any questions, please call the Imaging Department where you will have your exam. Directions, parking instructions, and other information is available on our website, [email or web address].            Nov 19, 2018  9:15 AM CST   [facility] with  [employer] LAB DRAW   [facility] ([facility])    [address] [place] [address]  [address]  [facility] [address]   [phone]            Nov 19, 2018  9:45 AM CST   (Arrive by 9:30 AM)   Return Visit with Magalie Espinal MD   [facility] ([facility])    [address] [place] [address]  [address]  [facility] [address]   [phone]            Dec 12, 2018 12:15 PM CST   LAB with  LAB   [facility])    [address] [place] [address]  1st Floor  [facility] [address]   [phone]           Please do not eat 10-12 hours before your appointment if you are coming in fasting for labs on lipids, cholesterol, or glucose (sugar). This does not apply to pregnant women. Water, hot tea and black coffee (with nothing added) are okay. Do not drink  other fluids, diet soda or chew gum.            Dec 12, 2018 12:40 PM CST   CT ABDOMEN PELVIS W CONTRAST with UCCT2   [facility] ([facility])    [address] [facility]  1st Floor  [facility] [address] [phone]           How do I prepare for my exam? (Food and drink instructions) To prepare: Do not eat or drink for 2 hours before your exam. If you need to take medicine, you may take it with small sips of water. (We may ask you to take liquid medicine as well.)  How do I prepare for my exam? (Other instructions) Please arrive 30 minutes early for your CT.  Once in the department you might be asked to drink water 15-20 minutes prior to your exam.  If indicated you may be asked to drink an oral contrast in advance of your CT.  If this is the case, the imaging team will let you know or be in contact with you prior to your appointment  Patients over 70 or patients with diabetes or kidney problems: If you haven t had a blood test (creatinine test) within the last 30 days, the Cardiologist/Radiologist may require you to get this test prior to your exam.  If you have diabetes:  Continue to take your metformin medication on the day of your exam  What should I wear: Please wear loose clothing, such as a sweat suit or jogging clothes. Avoid snaps, zippers and other metal. We may ask you to undress and put on a hospital gown.  How long does the exam take: Most scans take less than 20 minutes.  What should I bring: Please bring any scans or X-rays taken at other hospitals, if similar tests were done. Also bring a list of your medicines, including vitamins, minerals and over-the-counter drugs. It is safest to leave personal items at home.  Do I need a : No  is needed.  What do I need to tell my doctor? Be sure to tell your doctor: * If you have any allergies. * If there s any chance you are pregnant. * If you are breastfeeding.  What should I do after the exam: No  restrictions, You may resume normal activities.  What is this test: A CT (computed tomography) scan is a series of pictures that allows us to look inside your body. The scanner creates images of the body in cross sections, much like slices of bread. This helps us see any problems more clearly. You may receive contrast (X-ray dye) before or during your scan. You will be asked to drink the contrast.  Who should I call with questions: If you have any questions, please call the Imaging Department where you will have your exam. Directions, parking instructions, and other information is available on our website, [email or web address].            Dec 12, 2018  1:20 PM CST   (Arrive by 1:05 PM)   Return Visit with Bebeto Shea MD   [facility] ([facility])    [address] [id number] [phone]            Mar 26, 2019 10:15 AM CDT   Adult Med Follow UP with JELLY Chapa CNS   Psychiatry Clinic ([facility])    [address]  [address] [address] [phone]              Who to contact     If you have questions or need follow up information about today's clinic visit or your schedule please contact [facility] directly at [phone].  Normal or non-critical lab and imaging results will be communicated to you by MyChart, letter or phone within 4 business days after the clinic has received the results. If you do not hear from us within 7 days, please contact the clinic through MyChart or phone. If you have a critical or abnormal lab result, we will notify you by phone as soon as possible.  Submit refill requests through [employer] or call your pharmacy and they will forward the refill request to us. Please allow 3 business days for your refill to be completed.          Additional Information About Your  Visit        [employer] Information     [facility] gives you secure access to your electronic health record. If you see a primary care provider, you can also send messages to your care team and make appointments. If you have questions, please call your primary care clinic.  If you do not have a primary care provider, please call [phone] and they will assist you.        Care EveryWhere ID     This is your Care EveryWhere ID. This could be used by other organizations to access your [place] medical records  [id number]         Blood Pressure from Last 3 Encounters:   09/11/18 101/72   08/29/18 109/75   08/22/18 111/85    Weight from Last 3 Encounters:   09/11/18 67.8 kg (149 lb 8 oz)   08/29/18 68.7 kg (151 lb 6 oz)   08/22/18 67.7 kg (149 lb 3.2 oz)              Today, you had the following     No orders found for display         Today's Medication Changes          These changes are accurate as of 9/11/18 11:59 PM.  If you have any questions, ask your nurse or doctor.               These medicines have changed or have updated prescriptions.        Dose/Directions    amLODIPine 5 MG tablet   Commonly known as:  NORVASC   This may have changed:  when to take this   Used for:  Hypertension goal BP (blood pressure) < 140/90        Dose:  5 mg   Take 1 tablet (5 mg) by mouth daily   Quantity:  90 tablet   Refills:  3       diazepam 5 MG tablet   Commonly known as:  VALIUM   This may have changed:    - how much to take  - how to take this  - when to take this  - reasons to take this  - additional instructions   Used for:  Depressive disorder   Changed by:  Kaylin Rajan APRN CNS        Dose:  5 mg   Take 1 tablet (5 mg) by mouth daily as needed for anxiety or sleep   Quantity:  30 tablet   Refills:  5            Where to get your medicines      These medications were sent to [facility] [place], [place] - [address] [place] [address] [address]  [address] [address]     Phone:   [phone]     mirtazapine 45 MG tablet         Some of these will need a paper prescription and others can be bought over the counter.  Ask your nurse if you have questions.     Bring a paper prescription for each of these medications     diazepam 5 MG tablet                Primary Care Provider Office Phone # Fax #    Katie Ramos -[phone]       [address]  [facility] [address]        Equal Access to Services     ANDREW FLANAGAN : Hadii aad ku hadasho Soomaali, waaxda luqadaha, qaybta kaalmada adeegyada, waxay annin haystantonn nneka sueadelsoaneesh rene . So [facility] [phone].    ATENCIÓN: Si nnamdi bernstein, tiene a hernadez disposición servicios gratuitos de asistencia lingüística. Perry al [phone].    We comply with applicable federal civil rights laws and [place] laws. We do not discriminate on the basis of race, color, national origin, age, disability, sex, sexual orientation, or gender identity.            Thank you!     Thank you for choosing [facility]  for your care. Our goal is always to provide you with excellent care. Hearing back from our patients is one way we can continue to improve our services. Please take a few minutes to complete the written survey that you may receive in the mail after your visit with us. Thank you!             Your Updated Medication List - Protect others around you: Learn how to safely use, store and throw away your medicines at www.disposemymeds.org.          This list is accurate as of 9/11/18 11:59 PM.  Always use your most recent med list.                   Brand Name Dispense Instructions for use Diagnosis    acetaminophen 325 MG tablet    TYLENOL    100 tablet    Take 2 tablets (650 mg) by mouth every 4 hours as needed for mild pain or fever    Malignant neoplasm of urinary bladder, unspecified site (H)       amLODIPine 5 MG tablet    NORVASC    90 tablet    Take 1 tablet (5 mg) by mouth daily    Hypertension goal BP  (blood pressure) < 140/90       diazepam 5 MG tablet    VALIUM    30 tablet    Take 1 tablet (5 mg) by mouth daily as needed for anxiety or sleep    Depressive disorder       KOGENATE FS 1000 units Kit     88263 each    Infuse 3000 iu (-5/+10%) IV every Mondays, Wednesdays and Fridays.    Severe hemophilia A (H), Personal history of malignant neoplasm of bladder       lisinopril 40 MG tablet    PRINIVIL/ZESTRIL    30 tablet    Take 1 tablet (40 mg) by mouth every morning (HOLD UNTIL FOLLOW-UP with Primary Care Provider)    Hypertension goal BP (blood pressure) < 140/90       mirtazapine 45 MG tablet    REMERON    37 tablet    Take 1 tablet (45 mg) by mouth At Bedtime And may take 1/2 tablet for insomnia up to 3 times a week.    Generalized anxiety disorder       order for DME     1 each    Equipment being ordered: Walker Wheels () and Walker () Treatment Diagnosis: gait instability    Severe hemophilia A (H)       polyethylene glycol Packet    MIRALAX/GLYCOLAX    30 packet    Take 17 g by mouth daily (to prevent constipation)    Malignant neoplasm of urinary bladder, unspecified site (H)

## 2018-09-11 NOTE — MR AVS SNAPSHOT
After Visit Summary   9/11/2018    Orlin Alcantar    MRN: [id number]           Patient Information     Date Of Birth          1950        Visit Information        Provider Department      9/11/2018 11:30 AM Clement Griffin PA-C [facility]        Care Instructions    PHYSICAL THERAPY INSTRUCTIONS:    Continue to use the motion you have available in your joints, using pain as your guide.    Monitor your joint motion closely and notify us if you notice any sudden changes, especially affecting your daily function.    Contact Ama with any new joint/muscle concerns, and with any equipment needs.  Treatment of New Joint and/or Muscle Bleeds:     Treat with factor per doctor s orders.    Apply RICE treatment as needed for pain relief and to allow rest and healing   o R=Rest, Limit movement and protect your joint with splints, braces and assistive devices as directed by your treatment center.  Use crutches and do not put any weight on a new lower extremity bleed (ex: hip, knee, ankle).   Only move in pain-free range.  o I=Ice, Apply ice to surround the affected area for 15-20 minutes every 2 hours.  Use ice cubes in bag, frozen vegetables, gel ice packs, or commercial products (Cryocuff ).  o C=Compression, Helps to control swelling and can decrease pain. Can use elastic wraps or compression sleeve such as tubigrip.  Avoid tourniquet effect by using proper technique. Remove if pain increases or with any numbness or tingling.   o E=Elevate, Can help decrease swelling and encourages rest.  Most effective with elevation above level of the heart.  Joint Health: Bleed prevention and regular activity are key elements to maintaining healthy joints. Limit activities that cause joint pain and spread out activities/exercises throughout the day and the week, alternate exercises each day.    Footwear: Always wear supportive footwear-shoes with laces and good ankle support are best.  If  "you experience frequent foot/ankle pain, try wearing shoes indoors as well as outdoors.      Maria Teresa Chadwick [facility]  Physical Therapist  [place] [facility]  [phone]    Factor Plan:    Continue taking Kogenate factor concentrate at 3000 units m, W, F and [place].    Please call with any breakthrough bleeding to discuss your plan of care.    If you have a severe blow to the head, please give your factor dose and go the the Emergency Room for evaluation and for head CT scan.    Carry factor concentrate with you at all times. Call the center if you will be traveling out of the area. We will create a travel letter and letter with instructions on emergency care in hemophilia. For treatment centers around the world go to [email or web address], click on left link \"resources\" and then click on \"passport\" and type in travel destination.     General Recommendations:    Wear medic alert on your person for highest level of safety [email or web address]    Call the center if you have any dental pain or problems.  Since you have had a joint replacement, you might require antibiotic prior to  dental cleaning.    See your primary care provider for general health maintenance.  Your blood pressure was great today.    For any questions, please contact your [facility] nurse, Margot Reed RN at [phone] or the main phone number [phone].    If you have emergency needs in the evening or weekend, please contact the page  [phone] and page the hematologist on call or Dr. Jorge Jiang.    Please return for comprehensive clinic in one year.      Margot Reed RN - Nurse Clinician - [facility] - [phone]                Follow-ups after your visit        Your next 10 appointments already scheduled     Mar 26, 2019 10:15 AM CDT   Adult Med Follow UP with JELLY Chapa CNS   Psychiatry Clinic ([facility])    [place] [id number] [place] [address] " "[facility] [address] [phone]              Who to contact     If you have questions or need follow up information about today's clinic visit or your schedule please contact [facility] directly at [phone].  Normal or non-critical lab and imaging results will be communicated to you by easy2maphart, letter or phone within 4 business days after the clinic has received the results. If you do not hear from us within 7 days, please contact the clinic through easy[employer]maphart or phone. If you have a critical or abnormal lab result, we will notify you by phone as soon as possible.  Submit refill requests through [employer] or call your pharmacy and they will forward the refill request to us. Please allow 3 business days for your refill to be completed.          Additional Information About Your Visit        [employer] Information     [employer] gives you secure access to your electronic health record. If you see a primary care provider, you can also send messages to your care team and make appointments. If you have questions, please call your primary care clinic.  If you do not have a primary care provider, please call [phone] and they will assist you.        Care EveryWhere ID     This is your Care EveryWhere ID. This could be used by other organizations to access your [place] medical records  [id number]        Your Vitals Were     Pulse Temperature Respirations Height Pulse Oximetry BMI (Body Mass Index)    97 98.7  F (37.1  C) (Oral) 12 1.834 m (6' 0.2\") 98% 20.16 kg/m2       Blood Pressure from Last 3 Encounters:   09/11/18 101/72   08/29/18 109/75   08/22/18 111/85    Weight from Last 3 Encounters:   09/11/18 67.8 kg (149 lb 8 oz)   08/29/18 68.7 kg (151 lb 6 oz)   08/22/18 67.7 kg (149 lb 3.2 oz)              Today, you had the following     No orders found for display         Today's Medication Changes          These changes are accurate as of 9/11/18  1:37 PM.  If you have any " questions, ask your nurse or doctor.               These medicines have changed or have updated prescriptions.        Dose/Directions    amLODIPine 5 MG tablet   Commonly known as:  NORVASC   This may have changed:  when to take this   Used for:  Hypertension goal BP (blood pressure) < 140/90        Dose:  5 mg   Take 1 tablet (5 mg) by mouth daily   Quantity:  90 tablet   Refills:  3       diazepam 5 MG tablet   Commonly known as:  VALIUM   This may have changed:    - how much to take  - how to take this  - when to take this  - reasons to take this  - additional instructions   Used for:  Depressive disorder   Changed by:  Kaylin Rajan APRN CNS        Dose:  5 mg   Take 1 tablet (5 mg) by mouth daily as needed for anxiety or sleep   Quantity:  30 tablet   Refills:  5            Where to get your medicines      These medications were sent to [facility] - [facility] - [address] [address]     Phone:  [phone]     mirtazapine 45 MG tablet         Some of these will need a paper prescription and others can be bought over the counter.  Ask your nurse if you have questions.     Bring a paper prescription for each of these medications     diazepam 5 MG tablet                Primary Care Provider Office Phone # Fax #    Katie Ramos -[phone] [address]  [facility] [address]        Equal Access to Services     ANDREW FLANAGAN AH: Suleman irbyo Sofaiza, waaxda luqadaha, qaybta kaalmada adeegyada, lloyd blum. So [facility] [phone].    ATENCIÓN: Si habla español, tiene a hernadez disposición servicios gratuitos de asistencia lingüística. Perry benites [phone].    We comply with applicable federal civil rights laws and [place] laws. We do not discriminate on the basis of race, color, national origin, age, disability, sex, sexual orientation, or gender identity.             Thank you!     Thank you for choosing [place] [facility]  for your care. Our goal is always to provide you with excellent care. Hearing back from our patients is one way we can continue to improve our services. Please take a few minutes to complete the written survey that you may receive in the mail after your visit with us. Thank you!             Your Updated Medication List - Protect others around you: Learn how to safely use, store and throw away your medicines at www.disposemymeds.org.          This list is accurate as of 9/11/18  1:37 PM.  Always use your most recent med list.                   Brand Name Dispense Instructions for use Diagnosis    acetaminophen 325 MG tablet    TYLENOL    100 tablet    Take 2 tablets (650 mg) by mouth every 4 hours as needed for mild pain or fever    Malignant neoplasm of urinary bladder, unspecified site (H)       amLODIPine 5 MG tablet    NORVASC    90 tablet    Take 1 tablet (5 mg) by mouth daily    Hypertension goal BP (blood pressure) < 140/90       diazepam 5 MG tablet    VALIUM    30 tablet    Take 1 tablet (5 mg) by mouth daily as needed for anxiety or sleep    Depressive disorder       KOGENATE FS 1000 units Kit     [address] each    Infuse 3000 iu (-5/+10%) IV every Mondays, Wednesdays and Fridays.    Severe hemophilia A (H), Personal history of malignant neoplasm of bladder       lisinopril 40 MG tablet    PRINIVIL/ZESTRIL    30 tablet    Take 1 tablet (40 mg) by mouth every morning (HOLD UNTIL FOLLOW-UP with Primary Care Provider)    Hypertension goal BP (blood pressure) < 140/90       mirtazapine 45 MG tablet    REMERON    37 tablet    Take 1 tablet (45 mg) by mouth At Bedtime And may take 1/2 tablet for insomnia up to 3 times a week.    Generalized anxiety disorder       order for DME     1 each    Equipment being ordered: Walker Wheels () and Walker () Treatment Diagnosis: gait instability    Severe hemophilia A (H)        polyethylene glycol Packet    MIRALAX/GLYCOLAX    30 packet    Take 17 g by mouth daily (to prevent constipation)    Malignant neoplasm of urinary bladder, unspecified site (H)

## 2018-09-11 NOTE — PROGRESS NOTES
"Social Work Evaluation  [facility]    Name: Orlin Alcantar  Age:  68 year old  :  1950    Patient is a SWM diagnosed with severe hemophilia A who presented to clinic today for his annual comprehensive clinic evaluation.   Met with the patient 1:1 to complete an updated psychosocial evaluation.    Living Situation:   Orlin has a section 8 apartment in [facility] [place].  This is a 3rd floor apartment and he does have access to an elevator. He is very happy in this location and does not plan to try to move any time soon.    Family/Social Support:    Orlin has stated that he has limited support and that is due to his own preference of being a \"loner\" and that he can be \"stubborn\" at times.  He has stated that if he needs support, he trusts [facility] staff the most.  He has been involved in HFMD gatherings in the past.  Orlin moved into a foster home at age 1 and his parents/siblings have passed away within the last decade.    Functional Status: Independent.  Drives to/from appointments as needed.    Current Community Services:    [employer] Premium Assistance  [facility] HTC Assistance  [facility] for medication management    Past Community Services:   Inpatient psychiatry stay at [facility] in .    Chemical Dependency:    Orlin has not had alcohol since last November and states he quit related to bladder CA and \"just never went back.\" He denied use of tobacco or ilicit substances.    Mental/Emotional Health:   Chart indicates diagnoses of depression and anxiety.  He is seen by psychiatry twice a year for medication management.  PHQ-2= 0.  He shared today he is cancer free.  Overall, he feels his mood is stable and well controlled.  When asked how he fabien with stressful events, he stated he often turns to listening to music and reading, as he finds these activities to be relaxing and distracting.    Abuse Concerns:  No concerns indicated.    Education:  Orlin is a high " school graduate and has one year of post-secondary education.    Employment: Orlin worked as a  and is now retired.     Financial/Income:  SSDI up until senior living per report.  Orlin stated his social security payments, pre-Medicare deductions, are approximately $1596/month.  He stated finances are very tight but he budgets and makes ends meet.    Health Insurance:  Medicare and BCBS.  He has a $3000 deductible and is connected with [employer] program. His premium is approximately $170/month that he pays quarterly for his BCBS.  He stated he has a Medicare Cost Plan and has been made aware his current plan will not be available for 2019.  He has a plan to investigate other options in early October and will contact writer as more information is needed.  Discussed [facility]'s Advantage Plans as all of his providers are in the [place] Emergency Registry system.    Health Literacy/Adjustment to Illness:  Orlin is compliant with CC appointments and per report has followed a prophy schedule which Orlin reports was changed in light of CA diagnosis - see notes for details.  Writer met with Orlin prior to Orlin's visit with providers so did not have opportunity to clarify this at time of SW visit.  Orlin has always been organized and proactive when managing his community resources, health care benefits,and finances.    Advanced Care Planning:  HCD on file which lists primary POA as Dr. Jorge Jiang and secondary as other providers at [facility].  Patient confirms this is up to date and accurate.    Authorization to discuss PHI and Email Consent were declined as pt stated he has no one he would choose to list on these forms.    Interventions Utilized:   Assessment of patient's strengths and needs  Assessment of impact of bleeding disorder and recent CA, overall adjustment and current coping skills  Normalized and validated feelings and experiences  Education on applicable community resources and upcoming Medicare  changes  Discussed Advanced Care Planning and Health Care Directives  Case Coordination    Impressions/Recommendations:    The patient presented as alert, oriented and engaged. He came prepared to his visit with questions about upcoming Medicare changes and continuing his premium assistance support.  Otherwise, Orlin is doing well. He is in an apartment he likes, his mental health is well controlled, and he recently underwent treatment for CA and found out he is now CA free!  He stated he would call SW after open enrollment starts with questions on plan options.  Writer updated pharmacy.    Plan:   There were no further SW questions at the conclusion of today's visit.  Writer will remain available to assist with psychosocial needs or concerns as they arise.  Contact information was provided and the patient was encouraged to call as needed.    Analilia Jack, MARIN, LICSW, ACM  Clinical   [facility]  Advanced Therapies Program and Clinical Trials Services  Phone: [phone]

## 2018-09-11 NOTE — PROGRESS NOTES
"    [facility]  [address] [place] [id number]  Main: [phone], Fax: [phone]    Comprehensive Hemophilia Clinic Visit      Patient: Orlin Alcantar  MRN: [id number]  : 1950  MAR: 2018    Last comprehensive hemophilia clinic visit:  3/21/2017    Last clinic visit:  2018    Hemophilia history:  Orlin is a 68 year old male with a history of severe hemophilia A with his baseline factor VIII level at <1% with no history of factor VIII inhibitor, returns to clinic today for his routine hemophilia comprehensive clinic visit.    Orlin was raised in a foster family and he does not know his biological parents. He was first diagnosed by Dr. Filippo Quiñones long time ago here at the [facility] and had been followed by Dr. Quiñones for a few years. He then was followed by Dr. Perez, a outside facility hematologist for a few years prior to him re-establishing care with this hemophilia treatment center with Dr. Jorge Jiang back in . There was some mentioned in old paper records by Dr. Quiñones that Orlin also has a history of \"clinically significant\" Type I Von Willebrand Disease. However, repeat testing done by Dr. Jorge Jiang back in  showed that his VWF:Ag was 62%, VWF:RCo was 81% and thus it was determined that he does not have Von Willebrand Disease.     Orlin also was diagnosed with Type 2 diabetes back in . Diagnosed with hepatitis C infection in  and received combination therapy with interferon and ribavirin in  and successfully eradicated the virus. He underwent left knee replacement in 1992, right hip replacement in Dec 1992, left hip fracture S/P insertion of three pins in 1995. He also underwent radioactive synovectomy in the right hip in 2003. Then back in , when he was seen by Dr. Jorge Jiang, he was inquiring for a hemophilia treatment plan for surgical synovectomy of the right hip on " 4/25/2005 by Dr. Del Cid (orthopedic surgeon). 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     After his visit with Dr. Jiang in 2005, Orlin apparently returned to see Dr. Perez as his main hemophilia care. He re-emerged in 2012 and was seen by Dr. Nicole Chadwick, who was a hematology fellow at the time (currently a staff physician at [facility]) for discussion of hematological treatment for redo of right hip open synovectomy along with exchange of the polyethylene portion of his hip arthroplasty by Dr. Del Cid. Orlin was again placed on a continuous factor VIII drip for this surgical procedure and he did well.    Again Orlin failed to follow through with this hemophilia treatment center after his visit with Dr. Chadwick and Dr. Jiang back in 2012 until 2014, he returns to this clinic for his first hemophilia comprehensive clinic visit in more than 10 years. When he saw Tavo Mack CNP (who has since retired) of this clinic back in 2014, Orlin was on Recombinate as a main stay of prophylactic factor VIII replacement therapy. Orlin historically does his own dosing regimen of prophylactic factor replacement despite our recommendation of doing infusion three times weekly with standard half life factor VIII products (which is the standard of care for severe hemophilia patients). Back in 2014, he was treating with Recombinate reportedly every day at 3000 Units per dose with a total usage of about 86707-[address]140 Units per week. He eventually transitioned from Recombinate to Helixate (Kogenate) after his visit with Tavo Mack CNP in 2014.     He then had a hemophilia comprehensive clinic visit back in 2/9/2016 and saw Dr. Jorge Jiang. At the time, he was using Recombinate at 3000 Units on Monday, Wednesday and Friday or 2000 Units every other day and on occasion, he uses an additional 1000 Unit  doses in between treatment days. With this regimen, he did not have any bleeding complications.    His last hemophilia comprehensive clinic visit was with Dr. Jorge Jiang back in 3/21/2017, at the time, again no change in his prophylactic factor VIII replacement was made. He has continued to use about 02799-30749 Units of Kogenate a week with self dosing schedule. With this regimen, he has no breakthrough bleeding episodes.     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 His joints, however, has been in stable conditions for many years now. He did use Codeine tablets (30 mg) with a schedule of 2 tabs Q morning, 2 tabs at bedtime and 1-2 tabs as needed during the day. In the past year, Orlin is able to discontinue Codeine use on his own. As of today, he has not use any Codeine on a regular consistent basis. His last prescription of Codeine was filled back in Dec 2017.     Interim history since last comprehensive clinic visit:  Orlin was experiencing some recurrent hematuria back in April 2017. He did have a cystoscopy scheduled at the time but he cancelled the procedure as his bleeding was apparently resolved. Then back in 12/12/2017, he started to experience recurrent hematuria. Initially it was thought to be related to his hemophilia. But despite aggressive factor VIII replacement, his hematuria persisted. Subsequent urine cytology was sent, which showed high grade urothelial carcinoma. Cystoscopy was done by Dr. Liao on 1/19/2018 and found a large bladder tumor. He then underwent transurethral resection of bladder tumor on 2/19/2018. Post surgery, he was evaluated by Dr. Burns (Oncologist) and ultimately he was enrolled to start a clinical trial chemotherapy regimen with cisplatin, gemcitabine and nivolumab. He completed 4 cycles of this regimen and then underwent radical cystoprostatectomy, bilateral pelvic  lymph node dissection and ileal conduit by Dr. Shea just recently on 7/30/2018. During his hospitalization, Clement Griffin PA-C of this clinic followed and manage the patient's hemophilia treatment regimen with bolus of Kogenate prior to surgery along with continuous Kogenate infusions intra-operatively and post operatively. Orlin had an expected factor VIII recovery, in fact, we did have to adjust his continuous infusion dose downwards during his hospitalization. Thus there was no evidence of inhibitor during this most recent hospitalization. He was then treated with daily Kogenate infusions at 43 Units/kg (3000 Units) for 14 days post operatively and was then instructed to go to three times weekly infusion of Kogenate at 3000 Units per dose thereafter. At the time, Orlin was in agreement with this plan.    Today, Orlin reports that he has been using Kogenate at 3000 Units every Monday, Wednesday, Friday and Sunday. With this regimen he reports no breakthrough bleeding episodes at least since his discharge from the hospital on 8/6/2018. He had a follow up visit with Dr. Magalie Espinal on 8/29/2018 and was told that his radical cystectomy final pathology showed PT0 N0 where prior to neoadjuvant therapy, his staging was T2N1.     ROS:  Complete ROS is negative, except as noted above.       Current factor treatment regimen:  He is currently on Kogenate at around 3000 Units per dose (which is about 43 Units/kg) every Mondays, Wednesdays, Fridays and Sundays. Orlin is doing self venipuncture.     Bleeding episodes since last comprehensive clinic visit:    The total number of bleeding events since last comprehensive hemophilia clinic visit requiring factor infusion was zero.    The patient is recalling his bleeding events by memory    Number of Hemarthroses in the past year:  Location Number of bleeds Other information related to each bleed   Ankle, Left 0    Ankle, Right 0    Elbow, Left 0    Elbow, Right 0    Hip, Left 0     Hip, Right 0    Knee, Left 0    Knee, Right 0    Shoulder, Left 0    Shoulder, Right 0      Other bleeds (GI, , Soft tissue etc) and spontaneous vs traumatic: Hematuria history as above.      Emergency department visits or hospitalizations in the past year:  He has no emergency department visits in the past year. He has no hemophilia related hospitalization in the past year, although he was hospitalized for urological surgeries 2 times in the past year.     Joint health:  Orlin has had multiple known hemophilic arthropathic joints for which his right hip has been the most symptomatic. However, in the past few years, his right hip has done well and has been stable. In fact, today, he has no complaint of any joint pain on a daily basis.       Joint procedures (synovectomy or joints replacement) since last comprehensive clinic visit: None    Chronic pain: (No or every day / Most days / Some days) No chronic pain.     Prescribed opioids for chronic pain (Yes / No) No, stop using daily Codeine since Nov 2017.     Overall activity level:   Activity level for: Unrestricted / Limited / Unable   Work/School Limited   Recreation Limited   Self-Care Limited       Infectious disease status:     Date of Serology done Immunization status    Hepatitis A Unknown    Hepatitis B 3/28/2018 Negative serology both surface Agn and Core Nidhi      Status (Positive or Negative) Most recent RNA Quant and date Treatment:   Hepatitis C Antibody positive but negative viral load 3/28/2018 RNA not detected. Interferon and Ribavirin in 1999      Positive or Negative (date of test done)   HIV status Negative in 3/28/2018     Evidence of cirrhosis or liver disease on imaging? No, last CT Abd/Pelvis AND PET scan on 8/28/2018    Routine health maintenance:  His last visit with Dr. Katie Ramos, primary care physician at [facility] was back in Nov 2017. He has not seen a dentist for several years.     Social history:  Please see Analilia  "Marcie BAKER's note for her evaluation and assessment.   Denies any tobacco use (quit smoking back in 2013).  No significant alcohol use.  Denies any illicit drug use.  No marijuana use.     The patient has missed None days of work/school since last comprehensive clinic visit.    The patient highest level of education completed is high school plus one year of college.     Currently the patient is currently not working.     He has a history of depression and anxiety.       Medications:  Current Outpatient Prescriptions   Medication     acetaminophen (TYLENOL) 325 MG tablet     amLODIPine (NORVASC) 5 MG tablet     Antihemophilic Factor, Recomb, (KOGENATE FS) 1000 units KIT     diazepam (VALIUM) 5 MG tablet     mirtazapine (REMERON) 45 MG tablet     polyethylene glycol (MIRALAX/GLYCOLAX) Packet     lisinopril (PRINIVIL/ZESTRIL) 40 MG tablet     order for DME     [DISCONTINUED] mirtazapine (REMERON) 45 MG tablet     No current facility-administered medications for this visit.      Facility-Administered Medications Ordered in Other Visits   Medication     ethyl chloride spray       Allergies:  Allergies   Allergen Reactions     Aspirin      This drug inhibits platelets and is contraindicated due to hemophilia diagnosis.           Family history:  Orlin grew up at a foster family and does not know his biological parents.     Objective:  Vitals: /72 (BP Location: Right arm, Patient Position: Sitting, Cuff Size: Adult Regular)  Pulse 97  Temp 98.7  F (37.1  C) (Oral)  Resp 12  Ht 1.834 m (6' 0.2\")  Wt 67.8 kg (149 lb 8 oz)  SpO2 98%  BMI 20.16 kg/m2  Exam:  General: No acute distress  Lungs: Clear to auscultation bilaterally.   CV: RRR S1 S2, No murmur, rubs, or gallops.  Abd: Soft, non tender, non distended.  Positive bowel sounds throughout.   Ext: Please see Ama FLOYD's note for our combined joint evaluation.   Neuro: no focal deficits  Skin: no rash or hematomas    Labs:  Component      Latest Ref " Rng & Units 8/27/2018 8/29/2018   WBC      4.0 - 11.0 10e9/L  4.4   RBC Count      4.4 - 5.9 10e12/L  4.00 (L)   Hemoglobin      13.3 - 17.7 g/dL  11.2 (L)   Hematocrit      40.0 - 53.0 %  35.8 (L)   MCV      78 - 100 fl  90   MCH      26.5 - 33.0 pg  28.0   MCHC      31.5 - 36.5 g/dL  31.3 (L)   RDW      10.0 - 15.0 %  13.4   Platelet Count      150 - 450 10e9/L  243   Diff Method        Automated Method   % Neutrophils      %  51.6   % Lymphocytes      %  20.2   % Monocytes      %  9.8   % Eosinophils      %  17.3   % Basophils      %  0.9   % Immature Granulocytes      %  0.2   Nucleated RBCs      0 /100  0   Absolute Neutrophil      1.6 - 8.3 10e9/L  2.3   Absolute Lymphocytes      0.8 - 5.3 10e9/L  0.9   Absolute Monocytes      0.0 - 1.3 10e9/L  0.4   Absolute Eosinophils      0.0 - 0.7 10e9/L  0.8 (H)   Absolute Basophils      0.0 - 0.2 10e9/L  0.0   Abs Immature Granulocytes      0 - 0.4 10e9/L  0.0   Absolute Nucleated RBC        0.0   Bilirubin Direct      0.0 - 0.2 mg/dL 0.1    Bilirubin Total      0.2 - 1.3 mg/dL 0.3    Albumin      3.4 - 5.0 g/dL 3.7    Protein Total      6.8 - 8.8 g/dL 7.8    Alkaline Phosphatase      40 - 150 U/L 113    ALT      0 - 70 U/L 20    AST      0 - 45 U/L 23          Assessment:  In summary, Orlin Alcantar is a 68 year old year old man with severe hemophilia A with a baseline factor VIII level of <1% without a history of inhibitor, who also has a history of multiple hemophilic arthropathic joints and bladder cancer found early 2018, who presents for his routine hemophilia comprehensive hemophilia clinic visit today.     Plan:    Factor replacement plan:  No change in factor replacement were made today.  He feels he needs 4 days/week dosing to prevent hemarthrosis. He will remain on Kogenate 3000 Units ( ~40 U/kg) on M, W, F and Sun.    Hemophilic arthropathy management plan:  No bleeds and no chronic pain.  Doing well    Other medical follow up plan:  Has follow up scheduled  with Oncology regarding bladder cancer surveillance and has a PCP.    Labs needed:  none    Follow up clinic visit timin year for comprehensive evaluation      Ama Chadwick MPT; Analilia Jack [facility];  and Margot Reed, nurse clinician have all seen the patient independently, please refer to their notes for their evaluation and assessment.     Dr. Charles Martinez, staff hematologist has also seen this patient today in clinic.       Clement Griffin PA-C, MPAS  Physician Assistant  [facility].     Hematology Staff  Patient seen with the comprehensive hemophilia team.  The documentation above reflects our joint evaluation, assessment, and plan.    Briefly, Mr Alcantar is a 68 YOM with severe HA on prophylaxis and at goal for hemarthrosis.  Encouraged use of prophylaxis 3x weekly but patient feels strongly that this regimen works well for him and he is at goal.  No chronic pain and no urgent PT needs identified today.  RTC for comprehensive clinic in 1 year    Charles Martinez MD   of Medicine  [facility] [employer]

## 2018-09-11 NOTE — MR AVS SNAPSHOT
After Visit Summary   9/11/2018    Orlin Alcantar    MRN: [id number]           Patient Information     Date Of Birth          1950        Visit Information        Provider Department      9/11/2018 10:15 AM Kaylin Rajan APRN [facility]        Today's Diagnoses     Anxiety state    -  1    Mood disorder in conditions classified elsewhere        Depressive disorder        Generalized anxiety disorder          Care Instructions    Thank you for coming to the PSY[facility]ATRY CLINIC.    Lab Testing:  If you had lab testing today and your results are reassuring or normal they will be mailed to you or sent through [employer] within 7 days.   If the lab tests need quick action we will call you with the results.  The phone number we will call with results is # [phone] (home) . If this is not the best number please call our clinic and change the number.    Medication Refills:  If you need any refills please call your pharmacy and they will contact us. Our fax number for refills is [phone]. Please allow three business for refill processing.   If you need to  your refill at a new pharmacy, please contact the new pharmacy directly. The new pharmacy will help you get your medications transferred.     Scheduling:  If you have any concerns about today's visit or wish to schedule another appointment please call our office during normal business hours [phone] (8-5:00 M-F)    Contact Us:  Please call [phone] during business hours (8-5:00 M-F).  If after clinic hours, or on the weekend, please call  [phone].    Financial Assistance [phone]  [employer] Billing [phone]  [facility]illing [phone]  Medical Records [phone]      MENTAL HEALTH CRISIS NUMBERS:  [facility]:   [facility] - [phone]   Crisis Residence [facility] - [phone]   Walk-In Counseling Center [facility] - [phone]   COPE 24/7 [facility] Mobile Team  for Adults - [[phone]]; Child - [[phone]]     Crisis Connection - [phone]     [facility] - [phone]   Walk-in counseling [facility] - [phone]   Walk-in counseling [facility] - [phone]   Crisis Residence [facility] - [phone]   Urgent Care Adult Mental Health:   --Drop-in, 24/7 crisis line, and [employer] Mobile Team [[phone]]    CRISIS TEXT LINE: Text [phone] from anywhere, anytime, any crisis 24/7;    OR SEE www.crisistextline.org     Poison Control Center - [phone]    CHILD: [facility] needs assessment team - [phone]     [employer] Lifeline - 1-671.692.1418; or [employer] Lifeline - 1-766.632.4796    If you have a medical emergency please call 911or go to the nearest ER.                    _____________________________________________    Again thank you for choosing PSYCHIATRY CLINIC and please let us know how we can best partner with you to improve you and your family's health.  You may be receiving a survey in the mail regarding this appointment. We would love to have your feedback, both positive and negative, so please fill out the survey and return it using the provided envelope. The survey is done by an external company, so your answers are anonymous.             Follow-ups after your visit        Follow-up notes from your care team     Return in about 6 months (around 3/11/2019).      Your next 10 appointments already scheduled     Sep 11, 2018 11:30 AM CDT   COMPREHENSIVE CLINIC VISIT with Clement Griffin PA-C   [facility] ([facility])    [facility] [place] [address]   [phone]            Sep 11, 2018 11:30 AM CDT   Evaluation with AYUSH Foss Physical Therapy [facility] ([facility])    [address]  [address]  [facility] [address]   [phone]            Mar 26, 2019 10:15 AM CDT   Adult Med Follow UP with JELLY Chapa CNS   Psychiatry Clinic ([facility])    [address]  [address] [facility] [address]   [phone]              Who to contact     Please call your clinic at [phone] to:    Ask questions about your health    Make or cancel appointments    Discuss your medicines    Learn about your test results    Speak to your doctor            Additional Information About Your Visit        [employer] Information     [employer] gives you secure access to your electronic health record. If you see a primary care provider, you can also send messages to your care team and make appointments. If you have questions, please call your primary care clinic.  If you do not have a primary care provider, please call [phone] and they will assist you.      [employer] is an electronic gateway that provides easy, online access to your medical records. With [employer], you can request a clinic appointment, read your test results, renew a prescription or communicate with your care team.     To access your existing account, please contact your [facility] or call [phone] for assistance.        Care EveryWhere ID     This is your Care EveryWhere ID. This could be used by other organizations to access your [facility] medical records  [id number]        Your Vitals Were     Pulse BMI (Body Mass Index)                125 20.37 kg/m2           Blood Pressure from Last 3 Encounters:   09/11/18 111/78   08/29/18 109/75   08/22/18 111/85    Weight from Last 3 Encounters:   09/11/18 68.1 kg (150 lb 3.2 oz)   08/29/18 68.7 kg (151 lb 6 oz)   08/22/18 67.7 kg (149 lb 3.2 oz)              Today, you had the following     No orders found for display         Today's Medication Changes          These changes are accurate as of 9/11/18  10:58 AM.  If you have any questions, ask your nurse or doctor.               These medicines have changed or have updated prescriptions.        Dose/Directions    amLODIPine 5 MG tablet   Commonly known as:  NORVASC   This may have changed:  when to take this   Used for:  Hypertension goal BP (blood pressure) < 140/90        Dose:  5 mg   Take 1 tablet (5 mg) by mouth daily   Quantity:  90 tablet   Refills:  3       diazepam 5 MG tablet   Commonly known as:  VALIUM   This may have changed:    - how much to take  - how to take this  - when to take this  - reasons to take this  - additional instructions   Used for:  Depressive disorder   Changed by:  Kaylin Rajan APRN CNS        Dose:  5 mg   Take 1 tablet (5 mg) by mouth daily as needed for anxiety or sleep   Quantity:  30 tablet   Refills:  5            Where to get your medicines      These medications were sent to [facility] - [facility] [place] [address], [facility] [address]     Phone:  [phone]     mirtazapine 45 MG tablet         Some of these will need a paper prescription and others can be bought over the counter.  Ask your nurse if you have questions.     Bring a paper prescription for each of these medications     diazepam 5 MG tablet                Primary Care Provider Office Phone # Fax #    Katie Ramos [phone] [address]  [facility] [address]        Equal Access to Services     ANDREW FLANAGAN : Hadgeeta owens hadasho Sofaiza, waaxda luqadaha, qaybta kaalmada adeegyada, lloyd blum. So [facility] [phone].    ATENCIÓN: Si habla español, tiene a hernadez disposición servicios gratuitos de asistencia lingüística. [facility] [phone].    We comply with applicable federal civil rights laws and [place] laws. We do not discriminate on the basis of race, color, national origin, age, disability, sex, sexual orientation,  or gender identity.            Thank you!     Thank you for choosing [facility]IN[facility]  for your care. Our goal is always to provide you with excellent care. Hearing back from our patients is one way we can continue to improve our services. Please take a few minutes to complete the written survey that you may receive in the mail after your visit with us. Thank you!             Your Updated Medication List - Protect others around you: Learn how to safely use, store and throw away your medicines at www.disposemymeds.org.          This list is accurate as of 9/11/18 10:58 AM.  Always use your most recent med list.                   Brand Name Dispense Instructions for use Diagnosis    acetaminophen 325 MG tablet    TYLENOL    100 tablet    Take 2 tablets (650 mg) by mouth every 4 hours as needed for mild pain or fever    Malignant neoplasm of urinary bladder, unspecified site (H)       amLODIPine 5 MG tablet    NORVASC    90 tablet    Take 1 tablet (5 mg) by mouth daily    Hypertension goal BP (blood pressure) < 140/90       diazepam 5 MG tablet    VALIUM    30 tablet    Take 1 tablet (5 mg) by mouth daily as needed for anxiety or sleep    Depressive disorder       * KOGENATE FS 3000 units Kit     78481 Units    Infuse Kogenate at 3000 Units +/- 10% IV Q 24 hours until 8/13/2018.    Severe hemophilia A (H), Malignant neoplasm of urinary bladder, unspecified site (H), Hemophilic arthropathy       * KOGENATE FS 1000 units Kit     58995 each    Infuse 3000 iu (-5/+10%) IV every Mondays, Wednesdays and Fridays.    Severe hemophilia A (H), Personal history of malignant neoplasm of bladder       lisinopril 40 MG tablet    PRINIVIL/ZESTRIL    30 tablet    Take 1 tablet (40 mg) by mouth every morning (HOLD UNTIL FOLLOW-UP with Primary Care Provider)    Hypertension goal BP (blood pressure) < 140/90       mirtazapine 45 MG tablet    REMERON    37 tablet    Take 1 tablet (45 mg) by mouth At Bedtime And may take 1/2 tablet for  insomnia up to 3 times a week.    Generalized anxiety disorder       order for DME     1 each    Equipment being ordered: Walker Wheels () and Walker () Treatment Diagnosis: gait instability    Severe hemophilia A (H)       oxyCODONE IR 5 MG tablet    ROXICODONE    12 tablet    Take 1 tablet (5 mg) by mouth every 4 hours as needed for moderate to severe pain    Malignant neoplasm of urinary bladder, unspecified site (H)       polyethylene glycol Packet    MIRALAX/GLYCOLAX    30 packet    Take 17 g by mouth daily (to prevent constipation)    Malignant neoplasm of urinary bladder, unspecified site (H)       * Notice:  This list has 2 medication(s) that are the same as other medications prescribed for you. Read the directions carefully, and ask your doctor or other care provider to review them with you.

## 2018-09-11 NOTE — PATIENT INSTRUCTIONS
PHYSICAL THERAPY INSTRUCTIONS:    Continue to use the motion you have available in your joints, using pain as your guide.    Monitor your joint motion closely and notify us if you notice any sudden changes, especially affecting your daily function.    Contact Ama with any new joint/muscle concerns, and with any equipment needs.  Treatment of New Joint and/or Muscle Bleeds:     Treat with factor per doctor s orders.    Apply RICE treatment as needed for pain relief and to allow rest and healing   o R=Rest, Limit movement and protect your joint with splints, braces and assistive devices as directed by your treatment center.  Use crutches and do not put any weight on a new lower extremity bleed (ex: hip, knee, ankle).   Only move in pain-free range.  o I=Ice, Apply ice to surround the affected area for 15-20 minutes every 2 hours.  Use ice cubes in bag, frozen vegetables, gel ice packs, or commercial products (Cryocuff ).  o C=Compression, Helps to control swelling and can decrease pain. Can use elastic wraps or compression sleeve such as tubigrip.  Avoid tourniquet effect by using proper technique. Remove if pain increases or with any numbness or tingling.   o E=Elevate, Can help decrease swelling and encourages rest.  Most effective with elevation above level of the heart.  Joint Health: Bleed prevention and regular activity are key elements to maintaining healthy joints. Limit activities that cause joint pain and spread out activities/exercises throughout the day and the week, alternate exercises each day.    Footwear: Always wear supportive footwear-shoes with laces and good ankle support are best.  If you experience frequent foot/ankle pain, try wearing shoes indoors as well as outdoors.      Maria Teresa Chadwick RUST  Physical Therapist  [facility]  [phone]    Factor Plan:    Continue taking Kogenate factor concentrate at 3000 units m, W, F and Villas[place]or.    Please call with any  "breakthrough bleeding to discuss your plan of care.    If you have a severe blow to the head, please give your factor dose and go the the Emergency Room for evaluation and for head CT scan.    Carry factor concentrate with you at all times. Call the center if you will be traveling out of the area. We will create a travel letter and letter with instructions on emergency care in hemophilia. For treatment centers around the world go to [email or web address], click on left link \"resources\" and then click on \"passport\" and type in travel destination.     General Recommendations:    Wear medic alert on your person for highest level of safety www.[employer].org    Call the center if you have any dental pain or problems.  Since you have had a joint replacement, you might require antibiotic prior to  dental cleaning.    See your primary care provider for general health maintenance.  Your blood pressure was great today.    For any questions, please contact your [facility] nurse, Margot Reed RN at [phone] or the main phone number [phone].    If you have emergency needs in the evening or weekend, please contact the page  [phone] and page the hematologist on call or Dr. Jorge Jiang.    Please return for comprehensive clinic in one year.      Margot Reed RN - Nurse Clinician - [facility] - [phone]        "

## 2018-09-12 DIAGNOSIS — C67.8 MALIGNANT NEOPLASM OF OVERLAPPING SITES OF BLADDER (H): Primary | ICD-10-CM

## 2018-09-12 NOTE — PROGRESS NOTES
I met with Mr. Orlin Alcantar during his comprehensive clinic care appointment.  Our main topic of conversation was pharmacokinetic (PK) testing.  I gave Pal hand-out that describes the basics of PK and why it would be beneficial for him to have the testing done.  I also showed Orlin the home page for the [employer] website so he could see that PK is now being done on a world-wide basis for hemophilia patients.    Orlin was interested in getting the lab work done to have the testing done.  He would like to coordinate the lab work with an upcoming clinic appointment at the [facility] in about 2 months.  I told him to call when that time gets closer and we can have orders placed for 2 factor VIII assay lab draws.  When it is time to perform the testing, Orlin was informed that we will need to take note of the date, time, and amount of factor infused and then get a lab draw at 24 hours and another at 48 hours.  He would prefer to have the lab draws done at the [facility] where he is most familiar with the lab set-up.    MARVIN JALLOH, R.PH.  [facility], [facility]  [phone]

## 2018-09-13 NOTE — NURSING NOTE
Teaching Flowsheet   Orlin Alcantar  has a diagnosis of HEMOPHILIA A  with a baseline factor  FACTOR VIII of   <1% . He  presents today for his  comprehensive Hemophilia clinic evaluation.  Teaching Topic: hemophilia self care     Person(s) involved in teaching:   Patient     Motivation Level:  Asks Questions: Yes   Eager to Learn: Yes  Cooperative: Yes   Receptive (willing/able to accept information): Yes     Patient demonstrates understanding of the following:  Reason for the appointment, diagnosis and treatment plan: Yes  Knowledge of proper use of medications and conditions for which they are ordered (with special attention to potential side effects or drug interactions): Yes  Which situations necessitate calling provider and whom to contact: Yes        Proper use and care of   (medical equip, care aids, etc.): NA  Nutritional needs and diet plan: NA  Pain management techniques: NA  Wound Care: NA  How and/when to access community resources: NA      Orlin Alcantar Kogenate  brand factor VIII concentrate and treats prophylactically.  He treats himself using peripheral venipuncture and reports no problems with access.  His typical dose is 3000 units M, W, F and [place]or. Providers discussed maybe doing levels to make sure his factor levels are not staying at an unsafe levevl.  He would like to do this in a few months.       Reviewed procedure for home infusions of factor concentrates.  See attached treatment recommendations.      Reinforced that is he suspect bleeding in head, neck, throat or abdomen, give dose of factor if able, contact the hemophilia center immediately or report to the Emergency Room at [facility] or the nearest emergency room.      Patient's primary provider is at [facility].    Reviewed and discussed the patient instructions point by point and patient verbalized understanding. Copy of the After Visit Summary (AVS) given to patient for future reference. Patient  given the contact card for the [facility] and has the appropriate numbers to call with any questions.    Margot Reed RN - Nurse Clinician - [facility] - [phone]

## 2018-09-18 NOTE — PROGRESS NOTES
OUTPATIENT PHYSICAL THERAPY HEMOPHILIA CLINIC NOTE  [facility]    Orlin Alcantar     YOB: 1950  [id number]    Reason for visit: Comprehensive Clinic  Date of service:  9/11/2018  Referring provider: Clement Griffin PA-C  Medical Diagnosis: Factor VIII -  Severe  Replacement therapy: Prophylaxis: Schedule- M, W, F, Sat    Interval History (include personal factors and/or comorbidities that impact the plan of care):   Orlin was last seen on 3/21/2017 for a comp clinic.  Today he reports no significant change in function or mobility.  He does not have any identified goals for this visit.  Work: Orlin is not currently working.  Bleeds: None in the past year.  Exercise/physical activity: Daily walking.       Orthopedic past medical history:   Right SUNG 1992  Right hip steroid injections multiple times between 3665-6504  Right hip open synovectomy 2005  Right hip SUNG revision with synovectomy 4/26/2012  Left TKA 20+ years ago  Left hip fracture with internal fixation 20+ years    Pain:  Chief complaint: Denied    Fall Risk Screen:  Has the patient fallen 2 or more times in the last year? No  Has the patient fallen and had an injury in the past year? No  Timed Up and Go Score: NA  Is the patient a fall risk? No    Physical Exam: Gross ROM assessment shows loss of motion in bilateral ankles, left knee and bilateral elbows.  See table.  Patient is right handed.  ROM: Unchanged in past year per patient.  He does realize today for the first time that he has to hold the end of the razor handle with his right hand due to limited flexion.  He has done this for awhile so it was a habit but he hadn't recognized the limited motion as the reason.  He is unable to touch his face with his right hand.    Swelling: Denied  Pain: Denied, he does identify that the left ankle has a h/o of multiple bleeds over the years and will give him issues with prolonged walking.  He is able to complete all daily  activities and the walking associated with errands without concern.     ROM L elbow R elbow L Knee R Knee L Ankle R Ankle Other: Other:    Flex (DF) 110 100 90 110       Ext (PF) -45 -45 -10 0       Pronation NT NT         Supination NT NT           Gait: Independent without an assistive device.  Not formally assessed but patient reports joint stiffness in ankles and knees, and increased joint issues with prolonged walking.     Assessment:   Work: Retired, was a .  Bleeds: None reported in the past year.  Activity: Orlin stays active with daily walking and reports he is not one to sit around so he's active throughout his day.  Equipment: Orlin currently has all the equipment he needs to complete daily activities.  He has a walker, crutches, reacher and sock aid.   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  Right ankle: Probable hemophilic arthropathy with clinical evidence of decreased ROM and occasional pain with prolonged walking, no imaging found.  Left knee: H/o hemophilic arthropathy addressed with TKA many years ago.  Current decreased ROM secondary to soft tissue restriction likely present prior to surgery.  Right knee: Slight decrease in flexion as compared to age normative tables, no other clinical signs, no imaging found.  Joint identified to watch closely, not currently with hemophilic arthropathy.    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  Right hip: H/o hemophilic arthropathy addressed with USNG many years ago and revision in 2012.  Hip has been much improved clinically since 2012 surgery.    Findings  affecting gait, ROM, prolonged walking tolerance.     Treatment diagnosis: Joint hypomobility, ROM limitations, gait impairment    Clinical Presentation: Stable/Uncomplicated  Clinical Presentation Rationale: No new impairments identified in chronic presentation of joint issues.   Clinical Decision Making (Complexity): Low complexity  Treatment: Met with patient 1:1 today to update musculoskeletal and mobility history and provide education for home management of bleeding disorder.  Treatment included:     Encouraged Orlin to continue to use the motion he has available in his joints, using pain as guide.    Encouraged Orlin to monitor his joint motion closely and notify us of any sudden changes, especially affecting daily function.    Reviewed use of RICE for treatment of acute bleeds or injuries.      Provided general written information on bleed prevention and importance of pacing activities throughout the day and limiting activities that cause pain.    Recommended always wearing supportive footwear-shoes with laces and good ankle support are best.   If frequent foot/ankle pain occurs, recommended wearing shoes indoors as well as outdoors.      Encouraged Orlin to contact me at any time with questions or equipment needs.    Plan of Care:   1. Incorporate home management instructions as provided.  2. Will plan to see at next clinic visit.    Therapy Frequency and Predicted Duration of Therapy Intervention: One session evaluation and treatment  Goals: Patient verbalizes understanding of evaluation results, home management and home exercise recommendations provided today to maximize functional mobility and allow for continued safe participation in current home and work activities. -Goal Met    Recommendations: Follow up at next scheduled [facility] clinic visit  Educational assessment/Barriers to learning: No barriers noted  Treatment provided this date (including minutes): Home management: 10 minutes, no  charge   Patient/Family Education:Adjunctive treatment/RICE, Splints/orthoses/equipment, General information on benefits of exercise and physical activity, Footwear selection and Hemophilic arthropathy   Risks and benefits of evaluation/treatment have been explained.  Patient, family and/or caregiver are in agreement with Plan of Care.    Timed Code Treatment Minutes: 0  Total Treatment Time (sum of timed and untimed services): 30    Signature: Maria Teresa Chadwick [facility] [place]  Physical Therapist  [facility]  [phone]  Date: 9/18/2018    Mobility: Walking and Moving Around  Current Status , Goal , Discharge   1 session only, modifier the same for all G-codes.  CJ: 20-39% impairment  Modifier determined by clinical judgment in conjunction with objective data and subjective report.      Certification:  Onset date: 9/11/2018  Start of care date: 9/11/2018  Certification date from 9/11/2018 to 9/12/2018    I CERTIFY THE NEED FOR THESE SERVICES FURNISHED UNDER        THIS PLAN OF TREATMENT AND WHILE UNDER MY CARE     (Physician co-signature of this document indicates review and certification of the therapy plan).

## 2018-09-27 DIAGNOSIS — Z85.51 PERSONAL HISTORY OF MALIGNANT NEOPLASM OF BLADDER: ICD-10-CM

## 2018-09-27 DIAGNOSIS — D66 SEVERE HEMOPHILIA A (H): ICD-10-CM

## 2018-09-27 RX ORDER — ANTIHEMOPHILIC FACTOR (RECOMBINANT) 1000 (+/-)
KIT INTRAVENOUS
Qty: 12000 EACH | Refills: 12 | Status: SHIPPED | OUTPATIENT
Start: 2018-09-27 | End: 2019-01-07

## 2018-10-08 ENCOUNTER — TELEPHONE (OUTPATIENT)
Dept: PSYCHIATRY | Facility: CLINIC | Age: 68
End: 2018-10-08

## 2018-10-08 NOTE — TELEPHONE ENCOUNTER
Received incoming fax of partially completed PA for Mirtazapine from Sofía Mike with  [facility].    Writer completed remaining portions of PA and submitted to provider for review and signature.

## 2018-10-10 NOTE — TELEPHONE ENCOUNTER
-Write received completed and signed PA forms. Faxed to [phone] per Soífa Mike's request on the original fax cover sheet.

## 2018-10-19 NOTE — TELEPHONE ENCOUNTER
-Received approval letter from [facility]. Mirtazapine approved from 09/16/18 - 10/11/2019.    Member ID: [id number]  Certification #: [id number]    -Called  [facility] and relayed that the PA has been approved. The staff member stated she already called insurance and confirmed this and has spoken to the pt. She asked that writer fax the approval letter to the pharmacy at [phone]. Copy made and held at writer's desk. Original placed in scanning.

## 2018-11-01 DIAGNOSIS — D66 HEMOPHILIC ARTHROPATHY (H): ICD-10-CM

## 2018-11-01 DIAGNOSIS — M36.2 HEMOPHILIC ARTHROPATHY (H): ICD-10-CM

## 2018-11-01 DIAGNOSIS — D66 SEVERE HEMOPHILIA A (H): Primary | ICD-10-CM

## 2018-11-01 RX ORDER — CODEINE SULFATE 30 MG/1
TABLET ORAL
Qty: 90 TABLET | Refills: 0 | Status: SHIPPED | OUTPATIENT
Start: 2018-11-01 | End: 2018-11-26

## 2018-11-01 NOTE — TELEPHONE ENCOUNTER
Patient called to request pain medication as joint pain as worsened recently.  Had been on Codeine in the past but not using for the last year.  Instructed him to start at lower dose than previously used (60mg previous).  Would ideally see Orlin to discuss pain management plan in the next few months.         Yanira Branch MPH, PA-C  [facility]  [facility]  [phone] main line  [phone] pager  [phone] fax

## 2018-11-07 DIAGNOSIS — D66 SEVERE HEMOPHILIA A (H): Primary | ICD-10-CM

## 2018-11-13 ENCOUNTER — TELEPHONE (OUTPATIENT)
Dept: HEMATOLOGY | Facility: CLINIC | Age: 68
End: 2018-11-13

## 2018-11-13 NOTE — TELEPHONE ENCOUNTER
Mr. Alcantar called me today to confirm I received his letter and copy of his infusion records. His current secondary policy through [facility] is ending this year and he has enrolled in a new supplemental policy with [facility]. He is currently receiving premium assistance from [facility] and would like us to contact them to let them know of the change. His premium will be going up by about $50 so he wants to make sure they don't send him his quarterly prospective premium check in December. I spoke with Analilia Jack, , and she will contact [facility] of the change, and once he has his confirmation letter with new premium amounts, he will fax to us to forward to [facility].

## 2018-11-16 ENCOUNTER — RADIANT APPOINTMENT (OUTPATIENT)
Dept: CT IMAGING | Facility: CLINIC | Age: 68
End: 2018-11-16
Attending: INTERNAL MEDICINE
Payer: MEDICARE

## 2018-11-16 DIAGNOSIS — C67.9 UROTHELIAL CARCINOMA OF BLADDER (H): ICD-10-CM

## 2018-11-16 DIAGNOSIS — D66 SEVERE HEMOPHILIA A (H): ICD-10-CM

## 2018-11-16 DIAGNOSIS — C67.8 MALIGNANT NEOPLASM OF OVERLAPPING SITES OF BLADDER (H): ICD-10-CM

## 2018-11-16 LAB
ALBUMIN SERPL-MCNC: 2.9 G/DL (ref 3.4–5)
ALP SERPL-CCNC: 112 U/L (ref 40–150)
ALT SERPL W P-5'-P-CCNC: 16 U/L (ref 0–70)
ANION GAP SERPL CALCULATED.3IONS-SCNC: 7 MMOL/L (ref 3–14)
AST SERPL W P-5'-P-CCNC: 22 U/L (ref 0–45)
BASOPHILS # BLD AUTO: 0 10E9/L (ref 0–0.2)
BASOPHILS NFR BLD AUTO: 0.5 %
BILIRUB SERPL-MCNC: 0.6 MG/DL (ref 0.2–1.3)
BUN SERPL-MCNC: 30 MG/DL (ref 7–30)
CALCIUM SERPL-MCNC: 8.5 MG/DL (ref 8.5–10.1)
CHLORIDE SERPL-SCNC: 107 MMOL/L (ref 94–109)
CO2 SERPL-SCNC: 22 MMOL/L (ref 20–32)
CREAT BLD-MCNC: 1.1 MG/DL (ref 0.66–1.25)
CREAT SERPL-MCNC: 1.09 MG/DL (ref 0.66–1.25)
DIFFERENTIAL METHOD BLD: ABNORMAL
EOSINOPHIL # BLD AUTO: 0.2 10E9/L (ref 0–0.7)
EOSINOPHIL NFR BLD AUTO: 3.1 %
ERYTHROCYTE [DISTWIDTH] IN BLOOD BY AUTOMATED COUNT: 17.1 % (ref 10–15)
FACT VIII ACT/NOR PPP: 42 % (ref 55–200)
GFR SERPL CREATININE-BSD FRML MDRD: 67 ML/MIN/1.7M2
GFR SERPL CREATININE-BSD FRML MDRD: 67 ML/MIN/1.7M2
GLUCOSE SERPL-MCNC: 106 MG/DL (ref 70–99)
HCT VFR BLD AUTO: 33.2 % (ref 40–53)
HGB BLD-MCNC: 10.3 G/DL (ref 13.3–17.7)
IMM GRANULOCYTES # BLD: 0 10E9/L (ref 0–0.4)
IMM GRANULOCYTES NFR BLD: 0.3 %
INR PPP: 1.06 (ref 0.86–1.14)
LYMPHOCYTES # BLD AUTO: 0.8 10E9/L (ref 0.8–5.3)
LYMPHOCYTES NFR BLD AUTO: 12 %
MCH RBC QN AUTO: 25.2 PG (ref 26.5–33)
MCHC RBC AUTO-ENTMCNC: 31 G/DL (ref 31.5–36.5)
MCV RBC AUTO: 81 FL (ref 78–100)
MONOCYTES # BLD AUTO: 0.7 10E9/L (ref 0–1.3)
MONOCYTES NFR BLD AUTO: 11.5 %
NEUTROPHILS # BLD AUTO: 4.7 10E9/L (ref 1.6–8.3)
NEUTROPHILS NFR BLD AUTO: 72.6 %
NRBC # BLD AUTO: 0 10*3/UL
NRBC BLD AUTO-RTO: 0 /100
PLATELET # BLD AUTO: 178 10E9/L (ref 150–450)
POTASSIUM SERPL-SCNC: 4.3 MMOL/L (ref 3.4–5.3)
PROT SERPL-MCNC: 7.1 G/DL (ref 6.8–8.8)
RBC # BLD AUTO: 4.08 10E12/L (ref 4.4–5.9)
SODIUM SERPL-SCNC: 136 MMOL/L (ref 133–144)
WBC # BLD AUTO: 6.4 10E9/L (ref 4–11)

## 2018-11-16 RX ORDER — IOPAMIDOL 755 MG/ML
92 INJECTION, SOLUTION INTRAVASCULAR ONCE
Status: COMPLETED | OUTPATIENT
Start: 2018-11-16 | End: 2018-11-16

## 2018-11-16 RX ADMIN — IOPAMIDOL 92 ML: 755 INJECTION, SOLUTION INTRAVASCULAR at 08:41

## 2018-11-16 NOTE — DISCHARGE INSTRUCTIONS

## 2018-11-19 ENCOUNTER — ONCOLOGY VISIT (OUTPATIENT)
Dept: ONCOLOGY | Facility: CLINIC | Age: 68
End: 2018-11-19
Attending: INTERNAL MEDICINE
Payer: MEDICARE

## 2018-11-19 ENCOUNTER — APPOINTMENT (OUTPATIENT)
Dept: LAB | Facility: CLINIC | Age: 68
End: 2018-11-19
Attending: INTERNAL MEDICINE
Payer: MEDICARE

## 2018-11-19 ENCOUNTER — RESEARCH ENCOUNTER (OUTPATIENT)
Dept: ONCOLOGY | Facility: CLINIC | Age: 68
End: 2018-11-19

## 2018-11-19 VITALS
BODY MASS INDEX: 21.04 KG/M2 | DIASTOLIC BLOOD PRESSURE: 73 MMHG | RESPIRATION RATE: 18 BRPM | SYSTOLIC BLOOD PRESSURE: 106 MMHG | HEART RATE: 99 BPM | WEIGHT: 156 LBS | OXYGEN SATURATION: 98 % | TEMPERATURE: 98.2 F

## 2018-11-19 DIAGNOSIS — C67.8 MALIGNANT NEOPLASM OF OVERLAPPING SITES OF BLADDER (H): Primary | ICD-10-CM

## 2018-11-19 DIAGNOSIS — D66 SEVERE HEMOPHILIA A (H): ICD-10-CM

## 2018-11-19 LAB — FACT VIII ACT/NOR PPP: 19 % (ref 55–200)

## 2018-11-19 PROCEDURE — G0463 HOSPITAL OUTPT CLINIC VISIT: HCPCS | Mod: ZF

## 2018-11-19 PROCEDURE — 36415 COLL VENOUS BLD VENIPUNCTURE: CPT

## 2018-11-19 PROCEDURE — 99213 OFFICE O/P EST LOW 20 MIN: CPT | Mod: ZP | Performed by: INTERNAL MEDICINE

## 2018-11-19 ASSESSMENT — PAIN SCALES - GENERAL: PAINLEVEL: NO PAIN (0)

## 2018-11-19 NOTE — LETTER
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   3/26/18: Consented for the Nivolumab-Gemcitabine-Cisplatin study [id number]    3/28/18: Screening visit for study.   4/4/18; c1d1 gemcitabine  4/11/18: c1d8 gemcitabine-nivolumab  4/25/18: C2D1 Gemcitabine-Cisplatin  5/2/18: C2D8 Gemcitabine-Nivolumab  5/16/18: C3D1 Gemcitabine-Cisplatin  5/23/18: C3D8 Gemcitabine-Nivolumab  6/6/18: C4d1 Gemcitabine-Cisplatin  6/13/18: C4D8 Gemcitabine-Nivolumab    7/30/18: Radical cystoprostatectomy with bilateral pelvic LN dissection and ileal conduit. Final pathology pT0N0    HPI:    68 year old male with PMH of hemophilia, chronic hepatis C who was seen by DR. Burns for evaluation of neoadjuvant therapy with Gemcitabine Cisplatin and Nivolumab.    Patient has a history of hemophilia and has undergone several surgeries in right hip replacement, knee replacement. Hemophilia managed by Dr. Marley.   He reports recurrent hematuria for a year, initially attributed to kidney stones, cystoscopy in Feb 2018 revealed:  Bladder tumor, transurethral resection:   - Invasive high grade urothelial carcinoma   - Extent of invasion: Muscularis propria, multifocal   - Lymphovascular invasion: Present     PET-CT scan - which has revealed muscle invasive bladder right lateral wall of bladder and right  obturator lymphnode, 1.1 cm short axis.  He has been treated for HCV in the 1990s and has not had any complications. His LFTs have remained normal.   His hemophilia is very well controlled on current regimen of factor replacement.   He does not have any autoimmune disease and is not on immunesuppressants or steroids.   His diabetes is diet controlled and he has not required any medications.     We enrolled him on our neoadjuvant Nivolumab-[place]-Cis trial and he completed 4 cycles of treatment  and then underwent  Radical cystoprostatectomy and Bilateral pelvic lymph node dissection and ileal conduit on 7/30/18.  Final pathology showed:   Prostate, bladder, seminal vesicles, radical cystoprostatectomy:   - Bladder wall with chronic inflammation, edema, fibrosis and prior   surgical site changes   - No evidence of residual invasive or in-situ urothelial carcinoma   - Pathologic stage: ypT0N0   - Benign prostate and seminal vesicles     D. Lymph nodes, left pelvic, excision:   - Twelve benign lymph nodes     E. Lymph nodes, right pelvic, excision:   - Eleven benign lymph nodes   - See comment     Interval History:  Patient is doing very well. He has no complaints. Denies any complaints post surgery.Denies any signs and symptoms of immune related side effects.        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      Bladder tumor      Depression      DM II (diabetes mellitus, type II), controlled (H)     diet controlled     Hemophilia (H)     Type A     History of hepatitis C     treated successfully     HTN (hypertension)           CURRENT OUTPATIENT MEDICATIONS     Current Outpatient Prescriptions   Medication Sig     acetaminophen (TYLENOL) 325 MG tablet Take 2 tablets (650 mg) by mouth every 4 hours as needed for mild pain or fever     amLODIPine (NORVASC) 5 MG tablet Take 1 tablet (5 mg) by mouth daily (Patient taking differently: Take 5 mg by mouth every morning )     Antihemophilic Factor, Recomb, (KOGENATE FS) 1000 units KIT Infuse 3000 iu (-5/+10%) IV every Mondays, Wednesdays and Fridays and Sundays.  To also be used as needed for bleeding events.     codeine 30 MG tablet Take 1-2 tabs every 6 hours as needed for pain     diazepam (VALIUM) 5 MG tablet Take 1 tablet (5 mg) by mouth daily as needed for anxiety or sleep     mirtazapine (REMERON) 45 MG tablet Take 1 tablet (45 mg) by mouth At Bedtime And may take 1/2 tablet for insomnia up to  3 times a week.     order for DME Equipment being ordered: Walker Wheels () and Walker ()  Treatment Diagnosis: gait instability     polyethylene glycol (MIRALAX/GLYCOLAX) Packet Take 17 g by mouth daily (to prevent constipation)     lisinopril (PRINIVIL/ZESTRIL) 40 MG tablet Take 1 tablet (40 mg) by mouth every morning (HOLD UNTIL FOLLOW-UP with Primary Care Provider)     No current facility-administered medications for this visit.      Facility-Administered Medications Ordered in Other Visits   Medication     ethyl chloride spray       CBC RESULTS:     CBC RESULTS:   Recent Labs   Lab Test  11/16/18   0847   WBC  6.4   RBC  4.08*   HGB  10.3*   HCT  33.2*   MCV  81   MCH  25.2*   MCHC  31.0*   RDW  17.1*   PLT  178     Last Comprehensive Metabolic Panel:  Sodium   Date Value Ref Range Status   11/16/2018 136 133 - 144 mmol/L Final     Potassium   Date Value Ref Range Status   11/16/2018 4.3 3.4 - 5.3 mmol/L Final     Comment:     Specimen slightly hemolyzed, potassium may be falsely elevated     Chloride   Date Value Ref Range Status   11/16/2018 107 94 - 109 mmol/L Final     Carbon Dioxide   Date Value Ref Range Status   11/16/2018 22 20 - 32 mmol/L Final     Anion Gap   Date Value Ref Range Status   11/16/2018 7 3 - 14 mmol/L Final     Glucose   Date Value Ref Range Status   11/16/2018 106 (H) 70 - 99 mg/dL Final     Urea Nitrogen   Date Value Ref Range Status   11/16/2018 30 7 - 30 mg/dL Final     Creatinine   Date Value Ref Range Status   11/16/2018 1.09 0.66 - 1.25 mg/dL Final     GFR Estimate   Date Value Ref Range Status   11/16/2018 67 >60 mL/min/1.7m2 Final     Comment:     Non  GFR Calc     Calcium   Date Value Ref Range Status   11/16/2018 8.5 8.5 - 10.1 mg/dL Final     Bilirubin Total   Date Value Ref Range Status   11/16/2018 0.6 0.2 - 1.3 mg/dL Final     Alkaline Phosphatase   Date Value Ref Range Status   11/16/2018 112 40 - 150 U/L Final     ALT   Date Value Ref Range  Status   11/16/2018 16 0 - 70 U/L Final     AST   Date Value Ref Range Status   11/16/2018 22 0 - 45 U/L Final     Comment:     Specimen is hemolyzed which can falsely elevate AST. Analysis of a   non-hemolyzed specimen may result in a lower value.         11/16/18 CT CAP    IMPRESSION: In this patient with history of high-grade muscular  invasive bladder cancer status post cystoprostatectomy, bilateral  pelvic ashlie dissection, and ileal conduit urinary diversion:  1. Significantly increased size of an indeterminate hypoattenuating  lesion within the interpolar region of the left kidney which now  demonstrates an exophytic component with surrounding perirenal  inflammatory changes. Consider further evaluation with renal  ultrasound or MRI.  2. No convincing evidence of local recurrence.  3. Stable sub-5 mm pulmonary nodules throughout the lungs. Improved  appearance of the bibasilar groundglass opacities. Continued attention  on follow-up is recommended.  4. Cholelithiasis without evidence of cholecystitis.  5. Colonic diverticulosis without evidence of diverticulitis.  6. Centrilobular emphysematous changes.    ASSESSMENT/PLAN:     68 year old male with T2N1(solitary node)  urothelial carcinoma,currently enrolled on the clinical trial of Nivolumab with Gemcitabine and Cisplatin for MIBC, completed C4D8 on 6/13/18.   He has had a complete response at radical cystectomy final pathology showing PT0 N0 of note he was T2N1 prior to neoadjuvant therapy.  3 month restaging CT scan looks good, wo any evidence of recurrence. The slighlty increase in size of the kidney lesion can be watched for now.    We will continue to see him every 3 months with labs and restaging scans.  Return to clinic tin 3 months with labs and restaging CT scan.    Hemophilia management: Per Dr. Marley    Staffed with Dr. Espinal.    Ryley Singh  Hem Onc fellow  [phone]    Provider Disclosure:  Please refer to the note by the fellow for H&P and  physical exam and assessment and plan for details.     I agree with above History, Review of Systems, Physical exam and Plan. I have reviewed the content of the documentation and have edited it as needed. I have personally performed the services documented here and the documentation accurately represents those services and the decisions I have made.     Magalie Espinal MD  Hematology Oncology and Transplantation  [facility]

## 2018-11-19 NOTE — MR AVS SNAPSHOT
After Visit Summary   11/19/2018    Orlin Alcantar    MRN: [id number]           Patient Information     Date Of Birth          1950        Visit Information        Provider Department      11/19/2018 9:45 AM Magalie Espinal MD [facility]        Today's Diagnoses     Malignant neoplasm of overlapping sites of bladder (H)    -  1       Follow-ups after your visit        Your next 10 appointments already scheduled     Dec 08, 2018 10:40 AM CST   (Arrive by 10:25 AM)   Return Visit with Nguyễn Morris MD   [facility] ([facility])    [address]  [address] [facility] [id number] [phone]            Mar 26, 2019 10:15 AM CDT   Adult Med Follow UP with JELLY Chapa CNS   Psychiatry Clinic ([facility])    [address]  [address] [id number] [phone]              Who to contact     If you have questions or need follow up information about today's clinic visit or your schedule please contact [facility] directly at [phone].  Normal or non-critical lab and imaging results will be communicated to you by MyChart, letter or phone within 4 business days after the clinic has received the results. If you do not hear from us within 7 days, please contact the clinic through MyChart or phone. If you have a critical or abnormal lab result, we will notify you by phone as soon as possible.  Submit refill requests through [employer] or call your pharmacy and they will forward the refill request to us. Please allow 3 business days for your refill to be completed.          Additional Information About Your Visit        MyChart Information     [employer] gives you secure access to your electronic health record. If you see a primary care provider, you can also send messages to your care team and make  appointments. If you have questions, please call your primary care clinic.  If you do not have a primary care provider, please call [phone] and they will assist you.        Care EveryWhere ID     This is your Care EveryWhere ID. This could be used by other organizations to access your [place] medical records  [id number]        Your Vitals Were     Pulse Temperature Respirations Pulse Oximetry BMI (Body Mass Index)       99 98.2  F (36.8  C) (Oral) 18 98% 21.04 kg/m2        Blood Pressure from Last 3 Encounters:   11/19/18 106/73   09/11/18 101/72   08/29/18 109/75    Weight from Last 3 Encounters:   11/19/18 70.8 kg (156 lb)   09/11/18 67.8 kg (149 lb 8 oz)   08/29/18 68.7 kg (151 lb 6 oz)                 Today's Medication Changes          These changes are accurate as of 11/19/18 11:59 PM.  If you have any questions, ask your nurse or doctor.               These medicines have changed or have updated prescriptions.        Dose/Directions    amLODIPine 5 MG tablet   Commonly known as:  NORVASC   This may have changed:  when to take this   Used for:  Hypertension goal BP (blood pressure) < 140/90        Dose:  5 mg   Take 1 tablet (5 mg) by mouth daily   Quantity:  90 tablet   Refills:  3                Primary Care Provider Office Phone # Fax #    Katie Ramos [phone]       [address]  [facility] [address]        Equal Access to Services     ANDREW FLANAGAN AH: Hadii violette Colmenares, walashon eastman, qaybdhara jessicaallloyd aleman. So [facility] [phone].    ATENCIÓN: Si habla español, tiene a hernadez disposición servicios gratuitos de asistencia lingüística. Perry benites [phone].    We comply with applicable federal civil rights laws and [place] laws. We do not discriminate on the basis of race, color, national origin, age, disability, sex, sexual orientation, or gender identity.            Thank you!     Thank you for choosing [facility]  for your care. Our goal is always to provide you with excellent care. Hearing back from our patients is one way we can continue to improve our services. Please take a few minutes to complete the written survey that you may receive in the mail after your visit with us. Thank you!             Your Updated Medication List - Protect others around you: Learn how to safely use, store and throw away your medicines at www.disposemymeds.org.          This list is accurate as of 11/19/18 11:59 PM.  Always use your most recent med list.                   Brand Name Dispense Instructions for use Diagnosis    acetaminophen 325 MG tablet    TYLENOL    100 tablet    Take 2 tablets (650 mg) by mouth every 4 hours as needed for mild pain or fever    Malignant neoplasm of urinary bladder, unspecified site (H)       amLODIPine 5 MG tablet    NORVASC    90 tablet    Take 1 tablet (5 mg) by mouth daily    Hypertension goal BP (blood pressure) < 140/90       diazepam 5 MG tablet    VALIUM    30 tablet    Take 1 tablet (5 mg) by mouth daily as needed for anxiety or sleep    Depressive disorder       KOGENATE FS 1000 units Kit     69060 each    Infuse 3000 iu (-5/+10%) IV every Mondays, Wednesdays and Fridays and Sundays.  To also be used as needed for bleeding events.    Severe hemophilia A (H), Personal history of malignant neoplasm of bladder       lisinopril 40 MG tablet    PRINIVIL/ZESTRIL    30 tablet    Take 1 tablet (40 mg) by mouth every morning (HOLD UNTIL FOLLOW-UP with Primary Care Provider)    Hypertension goal BP (blood pressure) < 140/90       mirtazapine 45 MG tablet    REMERON    37 tablet    Take 1 tablet (45 mg) by mouth At Bedtime And may take 1/2 tablet for insomnia up to 3 times a week.    Generalized anxiety disorder       order for DME     1 each    Equipment being ordered: Walker Wheels () and Walker () Treatment Diagnosis: gait instability    Severe hemophilia A (H)        polyethylene glycol packet    MIRALAX/GLYCOLAX    30 packet    Take 17 g by mouth daily (to prevent constipation)    Malignant neoplasm of urinary bladder, unspecified site (H)

## 2018-11-19 NOTE — NURSING NOTE
"Oncology Rooming Note    November 19, 2018 10:03 AM   Orlin Alcantar is a 68 year old male who presents for:    Chief Complaint   Patient presents with     Blood Draw     VPT blood draw and vitals     Oncology Clinic Visit     Bladder cancer     Initial Vitals: /73  Pulse 99  Temp 98.2  F (36.8  C) (Oral)  Resp 18  Wt 70.8 kg (156 lb)  SpO2 98%  BMI 21.04 kg/m2 Estimated body mass index is 21.04 kg/(m^2) as calculated from the following:    Height as of 9/11/18: 1.834 m (6' 0.2\").    Weight as of this encounter: 70.8 kg (156 lb). Body surface area is 1.9 meters squared.  No Pain (0) Comment: Data Unavailable   No LMP for male patient.  Allergies reviewed: Yes  Medications reviewed: Yes    Medications: Medication refills not needed today.  Pharmacy name entered into Bia:    [facility] - [place], [place] - [address] [facility] [place], [place] - [address]    Clinical concerns: Patient denies pain. Vitals taken in lab. Teddy was notified.    6 minutes for nursing intake (face to face time)     Lakshmi Ram LPN            "

## 2018-11-19 NOTE — LETTER
11/19/2018       RE: Orlin Alcantar  [address]  [place] [address]     Dear Colleague,    Thank you for referring your patient, Orlin Alcantar, to the [facility]. Please see a copy of my visit note below.    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   3/26/18: Consented for the Nivolumab-Gemcitabine-Cisplatin study [id number]    3/28/18: Screening visit for study.   4/4/18; c1d1 gemcitabine  4/11/18: c1d8 gemcitabine-nivolumab  4/25/18: C2D1 Gemcitabine-Cisplatin  5/2/18: C2D8 Gemcitabine-Nivolumab  5/16/18: C3D1 Gemcitabine-Cisplatin  5/23/18: C3D8 Gemcitabine-Nivolumab  6/6/18: C4d1 Gemcitabine-Cisplatin  6/13/18: C4D8 Gemcitabine-Nivolumab    7/30/18: Radical cystoprostatectomy with bilateral pelvic LN dissection and ileal conduit. Final pathology pT0N0    HPI:    68 year old male with PMH of hemophilia, chronic hepatis C who was seen by DR. Burns for evaluation of neoadjuvant therapy with Gemcitabine Cisplatin and Nivolumab.    Patient has a history of hemophilia and has undergone several surgeries in right hip replacement, knee replacement. Hemophilia managed by Dr. Marley.   He reports recurrent hematuria for a year, initially attributed to kidney stones, cystoscopy in Feb 2018 revealed:  Bladder tumor, transurethral resection:   - Invasive high grade urothelial carcinoma   - Extent of invasion: Muscularis propria, multifocal   - Lymphovascular invasion: Present     PET-CT scan - which has revealed muscle invasive bladder right lateral wall of bladder and right  obturator lymphnode, 1.1 cm short axis.  He has been treated for HCV in the 1990s and has not had any complications. His LFTs have remained normal.   His hemophilia is very well controlled on current regimen of factor replacement.   He does not have any autoimmune disease and is not on immunesuppressants or steroids.   His diabetes is diet  controlled and he has not required any medications.     We enrolled him on our neoadjuvant Nivolumab-[place]-Cis trial and he completed 4 cycles of treatment and then underwent  Radical cystoprostatectomy and Bilateral pelvic lymph node dissection and ileal conduit on 7/30/18.  Final pathology showed:   Prostate, bladder, seminal vesicles, radical cystoprostatectomy:   - Bladder wall with chronic inflammation, edema, fibrosis and prior   surgical site changes   - No evidence of residual invasive or in-situ urothelial carcinoma   - Pathologic stage: ypT0N0   - Benign prostate and seminal vesicles     D. Lymph nodes, left pelvic, excision:   - Twelve benign lymph nodes     E. Lymph nodes, right pelvic, excision:   - Eleven benign lymph nodes   - See comment     Interval History:  Patient is doing very well. He has no complaints. Denies any complaints post surgery.Denies any signs and symptoms of immune related side effects.        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      Bladder tumor      Depression      DM II (diabetes mellitus, type II), controlled (H)     diet controlled     Hemophilia (H)     Type A     History of hepatitis C     treated successfully     HTN (hypertension)           CURRENT OUTPATIENT MEDICATIONS     Current Outpatient Prescriptions   Medication Sig     acetaminophen (TYLENOL) 325 MG tablet Take 2 tablets (650 mg) by mouth every 4 hours as needed for mild pain or fever     amLODIPine (NORVASC) 5 MG tablet Take 1 tablet (5 mg) by mouth daily (Patient taking differently: Take 5 mg by mouth every morning )     Antihemophilic Factor, Recomb, (KOGENATE FS) 1000 units KIT Infuse 3000 iu (-5/+10%) IV every Mondays, Wednesdays and Fridays and Sundays.  To also be used as needed for bleeding events.     codeine 30 MG tablet Take 1-2 tabs every 6 hours as needed for pain     diazepam (VALIUM) 5 MG tablet Take 1 tablet (5 mg) by mouth daily  as needed for anxiety or sleep     mirtazapine (REMERON) 45 MG tablet Take 1 tablet (45 mg) by mouth At Bedtime And may take 1/2 tablet for insomnia up to 3 times a week.     order for DME Equipment being ordered: Walker Wheels () and Walker ()  Treatment Diagnosis: gait instability     polyethylene glycol (MIRALAX/GLYCOLAX) Packet Take 17 g by mouth daily (to prevent constipation)     lisinopril (PRINIVIL/ZESTRIL) 40 MG tablet Take 1 tablet (40 mg) by mouth every morning (HOLD UNTIL FOLLOW-UP with Primary Care Provider)     No current facility-administered medications for this visit.      Facility-Administered Medications Ordered in Other Visits   Medication     ethyl chloride spray       CBC RESULTS:     CBC RESULTS:   Recent Labs   Lab Test  11/16/18   0847   WBC  6.4   RBC  4.08*   HGB  10.3*   HCT  33.2*   MCV  81   MCH  25.2*   MCHC  31.0*   RDW  17.1*   PLT  178     Last Comprehensive Metabolic Panel:  Sodium   Date Value Ref Range Status   11/16/2018 136 133 - 144 mmol/L Final     Potassium   Date Value Ref Range Status   11/16/2018 4.3 3.4 - 5.3 mmol/L Final     Comment:     Specimen slightly hemolyzed, potassium may be falsely elevated     Chloride   Date Value Ref Range Status   11/16/2018 107 94 - 109 mmol/L Final     Carbon Dioxide   Date Value Ref Range Status   11/16/2018 22 20 - 32 mmol/L Final     Anion Gap   Date Value Ref Range Status   11/16/2018 7 3 - 14 mmol/L Final     Glucose   Date Value Ref Range Status   11/16/2018 106 (H) 70 - 99 mg/dL Final     Urea Nitrogen   Date Value Ref Range Status   11/16/2018 30 7 - 30 mg/dL Final     Creatinine   Date Value Ref Range Status   11/16/2018 1.09 0.66 - 1.25 mg/dL Final     GFR Estimate   Date Value Ref Range Status   11/16/2018 67 >60 mL/min/1.7m2 Final     Comment:     Non  GFR Calc     Calcium   Date Value Ref Range Status   11/16/2018 8.5 8.5 - 10.1 mg/dL Final     Bilirubin Total   Date Value Ref Range Status    11/16/2018 0.6 0.2 - 1.3 mg/dL Final     Alkaline Phosphatase   Date Value Ref Range Status   11/16/2018 112 40 - 150 U/L Final     ALT   Date Value Ref Range Status   11/16/2018 16 0 - 70 U/L Final     AST   Date Value Ref Range Status   11/16/2018 22 0 - 45 U/L Final     Comment:     Specimen is hemolyzed which can falsely elevate AST. Analysis of a   non-hemolyzed specimen may result in a lower value.         11/16/18 CT CAP    IMPRESSION: In this patient with history of high-grade muscular  invasive bladder cancer status post cystoprostatectomy, bilateral  pelvic ashlie dissection, and ileal conduit urinary diversion:  1. Significantly increased size of an indeterminate hypoattenuating  lesion within the interpolar region of the left kidney which now  demonstrates an exophytic component with surrounding perirenal  inflammatory changes. Consider further evaluation with renal  ultrasound or MRI.  2. No convincing evidence of local recurrence.  3. Stable sub-5 mm pulmonary nodules throughout the lungs. Improved  appearance of the bibasilar groundglass opacities. Continued attention  on follow-up is recommended.  4. Cholelithiasis without evidence of cholecystitis.  5. Colonic diverticulosis without evidence of diverticulitis.  6. Centrilobular emphysematous changes.    ASSESSMENT/PLAN:     68 year old male with T2N1(solitary node)  urothelial carcinoma,currently enrolled on the clinical trial of Nivolumab with Gemcitabine and Cisplatin for MIBC, completed C4D8 on 6/13/18.   He has had a complete response at radical cystectomy final pathology showing PT0 N0 of note he was T2N1 prior to neoadjuvant therapy.  3 month restaging CT scan looks good, wo any evidence of recurrence. The slighlty increase in size of the kidney lesion can be watched for now.    We will continue to see him every 3 months with labs and restaging scans.  Return to clinic tin 3 months with labs and restaging CT scan.    Hemophilia management:  Per Dr. Marley    Staffed with Dr. Espinal.    Ryley Singh  Hem Onc fellow  [phone]    Provider Disclosure:  Please refer to the note by the fellow for H&P and physical exam and assessment and plan for details.     I agree with above History, Review of Systems, Physical exam and Plan. I have reviewed the content of the documentation and have edited it as needed. I have personally performed the services documented here and the documentation accurately represents those services and the decisions I have made.     Magalie Espinal MD  Hematology Oncology and Transplantation  [facility]     Again, thank you for allowing me to participate in the care of your patient.      Sincerely,    Magalie Espinal MD

## 2018-11-21 NOTE — PROGRESS NOTES
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   3/26/18: Consented for the Nivolumab-Gemcitabine-Cisplatin study [id number]    3/28/18: Screening visit for study.   4/4/18; c1d1 gemcitabine  4/11/18: c1d8 gemcitabine-nivolumab  4/25/18: C2D1 Gemcitabine-Cisplatin  5/2/18: C2D8 Gemcitabine-Nivolumab  5/16/18: C3D1 Gemcitabine-Cisplatin  5/23/18: C3D8 Gemcitabine-Nivolumab  6/6/18: C4d1 Gemcitabine-Cisplatin  6/13/18: C4D8 Gemcitabine-Nivolumab    7/30/18: Radical cystoprostatectomy with bilateral pelvic LN dissection and ileal conduit. Final pathology pT0N0    HPI:    68 year old male with PMH of hemophilia, chronic hepatis C who was seen by DR. Burns for evaluation of neoadjuvant therapy with Gemcitabine Cisplatin and Nivolumab.    Patient has a history of hemophilia and has undergone several surgeries in right hip replacement, knee replacement. Hemophilia managed by Dr. Marley.   He reports recurrent hematuria for a year, initially attributed to kidney stones, cystoscopy in Feb 2018 revealed:  Bladder tumor, transurethral resection:   - Invasive high grade urothelial carcinoma   - Extent of invasion: Muscularis propria, multifocal   - Lymphovascular invasion: Present     PET-CT scan - which has revealed muscle invasive bladder right lateral wall of bladder and right  obturator lymphnode, 1.1 cm short axis.  He has been treated for HCV in the 1990s and has not had any complications. His LFTs have remained normal.   His hemophilia is very well controlled on current regimen of factor replacement.   He does not have any autoimmune disease and is not on immunesuppressants or steroids.   His diabetes is diet controlled and he has not required any medications.     We enrolled him on our neoadjuvant Nivolumab-[place]-Cis trial and he completed 4 cycles of treatment and then underwent  Radical cystoprostatectomy and Bilateral pelvic lymph node dissection and  ileal conduit on 7/30/18.  Final pathology showed:   Prostate, bladder, seminal vesicles, radical cystoprostatectomy:   - Bladder wall with chronic inflammation, edema, fibrosis and prior   surgical site changes   - No evidence of residual invasive or in-situ urothelial carcinoma   - Pathologic stage: ypT0N0   - Benign prostate and seminal vesicles     D. Lymph nodes, left pelvic, excision:   - Twelve benign lymph nodes     E. Lymph nodes, right pelvic, excision:   - Eleven benign lymph nodes   - See comment     Interval History:  Patient is doing very well. He has no complaints. Denies any complaints post surgery.Denies any signs and symptoms of immune related side effects.        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      Bladder tumor      Depression      DM II (diabetes mellitus, type II), controlled (H)     diet controlled     Hemophilia (H)     Type A     History of hepatitis C     treated successfully     HTN (hypertension)           CURRENT OUTPATIENT MEDICATIONS     Current Outpatient Prescriptions   Medication Sig     acetaminophen (TYLENOL) 325 MG tablet Take 2 tablets (650 mg) by mouth every 4 hours as needed for mild pain or fever     amLODIPine (NORVASC) 5 MG tablet Take 1 tablet (5 mg) by mouth daily (Patient taking differently: Take 5 mg by mouth every morning )     Antihemophilic Factor, Recomb, (KOGENATE FS) 1000 units KIT Infuse 3000 iu (-5/+10%) IV every Mondays, Wednesdays and Fridays and Sundays.  To also be used as needed for bleeding events.     codeine 30 MG tablet Take 1-2 tabs every 6 hours as needed for pain     diazepam (VALIUM) 5 MG tablet Take 1 tablet (5 mg) by mouth daily as needed for anxiety or sleep     mirtazapine (REMERON) 45 MG tablet Take 1 tablet (45 mg) by mouth At Bedtime And may take 1/2 tablet for insomnia up to 3 times a week.     order for DME Equipment being ordered: Walker Wheels () and Walker  ()  Treatment Diagnosis: gait instability     polyethylene glycol (MIRALAX/GLYCOLAX) Packet Take 17 g by mouth daily (to prevent constipation)     lisinopril (PRINIVIL/ZESTRIL) 40 MG tablet Take 1 tablet (40 mg) by mouth every morning (HOLD UNTIL FOLLOW-UP with Primary Care Provider)     No current facility-administered medications for this visit.      Facility-Administered Medications Ordered in Other Visits   Medication     ethyl chloride spray       CBC RESULTS:     CBC RESULTS:   Recent Labs   Lab Test  11/16/18   0847   WBC  6.4   RBC  4.08*   HGB  10.3*   HCT  33.2*   MCV  81   MCH  25.2*   MCHC  31.0*   RDW  17.1*   PLT  178     Last Comprehensive Metabolic Panel:  Sodium   Date Value Ref Range Status   11/16/2018 136 133 - 144 mmol/L Final     Potassium   Date Value Ref Range Status   11/16/2018 4.3 3.4 - 5.3 mmol/L Final     Comment:     Specimen slightly hemolyzed, potassium may be falsely elevated     Chloride   Date Value Ref Range Status   11/16/2018 107 94 - 109 mmol/L Final     Carbon Dioxide   Date Value Ref Range Status   11/16/2018 22 20 - 32 mmol/L Final     Anion Gap   Date Value Ref Range Status   11/16/2018 7 3 - 14 mmol/L Final     Glucose   Date Value Ref Range Status   11/16/2018 106 (H) 70 - 99 mg/dL Final     Urea Nitrogen   Date Value Ref Range Status   11/16/2018 30 7 - 30 mg/dL Final     Creatinine   Date Value Ref Range Status   11/16/2018 1.09 0.66 - 1.25 mg/dL Final     GFR Estimate   Date Value Ref Range Status   11/16/2018 67 >60 mL/min/1.7m2 Final     Comment:     Non  GFR Calc     Calcium   Date Value Ref Range Status   11/16/2018 8.5 8.5 - 10.1 mg/dL Final     Bilirubin Total   Date Value Ref Range Status   11/16/2018 0.6 0.2 - 1.3 mg/dL Final     Alkaline Phosphatase   Date Value Ref Range Status   11/16/2018 112 40 - 150 U/L Final     ALT   Date Value Ref Range Status   11/16/2018 16 0 - 70 U/L Final     AST   Date Value Ref Range Status   11/16/2018 22 0  - 45 U/L Final     Comment:     Specimen is hemolyzed which can falsely elevate AST. Analysis of a   non-hemolyzed specimen may result in a lower value.         11/16/18 CT CAP    IMPRESSION: In this patient with history of high-grade muscular  invasive bladder cancer status post cystoprostatectomy, bilateral  pelvic ashlie dissection, and ileal conduit urinary diversion:  1. Significantly increased size of an indeterminate hypoattenuating  lesion within the interpolar region of the left kidney which now  demonstrates an exophytic component with surrounding perirenal  inflammatory changes. Consider further evaluation with renal  ultrasound or MRI.  2. No convincing evidence of local recurrence.  3. Stable sub-5 mm pulmonary nodules throughout the lungs. Improved  appearance of the bibasilar groundglass opacities. Continued attention  on follow-up is recommended.  4. Cholelithiasis without evidence of cholecystitis.  5. Colonic diverticulosis without evidence of diverticulitis.  6. Centrilobular emphysematous changes.    ASSESSMENT/PLAN:     68 year old male with T2N1(solitary node)  urothelial carcinoma,currently enrolled on the clinical trial of Nivolumab with Gemcitabine and Cisplatin for MIBC, completed C4D8 on 6/13/18.   He has had a complete response at radical cystectomy final pathology showing PT0 N0 of note he was T2N1 prior to neoadjuvant therapy.  3 month restaging CT scan looks good, wo any evidence of recurrence. The slighlty increase in size of the kidney lesion can be watched for now.    We will continue to see him every 3 months with labs and restaging scans.  Return to clinic tin 3 months with labs and restaging CT scan.    Hemophilia management: Per Dr. Marley    Staffed with Dr. Espinal.    Ryley Singh  Hem Onc fellow  [phone]    Provider Disclosure:  Please refer to the note by the fellow for H&P and physical exam and assessment and plan for details.     I agree with above History, Review of  Systems, Physical exam and Plan. I have reviewed the content of the documentation and have edited it as needed. I have personally performed the services documented here and the documentation accurately represents those services and the decisions I have made.     Magalie Espinal MD  Hematology Oncology and Transplantation  [facility]

## 2018-11-26 DIAGNOSIS — D66 HEMOPHILIC ARTHROPATHY (H): ICD-10-CM

## 2018-11-26 DIAGNOSIS — D66 SEVERE HEMOPHILIA A (H): ICD-10-CM

## 2018-11-26 DIAGNOSIS — M36.2 HEMOPHILIC ARTHROPATHY (H): ICD-10-CM

## 2018-11-26 RX ORDER — CODEINE SULFATE 30 MG/1
TABLET ORAL
Qty: 90 TABLET | Refills: 0 | Status: SHIPPED | OUTPATIENT
Start: 2018-11-26 | End: 2018-12-18

## 2018-12-08 ENCOUNTER — OFFICE VISIT (OUTPATIENT)
Dept: UROLOGY | Facility: CLINIC | Age: 68
End: 2018-12-08
Payer: MEDICARE

## 2018-12-08 VITALS
BODY MASS INDEX: 21.11 KG/M2 | WEIGHT: 156.5 LBS | DIASTOLIC BLOOD PRESSURE: 88 MMHG | SYSTOLIC BLOOD PRESSURE: 125 MMHG | HEART RATE: 99 BPM

## 2018-12-08 DIAGNOSIS — C67.8 MALIGNANT NEOPLASM OF OVERLAPPING SITES OF BLADDER (H): Primary | ICD-10-CM

## 2018-12-08 NOTE — LETTER
2018       RE: Orlin Alcantar  [address]  [place] [address]     Dear Colleague,    Thank you for referring your patient, Orlin Alcantar, to the [facility] UROLOGY AND INST FOR PROSTATE AND UROLOGIC CANCERS at [facility]. Please see a copy of my visit note below.    Follow up on a [facility] patient    Follow up on Orlin Alcantar, a 68 year old male with a history of bladder cancer here for follow up after cystectomy on 2018   History of hemophilia  Feeling good    TNM Stage: T2(T0), N0, M0  Number of nodes removed: 23  Number of positive nodes: 0  Surgical Margins : Negative  Grade: high  Histology: urothelial urothelial without secondary histology  Date of last upper tract imagin2018  Date of last chest imagin2018  Any recurrence? none    PE:  /88  Pulse 99  Wt 71 kg (156 lb 8 oz)  BMI 21.11 kg/m2  GEN: A&O in NAD  Stoma is pink and functioning and well pouched     Lab Results   Component Value Date    CR 1.09 2018    CR 1.00 2018    CR 0.80 2018    CR 0.83 2018    CR 0.84 2018    CR 0.68 2018    CR 0.70 2018    CR 0.81 2018    CR 0.93 2018    CR 0.87 2018       Any evidence of hydronephrosis? No    Recovered well    Assessment:  Urothelial Cancer s/p Radical Cystectomy on (2018), pT2(0)N0M0R0 with OZZIE to date    Plan:  Follow up in 4 months with    Abdominal imaging    CT Urogram    Chest X ray vs. CT chest    Electrolyte monitoring (CBC, BMP, vit B12)    Urine cytology (non-gyn)    Dr. Espinal is writing for the films    Nguyễn Morris MD  Department of Urology Staff  [facility]

## 2018-12-08 NOTE — PROGRESS NOTES
Follow up on a [facility] patient    Follow up on Orlin Alcantar, a 68 year old male with a history of bladder cancer here for follow up after cystectomy on 2018   History of hemophilia  Feeling good    TNM Stage: T2(T0), N0, M0  Number of nodes removed: 23  Number of positive nodes: 0  Surgical Margins : Negative  Grade: high  Histology: urothelial urothelial without secondary histology  Date of last upper tract imagin2018  Date of last chest imagin2018  Any recurrence? none    PE:  /88  Pulse 99  Wt 71 kg (156 lb 8 oz)  BMI 21.11 kg/m2  GEN: A&O in NAD  Stoma is pink and functioning and well pouched     Lab Results   Component Value Date    CR 1.09 2018    CR 1.00 2018    CR 0.80 2018    CR 0.83 2018    CR 0.84 2018    CR 0.68 2018    CR 0.70 2018    CR 0.81 2018    CR 0.93 2018    CR 0.87 2018       Any evidence of hydronephrosis? No    Recovered well    Assessment:  Urothelial Cancer s/p Radical Cystectomy on (2018), pT2(0)N0M0R0 with OZZIE to date    Plan:  Follow up in 4 months with    Abdominal imaging    CT Urogram    Chest X ray vs. CT chest    Electrolyte monitoring (CBC, BMP, vit B12)    Urine cytology (non-gyn)    Dr. Espinal is writing for the films    Nguyễn Morris MD  Department of Urology Staff  [facility]

## 2018-12-08 NOTE — MR AVS SNAPSHOT
After Visit Summary   12/8/2018    Orlin Alcantar    MRN: [id number]           Patient Information     Date Of Birth          1950        Visit Information        Provider Department      12/8/2018 10:40 AM Weight, Nguyễn Webster MD [facility] and [facility] for Prostate and Urologic Cancers         Follow-ups after your visit        Follow-up notes from your care team     Return in about 4 months (around 4/8/2019).      Your next 10 appointments already scheduled     Mar 26, 2019 10:15 AM CDT   Adult Med Follow UP with JELLY Chapa CNS   Psychiatry Clinic ([facility])    [address]  [address] [address] [phone]              Who to contact     Please call your clinic at [phone] to:    Ask questions about your health    Make or cancel appointments    Discuss your medicines    Learn about your test results    Speak to your doctor            Additional Information About Your Visit        MyChart Information     Parrut gives you secure access to your electronic health record. If you see a primary care provider, you can also send messages to your care team and make appointments. If you have questions, please call your primary care clinic.  If you do not have a primary care provider, please call [phone] and they will assist you.      Parrut is an electronic gateway that provides easy, online access to your medical records. With Parrut, you can request a clinic appointment, read your test results, renew a prescription or communicate with your care team.     To access your existing account, please contact your [facility] or call [phone] for assistance.        Care EveryWhere ID     This is your Care EveryWhere ID. This could be used by other organizations to access your [place] medical records  [id number]        Your Vitals Were     Pulse BMI (Body Mass Index)                 99 21.11 kg/m2           Blood Pressure from Last 3 Encounters:   12/08/18 125/88   11/19/18 106/73   09/11/18 101/72    Weight from Last 3 Encounters:   12/08/18 71 kg (156 lb 8 oz)   11/19/18 70.8 kg (156 lb)   09/11/18 67.8 kg (149 lb 8 oz)              Today, you had the following     No orders found for display         Today's Medication Changes          These changes are accurate as of 12/8/18 11:23 AM.  If you have any questions, ask your nurse or doctor.               These medicines have changed or have updated prescriptions.        Dose/Directions    amLODIPine 5 MG tablet   Commonly known as:  NORVASC   This may have changed:  when to take this   Used for:  Hypertension goal BP (blood pressure) < 140/90        Dose:  5 mg   Take 1 tablet (5 mg) by mouth daily   Quantity:  90 tablet   Refills:  3                Primary Care Provider Office Phone # Fax #    Katie Ramos [phone]       [facility] [place] [address]        Equal Access to Services     [facility]: Hadii violette owens hadasho Sofaiza, waaxda luqadaha, qaybta kaalmada adeegyaondina, lloyd rene . So [facility] [phone].    ATENCIÓN: Si habla español, tiene a hernadez disposición servicios gratuitos de asistencia lingüística. Perry al [phone].    We comply with applicable federal civil rights laws and [place] laws. We do not discriminate on the basis of race, color, national origin, age, disability, sex, sexual orientation, or gender identity.            Thank you!     Thank you for choosing [facility] AND [facility] FOR PROSTATE AND UROLOGIC CANCERS  for your care. Our goal is always to provide you with excellent care. Hearing back from our patients is one way we can continue to improve our services. Please take a few minutes to complete the written survey that you may receive in the mail after your visit with us. Thank you!             Your Updated Medication List - Protect others  around you: Learn how to safely use, store and throw away your medicines at www.disposemymeds.org.          This list is accurate as of 12/8/18 11:23 AM.  Always use your most recent med list.                   Brand Name Dispense Instructions for use Diagnosis    acetaminophen 325 MG tablet    TYLENOL    100 tablet    Take 2 tablets (650 mg) by mouth every 4 hours as needed for mild pain or fever    Malignant neoplasm of urinary bladder, unspecified site (H)       amLODIPine 5 MG tablet    NORVASC    90 tablet    Take 1 tablet (5 mg) by mouth daily    Hypertension goal BP (blood pressure) < 140/90       codeine 30 MG tablet     90 tablet    Take 1-2 tabs every 6 hours as needed for pain    Severe hemophilia A (H), Hemophilic arthropathy       diazepam 5 MG tablet    VALIUM    30 tablet    Take 1 tablet (5 mg) by mouth daily as needed for anxiety or sleep    Depressive disorder       KOGENATE FS 1000 units Kit     06135 each    Infuse 3000 iu (-5/+10%) IV every Mondays, Wednesdays and Fridays and Sundays.  To also be used as needed for bleeding events.    Severe hemophilia A (H), Personal history of malignant neoplasm of bladder       lisinopril 40 MG tablet    PRINIVIL/ZESTRIL    30 tablet    Take 1 tablet (40 mg) by mouth every morning (HOLD UNTIL FOLLOW-UP with Primary Care Provider)    Hypertension goal BP (blood pressure) < 140/90       mirtazapine 45 MG tablet    REMERON    37 tablet    Take 1 tablet (45 mg) by mouth At Bedtime And may take 1/2 tablet for insomnia up to 3 times a week.    Generalized anxiety disorder       order for DME     1 each    Equipment being ordered: Walker Wheels () and Walker () Treatment Diagnosis: gait instability    Severe hemophilia A (H)       polyethylene glycol packet    MIRALAX/GLYCOLAX    30 packet    Take 17 g by mouth daily (to prevent constipation)    Malignant neoplasm of urinary bladder, unspecified site (H)

## 2018-12-13 ENCOUNTER — TELEPHONE (OUTPATIENT)
Dept: HEMATOLOGY | Facility: CLINIC | Age: 68
End: 2018-12-13

## 2018-12-13 NOTE — TELEPHONE ENCOUNTER
"[facility]  Social Work Note    Received voicemail from patient this date requesting call back to discuss health insurance for 2019, PSI assistance, and income changes.  Call placed to Orlin () and spoke with him at length.    In summary, Orlin's Medicare Cost plan will no longer be available to him in 2019 and he therefore has changed his coverage to straight Medicare A and B, and a BCBS supplement. His premium will increase to $219 a month for his supplement; Medicare B is taken out of his social security prior to it being deposited into his account. His income will increase from $1606/month to $1654/month.  He will be paying a premium for Medicare D of $39.60/month.  He remains overincome for the extra help program.    Orlin will seek writer's assistance with providing [facility] with updated premium and income statements, one he has them. He remains hopeful that [facility] will support quarterly billing and send Orlin the check for his BCBS premium which Orlin, in turn, mails in to [facility].  We discussed alternate options and plans, should [facility] or his new insurance company not honor the request for quarterly billing/reimbursement and Orlin has a good \"plan B\" in place.    Updated Orlin that it is anticipated that PSI premium assistance fund he is currently receiving assistance from will likely no longer be available by the last quarter of the year.  Discussed other program options that may be available at that time, and options for emergency assistance if needed.  He does have money in savings specific for premium if needed for 1 or 2 months.  Writer will f/u with Orlin on specific PSI timeline once all data is gathered.    He did not have other questions or concerns at the conclusion of our visit. He expressed intent of reaching out to writer inbetween Christmas and New Years with his 2019 documents that will need to be submitted to PSI.    Analilia Jack, MARIN, LICSW, ACM  Clinical "   [facility]  [facility]  Phone: [phone]

## 2018-12-18 DIAGNOSIS — D66 HEMOPHILIC ARTHROPATHY (H): ICD-10-CM

## 2018-12-18 DIAGNOSIS — D66 SEVERE HEMOPHILIA A (H): ICD-10-CM

## 2018-12-18 DIAGNOSIS — M36.2 HEMOPHILIC ARTHROPATHY (H): ICD-10-CM

## 2018-12-18 RX ORDER — CODEINE SULFATE 30 MG/1
TABLET ORAL
Qty: 90 TABLET | Refills: 0 | Status: SHIPPED | OUTPATIENT
Start: 2018-12-18 | End: 2019-01-15

## 2018-12-18 NOTE — TELEPHONE ENCOUNTER
[facility]  [address] [place] [address] [address], [place] [address]  Main: [phone], Fax: [phone]    12/18/18    Patient called to request pain medication as joint pain as worsened recently.  Had been on Codeine in the past but not using for the last year until he was given Rx in November 2018.  At that time he was instructed to start at lower dose than previously used (60mg previous).    Refill will be given again this month but would ideally see Orlin to discuss pain management plan in early 2019.        Yanira Branch MPH, PA-C  [facility]  [facility]  [phone] main line  [phone] pager  [phone] fax

## 2018-12-20 ENCOUNTER — TELEPHONE (OUTPATIENT)
Dept: HEMATOLOGY | Facility: CLINIC | Age: 68
End: 2018-12-20

## 2018-12-21 NOTE — TELEPHONE ENCOUNTER
[facility]  Social Work Note    Received call from Orlin re: next year's premiums and PSI assistance.  He plans to continue accessing PSI for assistance with premiums and is aware that the current PSI fund may no longer be available in the fall.  At that time, Orlin and writer will explore alternate options.  He will send writer updated premium invoice once he has it. He did not have other questions or concerns at the conclusion of our call.    Analilia Jack, MARIN, LICSW, ACM  Clinical   [facility]  [facility]  Phone: [phone]

## 2019-01-07 DIAGNOSIS — Z85.51 PERSONAL HISTORY OF MALIGNANT NEOPLASM OF BLADDER: ICD-10-CM

## 2019-01-07 DIAGNOSIS — D66 SEVERE HEMOPHILIA A (H): ICD-10-CM

## 2019-01-07 RX ORDER — ANTIHEMOPHILIC FACTOR (RECOMBINANT) 1000 (+/-)
KIT INTRAVENOUS
Qty: 12000 EACH | Refills: 12 | Status: SHIPPED | OUTPATIENT
Start: 2019-01-07 | End: 2019-01-31

## 2019-01-09 ENCOUNTER — RESEARCH ENCOUNTER (OUTPATIENT)
Dept: ONCOLOGY | Facility: CLINIC | Age: 69
End: 2019-01-09

## 2019-01-09 NOTE — PROGRESS NOTES
[id number]: Re-Consent Note     Called and spoke with Orlin Alcantar regarding the changes to the  [id number] study consent form.  Read and reviewed the consent form changes with Orlin.  Patient verbalized understanding of the changes with no further questions or concerns at this time.  Informed Orlin that a copy of the new consent will be sent to them via mail, will call them after they receive the new consent, will again talk though the changes, and answer all questions after they has read the new consent form.  Will instruct them to sign and date the new consent form and return via mail to the Clinical Trials Office with the provided return envelope.    Consent Version Date: December 11, 2018      CHRISS Muro-RN      Form 503.00.02 (Version 1)     Effective date: 01JUL2018     Next Review Date: 01JUL2020

## 2019-01-11 ENCOUNTER — TELEPHONE (OUTPATIENT)
Dept: HEMATOLOGY | Facility: CLINIC | Age: 69
End: 2019-01-11

## 2019-01-11 DIAGNOSIS — I10 HYPERTENSION GOAL BP (BLOOD PRESSURE) < 140/90: ICD-10-CM

## 2019-01-11 NOTE — TELEPHONE ENCOUNTER
"Requested Prescriptions   Pending Prescriptions Disp Refills     amLODIPine (NORVASC) 5 MG tablet  Last Written Prescription Date:  11/8/2017  Last Fill Quantity: 90 tabs,  # refills: 3   Last office visit: 11/8/2017 with prescribing provider:  Richard   Future Office Visit:     90 tablet 3     Sig: Take 1 tablet (5 mg) by mouth daily    Calcium Channel Blockers Protocol  Failed - 1/11/2019  9:23 AM       Failed - Recent (12 mo) or future (30 days) visit within the authorizing provider's specialty    Patient had office visit in the last 12 months or has a visit in the next 30 days with authorizing provider or within the authorizing provider's specialty.  See \"Patient Info\" tab in inbasket, or \"Choose Columns\" in Meds & Orders section of the refill encounter.             Passed - Blood pressure under 140/90 in past 12 months    BP Readings from Last 3 Encounters:   12/08/18 125/88   11/19/18 106/73   09/11/18 101/72                Passed - Medication is active on med list       Passed - Patient is age 18 or older       Passed - Normal serum creatinine on file in past 12 months    Recent Labs   Lab Test 11/16/18  0847 11/16/18  0840  03/19/18   CR 1.09  --    < >  --    CREAT  --  1.1  --   --    CRPOC  --   --   --  0.9    < > = values in this interval not displayed.               "

## 2019-01-11 NOTE — TELEPHONE ENCOUNTER
[facility]  Social Work Note    Spoke with Orlin this date re: PSI premium assistance.  His 2019 documents have been uploaded to PSI and updated him that assistance will be available for the first 6 months of this year.  In May, writer and Orlin will discuss alternate community resources.    Orlin inquired about timeline for receiving this month's check. Writer has reached out to PSI for clarification and will update patient on Monday.    He did not have other questions or concerns at the conclusion of our visit.    MARIN Ferrari, LISSET, AC  Clinical   [facility]  [facility]  Phone: [phone]    Addendum:  Received update from PSI that PSI sent his insurance premium check directly to [facility]. Updated Orlin who was satisfied with this answer, and indicated he would call BCBS next week just to ensure this did happen. He will call writer or  PSI directly with any other needs.  Analilia/LISSET

## 2019-01-12 RX ORDER — AMLODIPINE BESYLATE 5 MG/1
5 TABLET ORAL DAILY
Qty: 30 TABLET | Refills: 0 | Status: SHIPPED | OUTPATIENT
Start: 2019-01-12 | End: 2019-05-06

## 2019-01-12 NOTE — TELEPHONE ENCOUNTER
One month refill only-patient overdue for annual office visit.    Team coordinators-Please contact patient to schedule.    Thank you!  SHILOH LillyN, RN

## 2019-01-15 DIAGNOSIS — M36.2 HEMOPHILIC ARTHROPATHY (H): ICD-10-CM

## 2019-01-15 DIAGNOSIS — D66 SEVERE HEMOPHILIA A (H): ICD-10-CM

## 2019-01-15 DIAGNOSIS — D66 HEMOPHILIC ARTHROPATHY (H): ICD-10-CM

## 2019-01-15 RX ORDER — CODEINE SULFATE 30 MG/1
TABLET ORAL
Qty: 90 TABLET | Refills: 0 | Status: SHIPPED | OUTPATIENT
Start: 2019-01-15 | End: 2019-01-29

## 2019-01-15 RX ORDER — CODEINE SULFATE 30 MG/1
TABLET ORAL
Qty: 90 TABLET | Refills: 0 | Status: SHIPPED | OUTPATIENT
Start: 2019-01-15 | End: 2019-01-15

## 2019-01-16 ENCOUNTER — TELEPHONE (OUTPATIENT)
Dept: HEMATOLOGY | Facility: CLINIC | Age: 69
End: 2019-01-16

## 2019-01-16 NOTE — TELEPHONE ENCOUNTER
Spoke to Mr. Alcantar to regarding the 2019 Medicare Part D Opiod Policy.  The first fill of an opiod is limited to a 7 day supply unless a prior authorization is obtained.  In Mr. Alcantar's case his 1/15/19 Codeine prescription was reduced from a quantity of 90 to 56 tablets.      Both Mr. Alcantar and Dr. Jorge Jiang were informed of the change in the dispense quantity.    Subsequent opiod prescriptions would not be subject to the 7 day limit.    GOKUL SINCLAIR.PH.  [facility], [facility]  [phone]

## 2019-01-22 ENCOUNTER — TELEPHONE (OUTPATIENT)
Dept: UROLOGY | Facility: CLINIC | Age: 69
End: 2019-01-22

## 2019-01-22 ENCOUNTER — OFFICE VISIT (OUTPATIENT)
Dept: WOUND CARE | Facility: CLINIC | Age: 69
End: 2019-01-22
Payer: MEDICARE

## 2019-01-22 DIAGNOSIS — Z93.6 S/P ILEAL CONDUIT (H): ICD-10-CM

## 2019-01-22 DIAGNOSIS — C67.8 MALIGNANT NEOPLASM OF OVERLAPPING SITES OF BLADDER (H): Primary | ICD-10-CM

## 2019-01-22 NOTE — PATIENT INSTRUCTIONS
"Stoma powder only when skin broken down:  Adapt 7906  Always use an Lurdes ring by [employer] 268006    1 piece elena pouch transparent  pre-cut to 1 \"   # 8462  Non pre-cut #8450 ultra clear and #8460 transparent    Change pouches MWF until skin is healed.  Skin will improve within 7-10 days           "

## 2019-01-22 NOTE — PROGRESS NOTES
"WO Ostomy Assessment  Patient comes to clinic stating that he has had difficulty with pouch leaking. He stated that he had been getting 3-4 days of wear time the week before last, but that last week his pouches have been lasting only 1-2 days. He stated that he just put the pouch that he has on yesterday and it has been working well he therefore did not want me to take it off to examine his skin.      He is here with himself and ostomy care is provided by self   Procedure: ileals conduit July 2018  Dx related to ostomy: Bladder Cancer  Consulted per Dr Bindu Shea  Subjective: frequent leaking recently    Objective:  Type: Urostomy  Stoma:  1\" viable, healthy, beefy red, round, moist and protruberant  Mucutaneous junction: not visualized barrier in place  Peristomal skin: erythema and skin breakdown 1-1.5 \" around the stoma  Output: Yellow and clear urine  Location: right   Hernia Belt measurement done: No  Wear time average:1-2 days  Received home care [facility] assessment after discharge:Yes    Current pouch system/supplies: two piece, cut to fit, flat and barrier ring    Assessment: Patient uses adept ring, patient refuses to piece George pouching system however the stoma is located on top of the fold very low in the abdomen.  Patient is cutting his underwear to fit underneath the pouch.  He was wet when he came to clinic and had taped his pouch on    Intervention/Plan: Recommend he can uses one piece flat stoma to fold into the small skin crease inferior of the stoma.  No need to fit his underwear but can just place that over the stoma as needed.  Recommend that he does not use any tape to keep the pouch on and change his pouch 3 times per week rather than twice per week.  I suspect that the erythema  around the stoma will heal very fast with the application of stoma powder probably within 7-10 days.  Not to use powder if it is not needed.  Patient can return to clinic as needed.  He was given my card for contact " information.  If patient order numbers for precut pouch which she is okay to use right now since the size of her stoma will likely be stable    Dr Corbett was available for supervision of care if needed or if questions should arise and regarding plan of care. Reba Jeffries RN [facility]

## 2019-01-22 NOTE — TELEPHONE ENCOUNTER
[facility] Call Center    Phone Message    May a detailed message be left on voicemail: yes    Reason for Call: Order(s): Ileostomy Supplies: Ileostomy- Stoma Powder Brand - Adapt 7906  Reason for requested: prescription to be faxed to [facility] in [facility] [phone]; pt account # [id number]  Date needed: asap  Provider name: Dr. Morris      Action Taken: Message routed to:  [facility] urology

## 2019-01-24 NOTE — TELEPHONE ENCOUNTER
Unable to leave a message on [phone]. Message left on patient voicemail ([phone]) stating that Aileen Montoya RNCC for Dr. Morris sent a script to [facility] pharmacy for his Ileostomy supplies.      John Rockwell MA

## 2019-01-24 NOTE — TELEPHONE ENCOUNTER
[facility] Call Center    Phone Message    May a detailed message be left on voicemail: yes    Reason for Call: Other: Patient called in extremely furious that this has not been sent for him. Pt states this is unacceptable to wait two days and not get any response. Please take care of this for the patient and he asks for a call back as well. Thank you.     Action Taken: Message routed to:  [facility]): Urology

## 2019-01-28 ENCOUNTER — TELEPHONE (OUTPATIENT)
Dept: HEMATOLOGY | Facility: CLINIC | Age: 69
End: 2019-01-28

## 2019-01-28 NOTE — TELEPHONE ENCOUNTER
PA Initiation    Medication: Codeine Sulfate 30mg tablets  Insurance Company: Medicare Blue RX - Phone [phone] Fax [phone]  Pharmacy Filling the Rx: [facility] - [facility] [place], [place] - [address] [place] [address]  Filling Pharmacy Phone:    Filling Pharmacy Fax:    Start Date: 1/28/2019

## 2019-01-29 DIAGNOSIS — D66 HEMOPHILIC ARTHROPATHY (H): ICD-10-CM

## 2019-01-29 DIAGNOSIS — D66 SEVERE HEMOPHILIA A (H): ICD-10-CM

## 2019-01-29 DIAGNOSIS — M36.2 HEMOPHILIC ARTHROPATHY (H): ICD-10-CM

## 2019-01-29 RX ORDER — CODEINE SULFATE 30 MG/1
TABLET ORAL
Qty: 90 TABLET | Refills: 0 | Status: SHIPPED | OUTPATIENT
Start: 2019-01-29 | End: 2019-01-29

## 2019-01-29 RX ORDER — CODEINE SULFATE 30 MG/1
TABLET ORAL
Qty: 90 TABLET | Refills: 0 | Status: SHIPPED | OUTPATIENT
Start: 2019-01-29 | End: 2019-02-22

## 2019-01-29 NOTE — TELEPHONE ENCOUNTER
Prior Authorization Approval    Authorization Effective Date: 1/1/2019  Authorization Expiration Date: 1/28/2020  Medication: Codeine Sulfate 30mg tablets APPROVED  Approved Dose/Quantity: 90 per 15 days  Reference #:     Insurance Company: Medicare Blue RX - Phone [phone] Fax [phone]  Expected CoPay:       CoPay Card Available:      Foundation Assistance Needed:    Which Pharmacy is filling the prescription (Not needed for infusion/clinic administered): [facility] - [facility] [place] [address]  Pharmacy Notified: Yes  Patient Notified: YesComment:  Pharmacy to notify patient                        om

## 2019-01-31 ENCOUNTER — DOCUMENTATION ONLY (OUTPATIENT)
Dept: HEMATOLOGY | Facility: CLINIC | Age: 69
End: 2019-01-31

## 2019-01-31 DIAGNOSIS — Z85.51 PERSONAL HISTORY OF MALIGNANT NEOPLASM OF BLADDER: ICD-10-CM

## 2019-01-31 DIAGNOSIS — D66 SEVERE HEMOPHILIA A (H): ICD-10-CM

## 2019-01-31 RX ORDER — ANTIHEMOPHILIC FACTOR (RECOMBINANT) 1000 (+/-)
KIT INTRAVENOUS
Qty: 11824 EACH | Refills: 5 | Status: SHIPPED | OUTPATIENT
Start: 2019-01-31 | End: 2019-03-19

## 2019-02-01 ENCOUNTER — TELEPHONE (OUTPATIENT)
Dept: HEMATOLOGY | Facility: CLINIC | Age: 69
End: 2019-02-01

## 2019-02-01 NOTE — TELEPHONE ENCOUNTER
I spoke to Mr. Cavanaugh regarding the need to establish a clinic appointmet to review prophylaxis and pain medication. He agreed to make an appointment but due to weather concerns he will call and make the appointment in a few weeks.

## 2019-02-04 NOTE — PROGRESS NOTES
Orlin Alcantar called to order a refill of Kogenate FS . He has 2 dose(s) remaining at home and would like it delivered to his house via  to arrive on 1/31/2019.     The following doses were dispensed from our pharmacy:    Product: Kogenate FS    Doses: 4 doses of 2959 units     MICHAEL Keys, [facility]  Hemophilia Pharmacy Coordinator  [phone]  [email or web address]

## 2019-02-07 ENCOUNTER — TELEPHONE (OUTPATIENT)
Dept: HEMATOLOGY | Facility: CLINIC | Age: 69
End: 2019-02-07

## 2019-02-07 NOTE — PROGRESS NOTES
[id number]: Re-Consent Note     New information has been added to the consent form. This was explained to the patient, and all his questions were answered. The patient verbalized understanding and would like to continue participation in the trial. The patient was provided with a signed copy of the IRB-approved consent form.    Consent Version Date: 12/11/2018  Consent obtained by: ANIA Muro     Date: 01/09/2019    Aaliyah Baxter  Clinical Research Coordinator-RN  Clinical Trials Office/[facility]  Desk Phone: [phone]   Pager: [phone]

## 2019-02-08 ENCOUNTER — DOCUMENTATION ONLY (OUTPATIENT)
Dept: HEMATOLOGY | Facility: CLINIC | Age: 69
End: 2019-02-08

## 2019-02-11 NOTE — PROGRESS NOTES
Orlin Alcantar called to order a refill of Kogenate FS . He has 0 dose(s) remaining at home and would like it delivered to his house via  to arrive on 2/08/2019.     The following doses were dispensed from our pharmacy:    Product: Kogenate FS    Doses: 4 does of 2956 units     MICHAEL Keys, [facility]  Hemophilia Pharmacy Coordinator  [phone]  [email or web address]

## 2019-02-15 ENCOUNTER — DOCUMENTATION ONLY (OUTPATIENT)
Dept: HEMATOLOGY | Facility: CLINIC | Age: 69
End: 2019-02-15

## 2019-02-15 NOTE — PROGRESS NOTES
Orlin Alcantar called to order a refill of Kogenate FS . He has 1 dose(s) remaining at home and would like it delivered to his house via  to arrive on 2/15/2019.     The following doses were dispensed from our pharmacy:    Product: Kogenate FS    Doses: 4 doses of 2956 units    MICHAEL Keys, [facility]  Hemophilia Pharmacy Coordinator  [phone]  [email or web address]

## 2019-02-22 ENCOUNTER — DOCUMENTATION ONLY (OUTPATIENT)
Dept: HEMATOLOGY | Facility: CLINIC | Age: 69
End: 2019-02-22

## 2019-02-22 DIAGNOSIS — G89.4 CHRONIC PAIN SYNDROME: ICD-10-CM

## 2019-02-22 DIAGNOSIS — M36.2 HEMOPHILIC ARTHROPATHY (H): ICD-10-CM

## 2019-02-22 DIAGNOSIS — D66 HEMOPHILIA A (H): Primary | ICD-10-CM

## 2019-02-22 DIAGNOSIS — D66 HEMOPHILIC ARTHROPATHY (H): ICD-10-CM

## 2019-02-22 NOTE — PROGRESS NOTES
Orlin Alcantar called to order a refill of Kogenate FS . He has 1 dose(s) remaining at home and would like it delivered to his house via  to arrive on 2/22/2019.     The following doses were dispensed from our pharmacy:    Product: Kogenate FS    Doses: 4 doses of 2956 units    MICHAEL Keys, [facility]  Hemophilia Pharmacy Coordinator  [phone]  [email or web address]

## 2019-03-01 ENCOUNTER — DOCUMENTATION ONLY (OUTPATIENT)
Dept: HEMATOLOGY | Facility: CLINIC | Age: 69
End: 2019-03-01

## 2019-03-01 NOTE — PROGRESS NOTES
Orlin Alcantar called to order a refill of Kogenate FS . He has 1 dose(s) remaining at home and would like it delivered to his house via  to arrive on 3/1/2019.     The following doses were dispensed from our pharmacy:    Product: Kogenate FS    Doses: 4 doses of 2956 units    MICHAEL Keys, [facility]  Hemophilia Pharmacy Coordinator  [phone]  [email or web address]

## 2019-03-05 NOTE — PROGRESS NOTES
[id number] Study Visit Note   Subject name: Orlin Alcantar     Visit: 100 day AE Assessment    Did the study visit occur within the appropriate window allowed by the protocol? yes  If no, why? N/A    Sept 21, 2018 was the 100 day sonia after the last dose of nivolumab. Last dose of nivolumab was 6/13/2018.    Dr. Espinal personally interviewed Orlin Alcantar and reviewed his medical record for adverse events and these have been recorded on the corresponding logs in Orlin Alcantar's research file.     Orlin Alcantar was given the opportunity to ask any trial related questions.  Please see provider progress note for physical exam and other clinical information. Labs were reviewed - any significant lab values were addressed and reviewed.      Eric Srivastava RN  Clinical Research Coordinator  Clinical Trials Office  [facility]  Desk Phone: [phone]  Pager: [phone]

## 2019-03-07 ENCOUNTER — ANCILLARY PROCEDURE (OUTPATIENT)
Dept: CT IMAGING | Facility: CLINIC | Age: 69
End: 2019-03-07
Attending: INTERNAL MEDICINE
Payer: MEDICARE

## 2019-03-07 DIAGNOSIS — C67.8 MALIGNANT NEOPLASM OF OVERLAPPING SITES OF BLADDER (H): ICD-10-CM

## 2019-03-07 LAB
CREAT BLD-MCNC: 1.1 MG/DL (ref 0.66–1.25)
GFR SERPL CREATININE-BSD FRML MDRD: 66 ML/MIN/{1.73_M2}

## 2019-03-07 RX ORDER — IOPAMIDOL 755 MG/ML
96 INJECTION, SOLUTION INTRAVASCULAR ONCE
Status: COMPLETED | OUTPATIENT
Start: 2019-03-07 | End: 2019-03-07

## 2019-03-07 RX ADMIN — IOPAMIDOL 96 ML: 755 INJECTION, SOLUTION INTRAVASCULAR at 10:11

## 2019-03-07 NOTE — DISCHARGE INSTRUCTIONS

## 2019-03-11 ENCOUNTER — DOCUMENTATION ONLY (OUTPATIENT)
Dept: HEMATOLOGY | Facility: CLINIC | Age: 69
End: 2019-03-11

## 2019-03-13 NOTE — PROGRESS NOTES
Orlin Alcantar called to order a refill of Kogenate FS . He has 0 dose(s) remaining at home and would like it delivered to his house via  to arrive on 3/11/2019.      The following doses were dispensed from our pharmacy:     Product: Kogenate FS     Doses: 4 doses of 2956 units    Analilia Mccarty [facility]  Hemophilia Pharmacy   [phone]  [email or web address]

## 2019-03-18 ENCOUNTER — ONCOLOGY VISIT (OUTPATIENT)
Dept: ONCOLOGY | Facility: CLINIC | Age: 69
End: 2019-03-18
Attending: INTERNAL MEDICINE
Payer: MEDICARE

## 2019-03-18 ENCOUNTER — APPOINTMENT (OUTPATIENT)
Dept: LAB | Facility: CLINIC | Age: 69
End: 2019-03-18
Attending: INTERNAL MEDICINE
Payer: MEDICARE

## 2019-03-18 VITALS
HEART RATE: 81 BPM | WEIGHT: 162.5 LBS | SYSTOLIC BLOOD PRESSURE: 110 MMHG | RESPIRATION RATE: 16 BRPM | BODY MASS INDEX: 22.01 KG/M2 | TEMPERATURE: 97.8 F | DIASTOLIC BLOOD PRESSURE: 72 MMHG | OXYGEN SATURATION: 97 % | HEIGHT: 72 IN

## 2019-03-18 DIAGNOSIS — C67.8 MALIGNANT NEOPLASM OF OVERLAPPING SITES OF BLADDER (H): ICD-10-CM

## 2019-03-18 LAB
ALBUMIN SERPL-MCNC: 3.7 G/DL (ref 3.4–5)
ALP SERPL-CCNC: 91 U/L (ref 40–150)
ALT SERPL W P-5'-P-CCNC: 17 U/L (ref 0–70)
ANION GAP SERPL CALCULATED.3IONS-SCNC: 6 MMOL/L (ref 3–14)
AST SERPL W P-5'-P-CCNC: 17 U/L (ref 0–45)
BASOPHILS # BLD AUTO: 0.1 10E9/L (ref 0–0.2)
BASOPHILS NFR BLD AUTO: 1 %
BILIRUB SERPL-MCNC: 0.4 MG/DL (ref 0.2–1.3)
BUN SERPL-MCNC: 43 MG/DL (ref 7–30)
CALCIUM SERPL-MCNC: 9.1 MG/DL (ref 8.5–10.1)
CHLORIDE SERPL-SCNC: 109 MMOL/L (ref 94–109)
CO2 SERPL-SCNC: 25 MMOL/L (ref 20–32)
CREAT SERPL-MCNC: 1.13 MG/DL (ref 0.66–1.25)
DIFFERENTIAL METHOD BLD: ABNORMAL
EOSINOPHIL # BLD AUTO: 0.4 10E9/L (ref 0–0.7)
EOSINOPHIL NFR BLD AUTO: 6 %
ERYTHROCYTE [DISTWIDTH] IN BLOOD BY AUTOMATED COUNT: 14.2 % (ref 10–15)
GFR SERPL CREATININE-BSD FRML MDRD: 66 ML/MIN/{1.73_M2}
GLUCOSE SERPL-MCNC: 95 MG/DL (ref 70–99)
HCT VFR BLD AUTO: 41.1 % (ref 40–53)
HGB BLD-MCNC: 12.8 G/DL (ref 13.3–17.7)
IMM GRANULOCYTES # BLD: 0 10E9/L (ref 0–0.4)
IMM GRANULOCYTES NFR BLD: 0.5 %
LYMPHOCYTES # BLD AUTO: 1 10E9/L (ref 0.8–5.3)
LYMPHOCYTES NFR BLD AUTO: 15.9 %
MCH RBC QN AUTO: 27.4 PG (ref 26.5–33)
MCHC RBC AUTO-ENTMCNC: 31.1 G/DL (ref 31.5–36.5)
MCV RBC AUTO: 88 FL (ref 78–100)
MONOCYTES # BLD AUTO: 0.5 10E9/L (ref 0–1.3)
MONOCYTES NFR BLD AUTO: 8.3 %
NEUTROPHILS # BLD AUTO: 4.1 10E9/L (ref 1.6–8.3)
NEUTROPHILS NFR BLD AUTO: 68.3 %
NRBC # BLD AUTO: 0 10*3/UL
NRBC BLD AUTO-RTO: 0 /100
PLATELET # BLD AUTO: 230 10E9/L (ref 150–450)
POTASSIUM SERPL-SCNC: 5.2 MMOL/L (ref 3.4–5.3)
PROT SERPL-MCNC: 7.7 G/DL (ref 6.8–8.8)
RBC # BLD AUTO: 4.67 10E12/L (ref 4.4–5.9)
SODIUM SERPL-SCNC: 140 MMOL/L (ref 133–144)
WBC # BLD AUTO: 6 10E9/L (ref 4–11)

## 2019-03-18 PROCEDURE — 99213 OFFICE O/P EST LOW 20 MIN: CPT | Mod: ZP | Performed by: INTERNAL MEDICINE

## 2019-03-18 PROCEDURE — G0463 HOSPITAL OUTPT CLINIC VISIT: HCPCS | Mod: ZF

## 2019-03-18 PROCEDURE — 85025 COMPLETE CBC W/AUTO DIFF WBC: CPT | Performed by: INTERNAL MEDICINE

## 2019-03-18 PROCEDURE — 80053 COMPREHEN METABOLIC PANEL: CPT | Performed by: INTERNAL MEDICINE

## 2019-03-18 PROCEDURE — 36415 COLL VENOUS BLD VENIPUNCTURE: CPT

## 2019-03-18 ASSESSMENT — MIFFLIN-ST. JEOR: SCORE: 1540.1

## 2019-03-18 ASSESSMENT — PAIN SCALES - GENERAL: PAINLEVEL: NO PAIN (0)

## 2019-03-18 NOTE — NURSING NOTE
Chief Complaint   Patient presents with     Blood Draw     Labs drawn via  by RN in lab. VS taken.      Labs drawn via venipuncture. Vital signs taken. Checked into next appointment.   Belia Cevallos RN

## 2019-03-18 NOTE — LETTER
3/18/2019       RE: Orlin Alcantar  [address]  [place] [address]     Dear Colleague,    Thank you for referring your patient, Orlin Alcantar, to the [facility]. Please see a copy of my visit note below.    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   3/26/18: Consented for the Nivolumab-Gemcitabine-Cisplatin study [id number]    3/28/18: Screening visit for study.   4/4/18; c1d1 gemcitabine  4/11/18: c1d8 gemcitabine-nivolumab  4/25/18: C2D1 Gemcitabine-Cisplatin  5/2/18: C2D8 Gemcitabine-Nivolumab  5/16/18: C3D1 Gemcitabine-Cisplatin  5/23/18: C3D8 Gemcitabine-Nivolumab  6/6/18: C4d1 Gemcitabine-Cisplatin  6/13/18: C4D8 Gemcitabine-Nivolumab    7/30/18: Radical cystoprostatectomy with bilateral pelvic LN dissection and ileal conduit. Final pathology pT0N0    HPI:    68 year old male with PMH of hemophilia, chronic hepatis C who was seen by DR. Burns for evaluation of neoadjuvant therapy with Gemcitabine Cisplatin and Nivolumab.    Patient has a history of hemophilia and has undergone several surgeries in right hip replacement, knee replacement. Hemophilia managed by Dr. Marley.   He reports recurrent hematuria for a year, initially attributed to kidney stones, cystoscopy in Feb 2018 revealed:  Bladder tumor, transurethral resection:   - Invasive high grade urothelial carcinoma   - Extent of invasion: Muscularis propria, multifocal   - Lymphovascular invasion: Present     PET-CT scan - which has revealed muscle invasive bladder right lateral wall of bladder and right  obturator lymphnode, 1.1 cm short axis.  He has been treated for HCV in the 1990s and has not had any complications. His LFTs have remained normal.   His hemophilia is very well controlled on current regimen of factor replacement.   He does not have any autoimmune disease and is not on immunesuppressants or steroids.   His diabetes is  diet controlled and he has not required any medications.     We enrolled him on our neoadjuvant Nivolumab-[place]-Cis trial and he completed 4 cycles of treatment and then underwent  Radical cystoprostatectomy and Bilateral pelvic lymph node dissection and ileal conduit on 7/30/18.  Final pathology showed:   Prostate, bladder, seminal vesicles, radical cystoprostatectomy:   - Bladder wall with chronic inflammation, edema, fibrosis and prior   surgical site changes   - No evidence of residual invasive or in-situ urothelial carcinoma   - Pathologic stage: ypT0N0   - Benign prostate and seminal vesicles     D. Lymph nodes, left pelvic, excision:   - Twelve benign lymph nodes     E. Lymph nodes, right pelvic, excision:   - Eleven benign lymph nodes   - See comment     Interval History:  Patient is doing extremely well, no new complaints.        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      Bladder tumor      Depression      DM II (diabetes mellitus, type II), controlled (H)     diet controlled     Hemophilia (H)     Type A     History of hepatitis C     treated successfully     HTN (hypertension)           CURRENT OUTPATIENT MEDICATIONS     Current Outpatient Medications   Medication Sig     acetaminophen (TYLENOL) 325 MG tablet Take 2 tablets (650 mg) by mouth every 4 hours as needed for mild pain or fever     diazepam (VALIUM) 5 MG tablet Take 1 tablet (5 mg) by mouth daily as needed for anxiety or sleep     lisinopril (PRINIVIL/ZESTRIL) 40 MG tablet Take 1 tablet (40 mg) by mouth every morning (HOLD UNTIL FOLLOW-UP with Primary Care Provider)     mirtazapine (REMERON) 45 MG tablet Take 1 tablet (45 mg) by mouth At Bedtime And may take 1/2 tablet for insomnia up to 3 times a week.     order for DME Cohesive Lurdes rings [id number]     polyethylene glycol (MIRALAX/GLYCOLAX) Packet Take 17 g by mouth daily (to prevent constipation)     acetaminophen-codeine  (TYLENOL #3) 300-30 MG tablet Take 1-2 tab PO Q 6 hours as needed.     amLODIPine (NORVASC) 5 MG tablet Take 1 tablet (5 mg) by mouth daily (Patient not taking: Reported on 3/18/2019)     Antihemophilic Factor, Recomb, (KOGENATE FS) 1000 units KIT Infuse 2956 units into the vein every Monday, Wednesday, Friday and Sunday and as needed for breakthrough bleeding.     order for DME Equipment being ordered: Walker Wheels () and Walker ()  Treatment Diagnosis: gait instability (Patient not taking: Reported on 3/18/2019)     No current facility-administered medications for this visit.      Facility-Administered Medications Ordered in Other Visits   Medication     ethyl chloride spray     ECOG performance status of 1.   /72 (BP Location: Right arm, Patient Position: Sitting, Cuff Size: Adult Large)   Pulse 81   Temp 97.8  F (36.6  C) (Oral)   Resp 16   Ht 1.829 m (6')   Wt 73.7 kg (162 lb 8 oz)   SpO2 97%   BMI 22.04 kg/m     HEENT: No icterus, no pallor. Moist mucous membranes. Oropharynx is clear.   NECK: Supple  LUNGS: Bilateral clear to ausculation  CARDIOVASCULAR: Regular rate and rhythm, no murmurs, gallops or rubs.   ABDOMEN: Soft, nontender and nondistended.   EXTREMITIES: No cyanosis, no clubbing, no edema.   NEUROLOGIC: No focal deficits.    Labs: not clinically significant   CT TAP: No evidence of disease recurrence.    ASSESSMENT/PLAN:     68 year old male with T2N1(solitary node)  urothelial carcinoma,currently enrolled on the clinical trial of Nivolumab with Gemcitabine and Cisplatin for MIBC, completed C4D8 on 6/13/18.   He has had a complete response at radical cystectomy final pathology showing PT0 N0 of note he was T2N1 prior to neoadjuvant therapy.    He is OZZIE on scans and is on SOC follow up for surveillance.  Return to clinic tin 3 months with labs and restaging CT scan and provider visit, and this schedule will continue for monitoring for progression.    Hemophilia management:  Per Dr. Ayaka Espinal MD  Hematology Oncology and Transplantation  [facility]

## 2019-03-18 NOTE — NURSING NOTE
Oncology Rooming Note    March 18, 2019 11:17 AM   Orlin Alcantar is a 69 year old male who presents for:    Chief Complaint   Patient presents with     Blood Draw     Labs drawn via  by RN in lab. VS taken.      Oncology Clinic Visit     Return: Bladder Ca     Initial Vitals: /72 (BP Location: Right arm, Patient Position: Sitting, Cuff Size: Adult Large)   Pulse 81   Temp 97.8  F (36.6  C) (Oral)   Resp 16   Ht 1.829 m (6')   Wt 73.7 kg (162 lb 8 oz)   SpO2 97%   BMI 22.04 kg/m   Estimated body mass index is 22.04 kg/m  as calculated from the following:    Height as of this encounter: 1.829 m (6').    Weight as of this encounter: 73.7 kg (162 lb 8 oz). Body surface area is 1.93 meters squared.  No Pain (0) Comment: Data Unavailable   No LMP for male patient.  Allergies reviewed: Yes  Medications reviewed: Yes    Medications: Medication refills not needed today.  Pharmacy name entered into Milk:    [facility] - [place], [place] - [address] [facility] [place], [place] - [address] - [place], [place] - [facility]    Clinical concerns: no new concerns today dr. Espinal was notified.      Elizabeth Norman CMA

## 2019-03-19 ENCOUNTER — DOCUMENTATION ONLY (OUTPATIENT)
Dept: HEMATOLOGY | Facility: CLINIC | Age: 69
End: 2019-03-19

## 2019-03-19 DIAGNOSIS — G89.4 CHRONIC PAIN SYNDROME: ICD-10-CM

## 2019-03-19 DIAGNOSIS — Z85.51 PERSONAL HISTORY OF MALIGNANT NEOPLASM OF BLADDER: ICD-10-CM

## 2019-03-19 DIAGNOSIS — D66 HEMOPHILIA A (H): ICD-10-CM

## 2019-03-19 DIAGNOSIS — D66 HEMOPHILIC ARTHROPATHY (H): ICD-10-CM

## 2019-03-19 DIAGNOSIS — M36.2 HEMOPHILIC ARTHROPATHY (H): ICD-10-CM

## 2019-03-19 DIAGNOSIS — D66 SEVERE HEMOPHILIA A (H): ICD-10-CM

## 2019-03-19 RX ORDER — ANTIHEMOPHILIC FACTOR (RECOMBINANT) 1000 (+/-)
KIT INTRAVENOUS
Qty: 11824 EACH | Refills: 5 | Status: SHIPPED | OUTPATIENT
Start: 2019-03-19 | End: 2019-05-03

## 2019-03-19 NOTE — PROGRESS NOTES
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   3/26/18: Consented for the Nivolumab-Gemcitabine-Cisplatin study [id number]    3/28/18: Screening visit for study.   4/4/18; c1d1 gemcitabine  4/11/18: c1d8 gemcitabine-nivolumab  4/25/18: C2D1 Gemcitabine-Cisplatin  5/2/18: C2D8 Gemcitabine-Nivolumab  5/16/18: C3D1 Gemcitabine-Cisplatin  5/23/18: C3D8 Gemcitabine-Nivolumab  6/6/18: C4d1 Gemcitabine-Cisplatin  6/13/18: C4D8 Gemcitabine-Nivolumab    7/30/18: Radical cystoprostatectomy with bilateral pelvic LN dissection and ileal conduit. Final pathology pT0N0    HPI:    68 year old male with PMH of hemophilia, chronic hepatis C who was seen by DR. Burns for evaluation of neoadjuvant therapy with Gemcitabine Cisplatin and Nivolumab.    Patient has a history of hemophilia and has undergone several surgeries in right hip replacement, knee replacement. Hemophilia managed by Dr. Marley.   He reports recurrent hematuria for a year, initially attributed to kidney stones, cystoscopy in Feb 2018 revealed:  Bladder tumor, transurethral resection:   - Invasive high grade urothelial carcinoma   - Extent of invasion: Muscularis propria, multifocal   - Lymphovascular invasion: Present     PET-CT scan - which has revealed muscle invasive bladder right lateral wall of bladder and right  obturator lymphnode, 1.1 cm short axis.  He has been treated for HCV in the 1990s and has not had any complications. His LFTs have remained normal.   His hemophilia is very well controlled on current regimen of factor replacement.   He does not have any autoimmune disease and is not on immunesuppressants or steroids.   His diabetes is diet controlled and he has not required any medications.     We enrolled him on our neoadjuvant Nivolumab-[place]-Cis trial and he completed 4 cycles of treatment and then underwent  Radical cystoprostatectomy and Bilateral pelvic lymph node  dissection and ileal conduit on 7/30/18.  Final pathology showed:   Prostate, bladder, seminal vesicles, radical cystoprostatectomy:   - Bladder wall with chronic inflammation, edema, fibrosis and prior   surgical site changes   - No evidence of residual invasive or in-situ urothelial carcinoma   - Pathologic stage: ypT0N0   - Benign prostate and seminal vesicles     D. Lymph nodes, left pelvic, excision:   - Twelve benign lymph nodes     E. Lymph nodes, right pelvic, excision:   - Eleven benign lymph nodes   - See comment     Interval History:  Patient is doing extremely well, no new complaints.        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      Bladder tumor      Depression      DM II (diabetes mellitus, type II), controlled (H)     diet controlled     Hemophilia (H)     Type A     History of hepatitis C     treated successfully     HTN (hypertension)           CURRENT OUTPATIENT MEDICATIONS     Current Outpatient Medications   Medication Sig     acetaminophen (TYLENOL) 325 MG tablet Take 2 tablets (650 mg) by mouth every 4 hours as needed for mild pain or fever     diazepam (VALIUM) 5 MG tablet Take 1 tablet (5 mg) by mouth daily as needed for anxiety or sleep     lisinopril (PRINIVIL/ZESTRIL) 40 MG tablet Take 1 tablet (40 mg) by mouth every morning (HOLD UNTIL FOLLOW-UP with Primary Care Provider)     mirtazapine (REMERON) 45 MG tablet Take 1 tablet (45 mg) by mouth At Bedtime And may take 1/2 tablet for insomnia up to 3 times a week.     order for DME Cohesive Lurdes rings [phone]     polyethylene glycol (MIRALAX/GLYCOLAX) Packet Take 17 g by mouth daily (to prevent constipation)     acetaminophen-codeine (TYLENOL #3) 300-30 MG tablet Take 1-2 tab PO Q 6 hours as needed.     amLODIPine (NORVASC) 5 MG tablet Take 1 tablet (5 mg) by mouth daily (Patient not taking: Reported on 3/18/2019)     Antihemophilic Factor, Recomb, (KOGENATE FS) 1000 units  KIT Infuse 2956 units into the vein every Monday, Wednesday, Friday and Sunday and as needed for breakthrough bleeding.     order for DME Equipment being ordered: Walker Wheels () and Walker ()  Treatment Diagnosis: gait instability (Patient not taking: Reported on 3/18/2019)     No current facility-administered medications for this visit.      Facility-Administered Medications Ordered in Other Visits   Medication     ethyl chloride spray     ECOG performance status of 1.   /72 (BP Location: Right arm, Patient Position: Sitting, Cuff Size: Adult Large)   Pulse 81   Temp 97.8  F (36.6  C) (Oral)   Resp 16   Ht 1.829 m (6')   Wt 73.7 kg (162 lb 8 oz)   SpO2 97%   BMI 22.04 kg/m    HEENT: No icterus, no pallor. Moist mucous membranes. Oropharynx is clear.   NECK: Supple  LUNGS: Bilateral clear to ausculation  CARDIOVASCULAR: Regular rate and rhythm, no murmurs, gallops or rubs.   ABDOMEN: Soft, nontender and nondistended.   EXTREMITIES: No cyanosis, no clubbing, no edema.   NEUROLOGIC: No focal deficits.    Labs: not clinically significant   CT TAP: No evidence of disease recurrence.    ASSESSMENT/PLAN:     68 year old male with T2N1(solitary node)  urothelial carcinoma,currently enrolled on the clinical trial of Nivolumab with Gemcitabine and Cisplatin for MIBC, completed C4D8 on 6/13/18.   He has had a complete response at radical cystectomy final pathology showing PT0 N0 of note he was T2N1 prior to neoadjuvant therapy.    He is OZZIE on scans and is on SOC follow up for surveillance.  Return to clinic tin 3 months with labs and restaging CT scan and provider visit, and this schedule will continue for monitoring for progression.    Hemophilia management: Per Dr. Ayaka Espinal MD  Hematology Oncology and Transplantation  [facility]

## 2019-03-19 NOTE — PROGRESS NOTES
Orlin Alcantar called to order a refill of Kogenate FS . He has 0 dose(s) remaining at home and would like it delivered to his house via  to arrive on 3/19/2019.      The following doses were dispensed from our pharmacy:     Product: Kogenate FS     Doses: 4 doses of 2956 units    MICHAEL Keys, [facility]  Hemophilia Pharmacy Coordinator  [phone]  [email or web address]

## 2019-03-21 ENCOUNTER — TELEPHONE (OUTPATIENT)
Dept: HEMATOLOGY | Facility: CLINIC | Age: 69
End: 2019-03-21

## 2019-03-21 NOTE — TELEPHONE ENCOUNTER
[facility]  Brief Social Work Note    Orlin Alcantar is a 69 year old male with severe Hemophilia A who is currently receiving premium assistance from [facility] on a quarterly basis.    Writer received fax from Orlin this date with his next quarterly premium statement, and the request it be uploaded into his account. Writer completed this task and writer called Orlin and spoke to him by phone to confirm this.    He did not have other questions or concerns.    MARIN Ferrari, LICSW, ACM  Clinical   [facility]  [facility]  Phone: [phone]

## 2019-03-26 ENCOUNTER — OFFICE VISIT (OUTPATIENT)
Dept: HEMATOLOGY | Facility: CLINIC | Age: 69
End: 2019-03-26
Attending: PHYSICIAN ASSISTANT
Payer: MEDICARE

## 2019-03-26 ENCOUNTER — TELEPHONE (OUTPATIENT)
Dept: PSYCHIATRY | Facility: CLINIC | Age: 69
End: 2019-03-26

## 2019-03-26 ENCOUNTER — OFFICE VISIT (OUTPATIENT)
Dept: PSYCHIATRY | Facility: CLINIC | Age: 69
End: 2019-03-26
Attending: CLINICAL NURSE SPECIALIST
Payer: MEDICARE

## 2019-03-26 VITALS
BODY MASS INDEX: 21.63 KG/M2 | HEART RATE: 109 BPM | DIASTOLIC BLOOD PRESSURE: 83 MMHG | WEIGHT: 159.7 LBS | RESPIRATION RATE: 14 BRPM | OXYGEN SATURATION: 99 % | HEIGHT: 72 IN | SYSTOLIC BLOOD PRESSURE: 122 MMHG

## 2019-03-26 VITALS
SYSTOLIC BLOOD PRESSURE: 111 MMHG | HEART RATE: 60 BPM | WEIGHT: 160 LBS | BODY MASS INDEX: 21.7 KG/M2 | DIASTOLIC BLOOD PRESSURE: 70 MMHG

## 2019-03-26 DIAGNOSIS — G89.4 CHRONIC PAIN SYNDROME: ICD-10-CM

## 2019-03-26 DIAGNOSIS — M36.2 HEMOPHILIC ARTHROPATHY (H): ICD-10-CM

## 2019-03-26 DIAGNOSIS — D66 HEMOPHILIC ARTHROPATHY (H): ICD-10-CM

## 2019-03-26 DIAGNOSIS — F41.1 ANXIETY STATE: Primary | ICD-10-CM

## 2019-03-26 DIAGNOSIS — F32.A DEPRESSIVE DISORDER: ICD-10-CM

## 2019-03-26 DIAGNOSIS — D66 SEVERE HEMOPHILIA A (H): Primary | ICD-10-CM

## 2019-03-26 DIAGNOSIS — F06.30 MOOD DISORDER IN CONDITIONS CLASSIFIED ELSEWHERE: ICD-10-CM

## 2019-03-26 DIAGNOSIS — F41.1 GENERALIZED ANXIETY DISORDER: ICD-10-CM

## 2019-03-26 PROCEDURE — 99214 OFFICE O/P EST MOD 30 MIN: CPT | Performed by: PHYSICIAN ASSISTANT

## 2019-03-26 PROCEDURE — G0463 HOSPITAL OUTPT CLINIC VISIT: HCPCS | Mod: ZF

## 2019-03-26 PROCEDURE — G0463 HOSPITAL OUTPT CLINIC VISIT: HCPCS | Mod: 27

## 2019-03-26 RX ORDER — OXYCODONE HYDROCHLORIDE 5 MG/1
5 TABLET ORAL EVERY 6 HOURS PRN
Qty: 90 TABLET | Refills: 0 | Status: SHIPPED | OUTPATIENT
Start: 2019-03-26 | End: 2019-05-03

## 2019-03-26 RX ORDER — MIRTAZAPINE 45 MG/1
45 TABLET, FILM COATED ORAL AT BEDTIME
Qty: 37 TABLET | Refills: 5 | Status: SHIPPED | OUTPATIENT
Start: 2019-03-26 | End: 2019-09-24

## 2019-03-26 RX ORDER — DIAZEPAM 5 MG
5 TABLET ORAL DAILY PRN
Qty: 30 TABLET | Refills: 5 | Status: SHIPPED | OUTPATIENT
Start: 2019-03-26 | End: 2019-09-24

## 2019-03-26 ASSESSMENT — PAIN SCALES - GENERAL
PAINLEVEL: NO PAIN (0)
PAINLEVEL: NO PAIN (0)

## 2019-03-26 ASSESSMENT — PATIENT HEALTH QUESTIONNAIRE - PHQ9: SUM OF ALL RESPONSES TO PHQ QUESTIONS 1-9: 0

## 2019-03-26 ASSESSMENT — MIFFLIN-ST. JEOR: SCORE: 1527.39

## 2019-03-26 NOTE — PROGRESS NOTES
[facility]  [address] [place] [address] [address], [place] [address]  Main: [phone], Fax: [phone]    Comprehensive Hemophilia Clinic Visit      Patient: Orlin Alcantar  MRN: [id number]  : 1950  MAR: 3/26/19    Hemophilia history:  Orlin is a 69 year old male with a history of severe hemophilia A with his baseline factor VIII level at <1% with no history of factor VIII inhibitor, returns to clinic today for routine follow-up of hemophilia.  He recently had pharmacokinetics done on current factor VIII product (Kogenate).  We will also discuss pain medication plan.     Orlin was seen last in comprehensive hemophilia clinic 18     Orlin has been using factor VIII replacement 3000 Units (40-45 units/kg) on Monday, Wednesday and Friday or 2000 Units every other day and on occasion, he sometimes uses an additional 1000 Unit doses in between treatment days. With this regimen, he did not have any bleeding complications.    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 His joints, however, have been in stable conditions for many years now.  He does have chronic joint pain and he uses Codeine tablets (30 mg) with a schedule of 2 tabs Q morning, 2 tabs at bedtime and 1-2 tabs as needed during the day.     ROS:  Complete ROS is negative, except as noted above.     Routine health maintenance:  His last visit with Dr. Katie Ramos, primary care physician at [facility] was back in 2017. He notes that with his bladder cancer diagnosis [facility] and he will plan to reconnect with her.  He has not seen a dentist for several years.   He does see psychiatry here at [facility] and saw that person today.  They discussed pain medications and there was not concern about interaction with current psychiatric medications.       Medications:  Current Outpatient Medications    Medication     acetaminophen (TYLENOL) 325 MG tablet     acetaminophen-codeine (TYLENOL #3) 300-30 MG tablet     amLODIPine (NORVASC) 5 MG tablet     Antihemophilic Factor, Recomb, (KOGENATE FS) 1000 units KIT     diazepam (VALIUM) 5 MG tablet     lisinopril (PRINIVIL/ZESTRIL) 40 MG tablet     mirtazapine (REMERON) 45 MG tablet     order for DME     order for DME     polyethylene glycol (MIRALAX/GLYCOLAX) Packet     No current facility-administered medications for this visit.      Facility-Administered Medications Ordered in Other Visits   Medication     ethyl chloride spray        Objective:  /83 (BP Location: Right arm, Patient Position: Sitting, Cuff Size: Adult Regular)   Pulse 109   Resp 14   Ht 1.829 m (6')   Wt 72.4 kg (159 lb 11.2 oz)   SpO2 99%   BMI 21.66 kg/m    Exam:  General: No acute distress      Assessment:  In summary, Orlin Alcantar is a 69 year old year old man with severe hemophilia A with a baseline factor VIII level of <1% without a history of inhibitor, who also has a history of multiple hemophilic arthropathic joints and bladder cancer found early 2018, who presents for his routine hemophilia follow-up visit today.   We discussed his factor VIII pharmacokinetics and pain management plan.     Plan:    Factor replacement plan:  No change in factor replacement were made today.  He feels he does best on every other day factor VIII dosing to prevent hemarthrosis but is happy to know on this regimen he is well covered and could consider every 3rd day dosing. He will remain on Kogenate 3000 Units ( ~40 U/kg).    Hemophilic arthropathy management plan:  No bleeds but does have chronic pain.   Discussed that Codeine is getting more difficult to order without tylenol.  He will trial a transition to oral oxycodone (5mg tabs up to 3 tabs/day).  Rx for 5mg oxycodone #90 tabs given today.   Discussed risks of use and use in combination with Valium.  Can use Tylenol separately if needed.      Other medical follow up plan:  Has follow up scheduled with Oncology regarding bladder cancer surveillance and will schedule an appointment with primary care clinic.     Labs needed:  none    Follow up clinic visit timing:  September 2019 for comprehensive evaluation.  For pain management would like to see him every 6 months.  He agrees with this plan.           Yanira Branch MPH, PA-C  [facility]  [facility]  [phone] main line  [phone] pager  [phone] fax      Total time spent was 25 min. 25 min of face to face time was spent counseling and/or coordination of care regarding severe hemophilia A and pain management.

## 2019-03-26 NOTE — PATIENT INSTRUCTIONS
1. Continue current bleed prevention schedule (3,000 units every 48 hours). May decrease infusions to 3 times /week for a trial period per [employer] data.  2. As you make the transition to your new pain medication let us know how it is working.

## 2019-03-26 NOTE — TELEPHONE ENCOUNTER
Received one signed script back from provider. Ara faxed to the [facility] at [phone].

## 2019-03-26 NOTE — PROGRESS NOTES
Outpatient Psychiatry Progress Note     Provider: JELLY Chapa CNS  Date: 3/26/2019  Service:  Medication follow up with counseling.   Patient Identification: Orlin Alcantar  : 1950   MRN: [id number]    Orlin Alcantar is a 69 year old year old male who presents for ongoing psychiatric care.  Orlin Alcantar was last seen in clinic on 18.   At that time,   Assessment & Plan       Orlin Alcantar is seen today for follow up and reports his mood good. He is very happy that he is cancer free.  He would like to continue current medications.     Diagnosis       Encounter Diagnoses   Name Primary?     Anxiety state Yes     Mood disorder in conditions classified elsewhere           Plan:  Medication: no change  OTC Recommendations: none  Lab Orders:  none  Referrals: none  Release of Information: none  Future Treatment Considerations:per symptoms  Return for Follow Up:6 months      ____________________________________________________________________________________________________________________________________________    2019  Today Orlin reports he is feeling good given all that he has been through. Had recent CT scan and he continues to be cancer.  Reviewed pain treatment.  Had been on Codeine  Prn up to 6 per day.  Then was changed to  Tylenol with codeine and he asked to stop the tylenol and just use Codeine but one dose of codeine.  Uses Valium once a day on average, never more than 1 a day.  He does not take it for sleep but does for passing anxiety. He dose feel that he cut back on this.   Takes about 3 to 4 of the tylenol with codeine per day.  Side effects of medication include: none  Psychiatric Review of Systems:  Depression: In the last 2 weeks per PHQ-9 score:   PHQ-9 SCORE 2017   PHQ-9 Total Score -   PHQ-9 Total Score 0       Anxiety : feels like he drinks 5 cups of coffee really fast- feeling that something bad happened but doesn't know what it is.  If he takes Valium it  never gets that bad.   Usha na   Psychosis  na.   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      Bladder tumor      Depression      DM II (diabetes mellitus, type II), controlled (H)     diet controlled     Hemophilia (H)     Type A     History of hepatitis C     treated successfully     HTN (hypertension)      Patient Active Problem List   Diagnosis     History of total hip arthroplasty     Hemophilia A (H)     Pain in joint, pelvic region and thigh     Anxiety state     Hypertension goal BP (blood pressure) < 140/90     Mood disorder in conditions classified elsewhere     Hematuria     Bladder cancer (H)     Examination of participant in clinical trial     Urothelial carcinoma of bladder (H)     Malignant neoplasm of overlapping sites of bladder (H)     Hemarthrosis     Pain     S/P ileal conduit (H)       Allergies:   Allergies   Allergen Reactions     Aspirin      This drug inhibits platelets and is contraindicated due to hemophilia diagnosis.             Current Medications     Current Outpatient Medications Ordered in Epic   Medication Sig Dispense Refill     acetaminophen (TYLENOL) 325 MG tablet Take 2 tablets (650 mg) by mouth every 4 hours as needed for mild pain or fever 100 tablet 1     acetaminophen-codeine (TYLENOL #3) 300-30 MG tablet Take 1-2 tab PO Q 6 hours as needed. 30 tablet 0     amLODIPine (NORVASC) 5 MG tablet Take 1 tablet (5 mg) by mouth daily (Patient not taking: Reported on 3/18/2019) 30 tablet 0     Antihemophilic Factor, Recomb, (KOGENATE FS) 1000 units KIT Infuse 2956 units into the vein every  "Monday, Wednesday, Friday and Sunday and as needed for breakthrough bleeding. 52341 each 5     diazepam (VALIUM) 5 MG tablet Take 1 tablet (5 mg) by mouth daily as needed for anxiety or sleep 30 tablet 5     lisinopril (PRINIVIL/ZESTRIL) 40 MG tablet Take 1 tablet (40 mg) by mouth every morning (HOLD UNTIL FOLLOW-UP with Primary Care Provider) 30 tablet 0     mirtazapine (REMERON) 45 MG tablet Take 1 tablet (45 mg) by mouth At Bedtime And may take 1/2 tablet for insomnia up to 3 times a week. 37 tablet 5     order for DME Cohesive Lurdes rings [phone] 20 each 11     order for DME Equipment being ordered: Walker Wheels () and Walker ()  Treatment Diagnosis: gait instability (Patient not taking: Reported on 3/18/2019) 1 each 0     polyethylene glycol (MIRALAX/GLYCOLAX) Packet Take 17 g by mouth daily (to prevent constipation) 30 packet 1     Current Facility-Administered Medications Ordered in Epic   Medication Dose Route Frequency Provider Last Rate Last Dose     ethyl chloride spray   Topical Once Magalie Espinal MD            Mental Status Exam     Appearance:  Casually dressed and Well groomed  Behavior/relationship to examiner/demeanor:  Cooperative  Orientation: Oriented to person, place, time and situation  Psychomotor: normal form  Speech Rate:  Normal  Speech Spontaneity:  Normal  Mood:  \"good\"  Affect:  Appropriate/mood-congruent  Thought Process (Associations):  Goal directed and Circumstantial  Thought Content:  no overt psychosis, denies suicidal ideation, intent or thoughts and patient does not appear to be responding to internal stimuli  Abnormal Perception:  None  Attention/Concentration:  Normal  Insight:  Good  Judgment:  Good      Results     Vital signs: /70   Pulse 60   Wt 72.6 kg (160 lb)   BMI 21.70 kg/m      Laboratory Data:  reviewed data from other providers    Assessment & Plan      Orlin Alcantar is seen today for follow up and reports his mood has been good and managing " stress well. His pain has been stable but he is talking to his other primary care provider about change in pain medication to avoid being on Tylenol due to concern about side effects with long term use.  Essentially he is not wanting to change the dose of pain medication. Since Diazepam dose is low and not daily and mood has been very stable I think it is fine to continue Diazepam and current dose of Mirtazapine with any change in pain medication.    Diagnosis  Encounter Diagnoses   Name Primary?     Anxiety state Yes     Depressive disorder      Generalized anxiety disorder      Mood disorder in conditions classified elsewhere        Plan:  Medication: Continue current medication  OTC Recommendations: none  Lab Orders:  none  Referrals: none  Release of Information: none  Future Treatment Considerations: per symptoms  Return for Follow Up: 6 months. Has one refill left on Valium and will have pharmacy call for total of 6 new refills when this one is used up.   The risks, benefits, alternatives and side effects have been discussed and are understood by the patient. The patient understands the risks of using street drugs or alcohol. There are no medical contraindications, the patient agrees to treatment, and has the capacity to do so. The patient understands to call 911 or come to the nearest ED if life threatening or urgent symptoms present.  In addition time was spent counseling the patient and/or coordinating care regarding review of social and occupational functioning.  In addition patient was counseled on health and wellness practices to augment medication treatment of symptoms. See note for details.    Kaylin Rajan, JELLY CNS 3/26/2019

## 2019-03-26 NOTE — TELEPHONE ENCOUNTER
FW: Diazepam refill request   Received: Today   Message Contents   Emily Bridges RN Moccio, Emily, RN          Previous Messages      ----- Message -----   From: Han Jalloh RP   Sent: 3/26/2019   1:43 PM   To: [facility]   Subject: Diazepam refill request                           Mr. Alcantar is looking for a Diazepam 5 mg refill.       Provider: Kaylin Rajan   Quantity: 30 tablets   Directions: Take one tablet daily as needed for anxiety or sleep   Last fill: 2/22/19     If appropriate to renew, a hand-signed prescription is required.  The hand-signed prescription can then be faxed to [phone].     We would like to  the medication (along with other meds) to Mr. Alcantar's home tomorrow (3/27) if possible.     Our pharmacy is the [facility] at [address] [place] [address].  Our epicID is [id number].  Phone: [phone]     Thanks,     MARVIN JALLOH, R.PH.   [facility]   [phone]

## 2019-03-27 ENCOUNTER — DOCUMENTATION ONLY (OUTPATIENT)
Dept: HEMATOLOGY | Facility: CLINIC | Age: 69
End: 2019-03-27

## 2019-03-27 NOTE — PROGRESS NOTES
Orlin Alcantar called to order a refill of Kogenate FS . He has 0 dose(s) remaining at home and would like it delivered to his house via  to arrive on 3/27/2019.      The following doses were dispensed from our pharmacy:     Product: Kogenate FS     Doses: 4 doses of 2956 units     MICHAEL Keys, [facility]  Hemophilia Pharmacy Coordinator  [phone]  [email or web address]

## 2019-04-04 ENCOUNTER — DOCUMENTATION ONLY (OUTPATIENT)
Dept: HEMATOLOGY | Facility: CLINIC | Age: 69
End: 2019-04-04

## 2019-04-05 NOTE — PROGRESS NOTES
Orlin Alcantar called to order a refill of Kogenate FS . He has 0 dose(s) remaining at home and would like it delivered to his house via  to arrive on 4/4/2019.      The following doses were dispensed from our pharmacy:     Product: Kogenate FS     Doses: 4 doses of 2956 units     MICHAEL Keys, [facility]  Hemophilia Pharmacy Coordinator  [phone]  [email or web address]

## 2019-04-12 ENCOUNTER — DOCUMENTATION ONLY (OUTPATIENT)
Dept: HEMATOLOGY | Facility: CLINIC | Age: 69
End: 2019-04-12

## 2019-04-18 ENCOUNTER — TELEPHONE (OUTPATIENT)
Dept: HEMATOLOGY | Facility: CLINIC | Age: 69
End: 2019-04-18

## 2019-04-18 NOTE — TELEPHONE ENCOUNTER
[facility]  Social Work Note    Orlin Alcantar is a 69 year old male with severe hemophilia A who receives premium assistance each quarter from [facility] for $657/month.  Writer had call with [employer] this week to discuss timeline of program closing, and Orlin will have access to this assistance through September of this year.     Writer placed call to Orlin to inform him of this (). Phone rang continually without voicemail or pick-up.  Writer will try again later.    MARIN Ferrari, LISSET, ACM  Clinical   [facility]  [facility]  Phone: [phone]    Writer was able to speak with Orlin this afternoon.  Plans made to connect again late summer to discuss alternate resources, and initiate applications as needed.  He did not have other questions or concerns. Analilia/LISSET

## 2019-04-19 ENCOUNTER — DOCUMENTATION ONLY (OUTPATIENT)
Dept: HEMATOLOGY | Facility: CLINIC | Age: 69
End: 2019-04-19

## 2019-04-19 DIAGNOSIS — I10 HYPERTENSION GOAL BP (BLOOD PRESSURE) < 140/90: ICD-10-CM

## 2019-04-19 NOTE — PROGRESS NOTES
Orlin Alcantar called to order a refill of Kogenate FS . He has 0 dose(s) remaining at home and would like it delivered to his house via  to arrive on 4/19/2019.      The following doses were dispensed from our pharmacy:     Product: Kogenate FS     Doses: 4 doses of 2956 units     MICHAEL Keys, [facility]  Hemophilia Pharmacy Coordinator  [phone]  [email or web address]

## 2019-04-19 NOTE — PROGRESS NOTES
Orlin Alcantar called to order a refill of Kogenate FS . He has 0 dose(s) remaining at home and would like it delivered to his house via  to arrive on 4/12/2019.      The following doses were dispensed from our pharmacy:     Product: Kogenate FS     Doses: 4 doses of 2956 units     MICHAEL Keys, [facility]  Hemophilia Pharmacy Coordinator  [phone]  [email or web address]

## 2019-04-19 NOTE — TELEPHONE ENCOUNTER
"Requested Prescriptions   Pending Prescriptions Disp Refills     lisinopril (PRINIVIL/ZESTRIL) 40 MG tablet  Last Written Prescription Date:  8/6/2018  Last Fill Quantity: 30 tabs,  # refills: 0   Last office visit: 11/8/2017 with prescribing provider:  Richard   Future Office Visit:     30 tablet 0     Sig: Take 1 tablet (40 mg) by mouth every morning (HOLD UNTIL FOLLOW-UP with Primary Care Provider)       ACE Inhibitors (Including Combos) Protocol Failed - 4/19/2019  2:33 PM        Failed - Recent (12 mo) or future (30 days) visit within the authorizing provider's specialty     Patient had office visit in the last 12 months or has a visit in the next 30 days with authorizing provider or within the authorizing provider's specialty.  See \"Patient Info\" tab in inbasket, or \"Choose Columns\" in Meds & Orders section of the refill encounter.              Passed - Blood pressure under 140/90 in past 12 months     BP Readings from Last 3 Encounters:   03/26/19 122/83   03/18/19 110/72   12/08/18 125/88                 Passed - Medication is active on med list        Passed - Patient is age 18 or older        Passed - Normal serum creatinine on file in past 12 months     Recent Labs   Lab Test 03/18/19  1111 03/07/19  0955  03/19/18   CR 1.13  --    < >  --    CREAT  --  1.1   < >  --    CRPOC  --   --   --  0.9    < > = values in this interval not displayed.             Passed - Normal serum potassium on file in past 12 months     Recent Labs   Lab Test 03/18/19  1111   POTASSIUM 5.2               "

## 2019-04-22 RX ORDER — LISINOPRIL 40 MG/1
40 TABLET ORAL EVERY MORNING
Qty: 30 TABLET | Refills: 0 | Status: SHIPPED | OUTPATIENT
Start: 2019-04-22 | End: 2019-05-06

## 2019-04-22 NOTE — TELEPHONE ENCOUNTER
Med last approved by Roberta Dumont PA - Directions included:  Sig - Route: Take 1 tablet (40 mg) by mouth every morning (HOLD UNTIL FOLLOW-UP with Primary Care Provider) - Oral    Routing refill request to provider for review/approval because:  Medication is reported/historical  Patient needs to be seen because it has been more than 1 year since last office visit.    Routing to reception for scheduling as well.  Erika Duran RN

## 2019-04-26 ENCOUNTER — DOCUMENTATION ONLY (OUTPATIENT)
Dept: HEMATOLOGY | Facility: CLINIC | Age: 69
End: 2019-04-26

## 2019-04-26 NOTE — PROGRESS NOTES
Orlin Alcantar called to order a refill of Kogenate FS . He has 1 dose(s) remaining at home and would like it delivered to his house via  to arrive on 4/26/2019.     The following doses were dispensed from our pharmacy:    Product: Kogenate FS    Doses: 4 doses of 2956 units    MICHAEL Keys, [facility]  Hemophilia Pharmacy Coordinator  [phone]  [email or web address]

## 2019-05-03 ENCOUNTER — DOCUMENTATION ONLY (OUTPATIENT)
Dept: HEMATOLOGY | Facility: CLINIC | Age: 69
End: 2019-05-03

## 2019-05-03 DIAGNOSIS — D66 SEVERE HEMOPHILIA A (H): ICD-10-CM

## 2019-05-03 DIAGNOSIS — G89.4 CHRONIC PAIN SYNDROME: ICD-10-CM

## 2019-05-03 DIAGNOSIS — Z85.51 PERSONAL HISTORY OF MALIGNANT NEOPLASM OF BLADDER: ICD-10-CM

## 2019-05-03 DIAGNOSIS — M36.2 HEMOPHILIC ARTHROPATHY (H): ICD-10-CM

## 2019-05-03 DIAGNOSIS — D66 HEMOPHILIC ARTHROPATHY (H): ICD-10-CM

## 2019-05-03 RX ORDER — ANTIHEMOPHILIC FACTOR (RECOMBINANT) 1000 (+/-)
KIT INTRAVENOUS
Qty: 12900 EACH | Refills: 4 | Status: SHIPPED | OUTPATIENT
Start: 2019-05-03 | End: 2019-06-13

## 2019-05-03 RX ORDER — OXYCODONE HYDROCHLORIDE 5 MG/1
5 TABLET ORAL EVERY 6 HOURS PRN
Qty: 90 TABLET | Refills: 0 | Status: SHIPPED | OUTPATIENT
Start: 2019-05-03 | End: 2019-06-03

## 2019-05-03 NOTE — PROGRESS NOTES
Orlin Alcantar called to order a refill of Kogenate FS . He has 2 dose(s) remaining at home and would like it delivered to his house via  to arrive on 5/3/2019.     The following doses were dispensed from our pharmacy:    Product: Kogenate FS    Doses: 4 doses of 3225 units    MICHAEL Keys, [facility]  Hemophilia Pharmacy Coordinator  [phone]  [email or web address]

## 2019-05-05 NOTE — PROGRESS NOTES
SUBJECTIVE:  Orlin Alcantar, a 69 year old male, is here to discuss the following issues:     HYPERTENSION FOLLOW-UP    Current medication regimen includes amlodipine 5 mg and lisinopril 40 mg QD.  However he states he has quit taking the amlodipine and takes the lisinopril only intermittently.  Patient reports no problems or side effects with the medication.  Patient does not check blood pressure outside of clinic but has clinic-based blood pressure monitoring frequently due to his other co-morbidities.          Problem list and histories reviewed & updated, as indicated.  Patient Active Problem List   Diagnosis     History of total hip arthroplasty     Hemophilia A (H)     Pain in joint, pelvic region and thigh     Anxiety state     Hypertension goal BP (blood pressure) < 140/90     Mood disorder in conditions classified elsewhere     Hematuria     Bladder cancer (H)     Examination of participant in clinical trial     Urothelial carcinoma of bladder (H)     Malignant neoplasm of overlapping sites of bladder (H)     Hemarthrosis     Pain     S/P ileal conduit (H)       BP Readings from Last 3 Encounters:   05/06/19 (!) 89/58   03/26/19 122/83   03/26/19 111/70    Wt Readings from Last 3 Encounters:   05/06/19 72.1 kg (159 lb)   03/26/19 72.4 kg (159 lb 11.2 oz)   03/26/19 72.6 kg (160 lb)         BP Readings from Last 6 Encounters:   05/06/19 (!) 89/58   03/26/19 122/83   03/26/19 111/70   03/18/19 110/72   12/08/18 125/88   11/19/18 106/73          ROS:  RESP: NEGATIVE for shortness of breath  CV: NEGATIVE for chest pain    OBJECTIVE:    BP (!) 89/58 (BP Location: Right arm, Patient Position: Sitting, Cuff Size: Adult Regular)   Pulse 85   Temp 97.2  F (36.2  C) (Tympanic)   Resp 14   Wt 72.1 kg (159 lb)   SpO2 98%   BMI 21.56 kg/m    GEN:  no apparent distress  LUNGS:  normal respiratory effort, and lungs clear to auscultation bilaterally - no rales, rhonchi or wheezes  CV: regular rate and rhythm, normal  S1 S2, no S3 or S4 and no murmur, click or rub     ASSESSMENT/PLAN:    ICD-10-CM    1. Hypertension goal BP (blood pressure) < 140/90 I10      His blood pressure is actually quite low today and other recent office-based readings have been normal.  He'll go ahead and discontinue the lisinopril and notify me if future blood pressure readings rise > 140/90.      Patient Instructions   Stop the lisinopril and let me know if your blood pressure rises > 140/90 at your follow-up clinic visits.        Katie Ramos MD   [facility]

## 2019-05-06 ENCOUNTER — OFFICE VISIT (OUTPATIENT)
Dept: FAMILY MEDICINE | Facility: CLINIC | Age: 69
End: 2019-05-06
Payer: MEDICARE

## 2019-05-06 VITALS
OXYGEN SATURATION: 98 % | DIASTOLIC BLOOD PRESSURE: 58 MMHG | SYSTOLIC BLOOD PRESSURE: 89 MMHG | RESPIRATION RATE: 14 BRPM | BODY MASS INDEX: 21.56 KG/M2 | WEIGHT: 159 LBS | TEMPERATURE: 97.2 F | HEART RATE: 85 BPM

## 2019-05-06 DIAGNOSIS — I10 HYPERTENSION GOAL BP (BLOOD PRESSURE) < 140/90: Primary | ICD-10-CM

## 2019-05-06 PROCEDURE — 99213 OFFICE O/P EST LOW 20 MIN: CPT | Performed by: FAMILY MEDICINE

## 2019-05-06 NOTE — PATIENT INSTRUCTIONS
Stop the lisinopril and let me know if your blood pressure rises > 140/90 at your follow-up clinic visits.

## 2019-05-14 ENCOUNTER — DOCUMENTATION ONLY (OUTPATIENT)
Dept: HEMATOLOGY | Facility: CLINIC | Age: 69
End: 2019-05-14

## 2019-05-14 NOTE — PROGRESS NOTES
Orlin Alcantar called to order a refill of Kogenate FS . He has 0 dose(s) remaining at home and would like it delivered to his house via  to arrive on 5/14/2019.     The following doses were dispensed from our pharmacy:    Product: Kogenate FS    Doses: 4 doses of 3225 units    MICHAEL Keys, [facility]  Hemophilia Pharmacy Coordinator  [phone]  [email or web address]

## 2019-05-21 ENCOUNTER — DOCUMENTATION ONLY (OUTPATIENT)
Dept: HEMATOLOGY | Facility: CLINIC | Age: 69
End: 2019-05-21

## 2019-05-21 NOTE — PROGRESS NOTES
Orlin Aclantar called to order a refill of Kogenate FS . He has 0 dose(s) remaining at home and would like it delivered to his house via  to arrive on 5/21/2019.     The following doses were dispensed from our pharmacy:    Product: Kogenate FS    Doses: 4 doses of 3225 units    MICHAEL Keys, [facility]  Hemophilia Pharmacy Coordinator  [phone]  [email or web address]

## 2019-05-28 ENCOUNTER — DOCUMENTATION ONLY (OUTPATIENT)
Dept: HEMATOLOGY | Facility: CLINIC | Age: 69
End: 2019-05-28

## 2019-05-29 NOTE — PROGRESS NOTES
Orlin Alcantar called to order a refill of Kogenate FS . He has 0 dose(s) remaining at home and would like it delivered to his house via  to arrive on 5/28/2019    The following doses were dispensed from our pharmacy:    Product: Kogenate FS    Doses: 4 doses of 3225 units    MICHAEL Keys, [facility]  Hemophilia Pharmacy Coordinator  [phone]  [email or web address]

## 2019-06-03 ENCOUNTER — DOCUMENTATION ONLY (OUTPATIENT)
Dept: HEMATOLOGY | Facility: CLINIC | Age: 69
End: 2019-06-03

## 2019-06-03 DIAGNOSIS — M36.2 HEMOPHILIC ARTHROPATHY (H): ICD-10-CM

## 2019-06-03 DIAGNOSIS — G89.4 CHRONIC PAIN SYNDROME: ICD-10-CM

## 2019-06-03 DIAGNOSIS — D66 HEMOPHILIC ARTHROPATHY (H): ICD-10-CM

## 2019-06-03 DIAGNOSIS — D66 SEVERE HEMOPHILIA A (H): ICD-10-CM

## 2019-06-03 RX ORDER — OXYCODONE HYDROCHLORIDE 5 MG/1
5 TABLET ORAL EVERY 6 HOURS PRN
Qty: 90 TABLET | Refills: 0 | Status: SHIPPED | OUTPATIENT
Start: 2019-06-03 | End: 2019-06-24

## 2019-06-13 ENCOUNTER — DOCUMENTATION ONLY (OUTPATIENT)
Dept: HEMATOLOGY | Facility: CLINIC | Age: 69
End: 2019-06-13

## 2019-06-13 DIAGNOSIS — Z85.51 PERSONAL HISTORY OF MALIGNANT NEOPLASM OF BLADDER: ICD-10-CM

## 2019-06-13 DIAGNOSIS — D66 SEVERE HEMOPHILIA A (H): ICD-10-CM

## 2019-06-13 RX ORDER — ANTIHEMOPHILIC FACTOR (RECOMBINANT) 1000 (+/-)
KIT INTRAVENOUS
Qty: 12900 EACH | Refills: 3 | Status: SHIPPED | OUTPATIENT
Start: 2019-06-13 | End: 2019-06-24

## 2019-06-13 NOTE — PROGRESS NOTES
Orlin Alcantar called to order a refill of Kogenate FS . He has 0 dose(s) remaining at home and would like it delivered to his house via  to arrive on 6/13/2019.     The following doses were dispensed from our pharmacy:    Product: Kogenate FS    Doses: 4 doses of 3225 units    MICHAEL Keys, [facility]  Hemophilia Pharmacy Coordinator  [phone]  [email or web address]

## 2019-06-14 ENCOUNTER — ANCILLARY PROCEDURE (OUTPATIENT)
Dept: CT IMAGING | Facility: CLINIC | Age: 69
End: 2019-06-14
Attending: INTERNAL MEDICINE
Payer: MEDICARE

## 2019-06-14 DIAGNOSIS — C67.8 MALIGNANT NEOPLASM OF OVERLAPPING SITES OF BLADDER (H): ICD-10-CM

## 2019-06-14 LAB
CREAT BLD-MCNC: 1.2 MG/DL (ref 0.66–1.25)
GFR SERPL CREATININE-BSD FRML MDRD: 60 ML/MIN/{1.73_M2}
RADIOLOGIST FLAGS: NORMAL

## 2019-06-14 RX ORDER — IOPAMIDOL 755 MG/ML
97 INJECTION, SOLUTION INTRAVASCULAR ONCE
Status: COMPLETED | OUTPATIENT
Start: 2019-06-14 | End: 2019-06-14

## 2019-06-14 RX ADMIN — IOPAMIDOL 97 ML: 755 INJECTION, SOLUTION INTRAVASCULAR at 10:41

## 2019-06-14 NOTE — DISCHARGE INSTRUCTIONS

## 2019-06-18 ENCOUNTER — DOCUMENTATION ONLY (OUTPATIENT)
Dept: HEMATOLOGY | Facility: CLINIC | Age: 69
End: 2019-06-18

## 2019-06-18 ENCOUNTER — TELEPHONE (OUTPATIENT)
Dept: ONCOLOGY | Facility: CLINIC | Age: 69
End: 2019-06-18

## 2019-06-18 NOTE — TELEPHONE ENCOUNTER
TC to pt per Monica. Pt denies s/s of infection but did state that he had a hemorrhage in his right shoulder on Friday d/t his hemophilia and noted that his urine was dark yellow starting on Saturday morning. He said this type of thing can happen when he has issues with his hemophilia, but that they resolve once he infuses factor for approximately a week, which he has been doing since Saturday. He stated he will call if his urine doesn't return to normal. Advised pt to continue to push fluids as he already has been and to call the clinic with any questions or concerns. Pt stated understanding of all.

## 2019-06-18 NOTE — TELEPHONE ENCOUNTER
----- Message from Brianna Harris PA-C sent at 6/18/2019 12:48 PM CDT -----  Regarding: RE: CT results  Can someone call him and assess him for f/s/c, urine changes, possible infection?      micaela  ----- Message -----  From: Mehreen Alejandra RN  Sent: 6/18/2019   9:22 AM  To: Brianna Harris PA-C, Dara Neal RN  Subject: CT results                                       [facility] called with result: Questionable inflammatory changes left kidney     Pt no-showed to Micaela's apt on 6/17.     Thank You,    Teresita Gonzales RN  [phone]

## 2019-06-19 ENCOUNTER — TELEPHONE (OUTPATIENT)
Dept: HEMATOLOGY | Facility: CLINIC | Age: 69
End: 2019-06-19

## 2019-06-19 NOTE — PROGRESS NOTES
Orlin Alcantar called to order a refill of Kogenate FS . He has 0 dose(s) remaining at home and would like it delivered to his house via  to arrive on 6/18/2019.     The following doses were dispensed from our pharmacy:    Product: Kogenate FS    Doses: 4 doses of 3225 units    MICHAEL Keys, [facility]  Hemophilia Pharmacy Coordinator  [phone]  [email or web address]

## 2019-06-20 NOTE — TELEPHONE ENCOUNTER
"Mr. Cavanaugh is a 69 year old man with severe Hemophilia A and left shoulder pain. He calls the Center today to report that he increased his infusions to daily (3000 units of Kogenate) last weekend but his left shoulder is still bothering him due to decreased ROM and pain. \"I am having difficulty even getting out of the chair because of the shoulder\". He thought he should be admitted but could not get to the ER for evaluation due to transportation problems. He did not want to consider calling an ambulance out of fear of cost. He requested that I investigate with our  what that would cost. Analilia PURI obtained the basic information regarding this and when I called him back he said that the shoulder had improved and he did not want to go to the ER or come to clinic. He requested that we \"touch base by phone Friday regarding the shoulder\".   "

## 2019-06-21 ENCOUNTER — TELEPHONE (OUTPATIENT)
Dept: HEMATOLOGY | Facility: CLINIC | Age: 69
End: 2019-06-21

## 2019-06-21 ENCOUNTER — DOCUMENTATION ONLY (OUTPATIENT)
Dept: HEMATOLOGY | Facility: CLINIC | Age: 69
End: 2019-06-21

## 2019-06-21 NOTE — TELEPHONE ENCOUNTER
I spoke with Mr. Cavanaugh, a 69 year old man with severe Hemophilia A regarding left shoulder pain and decreased ROM. He reports that the shoulder continues to improve and is no longer infusing daily factor. The plan is to resume previous bleed prevention. He agreed that early next week Ama Chadwick PT and I would call him to check on the shoulders recovery and decide if further evaluation of the shoulder is needed.

## 2019-06-24 ENCOUNTER — TELEPHONE (OUTPATIENT)
Dept: HEMATOLOGY | Facility: CLINIC | Age: 69
End: 2019-06-24

## 2019-06-24 ENCOUNTER — DOCUMENTATION ONLY (OUTPATIENT)
Dept: HEMATOLOGY | Facility: CLINIC | Age: 69
End: 2019-06-24

## 2019-06-24 DIAGNOSIS — D66 SEVERE HEMOPHILIA A (H): ICD-10-CM

## 2019-06-24 DIAGNOSIS — M36.2 HEMOPHILIC ARTHROPATHY (H): ICD-10-CM

## 2019-06-24 DIAGNOSIS — D66 HEMOPHILIC ARTHROPATHY (H): ICD-10-CM

## 2019-06-24 DIAGNOSIS — Z85.51 PERSONAL HISTORY OF MALIGNANT NEOPLASM OF BLADDER: ICD-10-CM

## 2019-06-24 DIAGNOSIS — G89.4 CHRONIC PAIN SYNDROME: ICD-10-CM

## 2019-06-24 RX ORDER — OXYCODONE HYDROCHLORIDE 5 MG/1
5 TABLET ORAL EVERY 6 HOURS PRN
Qty: 24 TABLET | Refills: 0 | Status: SHIPPED | OUTPATIENT
Start: 2019-06-24 | End: 2019-07-16

## 2019-06-24 RX ORDER — ANTIHEMOPHILIC FACTOR (RECOMBINANT) 1000 (+/-)
KIT INTRAVENOUS
Qty: 12900 EACH | Refills: 3 | Status: SHIPPED | OUTPATIENT
Start: 2019-06-24 | End: 2019-07-12

## 2019-06-24 NOTE — TELEPHONE ENCOUNTER
[facility]  [facility] [address], [place] [address]  Main: [phone], Fax: [phone]  6/24/19    Orlin is a 69 year old male with a history of severe hemophilia A with his baseline factor VIII level at <1% with no history of factor VIII inhibitor.  He calls today to report ongoing issues with left shoulder.  He states he has had pain since 6/15 which he is attributing to a bleed. He report no injury. He has treated almost daily for this past week with 3000 (40-45 units/kg) Kogenate. The shoulder does get better but then worsens.  He notes he has had a hard time resting the joint.  It hurt so much last week he considered coming to the Emergency Room by ambulance but then things improved.  He has been using Oxycodone 4 tabs daily until he ran out a few days ago.      He denies other bleeding issues and does feel he is responding to factor.      Orlin was seen last in comprehensive hemophilia clinic 9/11/18        PLAN:  1. Will send Orlin refill of Kogenate today (4 doses of 3000 units).  He will plan to treat tomorrow morning.  Had already treated today.  2. Oxycodone Rx given today #21 tabs along with arm sling.    3. Have strongly suggested he come to clinic tomorrow to be evaluated.  He will call in the morning to let us know how he is doing and if he can come to clinic.       6/25/19   Spoke to Orlin, he reports the shoulder is stable to slightly improved.  He treated again this morning with 3000 units Kogenate and felt some improvement.  He reports that the shoulder pain does not radiate to chest but is on the left side of neck.  He denies SOB, dizziness or chest pain.  No other changes to his overall health status.  He feels this is a shoulder bleed just taking a very long time to heal.     We reviewed that ideally he would be evaluated in clinic.  He does not feel he can drive himself to clinic and does not feel he needs an ambulance.  I will review with  SW the transportation option and get back to him but ideally would see him 6/27/19.         Yanira Branch MPH, PA-C  [facility]  [facility]  [phone] main line  [phone] pager  [phone] fax

## 2019-06-24 NOTE — PROGRESS NOTES
Orlin Alcantar called to order a refill of Kogenate FS . He has 0 dose(s) remaining at home and would like it delivered to his house via  to arrive on 6/24/2019.     The following doses were dispensed from our pharmacy:    Product: Kogenate FS    Doses: 4 doses of 3225 units    MICHAEL Keys, [facility]  Hemophilia Pharmacy Coordinator  [phone]  [email or web address]

## 2019-06-25 ENCOUNTER — TELEPHONE (OUTPATIENT)
Dept: HEMATOLOGY | Facility: CLINIC | Age: 69
End: 2019-06-25

## 2019-06-25 NOTE — TELEPHONE ENCOUNTER
[facility]  Social Work Note    Orlin Alcantar is a 69 year old male with severe hemophilia A who may need to be evaluated in clinic later this week and had questions related to transportation options.  Additionally, he faxed writer information to be uploaded into his PSI portal for premium assistance.    Writer uploaded pt's premium statement into his PSI portal and confirmed it was loaded.  Writer spoke with patient this date.  Updated him that premium statement was loaded.  Discussed transportation needs and he reported he would need a wheel chair medical van (non-emergent) in a van that provides a wheelchair to him.  Discussed general benefits for this under Medicare and relayed that typically, only medically necessary stretcher transport is a covered option.  He expressed understanding.  Offered to attempt to locate volunteer  or other low-cost option.  He stated he plans, for now, to rest until Thursday and readdress with providers as to weather or not he needs to be seen, and he will use a general cab company at that time (denied concerns related to cost).    He did not have other questions or concerns.  Writer will seek clarification on emergency transportation benefit for patient and update him once more is known.    Analilia Jack, MARIN, LICSW, ACM  Clinical   [facility]  [facility]  Phone: [phone]

## 2019-07-01 ENCOUNTER — DOCUMENTATION ONLY (OUTPATIENT)
Dept: HEMATOLOGY | Facility: CLINIC | Age: 69
End: 2019-07-01

## 2019-07-01 DIAGNOSIS — D66 SEVERE HEMOPHILIA A (H): Primary | ICD-10-CM

## 2019-07-01 DIAGNOSIS — G89.4 CHRONIC PAIN SYNDROME: ICD-10-CM

## 2019-07-01 RX ORDER — OXYCODONE HYDROCHLORIDE 5 MG/1
5 TABLET ORAL EVERY 6 HOURS PRN
Qty: 60 TABLET | Refills: 0 | Status: SHIPPED | OUTPATIENT
Start: 2019-07-01 | End: 2019-07-16

## 2019-07-01 NOTE — PROGRESS NOTES
Orlin Alcantar called to order a refill of Kogenate FS . He has 1 dose(s) remaining at home and would like it delivered to his house via  to arrive on 7/1/2019.     The following doses were dispensed from our pharmacy:    Product: Kogenate FS    Doses: 4 doses of 3225 units    MICHAEL Keys, [facility]  Hemophilia Pharmacy Coordinator  [phone]  [email or web address]

## 2019-07-05 ENCOUNTER — DOCUMENTATION ONLY (OUTPATIENT)
Dept: HEMATOLOGY | Facility: CLINIC | Age: 69
End: 2019-07-05

## 2019-07-05 NOTE — PROGRESS NOTES
Orlin Alcantar called to order a refill of Kogenate FS . He has 1 dose(s) remaining at home and would like it delivered to his house via  to arrive on 7/5/2019.     The following doses were dispensed from our pharmacy:    Product: Kogenate FS    Doses: 4 doses of 3225 units    MICHAEL Keys, [facility]  Hemophilia Pharmacy Coordinator  [phone]  [email or web address]

## 2019-07-09 ENCOUNTER — DOCUMENTATION ONLY (OUTPATIENT)
Dept: HEMATOLOGY | Facility: CLINIC | Age: 69
End: 2019-07-09

## 2019-07-09 NOTE — PROGRESS NOTES
Orlin Alcantar called to order a refill of Kogenate FS . He has 0 dose(s) remaining at home and would like it delivered to his house via  to arrive on 7/9/2019.     The following doses were dispensed from our pharmacy:    Product: Kogenate FS    Doses: 4 doses of 3225 units    MICHAEL Keys, [facility]  Hemophilia Pharmacy Coordinator  [phone]  [email or web address]

## 2019-07-10 ENCOUNTER — TELEPHONE (OUTPATIENT)
Dept: HEMATOLOGY | Facility: CLINIC | Age: 69
End: 2019-07-10

## 2019-07-12 ENCOUNTER — DOCUMENTATION ONLY (OUTPATIENT)
Dept: HEMATOLOGY | Facility: CLINIC | Age: 69
End: 2019-07-12

## 2019-07-12 DIAGNOSIS — Z85.51 PERSONAL HISTORY OF MALIGNANT NEOPLASM OF BLADDER: ICD-10-CM

## 2019-07-12 DIAGNOSIS — D66 SEVERE HEMOPHILIA A (H): ICD-10-CM

## 2019-07-12 RX ORDER — ANTIHEMOPHILIC FACTOR (RECOMBINANT) 1000 (+/-)
KIT INTRAVENOUS
Qty: 6450 EACH | Refills: 0 | Status: SHIPPED | OUTPATIENT
Start: 2019-07-12 | End: 2019-07-16

## 2019-07-12 NOTE — PROGRESS NOTES
Orlin Alcantar called to order a refill of Kogenate . He has 1 dose remaining at home and would like it delivered to his house via  to arrive on 07/12/19 before noon. He has been treating extra for a shoulder bleed..     The following doses were dispensed from our pharmacy:    Product: Kogenate    Doses: 2 doses of 3225 iu    Analilia Mccarty [facility]  Hemophilia Pharmacy   [phone]  [email or web address]

## 2019-07-12 NOTE — TELEPHONE ENCOUNTER
I spoke with Mr. Cavanaugh regarding the status of his left shoulder (see notes from 6/19,6/21,6/24/2019 for more details). He continues to have intermittent episodes of pain and decreased ROM in the shoulder which may be bleeds. This has required him to infuse more frequently. I explained that further evaluation of the shoulder in clinic would be important given the persistance of this issue. He agreed and will contact the Center next Monday to set an appointment if the left shoulder continues to have periodic bleeding episodes.

## 2019-07-16 ENCOUNTER — TELEPHONE (OUTPATIENT)
Dept: HEMATOLOGY | Facility: CLINIC | Age: 69
End: 2019-07-16

## 2019-07-16 ENCOUNTER — DOCUMENTATION ONLY (OUTPATIENT)
Dept: HEMATOLOGY | Facility: CLINIC | Age: 69
End: 2019-07-16

## 2019-07-16 ENCOUNTER — TRANSFERRED RECORDS (OUTPATIENT)
Dept: HEALTH INFORMATION MANAGEMENT | Facility: CLINIC | Age: 69
End: 2019-07-16

## 2019-07-16 DIAGNOSIS — D66 SEVERE HEMOPHILIA A (H): ICD-10-CM

## 2019-07-16 DIAGNOSIS — G89.4 CHRONIC PAIN SYNDROME: ICD-10-CM

## 2019-07-16 DIAGNOSIS — Z85.51 PERSONAL HISTORY OF MALIGNANT NEOPLASM OF BLADDER: ICD-10-CM

## 2019-07-16 RX ORDER — ANTIHEMOPHILIC FACTOR (RECOMBINANT) 1000 (+/-)
KIT INTRAVENOUS
Qty: 12900 EACH | Refills: 1 | Status: SHIPPED | OUTPATIENT
Start: 2019-07-16 | End: 2019-07-29

## 2019-07-16 RX ORDER — OXYCODONE HYDROCHLORIDE 5 MG/1
5 TABLET ORAL EVERY 6 HOURS PRN
Qty: 90 TABLET | Refills: 0 | Status: SHIPPED | OUTPATIENT
Start: 2019-07-16 | End: 2019-08-12

## 2019-07-16 NOTE — TELEPHONE ENCOUNTER
[facility]  [facility], [place] [address]  Main: [phone], Fax: [phone]      7/16/19  Orlin is a 68 yo male with severe hemophilia A without history of inhibitor.  This past month he reports the longest bleed of his life (Left shoulder).  He states it is now improving.  We had encouraged him to be evaluated for this but he was unable to transport himself to clinic.      He is scheduled for follow-up in oncology clinic on 7/23 at 8:20am and [facility].  We will plan to obtain a left shoulder x-ray before clinic (at [facility]) and then come to be seen here - appt. will be set for 9:30am.  Discussed option of having a factor VIII level drawn that day as well.  He did have PK done recently but we could add to this data and reassure ourselves that inhibitor development was not part of his ongoing bleed.  He is planning to treat with factor in the morning before coming to clinic.    PLAN:  [facility]  Left shoulder x-ray before visit at [facility] (after oncology visit)  Possible factor VIII level  See PT if possible    Today Orlin will be sent a refill of factor and Rx for oxycodone.          Yanira Branch MPH, PA-C  [facility]  [facility]  [phone] main line  [phone] pager  [phone] fax

## 2019-07-17 NOTE — PROGRESS NOTES
Orlin Alcantar called to order a refill of Kogenate FS . He has 0 dose(s) remaining at home and would like it delivered to his house via  to arrive on 7/16/2019.     The following doses were dispensed from our pharmacy:    Product: Kogenate FS    Doses: 4 doses of 3225 units    MICHAEL Keys, [facility]  Hemophilia Pharmacy Coordinator  [phone]  [email or web address]

## 2019-07-23 ENCOUNTER — OFFICE VISIT (OUTPATIENT)
Dept: PHYSICAL THERAPY | Facility: CLINIC | Age: 69
End: 2019-07-23

## 2019-07-23 ENCOUNTER — ONCOLOGY VISIT (OUTPATIENT)
Dept: ONCOLOGY | Facility: CLINIC | Age: 69
End: 2019-07-23
Attending: PHYSICIAN ASSISTANT
Payer: MEDICARE

## 2019-07-23 ENCOUNTER — OFFICE VISIT (OUTPATIENT)
Dept: HEMATOLOGY | Facility: CLINIC | Age: 69
End: 2019-07-23
Attending: PHYSICIAN ASSISTANT
Payer: MEDICARE

## 2019-07-23 ENCOUNTER — DOCUMENTATION ONLY (OUTPATIENT)
Dept: HEMATOLOGY | Facility: CLINIC | Age: 69
End: 2019-07-23

## 2019-07-23 VITALS
RESPIRATION RATE: 14 BRPM | TEMPERATURE: 97.9 F | OXYGEN SATURATION: 97 % | HEART RATE: 91 BPM | SYSTOLIC BLOOD PRESSURE: 128 MMHG | WEIGHT: 161 LBS | DIASTOLIC BLOOD PRESSURE: 83 MMHG | BODY MASS INDEX: 21.81 KG/M2 | HEIGHT: 72 IN

## 2019-07-23 VITALS
HEIGHT: 70 IN | SYSTOLIC BLOOD PRESSURE: 121 MMHG | TEMPERATURE: 97.9 F | WEIGHT: 161 LBS | DIASTOLIC BLOOD PRESSURE: 77 MMHG | BODY MASS INDEX: 23.05 KG/M2 | HEART RATE: 98 BPM | RESPIRATION RATE: 14 BRPM | OXYGEN SATURATION: 97 %

## 2019-07-23 DIAGNOSIS — D66 HEMOPHILIC ARTHROPATHY (H): ICD-10-CM

## 2019-07-23 DIAGNOSIS — D66 SEVERE HEMOPHILIA A (H): ICD-10-CM

## 2019-07-23 DIAGNOSIS — C67.8 MALIGNANT NEOPLASM OF OVERLAPPING SITES OF BLADDER (H): Primary | ICD-10-CM

## 2019-07-23 DIAGNOSIS — D66 SEVERE HEMOPHILIA A (H): Primary | ICD-10-CM

## 2019-07-23 DIAGNOSIS — M36.2 HEMOPHILIC ARTHROPATHY (H): ICD-10-CM

## 2019-07-23 LAB
FACT VIII ACT/NOR PPP: 8 % (ref 55–200)
FACT VIII INHIB PPP-ACNC: NORMAL BU/ML

## 2019-07-23 PROCEDURE — 40000269 ZZH STATISTIC NO CHARGE FACILITY FEE

## 2019-07-23 PROCEDURE — 99207 ZZC CDG-HISTORY COMP: MEETS EXP. PROBLEM FOCUSED-DOWN CODED LACK OF ROS: CPT | Performed by: PHYSICIAN ASSISTANT

## 2019-07-23 PROCEDURE — 00000401 ZZHCL STATISTIC THROMBIN TIME NC: Performed by: PHYSICIAN ASSISTANT

## 2019-07-23 PROCEDURE — 00000167 ZZHCL STATISTIC INR NC: Performed by: PHYSICIAN ASSISTANT

## 2019-07-23 PROCEDURE — 00000328 ZZHCL STATISTIC PTT NC: Performed by: PHYSICIAN ASSISTANT

## 2019-07-23 PROCEDURE — G0463 HOSPITAL OUTPT CLINIC VISIT: HCPCS

## 2019-07-23 PROCEDURE — G0463 HOSPITAL OUTPT CLINIC VISIT: HCPCS | Mod: 27

## 2019-07-23 PROCEDURE — 99213 OFFICE O/P EST LOW 20 MIN: CPT | Mod: ZP | Performed by: PHYSICIAN ASSISTANT

## 2019-07-23 PROCEDURE — 85240 CLOT FACTOR VIII AHG 1 STAGE: CPT | Performed by: PHYSICIAN ASSISTANT

## 2019-07-23 PROCEDURE — 99212 OFFICE O/P EST SF 10 MIN: CPT | Performed by: PHYSICIAN ASSISTANT

## 2019-07-23 ASSESSMENT — PAIN SCALES - GENERAL
PAINLEVEL: MILD PAIN (2)
PAINLEVEL: MILD PAIN (2)

## 2019-07-23 ASSESSMENT — MIFFLIN-ST. JEOR
SCORE: 1533.42
SCORE: 1501.54

## 2019-07-23 NOTE — NURSING NOTE
Orlin Alcantar is here for a follow up visit and is  being seen for  left shoulder bleed that persisted for several weeks, now resolving, to be seen by provider and PT.     I welcomed him to our center; he has our contact information.    We briefly reviewed the history of this bleed/treatment and shared this with the provider and physical therapist.  I then reviewed which provider he was going to be seeing today and the expected timing of that provider.    Medications and allergies were reviewed, updated and reconciled at a visit at the [facility] this morning, so not addressed here.     I  thanked him for coming and encouraged him to call with any concerns or questions.    Margot Reed, RN - Nurse Clinician - [facility] - [phone]

## 2019-07-23 NOTE — NURSING NOTE
"Oncology Rooming Note    July 23, 2019 8:51 AM   Orlin Alcantar is a 69 year old male who presents for:    Chief Complaint   Patient presents with     Oncology Clinic Visit     Return Visit for Bladder Cancer     Initial Vitals: /83 (BP Location: Right arm, Patient Position: Standing, Cuff Size: Adult Regular)   Pulse 91   Temp 97.9  F (36.6  C) (Oral)   Resp 14   Ht 1.829 m (6' 0.01\")   Wt 73 kg (161 lb)   SpO2 97%   BMI 21.83 kg/m   Estimated body mass index is 21.83 kg/m  as calculated from the following:    Height as of this encounter: 1.829 m (6' 0.01\").    Weight as of this encounter: 73 kg (161 lb). Body surface area is 1.93 meters squared.  Mild Pain (2) Comment: Data Unavailable   No LMP for male patient.  Allergies reviewed: Yes  Medications reviewed: Yes    Medications: Medication refills not needed today.  Pharmacy name entered into [employer]:    [facility] - [place], [place] - [address] [facility] [place], [place] - [address] [place] [address] PHARMACY [place], [place] - [facility] [address]    Clinical concerns: Patient wants to know if he will need CT scans every 3 months or every 6 months.  He would also like to know what his last CT scan showed.  He has had a bleed into his left shoulder for approx 1 month & has some pain in that location, but he is having an x-ray today & does not see this as a major concern today.   Monica was notified.      Anna Yoon, RN, MSN              "

## 2019-07-23 NOTE — PROGRESS NOTES
OUTPATIENT PHYSICAL THERAPY HEMOPHILIA CLINIC NOTE    Reason for visit: Left shoulder s/p bleed  Date of Service: 7/23/2019  Diagnosis: Factor VIII -  Severe    History: Orlin presents to clinic today for assessment of left shoulder following over 1 month h/o left shoulder bleed reported via phone by patient.  He was not seen during this time due to inability to physically get himself to the clinic.  He reports that he awoke on 6/15/19 with severe pain from a bleed in his left shoulder.  He isolates the area of pain to his left shoulder blade and up into his neck on the left side along upper trap muscle.  Orlin has multiple joint impairments, the most significant in his right elbow and left ankle which requires him to use bilat UEs to get out of a chair.  During the past 5 weeks he attempted to minimize use of his left arm but had to use it for sit to stand and getting out of bed.  He did sleep in a chair for about 10 days in an attempt to decrease use of arm.  He reports today that the shoulder just started to return to having some ROM and decreased pain 3 days ago.      Assessment: Left shoulder  ROM (Right comparison): All completed actively, Flex and abduction=80 (160), horiz add=0 (30) , ER=70 (70), IR=thumb to T10 (T8).  He is cautious with elevation and IR motions.   Pain: Denied in clinic today, reports he had some discomfort along top of shoulder with initial motion this morning but it has loosened up and resolved.   Swelling: None    Treatment diagnosis: Subacute left shoulder soft tissue/joint bleeding  Treatment: Completed all ROM in clinic today with instructions to continue 3-4 times/day without increasing his pain.  Instructed in [facility] as well.    Plan of Care: Plan is for Orlin to continue with gradual return to activity and ROM as instructed with goal to have full return of all ROM and function within 4 weeks.  Will plan phone call follow up to assess.  If pain increases, ROM decreases or stalls  out recommend he return for outpt PT visits.    Goals: Patient verbalizes understanding of evaluation results, home management and home exercise recommendations provided today to maximize functional mobility and allow for continued safe participation in current home and work activities. -Goal Met    Recommendations: 1 month follow up call  Educational assessment/Barriers to learning: No barriers noted  Treatment provided this date (including minutes): Therapeutic Procedure: 5   Patient/Family Education:Adjunctive treatment/RICE, Splints/orthoses/equipment and Home exercise program: Written and verbal instruction in the following: see above   Risks and benefits of evaluation/treatment have been explained.  Patient, family and/or caregiver are in agreement with Plan of Care.     Evaluation time: 10 combined with provider  Treatment time: 5  Total contact time: 15, no charge  Signature: Maria Teresa Chadwick [facility]  Physical Therapist  [facility]  [phone]

## 2019-07-23 NOTE — PROGRESS NOTES
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   3/26/18: Consented for the Nivolumab-Gemcitabine-Cisplatin study [id number]    3/28/18: Screening visit for study.   4/4/18; c1d1 gemcitabine  4/11/18: c1d8 gemcitabine-nivolumab  4/25/18: C2D1 Gemcitabine-Cisplatin  5/2/18: C2D8 Gemcitabine-Nivolumab  5/16/18: C3D1 Gemcitabine-Cisplatin  5/23/18: C3D8 Gemcitabine-Nivolumab  6/6/18: C4d1 Gemcitabine-Cisplatin  6/13/18: C4D8 Gemcitabine-Nivolumab    7/30/18: Radical cystoprostatectomy with bilateral pelvic LN dissection and ileal conduit. Final pathology pT0N0       HPI:  68 year old male with PMH of hemophilia, chronic hepatis C who was seen by DR. Burns for evaluation of neoadjuvant therapy with Gemcitabine Cisplatin and Nivolumab.    Patient has a history of hemophilia and has undergone several surgeries in right hip replacement, knee replacement. Hemophilia managed by Dr. Marley.   He reports recurrent hematuria for a year, initially attributed to kidney stones, cystoscopy in Feb 2018 revealed MIBC. PET-CT scan - which has revealed muscle invasive bladder right lateral wall of bladder and right  obturator lymphnode, 1.1 cm short axis.  He has been treated for HCV in the 1990s and has not had any complications. His LFTs have remained normal. His hemophilia is very well controlled on current regimen of factor replacement. He does not have any autoimmune disease and is not on immunesuppressants or steroids. His diabetes is diet controlled and he has not required any medications. Orlin was enrolled on our neoadjuvant Nivolumab-Taylor-Cis trial and he completed 4 cycles of treatment and then underwent  Radical cystoprostatectomy and Bilateral pelvic lymph node dissection and ileal conduit on  7/30/18.    INTERVAL HISTORY; Orlin is doing well today. He had a recent left shoulder bleed from his hemophilia and has been managing this- its 90% better. No neuropathy, tinnitus or residual chemo effects.  He feels perfectly well with no concerns.  Adjusted to his ilial conduit well, no changes in color or odor of the urine.        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      Bladder tumor      Depression      DM II (diabetes mellitus, type II), controlled (H)     diet controlled     Hemophilia (H)     Type A     History of hepatitis C     treated successfully     HTN (hypertension)           CURRENT OUTPATIENT MEDICATIONS     Current Outpatient Medications   Medication Sig     acetaminophen (TYLENOL) 325 MG tablet Take 2 tablets (650 mg) by mouth every 4 hours as needed for mild pain or fever     Antihemophilic Factor, Recomb, (KOGENATE FS) 1000 units KIT Infuse 3225 units into the vein every Monday, Wednesday, Friday and Sunday and as needed for breakthrough bleeding.     diazepam (VALIUM) 5 MG tablet Take 1 tablet (5 mg) by mouth daily as needed for anxiety or sleep     mirtazapine (REMERON) 45 MG tablet Take 1 tablet (45 mg) by mouth At Bedtime And may take 1/2 tablet for insomnia up to 3 times a week.     order for DME Cohesive Lurdes rings [phone]     oxyCODONE (ROXICODONE) 5 MG tablet Take 1 tablet (5 mg) by mouth every 6 hours as needed for pain     order for DME Equipment being ordered: Walker Wheels () and Walker ()  Treatment Diagnosis: gait instability (Patient not taking: Reported on 7/23/2019)     No current facility-administered medications for this visit.      Facility-Administered Medications Ordered in Other Visits   Medication     ethyl chloride spray     ECOG performance status of 1.   /83 (BP Location: Right arm, Patient Position: Standing, Cuff Size: Adult Regular)   Pulse 91   Temp 97.9  F (36.6  C) (Oral)    "Resp 14   Ht 1.829 m (6' 0.01\")   Wt 73 kg (161 lb)   SpO2 97%   BMI 21.83 kg/m      Wt Readings from Last 4 Encounters:   07/23/19 73 kg (161 lb)   05/06/19 72.1 kg (159 lb)   03/26/19 72.4 kg (159 lb 11.2 oz)   03/18/19 73.7 kg (162 lb 8 oz)       HEENT: No icterus, no pallor. Moist mucous membranes. Oropharynx is clear.   NECK: Supple  LUNGS: Bilateral clear to ausculation  CARDIOVASCULAR: Regular rate and rhythm, no murmurs, gallops or rubs.   ABDOMEN: Soft, nontender and nondistended.   EXTREMITIES: No cyanosis, no clubbing, no edema.   NEUROLOGIC: No focal deficits.    CT CAP Impression:   In this patient with a history of bladder cancer status post  cystoprostatectomy, pelvic lymph node dissection, and right lower  quadrant ileal conduit formation:  1. No convincing evidence of residual or metastatic disease in the  chest, abdomen, or pelvis.  2. Previously seen inflammatory cyst in the left kidney is again  decreased in size, however there is new stranding about the interpolar  region and lower pole. No renal hypoperfusion to suggest mitchell  pyelonephritis.  3. Cholelithiasis without cholecystitis.   4. Stable sub-4 mm pulmonary nodules. Recommend continued attention on  follow-up.  5. Centrilobular emphysema.    ASSESSMENT/PLAN:     ONC: 60 year old male with T2N1(solitary node)  urothelial carcinoma,currently enrolled on the clinical trial of Nivolumab with Gemcitabine and Cisplatin for MIBC, completed C4D8 on 6/13/18. He has had a complete response at radical cystectomy final pathology showing PT0 N0 of note he was T2N1 prior to neoadjuvant therapy.  He is OZZIE on scans and is on SOC follow up for surveillance.  We discussed labs, urine cytology -q6m and CT CAP every 3 months to complete 2 years, then decrease to 6 months. We discussed the recent data with prognosis and that he had an excellent response to neoadjuvant therapy.  His risk of recurrence is low.      Hemophilia management: Per Dr." Ayaka Harris PA-C

## 2019-07-23 NOTE — PROGRESS NOTES
Orlin Alcantar called to order a refill of Kogenate FS . He has 0 dose(s) remaining at home and would like to pick it up on 7/23/2019.     The following doses were dispensed from our pharmacy:    Product: Kogenate FS    Doses: 4 doses of 3225 units    MICHAEL Keys, [facility]  Hemophilia Pharmacy Coordinator  [phone]  [email or web address]

## 2019-07-23 NOTE — NURSING NOTE
Ortiz Orlin  MRN: [id number]    Patient requested that venipuncture of blood draw be on same poke as his self infusion.  Flushed with saline after blood draw and attached Kogenate 3000 unit syringe.  Patient infused himself without problem into right antecubital vein.  Louann Solis, RN, MSN -Nurse Clinician, [facility] [phone]

## 2019-07-29 ENCOUNTER — DOCUMENTATION ONLY (OUTPATIENT)
Dept: HEMATOLOGY | Facility: CLINIC | Age: 69
End: 2019-07-29

## 2019-07-29 DIAGNOSIS — Z85.51 PERSONAL HISTORY OF MALIGNANT NEOPLASM OF BLADDER: ICD-10-CM

## 2019-07-29 DIAGNOSIS — D66 SEVERE HEMOPHILIA A (H): ICD-10-CM

## 2019-07-29 RX ORDER — ANTIHEMOPHILIC FACTOR (RECOMBINANT) 1000 (+/-)
KIT INTRAVENOUS
Qty: 13456 EACH | Refills: 1 | Status: SHIPPED | OUTPATIENT
Start: 2019-07-29 | End: 2019-08-12

## 2019-07-29 NOTE — PROGRESS NOTES
Orlin Alcantar called to order a refill of Kogenate FS . He has 0 dose(s) remaining at home and would like it delivered to his house via  to arrive on 7/29/2019.     The following doses were dispensed from our pharmacy:    Product: Kogenate FS    Doses: 4 doses of 3364 units    MICHAEL Keys, [facility]  Hemophilia Pharmacy Coordinator  [phone]  [email or web address]

## 2019-08-05 ENCOUNTER — DOCUMENTATION ONLY (OUTPATIENT)
Dept: HEMATOLOGY | Facility: CLINIC | Age: 69
End: 2019-08-05

## 2019-08-05 NOTE — PROGRESS NOTES
Orlin Alcantar called to order a refill of Kogenate FS . He has 0 dose(s) remaining at home and would like it delivered to his house via  to arrive on 8/5/2019.     The following doses were dispensed from our pharmacy:    Product: Kogenate FS    Doses: 4 doses of 3364 units    MICHAEL Keys, [facility]  Hemophilia Pharmacy Coordinator  [phone]  [email or web address]

## 2019-08-07 ENCOUNTER — TELEPHONE (OUTPATIENT)
Dept: HEMATOLOGY | Facility: CLINIC | Age: 69
End: 2019-08-07

## 2019-08-07 NOTE — TELEPHONE ENCOUNTER
[facility]  Social Work Note    Orlin Alcantar is a 69 year old male with severe hemophilia A.  He has been receiving premium assistance from [facility], but is needing to locate alternate assistance, as PSI fund will no longer be available later this year.    Spoke with Orlin today about this, and application completed electronically today to [facility] Hemophilia Assistance program.  He should get a call from the fund in two business days to discuss eligibility and if approved, details.  Writer will mail Pal copy of the confirmation that his application was submitted.    Orlin additionally inquired about the contact at [facility]. He stated he is thorough and has no plans to change pharmacies, but that he wants to get information from them.  Writer provided him with this, along with education on [facility] pharmacy and pharmacy assistance fund.     He did not have other questions or concerns.  He expressed appreciation and understanding and will update writer once more is known.    MARIN Ferrari, LICSW, ACM  Clinical   [facility]  [facility]  Phone: [phone]

## 2019-08-09 ENCOUNTER — TELEPHONE (OUTPATIENT)
Dept: HEMATOLOGY | Facility: CLINIC | Age: 69
End: 2019-08-09

## 2019-08-09 NOTE — TELEPHONE ENCOUNTER
[facility]  Brief Social Work Note    The clinic received a fax from [employer] stating that Orlin has been approved for premium assistance and that [employer] would be contacting Orlin to discuss details.    Writer placed call to Orlin who had not yet gotten a call from [employer] but was pleased to hear this update.  He will update writer once CHRISTOPHER has contacted him and plans are finalized.    MARIN Ferrari, LICSW, ACM  Clinical   [facility]  [facility]  Phone: [phone]

## 2019-08-12 ENCOUNTER — DOCUMENTATION ONLY (OUTPATIENT)
Dept: HEMATOLOGY | Facility: CLINIC | Age: 69
End: 2019-08-12

## 2019-08-12 DIAGNOSIS — G89.4 CHRONIC PAIN SYNDROME: ICD-10-CM

## 2019-08-12 DIAGNOSIS — D66 SEVERE HEMOPHILIA A (H): ICD-10-CM

## 2019-08-12 DIAGNOSIS — Z85.51 PERSONAL HISTORY OF MALIGNANT NEOPLASM OF BLADDER: ICD-10-CM

## 2019-08-12 RX ORDER — ANTIHEMOPHILIC FACTOR (RECOMBINANT) 1000 (+/-)
KIT INTRAVENOUS
Qty: 13456 EACH | Refills: 1 | Status: SHIPPED | OUTPATIENT
Start: 2019-08-12 | End: 2019-08-28

## 2019-08-12 RX ORDER — OXYCODONE HYDROCHLORIDE 5 MG/1
5 TABLET ORAL EVERY 8 HOURS PRN
Qty: 90 TABLET | Refills: 0 | Status: SHIPPED | OUTPATIENT
Start: 2019-08-12 | End: 2019-09-10

## 2019-08-12 NOTE — PROGRESS NOTES
Orlin Alcantar called to order a refill of Kogenate FS . He has 0 dose(s) remaining at home and would like it delivered to his house via  to arrive on 8/12/2019.     The following doses were dispensed from our pharmacy:    Product: Kogenate FS    Doses: 4 doses of 3364 units    MICHAEL Keys, [facility]  Hemophilia Pharmacy Coordinator  [phone]  [email or web address]

## 2019-08-20 ENCOUNTER — DOCUMENTATION ONLY (OUTPATIENT)
Dept: HEMATOLOGY | Facility: CLINIC | Age: 69
End: 2019-08-20

## 2019-08-20 NOTE — PROGRESS NOTES
Orlin Alcantar called to order a refill of Kogenate FS . He has 0 dose(s) remaining at home and would like it delivered to his house via  to arrive on 8/20/2019.     The following doses were dispensed from our pharmacy:    Product: Kogenate FS    Doses: 4 doses of 3364 units    MICHAEL Keys, [facility]  Hemophilia Pharmacy Coordinator  [phone]  [email or web address]

## 2019-08-28 ENCOUNTER — DOCUMENTATION ONLY (OUTPATIENT)
Dept: HEMATOLOGY | Facility: CLINIC | Age: 69
End: 2019-08-28

## 2019-08-28 DIAGNOSIS — Z85.51 PERSONAL HISTORY OF MALIGNANT NEOPLASM OF BLADDER: ICD-10-CM

## 2019-08-28 DIAGNOSIS — D66 SEVERE HEMOPHILIA A (H): ICD-10-CM

## 2019-08-28 RX ORDER — ANTIHEMOPHILIC FACTOR (RECOMBINANT) 1000 (+/-)
KIT INTRAVENOUS
Qty: 12740 EACH | Refills: 3 | Status: SHIPPED | OUTPATIENT
Start: 2019-08-28 | End: 2019-09-25

## 2019-08-28 RX ORDER — ANTIHEMOPHILIC FACTOR (RECOMBINANT) 1000 (+/-)
KIT INTRAVENOUS
Qty: 12740 EACH | Refills: 3 | Status: SHIPPED | OUTPATIENT
Start: 2019-08-28 | End: 2019-08-28

## 2019-08-28 NOTE — PROGRESS NOTES
Orlin Alcantar called to order a refill of Kogenate FS . He has 0 dose(s) remaining at home and would like it delivered to his house via  to arrive on 8/28/2019.     The following doses were dispensed from our pharmacy:    Product: Kogenate FS    Doses: 4 doses of 3185 units    MICHAEL Keys, [facility]  Hemophilia Pharmacy Coordinator  [phone]  [email or web address]

## 2019-09-03 ENCOUNTER — DOCUMENTATION ONLY (OUTPATIENT)
Dept: HEMATOLOGY | Facility: CLINIC | Age: 69
End: 2019-09-03

## 2019-09-04 NOTE — PROGRESS NOTES
Orlin Alcantar called to order a refill of Kogenate FS . He has 1 dose(s) remaining at home and would like it delivered to his house via  to arrive on 9/3/2019.     The following doses were dispensed from our pharmacy:    Product: Kogenate FS    Doses: 4 doses of 3185 units    MICHAEL Keys, [facility]  Hemophilia Pharmacy Coordinator  [phone]  [email or web address]

## 2019-09-10 ENCOUNTER — DOCUMENTATION ONLY (OUTPATIENT)
Dept: HEMATOLOGY | Facility: CLINIC | Age: 69
End: 2019-09-10

## 2019-09-10 DIAGNOSIS — G89.4 CHRONIC PAIN SYNDROME: ICD-10-CM

## 2019-09-10 DIAGNOSIS — D66 SEVERE HEMOPHILIA A (H): ICD-10-CM

## 2019-09-10 RX ORDER — OXYCODONE HYDROCHLORIDE 5 MG/1
5 TABLET ORAL EVERY 8 HOURS PRN
Qty: 90 TABLET | Refills: 0 | Status: SHIPPED | OUTPATIENT
Start: 2019-09-10 | End: 2019-10-07

## 2019-09-10 NOTE — PROGRESS NOTES
Orlin Alcantar called to order a refill of Kogenate FS . He has 1 dose(s) remaining at home and would like it delivered to his house via  to arrive on 9/10/2019.     The following doses were dispensed from our pharmacy:    Product: Kogenate FS    Doses: 4 doses of 3185 units    MICHAEL Keys, [facility]  Hemophilia Pharmacy Coordinator  [phone]  [email or web address]

## 2019-09-16 ENCOUNTER — ANCILLARY PROCEDURE (OUTPATIENT)
Dept: CT IMAGING | Facility: CLINIC | Age: 69
End: 2019-09-16
Attending: PHYSICIAN ASSISTANT
Payer: MEDICARE

## 2019-09-16 DIAGNOSIS — C67.8 MALIGNANT NEOPLASM OF OVERLAPPING SITES OF BLADDER (H): ICD-10-CM

## 2019-09-16 LAB
ALBUMIN SERPL-MCNC: 4.1 G/DL (ref 3.4–5)
ALP SERPL-CCNC: 121 U/L (ref 40–150)
ALT SERPL W P-5'-P-CCNC: 16 U/L (ref 0–70)
ANION GAP SERPL CALCULATED.3IONS-SCNC: 4 MMOL/L (ref 3–14)
AST SERPL W P-5'-P-CCNC: 12 U/L (ref 0–45)
BASOPHILS # BLD AUTO: 0.1 10E9/L (ref 0–0.2)
BASOPHILS NFR BLD AUTO: 0.9 %
BILIRUB SERPL-MCNC: 0.4 MG/DL (ref 0.2–1.3)
BUN SERPL-MCNC: 33 MG/DL (ref 7–30)
CALCIUM SERPL-MCNC: 9.2 MG/DL (ref 8.5–10.1)
CHLORIDE SERPL-SCNC: 108 MMOL/L (ref 94–109)
CO2 SERPL-SCNC: 25 MMOL/L (ref 20–32)
CREAT BLD-MCNC: 1.2 MG/DL (ref 0.66–1.25)
CREAT SERPL-MCNC: 1.08 MG/DL (ref 0.66–1.25)
DIFFERENTIAL METHOD BLD: ABNORMAL
EOSINOPHIL # BLD AUTO: 0.3 10E9/L (ref 0–0.7)
EOSINOPHIL NFR BLD AUTO: 5.7 %
ERYTHROCYTE [DISTWIDTH] IN BLOOD BY AUTOMATED COUNT: 15.2 % (ref 10–15)
GFR SERPL CREATININE-BSD FRML MDRD: 60 ML/MIN/{1.73_M2}
GFR SERPL CREATININE-BSD FRML MDRD: 69 ML/MIN/{1.73_M2}
GLUCOSE SERPL-MCNC: 93 MG/DL (ref 70–99)
HCT VFR BLD AUTO: 40.5 % (ref 40–53)
HGB BLD-MCNC: 12.6 G/DL (ref 13.3–17.7)
IMM GRANULOCYTES # BLD: 0 10E9/L (ref 0–0.4)
IMM GRANULOCYTES NFR BLD: 0.5 %
LYMPHOCYTES # BLD AUTO: 1.3 10E9/L (ref 0.8–5.3)
LYMPHOCYTES NFR BLD AUTO: 22 %
MCH RBC QN AUTO: 27.3 PG (ref 26.5–33)
MCHC RBC AUTO-ENTMCNC: 31.1 G/DL (ref 31.5–36.5)
MCV RBC AUTO: 88 FL (ref 78–100)
MONOCYTES # BLD AUTO: 0.5 10E9/L (ref 0–1.3)
MONOCYTES NFR BLD AUTO: 8.8 %
NEUTROPHILS # BLD AUTO: 3.6 10E9/L (ref 1.6–8.3)
NEUTROPHILS NFR BLD AUTO: 62.1 %
NRBC # BLD AUTO: 0 10*3/UL
NRBC BLD AUTO-RTO: 0 /100
PLATELET # BLD AUTO: 217 10E9/L (ref 150–450)
POTASSIUM SERPL-SCNC: 4.1 MMOL/L (ref 3.4–5.3)
PROT SERPL-MCNC: 8.1 G/DL (ref 6.8–8.8)
RBC # BLD AUTO: 4.62 10E12/L (ref 4.4–5.9)
SODIUM SERPL-SCNC: 137 MMOL/L (ref 133–144)
WBC # BLD AUTO: 5.8 10E9/L (ref 4–11)

## 2019-09-16 PROCEDURE — 00000103 ZZHCL STATISTIC CYTO-FISH QC 88120: Performed by: PHYSICIAN ASSISTANT

## 2019-09-16 PROCEDURE — 88112 CYTOPATH CELL ENHANCE TECH: CPT | Performed by: PHYSICIAN ASSISTANT

## 2019-09-16 RX ORDER — IOPAMIDOL 755 MG/ML
99 INJECTION, SOLUTION INTRAVASCULAR ONCE
Status: COMPLETED | OUTPATIENT
Start: 2019-09-16 | End: 2019-09-16

## 2019-09-16 RX ADMIN — IOPAMIDOL 99 ML: 755 INJECTION, SOLUTION INTRAVASCULAR at 10:08

## 2019-09-17 ENCOUNTER — DOCUMENTATION ONLY (OUTPATIENT)
Dept: HEMATOLOGY | Facility: CLINIC | Age: 69
End: 2019-09-17

## 2019-09-19 NOTE — PROGRESS NOTES
Orlin Alcantar called to order a refill of Kogenate FS. He has 1 dose(s) remaining at home and would like it delivered to his house via  to arrive on 9/17/2019.     The following doses were dispensed from our pharmacy:    Product: Kogenate FS    Doses: 4 doses of 3185 units    MICHAEL Keys, [facility]  Hemophilia Pharmacy Coordinator  [phone]  [email or web address]

## 2019-09-23 ENCOUNTER — ONCOLOGY VISIT (OUTPATIENT)
Dept: ONCOLOGY | Facility: CLINIC | Age: 69
End: 2019-09-23
Attending: PHYSICIAN ASSISTANT
Payer: MEDICARE

## 2019-09-23 VITALS
OXYGEN SATURATION: 96 % | RESPIRATION RATE: 16 BRPM | TEMPERATURE: 97.7 F | BODY MASS INDEX: 23.29 KG/M2 | WEIGHT: 162.3 LBS | DIASTOLIC BLOOD PRESSURE: 87 MMHG | HEART RATE: 72 BPM | SYSTOLIC BLOOD PRESSURE: 135 MMHG

## 2019-09-23 DIAGNOSIS — C67.8 MALIGNANT NEOPLASM OF OVERLAPPING SITES OF BLADDER (H): Primary | ICD-10-CM

## 2019-09-23 PROCEDURE — G0463 HOSPITAL OUTPT CLINIC VISIT: HCPCS | Mod: ZF

## 2019-09-23 PROCEDURE — 99213 OFFICE O/P EST LOW 20 MIN: CPT | Mod: ZP | Performed by: PHYSICIAN ASSISTANT

## 2019-09-23 ASSESSMENT — PAIN SCALES - GENERAL: PAINLEVEL: NO PAIN (0)

## 2019-09-23 NOTE — NURSING NOTE
"Oncology Rooming Note    September 23, 2019 9:23 AM   Orlin Alcantar is a 69 year old male who presents for:    Chief Complaint   Patient presents with     Oncology Clinic Visit     Return; Bladder Ca     Initial Vitals: /87 (BP Location: Right arm, Patient Position: Chair, Cuff Size: Adult Regular)   Pulse 72   Temp 97.7  F (36.5  C) (Oral)   Resp 16   Wt 73.6 kg (162 lb 4.8 oz)   SpO2 96%   BMI 23.29 kg/m   Estimated body mass index is 23.29 kg/m  as calculated from the following:    Height as of 7/23/19: 1.778 m (5' 10\").    Weight as of this encounter: 73.6 kg (162 lb 4.8 oz). Body surface area is 1.91 meters squared.  No Pain (0) Comment: Data Unavailable   No LMP for male patient.  Allergies reviewed: Yes  Medications reviewed: Yes    Medications: Medication refills not needed today.  Pharmacy name entered into GiveGab:    [facility] - [place], [place] - [address] [facility] [place] [place] [facility] [address] [facility] [place] [place] [facility] [address]    Clinical concerns: No new concerns.  Monica was NOT notified.      Lisandra Pires              "

## 2019-09-23 NOTE — NURSING NOTE
Patient's rooming completed by Lisandra Pires, Student MA.      All steps completed per rooming protocol.    Concepcion Benson CMA ([facility])

## 2019-09-23 NOTE — PROGRESS NOTES
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   3/26/18: Consented for the Nivolumab-Gemcitabine-Cisplatin study [id number]    3/28/18: Screening visit for study.   4/4/18; c1d1 gemcitabine  4/11/18: c1d8 gemcitabine-nivolumab  4/25/18: C2D1 Gemcitabine-Cisplatin  5/2/18: C2D8 Gemcitabine-Nivolumab  5/16/18: C3D1 Gemcitabine-Cisplatin  5/23/18: C3D8 Gemcitabine-Nivolumab  6/6/18: C4d1 Gemcitabine-Cisplatin  6/13/18: C4D8 Gemcitabine-Nivolumab    7/30/18: Radical cystoprostatectomy with bilateral pelvic LN dissection and ileal conduit. Final pathology pT0N0       HPI:  68 year old male with PMH of hemophilia, chronic hepatis C who was seen by DR. Burns for evaluation of neoadjuvant therapy with Gemcitabine Cisplatin and Nivolumab.    Patient has a history of hemophilia and has undergone several surgeries in right hip replacement, knee replacement. Hemophilia managed by Dr. Marley.   He reports recurrent hematuria for a year, initially attributed to kidney stones, cystoscopy in Feb 2018 revealed MIBC. PET-CT scan - which has revealed muscle invasive bladder right lateral wall of bladder and right  obturator lymphnode, 1.1 cm short axis.  He has been treated for HCV in the 1990s and has not had any complications. His LFTs have remained normal. His hemophilia is very well controlled on current regimen of factor replacement. He does not have any autoimmune disease and is not on immunesuppressants or steroids. His diabetes is diet controlled and he has not required any medications. Orlin was enrolled on our neoadjuvant Nivolumab-Stephen-Cis trial and he completed 4 cycles of treatment and then underwent  Radical cystoprostatectomy and Bilateral pelvic lymph node dissection and ileal conduit on  7/30/18.    INTERVAL HISTORY; Orlin is doing well today. Since I saw him last- no major joint bleeding.  No neuropathy, tinnitus or residual chemo effects.  He feels perfectly well with no concerns.  Adjusted to his ilial conduit well, no changes in color or odor of the urine. 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      Bladder tumor      Depression      DM II (diabetes mellitus, type II), controlled (H)     diet controlled     Hemophilia (H)     Type A     History of hepatitis C     treated successfully     HTN (hypertension)           CURRENT OUTPATIENT MEDICATIONS     Current Outpatient Medications   Medication Sig     acetaminophen (TYLENOL) 325 MG tablet Take 2 tablets (650 mg) by mouth every 4 hours as needed for mild pain or fever     Antihemophilic Factor, Recomb, (KOGENATE FS) 1000 units KIT Infuse 3185 units into the vein every Monday, Wednesday, Friday and Sunday and as needed for breakthrough bleeding.     diazepam (VALIUM) 5 MG tablet Take 1 tablet (5 mg) by mouth daily as needed for anxiety or sleep     mirtazapine (REMERON) 45 MG tablet Take 1 tablet (45 mg) by mouth At Bedtime And may take 1/2 tablet for insomnia up to 3 times a week.     oxyCODONE (ROXICODONE) 5 MG tablet Take 1 tablet (5 mg) by mouth every 8 hours as needed for pain     order for DME Cohesive Lurdes rings [id number] (Patient not taking: Reported on 9/23/2019)     order for DME Equipment being ordered: Walker Wheels () and Walker ()  Treatment Diagnosis: gait instability (Patient not taking: Reported on 7/23/2019)     No current facility-administered medications for this visit.      Facility-Administered Medications Ordered in Other Visits   Medication     ethyl chloride spray     ECOG performance status of 1.   /87 (BP Location: Right arm, Patient Position: Chair, Cuff Size: Adult  Regular)   Pulse 72   Temp 97.7  F (36.5  C) (Oral)   Resp 16   Wt 73.6 kg (162 lb 4.8 oz)   SpO2 96%   BMI 23.29 kg/m      Wt Readings from Last 4 Encounters:   09/23/19 73.6 kg (162 lb 4.8 oz)   07/23/19 73 kg (161 lb)   07/23/19 73 kg (161 lb)   05/06/19 72.1 kg (159 lb)       HEENT: No icterus, no pallor. Moist mucous membranes. Oropharynx is clear.   NECK: Supple  LUNGS: Bilateral clear to ausculation  CARDIOVASCULAR: Regular rate and rhythm, no murmurs, gallops or rubs.   ABDOMEN: Soft, nontender and nondistended.   EXTREMITIES: No cyanosis, no clubbing, no edema.   NEUROLOGIC: No focal deficits.       8/29/2018 08:17 11/16/2018 08:47 3/18/2019 11:11 9/16/2019 09:40   WBC 4.4 6.4 6.0 5.8   Hemoglobin 11.2 (L) 10.3 (L) 12.8 (L) 12.6 (L)   Hematocrit 35.8 (L) 33.2 (L) 41.1 40.5   Platelet Count 243 178 230 217   RBC Count 4.00 (L) 4.08 (L) 4.67 4.62   MCV 90 81 88 88      3/18/2019 11:11 9/16/2019 09:40   Sodium 140 137   Potassium 5.2 4.1   Chloride 109 108   Carbon Dioxide 25 25   Urea Nitrogen 43 (H) 33 (H)   Creatinine 1.13 1.08   GFR Estimate 66 69   GFR Estimate If Black 76 80   Calcium 9.1 9.2   Anion Gap 6 4   Albumin 3.7 4.1   Protein Total 7.7 8.1   Bilirubin Total 0.4 0.4   Alkaline Phosphatase 91 121   ALT 17 16   AST 17 12     CT CAP Impression: In this patient with history of malignant neoplasm of the  bladder:  1.  Postsurgical changes of cystoprostatectomy without evidence of  local or metastatic disease.  2.  Prominent retroperitoneal lymph nodes, stable to decreased in size  since comparison 6/14/2019, likely reactive, continued attention on  follow-up.   3.  Cholelithiasis, fluid distended gallbladder with common bile duct  prominence, no choledocholithiasis.   4.  Stable subcentimeter pulmonary nodules.    ASSESSMENT/PLAN:     ONC: 69 year old male with T2N1(solitary node)  urothelial carcinoma,currently enrolled on the clinical trial of Nivolumab with Gemcitabine and Cisplatin for  MIBC, completed C4D8 on 6/13/18. He has had a complete response at radical cystectomy final pathology showing PT0 N0 of note he was T2N1 prior to neoadjuvant therapy.  He is OZZIE on scans and is on SOC follow up for surveillance.  We discussed labs, urine cytology -q6m and CT CAP every 3 months to complete 2 years, then decrease to 6 months. We discussed the recent data with prognosis and that he had an excellent response to neoadjuvant therapy.  His risk of recurrence is low.  He has not followed up with Dr Morris, thus I will schedule this sometime this winter.  He will be back in 3 months for labs, CT and to see myself.     Hemophilia management: Per Dr. Ayaka Harris PA-C

## 2019-09-24 ENCOUNTER — OFFICE VISIT (OUTPATIENT)
Dept: PSYCHIATRY | Facility: CLINIC | Age: 69
End: 2019-09-24
Attending: CLINICAL NURSE SPECIALIST
Payer: MEDICARE

## 2019-09-24 VITALS
SYSTOLIC BLOOD PRESSURE: 127 MMHG | BODY MASS INDEX: 23.24 KG/M2 | WEIGHT: 162 LBS | DIASTOLIC BLOOD PRESSURE: 84 MMHG | HEART RATE: 73 BPM

## 2019-09-24 DIAGNOSIS — F32.A DEPRESSIVE DISORDER: ICD-10-CM

## 2019-09-24 DIAGNOSIS — F41.1 GENERALIZED ANXIETY DISORDER: ICD-10-CM

## 2019-09-24 DIAGNOSIS — F06.30 MOOD DISORDER IN CONDITIONS CLASSIFIED ELSEWHERE: ICD-10-CM

## 2019-09-24 DIAGNOSIS — F32.A DEPRESSION, UNSPECIFIED DEPRESSION TYPE: ICD-10-CM

## 2019-09-24 DIAGNOSIS — F41.1 ANXIETY STATE: Primary | ICD-10-CM

## 2019-09-24 LAB — COPATH REPORT: NORMAL

## 2019-09-24 PROCEDURE — G0463 HOSPITAL OUTPT CLINIC VISIT: HCPCS | Mod: ZF

## 2019-09-24 RX ORDER — DIAZEPAM 5 MG
5 TABLET ORAL DAILY PRN
Qty: 30 TABLET | Refills: 5 | Status: SHIPPED | OUTPATIENT
Start: 2019-09-24 | End: 2020-04-14

## 2019-09-24 RX ORDER — MIRTAZAPINE 45 MG/1
45 TABLET, FILM COATED ORAL AT BEDTIME
Qty: 37 TABLET | Refills: 5 | Status: SHIPPED | OUTPATIENT
Start: 2019-09-24 | End: 2020-04-14

## 2019-09-24 ASSESSMENT — PATIENT HEALTH QUESTIONNAIRE - PHQ9: SUM OF ALL RESPONSES TO PHQ QUESTIONS 1-9: 1

## 2019-09-24 ASSESSMENT — PAIN SCALES - GENERAL: PAINLEVEL: NO PAIN (0)

## 2019-09-24 NOTE — PROGRESS NOTES
Outpatient Psychiatry Progress Note     Provider: JELLY Chapa CNS  Date: 2019  Service:  Medication follow up with counseling.   Patient Identification: Orlin Alcantar  : 1950   MRN: [id number]    Orlin Alcantar is a 69 year old year old male who presents for ongoing psychiatric care.  Orlin Alcantar was last seen in clinic on 3/26/19.   At that time,   Assessment & Plan       Orlin Alcantar is seen today for follow up and reports his mood has been good and managing stress well. His pain has been stable but he is talking to his other primary care provider about change in pain medication to avoid being on Tylenol due to concern about side effects with long term use.  Essentially he is not wanting to change the dose of pain medication. Since Diazepam dose is low and not daily and mood has been very stable I think it is fine to continue Diazepam and current dose of Mirtazapine with any change in pain medication.     Diagnosis       Encounter Diagnoses   Name Primary?     Anxiety state Yes     Depressive disorder       Generalized anxiety disorder       Mood disorder in conditions classified elsewhere           Plan:  Medication: Continue current medication  OTC Recommendations: none  Lab Orders:  none  Referrals: none  Release of Information: none  Future Treatment Considerations: per symptoms  Return for Follow Up: 6 months. Has one refill left on Valium and will have pharmacy call for total of 6 new refills when this one is used up.      ____________________________________________________________________________________________________________________________________________    2019  Today Orlin reports he is feeling well. Was a little anxious waiting for results of CT to check for reoccurance of bladder cancer. He found out yesterday that the results are negative and he is now 2 years without cancer.  Other health has been ok. Had a shoulder bleed a few months ago that took awhile to  go away but is ok now.  Still has the same car and not really worried about it. Things at home are fine.   Side effects of medication include: none reported  Psychiatric Review of Systems:  Depression: In the last 2 weeks per PHQ-9 score:   PHQ-9 SCORE 9/24/2019   PHQ-9 Total Score -   PHQ-9 Total Score 1       Anxiety : stable  Usha na   Psychosis  na.   ADHD na    Review of Medical Systems:  Sleep: stable  Energy: stable  Concentration: stable  Appetite: stable  GI Concerns: none  Cardiac concerns: none  Neurological concerns: no new concerns  Other medical concerns: no new concerns  Current Substance Use:  Alcohol:denies  Other drugs:denies  Caffeine:has cut back and now only about once a week cup fo coffee  Nicotine: none  Past Medical History:   Past Medical History:   Diagnosis Date     Anxiety      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      Bladder tumor      Depression      DM II (diabetes mellitus, type II), controlled (H)     diet controlled     Hemophilia (H)     Type A     History of hepatitis C     treated successfully     HTN (hypertension)      Patient Active Problem List   Diagnosis     History of total hip arthroplasty     Hemophilia A (H)     Pain in joint, pelvic region and thigh     Anxiety state     Hypertension goal BP (blood pressure) < 140/90     Mood disorder in conditions classified elsewhere     Hematuria     Bladder cancer (H)     Examination of participant in clinical trial     Urothelial carcinoma of bladder (H)     Malignant neoplasm of overlapping sites of bladder (H)     Hemarthrosis     Pain     S/P ileal conduit (H)       Allergies:   Allergies   Allergen Reactions     Aspirin      This drug inhibits platelets and is contraindicated due to hemophilia diagnosis.             Current Medications     Current Outpatient Medications Ordered in Epic   Medication Sig Dispense Refill     acetaminophen (TYLENOL) 325 MG tablet Take 2 tablets (650 mg) by mouth every 4 hours as needed for mild pain or  "fever 100 tablet 1     Antihemophilic Factor, Recomb, (KOGENATE FS) 1000 units KIT Infuse 3185 units into the vein every Monday, Wednesday, Friday and Sunday and as needed for breakthrough bleeding. [address] each 3     diazepam (VALIUM) 5 MG tablet Take 1 tablet (5 mg) by mouth daily as needed for anxiety or sleep 30 tablet 5     mirtazapine (REMERON) 45 MG tablet Take 1 tablet (45 mg) by mouth At Bedtime And may take 1/2 tablet for insomnia up to 3 times a week. 37 tablet 5     order for DME Cohesive Lurdes rings [id number] each 11     order for DME Equipment being ordered: Walker Wheels () and Walker ()  Treatment Diagnosis: gait instability 1 each 0     oxyCODONE (ROXICODONE) 5 MG tablet Take 1 tablet (5 mg) by mouth every 8 hours as needed for pain 90 tablet 0     Current Facility-Administered Medications Ordered in Epic   Medication Dose Route Frequency Provider Last Rate Last Dose     ethyl chloride spray   Topical Once Magalie Espinal MD            Mental Status Exam     Appearance:  Casually dressed and Adequately groomed  Behavior/relationship to examiner/demeanor:  Cooperative  Orientation: Oriented to person, place, time and situation  Psychomotor: slowed  Speech Rate:  Normal  Speech Spontaneity:  Normal  Mood:  \"good\"  Affect:  Appropriate/mood-congruent  Thought Process (Associations):  Goal directed  Thought Content:  no overt psychosis, denies suicidal ideation, intent or thoughts and patient does not appear to be responding to internal stimuli  Abnormal Perception:  None  Attention/Concentration:  Normal  Insight:  Good  Judgment:  Good      Results     Vital signs: /84   Pulse 73   Wt 73.5 kg (162 lb)   BMI 23.24 kg/m      Laboratory Data:  reviewed from other providers. No concerns with  Psych medication or symptoms    Assessment & Plan      Orlin Alcantar is seen today for follow up and reports his mood has been good. He would like to continue current " medications.    Diagnosis  Encounter Diagnoses   Name Primary?     Anxiety state Yes     Generalized anxiety disorder      Depression, unspecified depression type      Mood disorder in conditions classified elsewhere        Plan:  Medication: Continue current medications  OTC Recommendations: none  Lab Orders:  none  Referrals: none  Release of Information: none  Future Treatment Considerations:per symptoms  Return for Follow Up:6 months   The risks, benefits, alternatives and side effects have been discussed and are understood by the patient. The patient understands the risks of using street drugs or alcohol. There are no medical contraindications, the patient agrees to treatment, and has the capacity to do so. The patient understands to call 911 or come to the nearest ED if life threatening or urgent symptoms present.  In addition time was spent counseling the patient and/or coordinating care regarding review of social and occupational functioning.  In addition patient was counseled on health and wellness practices to augment medication treatment of symptoms. See note for details.    Kaylin Rajan, APRN CNS 9/24/2019

## 2019-09-25 ENCOUNTER — DOCUMENTATION ONLY (OUTPATIENT)
Dept: HEMATOLOGY | Facility: CLINIC | Age: 69
End: 2019-09-25

## 2019-09-25 DIAGNOSIS — Z85.51 PERSONAL HISTORY OF MALIGNANT NEOPLASM OF BLADDER: ICD-10-CM

## 2019-09-25 DIAGNOSIS — D66 SEVERE HEMOPHILIA A (H): ICD-10-CM

## 2019-09-25 RX ORDER — ANTIHEMOPHILIC FACTOR (RECOMBINANT) 1000 (+/-)
KIT INTRAVENOUS
Qty: 12740 EACH | Refills: 3 | Status: SHIPPED | OUTPATIENT
Start: 2019-09-25 | End: 2019-10-07

## 2019-09-26 NOTE — PROGRESS NOTES
Orlin Alcantar called to order a refill of Kogenate FS . He has 0 dose(s) remaining at home and would like it delivered to his house via  to arrive on 9/25/2019.     The following doses were dispensed from our pharmacy:    Product: Kogenate FS     Doses: 4 doses of 3185 units    MICHAEL Keys, [facility]  Hemophilia Pharmacy Coordinator  [phone]  [email or web address]

## 2019-09-28 ENCOUNTER — HEALTH MAINTENANCE LETTER (OUTPATIENT)
Age: 69
End: 2019-09-28

## 2019-10-02 ENCOUNTER — DOCUMENTATION ONLY (OUTPATIENT)
Dept: HEMATOLOGY | Facility: CLINIC | Age: 69
End: 2019-10-02

## 2019-10-02 NOTE — PROGRESS NOTES
Orlin Alcantar called to order a refill of Kogenate FS . He has 0 dose(s) remaining at home and would like it delivered to his house via  to arrive on 10/2/2019.     The following doses were dispensed from our pharmacy:    Product: Kogenate FS    Doses: 4 doses of 3185 units    MICHAEL Keys, [facility]  Hemophilia Pharmacy Coordinator  [phone]  [email or web address]

## 2019-10-07 ENCOUNTER — DOCUMENTATION ONLY (OUTPATIENT)
Dept: HEMATOLOGY | Facility: CLINIC | Age: 69
End: 2019-10-07

## 2019-10-07 ENCOUNTER — TELEPHONE (OUTPATIENT)
Dept: HEMATOLOGY | Facility: CLINIC | Age: 69
End: 2019-10-07

## 2019-10-07 DIAGNOSIS — D66 SEVERE HEMOPHILIA A (H): Primary | ICD-10-CM

## 2019-10-07 DIAGNOSIS — Z85.51 PERSONAL HISTORY OF MALIGNANT NEOPLASM OF BLADDER: ICD-10-CM

## 2019-10-07 DIAGNOSIS — G89.4 CHRONIC PAIN SYNDROME: ICD-10-CM

## 2019-10-07 DIAGNOSIS — D66 HEMOPHILIC ARTHROPATHY (H): ICD-10-CM

## 2019-10-07 DIAGNOSIS — M36.2 HEMOPHILIC ARTHROPATHY (H): ICD-10-CM

## 2019-10-07 RX ORDER — OXYCODONE HYDROCHLORIDE 5 MG/1
5 TABLET ORAL EVERY 8 HOURS PRN
Qty: 90 TABLET | Refills: 0 | Status: SHIPPED | OUTPATIENT
Start: 2019-10-07 | End: 2019-11-04

## 2019-10-07 RX ORDER — ANTIHEMOPHILIC FACTOR (RECOMBINANT) 1000 (+/-)
KIT INTRAVENOUS
Qty: 19110 EACH | Refills: 0 | Status: SHIPPED | OUTPATIENT
Start: 2019-10-07 | End: 2019-10-31

## 2019-10-07 NOTE — TELEPHONE ENCOUNTER
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  He called to report falling on an area rug in the lobby of his apartment yesterday at about 3pm.  He hit his right shoulder and right elbow.  He treated with factor right away (Kogenate 3000 units = 40 units/kg).    Overnight he had a lot of pain and considered coming in to be evaluated.  This morning he states he is improved.  He has limited ROM in elbow at baseline but this is worsened.  He has decreased ROM in right shoulder as well.  No numbness or tingling in hand.    PLAN:  1. Treat with Kogenate 3000 units today and tomorrow (3 days of daily therapy) and then return to every other day treatment.   2. Refill of oxycodone monthly supply sent today  3. Call with update in 24-48 hours.          Yanira Branch MPH, PA-C  [facility]  [facility]  [phone] main line  [phone] pager  [phone] fax

## 2019-10-11 NOTE — MR AVS SNAPSHOT
"              After Visit Summary   3/21/2017    Orlin Alcantar    MRN: [id number]           Patient Information     Date Of Birth          1950        Visit Information        Provider Department      3/21/2017 9:54 AM Analilia Jack LICSW [facility]        Today's Diagnoses     Encounter for counseling    -  1       Follow-ups after your visit        Your next 10 appointments already scheduled     Sep 12, 2017 10:15 AM CDT   Adult Med Follow UP with JELLY Chapa CNS   Psychiatry Clinic ([facility])    [address]  [address] [facility] [address] [phone]              Who to contact     If you have questions or need follow up information about today's clinic visit or your schedule please contact [facility] directly at [phone].  Normal or non-critical lab and imaging results will be communicated to you by Styliticshart, letter or phone within 4 business days after the clinic has received the results. If you do not hear from us within 7 days, please contact the clinic through Styliticshart or phone. If you have a critical or abnormal lab result, we will notify you by phone as soon as possible.  Submit refill requests through [employer] or call your pharmacy and they will forward the refill request to us. Please allow 3 business days for your refill to be completed.          Additional Information About Your Visit        Styliticshart Information     Oktalogic lets you send messages to your doctor, view your test results, renew your prescriptions, schedule appointments and more. To sign up, go to [email or web address] . Click on \"Log in\" on the left side of the screen, which will take you to the Welcome page. Then click on \"Sign up Now\" on the right side of the page.     You will be asked to enter the access code listed below, as well as some personal information. Please follow the directions to create your username and " password.     Your access code is: [email or web address]  Expires: 2017  7:30 AM     Your access code will  in 90 days. If you need help or a new code, please call your [place] clinic or [phone].        Care EveryWhere ID     This is your Care EveryWhere ID. This could be used by other organizations to access your [place] medical records  [id number]         Blood Pressure from Last 3 Encounters:   17 (!) 138/95   16 124/88   16 (!) 122/94    Weight from Last 3 Encounters:   17 81.1 kg (178 lb 14.4 oz)   16 77.6 kg (171 lb)   16 72.3 kg (159 lb 8 oz)              Today, you had the following     No orders found for display       Primary Care Provider Office Phone # Fax #    Katie Ramos [phone]       [facility] [address] [facility] [address]        Thank you!     Thank you for choosing [facility]  for your care. Our goal is always to provide you with excellent care. Hearing back from our patients is one way we can continue to improve our services. Please take a few minutes to complete the written survey that you may receive in the mail after your visit with us. Thank you!             Your Updated Medication List - Protect others around you: Learn how to safely use, store and throw away your medicines at www.disposemymeds.org.          This list is accurate as of: 3/21/17 11:59 PM.  Always use your most recent med list.                   Brand Name Dispense Instructions for use    amLODIPine 5 MG tablet    NORVASC    90 tablet    Take 1 tablet (5 mg) by mouth daily       codeine 30 MG tablet     168 tablet    Take 2 tablets (60 mg) by mouth every 6 hours as needed for moderate to severe pain (up to 8 tabs in 24 hours)       diazepam 5 MG tablet    VALIUM    30 tablet    Take up to one tablet by mouth daily as needed for anxiety       factor VIII (recomb) 1000 UNITS Kit     82675 Units    Infuse 2000 to 3000  units of Helixate 2-3 times weekly and as needed for bleeding episodes       lisinopril 40 MG tablet    PRINIVIL/ZESTRIL    90 tablet    Take 1 tablet (40 mg) by mouth daily       mirtazapine 45 MG tablet    REMERON    30 tablet    Take 1 tablet (45 mg) by mouth At Bedtime          previously intubated - no problems

## 2019-10-14 NOTE — PROGRESS NOTES
Orlin Alcantar called to order a refill of Kogenate FS . He has 0 dose(s) remaining at home and would like it delivered to his house via  to arrive on 10/7/2019.     The following doses were dispensed from our pharmacy:    Product: Kogenate FS    Doses: 6 doses of 3185 units    MICHAEL Keys, [facility]  Hemophilia Pharmacy Coordinator  [phone]  [email or web address]

## 2019-10-15 DIAGNOSIS — M36.2 HEMOPHILIC ARTHROPATHY (H): ICD-10-CM

## 2019-10-15 DIAGNOSIS — D66 HEMOPHILIC ARTHROPATHY (H): ICD-10-CM

## 2019-10-15 DIAGNOSIS — D66 SEVERE HEMOPHILIA A (H): Primary | ICD-10-CM

## 2019-10-15 DIAGNOSIS — R26.9 ABNORMAL GAIT: ICD-10-CM

## 2019-10-16 ENCOUNTER — DOCUMENTATION ONLY (OUTPATIENT)
Dept: HEMATOLOGY | Facility: CLINIC | Age: 69
End: 2019-10-16

## 2019-10-16 NOTE — PROGRESS NOTES
Orlin Alcantar called to order a refill of Kogenate FS . He has 0 dose(s) remaining at home and would like it delivered to his house via  to arrive on 10/16/2019.     The following doses were dispensed from our pharmacy:    Product: Kogenate FS    Doses: 4 doses of 3185 units    MICHAEL Keys, [facility]  Hemophilia Pharmacy Coordinator  [phone]  [email or web address]

## 2019-10-22 ENCOUNTER — HOSPITAL ENCOUNTER (OUTPATIENT)
Dept: PHYSICAL THERAPY | Facility: CLINIC | Age: 69
Setting detail: THERAPIES SERIES
End: 2019-10-22
Attending: PHYSICAL THERAPIST
Payer: MEDICARE

## 2019-10-22 ENCOUNTER — DOCUMENTATION ONLY (OUTPATIENT)
Dept: HEMATOLOGY | Facility: CLINIC | Age: 69
End: 2019-10-22

## 2019-10-22 ENCOUNTER — OFFICE VISIT (OUTPATIENT)
Dept: HEMATOLOGY | Facility: CLINIC | Age: 69
End: 2019-10-22
Attending: INTERNAL MEDICINE
Payer: MEDICARE

## 2019-10-22 VITALS
BODY MASS INDEX: 23.34 KG/M2 | TEMPERATURE: 97.5 F | HEIGHT: 70 IN | DIASTOLIC BLOOD PRESSURE: 90 MMHG | OXYGEN SATURATION: 98 % | HEART RATE: 100 BPM | SYSTOLIC BLOOD PRESSURE: 133 MMHG | WEIGHT: 163 LBS | RESPIRATION RATE: 14 BRPM

## 2019-10-22 DIAGNOSIS — D66 SEVERE HEMOPHILIA A (H): ICD-10-CM

## 2019-10-22 DIAGNOSIS — D66 SEVERE HEMOPHILIA A (H): Primary | ICD-10-CM

## 2019-10-22 DIAGNOSIS — D66 HEMOPHILIC ARTHROPATHY (H): ICD-10-CM

## 2019-10-22 DIAGNOSIS — M36.2 HEMOPHILIC ARTHROPATHY (H): ICD-10-CM

## 2019-10-22 DIAGNOSIS — R26.9 ABNORMAL GAIT: ICD-10-CM

## 2019-10-22 PROCEDURE — 99213 OFFICE O/P EST LOW 20 MIN: CPT | Performed by: INTERNAL MEDICINE

## 2019-10-22 PROCEDURE — 97535 SELF CARE MNGMENT TRAINING: CPT | Mod: GP | Performed by: PHYSICAL THERAPIST

## 2019-10-22 PROCEDURE — 97161 PT EVAL LOW COMPLEX 20 MIN: CPT | Mod: GP | Performed by: PHYSICAL THERAPIST

## 2019-10-22 ASSESSMENT — MIFFLIN-ST. JEOR: SCORE: 1510.61

## 2019-10-22 ASSESSMENT — PAIN SCALES - GENERAL: PAINLEVEL: NO PAIN (0)

## 2019-10-22 NOTE — PATIENT INSTRUCTIONS
"Please call the Center about any significant bleeding episode.  Dose of factor concentrate = 3000 units given every 48 hours.  For emergency head trauma, please treat with at least 50 units/kg to boost your factor level to 100%.  Please seek emergency care for head CT scan.  Continue to keep infusion records per scanned logs.  Carry factor concentrate with you at all times. Call the center if you will be traveling out of the area. We can create a travel letter prior to your departure . For treatment centers around the world go to [email or web address], click on top right link \"RESOURCES\" and then scroll down to  \"Find a Treatment [place]\". Enter country, then scroll down to city closest to destination.  Wear medic alert on your person for highest level of safety [email or web address]  See dentist every 6 months. Call the center for recommendations for dental visits that involve more than an examination and cleaning. If you have had a joint replacement or port for infusions, please call for antibiotic recommendations for dental cleaning.  See primary care provider for general health maintenance.  For any questions, please contact your [facility] nurse, Yu Schultz RN at [phone] or the main phone number [phone].  If you have emergency needs in the evening or weekend, please call [phone] and ask for the hematologist on call or Dr. Jorge Jiang.  Please return for comprehensive clinic in 1 year.    PHYSICAL THERAPY INSTRUCTIONS:    We discussed footwear including wearing shoes in the house that have a slight heel to prevent stress in your soft tissues when walking on hard floor without shoes.    We discussed the importance of regular stretching to promote soft tissue length.  This should be completed daily or every other day.  Slowly take joint to end range and then hold for 1 minute.  This should not be aggressive and should not cause increased pain.  We reviewed specific positions for stretches.  Contact Ama if you have " any questions.     Continue to use adjunctive treatments during new bleeds if they are helpful in pain management.  Treatment of New Joint and/or Muscle Bleeds:     Treat with factor per doctor s orders.    Apply RICE treatment as needed for pain relief and to allow rest and healing   o R=Rest, Limit movement and protect your joint with splints, braces and assistive devices as directed by your treatment center.  Use crutches and do not put any weight on a new lower extremity bleed (ex: hip, knee, ankle).   Only move in pain-free range.  o I=Ice, Apply ice to surround the affected area for 15-20 minutes every 2 hours.  Use ice cubes in bag, frozen vegetables, gel ice packs, or commercial products (Cryocuff ).  o C=Compression, Helps to control swelling and can decrease pain. Can use elastic wraps or compression sleeve such as tubigrip.  Avoid tourniquet effect by using proper technique. Remove if pain increases or with any numbness or tingling.   o E=Elevate, Can help decrease swelling and encourages rest.  Most effective with elevation above level of the heart.  Joint Health: Bleed prevention and regular activity are key elements to maintaining healthy joints. Limit activities that cause joint pain and spread out activities/exercises throughout the day and the week, alternate exercises each day.    Footwear: Always wear supportive footwear-shoes with laces and good ankle support are best.  If you experience frequent foot/ankle pain, try wearing shoes indoors as well as outdoors.      Maria Teresa Chadwick [facility]  Physical Therapist  [facility]  [phone]

## 2019-10-22 NOTE — NURSING NOTE
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 He  presents today for his  comprehensive Hemophilia clinic evaluation.  Teaching Topic: Bleed prevention  Person(s) involved in teaching:   Patient  Motivation Level:  Asks Questions: Yes  Eager to Learn: Yes  Cooperative: Yes  Receptive (willing/able to accept information): Yes  Patient demonstrates understanding of the following:  Reason for the appointment, diagnosis and treatment plan: Yes-understands his hemophilia diagnosis well and has done self infusion for many years.  Knowledge of proper use of medications and conditions for which they are ordered (with special attention to potential side effects or drug interactions): Is aware of other Factor VIII concentrates but wants to continue with Kogenate (been on it since March 2017). Is aware of non-replacement therapy but not interested at this time. No issues with venous access reported.  Which situations necessitate calling provider and whom to contact: Yes  Reviewed procedure for home infusions of factor concentrates.  See attached treatment recommendations.    Reviewed severe/life threatening hemorrhage and handout given that recommends appropriate dosing.  If suspect bleeding in head, neck, throat or abdomen, give dose of factor if able, contact the hemophilia center immediately or report to the Emergency Room at [facility] or the nearest emergency room.   Discussed importance of Medic Alert identification.    Discussed need for dental check-ups and prophylactic antibiotics for dental procedures.  Discussed and gave copies of the following handouts with patient.  [facility] Contact Card

## 2019-10-22 NOTE — PROGRESS NOTES
I met with Orlin Alcantar on October 22, 2019 during his annual comprehensive clinic visit with the [facility].     INSURANCE: Orlin is covered by Medicare and has a supplement policy through [facility] [place]. Factor is covered on his medical benefit.     ASSISTANCE PROGRAMS: Orlin receives premium assistance that he works with our , Analilia monte. He has been covered through [employer], and now through [employer]. Not eligible for factor copay programs.     CURRENT FACTOR PLAN: Orlin is currently treating with Kogenate 3000 units every other day and additionally as needed for any bleeding. I reviewed his utilization history with him in regards to the number of units we have dispensed. Taking out additional doses that were used for bleeding, his usage is in line with what would be expected for every other day prophy.     RECORDS:Orlin keeps a paper record of his infusion using the stickers from the vial. He faxes in copies of these to myself periodically. He has been doing this since the start of 2017. I thanked him doing this and encouraged him to continue. He will fax me the rest of records by the end of the year.     BLEEDS: Orlin recorded a couple of shoulder bleeds over the past year, in June/July which he treated daily for.     I spent 5 minutes face to face in clinic today with Orlin Alcantar.     Analilia Mccarty, [facility]  Hemophilia Pharmacy   [phone]  [email or web address]

## 2019-10-22 NOTE — PROGRESS NOTES
OUTPATIENT PHYSICAL THERAPY HEMOPHILIA CLINIC NOTE  [facility]    Orlin Alcantar     YOB: 1950  [id number]    Reason for visit: Comprehensive Clinic  Date of service:  10/22/2019  Referring provider: Yanira Branch PA-C  Medical Diagnosis: Factor VIII -  Severe  Replacement therapy: Prophylaxis: Schedule- Kogenate 3x/week   present: No    Interval History (include personal factors and/or comorbidities that impact the plan of care):   Orlin was last seen on 9/11/2018 for a comp clinic.  He was also seen in July 2019 for follow up of left shoulder bleed that started on 6/15/19 without known provocation.  Today he reports that his left shoulder returned to normal after a couple of weeks.  He does report recent fall on 10/7/19 when he tripped on the carpet and fell landing on his right side with resultant bleed into right shoulder and right elbow.  He also reports these joints as returned to normal today.  Orlin does not have any specific joint concerns today, reports no significant changes in his longstanding joint issues.   Work: Orlin is retired  who has been on disability for many years due to multiple joint issues.  Bleeds: 3 reported bleeds requiring additional factor treatment per patient, 1 left shoulder, 1 right shoulder and 1 right elbow  Exercise/physical activity consists of no regular exercise routine.  He reports he occasionally goes for walks.  Orlin identifies a decline in activity since moving to a building where he does not have to take the stairs to his apartment, current building has an elevator.       Orthopedic past medical history:   Right SUNG 1992  Right hip steroid injections multiple times between 2260-9817   Right hip open synovectomy 2005  Right hip SUNG revision with synovectomy 4/26/2012  Left TKA 1991  Left hip fracture with internal fixation 20+ years    Pain:  Chief complaint: chronic pain reported in right elbow and  left ankle.  He also reports occasional acute pain flares in these same joints.   Symptoms are reported as unchanged and not progressed over the past year.  He uses daily Oxycodone to manage his pain and reports this is working well.  He denies pain affecting his activity level, but also concedes he is not very active in general so there is not a lot of activity to be affected.    Fall Risk Screen:  Has the patient fallen 2 or more times in the last year? No  Has the patient fallen and had an injury in the past year? Yes  Timed Up and Go Score: <13.5 seconds  Is the patient a fall risk? Yes, despite normal TUG score his LE contractures put him at increased risk for falls, department fall risk interventions implemented    Physical Exam:   Gross ROM assessment shows loss of motion in bilateral ankles, bilateral knees L>R, and bilateral elbows.  See table for gross measurements.  Patient is right handed.  ROM and joint function: Unchanged in past year per patient.  However, he is found to have decrease in bilat shoulder flexion which he had not noticed but confirms he has not been using overhead motions since shoulder bleeds which both occurred in past year.  He continues to have inability to touch his face with right hand, this has not progressed, he remains about 1 inch from face with fingertips.  He is able to assume Figure 4 position to don/doff shoes.     Swelling: Denied  Pain: Denied today, he does identify that the left ankle has a h/o of multiple bleeds over the years and will give him issues with prolonged walking and the right elbow will occasionally flare but is not as functionally limiting.  He is able to complete all daily activities and the walking associated with errands without concern.   Gait:  Assessed with high top hiking boots which are his footwear of choice for colder months with snow/ice.   Independent without an assistive device.  Shoe assisted abbreviated heel strike, bilat knee flex,  decreased push off and step length, equal step length, decreased foot clearance bilat, decreased walking efficiency.     ROM L elbow R elbow L Knee R Knee L Ankle R Ankle Other: L shldr Other: R shldr   Flex (DF) -45 -45 90 110 -10 -5 140 140   Ext (PF) 110 100 -10 -5 18 20     Pronation           Supination               Assessment:   Work: N/A, retired, was a .  Bleeds: bilat shldr and right elbow bleeds in past year with residual tightness in bilat shoulders not currently affecting function, these UEs bleeds were difficult to manage as he relies on UEs to get up/down from chair and in/out of car.  Activity: Decreased over the past year, decreased for age, some walking for activity.  No regular program.    Equipment: Orlin currently has all the equipment he needs to complete daily activities.  He has a wheeled walker and crutches which he has not used in the past year.     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  No change in function.    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  No imaging found.  Left knee: H/o hemophilic arthropathy addressed with TKR in 1991, continues to be functional with replaced joint.  Current decreased ROM secondary to soft tissue restriction likely present prior to surgery.  Right knee: Slight decrease in flexion as compared to age normative tables, no other clinical signs of hemarthropathy, no imaging found.   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  Right hip: H/o hemophilic arthropathy addressed with SUNG many years ago  and revision in 2012.  Hip has been much improved clinically since 2012 surgery.  Bilat shoulders: Decrease in flexion ROM and functional use of UEs in overhead activities 2/2 bleeding episode in each shoulder this past year.  Continue to monitor closely, goal to increase ROM over the next few months.     Treatment diagnosis: Joint hypomobility, ROM limitations, gait impairment    Clinical Presentation: Evolving/Changing  Clinical Presentation Rationale: Decrease in shoulder overhead function  Clinical Decision Making (Complexity): Low complexity  Treatment: Met with patient 1:1 today to update musculoskeletal and mobility history and provide education for home management of bleeding disorder.  Orlin is appropriate for hemophilia specific skilled PT services today to address his joint hemarthropathy and home management of his bleeding disorder.  Treatment focused on symptom management and joint protection today and included:     Provided instruction in daily to every other day stretching program to include shoulders, elbows, knees and ankles with specific instruction in joint position to facilitate stretch.  Stretches to be held 1 minute without increasing pain.     Provided education on joint function and health, importance of bleed prevention and activity modifications to maximize joint function.    Provided information about ankle fusion surgery per patient's questions, including length of time in NWBing, stress to surrounding and contralateral joints and function such as gait post surgery.    Reviewed RICE applications and purpose of pain relief and promotion of healing with adjunctive treatments.  Encouraged use of applications as they are beneficial, and to discontinue or not use if not provided desired benefit.  Plan of Care:   1. Plan to continue with watch and see approach with left ankle, he is not interested in surgical intervention with current level of pain and function.  Will re-visit as needed in the  future.  2. Orlin to initiate regular stretching program at least every other day with goal to maximize joint function and prevent soft tissue shortening around contracted joints.  3. Orlin to contact this writer with any new musculoskeletal concerns or loss of motion or function.  4. Will plan to see at next clinic visit.    Therapy Frequency and Predicted Duration of Therapy Intervention: One session evaluation and treatment  Goals: Patient verbalizes understanding of evaluation results, home management and home exercise recommendations provided today to maximize functional mobility and allow for continued safe participation in current home and work activities. -Goal Met    Recommendations: Follow up at next scheduled [facility] clinic visit  Educational assessment/Barriers to learning: No barriers noted  Treatment provided this date (including minutes): Self-care/home management: 20   Patient/Family Education:Adjunctive treatment/RICE, Splints/orthoses/equipment, General information on benefits of exercise and physical activity, Footwear selection, Home environment safety/falls prevention, Hemophilic arthropathy, Elective orthopedic surgery/rehabilitation and Home exercise program: Written and verbal instruction in the following: home stretching program,   Risks and benefits of evaluation/treatment have been explained.  Patient, family and/or caregiver are in agreement with Plan of Care.    Timed Code Treatment Minutes: 20  Total Treatment Time (sum of timed and untimed services): 50    Signature: Maria Teresa Chadwick [facility]  Physical Therapist  [facility]  [phone]    Date: 10/22/2019    Certification:  Onset date: 10/22/2019  Start of care date: 10/22/2019  Certification date from 10/22/2019 to 10/23/2019    I CERTIFY THE NEED FOR THESE SERVICES FURNISHED UNDER        THIS PLAN OF TREATMENT AND WHILE UNDER MY CARE     (Physician co-signature of this document indicates review and  certification of the therapy plan).

## 2019-10-22 NOTE — PROGRESS NOTES
[facility]    Bleeding Disorder Emergency Treatment Recommendation    Date:2019    Name: Orlin Alcantar  MRN: [id number]  : 1950    Diagnosis:  severe Hemophilia A  Baseline factor level:  Factor  VIII = <1 %  Inhibitor status:    History of inhibitor? No  Most Recent [place]:  Unknown Bu/mL (0/0/0)  Patient weight:    Wt Readings from Last 1 Encounters:   10/22/19 73.9 kg (163 lb)     Current treatment:  3000 units Kogenate every 48 hours  Pharmacokinetic information: unknown     For bleeding events:     Treat with: recombinant factor VIII 50 units/kg     Factor replacement should be administered emergently and prior to imaging or any invasive/surgical procedure     For suspected intracranial hemorrhage or life / limb threatening bleed, draw a peak factor VIII level  5-10 minutes after administration     For mucosal bleeding, antifibrinolytics may be useful; these are contraindicated in hematuria  o Aminocaproic acid (Amicar) 50mg/kg IV or PO q 6 hours  o Tranexamic acid (Lysteda) 1300 mg po TID or 10mg/kg IV q 8 hours     A single dose of factor replacement will temporarily correct the bleeding disorder  Further dosing may be required    For guidance, please contact:  [facility]:  [phone]  Outside providers call Physician Access at:  [phone] (ask for hematologist on call)    [facility]  [address] [place] [address]  Main: [phone], Fax: [phone]  : Dr. Jorge Jiang

## 2019-10-23 ENCOUNTER — DOCUMENTATION ONLY (OUTPATIENT)
Dept: HEMATOLOGY | Facility: CLINIC | Age: 69
End: 2019-10-23

## 2019-10-23 NOTE — PROGRESS NOTES
Orlin Alcantar called to order a refill of Kogenate  . He has 0 dose(s) remaining at home and would like it delivered to his house via  to arrive on 10/23/2019.     The following doses were dispensed from our pharmacy:    Product: Kogenate FS    Doses: 4 doses of 3185 units    MICHAEL Keys, [facility]  Hemophilia Pharmacy Coordinator  [phone]  [email or web address]

## 2019-10-31 ENCOUNTER — DOCUMENTATION ONLY (OUTPATIENT)
Dept: HEMATOLOGY | Facility: CLINIC | Age: 69
End: 2019-10-31

## 2019-10-31 DIAGNOSIS — Z85.51 PERSONAL HISTORY OF MALIGNANT NEOPLASM OF BLADDER: ICD-10-CM

## 2019-10-31 DIAGNOSIS — D66 SEVERE HEMOPHILIA A (H): ICD-10-CM

## 2019-10-31 RX ORDER — ANTIHEMOPHILIC FACTOR (RECOMBINANT) 1000 (+/-)
KIT INTRAVENOUS
Qty: 12740 EACH | Refills: 3 | Status: SHIPPED | OUTPATIENT
Start: 2019-10-31 | End: 2019-11-27

## 2019-10-31 RX ORDER — ANTIHEMOPHILIC FACTOR (RECOMBINANT) 1000 (+/-)
KIT INTRAVENOUS
Qty: 12736 EACH | Refills: 0 | Status: SHIPPED | OUTPATIENT
Start: 2019-10-31 | End: 2019-10-31

## 2019-10-31 NOTE — PROGRESS NOTES
Orlin Alcantar called to order a refill of Kogenate FS . He has 0 dose(s) remaining at home and would like it delivered to his house via  to arrive on 10/31/2019.     The following doses were dispensed from our pharmacy:    Product: Kogenate FS    Doses: 4 doses of 3185 units    MICHAEL Keys, [facility]  Hemophilia Pharmacy Coordinator  [phone]  [email or web address]

## 2019-11-04 DIAGNOSIS — G89.4 CHRONIC PAIN SYNDROME: ICD-10-CM

## 2019-11-04 DIAGNOSIS — D66 SEVERE HEMOPHILIA A (H): ICD-10-CM

## 2019-11-04 RX ORDER — OXYCODONE HYDROCHLORIDE 5 MG/1
5 TABLET ORAL EVERY 8 HOURS PRN
Qty: 90 TABLET | Refills: 0 | Status: SHIPPED | OUTPATIENT
Start: 2019-11-04 | End: 2019-12-02

## 2019-11-05 ENCOUNTER — DOCUMENTATION ONLY (OUTPATIENT)
Dept: HEMATOLOGY | Facility: CLINIC | Age: 69
End: 2019-11-05

## 2019-11-05 NOTE — PROGRESS NOTES
Orlin Alcantar called to order a refill of Kogenate FS . He has 2 dose(s) remaining at home and would like it delivered to his house via  to arrive on 11/5/2019.     The following doses were dispensed from our pharmacy:    Product: Kogenate FS    Doses: 4 doses of 3185 units    MICHAEL Keys, [facility]  Hemophilia Pharmacy Coordinator  [phone]  [email or web address]

## 2019-11-13 ENCOUNTER — DOCUMENTATION ONLY (OUTPATIENT)
Dept: HEMATOLOGY | Facility: CLINIC | Age: 69
End: 2019-11-13

## 2019-11-13 NOTE — PROGRESS NOTES
Orlin Alcantar called to order a refill of Kogenate FS . He has 0 dose(s) remaining at home and would like it delivered to his house via  to arrive on 11/13/2019.     The following doses were dispensed from our pharmacy:    Product: Kogenate FS    Doses: 4 doses of 3185 units    MICHAEL Keys, [facility]  Hemophilia Pharmacy Coordinator  [phone]  [email or web address]

## 2019-11-20 ENCOUNTER — DOCUMENTATION ONLY (OUTPATIENT)
Dept: HEMATOLOGY | Facility: CLINIC | Age: 69
End: 2019-11-20

## 2019-11-20 NOTE — PROGRESS NOTES
Orlin Alcantar called to order a refill of Kogenate FS . He has 0 dose(s) remaining at home and would like it delivered to his house via  to arrive on 11/20/2019.     The following doses were dispensed from our pharmacy:    Product: Kogenate FS    Doses: 4 doses of 3185 units    MICHAEL Keys, [facility]  Hemophilia Pharmacy Coordinator  [phone]  [email or web address]

## 2019-11-24 NOTE — PROGRESS NOTES
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    Orlin was raised in a foster family and he does not know his biological parents, so it is unclear whether or not there is a family history of hemophilia. He was first diagnosed by Dr. Filippo Quiñones many years ago here at the [facility] and had been followed by Dr. Quiñones initially for a few years. He subsequently was followed by a community hematologist until briefly re-establishing care here in  prior to right hip surgery.  He then returned to the care of his community hematologist until , when we again saw him prior to another right hip surgery.  He returned to us again in , and has been followed here since that time.      Summary of orthopedic procedures:  1992 -- left knee replacement  1992 -- right hip replacement  1995 -- left hip fracture s/p insertion of three pins  2003 -- radioactive synovectomy, right hip  2005 -- surgical synovectomy, right hip  2012 -- redo right hip surgical synovectomy, and exchange of polyethylene portion of previous hip arthroplasty    Orlin was diagnosed with hepatitis C infection in  and received combination therapy with interferon and ribavirin in  which successfully eradicated the virus.     He has been on prophylactic FVIII replacement therapy for the last several years.  He has used a variety of  regimens, typically consuming 10,000 to 15,000 units of factor weekly.  He has preferred to use smaller factor doses more frequently, rather than trying to extend the interval between infusions.  He used Recombinate for many years, followed by Helixate, and most recently has been using Kogenate since approximately 2017.  He is currently using 3,000 units every 48 hours, and reports 2 bleeds in the last 12 months (one spontaneous, one traumatic).  A pharmacokinetic study done on Kogenate earlier this year showed a half life of 19 (range 14-24) hours.    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  Over the last few years, his joint status has remained stable.  He had used codeine to manage joint pain for many years, but transitioned to oxycodone in March 2019.  He receives ninety 5 mg tablets each month (provided by us), with quarterly visits here for ongoing monitoring.    Interim history since last comprehensive clinic visit:  As outlined in detail in previous notes, Orlin was diagnosed with bladder cancer in 2018, for which he received neoadjuvant chemotherapy followed by radical cyctectomy in July 2018.  He continues to be followed in Oncology Clinic here, and remains in remission as of his last check up in September 2019.    He is currently feeling well, with no specific concerns or new issues to discuss today.      ROS:  Complete ROS is negative.       Current factor treatment regimen:  Kogenate 3000 units per dose (which is about 40 Units/kg) every 48 hours, via self venipuncture.     Bleeding episodes since last comprehensive clinic visit:    The total number of bleeding events since last comprehensive hemophilia clinic visit requiring factor infusion -- 2    The patient is recalling his bleeding events by -- paper log    Number of Hemarthroses in the past year:  Location Number of bleeds Other information related to each bleed    Ankle, Left 0    Ankle, Right 0    Elbow, Left 0    Elbow, Right 1* *same event, related to trauma   Hip, Left 0    Hip, Right 0    Knee, Left 0    Knee, Right 0    Shoulder, Left 1 spontaneous   Shoulder, Right 1* *same event, related to trauma     Other bleeds (GI, , Soft tissue etc.) -- none      Emergency department visits or hospitalizations in the past year:  He has no emergency department visits in the past year.    He has no hemophilia related hospitalization in the past year.     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       Joint procedures (synovectomy or joints replacement) since last comprehensive clinic visit: None    Chronic pain: (No or every day / Most days / Some days):  Every day, multiple joints, but primarily right elbow and left ankle     Prescribed opioids for chronic pain (Yes / No):  Yes -- oxycodone     Overall activity level:   Activity level for: Unrestricted / Limited / Unable   Work/School Limited   Recreation Limited   Self-Care Limited       Infectious disease status:     Date of Serology done Immunization status    Hepatitis A Unknown    Hepatitis B 3/28/2018 Negative serology both surface Agn and Core Nidhi      Status (Positive or Negative) Most recent RNA Quant and date Treatment:   Hepatitis C Antibody positive but negative viral load 3/28/2018 RNA not detected. Interferon and Ribavirin in 1999      Positive or Negative (date of test done)   HIV status Negative in 3/28/2018     Evidence of cirrhosis or liver disease on imaging? No; last CT scan (for h/o bladder cancer) was in September 2019    Routine health maintenance:  His last visit with Dr. Katie Ramos, primary care physician at [facility] was in May 2019. He has not seen a dentist for several years.       Family history:  Orlin grew up in a foster family and does not know his biological parents.   Unknown whether or not there is a family  history of hemophilia.      Social history:  Please see Analilia BAKER's note from September 2018.   Single, lives alone in apartment.  Retired .  Denies any tobacco use (quit smoking back in 2013).  No significant alcohol use.  Denies any illicit drug use.  No marijuana use.     The patient has missed zero days of work/school since last comprehensive clinic visit.    The patient highest level of education completed is high school plus one year of college.     Currently the patient is currently not working.     He has a history of depression and anxiety.       Medications:  Kogenate 3000 units every 48 hours  Oxycodone 5 mg tab as needed, #90 per month  Tylenol as needed  Valium (prescribed by psychiatrist)  Remeron (prescribed by psychiatrist)       Objective:  Looks well.  Detailed exam not done -- see physical therapy note for joint assessment.      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      Presents today for routine annual comprehensive hemophilia clinic visit.  Currently doing well on current prophylaxis regimen, with 2 bleeds in the last 12 months (one spontaneous), and stable joint status over the last year.    Plan:    Factor replacement plan:  No change in prophylactic factor replacement plan was made today.  He feels he needs 4 days/week dosing to prevent hemarthrosis. He will remain on Kogenate 3000 Units ( ~40 U/kg) on M, W, F and Sun.    Hemophilic arthropathy management plan:  Continue oxycodone 5 mg as needed for joint pain, #90 per month    Other medical follow up plan:  Has follow up scheduled with Oncology  regarding bladder cancer surveillance and also has a PCP.    Labs needed:  none    Follow up clinic visit timing:  3 months for quarterly monitoring of pain med use  1 year for comprehensive evaluation, sooner if new joint issues      Ama FLOYD and  and Yu Schultz, nurse clinician have also seen the patient independently, please refer to their notes for their evaluation and assessment.       Jorge Jiang MD  Associate Professor of Medicine  Division of Hematology, Oncology, and Transplantation  Director, [facility]

## 2019-11-27 ENCOUNTER — DOCUMENTATION ONLY (OUTPATIENT)
Dept: HEMATOLOGY | Facility: CLINIC | Age: 69
End: 2019-11-27

## 2019-11-27 DIAGNOSIS — Z85.51 PERSONAL HISTORY OF MALIGNANT NEOPLASM OF BLADDER: ICD-10-CM

## 2019-11-27 DIAGNOSIS — D66 SEVERE HEMOPHILIA A (H): ICD-10-CM

## 2019-11-27 RX ORDER — ANTIHEMOPHILIC FACTOR (RECOMBINANT) 1000 (+/-)
KIT INTRAVENOUS
Qty: 11796 EACH | Refills: 3 | Status: SHIPPED | OUTPATIENT
Start: 2019-11-27 | End: 2019-12-26

## 2019-11-27 NOTE — PROGRESS NOTES
Orlin Alcantar called to order a refill of Kogenate FS . He has 1 dose(s) remaining at home and would like it delivered to his house via  to arrive on 11/27/2019.     The following doses were dispensed from our pharmacy:    Product: Kogenate FS    Doses: 4 doses of 2949 units    MICHAEL Keys, [facility]  Hemophilia Pharmacy Coordinator  [phone]  [email or web address]

## 2019-11-27 NOTE — TELEPHONE ENCOUNTER
Orlin Alcantar  :  1950    Bleeding disorder: Hemophilia A    Last visit: 3/26/19  Next visit scheduled: to be scheduled  Prophy (Y/N): [facility], [facility], is calling today requesting a new prescription.      Paola Ruiz, RN - Nurse Clinician - [facility] - [phone]

## 2019-12-02 ENCOUNTER — DOCUMENTATION ONLY (OUTPATIENT)
Dept: HEMATOLOGY | Facility: CLINIC | Age: 69
End: 2019-12-02

## 2019-12-02 DIAGNOSIS — G89.4 CHRONIC PAIN SYNDROME: ICD-10-CM

## 2019-12-02 DIAGNOSIS — D66 SEVERE HEMOPHILIA A (H): ICD-10-CM

## 2019-12-02 RX ORDER — OXYCODONE HYDROCHLORIDE 5 MG/1
5 TABLET ORAL EVERY 8 HOURS PRN
Qty: 90 TABLET | Refills: 0 | Status: SHIPPED | OUTPATIENT
Start: 2019-12-02 | End: 2020-01-02

## 2019-12-10 ENCOUNTER — DOCUMENTATION ONLY (OUTPATIENT)
Dept: HEMATOLOGY | Facility: CLINIC | Age: 69
End: 2019-12-10

## 2019-12-10 NOTE — PROGRESS NOTES
Orlin Alcantar called to order a refill of Kogenate FS . He has 1 dose(s) remaining at home and would like it delivered to his house via  to arrive on 12/10/2019.     The following doses were dispensed from our pharmacy:    Product: Kogenate FS    Doses: 4 doses of 2949 units     MICHAEL Keys, [facility]  Hemophilia Pharmacy Coordinator  [phone]  [email or web address]

## 2019-12-10 NOTE — PROGRESS NOTES
Orlin Alcantar called to order a refill of Kogenate FS . He has 1 dose(s) remaining at home and would like it delivered to his house via  to arrive on 12/2/2019.     The following doses were dispensed from our pharmacy:    Product: Kogenate FS    Doses: 4 doses of 2949 units    MICHAEL Keys, [facility]  Hemophilia Pharmacy Coordinator  [phone]  [email or web address]

## 2019-12-18 ENCOUNTER — DOCUMENTATION ONLY (OUTPATIENT)
Dept: HEMATOLOGY | Facility: CLINIC | Age: 69
End: 2019-12-18

## 2019-12-24 NOTE — PROGRESS NOTES
Orlin Alcantar called to order a refill of Kogenate FS . He has 1 dose(s) remaining at home and would like it delivered to his house via  to arrive on 12/18/2019.     The following doses were dispensed from our pharmacy:    Product: Kogenate FS    Doses: 4 doses of 2949 units    MICHAEL Keys, [facility]  Hemophilia Pharmacy Coordinator  [phone]  [email or web address]

## 2019-12-24 NOTE — PROGRESS NOTES
Orlin Alcantar called to order a refill of Kogenate FS . He has 1 dose(s) remaining at home and would like it delivered to his house via  to arrive on 12/10/2019.     The following doses were dispensed from our pharmacy:    Product: Kogenate FS    Doses: 4 doses of 2949 units    MICHAEL Keys, [facility]  Hemophilia Pharmacy Coordinator  [phone]  [email or web address]

## 2019-12-26 ENCOUNTER — DOCUMENTATION ONLY (OUTPATIENT)
Dept: HEMATOLOGY | Facility: CLINIC | Age: 69
End: 2019-12-26

## 2019-12-26 DIAGNOSIS — Z85.51 PERSONAL HISTORY OF MALIGNANT NEOPLASM OF BLADDER: ICD-10-CM

## 2019-12-26 DIAGNOSIS — D66 SEVERE HEMOPHILIA A (H): ICD-10-CM

## 2019-12-26 RX ORDER — ANTIHEMOPHILIC FACTOR (RECOMBINANT) 1000 (+/-)
KIT INTRAVENOUS
Qty: 12504 EACH | Refills: 3 | Status: SHIPPED | OUTPATIENT
Start: 2019-12-26 | End: 2020-01-24

## 2019-12-26 NOTE — PROGRESS NOTES
Orlin Alcantar called to order a refill of Kogenate FS . He has 1 dose(s) remaining at home and would like it delivered to his house via  to arrive on 12/26/2019.     The following doses were dispensed from our pharmacy:    Product: Kogenate FS    Doses: 1 dose of 2949 units + 3 doses of 3185 units    SALINA Keys., [facility]  Hemophilia Pharmacy Coordinator  [phone]  [email or web address]

## 2019-12-31 NOTE — PROGRESS NOTES
Urology Clinic    Nguyễn Morris MD  Date of Service: 2020     Name: Orlin Alcantar  MRN: [id number]  Age: 69 year old  : 1950  Referring provider: Katie Ramos     Assessment:  Orlin Alcantar  is a 69 year old male with a history of T2 bladder cancer s/p neoadjuvant gem-cis-nivo and radical cystoprostatectomy with IC and BPLND on 2018 with Dr. Shea. He presents today for a follow-up. He will have imaging later today which I will review once complete. no evidence of disease     Plan:  -Follow up with Dr. Harris on  with imaging.   -Follow up with imaging in 6 months and clinic visit with Dr. Shea.     Attestation:  This patient was seen and evaluated by me, with a scribe taking notes.  I have reviewed the note above and agree.  The physical exam and or any procedures were performed by me and the pertinant details are outlined below.       Nguyễn Morris MD  Department of Urology  [facility]    ______________________________________________________________________    HPI  Orlin Alcantar is a 69 year old male with a history of hemophilia and T2 bladder cancer s/p radical cystoprostatectomy with IC and BPLND on 2018 with Dr. Shea.     TNM Stage: T2a, N0, M0  Number of nodes removed: 23  Number of positive nodes: 0  Surgical Margins Positive: No  Grade: High   Histology: urothelial without secondary histology.    Tolerating ORAL and good bowel function. He did have some irritation around his pouch previously but was given a cream which helped. He is doing well now.     Date of Initial Diagnosis: 2018 TURBT with Dr. Liao   Stage of Initial Diagnosis: invasive urothelial carcinoma   Grade at Initial Diagnosis: high grade   Site of First Diagnosis: R UO was involved in the resection   Any Recurrence?: No  Intravesical Treatments: gemcitabine initiated on 2018, 4 cycles of gemcitabine-nivolumab 2018-2018 and 4 cycles of  Gemcitabine-Cisplatin from 2018-2018.   Date of Last Upper T[place] Imagin2019   Any evidence of hydronephrosis? mild    Review of Systems:   Pertinent items are noted in HPI or as below, remainder of complete ROS is negative.      Physical Exam:   Patient is a 69 year old  male   Vitals: Blood pressure (!) 140/95, pulse 93, height 1.829 m (6'), weight 71.2 kg (157 lb).  General Appearance Adult: Alert, no acute distress, oriented  HENT: throat/mouth:normal, good dentition  Lungs: no respiratory distress, or pursed lip breathing  Heart: No obvious jugular venous distension present  Abdomen: Body mass index is 21.29 kg/m . Stoma is pink and functioning and well pouched   Musculoskeltal: extremities normal  Skin: no visible suspicious lesions or rashes  Neuro: Alert, oriented, speech and mentation normal  Psych: affect and mood normal  Gait: Normal  : deferred    Laboratory:   I reviewed all applicable laboratory and pathology data and went over findings with patient  Significant for     Lab Results   Component Value Date    CR 1.08 2019    CR 1.13 2019    CR 1.09 2018    CR 1.00 2018    CR 0.80 2018    CR 0.83 2018    CR 0.84 2018    CR 0.68 2018    CR 0.70 2018    CR 0.81 2018     Results for orders placed or performed in visit on 18   Routine UA with microscopic - No culture   Result Value Ref Range    Color Urine Yellow     Appearance Urine Cloudy     Glucose Urine Negative NEG^Negative mg/dL    Bilirubin Urine Negative NEG^Negative    Ketones Urine Negative NEG^Negative mg/dL    Specific Gravity Urine 1.017 1.003 - 1.035    Blood Urine Large (A) NEG^Negative    pH Urine 5.0 5.0 - 7.0 pH    Protein Albumin Urine 100 (A) NEG^Negative mg/dL    Urobilinogen mg/dL 0.0 0.0 - 2.0 mg/dL    Nitrite Urine Negative NEG^Negative    Leukocyte Esterase Urine Large (A) NEG^Negative    Source Midstream Urine     WBC Urine >182 (H) 0 - 2 /HPF    RBC Urine  >182 (H) 0 - 2 /HPF    WBC Clumps Present (A) NEG^Negative /HPF    Bacteria Urine Few (A) NEG^Negative /HPF    Transitional Epi 1 (A) FEW^Few /HPF    Mucous Urine Present (A) NEG^Negative /LPF    Hyaline Casts 6 (H) 0 - 2 /LPF     Imaging:   I personally reviewed all applicable imaging and went over the below findings with patient.    Results for orders placed or performed in visit on 09/16/19   CT Chest/Abdomen/Pelvis w Contrast    Narrative    Examination: CT CHEST/ABDOMEN/PELVIS W CONTRAST, 9/16/2019 10:26 AM    Comparison: 8/14/2019, 3/7/2019.    History: h/o bladder cancer, routine 3 month follow up; Malignant  neoplasm of overlapping sites of bladder (H).     Technique: Axial images of the chest, abdomen and pelvis were obtained  with contrast. Coronal reconstructions were provided. Images were  reviewed in bone, lung, and soft tissue windows.   Contrast dose: 98.3 mL Isovue 370 IV push    Radiation Dose (DLP): 922 mGy*cm    FINDINGS:    CHEST:  AIRWAYS: Small amount of debris within the trachea and right mainstem  bronchus.  LUNG: Bibasilar, subpleural reticular and groundglass atelectasis.  Thin-walled cystic changes in the posterior left lower lobe.  Mild  diffuse emphysematous changes. Unchanged nodular focus of streaky  hyperattenuation in the anterior right middle lobe (series 10 image  64), similar to comparison 3/19/2018.  Stable right lower lobe  groundglass nodule measuring 6 mm (series 10 image 70), unchanged  since 3/19/2018. Stable 5 mm solid left lower lobe nodule (series 10  image 77). No new or enlarging pulmonary nodules. Scattered  granulomas.  PLEURA: within normal limits.  VESSELS: Atherosclerotic calcification of the aorta and its major  branches. Aorta and main pulmonary artery nondilated.  HEART: within normal limits.  MEDIASTINUM AND CHRIS: Trace pericardial fluid. No mediastinal or hilar  adenopathy.  BONES AND SOFT TISSUES: within normal limits.    ABDOMEN:  LIVER: Too small to  characterize foci of hypoattenuation, segments 2/3  of the liver, stable.  BILE DUCTS: Mild central intrahepatic dilatation. Common bile duct  measures 10 mm.  GALLBLADDER: Cholelithiasis. Fluid distended. No gallbladder wall  thickening or pericholecystic fluid.  PANCREAS: within normal limits.  SPLEEN: Splenic granulomas.  ADRENALS: Spleen is not enlarged.  URINARY TRACT: Postsurgical changes of cystectomy with right lower  quadrant ileal conduit. Bilateral nonobstructing punctate  nephrolithiasis. Too small to characterize hypoattenuating cortical  foci. Left renal hypoattenuating cortical focus measures 12 x 12 mm  (series 6 image 74), demonstrates thick septation, decreased in size  since comparison 4/17/2017. Mild enhancement of the urothelium of the  renal pelvis and ureters. Mild hydronephrosis bilaterally. Mild  eccentric urothelial thickening of the left ureter, similar to  comparisons. No filling defects within the urinary collecting system.    BOWEL: Not obstructive. Normal vermiform appendix. Moderate volume of  fecal-like contents at the small bowel anastomosis.  LYMPH NODES: Prominent retroperitoneal lymph nodes. Representative  lymph node the pancreatic head measures 8 mm (series 14 image 30),  periaortic lymph node measuring 8 mm (series 15 image 155), decreased  in size since comparison 6/14/2019.  PERITONEUM: no ascites or free air, no fluid collection.  VESSELS: Atherosclerotic changes of aorta without abdominal aortic  aneurysm.  RETROPERITONEUM: within normal limits.  BONES AND SOFT TISSUES: Postsurgical changes of right total hip  arthroplasty, internal fixation of left femoral neck fracture with  streak artifact limiting evaluation of the uterine soft tissues.  Sacral osteopenia. Linear hyperdensity along the right iliacus,  unchanged.      Impression    IMPRESSION: In this patient with history of malignant neoplasm of the  bladder:  1.  Postsurgical changes of cystoprostatectomy without  evidence of  local or metastatic disease.  2.  Prominent retroperitoneal lymph nodes, stable to decreased in size  since comparison 6/14/2019, likely reactive, continued attention on  follow-up.   3.  Cholelithiasis, fluid distended gallbladder with common bile duct  prominence, no choledocholithiasis.   4.  Stable subcentimeter pulmonary nodules.    I have personally reviewed the examination and initial interpretation  and I agree with the findings.    AARON PARTIDA MD       Scribe Disclosure:  I, Sruthi Kenny, am serving as a scribe to document services personally performed by Nguyễn Morris MD at this visit, based upon the provider's statements to me. All documentation has been reviewed by the aforementioned provider prior to being entered into the official medical record.

## 2020-01-02 ENCOUNTER — OFFICE VISIT (OUTPATIENT)
Dept: UROLOGY | Facility: CLINIC | Age: 70
End: 2020-01-02
Payer: MEDICARE

## 2020-01-02 ENCOUNTER — ANCILLARY PROCEDURE (OUTPATIENT)
Dept: CT IMAGING | Facility: CLINIC | Age: 70
End: 2020-01-02
Attending: PHYSICIAN ASSISTANT
Payer: MEDICARE

## 2020-01-02 ENCOUNTER — DOCUMENTATION ONLY (OUTPATIENT)
Dept: HEMATOLOGY | Facility: CLINIC | Age: 70
End: 2020-01-02

## 2020-01-02 VITALS
BODY MASS INDEX: 21.26 KG/M2 | WEIGHT: 157 LBS | DIASTOLIC BLOOD PRESSURE: 95 MMHG | HEIGHT: 72 IN | HEART RATE: 93 BPM | SYSTOLIC BLOOD PRESSURE: 140 MMHG

## 2020-01-02 DIAGNOSIS — C67.8 MALIGNANT NEOPLASM OF OVERLAPPING SITES OF BLADDER (H): Primary | ICD-10-CM

## 2020-01-02 DIAGNOSIS — G89.4 CHRONIC PAIN SYNDROME: ICD-10-CM

## 2020-01-02 DIAGNOSIS — D66 SEVERE HEMOPHILIA A (H): ICD-10-CM

## 2020-01-02 DIAGNOSIS — C67.8 MALIGNANT NEOPLASM OF OVERLAPPING SITES OF BLADDER (H): ICD-10-CM

## 2020-01-02 LAB
ALBUMIN SERPL-MCNC: 3.8 G/DL (ref 3.4–5)
ALP SERPL-CCNC: 106 U/L (ref 40–150)
ALT SERPL W P-5'-P-CCNC: 17 U/L (ref 0–70)
ANION GAP SERPL CALCULATED.3IONS-SCNC: 5 MMOL/L (ref 3–14)
AST SERPL W P-5'-P-CCNC: 12 U/L (ref 0–45)
BASOPHILS # BLD AUTO: 0 10E9/L (ref 0–0.2)
BASOPHILS NFR BLD AUTO: 0.7 %
BILIRUB SERPL-MCNC: 0.5 MG/DL (ref 0.2–1.3)
BUN SERPL-MCNC: 21 MG/DL (ref 7–30)
CALCIUM SERPL-MCNC: 8.6 MG/DL (ref 8.5–10.1)
CHLORIDE SERPL-SCNC: 102 MMOL/L (ref 94–109)
CO2 SERPL-SCNC: 25 MMOL/L (ref 20–32)
CREAT SERPL-MCNC: 0.95 MG/DL (ref 0.66–1.25)
DIFFERENTIAL METHOD BLD: ABNORMAL
EOSINOPHIL # BLD AUTO: 0.1 10E9/L (ref 0–0.7)
EOSINOPHIL NFR BLD AUTO: 2 %
ERYTHROCYTE [DISTWIDTH] IN BLOOD BY AUTOMATED COUNT: 15.6 % (ref 10–15)
GFR SERPL CREATININE-BSD FRML MDRD: 81 ML/MIN/{1.73_M2}
GLUCOSE SERPL-MCNC: 79 MG/DL (ref 70–99)
HCT VFR BLD AUTO: 40.6 % (ref 40–53)
HGB BLD-MCNC: 12.9 G/DL (ref 13.3–17.7)
IMM GRANULOCYTES # BLD: 0 10E9/L (ref 0–0.4)
IMM GRANULOCYTES NFR BLD: 0.4 %
LYMPHOCYTES # BLD AUTO: 0.9 10E9/L (ref 0.8–5.3)
LYMPHOCYTES NFR BLD AUTO: 15.8 %
MCH RBC QN AUTO: 27.6 PG (ref 26.5–33)
MCHC RBC AUTO-ENTMCNC: 31.8 G/DL (ref 31.5–36.5)
MCV RBC AUTO: 87 FL (ref 78–100)
MONOCYTES # BLD AUTO: 0.4 10E9/L (ref 0–1.3)
MONOCYTES NFR BLD AUTO: 7.4 %
NEUTROPHILS # BLD AUTO: 4 10E9/L (ref 1.6–8.3)
NEUTROPHILS NFR BLD AUTO: 73.7 %
NRBC # BLD AUTO: 0 10*3/UL
NRBC BLD AUTO-RTO: 0 /100
PLATELET # BLD AUTO: 204 10E9/L (ref 150–450)
POTASSIUM SERPL-SCNC: 3.8 MMOL/L (ref 3.4–5.3)
PROT SERPL-MCNC: 7.5 G/DL (ref 6.8–8.8)
RBC # BLD AUTO: 4.67 10E12/L (ref 4.4–5.9)
SODIUM SERPL-SCNC: 132 MMOL/L (ref 133–144)
WBC # BLD AUTO: 5.4 10E9/L (ref 4–11)

## 2020-01-02 PROCEDURE — 88112 CYTOPATH CELL ENHANCE TECH: CPT | Performed by: PHYSICIAN ASSISTANT

## 2020-01-02 PROCEDURE — 00000103 ZZHCL STATISTIC CYTO-FISH QC 88120: Performed by: PHYSICIAN ASSISTANT

## 2020-01-02 RX ORDER — IOPAMIDOL 755 MG/ML
96 INJECTION, SOLUTION INTRAVASCULAR ONCE
Status: COMPLETED | OUTPATIENT
Start: 2020-01-02 | End: 2020-01-02

## 2020-01-02 RX ORDER — OXYCODONE HYDROCHLORIDE 5 MG/1
5 TABLET ORAL EVERY 8 HOURS PRN
Qty: 90 TABLET | Refills: 0 | Status: SHIPPED | OUTPATIENT
Start: 2020-01-02 | End: 2020-02-03

## 2020-01-02 RX ADMIN — IOPAMIDOL 96 ML: 755 INJECTION, SOLUTION INTRAVASCULAR at 09:27

## 2020-01-02 ASSESSMENT — MIFFLIN-ST. JEOR: SCORE: 1515.15

## 2020-01-02 ASSESSMENT — PAIN SCALES - GENERAL: PAINLEVEL: NO PAIN (0)

## 2020-01-02 NOTE — LETTER
2020       RE: Orlin Alcantar  [address]  [place] [address]     Dear Colleague,    Thank you for referring your patient, Orlin Alcantar, to the [facility] UROLOGY AND INST FOR PROSTATE AND UROLOGIC CANCERS at [facility]. Please see a copy of my visit note below.      Urology Clinic    Nguyễn Morris MD  Date of Service: 2020     Name: Orlin Alcantar  MRN: [id number]  Age: 69 year old  : 1950  Referring provider: Katie Ramos     Assessment:  Orlin Alcantar  is a 69 year old male with a history of T2 bladder cancer s/p neoadjuvant gem-cis-nivo and radical cystoprostatectomy with IC and BPLND on 2018 with Dr. Shea. He presents today for a follow-up. He will have imaging later today which I will review once complete. no evidence of disease     Plan:  -Follow up with Dr. Harris on  with imaging.   -Follow up with imaging in 6 months and clinic visit with Dr. Shea.     Attestation:  This patient was seen and evaluated by me, with a scribe taking notes.  I have reviewed the note above and agree.  The physical exam and or any procedures were performed by me and the pertinant details are outlined below.       Ngyuễn Morris MD  Department of Urology  [facility]    ______________________________________________________________________    HPI  Orlin Alcantar is a 69 year old male with a history of hemophilia and T2 bladder cancer s/p radical cystoprostatectomy with IC and BPLND on 2018 with Dr. Shea.     TNM Stage: T2a, N0, M0  Number of nodes removed: 23  Number of positive nodes: 0  Surgical Margins Positive: No  Grade: High   Histology: urothelial without secondary histology.    Tolerating ORAL and good bowel function. He did have some irritation around his pouch previously but was given a cream which helped. He is doing well now.     Date of Initial Diagnosis: 2018 TURBT with Dr. Liao   Stage  of Initial Diagnosis: invasive urothelial carcinoma   Grade at Initial Diagnosis: high grade   Site of First Diagnosis: R UO was involved in the resection   Any Recurrence?: No  Intravesical Treatments: gemcitabine initiated on 2018, 4 cycles of gemcitabine-nivolumab 2018-2018 and 4 cycles of Gemcitabine-Cisplatin from 2018-2018.   Date of Last Upper Tract Imagin2019   Any evidence of hydronephrosis? mild    Review of Systems:   Pertinent items are noted in HPI or as below, remainder of complete ROS is negative.      Physical Exam:   Patient is a 69 year old  male   Vitals: Blood pressure (!) 140/95, pulse 93, height 1.829 m (6'), weight 71.2 kg (157 lb).  General Appearance Adult: Alert, no acute distress, oriented  HENT: throat/mouth:normal, good dentition  Lungs: no respiratory distress, or pursed lip breathing  Heart: No obvious jugular venous distension present  Abdomen: Body mass index is 21.29 kg/m .  Stoma is pink and functioning and well pouched   Musculoskeltal: extremities normal  Skin: no visible suspicious lesions or rashes  Neuro: Alert, oriented, speech and mentation normal  Psych: affect and mood normal  Gait: Normal  : deferred    Laboratory:   I reviewed all applicable laboratory and pathology data and went over findings with patient  Significant for     Lab Results   Component Value Date    CR 1.08 2019    CR 1.13 2019    CR 1.09 2018    CR 1.00 2018    CR 0.80 2018    CR 0.83 2018    CR 0.84 2018    CR 0.68 2018    CR 0.70 2018    CR 0.81 2018     Results for orders placed or performed in visit on 18   Routine UA with microscopic - No culture   Result Value Ref Range    Color Urine Yellow     Appearance Urine Cloudy     Glucose Urine Negative NEG^Negative mg/dL    Bilirubin Urine Negative NEG^Negative    Ketones Urine Negative NEG^Negative mg/dL    Specific Gravity Urine 1.017 1.003 - 1.035    Blood Urine  Large (A) NEG^Negative    pH Urine 5.0 5.0 - 7.0 pH    Protein Albumin Urine 100 (A) NEG^Negative mg/dL    Urobilinogen mg/dL 0.0 0.0 - 2.0 mg/dL    Nitrite Urine Negative NEG^Negative    Leukocyte Esterase Urine Large (A) NEG^Negative    Source Midstream Urine     WBC Urine >182 (H) 0 - 2 /HPF    RBC Urine >182 (H) 0 - 2 /HPF    WBC Clumps Present (A) NEG^Negative /HPF    Bacteria Urine Few (A) NEG^Negative /HPF    Transitional Epi 1 (A) FEW^Few /HPF    Mucous Urine Present (A) NEG^Negative /LPF    Hyaline Casts 6 (H) 0 - 2 /LPF     Imaging:   I personally reviewed all applicable imaging and went over the below findings with patient.    Results for orders placed or performed in visit on 09/16/19   CT Chest/Abdomen/Pelvis w Contrast    Narrative    Examination: CT CHEST/ABDOMEN/PELVIS W CONTRAST, 9/16/2019 10:26 AM    Comparison: 8/14/2019, 3/7/2019.    History: h/o bladder cancer, routine 3 month follow up; Malignant  neoplasm of overlapping sites of bladder (H).     Technique: Axial images of the chest, abdomen and pelvis were obtained  with contrast. Coronal reconstructions were provided. Images were  reviewed in bone, lung, and soft tissue windows.   Contrast dose: 98.3 mL Isovue 370 IV push    Radiation Dose (DLP): 922 mGy*cm    FINDINGS:    CHEST:  AIRWAYS: Small amount of debris within the trachea and right mainstem  bronchus.  LUNG: Bibasilar, subpleural reticular and groundglass atelectasis.  Thin-walled cystic changes in the posterior left lower lobe.  Mild  diffuse emphysematous changes. Unchanged nodular focus of streaky  hyperattenuation in the anterior right middle lobe (series 10 image  64), similar to comparison 3/19/2018.  Stable right lower lobe  groundglass nodule measuring 6 mm (series 10 image 70), unchanged  since 3/19/2018. Stable 5 mm solid left lower lobe nodule (series 10  image 77). No new or enlarging pulmonary nodules. Scattered  granulomas.  PLEURA: within normal limits.  VESSELS:  Atherosclerotic calcification of the aorta and its major  branches. Aorta and main pulmonary artery nondilated.  HEART: within normal limits.  MEDIASTINUM AND CHRIS: Trace pericardial fluid. No mediastinal or hilar  adenopathy.  BONES AND SOFT TISSUES: within normal limits.    ABDOMEN:  LIVER: Too small to characterize foci of hypoattenuation, segments 2/3  of the liver, stable.  BILE DUCTS: Mild central intrahepatic dilatation. Common bile duct  measures 10 mm.  GALLBLADDER: Cholelithiasis. Fluid distended. No gallbladder wall  thickening or pericholecystic fluid.  PANCREAS: within normal limits.  SPLEEN: Splenic granulomas.  ADRENALS: Spleen is not enlarged.  URINARY TRACT: Postsurgical changes of cystectomy with right lower  quadrant ileal conduit. Bilateral nonobstructing punctate  nephrolithiasis. Too small to characterize hypoattenuating cortical  foci. Left renal hypoattenuating cortical focus measures 12 x 12 mm  (series 6 image 74), demonstrates thick septation, decreased in size  since comparison 4/17/2017. Mild enhancement of the urothelium of the  renal pelvis and ureters. Mild hydronephrosis bilaterally. Mild  eccentric urothelial thickening of the left ureter, similar to  comparisons. No filling defects within the urinary collecting system.    BOWEL: Not obstructive. Normal vermiform appendix. Moderate volume of  fecal-like contents at the small bowel anastomosis.  LYMPH NODES: Prominent retroperitoneal lymph nodes. Representative  lymph node the pancreatic head measures 8 mm (series 14 image 30),  periaortic lymph node measuring 8 mm (series 15 image 155), decreased  in size since comparison 6/14/2019.  PERITONEUM: no ascites or free air, no fluid collection.  VESSELS: Atherosclerotic changes of aorta without abdominal aortic  aneurysm.  RETROPERITONEUM: within normal limits.  BONES AND SOFT TISSUES: Postsurgical changes of right total hip  arthroplasty, internal fixation of left femoral neck fracture  with  streak artifact limiting evaluation of the uterine soft tissues.  Sacral osteopenia. Linear hyperdensity along the right iliacus,  unchanged.      Impression    IMPRESSION: In this patient with history of malignant neoplasm of the  bladder:  1.  Postsurgical changes of cystoprostatectomy without evidence of  local or metastatic disease.  2.  Prominent retroperitoneal lymph nodes, stable to decreased in size  since comparison 6/14/2019, likely reactive, continued attention on  follow-up.   3.  Cholelithiasis, fluid distended gallbladder with common bile duct  prominence, no choledocholithiasis.   4.  Stable subcentimeter pulmonary nodules.    I have personally reviewed the examination and initial interpretation  and I agree with the findings.    AARON PARTIDA MD       Scribe Disclosure:  I, Sruthi Kenny, am serving as a scribe to document services personally performed by Nguyễn Morris MD at this visit, based upon the provider's statements to me. All documentation has been reviewed by the aforementioned provider prior to being entered into the official medical record.       Again, thank you for allowing me to participate in the care of your patient.      Sincerely,    Nguyễn Morris MD

## 2020-01-02 NOTE — NURSING NOTE
Chief Complaint   Patient presents with     Blood Draw     Pt came to lab with a 20 ga angio cath placed from CT scan this am, labs drawn from IV, deaccessed IV, urine collected and sent, no further appointments, no vitals needed.   Faith Tovar, RN

## 2020-01-02 NOTE — PATIENT INSTRUCTIONS
Please follow up in 6 months with Dr. Shea    It was a pleasure meeting with you today.  Thank you for allowing me and my team the privilege of caring for you today.  YOU are the reason we are here, and I truly hope we provided you with the excellent service you deserve.  Please let us know if there is anything else we can do for you so that we can be sure you are leaving completely satisfied with your care experience.

## 2020-01-03 NOTE — PROGRESS NOTES
Orlin Alcantar called to order a refill of Kogenate FS . He has 1 dose(s) remaining at home and would like it delivered to his house via  to arrive on 1/2/2020.      The following doses were dispensed from our pharmacy:     Product: Kogenate FS     Doses: 1 dose of 2949 units + 3 doses of 3185 units     SALINA Keys., [facility]  Hemophilia Pharmacy Coordinator  [phone]  [email or web address]

## 2020-01-06 ENCOUNTER — ONCOLOGY VISIT (OUTPATIENT)
Dept: ONCOLOGY | Facility: CLINIC | Age: 70
End: 2020-01-06
Attending: PHYSICIAN ASSISTANT
Payer: MEDICARE

## 2020-01-06 VITALS
TEMPERATURE: 97.9 F | DIASTOLIC BLOOD PRESSURE: 94 MMHG | HEIGHT: 72 IN | OXYGEN SATURATION: 97 % | HEART RATE: 96 BPM | WEIGHT: 162 LBS | SYSTOLIC BLOOD PRESSURE: 148 MMHG | BODY MASS INDEX: 21.94 KG/M2 | RESPIRATION RATE: 15 BRPM

## 2020-01-06 DIAGNOSIS — C67.9 UROTHELIAL CARCINOMA OF BLADDER (H): Primary | ICD-10-CM

## 2020-01-06 LAB — COPATH REPORT: NORMAL

## 2020-01-06 PROCEDURE — 99214 OFFICE O/P EST MOD 30 MIN: CPT | Mod: ZP | Performed by: PHYSICIAN ASSISTANT

## 2020-01-06 PROCEDURE — G0463 HOSPITAL OUTPT CLINIC VISIT: HCPCS | Mod: ZF

## 2020-01-06 ASSESSMENT — MIFFLIN-ST. JEOR: SCORE: 1537.96

## 2020-01-06 ASSESSMENT — PAIN SCALES - GENERAL: PAINLEVEL: NO PAIN (0)

## 2020-01-06 NOTE — PATIENT INSTRUCTIONS
Colorectal Cancer Screening: During our visit today, we discussed that it is recommended you receive colorectal cancer screening. Please call or make an appointment with your primary care provider to discuss this. You may also call the [facility] scheduling line ([phone]) to set up a colonoscopy appointment.

## 2020-01-06 NOTE — NURSING NOTE
"Oncology Rooming Note    January 6, 2020 9:17 AM   Orlin Alcantar is a 69 year old male who presents for:    Chief Complaint   Patient presents with     Oncology Clinic Visit     Return; Bladder Ca     Initial Vitals: BP (!) 163/103   Pulse 96   Temp 97.9  F (36.6  C) (Oral)   Resp 15   Ht 1.829 m (6' 0.01\")   Wt 73.5 kg (162 lb)   SpO2 97%   BMI 21.97 kg/m   Estimated body mass index is 21.97 kg/m  as calculated from the following:    Height as of this encounter: 1.829 m (6' 0.01\").    Weight as of this encounter: 73.5 kg (162 lb). Body surface area is 1.93 meters squared.  No Pain (0) Comment: Data Unavailable   No LMP for male patient.  Allergies reviewed: Yes  Medications reviewed: Yes    Medications: Medication refills not needed today.  Pharmacy name entered into EPIC:    [facility] - [place], [place] - [address] [facility] [place], [place] - [address] [place] [address] PHARMACY [place] - [facility] [place][note 2[place]    Clinical concerns: No new concerns        Kalyn Spaulding CMA              "

## 2020-01-06 NOTE — PROGRESS NOTES
[facility] CANCER CLINIC  FOLLOW-UP VISIT NOTE  Date of visit: Jan 6, 2020        REASON FOR VISIT: bladder cancer, 4 month follow up    ONCOLOGY TREATMENT HISTORY: Muscle Invasive Urothelial Carcinoma, soilitary, obturator node, no distant metastases.   3/26/18: Consented for the Nivolumab-Gemcitabine-Cisplatin study [id number]    3/28/18: Screening visit for study.   4/4/18; c1d1 gemcitabine  4/11/18: c1d8 gemcitabine-nivolumab  4/25/18: C2D1 Gemcitabine-Cisplatin  5/2/18: C2D8 Gemcitabine-Nivolumab  5/16/18: C3D1 Gemcitabine-Cisplatin  5/23/18: C3D8 Gemcitabine-Nivolumab  6/6/18: C4d1 Gemcitabine-Cisplatin  6/13/18: C4D8 Gemcitabine-Nivolumab    7/30/18: Radical cystoprostatectomy with bilateral pelvic LN dissection and ileal conduit. Final pathology pT0N0       HPI:  68 year old male with PMH of hemophilia, chronic hepatis C who was seen by DR. Burns for evaluation of neoadjuvant therapy with Gemcitabine Cisplatin and Nivolumab.    Patient has a history of hemophilia and has undergone several surgeries in right hip replacement, knee replacement. Hemophilia managed by Dr. Marley.   He reports recurrent hematuria for a year, initially attributed to kidney stones, cystoscopy in Feb 2018 revealed MIBC. PET-CT scan - which has revealed muscle invasive bladder right lateral wall of bladder and right  obturator lymphnode, 1.1 cm short axis.  He has been treated for HCV in the 1990s and has not had any complications. His LFTs have remained normal. His hemophilia is very well controlled on current regimen of factor replacement. He does not have any autoimmune disease and is not on immunesuppressants or steroids. His diabetes is diet controlled and he has not required any medications. Olrin was enrolled on our neoadjuvant Nivolumab-[place]-Cis trial and he completed 4 cycles of treatment and then underwent      Radical cystoprostatectomy and Bilateral pelvic lymph node dissection and ileal conduit on  7/30/18.    INTERVAL HISTORY; Orlin is doing well today. Since I saw him last- no major joint bleeding. He had two falls, slipped in the snow and tripped on his carpet- thankfully no major joint bleeds after this.  No neuropathy, tinnitus or residual chemo effects.  He feels perfectly well with no concerns.  Adjusted to his ilial conduit well, no changes in color or odor of the urine.     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      Bladder tumor      Depression      DM II (diabetes mellitus, type II), controlled (H)     diet controlled     Hemophilia (H)     Type A     History of hepatitis C     treated successfully     HTN (hypertension)           CURRENT OUTPATIENT MEDICATIONS     Current Outpatient Medications   Medication Sig     acetaminophen (TYLENOL) 325 MG tablet Take 2 tablets (650 mg) by mouth every 4 hours as needed for mild pain or fever     Antihemophilic Factor, Recomb, (KOGENATE FS) 1000 units KIT Directions: Infuse 2949 units or 3185 units iv every Mon, Wed, Fri & Sun prn for breakthrough bleeding     diazepam (VALIUM) 5 MG tablet Take 1 tablet (5 mg) by mouth daily as needed for anxiety or sleep     mirtazapine (REMERON) 45 MG tablet Take 1 tablet (45 mg) by mouth At Bedtime And may take 1/2 tablet for insomnia up to 3 times a week.     order for DME Cohesive Lurdes rings [phone]     order for DME Equipment being ordered: Walker Wheels () and Walker ()  Treatment Diagnosis: gait instability     oxyCODONE (ROXICODONE) 5 MG tablet Take 1 tablet (5 mg) by mouth every 8 hours as needed for pain     No current facility-administered medications for this visit.      Facility-Administered Medications Ordered in Other Visits   Medication     ethyl chloride spray     ECOG performance status of 1.   There were no vitals taken for this visit.    Wt Readings from Last 4  Encounters:   01/02/20 71.2 kg (157 lb)   10/22/19 73.9 kg (163 lb)   09/23/19 73.6 kg (162 lb 4.8 oz)   07/23/19 73 kg (161 lb)       HEENT: No icterus, no pallor. Moist mucous membranes. Oropharynx is clear.   NECK: Supple  LUNGS: Bilateral clear to ausculation  CARDIOVASCULAR: Regular rate and rhythm, no murmurs, gallops or rubs.   ABDOMEN: Soft, nontender and nondistended.   EXTREMITIES: No cyanosis, no clubbing, no edema.   NEUROLOGIC: No focal deficits.       11/16/2018 08:47 3/18/2019 11:11 9/16/2019 09:40 1/2/2020 10:05   WBC 6.4 6.0 5.8 5.4   Hemoglobin 10.3 (L) 12.8 (L) 12.6 (L) 12.9 (L)   Hematocrit 33.2 (L) 41.1 40.5 40.6   Platelet Count 178 230 217 204   RBC Count 4.08 (L) 4.67 4.62 4.67   MCV 81 88 88 87      1/2/2020 10:05   Sodium 132 (L)   Potassium 3.8   Chloride 102   Carbon Dioxide 25   Urea Nitrogen 21   Creatinine 0.95   GFR Estimate 81   GFR Estimate If Black >90   Calcium 8.6   Anion Gap 5   Albumin 3.8   Protein Total 7.5   Bilirubin Total 0.5   Alkaline Phosphatase 106   ALT 17   AST 12   Glucose 79     Urine Cyto--pending    CT CAP IMPRESSION:  1. Cystoprostatectomy without evidence of recurrent or metastatic  disease.  2. Prominence of the renal collecting systems and ureters similar to  prior, not unexpected for a urinary diversion.  3. Nonobstructing bilateral nephrolithiasis.  4. Cholelithiasis.  5. Stable left upper lobe pulmonary nodule.    ASSESSMENT/PLAN:     ONC: 69 year old male with T2N1(solitary node)  urothelial carcinoma,currently enrolled on the clinical trial of Nivolumab with Gemcitabine and Cisplatin for MIBC, completed C4D8 on 6/13/18. He has had a complete response at radical cystectomy final pathology showing PT0 N0 of note he was T2N1 prior to neoadjuvant therapy.  He is OZZIE on scans and is on SOC follow up for surveillance.  We discussed labs, urine cytology -q6m and CT CAP every 3 months to complete 2 years, then decrease to 6 months. We discussed the recent data  with prognosis and that he had an excellent response to neoadjuvant therapy.  His risk of recurrence is low.      He recently saw Dr Morris, and will see Urology every 6 months.   He will see me in oncology back in 3-4 months for labs, urine cytology, CT and to see myself.     Hemophilia management: Per Dr. Marley  Encouraged him to see PCP for annual exam,etc    Monica Harris PA-C

## 2020-01-08 ENCOUNTER — TELEPHONE (OUTPATIENT)
Dept: FAMILY MEDICINE | Facility: CLINIC | Age: 70
End: 2020-01-08

## 2020-01-08 NOTE — TELEPHONE ENCOUNTER
Panel Management Review      Patient has the following on his problem list:     Depression / Dysthymia review    Measure:  Needs PHQ-9 score of 4 or less during index window.  Administer PHQ-9 and if score is 5 or more, send encounter to provider for next steps.    5 - 7 month window range:     PHQ-9 SCORE 4/9/2015 9/7/2016 11/8/2017   PHQ-9 Total Score 4 - -   PHQ-9 Total Score - 1 0   Some encounter information is confidential and restricted. Go to Review Flowsheets activity to see all data.       If PHQ-9 recheck is 5 or more, route to provider for next steps.    Patient is due for:  PHQ9    Hypertension   Last three blood pressure readings:  BP Readings from Last 3 Encounters:   01/06/20 (!) 148/94   01/02/20 (!) 140/95   10/22/19 (!) 133/90     Blood pressure: FAILED    HTN Guidelines:  Less than 140/90      Composite cancer screening  Chart review shows that this patient is due/due soon for the following Colonoscopy  Summary:    Patient is due/failing the following:   BP CHECK, PHQ9 and PHYSICAL    Action needed:   Patient needs office visit for annual., Patient needs to do PHQ9. and Patient needs nurse only appointment.    Type of outreach:    Phone, left message for patient to call back.     Questions for provider review:    None                                                                                                                                    Kayce Rosen MA on 1/8/2020 at 4:35 PM       First attempt

## 2020-01-09 ENCOUNTER — DOCUMENTATION ONLY (OUTPATIENT)
Dept: HEMATOLOGY | Facility: CLINIC | Age: 70
End: 2020-01-09

## 2020-01-10 NOTE — PROGRESS NOTES
Orlin Alcantar called to order a refill of Kogenate . He has 1 dose(s) remaining at home and would like it delivered to his house via  to arrive on 1/9/20.     The following doses were dispensed from our pharmacy:    Product: Kogenate    Doses: 1 dose of 2949, 3 doses of 3185     Analilia Mccarty    [facility]  [phone]  [email or web address]

## 2020-01-16 ENCOUNTER — DOCUMENTATION ONLY (OUTPATIENT)
Dept: HEMATOLOGY | Facility: CLINIC | Age: 70
End: 2020-01-16

## 2020-01-17 NOTE — PROGRESS NOTES
Orlin Alcantar called to order a refill of Kogenate FS . He has 1 dose(s) remaining at home and would like it delivered to his house via  to arrive on 1/16/2020.     The following doses were dispensed from our pharmacy:    Product: Kogenate FS    Doses: 1 dose of 2949 units + 3 doses of 3185 units    SALINA Keys., [facility]  Hemophilia Pharmacy Coordinator  [phone]  [email or web address]             
11-Mar-2019 20:10

## 2020-01-23 ENCOUNTER — TELEPHONE (OUTPATIENT)
Dept: FAMILY MEDICINE | Facility: CLINIC | Age: 70
End: 2020-01-23

## 2020-01-23 NOTE — TELEPHONE ENCOUNTER
Panel Management Review      Patient has the following on his problem list:     Depression / Dysthymia review    Measure:  Needs PHQ-9 score of 4 or less during index window.  Administer PHQ-9 and if score is 5 or more, send encounter to provider for next steps.    5 - 7 month window range:     PHQ-9 SCORE 4/9/2015 9/7/2016 11/8/2017   PHQ-9 Total Score 4 - -   PHQ-9 Total Score - 1 0   Some encounter information is confidential and restricted. Go to Review Flowsheets activity to see all data.       If PHQ-9 recheck is 5 or more, route to provider for next steps.    Patient is due for:  PHQ9    Hypertension   Last three blood pressure readings:  BP Readings from Last 3 Encounters:   01/06/20 (!) 148/94   01/02/20 (!) 140/95   10/22/19 (!) 133/90     Blood pressure: FAILED    HTN Guidelines:  Less than 140/90      Composite cancer screening  Chart review shows that this patient is due/due soon for the following None  Summary:    Patient is due/failing the following:   BP CHECK, PHQ9 and PHYSICAL    Action needed:   Patient needs office visit for annual., Patient needs to do PHQ9. and Patient needs nurse only appointment.    Type of outreach:    Phone, left message for patient to call back.     Questions for provider review:    None                                                                                                                                    Kayce Rosen MA on 1/23/2020 at 1:39 PM       Second attempt

## 2020-01-24 ENCOUNTER — DOCUMENTATION ONLY (OUTPATIENT)
Dept: HEMATOLOGY | Facility: CLINIC | Age: 70
End: 2020-01-24

## 2020-01-24 DIAGNOSIS — Z85.51 PERSONAL HISTORY OF MALIGNANT NEOPLASM OF BLADDER: ICD-10-CM

## 2020-01-24 DIAGNOSIS — D66 SEVERE HEMOPHILIA A (H): ICD-10-CM

## 2020-01-24 RX ORDER — ANTIHEMOPHILIC FACTOR (RECOMBINANT) 1000 (+/-)
KIT INTRAVENOUS
Qty: 11796 EACH | Refills: 6 | Status: SHIPPED | OUTPATIENT
Start: 2020-01-24 | End: 2020-03-16

## 2020-01-24 RX ORDER — ANTIHEMOPHILIC FACTOR (RECOMBINANT) 1000 (+/-)
KIT INTRAVENOUS
Qty: 11796 EACH | Refills: 6 | Status: SHIPPED | OUTPATIENT
Start: 2020-01-24 | End: 2020-01-24

## 2020-01-30 ENCOUNTER — DOCUMENTATION ONLY (OUTPATIENT)
Dept: HEMATOLOGY | Facility: CLINIC | Age: 70
End: 2020-01-30

## 2020-01-30 NOTE — PROGRESS NOTES
Orlin Alacntar called to order a refill of Kogenate FS . He has 1 dose(s) remaining at home and would like it delivered to his house via  to arrive on 1/24/2020.     The following doses were dispensed from our pharmacy:    Product: Kogenate FS    Doses: 4 doses of 2949 units    MICHAEL Keys, [facility]  Hemophilia Pharmacy Coordinator  [phone]  [email or web address]

## 2020-02-03 ENCOUNTER — DOCUMENTATION ONLY (OUTPATIENT)
Dept: HEMATOLOGY | Facility: CLINIC | Age: 70
End: 2020-02-03

## 2020-02-03 DIAGNOSIS — G89.4 CHRONIC PAIN SYNDROME: ICD-10-CM

## 2020-02-03 DIAGNOSIS — D66 SEVERE HEMOPHILIA A (H): ICD-10-CM

## 2020-02-03 RX ORDER — OXYCODONE HYDROCHLORIDE 5 MG/1
5 TABLET ORAL EVERY 8 HOURS PRN
Qty: 90 TABLET | Refills: 0 | Status: SHIPPED | OUTPATIENT
Start: 2020-02-03 | End: 2020-03-02

## 2020-02-03 NOTE — PROGRESS NOTES
Orlin Alcantar called to order a refill of Kogenate FS . He has 1 dose(s) remaining at home and would like it delivered to his house via  to arrive on 2/3/2020.     The following doses were dispensed from our pharmacy:    Product: Kogenate FS    Doses: 4 doses of 2949 units    MICHAEL Keys, [facility]  Hemophilia Pharmacy Coordinator  [phone]  [email or web address]

## 2020-02-06 ENCOUNTER — TELEPHONE (OUTPATIENT)
Dept: FAMILY MEDICINE | Facility: CLINIC | Age: 70
End: 2020-02-06

## 2020-02-06 NOTE — TELEPHONE ENCOUNTER
Panel Management Review      Patient has the following on his problem list:     Depression / Dysthymia review    Measure:  Needs PHQ-9 score of 4 or less during index window.  Administer PHQ-9 and if score is 5 or more, send encounter to provider for next steps.    5 - 7 month window range:     PHQ-9 SCORE 4/9/2015 9/7/2016 11/8/2017   PHQ-9 Total Score 4 - -   PHQ-9 Total Score - 1 0   Some encounter information is confidential and restricted. Go to Review Flowsheets activity to see all data.       If PHQ-9 recheck is 5 or more, route to provider for next steps.    Patient is due for:  PHQ9    Hypertension   Last three blood pressure readings:  BP Readings from Last 3 Encounters:   01/06/20 (!) 148/94   01/02/20 (!) 140/95   10/22/19 (!) 133/90     Blood pressure: FAILED    HTN Guidelines:  Less than 140/90      Composite cancer screening  Chart review shows that this patient is due/due soon for the following None  Summary:    Patient is due/failing the following:   BP CHECK, PHQ9 and PHYSICAL    Action needed:   Patient needs office visit for annual., Patient needs to do PHQ9. and Patient needs nurse only appointment.    Type of outreach:    Phone, left message for patient to call back.  and Sent letter.    Questions for provider review:    None                                                                                                                                    Kayce Rosen MA on 2/6/2020 at 8:25 AM

## 2020-02-06 NOTE — LETTER
Formerly named [facility]  [address] [place] [id number] [phone]      February 6, 2020    Orlin Alcantar                                                                                                                     [address]   [place] [address]        Dear Orlin Alcantar,    [facility] cares about your health and your health plan.  We have reviewed your medical conditions, medication list and lab results, and are making recommendations based on this review to better manage your health.    You are in particular need of attention regarding:  -Depression/Anxiety  -High Blood Pressure  -Wellness (Physical) Visit     We are recommending that you:     -schedule a NURSE-ONLY APPOINTMENT within the next 1-4 weeks for a Medical Assistant BP Check    -schedule a WELLNESS (Physical) APPOINTMENT with Katie Ramos MD             -Please complete the enclosed PHQ9 and return form, if you score above a 5                 please schedule a follow up appointment with your primary care physician    Please call us at the number listed above or use Knip to address the above recommendations.     Thank you for trusting [facility].  We appreciate the opportunity to serve you and look forward to supporting your healthcare in the future.    If you have (or plan to have) any of these tests or questionnaires done at a facility other than a [facility] or a [facility], please have the results sent to the [facility] location noted above.      Best Regards,    Your [facility] Team

## 2020-02-10 ENCOUNTER — DOCUMENTATION ONLY (OUTPATIENT)
Dept: HEMATOLOGY | Facility: CLINIC | Age: 70
End: 2020-02-10

## 2020-02-13 NOTE — PROGRESS NOTES
Orlin Alcantar called to order a refill of Kogenate . He has 1 dose(s) remaining at home and would like it delivered to his house via  to arrive on 02/10/20.     The following doses were dispensed from our pharmacy:    Product: Kogenate    Doses: 4 doses of 2949 units    Nathan Land CPhT  [facility]  [phone]

## 2020-02-14 ENCOUNTER — TELEPHONE (OUTPATIENT)
Dept: FAMILY MEDICINE | Facility: CLINIC | Age: 70
End: 2020-02-14

## 2020-02-14 ASSESSMENT — ANXIETY QUESTIONNAIRES
1. FEELING NERVOUS, ANXIOUS, OR ON EDGE: NOT AT ALL
7. FEELING AFRAID AS IF SOMETHING AWFUL MIGHT HAPPEN: NOT AT ALL
5. BEING SO RESTLESS THAT IT IS HARD TO SIT STILL: NOT AT ALL
2. NOT BEING ABLE TO STOP OR CONTROL WORRYING: NOT AT ALL
6. BECOMING EASILY ANNOYED OR IRRITABLE: NOT AT ALL
3. WORRYING TOO MUCH ABOUT DIFFERENT THINGS: SEVERAL DAYS
IF YOU CHECKED OFF ANY PROBLEMS ON THIS QUESTIONNAIRE, HOW DIFFICULT HAVE THESE PROBLEMS MADE IT FOR YOU TO DO YOUR WORK, TAKE CARE OF THINGS AT HOME, OR GET ALONG WITH OTHER PEOPLE: NOT DIFFICULT AT ALL
GAD7 TOTAL SCORE: 1

## 2020-02-14 ASSESSMENT — PATIENT HEALTH QUESTIONNAIRE - PHQ9
5. POOR APPETITE OR OVEREATING: NOT AT ALL
SUM OF ALL RESPONSES TO PHQ QUESTIONS 1-9: 0

## 2020-02-14 NOTE — TELEPHONE ENCOUNTER
Panel Management Review      Patient has the following on his problem list:     Depression / Dysthymia review    Measure:  Needs PHQ-9 score of 4 or less during index window.  Administer PHQ-9 and if score is 5 or more, send encounter to provider for next steps.    5 - 7 month window range:     PHQ-9 SCORE 4/9/2015 9/7/2016 11/8/2017   PHQ-9 Total Score 4 - -   PHQ-9 Total Score - 1 0   Some encounter information is confidential and restricted. Go to Review Flowsheets activity to see all data.       If PHQ-9 recheck is 5 or more, route to provider for next steps.    Patient is due for:  PHQ9      Composite cancer screening  Chart review shows that this patient is due/due soon for the following Colonoscopy  Summary:    Patient is due/failing the following:   COLONOSCOPY, PHQ9 and PHYSICAL    Action needed:   phq9 mailed to clinic     Type of outreach:    phq 9 mailed to clinic     Questions for provider review:    None                                                                                                                                    Monica Pack on 2/14/2020 at 4:23 PM

## 2020-02-15 ASSESSMENT — ANXIETY QUESTIONNAIRES: GAD7 TOTAL SCORE: 1

## 2020-02-16 ENCOUNTER — DOCUMENTATION ONLY (OUTPATIENT)
Dept: HEMATOLOGY | Facility: CLINIC | Age: 70
End: 2020-02-16

## 2020-02-17 NOTE — PROGRESS NOTES
Orlin Alcantar called to order a refill of Kogenate Fs . He has 0 dose(s) remaining at home and would like it delivered to his house via   to arrive on 02/17/20.     The following doses were dispensed from our pharmacy:    Product: Kogenate Fs    Doses: 4 doses of 2949     Nathan Land CPhT  [facility]  [phone]

## 2020-02-25 ENCOUNTER — DOCUMENTATION ONLY (OUTPATIENT)
Dept: HEMATOLOGY | Facility: CLINIC | Age: 70
End: 2020-02-25

## 2020-02-25 NOTE — PROGRESS NOTES
Orlin Alcantar called to order a refill of Kogenate FS . He has 1 dose(s) remaining at home and would like it delivered to his house via  to arrive on 02/24/20.     The following doses were dispensed from our pharmacy:    Product: Kogenate FS     Doses: 4 doses of 2949 = total units 11,796    Nathan Land CPhT  [facility]  [phone]

## 2020-03-02 DIAGNOSIS — D66 SEVERE HEMOPHILIA A (H): ICD-10-CM

## 2020-03-02 DIAGNOSIS — G89.4 CHRONIC PAIN SYNDROME: ICD-10-CM

## 2020-03-02 RX ORDER — OXYCODONE HYDROCHLORIDE 5 MG/1
5 TABLET ORAL EVERY 8 HOURS PRN
Qty: 90 TABLET | Refills: 0 | Status: SHIPPED | OUTPATIENT
Start: 2020-03-02 | End: 2020-03-30

## 2020-03-03 ENCOUNTER — DOCUMENTATION ONLY (OUTPATIENT)
Dept: HEMATOLOGY | Facility: CLINIC | Age: 70
End: 2020-03-03

## 2020-03-03 NOTE — PROGRESS NOTES
Orlin Alcantar called to order a refill of Kogenate Fs . He has 2 dose(s) remaining at home and would like it delivered to his house via  to arrive on 03/02/2020.     The following doses were dispensed from our pharmacy:    Product: Kogenate Fs    Doses: 4 Doses of 2949    Nathan Land CPhT  [facility]  [phone]

## 2020-03-11 ENCOUNTER — DOCUMENTATION ONLY (OUTPATIENT)
Dept: HEMATOLOGY | Facility: CLINIC | Age: 70
End: 2020-03-11

## 2020-03-11 NOTE — PROGRESS NOTES
Orlin Alcantar called to order a refill of Kogenate . He has 0   dose(s) remaining at home and would like it delivered to his house via  to arrive on 03/10/20.     The following doses were dispensed from our pharmacy:    Product: Kogenate    Doses: 4 doses of 2949     Nathan Land CPhT  [facility]  [phone]

## 2020-03-15 ENCOUNTER — HEALTH MAINTENANCE LETTER (OUTPATIENT)
Age: 70
End: 2020-03-15

## 2020-03-16 ENCOUNTER — DOCUMENTATION ONLY (OUTPATIENT)
Dept: HEMATOLOGY | Facility: CLINIC | Age: 70
End: 2020-03-16

## 2020-03-16 DIAGNOSIS — Z85.51 PERSONAL HISTORY OF MALIGNANT NEOPLASM OF BLADDER: ICD-10-CM

## 2020-03-16 DIAGNOSIS — D66 SEVERE HEMOPHILIA A (H): ICD-10-CM

## 2020-03-16 RX ORDER — ANTIHEMOPHILIC FACTOR (RECOMBINANT) 1000 (+/-)
KIT INTRAVENOUS
Qty: 11796 EACH | Refills: 6 | Status: SHIPPED | OUTPATIENT
Start: 2020-03-16 | End: 2020-03-23

## 2020-03-16 NOTE — PROGRESS NOTES
Orlin Alcantar called to order a refill of Kogenate . He has1 dose(s) remaining at home and would like it delivered to his house via  to arrive on 03/16/20.     The following doses were dispensed from our pharmacy:    Product: Kogenate    Doses: 4 doses of 2949    Nathan Land CPhT  [facility]  [phone]

## 2020-03-23 DIAGNOSIS — D66 SEVERE HEMOPHILIA A (H): ICD-10-CM

## 2020-03-23 DIAGNOSIS — Z85.51 PERSONAL HISTORY OF MALIGNANT NEOPLASM OF BLADDER: ICD-10-CM

## 2020-03-23 RX ORDER — ANTIHEMOPHILIC FACTOR (RECOMBINANT) 1000 (+/-)
KIT INTRAVENOUS
Qty: 11796 EACH | Refills: 6 | Status: SHIPPED | OUTPATIENT
Start: 2020-03-23 | End: 2020-05-14

## 2020-03-24 ENCOUNTER — TELEPHONE (OUTPATIENT)
Dept: HEMATOLOGY | Facility: CLINIC | Age: 70
End: 2020-03-24

## 2020-03-24 NOTE — TELEPHONE ENCOUNTER
Mr. Cavanaugh is a 70 year old man with severe Hemophilia A and history of bladder cancer. RN checked with him regarding on gong bleed prevention and he reports no bleeding episodes and doing well. He received by mail (he does not have a computer) the letter from our Center regarding the COVID-19 pandemic and is aware of the resources listed. He explained that he was feeling well, was checking daily for fever, and practicing social distancing. I thanked him for his time and let him know to call with any concerns.

## 2020-03-25 ENCOUNTER — DOCUMENTATION ONLY (OUTPATIENT)
Dept: HEMATOLOGY | Facility: CLINIC | Age: 70
End: 2020-03-25

## 2020-03-25 NOTE — PROGRESS NOTES
Orlin Alcantar called to order a refill of Kogenate Fs . He has 1 dose(s) remaining at home and would like it delivered to his house via  to arrive on 03/24/2020.     The following doses were dispensed from our pharmacy:    Product: Kogenate Fs    Doses: 4 doses of 2949    Nathan Land CPhT  [facility]  [phone]

## 2020-03-30 ENCOUNTER — DOCUMENTATION ONLY (OUTPATIENT)
Dept: HEMATOLOGY | Facility: CLINIC | Age: 70
End: 2020-03-30

## 2020-03-30 DIAGNOSIS — G89.4 CHRONIC PAIN SYNDROME: ICD-10-CM

## 2020-03-30 DIAGNOSIS — D66 SEVERE HEMOPHILIA A (H): ICD-10-CM

## 2020-03-30 RX ORDER — OXYCODONE HYDROCHLORIDE 5 MG/1
5 TABLET ORAL EVERY 8 HOURS PRN
Qty: 90 TABLET | Refills: 0 | Status: SHIPPED | OUTPATIENT
Start: 2020-03-30 | End: 2020-04-28

## 2020-03-30 NOTE — PROGRESS NOTES
Orlin Alcantar called to order a refill of Kogenate Fs . He has 1 dose(s) remaining at home and would like it delivered to his house via   to arrive on 03/30/2020.     The following doses were dispensed from our pharmacy:    Product: Kogenate Fs    Doses: 4 doses of 2949    Nathan Land CPhT  [facility]  [phone]

## 2020-04-10 ENCOUNTER — DOCUMENTATION ONLY (OUTPATIENT)
Dept: HEMATOLOGY | Facility: CLINIC | Age: 70
End: 2020-04-10

## 2020-04-10 NOTE — PROGRESS NOTES
Orlin Alcantar called to order a refill of Kogenate Fs . He has 1 dose(s) remaining at home and would like it delivered to his house via  to arrive on 04/09/2020.     The following doses were dispensed from our pharmacy:    Product: Kogenate Fs    Doses: 4 doses of 2949    Nathan Land CPhT  [facility]  [phone]

## 2020-04-13 DIAGNOSIS — F41.1 GENERALIZED ANXIETY DISORDER: ICD-10-CM

## 2020-04-13 RX ORDER — MIRTAZAPINE 45 MG/1
45 TABLET, FILM COATED ORAL AT BEDTIME
Qty: 37 TABLET | Refills: 5 | Status: CANCELLED | OUTPATIENT
Start: 2020-04-13

## 2020-04-14 DIAGNOSIS — F41.1 GENERALIZED ANXIETY DISORDER: ICD-10-CM

## 2020-04-14 DIAGNOSIS — F32.A DEPRESSIVE DISORDER: ICD-10-CM

## 2020-04-14 RX ORDER — DIAZEPAM 5 MG
5 TABLET ORAL DAILY PRN
Qty: 30 TABLET | Refills: 0 | Status: SHIPPED | OUTPATIENT
Start: 2020-04-14 | End: 2020-04-28

## 2020-04-14 RX ORDER — MIRTAZAPINE 45 MG/1
45 TABLET, FILM COATED ORAL AT BEDTIME
Qty: 37 TABLET | Refills: 0 | Status: SHIPPED | OUTPATIENT
Start: 2020-04-14 | End: 2020-04-28

## 2020-04-14 NOTE — TELEPHONE ENCOUNTER
From: Han Smith RPH   Sent: 4/14/2020   9:31 AM CDT   To: [facility]   Subject: Med refills - Mirtazapine and Diazepam           Marielo Mr. Alcantar is in need of refills for the following meds:     1. Mirtazapine 45 mg tabs #37       Last fill: 3/10/20     2.  Diazepam 5 mg #30        Last fill: 3/16/20     We would like to  these items to him on 4/16/20.  Thanks.  Let me know if there are questions     GOKUL SINCLAIR.PH.   [facility]   [phone],  FAX: [phone]     Pharmacy Epic ID:  [id number]   Epic Description: [facility]          Last seen: 9/24/2019  RTC: 6 months  Cancel: 1  No-show: 0  Next appt: 4/28/2020     Incoming refill from pharmacy via Epic in-basket       Medication requested: mirtazapine (REMERON) 45 MG tablet  Directions: Take 1 tablet (45 mg) by mouth At Bedtime And may take 1/2 tablet for insomnia up to 3 times a week. - Oral  Qty: 37  Last refilled: 3/10/2020    Medication requested: diazepam (VALIUM) 5 MG tablet  Directions: Take 1 tablet (5 mg) by mouth daily as needed for anxiety or sleep - Oral  Qty: 30  Last refilled: 3/16/2020, 2/17/2020, 1/16/2020     Order pended and routed to provider for approval. Patient has 1 cancelled appointment.

## 2020-04-14 NOTE — TELEPHONE ENCOUNTER
Placed follow-up call to patient to inform him that refills have been approved and sent to the pharmacy. Confirmed 4/28 appt with Orlin and assisted in changing to a telephone visit. He requests time to be changed from 7:45am to 1:15pm. Patient held slot on provider's schedule and notified scheduling.     Orlin reports that he intends to ask his NP from the hemophilia clinic to take over his medications and plans to fax a letter to them which he has drafted. Orlin will discuss this with further with Kaylin Rajan on 4/28.

## 2020-04-17 ENCOUNTER — DOCUMENTATION ONLY (OUTPATIENT)
Dept: HEMATOLOGY | Facility: CLINIC | Age: 70
End: 2020-04-17

## 2020-04-17 ENCOUNTER — MEDICAL CORRESPONDENCE (OUTPATIENT)
Dept: HEALTH INFORMATION MANAGEMENT | Facility: CLINIC | Age: 70
End: 2020-04-17

## 2020-04-17 NOTE — PROGRESS NOTES
Orlin Alcantar called to order a refill of Kogenate Fs . He has 1 dose(s) remaining at home and would like it delivered to his house via  to arrive on 04/16/20.     The following doses were dispensed from our pharmacy:    Product: Kogenate Fs    Doses: 4 doses of 2949     Nathan Land CPhT  [facility]  [phone]

## 2020-04-20 ENCOUNTER — TELEPHONE (OUTPATIENT)
Dept: FAMILY MEDICINE | Facility: CLINIC | Age: 70
End: 2020-04-20

## 2020-04-20 NOTE — TELEPHONE ENCOUNTER
Reason for Call:  Other Incoming fax    Detailed comments: did Dr. Ramos receive patient's fax?    Call and confirm, thank you.    Phone Number Patient can be reached at: Home number on file [phone] (home)    Best Time: asap    Can we leave a detailed message on this number? YES    Call taken on 4/20/2020 at 11:39 AM by Michelle Alvarez

## 2020-04-22 NOTE — TELEPHONE ENCOUNTER
Please ask patient to schedule virtual visit with me to take over prescription of his meds.    Katie Ramos MD

## 2020-04-23 NOTE — TELEPHONE ENCOUNTER
Called and reached patient. He is scheduled for a telephone visit with Modesto Vela tomorrow, 4/24/2020. PCP unavailable for two weeks and patient will need this addressed sooner.

## 2020-04-24 ENCOUNTER — DOCUMENTATION ONLY (OUTPATIENT)
Dept: HEMATOLOGY | Facility: CLINIC | Age: 70
End: 2020-04-24

## 2020-04-24 ENCOUNTER — VIRTUAL VISIT (OUTPATIENT)
Dept: FAMILY MEDICINE | Facility: CLINIC | Age: 70
End: 2020-04-24
Payer: MEDICARE

## 2020-04-24 ENCOUNTER — MEDICAL CORRESPONDENCE (OUTPATIENT)
Dept: HEALTH INFORMATION MANAGEMENT | Facility: CLINIC | Age: 70
End: 2020-04-24

## 2020-04-24 DIAGNOSIS — F32.A DEPRESSIVE DISORDER: ICD-10-CM

## 2020-04-24 DIAGNOSIS — F41.1 GENERALIZED ANXIETY DISORDER: ICD-10-CM

## 2020-04-24 PROCEDURE — 98967 PH1 ASSMT&MGMT NQHP 11-20: CPT | Performed by: PHYSICIAN ASSISTANT

## 2020-04-24 RX ORDER — DIAZEPAM 5 MG
5 TABLET ORAL DAILY PRN
Qty: 30 TABLET | Refills: 0 | Status: CANCELLED | OUTPATIENT
Start: 2020-04-24

## 2020-04-24 RX ORDER — MIRTAZAPINE 45 MG/1
45 TABLET, FILM COATED ORAL AT BEDTIME
Qty: 37 TABLET | Refills: 0 | Status: CANCELLED | OUTPATIENT
Start: 2020-04-24

## 2020-04-24 ASSESSMENT — PATIENT HEALTH QUESTIONNAIRE - PHQ9: SUM OF ALL RESPONSES TO PHQ QUESTIONS 1-9: 0

## 2020-04-24 NOTE — PROGRESS NOTES
"Orlin Alcantar is a 70 year old male who is being evaluated via a billable telephone visit.      The patient has been notified of following:     \"This telephone visit will be conducted via a call between you and your physician/provider. We have found that certain health care needs can be provided without the need for a physical exam.  This service lets us provide the care you need with a short phone conversation.  If a prescription is necessary we can send it directly to your pharmacy.  If lab work is needed we can place an order for that and you can then stop by our lab to have the test done at a later time.    Telephone visits are billed at different rates depending on your insurance coverage. During this emergency period, for some insurers they may be billed the same as an in-person visit.  Please reach out to your insurance provider with any questions.    If during the course of the call the physician/provider feels a telephone visit is not appropriate, you will not be charged for this service.\"    Patient has given verbal consent for Telephone visit?  Yes    How would you like to obtain your AVS? MyChart    Vito     Orlin Alcantar is a 70 year old male who presents to clinic today for the following health issues:    HPI     Patient is a 70 year old male with a history of hypertension, bladder cancer, hemophilia A, and anxiety who is completing a telephone visit today to discuss medication management.     His psychiatrist is leaving clinical practice at the [facility]. Had been seeing this provider since 2015. He reports taking valium 5 mg and mirtazapine 45 mg daily to help with sleep and anxiety. He has been on the medications without adverse effects for extended period of time and is wondering if his PCP will be able to take over prescribing the medications. He is currently not needing refills until May 2020 but wants to plan ahead.     His anxiety symptoms are often related to and " triggered by cancer history and bleed risk       Review of Systems   ROS COMP: Constitutional, HEENT, cardiovascular, pulmonary, gi and gu systems are negative, except as otherwise noted.       Objective   Reported vitals:  There were no vitals taken for this visit.   no distress  PSYCH: Alert and oriented times 3; coherent speech, normal   rate and volume, able to articulate logical thoughts, able   to abstract reason, no tangential thoughts, no hallucinations   or delusions  His affect is normal  RESP: No cough, no audible wheezing, able to talk in full sentences  Remainder of exam unable to be completed due to telephone visits    Diagnostic Test Results:  none         Assessment/Plan:  1. Generalized anxiety disorder  - Patient's PCP is Dr. Katie Ramos who is [place] today. Will CC her on this chart for her review. [place]  reviewed, appears patient is consistent with no early fills. If PCP's practice philosophy does not include prescribing controlled substances such as Valium, I would be willing to prescribe this medication for him. Patient expressed understanding. Not currently in need of new prescriptions today but will be submitting refill request in May.     Return in 10 days (on 5/4/2020) for oncology appointment.      Phone call duration:  11 minutes    Modesto Vela PA-C

## 2020-04-24 NOTE — Clinical Note
Hi Dr. Ramos,  I visited with Orlin last week. He was hoping you would be able to take over prescribing the meds previously managed by his psychiatrist who is leaving practice. I told him I cannot answer that question for you but would send his chart your way. I am not familiar with your practice style so I let him know ahead of time that not all providers write for controlled substances which he understands. Thanks!  Modesto Vela PA-C

## 2020-04-24 NOTE — PROGRESS NOTES
Orlin Alcantar called to order a refill of Kogenate Fs . He has 1 dose(s) remaining at home and would like it delivered to his house via  to arrive on 04/23/20.     The following doses were dispensed from our pharmacy:    Product: Kogenate Fs    Doses: 4 doses of 2949    Nathan Land CPhT  [facility]  [phone]

## 2020-04-28 ENCOUNTER — VIRTUAL VISIT (OUTPATIENT)
Dept: PSYCHIATRY | Facility: CLINIC | Age: 70
End: 2020-04-28
Attending: CLINICAL NURSE SPECIALIST
Payer: MEDICARE

## 2020-04-28 DIAGNOSIS — F32.A DEPRESSIVE DISORDER: ICD-10-CM

## 2020-04-28 DIAGNOSIS — G89.4 CHRONIC PAIN SYNDROME: ICD-10-CM

## 2020-04-28 DIAGNOSIS — F41.1 GENERALIZED ANXIETY DISORDER: ICD-10-CM

## 2020-04-28 DIAGNOSIS — F41.1 ANXIETY STATE: Primary | ICD-10-CM

## 2020-04-28 DIAGNOSIS — D66 SEVERE HEMOPHILIA A (H): ICD-10-CM

## 2020-04-28 DIAGNOSIS — F06.30 MOOD DISORDER IN CONDITIONS CLASSIFIED ELSEWHERE: ICD-10-CM

## 2020-04-28 RX ORDER — DIAZEPAM 5 MG
5 TABLET ORAL DAILY PRN
Qty: 30 TABLET | Refills: 2 | Status: SHIPPED | OUTPATIENT
Start: 2020-04-28 | End: 2020-09-08

## 2020-04-28 RX ORDER — OXYCODONE HYDROCHLORIDE 5 MG/1
5 TABLET ORAL EVERY 8 HOURS PRN
Qty: 90 TABLET | Refills: 0 | Status: SHIPPED | OUTPATIENT
Start: 2020-04-28 | End: 2020-05-21

## 2020-04-28 RX ORDER — MIRTAZAPINE 45 MG/1
45 TABLET, FILM COATED ORAL AT BEDTIME
Qty: 37 TABLET | Refills: 2 | Status: SHIPPED | OUTPATIENT
Start: 2020-04-28 | End: 2020-09-08

## 2020-04-28 NOTE — PROGRESS NOTES
"Outpatient Psychiatry Progress Note     Provider: JELLY Chapa CNS  Date: 2020  Service:  Virtual Medication follow up with counseling conducted by phone Orlin Alcantar verbally consents to telehealth visit due to COVID 19 restrictions/mandates  Patient Identification: Orlin Alcantar  : 1950   MRN: [id number]  Orlin Alcantar is a 70 year old year old male who is interviewed by phone for ongoing psychiatric care.  Orlin Alcantar was last seen 19.   At that time,   Assessment & Plan       Orlin Alcantar is seen today for follow up and reports his mood has been good. He would like to continue current medications.     Diagnosis       Encounter Diagnoses   Name Primary?     Anxiety state Yes     Generalized anxiety disorder       Depression, unspecified depression type       Mood disorder in conditions classified elsewhere           Plan:  Medication: Continue current medications  OTC Recommendations: none  Lab Orders:  none  Referrals: none  Release of Information: none  Future Treatment Considerations:per symptoms  Return for Follow Up:6 months      ____________________________________________________________________________________________________________________________________________  2020  Start time: 1:15  End time: 1:36  Total time: 16 minutes  Orlin Alcantar is a 70 year old male who is being evaluated via a billable telephone visit.      The patient has been notified of following:     \"This telephone visit will be conducted via a call between you and your physician/provider. We have found that certain health care needs can be provided without the need for a physical exam.  This service lets us provide the care you need with a short phone conversation.  If a prescription is necessary we can send it directly to your pharmacy.  If lab work is needed we can place an order for that and you can then stop by our lab to have the test done at a later time.    Telephone visits are billed " "at different rates depending on your insurance coverage. During this emergency period, for some insurers they may be billed the same as an in-person visit.  Please reach out to your insurance provider with any questions.    If during the course of the call the physician/provider feels a telephone visit is not appropriate, you will not be charged for this service.\"    Patient has given verbal consent for Telephone visit?  Yes      Today Orlin reports that he is feeling well and not overly anxious about COVID19. He has spoke with his PCP and he feels comfortable taking over his psych medications.    Side effects of medication include: none  Psychiatric Review of Systems:  Depression: denies symptoms  Most recent PHQ 9   PHQ-9 SCORE 9/24/2019 2/14/2020 4/24/2020   PHQ-9 Total Score - - -   PHQ-9 Total Score 1 0 0      Anxiety : stable  Usha na   Psychosis  na.   ADHD na    Review of Medical Systems:  Sleep: stable  Energy: stable  Concentration: stable  Appetite: stable  GI Concerns: none  Cardiac concerns: none  Neurological concerns: no new concerns  Other medical concerns: no new concerns. 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      Bladder tumor      Depression      DM II (diabetes mellitus, type II), controlled (H)     diet controlled     Hemophilia (H)     Type A     History of hepatitis C     treated successfully     HTN (hypertension)      Patient Active Problem List   Diagnosis     History of total hip arthroplasty     Hemophilia A (H)     Pain in joint, pelvic region and thigh     Anxiety state     Hypertension goal BP (blood pressure) < 140/90     Mood disorder in conditions classified elsewhere     Hematuria     Bladder cancer (H)     Examination of participant in clinical trial     Urothelial " "carcinoma of bladder (H)     Malignant neoplasm of overlapping sites of bladder (H)     Hemarthrosis     Pain     S/P ileal conduit (H)       Allergies:   Allergies   Allergen Reactions     Aspirin      This drug inhibits platelets and is contraindicated due to hemophilia diagnosis.             Current Medications     Current Outpatient Medications Ordered in Epic   Medication Sig Dispense Refill     acetaminophen (TYLENOL) 325 MG tablet Take 2 tablets (650 mg) by mouth every 4 hours as needed for mild pain or fever 100 tablet 1     Antihemophilic Factor, Recomb, (KOGENATE FS) 1000 units KIT Directions: Infuse 2949 units units iv every Mon, Wed, Fri & Sun prn for breakthrough bleeding [address] each 6     diazepam (VALIUM) 5 MG tablet Take 1 tablet (5 mg) by mouth daily as needed for anxiety or sleep 30 tablet 0     mirtazapine (REMERON) 45 MG tablet Take 1 tablet (45 mg) by mouth At Bedtime And may take 1/2 tablet for insomnia up to 3 times a week. 37 tablet 0     oxyCODONE (ROXICODONE) 5 MG tablet Take 1 tablet (5 mg) by mouth every 8 hours as needed for pain 90 tablet 0     Current Facility-Administered Medications Ordered in Epic   Medication Dose Route Frequency Provider Last Rate Last Dose     ethyl chloride spray   Topical Once Magalie Espinal MD            Mental Status Exam       Behavior/relationship to examiner/demeanor:  Cooperative  Orientation: Oriented to person, place, time and situation  Speech Rate:  Normal  Speech Spontaneity:  Normal  Mood:  \"good\"  Affect:  Appropriate/mood-congruent  Thought Process (Associations):  Goal directed  Thought Content:  no overt psychosis, denies suicidal ideation, intent or thoughts and patient does not appear to be responding to internal stimuli  Abnormal Perception:  None  Attention/Concentration:  Normal  Insight:  Good  Judgment:  Good      Results     Vital signs: Reviewed previous recorded vitals. Significant concerns include:  none    Laboratory Data:  no new " data reviewed    Assessment & Plan      Orlin Alcantar is interviewed by phone today for follow up and reports his mood and health have been stable. Since he has been stable on current medications and there is no plan to make changes his PCP is comfortable taking over his psych medications. If changes are needed then he will return to the psychiatry clinic for consultation or PCP maybe able to have psych consult through resident clinic.    Diagnosis  Encounter Diagnoses   Name Primary?     Anxiety state Yes     Mood disorder in conditions classified elsewhere        Plan:  Medication: Continue Mirtazapine 45mg and 1/2 tablet prn insomnia up to 3 times per week  and also continue Valium 5mg daily which he takes in the am. Discussed that Mirtazapine is not just for insomnia but also treats anxiety and depression at the dose he is taking and that it should not be discontinued suddenly.  Lab Orders:  none  Referrals: none needed  Return for Follow Up:will followup with Modesto Vela PA-C     The risks, benefits, alternatives and side effects have been discussed and are understood by the patient. The patient understands the risks of using street drugs or alcohol. There are no medical contraindications, the patient agrees to treatment, and has the capacity to do so. The patient understands to call 911 or come to the nearest ED if life threatening or urgent symptoms present.  In addition time was spent counseling the patient and/or coordinating care regarding review of social and occupational functioning.  In addition patient was counseled on health and wellness practices to augment medication treatment of symptoms. See note for details.    Kaylin Rajan, APRN CNS 4/28/2020

## 2020-04-28 NOTE — PATIENT INSTRUCTIONS
Christoph Ordaz,  As we discussed, I will send a copy of your chart to Dr. Ramos. I am not sure what her practice philosophy is surrounding the prescription of controlled substances. If she is not able to prescribe the valium and mirtazapine for you I would be able to help.   It was nice visiting with you -- please take care!  Best,  Modesto Vela PA-C

## 2020-04-28 NOTE — Clinical Note
Hello, just want to confirm that you are ok taking over Orlin's psych medications. I did refill today for 3 months so encouraged him to make sure that refills after that are sent to your clinic.  Thank you!  Kaylin PARKS, APRN

## 2020-04-30 ENCOUNTER — PATIENT OUTREACH (OUTPATIENT)
Dept: ONCOLOGY | Facility: CLINIC | Age: 70
End: 2020-04-30

## 2020-04-30 NOTE — PROGRESS NOTES
Called and spoke to patient.  Discussed plan to move CT scan and labs and office visit to August.  He is agreeable to plan. He prefers to be notified of appointment via mail.  Message sent to scheduling team      Brianna Brown PA-C [place], Cindy, LPN               Thanks Samreen.     I honestly think we should just move everything to August.  He is in a safe place.  No point in talking to him or getting labs if he is doing fine.  He should be cured.       So I am fine with moving out , can we just reschedule in August and then decide then?     Monica    Previous Messages     ----- Message -----   From: Samreen Horan LPN   Sent: 4/30/2020   9:39 AM CDT   To: Brianna Harris PA-C   Subject: RE: Asia - Are labs on 5/1 neces*     Julius Anderson,       It looks like you deferred the CT scan, I can call and talk to him about labs at [facility],  when they are only bringing in 30 people a day,  it SHOULD be pretty safe.       Or do you just want to defer labs too?    Keep appointment still or delay?       ----- Message -----   From: Tianna Martin   Sent: 4/30/2020   9:31 AM CDT   To: Brianna Harris PA-C, Samreen Horan LPN   Subject: Apolinar/Steven - Are labs on 5/1 necessary?     Hello,     Patient called inquiring about scheduled lab appt tomorrow at [facility] for Monica Harris. He stated that he already called and spoke to someone who was supposed to give him a call back to confirm if the labs are absolutely necessary but has not heard from anyone.     Patient stated that due to the pandemic, he does not feel comfortable going to any clinic due to several health and safety concerns during this time. He wants to know if the labs are absolutely necessary for him to get. Please advise.     Thanks,     Tianna   Clinic Coordinator   [facility]

## 2020-05-05 ENCOUNTER — DOCUMENTATION ONLY (OUTPATIENT)
Dept: HEMATOLOGY | Facility: CLINIC | Age: 70
End: 2020-05-05

## 2020-05-05 NOTE — TELEPHONE ENCOUNTER
Called and talked to patient. Patient said he is establishing care with Modesto Vela instead because he said he would be refilling his medications. Patient was very upset and asked where are the forms/fax he faxed on 4/17/20 and then hung up.        Nelsy Le CMA

## 2020-05-05 NOTE — TELEPHONE ENCOUNTER
Unfortunately that was not helpful.  If he is to establish his psychiatric care with me (including prescribing controlled substances) he does need to have a visit with me.  Again please schedule him for a virtual visit with me to establish care of depression/anxiety before he is next due for refills.  I have plenty of openings this week.    Katie Ramos MD

## 2020-05-05 NOTE — PROGRESS NOTES
Orlin Alcantar called to order a refill of Kogenate Fs . He has 1 dose(s) remaining at home and would like it delivered to his house via  to arrive on 04/29/20.     The following doses were dispensed from our pharmacy:    Product: Kogenate Fs    Doses: 4 doses of 2949     Nathan Land CPhT  [facility]  [phone]

## 2020-05-08 ENCOUNTER — DOCUMENTATION ONLY (OUTPATIENT)
Dept: HEMATOLOGY | Facility: CLINIC | Age: 70
End: 2020-05-08

## 2020-05-08 NOTE — PROGRESS NOTES
Orlin Alcantar called to order a refill of Kogenate Fs . He has 1 dose(s) remaining at home and would like it delivered to his house via  to arrive on 05/08/20.     The following doses were dispensed from our pharmacy:    Product: Kogenate Fs    Doses: 4 doses of 2949    Nathan Land CPhT  [facility]  [phone]

## 2020-05-13 ENCOUNTER — TELEPHONE (OUTPATIENT)
Dept: HEMATOLOGY | Facility: CLINIC | Age: 70
End: 2020-05-13

## 2020-05-13 NOTE — TELEPHONE ENCOUNTER
I received a fax from Mr. Alcantar regarding a statement he received from [facility] for his medication copays. He had assumed that he remained eligible for the Assistance Fund through the [facility] program. I let him know that it does require a renewal each calendar year. We will send a copy of the applicaton to him with his next factor refill. He said he will fill that out and get it back to us with his income documentation.

## 2020-05-14 DIAGNOSIS — D66 SEVERE HEMOPHILIA A (H): ICD-10-CM

## 2020-05-14 DIAGNOSIS — Z85.51 PERSONAL HISTORY OF MALIGNANT NEOPLASM OF BLADDER: ICD-10-CM

## 2020-05-14 RX ORDER — ANTIHEMOPHILIC FACTOR (RECOMBINANT) 1000 (+/-)
KIT INTRAVENOUS
Qty: 11796 EACH | Refills: 6 | Status: SHIPPED | OUTPATIENT
Start: 2020-05-14 | End: 2020-07-08

## 2020-05-15 ENCOUNTER — DOCUMENTATION ONLY (OUTPATIENT)
Dept: HEMATOLOGY | Facility: CLINIC | Age: 70
End: 2020-05-15

## 2020-05-15 NOTE — PROGRESS NOTES
Orlin Alcantar called to order a refill of Kogenate Fs . He has 1 dose(s) remaining at home and would like it delivered to his house via  to arrive on 05/15/20.     The following doses were dispensed from our pharmacy:    Product: Kogenate Fs    Doses: 4 doses of 2949    Nathan Land CPhT  [facility]  [phone]

## 2020-05-21 DIAGNOSIS — D66 SEVERE HEMOPHILIA A (H): ICD-10-CM

## 2020-05-21 DIAGNOSIS — G89.4 CHRONIC PAIN SYNDROME: ICD-10-CM

## 2020-05-21 RX ORDER — OXYCODONE HYDROCHLORIDE 5 MG/1
5 TABLET ORAL EVERY 8 HOURS PRN
Qty: 90 TABLET | Refills: 0 | Status: SHIPPED | OUTPATIENT
Start: 2020-05-21 | End: 2020-06-23

## 2020-05-26 ENCOUNTER — DOCUMENTATION ONLY (OUTPATIENT)
Dept: HEMATOLOGY | Facility: CLINIC | Age: 70
End: 2020-05-26

## 2020-05-26 NOTE — PROGRESS NOTES
Orlin Alcantar called to order a refill of Kogenate FS . He has 2 dose(s) remaining at home and would like it delivered to his house via  to arrive on 5/26/2020.     The following doses were dispensed from our pharmacy:    Product: Kogenate FS    Doses: 4 doses of 2949 units    SALINA Keys., [facility]  Hemophilia Pharmacy Coordinator  [phone]  [email or web address]

## 2020-06-02 ENCOUNTER — DOCUMENTATION ONLY (OUTPATIENT)
Dept: HEMATOLOGY | Facility: CLINIC | Age: 70
End: 2020-06-02

## 2020-06-02 NOTE — PROGRESS NOTES
Orlin Alcantar called to order a refill of Kogenate FS . He has 0 dose(s) remaining at home and would like it delivered to his house via  to arrive on 6/2/2020.     The following doses were dispensed from our pharmacy:    Product: Kogenate FS    Doses: 4 doses of 2949 units    SALINA Keys., [facility]  Hemophilia Pharmacy Coordinator  [phone]  [email or web address]

## 2020-06-03 ASSESSMENT — PAIN SCALES - GENERAL: PAINLEVEL: NO PAIN (0)

## 2020-06-03 NOTE — PROGRESS NOTES
"Orlin Alcantar is a 70 year old male who is being evaluated via a billable telephone visit.      The patient has been notified of following:     \"This telephone visit will be conducted via a call between you and your physician/provider. We have found that certain health care needs can be provided without the need for a physical exam.  This service lets us provide the care you need with a short phone conversation.  If a prescription is necessary we can send it directly to your pharmacy.  If lab work is needed we can place an order for that and you can then stop by our lab to have the test done at a later time.    Telephone visits are billed at different rates depending on your insurance coverage. During this emergency period, for some insurers they may be billed the same as an in-person visit.  Please reach out to your insurance provider with any questions.    If during the course of the call the physician/provider feels a telephone visit is not appropriate, you will not be charged for this service.\"    Patient has given verbal consent for Telephone visit?  Yes    What phone number would you like to be contacted at? [phone]    How would you like to obtain your AVS? Mail a copy    Phone call duration: 8 minutes    Nguyễn Morris MD    "

## 2020-06-04 ENCOUNTER — VIRTUAL VISIT (OUTPATIENT)
Dept: UROLOGY | Facility: CLINIC | Age: 70
End: 2020-06-04
Payer: MEDICARE

## 2020-06-04 DIAGNOSIS — C67.8 MALIGNANT NEOPLASM OF OVERLAPPING SITES OF BLADDER (H): Primary | ICD-10-CM

## 2020-06-04 NOTE — PROGRESS NOTES
Urology Clinic    Nguyễn Morris MD  Date of Service: 2020     Name: Orlin Alcantar  MRN: [id number]  Age: 70 year old   : 1950  Referring provider: Katie Ramos     Assessment:  Orlin Alcantar  is a 70 year old male with a history of T2 bladder cancer s/p neoadjuvant gem-cis-nivo and radical cystoprostatectomy with IC and BPLND on 2018 with Dr. Shea. He presents today for a follow-up. He will have imaging later today which I will review once complete. no evidence of disease     Plan:  -Follow up with Dr. Harris on  with imaging.   -Follow up with imaging in 6 months and clinic visit with Dr. Contreras  -CT follow up pending for post COVID, MRI also possible  He would consider that this would be lower risk for COVID and would prefer this option.    Nguyễn Morris MD  Department of Urology  [facility]    ______________________________________________________________________    HPI  Orlin Alcantar is a 70 year old   male with a history of hemophilia and T2 bladder cancer s/p radical cystoprostatectomy with IC and BPLND on 2018 with Dr. Shea.     TNM Stage: T2a, N0, M0  Number of nodes removed: 23  Number of positive nodes: 0  Surgical Margins Positive: No  Grade: High   Histology: urothelial without secondary histology.    Tolerating ORAL and good bowel function. He did have some irritation around his pouch previously but was given a cream which helped. He is doing well now.     Date of Initial Diagnosis: 2018 TURBT with Dr. Liao   Stage of Initial Diagnosis: invasive urothelial carcinoma   Grade at Initial Diagnosis: high grade   Site of First Diagnosis: R UO was involved in the resection   Any Recurrence?: No  Intravesical Treatments: gemcitabine initiated on 2018, 4 cycles of gemcitabine-nivolumab 2018-2018 and 4 cycles of Gemcitabine-Cisplatin from 2018-2018.   Date of Last Upper Tract Imagin2019   Any evidence of  hydronephrosis? mild    Physical Exam:   Telephone visit    Laboratory:   I reviewed all applicable laboratory and pathology data and went over findings with patient  Significant for     Lab Results   Component Value Date    CR 0.95 01/02/2020    CR 1.08 09/16/2019    CR 1.13 03/18/2019    CR 1.09 11/16/2018    CR 1.00 08/27/2018    CR 0.80 08/06/2018    CR 0.83 08/05/2018    CR 0.84 08/04/2018    CR 0.68 08/03/2018    CR 0.70 08/02/2018       Imaging:   I personally reviewed all applicable imaging and went over the below findings with patient.    Last imaging 1/2020 shows no sign of recurrence or hernia

## 2020-06-04 NOTE — PATIENT INSTRUCTIONS
-Follow up with Dr. Harris on 01/06 with imaging.   -Follow up with imaging in 6 months and clinic visit with Dr. Contreras  -CT follow up pending for post COVID, MRI also possible  He would consider that this would be lower risk for COVID and would prefer this option.

## 2020-06-04 NOTE — LETTER
"2020       RE: Orlin Alcantar  [address]  [place] [address]     Dear Colleague,    Thank you for referring your patient, Orlin Alcantar, to the [facility] UROLOGY AND INST FOR PROSTATE AND UROLOGIC CANCERS at [facility]. Please see a copy of my visit note below.    Orlin Alcantar is a 70 year old male who is being evaluated via a billable telephone visit.      The patient has been notified of following:     \"This telephone visit will be conducted via a call between you and your physician/provider. We have found that certain health care needs can be provided without the need for a physical exam.  This service lets us provide the care you need with a short phone conversation.  If a prescription is necessary we can send it directly to your pharmacy.  If lab work is needed we can place an order for that and you can then stop by our lab to have the test done at a later time.    Telephone visits are billed at different rates depending on your insurance coverage. During this emergency period, for some insurers they may be billed the same as an in-person visit.  Please reach out to your insurance provider with any questions.    If during the course of the call the physician/provider feels a telephone visit is not appropriate, you will not be charged for this service.\"    Patient has given verbal consent for Telephone visit?  Yes    What phone number would you like to be contacted at? [phone]    How would you like to obtain your AVS? Mail a copy    Phone call duration: 8 minutes    Nguyễn Morris MD        Urology Clinic    Nguyễn Morris MD  Date of Service: 2020     Name: Orlin Alcantar  MRN: [id number]  Age: 70 year old   : 1950  Referring provider: Katie Ramos     Assessment:  Orlin Alcantar  is a 70 year old male with a history of T2 bladder cancer s/p neoadjuvant gem-cis-nivo and radical cystoprostatectomy with IC and " BPLND on 2018 with Dr. Shea. He presents today for a follow-up. He will have imaging later today which I will review once complete. no evidence of disease     Plan:  -Follow up with Dr. Harris on  with imaging.   -Follow up with imaging in 6 months and clinic visit with Dr. Contreras  -CT follow up pending for post COVID, MRI also possible  He would consider that this would be lower risk for COVID and would prefer this option.    Nguyễn Morris MD  Department of Urology  [facility]    ______________________________________________________________________    HPI  Orlin Alcantar is a 70 year old   male with a history of hemophilia and T2 bladder cancer s/p radical cystoprostatectomy with IC and BPLND on 2018 with Dr. Shea.     TNM Stage: T2a, N0, M0  Number of nodes removed: 23  Number of positive nodes: 0  Surgical Margins Positive: No  Grade: High   Histology: urothelial without secondary histology.    Tolerating ORAL and good bowel function. He did have some irritation around his pouch previously but was given a cream which helped. He is doing well now.     Date of Initial Diagnosis: 2018 TURBT with Dr. Liao   Stage of Initial Diagnosis: invasive urothelial carcinoma   Grade at Initial Diagnosis: high grade   Site of First Diagnosis: R UO was involved in the resection   Any Recurrence?: No  Intravesical Treatments: gemcitabine initiated on 2018, 4 cycles of gemcitabine-nivolumab 2018-2018 and 4 cycles of Gemcitabine-Cisplatin from 2018-2018.   Date of Last Upper Tract Imagin2019   Any evidence of hydronephrosis? mild    Physical Exam:   Telephone visit    Laboratory:   I reviewed all applicable laboratory and pathology data and went over findings with patient  Significant for     Lab Results   Component Value Date    CR 0.95 2020    CR 1.08 2019    CR 1.13 2019    CR 1.09 2018    CR 1.00 2018    CR 0.80 2018    CR  0.83 08/05/2018    CR 0.84 08/04/2018    CR 0.68 08/03/2018    CR 0.70 08/02/2018       Imaging:   I personally reviewed all applicable imaging and went over the below findings with patient.    Last imaging 1/2020 shows no sign of recurrence or hernia    Again, thank you for allowing me to participate in the care of your patient.      Sincerely,    Nguyễn Morris MD

## 2020-06-12 ENCOUNTER — DOCUMENTATION ONLY (OUTPATIENT)
Dept: HEMATOLOGY | Facility: CLINIC | Age: 70
End: 2020-06-12

## 2020-06-12 NOTE — PROGRESS NOTES
Orlin Alcantar called to order a refill of Kogenate Fs . He has 1 dose(s) remaining at home and would like it delivered to his house via  to arrive on 06/09/2020.     The following doses were dispensed from our pharmacy:    Product: Kogenate Fs    Doses: 4 doses of 2949    Nathan Land CPhT  [facility]  [phone]

## 2020-06-16 ENCOUNTER — DOCUMENTATION ONLY (OUTPATIENT)
Dept: HEMATOLOGY | Facility: CLINIC | Age: 70
End: 2020-06-16

## 2020-06-16 NOTE — PROGRESS NOTES
Orlin Alcantar called to order a refill of Kogenate Fs . He has 1 dose(s) remaining at home and would like it delivered to his house via  to arrive on 06/16/2020.     The following doses were dispensed from our pharmacy:    Product: Kogenate Fs    Doses: 4 doses of 2949    Nathan Land CPhT  [facility]  [phone]

## 2020-06-23 ENCOUNTER — DOCUMENTATION ONLY (OUTPATIENT)
Dept: HEMATOLOGY | Facility: CLINIC | Age: 70
End: 2020-06-23

## 2020-06-23 DIAGNOSIS — D66 SEVERE HEMOPHILIA A (H): ICD-10-CM

## 2020-06-23 DIAGNOSIS — G89.4 CHRONIC PAIN SYNDROME: ICD-10-CM

## 2020-06-23 RX ORDER — OXYCODONE HYDROCHLORIDE 5 MG/1
5 TABLET ORAL EVERY 8 HOURS PRN
Qty: 90 TABLET | Refills: 0 | Status: SHIPPED | OUTPATIENT
Start: 2020-06-23 | End: 2020-07-21

## 2020-06-23 NOTE — PROGRESS NOTES
Orlin Alcantar called to order a refill of Kogenate Fs . He has 1 dose(s) remaining at home and would like it delivered to his house via  to arrive on 06/23/20.     The following doses were dispensed from our pharmacy:    Product: Kogenate Fs    Doses: 4 doses of 2949    Nathan Land CPhT  [facility]  [phone]

## 2020-07-02 ENCOUNTER — DOCUMENTATION ONLY (OUTPATIENT)
Dept: HEMATOLOGY | Facility: CLINIC | Age: 70
End: 2020-07-02

## 2020-07-02 NOTE — PROGRESS NOTES
Orlin Alcantar called to order a refill of Kogenate Fs . He has 1 dose(s) remaining at home and would like it delivered to his house via   to arrive on 07/01/2020.     The following doses were dispensed from our pharmacy:    Product: Kogenate Fs    Doses: 4 doses of 2949    Nathan Land CPhT  [facility]  [phone]

## 2020-07-07 ENCOUNTER — TELEPHONE (OUTPATIENT)
Dept: HEMATOLOGY | Facility: CLINIC | Age: 70
End: 2020-07-07

## 2020-07-07 NOTE — TELEPHONE ENCOUNTER
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  The clinic received a fax re: the status of his premium assistance with [employer] [facility].    Writer reached out to Orlin to discuss.  He has received similar information from [employer] and was aware that his current balance is $0.  He stated he can reapply soon for another michelet, and we discussed pros/cons of timing.  Orlin decided to call CHRISTOPHER immediately and left writer a follow-up voicemail stating he has been issued his next cycle of premium assistance, and also applied for ongoing support which was already approved.    Writer will remain available as needed.    Analilia Jack, MARIN, LICSW, ACM  Clinical   [facility]  Phone: [phone]

## 2020-07-08 DIAGNOSIS — Z85.51 PERSONAL HISTORY OF MALIGNANT NEOPLASM OF BLADDER: ICD-10-CM

## 2020-07-08 DIAGNOSIS — D66 SEVERE HEMOPHILIA A (H): ICD-10-CM

## 2020-07-08 RX ORDER — ANTIHEMOPHILIC FACTOR (RECOMBINANT) 1000 (+/-)
KIT INTRAVENOUS
Qty: 11796 EACH | Refills: 1 | Status: SHIPPED | OUTPATIENT
Start: 2020-07-08 | End: 2020-07-21

## 2020-07-09 ENCOUNTER — DOCUMENTATION ONLY (OUTPATIENT)
Dept: HEMATOLOGY | Facility: CLINIC | Age: 70
End: 2020-07-09

## 2020-07-13 ENCOUNTER — TELEPHONE (OUTPATIENT)
Dept: UROLOGY | Facility: CLINIC | Age: 70
End: 2020-07-13

## 2020-07-13 NOTE — TELEPHONE ENCOUNTER
Health Call Center    Phone Message    May a detailed message be left on voicemail: yes     Reason for Call: Other: Former Pt of Dr Morris who whou like to establish care with Dr Contreras but has some questions.  He was scheduled for a CT that got Cancelled due to COVID in May and he wants to know what sense of urgency should be applied to rescheduling that and also scheduling a first cruz with Dr Contreras.  Please have staff call to discuss     Action Taken: Message routed to:  [facility] ([facility] Urology    Travel Screening: Not Applicable

## 2020-07-13 NOTE — TELEPHONE ENCOUNTER
Patient needing an appointment in October with [facility] with ct and labs  Sent to scheduling to set up phone appointment has no internet Radha Hardy LPN Staff Nurse

## 2020-07-15 ENCOUNTER — DOCUMENTATION ONLY (OUTPATIENT)
Dept: HEMATOLOGY | Facility: CLINIC | Age: 70
End: 2020-07-15

## 2020-07-21 DIAGNOSIS — G89.4 CHRONIC PAIN SYNDROME: ICD-10-CM

## 2020-07-21 DIAGNOSIS — D66 SEVERE HEMOPHILIA A (H): ICD-10-CM

## 2020-07-21 DIAGNOSIS — Z85.51 PERSONAL HISTORY OF MALIGNANT NEOPLASM OF BLADDER: ICD-10-CM

## 2020-07-21 RX ORDER — OXYCODONE HYDROCHLORIDE 5 MG/1
5 TABLET ORAL EVERY 8 HOURS PRN
Qty: 90 TABLET | Refills: 0 | Status: SHIPPED | OUTPATIENT
Start: 2020-07-21 | End: 2020-08-17

## 2020-07-21 RX ORDER — ANTIHEMOPHILIC FACTOR (RECOMBINANT) 1000 (+/-)
KIT INTRAVENOUS
Qty: 12432 EACH | Refills: 1 | Status: SHIPPED | OUTPATIENT
Start: 2020-07-21 | End: 2020-08-10

## 2020-07-22 ENCOUNTER — DOCUMENTATION ONLY (OUTPATIENT)
Dept: HEMATOLOGY | Facility: CLINIC | Age: 70
End: 2020-07-22

## 2020-07-31 ENCOUNTER — DOCUMENTATION ONLY (OUTPATIENT)
Dept: HEMATOLOGY | Facility: CLINIC | Age: 70
End: 2020-07-31

## 2020-08-04 NOTE — PROGRESS NOTES
Orlin Alcantar called to order a refill of Kogenate FS . He has 2 dose(s) remaining at home and would like it delivered to his house via  to arrive on 7/15/2020.     The following doses were dispensed from our pharmacy:    Product: Kogenate FS    Doses: 4 doses of 2949 units    MICHAEL Keys, [facility]  Hemophilia Pharmacy Coordinator  [phone]  [email or web address]

## 2020-08-04 NOTE — PROGRESS NOTES
Orlin Alcantar called to order a refill of Kogenate FS . He has 2 dose(s) remaining at home and would like it delivered to his house via  to arrive on 7/31/2020.     The following doses were dispensed from our pharmacy:    Product: Kogenate FS    Doses: 4 doses of 3108 units    SALINA Keys., [facility]  Hemophilia Pharmacy Coordinator  [phone]  [email or web address]

## 2020-08-04 NOTE — PROGRESS NOTES
Orlin Alcantar called to order a refill of Kogenate FS . He has 1 dose(s) remaining at home and would like it delivered to his house via  to arrive on 7/9/2020.     The following doses were dispensed from our pharmacy:    Product: Kogenate FS    Doses: 4 doses of 2949 units    MICHAEL Keys, [facility]  Hemophilia Pharmacy Coordinator  [phone]  [email or web address]

## 2020-08-04 NOTE — PROGRESS NOTES
Orlin Alcantar called to order a refill of Kogenate FS . He has 2 dose(s) remaining at home and would like it delivered to his house via  to arrive on 7/22/2020.     The following doses were dispensed from our pharmacy:    Product: Kogenate FS    Doses: 4 doses of 3108 units    SALINA Keys., [facility]  Hemophilia Pharmacy Coordinator  [phone]  [email or web address]

## 2020-08-05 ENCOUNTER — VIRTUAL VISIT (OUTPATIENT)
Dept: ONCOLOGY | Facility: CLINIC | Age: 70
End: 2020-08-05
Attending: PHYSICIAN ASSISTANT
Payer: MEDICARE

## 2020-08-05 ENCOUNTER — ANCILLARY PROCEDURE (OUTPATIENT)
Dept: CT IMAGING | Facility: CLINIC | Age: 70
End: 2020-08-05
Attending: PHYSICIAN ASSISTANT
Payer: MEDICARE

## 2020-08-05 DIAGNOSIS — C67.9 UROTHELIAL CARCINOMA OF BLADDER (H): Primary | ICD-10-CM

## 2020-08-05 DIAGNOSIS — C67.9 UROTHELIAL CARCINOMA OF BLADDER (H): ICD-10-CM

## 2020-08-05 LAB
ALBUMIN SERPL-MCNC: 4.5 G/DL (ref 3.4–5)
ALP SERPL-CCNC: 98 U/L (ref 40–150)
ALT SERPL W P-5'-P-CCNC: 22 U/L (ref 0–70)
ANION GAP SERPL CALCULATED.3IONS-SCNC: 6 MMOL/L (ref 3–14)
AST SERPL W P-5'-P-CCNC: 16 U/L (ref 0–45)
BASOPHILS # BLD AUTO: 0.1 10E9/L (ref 0–0.2)
BASOPHILS NFR BLD AUTO: 1 %
BILIRUB SERPL-MCNC: 0.8 MG/DL (ref 0.2–1.3)
BUN SERPL-MCNC: 17 MG/DL (ref 7–30)
CALCIUM SERPL-MCNC: 9.4 MG/DL (ref 8.5–10.1)
CHLORIDE SERPL-SCNC: 110 MMOL/L (ref 94–109)
CO2 SERPL-SCNC: 24 MMOL/L (ref 20–32)
CREAT BLD-MCNC: 1.1 MG/DL (ref 0.66–1.25)
CREAT SERPL-MCNC: 1.06 MG/DL (ref 0.66–1.25)
DIFFERENTIAL METHOD BLD: NORMAL
EOSINOPHIL # BLD AUTO: 0.2 10E9/L (ref 0–0.7)
EOSINOPHIL NFR BLD AUTO: 3.4 %
ERYTHROCYTE [DISTWIDTH] IN BLOOD BY AUTOMATED COUNT: 14.4 % (ref 10–15)
GFR SERPL CREATININE-BSD FRML MDRD: 66 ML/MIN/{1.73_M2}
GFR SERPL CREATININE-BSD FRML MDRD: 71 ML/MIN/{1.73_M2}
GLUCOSE SERPL-MCNC: 97 MG/DL (ref 70–99)
HCT VFR BLD AUTO: 45 % (ref 40–53)
HGB BLD-MCNC: 14.4 G/DL (ref 13.3–17.7)
IMM GRANULOCYTES # BLD: 0 10E9/L (ref 0–0.4)
IMM GRANULOCYTES NFR BLD: 0.6 %
LYMPHOCYTES # BLD AUTO: 0.9 10E9/L (ref 0.8–5.3)
LYMPHOCYTES NFR BLD AUTO: 17.8 %
MCH RBC QN AUTO: 29.1 PG (ref 26.5–33)
MCHC RBC AUTO-ENTMCNC: 32 G/DL (ref 31.5–36.5)
MCV RBC AUTO: 91 FL (ref 78–100)
MONOCYTES # BLD AUTO: 0.3 10E9/L (ref 0–1.3)
MONOCYTES NFR BLD AUTO: 5.4 %
NEUTROPHILS # BLD AUTO: 3.8 10E9/L (ref 1.6–8.3)
NEUTROPHILS NFR BLD AUTO: 71.8 %
NRBC # BLD AUTO: 0 10*3/UL
NRBC BLD AUTO-RTO: 0 /100
PLATELET # BLD AUTO: 177 10E9/L (ref 150–450)
POTASSIUM SERPL-SCNC: 4.3 MMOL/L (ref 3.4–5.3)
PROT SERPL-MCNC: 8.4 G/DL (ref 6.8–8.8)
RBC # BLD AUTO: 4.95 10E12/L (ref 4.4–5.9)
SODIUM SERPL-SCNC: 140 MMOL/L (ref 133–144)
WBC # BLD AUTO: 5.2 10E9/L (ref 4–11)

## 2020-08-05 PROCEDURE — 40000556 ZZH STATISTIC PERIPHERAL IV START W US GUIDANCE

## 2020-08-05 PROCEDURE — 00000103 ZZHCL STATISTIC CYTO-FISH QC 88120: Performed by: PHYSICIAN ASSISTANT

## 2020-08-05 PROCEDURE — 99442 ZZC PHYSICIAN TELEPHONE EVALUATION 11-20 MIN: CPT | Performed by: PHYSICIAN ASSISTANT

## 2020-08-05 PROCEDURE — 85025 COMPLETE CBC W/AUTO DIFF WBC: CPT | Performed by: PHYSICIAN ASSISTANT

## 2020-08-05 PROCEDURE — 36415 COLL VENOUS BLD VENIPUNCTURE: CPT | Performed by: PHYSICIAN ASSISTANT

## 2020-08-05 PROCEDURE — 80053 COMPREHEN METABOLIC PANEL: CPT | Performed by: PHYSICIAN ASSISTANT

## 2020-08-05 PROCEDURE — 88112 CYTOPATH CELL ENHANCE TECH: CPT | Performed by: PHYSICIAN ASSISTANT

## 2020-08-05 RX ORDER — IOPAMIDOL 755 MG/ML
99 INJECTION, SOLUTION INTRAVASCULAR ONCE
Status: COMPLETED | OUTPATIENT
Start: 2020-08-05 | End: 2020-08-05

## 2020-08-05 RX ADMIN — IOPAMIDOL 99 ML: 755 INJECTION, SOLUTION INTRAVASCULAR at 11:12

## 2020-08-05 NOTE — PROGRESS NOTES
"Orlin Alcantar is a 70 year old male who is being evaluated via a billable telephone visit.      The patient has been notified of following:     \"This telephone visit will be conducted via a call between you and your physician/provider. We have found that certain health care needs can be provided without the need for a physical exam.  This service lets us provide the care you need with a short phone conversation.  If a prescription is necessary we can send it directly to your pharmacy.  If lab work is needed we can place an order for that and you can then stop by our lab to have the test done at a later time.    Telephone visits are billed at different rates depending on your insurance coverage. During this emergency period, for some insurers they may be billed the same as an in-person visit.  Please reach out to your insurance provider with any questions.    If during the course of the call the physician/provider feels a telephone visit is not appropriate, you will not be charged for this service.\"    Patient has given verbal consent for Telephone visit?  Yes    What phone number would you like to be contacted at? [phone]    How would you like to obtain your AVS? MyChart    I have reviewed and updated the patient's allergies and medication list.    Concerns: Lab and scan results.   Refills: None needed.     Vitals - Patient Reported  Weight (Patient Reported): 77.1 kg (170 lb)  Height (Patient Reported): 182.9 cm (6')  BMI (Based on Pt Reported Ht/Wt): 23.06  Pain Score: No Pain (0)      Keshia Huerta CMA    [facility]  FOLLOW-UP VISIT NOTE  Aug 5, 2020             REASON FOR VISIT: bladder cancer, 6 month follow up     ONCOLOGY TREATMENT HISTORY: Muscle Invasive Urothelial Carcinoma, soilitary, obturator node, no distant metastases.   3/26/18: Consented for the Nivolumab-Gemcitabine-Cisplatin study [id number]             3/28/18: Screening visit for study.   4/4/18; c1d1 gemcitabine  4/11/18: c1d8 " gemcitabine-nivolumab  4/25/18: C2D1 Gemcitabine-Cisplatin  5/2/18: C2D8 Gemcitabine-Nivolumab  5/16/18: C3D1 Gemcitabine-Cisplatin  5/23/18: C3D8 Gemcitabine-Nivolumab  6/6/18: C4d1 Gemcitabine-Cisplatin  6/13/18: C4D8 Gemcitabine-Nivolumab     7/30/18: Radical cystoprostatectomy with bilateral pelvic LN dissection and ileal conduit. Final pathology pT0N0         HPI:  68 year old male with PMH of hemophilia, chronic hepatis C who was seen by DR. Burns for evaluation of neoadjuvant therapy with Gemcitabine Cisplatin and Nivolumab.     Patient has a history of hemophilia and has undergone several surgeries in right hip replacement, knee replacement. Hemophilia managed by Dr. Marley.   He reports recurrent hematuria for a year, initially attributed to kidney stones, cystoscopy in Feb 2018 revealed MIBC. PET-CT scan - which has revealed muscle invasive bladder right lateral wall of bladder and right  obturator lymphnode, 1.1 cm short axis.  He has been treated for HCV in the 1990s and has not had any complications. His LFTs have remained normal. His hemophilia is very well controlled on current regimen of factor replacement. He does not have any autoimmune disease and is not on immunesuppressants or steroids. His diabetes is diet controlled and he has not required any medications. Orlin was enrolled on our neoadjuvant Nivolumab-Oxford-Cis trial and he completed 4 cycles of treatment and then underwent       Radical cystoprostatectomy and Bilateral pelvic lymph node dissection and ileal conduit on 7/30/18.     INTERVAL HISTORY; Orlin is doing well today. Since I saw him last- no major joint bleeding.  No neuropathy, tinnitus or residual chemo effects.  He feels perfectly well with no concerns.  Adjusted to his ilial conduit well, no changes in color or odor of the urine. Takes Senna on occasion for constipation, and sometimes this discolors his urine.      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            PAST MEDICAL HISTORY      Past Medical History        Past Medical History:   Diagnosis Date     Anxiety       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       Bladder tumor       Depression       DM II (diabetes mellitus, type II), controlled (H)       diet controlled     Hemophilia (H)       Type A     History of hepatitis C       treated successfully     HTN (hypertension)                CURRENT OUTPATIENT MEDICATIONS           Current Outpatient Medications   Medication Sig     acetaminophen (TYLENOL) 325 MG tablet Take 2 tablets (650 mg) by mouth every 4 hours as needed for mild pain or fever     Antihemophilic Factor, Recomb, (KOGENATE FS) 1000 units KIT Directions: Infuse 2949 units or 3185 units iv every Mon, Wed, Fri & Sun prn for breakthrough bleeding     diazepam (VALIUM) 5 MG tablet Take 1 tablet (5 mg) by mouth daily as needed for anxiety or sleep     mirtazapine (REMERON) 45 MG tablet Take 1 tablet (45 mg) by mouth At Bedtime And may take 1/2 tablet for insomnia up to 3 times a week.     order for DME Cohesive Lurdes rings [phone]     order for DME Equipment being ordered: Walker Wheels () and Walker ()  Treatment Diagnosis: gait instability     oxyCODONE (ROXICODONE) 5 MG tablet Take 1 tablet (5 mg) by mouth every 8 hours as needed for pain      No current facility-administered medications for this visit.         ECOG performance status of 1.   There were no vitals taken for this visit.     Voice is clear and strong. No audible stridor, wheezing, or respiratory distress. The remainder of PE was deferred due to PHE.        8/5/2020 10:38   Sodium 140   Potassium 4.3   Chloride 110 (H)   Carbon Dioxide 24   Urea Nitrogen 17   Creatinine 1.06   GFR Estimate 71   GFR Estimate If Black 82   Calcium 9.4   Anion Gap 6   Albumin 4.5   Protein Total 8.4   Bilirubin Total 0.8   Alkaline Phosphatase 98   ALT 22   AST 16   Glucose 97   WBC 5.2   Hemoglobin 14.4   Hematocrit 45.0   Platelet Count 177   RBC Count  4.95   MCV 91     CT CAP IMPRESSION: In this patient with history of bladder cancer, post  cystectomy, urinary diversion and chemotherapy, the current scan  shows:  1. Postsurgical changes of cystoprostatectomy without evidence of  local recurrence.  2. Bilateral  pelvicalyceal system and proximal ureters show increased  urothelial enhancement, with filling defects in the right  pelvicalyceal system, demonstrated on delayed phase images, which may  represent clots or debris, concerning for infective/inflammatory  etiology. Recommend attention on follow-up.  3. Cholelithiasis with no evidence for cholecystitis.  4. Nonobstructive bilateral renal calculi.  5. Unchanged sub-6 mm pulmonary nodules. No new or enlarging pulmonary  nodule.          ASSESSMENT/PLAN:      ONC: 70 year old male with T2N1(solitary node)  urothelial carcinoma,currently enrolled on the clinical trial of Nivolumab with Gemcitabine and Cisplatin for MIBC, completed C4D8 on 6/13/18. He has had a complete response at radical cystectomy final pathology showing PT0 N0 of note he was T2N1 prior to neoadjuvant therapy.  He is OZZIE on scans and is on SOC follow up for surveillance.  We discussed labs, urine cytology -q6m and CT CAP every 3 months to complete 2 years, then decrease to 6 months. We discussed the recent data with prognosis and that he had an excellent response to neoadjuvant therapy.  His risk of recurrence is low.       He recently saw Dr Morris, and will see Urology every 6 months, will transition to Dr Contreras now.   He will see me in oncology back in 6 months for labs, urine cytology, CT and to see myself.      Hemophilia management: Per Dr. Marley, no major bleeds recently  Psych: Dr Conti next month, mood is stable.   Encouraged him to see PCP for annual exam,etc        Phone call duration: 15 minutes    Brianna Harris PA-C

## 2020-08-05 NOTE — LETTER
8/5/2020         RE: Orlin Alcantar  [address]  [place] [address]        Dear Colleague,    Thank you for referring your patient, Orlin Alcantar, to the [facility]. Please see a copy of my visit note below.    Orlin Alcantar is a 70 year old male who is being evaluated via a billable telephone visit.        I have reviewed and updated the patient's allergies and medication list.    Concerns: Lab and scan results.   Refills: None needed.     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   3/26/18: Consented for the Nivolumab-Gemcitabine-Cisplatin study [id number]             3/28/18: Screening visit for study.   4/4/18; c1d1 gemcitabine  4/11/18: c1d8 gemcitabine-nivolumab  4/25/18: C2D1 Gemcitabine-Cisplatin  5/2/18: C2D8 Gemcitabine-Nivolumab  5/16/18: C3D1 Gemcitabine-Cisplatin  5/23/18: C3D8 Gemcitabine-Nivolumab  6/6/18: C4d1 Gemcitabine-Cisplatin  6/13/18: C4D8 Gemcitabine-Nivolumab     7/30/18: Radical cystoprostatectomy with bilateral pelvic LN dissection and ileal conduit. Final pathology pT0N0         HPI:  68 year old male with PMH of hemophilia, chronic hepatis C who was seen by DR. Burns for evaluation of neoadjuvant therapy with Gemcitabine Cisplatin and Nivolumab.     Patient has a history of hemophilia and has undergone several surgeries in right hip replacement, knee replacement. Hemophilia managed by Dr. Marley.   He reports recurrent hematuria for a year, initially attributed to kidney stones, cystoscopy in Feb 2018 revealed MIBC. PET-CT scan - which has revealed  muscle invasive bladder right lateral wall of bladder and right  obturator lymphnode, 1.1 cm short axis.  He has been treated for HCV in the 1990s and has not had any complications. His LFTs have remained normal. His hemophilia is very well controlled on current regimen of factor replacement. He does not have any autoimmune disease and is not on immunesuppressants or steroids. His diabetes is diet controlled and he has not required any medications. Orlin was enrolled on our neoadjuvant Nivolumab-[place]-Cis trial and he completed 4 cycles of treatment and then underwent       Radical cystoprostatectomy and Bilateral pelvic lymph node dissection and ileal conduit on 7/30/18.     INTERVAL HISTORY; Orlin is doing well today. Since I saw him last- no major joint bleeding.  No neuropathy, tinnitus or residual chemo effects.  He feels perfectly well with no concerns.  Adjusted to his ilial conduit well, no changes in color or odor of the urine. Takes Senna on occasion for constipation, and sometimes this discolors his urine.      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           PAST MEDICAL HISTORY      Past Medical History        Past Medical History:   Diagnosis Date     Anxiety       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       Bladder tumor       Depression       DM II (diabetes mellitus, type II), controlled (H)       diet controlled     Hemophilia (H)       Type A     History of hepatitis C       treated successfully     HTN (hypertension)                CURRENT OUTPATIENT MEDICATIONS           Current Outpatient Medications   Medication Sig     acetaminophen (TYLENOL) 325 MG tablet Take 2 tablets (650 mg) by mouth every 4 hours as needed for mild pain or fever     Antihemophilic Factor, Recomb, (KOGENATE FS) 1000 units KIT Directions: Infuse 2949 units or 3185 units iv every Mon, Wed, Fri & Sun prn for breakthrough bleeding     diazepam (VALIUM) 5 MG tablet Take 1 tablet (5 mg) by mouth daily as needed for  anxiety or sleep     mirtazapine (REMERON) 45 MG tablet Take 1 tablet (45 mg) by mouth At Bedtime And may take 1/2 tablet for insomnia up to 3 times a week.     order for DME Cohesive Lurdes rings [phone]     order for DME Equipment being ordered: Walker Wheels () and Walker ()  Treatment Diagnosis: gait instability     oxyCODONE (ROXICODONE) 5 MG tablet Take 1 tablet (5 mg) by mouth every 8 hours as needed for pain      No current facility-administered medications for this visit.         ECOG performance status of 1.   There were no vitals taken for this visit.     Voice is clear and strong. No audible stridor, wheezing, or respiratory distress. The remainder of PE was deferred due to PHE.        8/5/2020 10:38   Sodium 140   Potassium 4.3   Chloride 110 (H)   Carbon Dioxide 24   Urea Nitrogen 17   Creatinine 1.06   GFR Estimate 71   GFR Estimate If Black 82   Calcium 9.4   Anion Gap 6   Albumin 4.5   Protein Total 8.4   Bilirubin Total 0.8   Alkaline Phosphatase 98   ALT 22   AST 16   Glucose 97   WBC 5.2   Hemoglobin 14.4   Hematocrit 45.0   Platelet Count 177   RBC Count 4.95   MCV 91     CT CAP IMPRESSION: In this patient with history of bladder cancer, post  cystectomy, urinary diversion and chemotherapy, the current scan  shows:  1. Postsurgical changes of cystoprostatectomy without evidence of  local recurrence.  2. Bilateral  pelvicalyceal system and proximal ureters show increased  urothelial enhancement, with filling defects in the right  pelvicalyceal system, demonstrated on delayed phase images, which may  represent clots or debris, concerning for infective/inflammatory  etiology. Recommend attention on follow-up.  3. Cholelithiasis with no evidence for cholecystitis.  4. Nonobstructive bilateral renal calculi.  5. Unchanged sub-6 mm pulmonary nodules. No new or enlarging pulmonary  nodule.          ASSESSMENT/PLAN:      ONC: 70 year old male with T2N1(solitary node)  urothelial  carcinoma,currently enrolled on the clinical trial of Nivolumab with Gemcitabine and Cisplatin for MIBC, completed C4D8 on 6/13/18. He has had a complete response at radical cystectomy final pathology showing PT0 N0 of note he was T2N1 prior to neoadjuvant therapy.  He is OZZIE on scans and is on SOC follow up for surveillance.  We discussed labs, urine cytology -q6m and CT CAP every 3 months to complete 2 years, then decrease to 6 months. We discussed the recent data with prognosis and that he had an excellent response to neoadjuvant therapy.  His risk of recurrence is low.       He recently saw Dr Morris, and will see Urology every 6 months, will transition to Dr Contreras now.   He will see me in oncology back in 6 months for labs, urine cytology, CT and to see myself.      Hemophilia management: Per Dr. Marley, no major bleeds recently  Psych: Dr Conti next month, mood is stable.   Encouraged him to see PCP for annual exam,etc        Phone call duration: 15 minutes    Brianna Harris PA-C

## 2020-08-06 ENCOUNTER — TELEPHONE (OUTPATIENT)
Dept: ONCOLOGY | Facility: CLINIC | Age: 70
End: 2020-08-06

## 2020-08-06 LAB — RADIOLOGIST FLAGS: ABNORMAL

## 2020-08-06 NOTE — TELEPHONE ENCOUNTER
"DATE: 8/6/20   TIME OF RECEIPT FROM LAB:  1300  LAB TEST:  CT abdomen  LAB VALUE:  [Access Center: Indeterminate filling defects in the right renal pelvis; consider urology consult]  RESULTS PAGED TO (PROVIDER):  Monica HODGSON  TIME LAB VALUE REPORTED TO PROVIDER: 1312      Included in CT report \"Preliminary report did not include neoplastic etiology as possibility for right renal filling defects, final impression with recommendation for urology consult were discussed with Dr Dumont at 10:45 AM  8/6/2020\"  "

## 2020-08-10 ENCOUNTER — DOCUMENTATION ONLY (OUTPATIENT)
Dept: HEMATOLOGY | Facility: CLINIC | Age: 70
End: 2020-08-10

## 2020-08-10 ENCOUNTER — TELEPHONE (OUTPATIENT)
Dept: HEMATOLOGY | Facility: CLINIC | Age: 70
End: 2020-08-10

## 2020-08-10 DIAGNOSIS — Z85.51 PERSONAL HISTORY OF MALIGNANT NEOPLASM OF BLADDER: ICD-10-CM

## 2020-08-10 DIAGNOSIS — D66 SEVERE HEMOPHILIA A (H): Primary | ICD-10-CM

## 2020-08-10 DIAGNOSIS — G89.4 CHRONIC PAIN SYNDROME: ICD-10-CM

## 2020-08-10 RX ORDER — OXYCODONE HYDROCHLORIDE 5 MG/1
10 TABLET ORAL EVERY 6 HOURS PRN
Qty: 10 TABLET | Refills: 0 | Status: SHIPPED | OUTPATIENT
Start: 2020-08-10 | End: 2020-08-17

## 2020-08-10 RX ORDER — ANTIHEMOPHILIC FACTOR (RECOMBINANT) 1000 (+/-)
KIT INTRAVENOUS
Qty: 12432 EACH | Refills: 1 | Status: SHIPPED | OUTPATIENT
Start: 2020-08-10 | End: 2020-08-24

## 2020-08-10 NOTE — TELEPHONE ENCOUNTER
8/10/2020    Called patient regarding fall reported to pharmacy.    Orlin is a 69 yo male with severe hemophilia A, currently on Kogenate 3000 units (40 units/kg) every other day.    He states he tripped on the sidewalk on 8/7/2020 and hit his left hip.  He was able to bear weight right away and was not evaluated.  He did treat with extra factor both on 8/7/2020 and 8/9/2020 (treated q 12 hours on these days).  Used one extra 40 unit/kg dose on these days.   He has also used extra pain medications (2 extra 5mg tabs x 3 days).      PLAN:  Continue to rest hip, call with any concerns of ongoing bleeding or pain.  Return to prophylactic factor VIII infusion schedule of every other day dosing  Return to maintenance dosing of pain medications (3 x 5 mg tabs q day) over the next 48 hours.    Rx for factor requested today along with 10 tabs of 5mg oxycodone.      Will plan to see Orlin in comprehensive clinic late October 2020.    Yanira Branch PA-C

## 2020-08-12 LAB — COPATH REPORT: NORMAL

## 2020-08-17 DIAGNOSIS — D66 SEVERE HEMOPHILIA A (H): ICD-10-CM

## 2020-08-17 DIAGNOSIS — G89.4 CHRONIC PAIN SYNDROME: ICD-10-CM

## 2020-08-17 RX ORDER — OXYCODONE HYDROCHLORIDE 5 MG/1
5 TABLET ORAL EVERY 8 HOURS PRN
Qty: 90 TABLET | Refills: 0 | Status: SHIPPED | OUTPATIENT
Start: 2020-08-17 | End: 2020-09-14

## 2020-08-18 ENCOUNTER — DOCUMENTATION ONLY (OUTPATIENT)
Dept: HEMATOLOGY | Facility: CLINIC | Age: 70
End: 2020-08-18

## 2020-08-20 NOTE — PROGRESS NOTES
Orlin Alcantar called to order a refill of Kogenate FS . He has 1 dose(s) remaining at home and would like it delivered to his house via  to arrive on 8/18/2020.     The following doses were dispensed from our pharmacy:    Product: Kogenate FS    Doses: 4 doses of 3108 units    SALINA Keys., [facility]  Hemophilia Pharmacy Coordinator  [phone]  [email or web address]

## 2020-08-20 NOTE — PROGRESS NOTES
Orlin Alcantar called to order a refill of Kogenate FS . He has 2 dose(s) remaining at home and would like it delivered to his house via  to arrive on 8/10/2020.     The following doses were dispensed from our pharmacy:    Product: Kogenate FS    Doses: 4 doses of 3108 units    SALINA Keys., [facility]  Hemophilia Pharmacy Coordinator  [phone]  [email or web address]

## 2020-08-24 DIAGNOSIS — D66 SEVERE HEMOPHILIA A (H): ICD-10-CM

## 2020-08-24 DIAGNOSIS — Z85.51 PERSONAL HISTORY OF MALIGNANT NEOPLASM OF BLADDER: ICD-10-CM

## 2020-08-24 RX ORDER — ANTIHEMOPHILIC FACTOR (RECOMBINANT) 1000 (+/-)
KIT INTRAVENOUS
Qty: 12432 EACH | Refills: 1 | Status: SHIPPED | OUTPATIENT
Start: 2020-08-24 | End: 2020-09-08

## 2020-08-25 ENCOUNTER — DOCUMENTATION ONLY (OUTPATIENT)
Dept: HEMATOLOGY | Facility: CLINIC | Age: 70
End: 2020-08-25

## 2020-08-25 NOTE — PROGRESS NOTES
Orlin Alcantar called to order a refill of Kogenate FS . He has 0 dose(s) remaining at home and would like it delivered to his house via  to arrive on 8/25/2020.     The following doses were dispensed from our pharmacy:    Product: Kogenate FS    Doses: 4 doses of 3108 units    SALINA Keys., [facility]  Hemophilia Pharmacy Coordinator  [phone]  [email or web address]

## 2020-09-01 ENCOUNTER — DOCUMENTATION ONLY (OUTPATIENT)
Dept: HEMATOLOGY | Facility: CLINIC | Age: 70
End: 2020-09-01

## 2020-09-04 NOTE — PROGRESS NOTES
Orlin Alcantar called to order a refill of Kogenate FS . He has 0 dose(s) remaining at home and would like it delivered to his house via  to arrive on 9/1/2020.     The following doses were dispensed from our pharmacy:    Product: Kogenate FS    Doses: 4 doses of 3108 units    SALINA Keys., [facility]  Hemophilia Pharmacy Coordinator  [phone]  [email or web address]

## 2020-09-08 ENCOUNTER — VIRTUAL VISIT (OUTPATIENT)
Dept: PSYCHIATRY | Facility: CLINIC | Age: 70
End: 2020-09-08
Attending: NURSE PRACTITIONER
Payer: MEDICARE

## 2020-09-08 VITALS
HEART RATE: 73 BPM | DIASTOLIC BLOOD PRESSURE: 106 MMHG | BODY MASS INDEX: 21.97 KG/M2 | SYSTOLIC BLOOD PRESSURE: 158 MMHG | WEIGHT: 162 LBS

## 2020-09-08 DIAGNOSIS — F41.1 ANXIETY STATE: Primary | ICD-10-CM

## 2020-09-08 DIAGNOSIS — D66 SEVERE HEMOPHILIA A (H): ICD-10-CM

## 2020-09-08 DIAGNOSIS — Z85.51 PERSONAL HISTORY OF MALIGNANT NEOPLASM OF BLADDER: ICD-10-CM

## 2020-09-08 RX ORDER — MIRTAZAPINE 15 MG/1
15 TABLET, FILM COATED ORAL AT BEDTIME
Qty: 30 TABLET | Refills: 1 | Status: SHIPPED | OUTPATIENT
Start: 2020-09-08 | End: 2020-10-13

## 2020-09-08 RX ORDER — HYDROXYZINE HYDROCHLORIDE 10 MG/1
10-20 TABLET, FILM COATED ORAL 2 TIMES DAILY PRN
Qty: 120 TABLET | Refills: 0 | Status: SHIPPED | OUTPATIENT
Start: 2020-09-08 | End: 2020-11-25

## 2020-09-08 RX ORDER — ANTIHEMOPHILIC FACTOR (RECOMBINANT) 1000 (+/-)
KIT INTRAVENOUS
Qty: 12432 EACH | Refills: 3 | Status: SHIPPED | OUTPATIENT
Start: 2020-09-08 | End: 2020-10-05

## 2020-09-08 ASSESSMENT — PAIN SCALES - GENERAL: PAINLEVEL: NO PAIN (0)

## 2020-09-08 NOTE — PROGRESS NOTES
"VIDEO VISIT  Orlin Alcantar is a 70 year old patient who is being evaluated via a billable video visit.      The patient has been notified of following:   \"We have found that certain health care needs can be provided without the need for an in-person physical exam. This service lets us provide the care you need with a video conversation. If a prescription is necessary we can send it directly to your pharmacy. If lab work is needed we can place an order for that and you can then stop by our lab to have the test done at a later time. Insurers are generally covering virtual visits as they would in-office visits so billing should not be different than normal.  If for some reason you do get billed incorrectly, you should contact the billing office to correct it and that number is in the AVS .    Patient has given verbal consent for video visit?: Yes   How would you like to obtain your AVS?: not requested  AVS SmartPhrase [PsychAVS] has been placed in 'Patient Instructions': N/A      Video- Visit Details  Type of service:  video visit for medication management  Time of service:    Date:  09/08/2020    Video Start Time:  9:35 AM        Video End Time:  10:21am    Reason for video visit:  Patient unable to travel due to Covid-19  Originating Site (patient location):  [facility]  Distant Site (provider location):  Remote location  Mode of Communication:  Video Conference via [email or web address]  Consent:  Patient has given verbal consent for video visit?: Yes            [facility]  Psychiatry Clinic  PSYCHIATRIC PROGRESS NOTE     CARE TEAM: PCP: No primary care provider on file.              Other Providers: Hematology  Orlin Alcantar is a 70 year old pt who prefers the name Orlin and pronoun he, him.    Pertinent Background:  See previous notes.  Psych critical item history includes suicidal ideation and psych hosp (<3).     Interim History     [4, 4]     The patient is a " "good historian, reports good treatment adherence and was last seen 4/28/20 by Kaylin Rajan.  Since the last visit, Orlin reports he is \"fine overall.\"  Endorses anxiety stemming from medical issues, primarily re-emergence of bladder cancer.  He is getting CT scans every 6 months and anxiety worsens approximately 2-3 months prior to exams.  Orlin also diagnosed with hemophilia which has led to chronic joint pain.  Takes Oxycodone 5mg q8hrs.  Prescribed Mirtazapine 45mg and Valium 5mg daily PRN by previous psychiatric provider.  Last filled Mirtazapine on June 30th (without since July 30) and last filled Valium on July 14 (without since August 14).  Since stopping medications, his sleep has worsened and feels occasional \"unease, worry, and rumination.\"  Anxiety has worsened overall since introduction of Covid19 due to being in high risk category. No concerns with depression.  No previous medication trials reported but according to MAR, Lexapro 15mg trial occurred in 2014.    Depression and anxiety started in teen years when he realized that he would have to live rest of life with chronic illness.  Missed a lot of school during these years due to medical complications.  Placed in foster system at 1 year old.  Lived with same foster family throughout childhood and adolescence.  Remains close with family.  No seizure history.  No history of head trauma.      Last psychiatric hospitalization in 2014 at [facility]. Anxiety and depression worsened at this time. He also experienced SI at this time.      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 Unemployed since 1988.    CHILDREN- None       LIVING SITUATION- Lives alone in subsidized housing in [facility]      EARLY HISTORY/ EDUCATION- Grew up in foster family in [place], [place].  1 year [facility]  SOCIAL/ SPIRITUAL SUPPORT- foster family.    TRAUMA HISTORY (self-report)- None  FEELS SAFE AT HOME- Yes  FAMILY HISTORY-  UNKNOWN    Medical / Surgical History                                 Patient Active Problem List   Diagnosis     History of total hip arthroplasty     Hemophilia A (H)     Pain in joint, pelvic region and thigh     Anxiety state     Hypertension goal BP (blood pressure) < 140/90     Mood disorder in conditions classified elsewhere     Hematuria     Bladder cancer (H)     Examination of participant in clinical trial     Urothelial carcinoma of bladder (H)     Malignant neoplasm of overlapping sites of bladder (H)     Hemarthrosis     Pain     S/P ileal conduit (H)       Past Surgical History:   Procedure Laterality Date     ARTHROPLASTY REVISION HIP  4/26/2012    Procedure:ARTHROPLASTY REVISION HIP; Surgeon:ALEJANDRA RAYMOND; [facility]     CYSTOSCOPY, TRANSURETHRAL RESECTION (TUR) TUMOR BLADDER, COMBINED N/A 2/19/2018    Procedure: COMBINED CYSTOSCOPY, TRANSURETHRAL RESECTION (TUR) TUMOR BLADDER;  Cystoscopy, Transurethral Resection Of Bladder Tumor ;  Surgeon: Cassidy Liao MD;  [facility]     INJECT STEROID (LOCATION)      right hip x 2, 2 weeks before.      LAPAROSCOPIC CYSTOPROSTATECTOMY N/A 7/30/2018    Procedure: CYSTOPROSTATECTOMY;  Radical Cystoprostatectomy, Ileal Conduit, Bilateral Pelvic Lymphadenectomy;  Surgeon: Bebeto Shea MD;  [facility]     left total knee arthroplasty  07/1992     LYMPHADENECTOMY ABDOMINAL N/A 7/30/2018    Procedure: LYMPHADENECTOMY ABDOMINAL;;  Surgeon: Bebeto Shea MD;  [facility]  "OR     pins in left hip      hip fracture     radioactive synovectomy  03/2003     right total hip arthroplasty  12/1992     SYNOVECTOMY HIP  4/26/2012    Procedure:SYNOVECTOMY HIP; Right Hip Open Synovectomy, Right Total Hip Revision with Exchange of Poly Liner and Head; Surgeon:ALEJANDRA RAYMOND; Location:[facility] OR        Medical Review of Systems         [2,10]   The remainder of the review of systems is noncontributory  Allergy    Aspirin  Current Medications        Current Outpatient Medications   Medication Sig Dispense Refill     acetaminophen (TYLENOL) 325 MG tablet Take 2 tablets (650 mg) by mouth every 4 hours as needed for mild pain or fever 100 tablet 1     Antihemophilic Factor, Recomb, (KOGENATE FS) 1000 units KIT Directions: Infuse 3108 units units iv every Mon, Wed, Fri & Sun prn for breakthrough bleeding 65879 each 1     diazepam (VALIUM) 5 MG tablet Take 1 tablet (5 mg) by mouth daily as needed for anxiety or sleep 30 tablet 2     mirtazapine (REMERON) 45 MG tablet Take 1 tablet (45 mg) by mouth At Bedtime And may take 1/2 tablet for insomnia up to 3 times a week. 37 tablet 2     oxyCODONE (ROXICODONE) 5 MG tablet Take 1 tablet (5 mg) by mouth every 8 hours as needed for pain 90 tablet 0     Vitals         [3, 3]   BP (!) 158/106 (BP Location: Right arm, Patient Position: Sitting, Cuff Size: Adult Regular)   Pulse 73   Wt 73.5 kg (162 lb)   BMI 21.97 kg/m     Mental Status Exam        [9, 14 cog gs]     Alertness: alert  and oriented  Appearance: casually groomed  Behavior/Demeanor: cooperative, pleasant and calm, with good  eye contact   Speech: normal  Language: intact  Psychomotor: normal or unremarkable  Mood: \"fine\"  Affect: appropriate; was congruent to mood; was congruent to content  Thought Process/Associations: unremarkable  Thought Content:  Reports none;  Denies suicidal ideation and violent ideation  Perception:  Reports none;  Denies auditory hallucinations and visual " hallucinations  Insight: good  Judgment: intact  Cognition: (6) does  appear grossly intact; formal cognitive testing was not done  Gait/Station and/or Muscle Strength/Tone: unable to assess    Labs and Data                          Rating Scales:    PHQ9    PHQ9 Today:  Not completed  PHQ 9/24/2019 2/14/2020 4/24/2020   PHQ-9 Total Score 1 0 0   Q9: Thoughts of better off dead/self-harm past 2 weeks Not at all Not at all Not at all          Assessment      [m2, h3]     Unspecified Anxiety Disorder  Unspecified Mood Disorder (Hx)    [place] Prescription Monitoring Program [] was checked today:  indicates Oxycodone 5mg #90 filled 8/18/20, Oxycodone 5mg #10 filled 8/10/20, Oxycodone 5mg #90 filled 7/22/20, and Diazepam 5mg #30 filled 7/14/20.        Plan                                                                                                                     m2, h3     1) MEDICATION:  Restart Remeron 15mg at bedtime   Start Hydroxyzine 10-20mg BID PRN for anxiety  Will not restart Diazepam 5mg due to patient taking Oxycodone    2) THERAPY:  Recommended Health Psychology.  Orlin is somewhat hesitant as therapy has not been beneficial in past.     RTC: 1 month    CRISIS NUMBERS:   Provided routinely in AVS.    Treatment Risk Statement:  The patient understands the risks, benefits, adverse effects and alternatives. Agrees to treatment with the capacity to do so. No medical contraindications to treatment. Agrees to call clinic for any problems. The patient understands to call 911 or go to the nearest ED if life threatening or urgent symptoms occur.     WHODAS 2.0  TODAY total score = N/A; [a 12-item WHODAS 2.0 assessment was not completed by the pt today and/or recorded in EPIC].       PROVIDER:  JELLY Cardenas CNP

## 2020-09-08 NOTE — PATIENT INSTRUCTIONS
Thank you for coming to the PSYCHIATRY CLINIC.    Lab Testing:  If you had lab testing today and your results are reassuring or normal they will be mailed to you or sent through [employer] within 7 days. If the lab tests need quick action we will call you with the results. The phone number we will call with results is # [phone] (home) . If this is not the best number please call our clinic and change the number.    Medication Refills:  If you need any refills please call your pharmacy and they will contact us. Our fax number for refills is [phone]. Please allow three business for refill processing. If you need to  your refill at a new pharmacy, please contact the new pharmacy directly. The new pharmacy will help you get your medications transferred.     Scheduling:  If you have any concerns about today's visit or wish to schedule another appointment please call our office during normal business hours [phone] (8-5:00 M-F)    Contact Us:  Please call [phone] during business hours (8-5:00 M-F).  If after clinic hours, or on the weekend, please call  [phone].    Financial Assistance [phone]  [employer] Billing [phone]  [place] Billing Office, [employer]: [phone]  [place] Billing [phone]  Medical Records [phone]      MENTAL HEALTH CRISIS NUMBERS:  For a medical emergency please call  911 or go to the nearest ER.     [facility]:   [facility] -[phone]   Crisis Residence [facility] -[phone]   Walk-In Counseling [facility] -[phone]   COPE 24/7 [employer] Mobile Team -[phone] (adults)/[phone] (child)  CHILD: [facility] needs assessment team - [phone]      [facility]:   [facility] - [phone]   Walk-in counseling [facility] - [phone]   Walk-in counseling  - [phone]   Crisis Residence [facility] - [phone]  Urgent  [facility] Adult Mental [facility]-[phone] mobile unit/ 24/7 crisis line    National Crisis Numbers:   National Suicide Prevention Lifeline: 0-478-225-TALK (064-227-8265)  Poison Control Center - 4-300-004-3611  [employer]/resources for a list of additional resources (SOS)  Trans Lifeline a hotline for transgender people 0-832-471-6317  The [employer] a hotline for LGBT youth 1-390.196.6225  Crisis Text Line: For any crisis 24/7   To: 893458  see www.crisistextline.org  - IF MAKING A CALL FEELS TOO HARD, send a text!         Again thank you for choosing PSYCHIATRY CLINIC and please let us know how we can best partner with you to improve you and your family's health.    You may be receiving a survey regarding this appointment. We would love to have your feedback, both positive and negative. The survey is done by an external company, so your answers are anonymous.

## 2020-09-08 NOTE — PROGRESS NOTES
"VIDEO VISIT  Orlin Alcantar is a 70 year old patient who is being evaluated via a billable video visit.      The patient has been notified of following:   \"This video visit will be conducted via a call between you and your physician/provider. We have found that certain health care needs can be provided without the need for an in-person physical exam. This service lets us provide the care you need with a video conversation. If a prescription is necessary we can send it directly to your pharmacy. If lab work is needed we can place an order for that and you can then stop by our lab to have the test done at a later time. Insurers are generally covering virtual visits as they would in-office visits so billing should not be different than normal.  If for some reason you do get billed incorrectly, you should contact the billing office to correct it and that number is in the AVS .    Video Conference to be completed via:  [email or web address]    Patient has given verbal consent for video visit?:  Yes    Patient would prefer that any video invitations be sent by: Other e-mail: IN CLINIC      How would patient like to obtain AVS?:  IN CLINIC    AVS SmartPhrase [PsychAVS] has been placed in 'Patient Instructions':  Yes    "

## 2020-09-09 ENCOUNTER — DOCUMENTATION ONLY (OUTPATIENT)
Dept: HEMATOLOGY | Facility: CLINIC | Age: 70
End: 2020-09-09

## 2020-09-10 ENCOUNTER — TELEPHONE (OUTPATIENT)
Dept: ONCOLOGY | Facility: CLINIC | Age: 70
End: 2020-09-10

## 2020-09-10 NOTE — TELEPHONE ENCOUNTER
[facility] Call Center    Phone Message    May a detailed message be left on voicemail: no     Reason for Call: Other: Needs Ostomy Supplies- Please call: [phone]     Action Taken: Message routed to:  [facility] Urology    Travel Screening: Not Applicable

## 2020-09-11 NOTE — TELEPHONE ENCOUNTER
Spoke with patient, he currently goes through [facility] pharmacy for his catheter supplies.  He is having a hard time getting his supplies and thinking of changing companies. He will wait and see if they deliver his supplies by Sat and if not will call me and we will go with a different vendor. IRON MARCUS

## 2020-09-14 DIAGNOSIS — D66 SEVERE HEMOPHILIA A (H): ICD-10-CM

## 2020-09-14 DIAGNOSIS — G89.4 CHRONIC PAIN SYNDROME: ICD-10-CM

## 2020-09-14 RX ORDER — OXYCODONE HYDROCHLORIDE 5 MG/1
5 TABLET ORAL EVERY 8 HOURS PRN
Qty: 90 TABLET | Refills: 0 | Status: SHIPPED | OUTPATIENT
Start: 2020-09-14 | End: 2020-10-12

## 2020-09-15 ENCOUNTER — DOCUMENTATION ONLY (OUTPATIENT)
Dept: HEMATOLOGY | Facility: CLINIC | Age: 70
End: 2020-09-15

## 2020-09-22 ENCOUNTER — PRE VISIT (OUTPATIENT)
Dept: UROLOGY | Facility: CLINIC | Age: 70
End: 2020-09-22

## 2020-09-22 DIAGNOSIS — C67.8 MALIGNANT NEOPLASM OF OVERLAPPING SITES OF BLADDER (H): Primary | ICD-10-CM

## 2020-09-22 NOTE — TELEPHONE ENCOUNTER
Visit Type : Bladder cancer follow up with imaging before     Records/Orders: sent message to get imaging before appointment     Pt Contacted: no    At Rooming: phone

## 2020-09-23 ENCOUNTER — DOCUMENTATION ONLY (OUTPATIENT)
Dept: HEMATOLOGY | Facility: CLINIC | Age: 70
End: 2020-09-23

## 2020-09-24 ENCOUNTER — TELEPHONE (OUTPATIENT)
Dept: ONCOLOGY | Facility: CLINIC | Age: 70
End: 2020-09-24

## 2020-09-24 ENCOUNTER — TELEPHONE (OUTPATIENT)
Dept: UROLOGY | Facility: CLINIC | Age: 70
End: 2020-09-24

## 2020-09-24 NOTE — TELEPHONE ENCOUNTER
Pt requesting to speak to Monica Harris PA-C regarding names of Urologists. Had received a list of names, made an appt with one but now would like to discuss options with Monica. Cannot find list.

## 2020-09-24 NOTE — TELEPHONE ENCOUNTER
[facility] Call Center    Phone Message    May a detailed message be left on voicemail: yes     Reason for Call: Other: Orlin calling to request that someone from Dr. Contreras's team gives him a call at [phone]. Orlin said he has been waiting all day for an answer to his earlier question, and he is hoping to hear back about that CT. Please give Pal call back at your earliest convenience to discuss.     Action Taken: Message routed to:  [facility]  Uro    Travel Screening: Not Applicable

## 2020-09-24 NOTE — TELEPHONE ENCOUNTER
[facility] Call Center    Phone Message    May a detailed message be left on voicemail: yes     Reason for Call: Other: pt has a CT scheduled for saturday, he just had one on august 5th, why does he need another one? please call pt to discuss thanks     Action Taken: Message routed to:  [facility] uro    Travel Screening: Not Applicable

## 2020-09-24 NOTE — TELEPHONE ENCOUNTER
[facility] Call Center    Phone Message    May a detailed message be left on voicemail: yes     Reason for Call: Other: Orlin calling to follow up on his request for a call back from a nurse to discuss the necessity of a CT.  Please call him back to discuss     Action Taken: Message routed to:  [facility]  Urology    Travel Screening: Not Applicable

## 2020-09-25 ENCOUNTER — TELEPHONE (OUTPATIENT)
Dept: ONCOLOGY | Facility: CLINIC | Age: 70
End: 2020-09-25

## 2020-09-25 NOTE — TELEPHONE ENCOUNTER
I called Orlin re: dr Morocho appt next week, he hadn't seen the my chart about the appt.   Discussed that there was some clot in the ureters seen last time, likely 2/2 to his hemophilia.   We are just repeating the CT this weekend for reassurance and he will see DR Morocho next week.   Appreciated the call.  I will see him again in winter.  Katelin Harris PA-C med onc

## 2020-09-26 ENCOUNTER — ANCILLARY PROCEDURE (OUTPATIENT)
Dept: CT IMAGING | Facility: CLINIC | Age: 70
End: 2020-09-26
Attending: UROLOGY
Payer: MEDICARE

## 2020-09-26 DIAGNOSIS — C67.8 MALIGNANT NEOPLASM OF OVERLAPPING SITES OF BLADDER (H): ICD-10-CM

## 2020-09-26 LAB
CREAT BLD-MCNC: 1 MG/DL (ref 0.66–1.25)
GFR SERPL CREATININE-BSD FRML MDRD: 74 ML/MIN/{1.73_M2}

## 2020-09-26 RX ORDER — IOPAMIDOL 755 MG/ML
99 INJECTION, SOLUTION INTRAVASCULAR ONCE
Status: COMPLETED | OUTPATIENT
Start: 2020-09-26 | End: 2020-09-26

## 2020-09-26 RX ADMIN — IOPAMIDOL 99 ML: 755 INJECTION, SOLUTION INTRAVASCULAR at 08:24

## 2020-09-30 ENCOUNTER — DOCUMENTATION ONLY (OUTPATIENT)
Dept: HEMATOLOGY | Facility: CLINIC | Age: 70
End: 2020-09-30

## 2020-10-01 ENCOUNTER — VIRTUAL VISIT (OUTPATIENT)
Dept: UROLOGY | Facility: CLINIC | Age: 70
End: 2020-10-01
Payer: MEDICARE

## 2020-10-01 DIAGNOSIS — C67.8 MALIGNANT NEOPLASM OF OVERLAPPING SITES OF BLADDER (H): Primary | ICD-10-CM

## 2020-10-01 PROCEDURE — 99442 PR PHYSICIAN TELEPHONE EVALUATION 11-20 MIN: CPT | Mod: 95 | Performed by: UROLOGY

## 2020-10-01 ASSESSMENT — PAIN SCALES - GENERAL: PAINLEVEL: NO PAIN (0)

## 2020-10-01 NOTE — PROGRESS NOTES
"Orlin Alcantar is a 70 year old male who is being evaluated via a billable telephone visit.      The patient has been notified of following:     \"This telephone visit will be conducted via a call between you and your physician/provider. We have found that certain health care needs can be provided without the need for a physical exam.  This service lets us provide the care you need with a short phone conversation.  If a prescription is necessary we can send it directly to your pharmacy.  If lab work is needed we can place an order for that and you can then stop by our lab to have the test done at a later time.    Telephone visits are billed at different rates depending on your insurance coverage. During this emergency period, for some insurers they may be billed the same as an in-person visit.  Please reach out to your insurance provider with any questions.    If during the course of the call the physician/provider feels a telephone visit is not appropriate, you will not be charged for this service.\"    Patient has given verbal consent for Telephone visit?  Yes     What phone number would you like to be contacted at? [phone]     How would you like to obtain your AVS? Mail a copy    Chief Complaint   It was my pleasure to speak with Orlin Alcantar who is a 70 year old male for follow-up of bladder cancer.      HPI  Orlin Alcantar is a 70 year old male with a history of hemophilia and T2 bladder cancer s/p radical cystoprostatectomy with IC and BPLND on 07/30/2018 with Dr. Shea. Here for routine follow-up. No evidence of recurrence. There was question of filling defects on recent CT, but may have been clots given history of hemophilia. He has since had repeated imaging that no longer shows any suspicious lesions.      TNM Stage: T2a, N0, M0  Number of nodes removed: 23  Number of positive nodes: 0  Surgical Margins Positive: No  Grade: High   Histology: urothelial without secondary histology.     Tolerating ORAL " and good bowel function. He did have some irritation around his pouch previously but was given a cream which helped. He is doing well now.      Date of Initial Diagnosis: 2018 TURBT with Dr. Liao   Stage of Initial Diagnosis: invasive urothelial carcinoma   Grade at Initial Diagnosis: high grade   Site of First Diagnosis: R UO was involved in the resection   Any Recurrence?: No  Intravesical Treatments: gemcitabine initiated on 2018, 4 cycles of gemcitabine-nivolumab 2018-2018 and 4 cycles of Gemcitabine-Cisplatin from 2018-2018.   Date of Last Upper Tract Imagin2019   Any evidence of hydronephrosis? Mild    CT abd/pelvis 2020  IMPRESSION: In this patient with history of bladder cancer status post  cystoprostatectomy, urinary diversion, ileal conduit and right lower  quadrant ileostomy and chemotherapy:  1. No convincing evidence for local recurrence.  2. Mild bilateral pelvocaliectasis is likely due to postoperative  changes. Interval resolution of the previously seen filling defects  within the opacified portions of bilateral pyelocalyceal system and  ureters as well as interval resolution of the previously seen mild  urothelial thickening.  3. Cholelithiasis. Nonobstructive bilateral renal calculi.  4. Stable sub-6 mm pulmonary nodules. Attention on follow-up studies.  5. Additional incidental findings as described above.    I have reviewed and updated the patient's Past Medical History, Social History, Family History and Medication List.    ALLERGIES  Aspirin    ASSESSMENT and PLAN  Assessment:  Orlin Alcantar  is a 70 year old male with a history of T2 bladder cancer s/p neoadjuvant gem-cis-nivo and radical cystoprostatectomy with IC and BPLND on 2018 with Dr. Shea. No evidence of recurrence and he overall is doing well.     Plan:  - Scans per zoe in 3 months  - Follow up with me in 6 months and clinic visit - cytology at that time    Phone call duration: 12  minutes    Jamir Contreras MD  Urology  [facility]

## 2020-10-01 NOTE — PATIENT INSTRUCTIONS
Please follow up in 6 month in clinic for a cytology     It was a pleasure meeting with you today.  Thank you for allowing me and my team the privilege of caring for you today.  YOU are the reason we are here, and I truly hope we provided you with the excellent service you deserve.  Please let us know if there is anything else we can do for you so that we can be sure you are leaving completely satisfied with your care experience.

## 2020-10-02 NOTE — PROGRESS NOTES
Orlin Alcantar called to order a refill of Kogenate FS . He has 1 dose(s) remaining at home and would like it delivered to his house via  to arrive on 9/23/2020.     The following doses were dispensed from our pharmacy:    Product: Kogenate FS    Doses: 4 doses of 3108 units    SALINA Keys., [facility]  Hemophilia Pharmacy Coordinator  [phone]  [email or web address]

## 2020-10-02 NOTE — PROGRESS NOTES
Orlin Alcantar called to order a refill of Kogenate FS . He has 1 dose(s) remaining at home and would like it delivered to his house via  to arrive on 9/30/2020.     The following doses were dispensed from our pharmacy:    Product: Kogenate FS    Doses: 4 doses of 3108 units    SALINA Keys., [facility]  Hemophilia Pharmacy Coordinator  [phone]  [email or web address]

## 2020-10-02 NOTE — PROGRESS NOTES
Orlin Alcantar called to order a refill of Kogenate FS . He has 0 dose(s) remaining at home and would like it delivered to his house via  to arrive on 9/9/2020.     The following doses were dispensed from our pharmacy:    Product: Kogenate FS    Doses: 4 doses of 3108 units    SALINA Keys., [facility]  Hemophilia Pharmacy Coordinator  [phone]  [email or web address]

## 2020-10-02 NOTE — PROGRESS NOTES
Orlin Alcantar called to order a refill of Kogenate FS . He has 1 dose(s) remaining at home and would like it delivered to his house via  to arrive on 9/15/2020.     The following doses were dispensed from our pharmacy:    Product: Kogenate FS    Doses: 4 doses of 3108 units    SALINA Keys., [facility]  Hemophilia Pharmacy Coordinator  [phone]  [email or web address]

## 2020-10-05 ENCOUNTER — TELEPHONE (OUTPATIENT)
Dept: ONCOLOGY | Facility: CLINIC | Age: 70
End: 2020-10-05

## 2020-10-05 ENCOUNTER — TELEPHONE (OUTPATIENT)
Dept: HEMATOLOGY | Facility: CLINIC | Age: 70
End: 2020-10-05

## 2020-10-05 DIAGNOSIS — Z85.51 PERSONAL HISTORY OF MALIGNANT NEOPLASM OF BLADDER: ICD-10-CM

## 2020-10-05 DIAGNOSIS — D66 SEVERE HEMOPHILIA A (H): ICD-10-CM

## 2020-10-05 RX ORDER — ANTIHEMOPHILIC FACTOR (RECOMBINANT) 1000 (+/-)
KIT INTRAVENOUS
Qty: 12432 EACH | Refills: 3 | Status: SHIPPED | OUTPATIENT
Start: 2020-10-05 | End: 2020-10-28

## 2020-10-05 NOTE — TELEPHONE ENCOUNTER
Called patient and tried to explain his appointment  Very upset hung up on me Radha Hardy, ZULMAN Staff Nurse

## 2020-10-05 NOTE — TELEPHONE ENCOUNTER
Aileen can you tell me what labs  Dr portillo ordered a year ago labs I am familiar ordering for follow up Radha Hardy LPN Staff Nurse

## 2020-10-05 NOTE — TELEPHONE ENCOUNTER
[facility] Call Center    Phone Message    May a detailed message be left on voicemail: yes     Reason for Call: Other: Patient Request: Patient is requesting for a call back from Dr. Contreras's Nurse. Patient states he will be having a CT Scan on 03/01/2020. Patient states he needs to do labs prior to the CT Scan. Patient is requesting for the labs to be placed and scheduled by the clinicfor 03/01/2020 before his CT at 8:00 am. Please call patient to advise.     Note: Patient sent message through Primary Care Clinic line. Please contact patient.    Action Taken: Patient transferred to: [facility]    Travel Screening: Not Applicable

## 2020-10-06 ENCOUNTER — MEDICAL CORRESPONDENCE (OUTPATIENT)
Dept: HEALTH INFORMATION MANAGEMENT | Facility: CLINIC | Age: 70
End: 2020-10-06

## 2020-10-07 ENCOUNTER — TELEPHONE (OUTPATIENT)
Dept: PSYCHIATRY | Facility: CLINIC | Age: 70
End: 2020-10-07

## 2020-10-07 ENCOUNTER — DOCUMENTATION ONLY (OUTPATIENT)
Dept: HEMATOLOGY | Facility: CLINIC | Age: 70
End: 2020-10-07

## 2020-10-07 NOTE — TELEPHONE ENCOUNTER
[facility]  Brief Social Work Note    Orlin contacted writer requesting assistance with getting a letter to his new psychiatry nurse practitioner, Michelet Guthrie.  Orlin does not have email or a scanner at home, so faxed letter to writer, who forwarded to JOEY Guthrie at pt request.  Also forwarded to Yanira Branch PA-C per patient request.    Analilia Jack, MARIN, LICSW, ACM  Clinical   [facility]  Phone: [phone]    Addendum:  Updated Orlin that this has been completed by phone on 10/9.   Received another call from Pee Bernstein from [facility] ([facility]). He reports insurance is denying code Z47.89 as it is not specific enough. Gave Pee the phone number to our insurance specialists Kristen ([phone]) and Cathi ([phone]) who may be better able to answer Pee's questions. Writer faxed a copy of patient's occupational therapy order to Pee Bernstein at [facility] that has a different diagnosis code of M79.645.

## 2020-10-07 NOTE — TELEPHONE ENCOUNTER
2 faxed pages were received from the patient addressed to Michelet Guthrie regarding medications. This was routed to Michelet and the pages were sent to scanning. Mayda Mathews MA

## 2020-10-09 NOTE — PROGRESS NOTES
Orlin Alcantar called to order a refill of Kogenate FS . He has 1 dose(s) remaining at home and would like it delivered to his house via  to arrive on 10/7/2020.     The following doses were dispensed from our pharmacy:    Product: Kogenate FS    Doses: 4 doses of 3108 units    SALINA Keys., [place]  Hemophilia Pharmacy Coordinator  [phone]  [email or web address]

## 2020-10-12 DIAGNOSIS — D66 SEVERE HEMOPHILIA A (H): ICD-10-CM

## 2020-10-12 DIAGNOSIS — G89.4 CHRONIC PAIN SYNDROME: ICD-10-CM

## 2020-10-12 RX ORDER — OXYCODONE HYDROCHLORIDE 5 MG/1
5 TABLET ORAL EVERY 8 HOURS PRN
Qty: 89 TABLET | Refills: 0 | Status: SHIPPED | OUTPATIENT
Start: 2020-10-12 | End: 2020-11-09

## 2020-10-13 ENCOUNTER — DOCUMENTATION ONLY (OUTPATIENT)
Dept: HEMATOLOGY | Facility: CLINIC | Age: 70
End: 2020-10-13

## 2020-10-19 DIAGNOSIS — M36.2 HEMOPHILIC ARTHROPATHY (H): ICD-10-CM

## 2020-10-19 DIAGNOSIS — R26.9 ABNORMAL GAIT: ICD-10-CM

## 2020-10-19 DIAGNOSIS — D66 SEVERE HEMOPHILIA A (H): Primary | ICD-10-CM

## 2020-10-19 DIAGNOSIS — D66 HEMOPHILIC ARTHROPATHY (H): ICD-10-CM

## 2020-10-21 ENCOUNTER — DOCUMENTATION ONLY (OUTPATIENT)
Dept: HEMATOLOGY | Facility: CLINIC | Age: 70
End: 2020-10-21

## 2020-10-22 ENCOUNTER — CARE COORDINATION (OUTPATIENT)
Dept: HEMATOLOGY | Facility: CLINIC | Age: 70
End: 2020-10-22

## 2020-10-22 NOTE — PROGRESS NOTES
PRE-COMPRENSIVE CLINIC DOCUMENTATION     Diagnosis:   Hemophilia a  Factor level: < 1%  Inhibitor Hx: none  Last CC:  10/22/2019  Viral issues: HIV neg, Hep C treated in 1999  Prior Joint issues: Right SUNG 1992  Right hip steroid injections multiple times between 3290-5298   Right hip open synovectomy 2005  Right hip SUNG revision with synovectomy 4/26/2012  Left TKA 1991  Left hip fracture with internal fixation 20+ years  Other health issues: Diagnosed with bladder cancer in 2018 treated and no evidence of recurrence.    Hemophilia treatment plan: Kogenate 3000 units q 48 hours  Method of logging infusions: paper logs  Pharmacokinetics: done 7/16/2019    Insurance: Medicare / [employer]   Pharmacy: [facility]  MPR: 0.88    Eligible studies: Registry Initial, Dataset (unsure if every approached)    Goals for visit: Issues with new psychiatry provider  Planned procedures or surgeries: none    PT Orders: 10/19/2020    Text Consent: no  Email Consent: no    Confirmation of appointment: Talked with Orlin on 10/22/2020.  He is planning to attend visit.  No big issues to talk about hemophilia wise.  Doing well.

## 2020-10-26 NOTE — PROGRESS NOTES
Orlin Alcantar called to order a refill of Kogenate FS . He has 1 dose(s) remaining at home and would like it delivered to his house via  to arrive on 10/21/2020.     The following doses were dispensed from our pharmacy:    Product: Kogenate FS    Doses: 4 doses of 3108 units    SALINA Keys., [facility]  Hemophilia Pharmacy Coordinator  [phone]  [email or web address]

## 2020-10-27 ENCOUNTER — OFFICE VISIT (OUTPATIENT)
Dept: HEMATOLOGY | Facility: CLINIC | Age: 70
End: 2020-10-27
Payer: MEDICARE

## 2020-10-27 ENCOUNTER — TELEPHONE (OUTPATIENT)
Dept: PSYCHIATRY | Facility: CLINIC | Age: 70
End: 2020-10-27

## 2020-10-27 ENCOUNTER — HOSPITAL ENCOUNTER (OUTPATIENT)
Dept: PHYSICAL THERAPY | Facility: CLINIC | Age: 70
Setting detail: THERAPIES SERIES
End: 2020-10-27
Attending: PHYSICIAN ASSISTANT
Payer: MEDICARE

## 2020-10-27 ENCOUNTER — OFFICE VISIT (OUTPATIENT)
Dept: HEMATOLOGY | Facility: CLINIC | Age: 70
End: 2020-10-27
Attending: INTERNAL MEDICINE
Payer: MEDICARE

## 2020-10-27 VITALS
SYSTOLIC BLOOD PRESSURE: 141 MMHG | HEIGHT: 72 IN | HEART RATE: 112 BPM | OXYGEN SATURATION: 99 % | TEMPERATURE: 97.8 F | WEIGHT: 166.3 LBS | BODY MASS INDEX: 22.52 KG/M2 | DIASTOLIC BLOOD PRESSURE: 92 MMHG | RESPIRATION RATE: 14 BRPM

## 2020-10-27 DIAGNOSIS — D66 SEVERE HEMOPHILIA A (H): ICD-10-CM

## 2020-10-27 DIAGNOSIS — M36.2 HEMOPHILIC ARTHROPATHY (H): ICD-10-CM

## 2020-10-27 DIAGNOSIS — Z71.9 ENCOUNTER FOR COUNSELING: Primary | ICD-10-CM

## 2020-10-27 DIAGNOSIS — D66 HEMOPHILIC ARTHROPATHY (H): ICD-10-CM

## 2020-10-27 DIAGNOSIS — R26.9 ABNORMAL GAIT: ICD-10-CM

## 2020-10-27 DIAGNOSIS — G89.4 CHRONIC PAIN SYNDROME: Primary | ICD-10-CM

## 2020-10-27 PROCEDURE — G0463 HOSPITAL OUTPT CLINIC VISIT: HCPCS | Mod: 25,27

## 2020-10-27 PROCEDURE — 97161 PT EVAL LOW COMPLEX 20 MIN: CPT | Mod: GP | Performed by: PHYSICAL THERAPIST

## 2020-10-27 PROCEDURE — 99215 OFFICE O/P EST HI 40 MIN: CPT | Performed by: INTERNAL MEDICINE

## 2020-10-27 PROCEDURE — G0463 HOSPITAL OUTPT CLINIC VISIT: HCPCS

## 2020-10-27 RX ORDER — EMICIZUMAB 150 MG/ML
INJECTION, SOLUTION SUBCUTANEOUS
Qty: 2.8 ML | Refills: 0 | Status: SHIPPED | OUTPATIENT
Start: 2020-10-27 | End: 2020-12-28

## 2020-10-27 RX ORDER — EMICIZUMAB 150 MG/ML
INJECTION, SOLUTION SUBCUTANEOUS
Qty: 3.2 ML | Refills: 0 | Status: SHIPPED | OUTPATIENT
Start: 2020-10-27 | End: 2020-12-28

## 2020-10-27 ASSESSMENT — MIFFLIN-ST. JEOR: SCORE: 1552.33

## 2020-10-27 ASSESSMENT — PAIN SCALES - GENERAL: PAINLEVEL: NO PAIN (0)

## 2020-10-27 NOTE — PROGRESS NOTES
[facility]  [facility]  [facility] [address], [address], [place] [address]  Main: [phone], Fax: [phone]        Comprehensive Hemophilia Clinic Visit      Patient: Orlin Alcantar  MRN: [id number]  : 1950  MAR: 10/27/2020    Last comprehensive hemophilia clinic visit:  10/22/2019    Hemophilia history:  Orlin is a 70 year old male with severe hemophilia A with a baseline factor VIII level of <1% with no history of factor VIII inhibitor, who returns to clinic today for annual comprehensive hemophilia clinic visit.    Orlin was raised in a foster family and he does not know his biological parents, so it is unclear whether or not there is a family history of hemophilia. He was first diagnosed by Dr. Filippo Quiñones many years ago here at the [facility] and had been followed by Dr. Quiñones initially for a few years. He subsequently was followed by a community hematologist until briefly re-establishing care here in  prior to right hip surgery.  He then returned to the care of his community hematologist until , when we again saw him prior to another right hip surgery.  He returned to us again in , and has been followed here since that time.      Summary of orthopedic procedures:  1992 -- left knee replacement  1992 -- right hip replacement  1995 -- left hip fracture s/p insertion of three pins  2003 -- radioactive synovectomy, right hip  2005 -- surgical synovectomy, right hip  2012 -- redo right hip surgical synovectomy, and exchange of polyethylene portion of previous hip arthroplasty    Orlin was diagnosed with hepatitis C infection in  and received combination therapy with interferon and ribavirin in  which successfully eradicated the virus.     He has been on prophylactic FVIII replacement therapy for the last several years.  He has used a variety of regimens, typically using 10,000  "Subjective:      Gayla Moreno is a 2 m.o. female here with mother. Patient brought in for Gastroesophageal Reflux and Well Child (2 months )    Mother reports reflux seems to be doing better over the last few days  On enfamil AR  Will occ. Add oatmeal to a bottle as well    History of Present Illness:  Well Child Exam  Diet - WNL - Diet includes formula (enfamil AR)   Growth, Elimination, Sleep - WNL (HC > 97%- following curve) - Growth chart normal  Development - WNL -Developmental screen  Household/Safety - WNL - safe environment, adult support for patient and appropriate carseat/belt use      Review of Systems   Constitutional: Negative for activity change, appetite change and fever.   HENT: Positive for congestion. Negative for mouth sores.    Eyes: Negative for discharge and redness.   Respiratory: Positive for wheezing. Negative for cough.    Cardiovascular: Negative for leg swelling and cyanosis.   Gastrointestinal: Negative for constipation, diarrhea and vomiting.   Genitourinary: Negative for decreased urine volume and hematuria.   Musculoskeletal: Negative for extremity weakness.   Skin: Negative for rash and wound.     Well Child Development 2019   Bring hands to face? Yes   Follow you or a moving object with eyes? Yes   Wave arms towards a dangling toy while lying on their back? Yes   Hold onto a toy or rattle briefly when it is placed in their hand? Yes   Hold hands partially open while awake? Yes   Push head up when lying on the tummy? Yes   Look side to side? Yes   Move both arms and legs well? Yes   Hold head off of your shoulder when held? Yes    (make "ooo," "gah," and "aah" sounds)? Yes   When you speak to your baby does he or she make sounds back at you? Yes   Smile back at you when you smile? Yes   Get excited when he or she sees you? Yes   Fuss if hungry, wet, tired or wants to be held? Yes   Rash? No   OHS PEQ MCHAT SCORE Incomplete   Some recent data might be hidden "       Objective:     Physical Exam   Constitutional: She appears well-developed and well-nourished. No distress.   HENT:   Head: Anterior fontanelle is flat.   Right Ear: Tympanic membrane normal.   Left Ear: Tympanic membrane normal.   Nose: No nasal discharge.   Mouth/Throat: Mucous membranes are moist. Pharynx is normal.   Eyes: Red reflex is present bilaterally. Pupils are equal, round, and reactive to light. Conjunctivae are normal. Right eye exhibits no discharge. Left eye exhibits no discharge.   Neck: Normal range of motion. Neck supple.   Cardiovascular: Normal rate, regular rhythm, S1 normal and S2 normal.   No murmur heard.  Pulmonary/Chest: Effort normal and breath sounds normal. She has no wheezes. She has no rhonchi. She has no rales.   Abdominal: Soft. Bowel sounds are normal. She exhibits no distension. There is no hepatosplenomegaly. There is no tenderness.   Genitourinary: No labial rash. No labial fusion.   Musculoskeletal: Normal range of motion.   Negative Ortalani, Negative Contreras   Lymphadenopathy:     She has no cervical adenopathy.   Neurological: She is alert. She has normal strength.   Skin: Skin is warm. No rash noted.   Vitals reviewed.      Assessment:        1. Encounter for routine child health examination without abnormal findings    2. Gastroesophageal reflux disease, esophagitis presence not specified         Plan:       Gayla was seen today for gastroesophageal reflux and well child.    Diagnoses and all orders for this visit:    Encounter for routine child health examination without abnormal findings  -     DTaP HiB IPV combined vaccine IM (PENTACEL)  -     Hepatitis B vaccine pediatric / adolescent 3-dose IM  -     Pneumococcal conjugate vaccine 13-valent less than 4yo IM  -     Rotavirus vaccine pentavalent 3 dose oral    Gastroesophageal reflux disease, esophagitis presence not specified    PKU WNL,   Educ. growth, development, & feeds. Safety discussed. Educ. fever/Tylenol.  to 15,000 units of factor weekly.  He has preferred to use smaller factor doses more frequently, rather than trying to extend the interval between infusions.  He used Recombinate for many years, followed by Helixate, and most recently has been using Kogenate since approximately 2017.  He is currently using 3,000 units every 48 hours.  A pharmacokinetic study done on Kogenate done in 7/2019 showed a half life of 19 (range 14-24) hours.    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  Over the last few years, his joint status has remained stable.  He used codeine to manage joint pain for many years, but transitioned to oxycodone in March 2019 due to some difficulty with codeine dosing and availability.  He now receives ninety 5 mg tablets each month (provided by us), with quarterly check in for ongoing monitoring.    Interim history since last comprehensive clinic visit:  As outlined in detail in previous notes, Orlin was diagnosed with bladder cancer in 2018, for which he received neoadjuvant chemotherapy followed by radical cyctectomy in July 2018.  He continues to be followed in Oncology Clinic here, and remains in remission as of his last check up in September 2020.    He is currently feeling well.  He is interested in new therapies for hemophilia which would help with his frequent need for IV infusions.  He states he is having increasing difficulty with venous access.  This is stressful to him.      ROS:  Complete ROS is negative.     Current factor treatment regimen:  Kogenate 3000 units per dose (which is about 40 Units/kg) every 48 hours, via self venipuncture.     Bleeding episodes since last comprehensive clinic visit:    The total number of bleeding events since last comprehensive hemophilia clinic visit requiring factor infusion -- 24    The patient is recalling his bleeding events by -- paper logs which he brings with him  Interpretive conf.conducted.Addressed concerns.     Discussed CASSIDY- continue reflux precautions, thickened feeds- consider zantac if worsening, but mother does report improvement  HC is > 97% but has always been- following curve- + family h/o macrocephaly- will monitor- discussed s/s of Increased ICP and if noted recommend re-evaluation  Next well visit at 4 months, f/u sooner prn     today    Number of Hemarthroses in the past year: 24  Location Number of bleeds Other information related to each bleed   Ankle, Left 6    Ankle, Right 2    Elbow, Left 0    Elbow, Right 7    Hip, Left 3    Hip, Right 0    Knee, Left 0    Knee, Right 1    Shoulder, Left 2 Related to fall   Shoulder, Right 3 Related to fall     Other bleeds (GI, , Soft tissue etc.) -- none      Emergency department visits or hospitalizations in the past year:  He has no emergency department visits in the past year.    He has no hemophilia related hospitalization in the past year.     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       Joint procedures (synovectomy or joints replacement) since last comprehensive clinic visit: None    Chronic pain: (No or every day / Most days / Some days):  Every day, multiple joints, but primarily right elbow and left ankle     Prescribed opioids for chronic pain (Yes / No):  Yes -- oxycodone     Overall activity level:   Activity level for: Unrestricted / Limited / Unable   Work/School Limited   Recreation Limited   Self-Care Limited       Infectious disease status:     Date of Serology done Immunization status    Hepatitis A Unknown    Hepatitis B 3/28/2018 Negative serology both surface Agn and Core Nidhi      Status (Positive or Negative) Most recent RNA Quant and date Treatment:   Hepatitis C Antibody positive but negative viral load 3/28/2018 RNA not detected. Interferon and Ribavirin in 1999      Positive or Negative (date of test done)   HIV status Negative in 3/28/2018     Evidence of cirrhosis or liver disease on imaging? No; last CT scan (for h/o bladder cancer) was in September 2020. Normal LFTs.     Routine health maintenance:  His last visit with Dr. Katie Ramos, primary care physician at [facility] was in 2019.  He has had some phone contact with office since that time.  He recently started to see a new  psychiatry provider and had medication changes which he is struggling with.  Specifically he would like to have his Ativan restarted. He has not seen a dentist for several years.       Family history:  Orlin grew up in a foster family and does not know his biological parents.   Unknown whether or not there is a family history of hemophilia.      Social history:  Please see Analilia BAKER's note from September 2018.   Single, lives alone in apartment.  Retired .  Denies any tobacco use (quit smoking back in 2013).  No significant alcohol use.  Denies any illicit drug use.  No marijuana use.     The patient highest level of education completed is high school plus one year of college.     Currently the patient is currently not working.     He has a history of depression and anxiety.       Medications:  Kogenate 3000 units every 48 hours  Oxycodone 5 mg tab as needed, #90 per month (prescribed by [facility])  Tylenol as needed  Valium (previously prescribed by psychiatrist)  Remeron (prescribed by psychiatrist)       Objective:  Looks well.  Detailed exam not done -- see physical therapy note for joint assessment.    Labs:  Enrolled in CDC registry and ATHNdataset today and having initial labs done.     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  Has had no recurrence of bladder cancer.    Presents today for routine annual comprehensive hemophilia clinic visit.  Orlin is doing relatively well although is treating frequently for bleeds (24 times in last year) and struggling with IV infusions.  He is interested in Hemlibra (non-factor) product for prevention of  bleeds.  He would like a cost comparison before making his final decision.     Plan:    Factor replacement plan:  No change in prophylactic factor replacement plan was made today but he is strongly considering transition to Hemlibra. He will remain on Kogenate 3000 Units ( ~40 U/kg) every other day until decision is made.  Reviewed the initiation plan for Hemlibra and how the transition is made.    Hemophilic arthropathy management plan:  Continue oxycodone 5 mg as needed for joint pain, #90 per month. [place] prescription database checked.  His oxycodone use has been stable outside of acute bleed events which have been closely monitored and discussed.  Rx for Narcan given to patient today to have in the home. Reviewed need and use of Narcan, no specific concerns today but feel should have for safety.    Other medical follow up plan:  Has follow up scheduled with Oncology regarding bladder cancer surveillance and also has a PCP.  We will also reach out to his new psychiatry provider regarding his anxiety medications to give reassurance on his stability and overall improved engagement with clinical care.     Labs needed:  Did [facility] registry initial labs today.     Follow up clinic visit/phone check in timing:  3 months for quarterly monitoring of pain med use  1 year for comprehensive evaluation, sooner if new joint issues      Ama Chadwick MPT, Analilia Rashid LICSW and  and Yu Schultz, nurse clinician have also seen the patient independently, please refer to their notes for their evaluation and assessment.           Yanira Branch MPH, PA-C  [facility]  [facility]  [phone] main line  [phone] pager  [phone] fax      HEMATOLOGY STAFF    Patient seen and evaluated as part of a shared visit.  I have reviewed the clinical history, physical exam, relevant labs, and treatment plan with the BARB, as well as other members of the comprehensive hemophilia care team.       Key findings:    70-year-old male severe hemophilia A, no history of inhibitor.  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  Successfully treated HCV approximately 20 years ago.  HIV negative.  More recent medical history remarkable for bladder cancer which is currently in remission following cystectomy and chemotherapy in 2018.    Joint function remains stable overall.  On factor prophylaxis with a standard half-life product, infusing every other day.  However, he continues to treat frequently for bleeding episodes and is having increasing difficulty with venous access.      Key management decisions:    Long discussion today regarding treatment options.  Specifically discussed potential transition to emicizumab.  Reviewed the advantages and disadvantages of this medication relative to factor replacement therapy.  He would like more time to consider these options.  Thus, for now we will continue with current treatment plan.      Jorge Jiang MD  Associate Professor of Medicine  Division of Hematology, Oncology, and Transplantation  Director, [facility]

## 2020-10-27 NOTE — PROGRESS NOTES
OUTPATIENT PHYSICAL THERAPY HEMOPHILIA CLINIC NOTE  [facility]    Orlin Alcantar     YOB: 1950  [id number]    Reason for visit: Comprehensive Clinic  Date of service:  10/27/2020  Referring provider: Yanira Branch PA-C  Medical Diagnosis: Factor VIII -  Severe  Replacement therapy: Prophylaxis: Schedule- every other day   present: No    Interval History (include personal factors and/or comorbidities that impact the plan of care):   Orlin was last seen on 10/22/2019 for a comp clinic. Today he reports that his joints and overall mobility are unchanged over the past year.  He denies any specific joint concerns and reports his primary problem joints are his ankles and elbows.    Bleeds: Paper records from patient show 24 bleeds over the past year.  See specifics in provider note.  Work: Orlin has been retired for many years, was a .   Exercise/physical activity consists of daily activities and running errands.  No regular stretching or strengthening program.  He does take the 3 flights of stairs to/from his apartment some days for exercise instead of using the elevator.     Orthopedic past medical history:   Right SUNG 1992  Right hip steroid injections multiple times between 0105-8595   Right hip open synovectomy 2005  Right hip SNUG revision with synovectomy 4/26/2012  Left TKA 1991  Left hip fracture with internal fixation 20+ years    Pain:  Chief complaint: Pain in bilateral elbows and ankles that occurs most days, and is reported as a result of activity/overuse such as walking and lifting items but no specific triggers.     Fall Risk Screen:  Has the patient fallen 2 or more times in the last year? Yes  Has the patient fallen and had an injury in the past year? Yes  Timed Up and Go Score: <13.5 seconds  Is the patient a fall risk? Yes, due to multiple joint involvement, department fall risk interventions implemented    Physical Exam: Gross ROM  assessment continues to show longstanding loss of motion in bilateral ankles, bilateral knees L>R, and bilateral elbows.  See table for gross measurements.    ROM and joint function: Patient is right handed.  Unchanged ROM and function reported by Orlin in past year.  He continues to have inability to touch his face with either palm, can get fingertips to face.  He affirms he is still able to feed himself and shave due to length of tools but that shaving is getting close to being a problem.   Swelling: Denied  Gait:  Orlin continues to wear high top hiking boots which are his footwear of choice for colder months with snow/ice.   Independent without an assistive device.  Shoe assisted abbreviated heel strike, bilat knee flex, decreased push off and step length, equal step length, decreased foot clearance bilat, decreased walking efficiency.  Equipment: Patient has sock aid, reacher, crutches, wheeled walker, as well as walk-in shower with grab bars.     ROM L elbow R elbow L Knee R Knee L Ankle R Ankle Other: Other:    Flex (DF) 110 100 90 110 -10 -5     Ext (PF) -45 -45 -10 -5 18 20     Pronation 45 45         Supination 45 45           Assessment: Orlin is doing well overall and continues to complete all desired daily activities.  His primary complaint is bleeding and the resulting pain and limitations in his elbows and ankles.  He identifies that these are related to lifting and walking, respectively, but are not always triggered by the same activities.  Some days he can walk 6-7 blocks without issue and other days just a short distance or a flight of stairs will trigger bleeding and pain.  Lifting ability and triggering of pain and bleeding is variable but seems to have a positional component.  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  No change in function.     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  No imaging found.  Left knee: H/o hemophilic arthropathy addressed with TKR in 1991, continues to be functional with replaced joint.  Current decreased ROM secondary to soft tissue restriction likely present prior to surgery.  Right knee: Slight decrease in flexion as compared to age normative tables, no other clinical signs of hemarthropathy, no imaging found. Orlin reports h/o significant bleed in this joint when he was a child that led to about a year of immobility with wheelchair use.   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  Right hip: H/o hemophilic arthropathy addressed with SUNG many years ago and revision in 2012.  Hip has been much improved clinically since 2012 surgery.    Treatment diagnosis: Joint hypomobility, ROM limitations, gait impairment, pain    Clinical Presentation: Stable/Uncomplicated  Clinical Presentation Rationale: No progression of chronic joint disease  Clinical Decision Making (Complexity): Low complexity  Treatment: Patient will benefit from skilled physical therapy consisting of:   -education in self care / home management training to include instruction in: daily symptom control techniques and flare management: reviewed RICE and joint positioning for comfort  Gait training:recommended continued use of high top supportive boots and to always seek a heel toe offset in his footwear  Reviewed current exercise program and encouraged continued use of stairs as able for exercise and community ambulation.   Plan of Care:   1. Orlin to continue with current activity level.  No equipment needs at this time.  Encouraged him to reach out with any new or progressing  joint concerns and any equipment needs.  2. Will plan to see at next clinic visit.     Therapy Frequency and Predicted Duration of Therapy Intervention: One session eval only  Goals: Patient verbalizes understanding of evaluation results, and home management recommendations provided today to maximize functional mobility and allow for continued safe participation in current home and community activities. -Goal Met    Recommendations: Follow up at next scheduled [facility] clinic visit  Educational assessment/Barriers to learning: No barriers noted  Treatment provided this date (including minutes): Self care/home management: 7   Patient/Family Education:Early and adequate treatment principles, Signs and symptoms of a bleed, Adjunctive treatment/RICE, Splints/orthoses/equipment, Footwear selection, Home environment safety/falls prevention and Hemophilic arthropathy   Risks and benefits of evaluation/treatment have been explained.  Patient, family and/or caregiver are in agreement with Plan of Care.     Timed Code Treatment Minutes: 7  Total Treatment Time (sum of timed and untimed services): 30    Signature: [facility]  Physical Therapist  [facility]  [phone]    Date: 10/27/2020    Certification:  Onset date: 10/27/2020  Start of care date: 10/27/2020  Certification date from 10/27/2020 to 10/27/2020    I CERTIFY THE NEED FOR THESE SERVICES FURNISHED UNDER        THIS PLAN OF TREATMENT AND WHILE UNDER MY CARE     (Physician co-signature of this document indicates review and certification of the therapy plan).

## 2020-10-27 NOTE — TELEPHONE ENCOUNTER
Spoke with Orlin regarding plan to refer from [facility] to help find possible alternative to Valium as means to manage anxiety. He was agreeable to plan.

## 2020-10-27 NOTE — PROGRESS NOTES
"  [facility]  Social Work Evaluation    Name:  Orlin Alcantar  Age:   70 year old  :  1950    Presenting Information:  Orlin is followed by the [facility] for management of severe hemophilia A.  He presented to clinic today for a routine comprehensive clinic evaluation.  Met with the patient 1:1 to complete an updated psychosocial evaluation.    Living Situation:   Orlin lives in a section 8 apartment in [place].  This is a 3rd floor apartment and he has access to an elevator, though takes the stairs for exercise most of the time.  He is very happy in his current location and does not plan to try to move anytime soon.    Family/Social Support:  Orlin has stated that he has a limited support network and that is due to his own preference of \"being a loner\" and \"being stubborn\" at times.  He has stated if he needs support, he trusts [facility] staff the most.  He has been involved in HFMD gatherings in the past.  He moved into a foster home at age 1; his parents/siblings have passed away within the past decade (none recently).    Functional Status: Independent with ADLs and IADLs.  Drives to and from appointments if needed.  He has chronic pain in his elbows and ankles; see PT note for assessment.    Current Community Services:    [facility]  [facility]  [facility]  Psychiatry Clinic ([facility]) for medication management.    Past Community Services:   Inpatient psychiatry stay at [facility] in .    Chemical Use:    No concerning use was reported.    Mental/Emotional Health:   The patient reported a history of the following mental health concerns and/or diagnoses: anxiety and depression. He is currently under the care of outpatient psychiatry for medication management. He endorsed increased anxiety and worry related to COVID-19 pandemic and current supreme court confirmation.  Discussed issues related to access to care " "and resources available at the clinic.    Pain Management Strategies:  PRN pain medications, distraction via music and reading.    Abuse Concerns:  No concerns indicated.    Education: Orlin is a high school graduate and he completed 1 year of post-secondary education.    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    School/Work Attendance:   Not applicable    Financial/Income:    Social security disability income up until he retired.  His social security payments, pre-Medicare deductions, are approximately $1600/month.  He stated finances are tight, but he budgets and makes ends meet.    Health Insurance:    Medicare and BCBS.  He is connected to PAN premium assistance and FPS 340B program. He expressed concern over changes in eligibility depending on upcoming supreme court decisions re: ACACIA.      Health Literacy/Adjustment to Illness:    Orlin keeps an annual visit schedule, and reaches out to the center as needed.  He is proactive in meeting his health care needs and is very organized when managing his benefits and finances.  He has maintained access to health insurance, resources, and community resources.  He keeps paper logs, and may be interested in starting HEMLIBRA which the team will address today.    Advanced Care Planning:  HCD on file, listing Dr. Jiang as primary POA and secondary POA as staff at [facility].  Patient and MD confirms this is up to date and accurate.  Writer coordinated with Feliberto Hess to seek clarification on \"staff at [facility]\" and discussed with patient.    Authorization to discuss PHI:  Not completed, as patient stated he has no one he would list on this form.  Electronic Consent for email: Not completed, as patient stated he has no one he would list on this form.  Electronic Consent for text messaging: Not completed, as patient stated he has no one he would list on this form.    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  Normalized and validated feelings and experiences  Provided positive feedback and encouragement  Encouraged ongoing self-care and continued attention to self-care  Discussed Advanced Care Planning:  Reviewed HCD. Clarified Preferences.  Discussed Applicable Community Resources:  Premium Assistance  Case Coordination with CBCD Team, [facility] staff      Impressions/Recommendations:    Orlin Alcantar is a 70 year old male who presented to the [facility] at [facility] this date for a routine comprehensive clinic evaluation.  He presented today as alert, oriented and engaged and they were welcoming of social work visit.    Orlin keeps an annual visit schedule, and reaches out to the center as needed.  He is proactive in meeting his health care needs and is very organized when managing his benefits and finances.  He has maintained access to health insurance, resources, and community resources.  He keeps paper logs, and may be interested in starting HEMLIBRA which the team will address today.    Orlin has good coping skills. He endorses a limited support network, indicating this is by his own choice.  Will continue to address ACP and HCD with Orlin to further clarify wishes, as medical director is listed as POA.    Plan:   There were no ongoing psychosocial needs identified.  Writer remains available to assist as needs arise, and Orlin was encouraged to contact writer as needed.    Analilia Jack, MARIN, LICSW, ACM  Clinical   [facility] for Bleeding and Clotting Disorders  Phone: [phone]

## 2020-10-28 DIAGNOSIS — Z85.51 PERSONAL HISTORY OF MALIGNANT NEOPLASM OF BLADDER: ICD-10-CM

## 2020-10-28 DIAGNOSIS — D66 SEVERE HEMOPHILIA A (H): ICD-10-CM

## 2020-10-28 RX ORDER — ANTIHEMOPHILIC FACTOR (RECOMBINANT) 1000 (+/-)
KIT INTRAVENOUS
Qty: 13028 EACH | Refills: 1 | Status: SHIPPED | OUTPATIENT
Start: 2020-10-28 | End: 2020-11-03

## 2020-10-29 ENCOUNTER — DOCUMENTATION ONLY (OUTPATIENT)
Dept: HEMATOLOGY | Facility: CLINIC | Age: 70
End: 2020-10-29

## 2020-10-30 NOTE — PROGRESS NOTES
Orlin Alcantar called to order a refill of Kogenate FS . He has 1 dose(s) remaining at home and would like it delivered to his house via  to arrive on 10/29/2020.     The following doses were dispensed from our pharmacy:    Product: Kogenate FS    Doses: 4 doses of 3257 units    MICHAEL Keys, [facility]  Hemophilia Pharmacy Coordinator  [phone]  [email or web address]

## 2020-11-02 ENCOUNTER — VIRTUAL VISIT (OUTPATIENT)
Dept: PHARMACY | Facility: CLINIC | Age: 70
End: 2020-11-02
Payer: COMMERCIAL

## 2020-11-02 DIAGNOSIS — D66 HEMOPHILIA A (H): ICD-10-CM

## 2020-11-02 DIAGNOSIS — F41.1 GAD (GENERALIZED ANXIETY DISORDER): Primary | ICD-10-CM

## 2020-11-02 PROCEDURE — 99607 MTMS BY PHARM ADDL 15 MIN: CPT | Mod: TEL | Performed by: PHARMACIST

## 2020-11-02 PROCEDURE — 99605 MTMS BY PHARM NP 15 MIN: CPT | Mod: TEL | Performed by: PHARMACIST

## 2020-11-02 NOTE — PROGRESS NOTES
"MTM ENCOUNTER  SUBJECTIVE/OBJECTIVE:                           Orlin Alcantar is a 70 year old male called for an initial visit. He was referred to me from Michelet Guthrie.      Reason for visit: \"Is it safe for me to take an SSRI?\"    Allergies/ADRs: Reviewed in chart  Tobacco: He reports that he quit smoking about 7 years ago. He has a 20.00 pack-year smoking history. He has never used smokeless tobacco.  Alcohol: not currently using  Past Medical History: Reviewed in chart      Medication Adherence/Access: no issues reported    Anxiety: Pt is currently taking mirtazapine 45mg at bedtime and has hydroxyzine 10-20mg BID prn, but has not found it effective.  He had most recently been seeing JELLY Barragan prior to her clinic departure and was taking diazepam 5mg every morning per that prescriber.  He has been out of diazepam since middle of July and wishes to restart due to increased anxiety. There is concern regarding patient taking both scheduled diazepam and oxycodone, though patient feels that has been the most effective combination and seeks opinion and possible alternatives from this provider due to currently uncontrolled anxiety.    Today, patient reported that current mirtazapine dose is helpful for falling asleep, which is improved from taking only 15mg at bedtime, but doesn't feel that it's been at all helpful for anxiety management.  He described that he hasn't had any concerns with depression for at least the last 5 years.  He has engaged in talk therapy in the past, but did not find it overly helpful.  Currently, his anxiety is described as \"background noise\" that is consistent all day.  Pt denied having any panic attacks.  He noted that he hasn't associated increased anxiety with COVID-19 specifically. Pt reported that when taking diazepam, he noticed anxiety level drop soon after the dose and positive effect remained throughout the day.  He did not recall feeling lethargic or having lack of " coordination.    Per Epic notes, he had taken escitalopram (up to 15mg daily) 7/2014-1/2015. Pt did not recall specifics of the medication, though Epic documentation seems to indicate it was well tolerated and offered some benefit prior to patient choosing to discontinue taking it due to increased anxiety around that time.        Hemophilia:  He is currently taking Kogenate FS infusions, but will soon be starting Hemlibra monoclonal antibody for prophylaxis. Pt has close [facility] up at [facility].  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 He does have naloxone at home.      ASSESSMENT:                              Medication Adherence: No issues identified    Anxiety/Hemophilia: Discussed differing role of benzodiazepines and SSRIs in anxiety management and reviewed previous benefit and tolerability with escitalopram many years ago.  Additionally, discussed benzodiazepine and opioids having additive risk of respiratory depression and falls, especially in his age category, which could cause significant harm.  Per [employer], incidence of hemorrhage with sertraline is 0.1%, mostly based upon case reports.  UpToDate reports incidence of <2% for hemorrhage. As patient is also taking factor VIII replacement, he has some protection against bleeding, which was supported by his hemophilia provider FAITH Pugh, in communication with psych provider.  Recommend trying sertraline and follow up with psychiatry provider.  Discussed possible side effects including nausea or headache during first week of therapy and to report if symptoms continue much past that time.  Reviewed risk for increased bleeding or bruising and patient will monitor and report any concerns.    PLAN:                            1. Discussed with Michelet Guthrie.  Pt will start sertraline 25mg x 7 days, then increase to 50mg daily, if tolerated. Rx sent.  2. Follow up with Michelet  [place] first week of December.    I spent 50 minutes with this patient today. All changes were made via verbal approval with Michelet REAVES CNP. A copy of the visit note was provided to the patient's referring and specialty provider.    Will follow up in 2-3 months.    The patient declined a summary of these recommendations.     Giselle Manning, PharmD  Medication Therapy Management Pharmacist  [facility]  Phone: [phone]    Patient consented to a telehealth visit: yes  Telemedicine Visit Details  Type of service:  Telephone visit  Start Time: 1:35pm  End Time: 2:25pm  Originating Location (patient location): [place]  Distant Location (provider location):  [facility]  Mode of Communication:  Telephone

## 2020-11-02 NOTE — Clinical Note
Another thought would be to provide some diazepam with a taper plan or time plan or whatever it is.  This would at least provide him some coverage while waiting for SSRI to be effective and could be considered.  Let me know if you want to chat.  I know he was looking for some feedback this week, hopefully. Thanks!

## 2020-11-02 NOTE — TELEPHONE ENCOUNTER
Messaged Dr. Jaylyn John concerning critical lab.  [employer] Other co-worker Jessica spoke with Dr Hernandez and she said the forms were sent to be abstracted. I called patient to inform him of this and he told me to just throw away those papers and that he is no longer a patient of Dr Ramos

## 2020-11-02 NOTE — Clinical Note
Christoph Coburn- This patient needs to follow up with Michelet during the first week of December, but was on hold for 45 minutes last time he called the clinic. Are you able to call and schedule him or should I route this to someone else?  Thank you so much!  Giselle

## 2020-11-02 NOTE — Clinical Note
Hello,    I recently met with this patient for Medication Therapy Management (MTM) to review medication options for anxiety management.  In discussion with psychiatry provider, and agreement with Orlin, he will start taking sertraline next week.  We reviewed side effect monitoring and when to call clinic. He will follow up in 3 weeks.  I'm hopeful this will provide some ongoing benefit for him long term, as compared to continued increased risk with benzodiazepine as he gets older.    Please let me know what questions you have!  Giselle Manning, PharmD  Medication Therapy Management Pharmacist  [facility]  Phone: [phone]

## 2020-11-02 NOTE — Clinical Note
He was open to trying sertraline. Looks like he was on escitalopram for about 6-7 months in 2014 and, per notes, sounds like it was pretty helpful.  Again, per notes, it sounds like he might have attributed some increased anxiety to the med, but there were other things going on at that time.  He doesn't have any appts scheduled with you.  Do you want me to start this now and then have him follow up with you or me in a few weeks?  Other plan preference?

## 2020-11-03 DIAGNOSIS — Z85.51 PERSONAL HISTORY OF MALIGNANT NEOPLASM OF BLADDER: ICD-10-CM

## 2020-11-03 DIAGNOSIS — D66 SEVERE HEMOPHILIA A (H): ICD-10-CM

## 2020-11-03 RX ORDER — ANTIHEMOPHILIC FACTOR (RECOMBINANT) 1000 (+/-)
KIT INTRAVENOUS
Qty: 6514 EACH | Refills: 0 | Status: SHIPPED | OUTPATIENT
Start: 2020-11-03 | End: 2020-11-09

## 2020-11-04 ENCOUNTER — DOCUMENTATION ONLY (OUTPATIENT)
Dept: HEMATOLOGY | Facility: CLINIC | Age: 70
End: 2020-11-04

## 2020-11-09 ENCOUNTER — TELEPHONE (OUTPATIENT)
Dept: HEMATOLOGY | Facility: CLINIC | Age: 70
End: 2020-11-09

## 2020-11-09 DIAGNOSIS — G89.4 CHRONIC PAIN SYNDROME: ICD-10-CM

## 2020-11-09 DIAGNOSIS — D66 SEVERE HEMOPHILIA A (H): ICD-10-CM

## 2020-11-09 DIAGNOSIS — Z85.51 PERSONAL HISTORY OF MALIGNANT NEOPLASM OF BLADDER: ICD-10-CM

## 2020-11-09 RX ORDER — OXYCODONE HYDROCHLORIDE 5 MG/1
5 TABLET ORAL EVERY 8 HOURS PRN
Qty: 90 TABLET | Refills: 0 | Status: SHIPPED | OUTPATIENT
Start: 2020-11-09 | End: 2020-12-07

## 2020-11-09 RX ORDER — ANTIHEMOPHILIC FACTOR (RECOMBINANT) 1000 (+/-)
KIT INTRAVENOUS
Qty: 13028 EACH | Refills: 0 | Status: SHIPPED | OUTPATIENT
Start: 2020-11-09 | End: 2020-11-16

## 2020-11-09 NOTE — TELEPHONE ENCOUNTER
[facility]  Brief Social Work Note    Orlin contacted writer and left a message about a call he got from general registration.  He called back in follow-up but was on hold for a very long time.  He was confused as to why he got a call, as he was just seen in clinic and his information is up to date.    Discussed with  and spoke with Orlin.  Issue has resolved.  He additionally stated that he received packet of information from writer for anatomy bequest program, [facility] assistance and [facility] for updating.  He plans to bring the paperwork to clinic on Thursday to be left for writer to review and have scanned into his record.    He did not have other questions or concerns.    Analilia Jack, MARIN, LICSW, ACM  Clinical   [facility] for Bleeding and Clotting Disorders  Phone: [phone]

## 2020-11-10 ENCOUNTER — DOCUMENTATION ONLY (OUTPATIENT)
Dept: HEMATOLOGY | Facility: CLINIC | Age: 70
End: 2020-11-10

## 2020-11-11 NOTE — PROGRESS NOTES
Orlin Alcantar called to order a refill of Kogenate FS . He has 2 dose(s) remaining at home and would like it delivered to his house via  to arrive on 11/4/2020.     The following doses were dispensed from our pharmacy:    Product: Kogenate FS    Doses: 2 dose of 3257 units    SALINA Keys., [facility]  Hemophilia Pharmacy Coordinator  [phone]  [email or web address]

## 2020-11-11 NOTE — PROGRESS NOTES
Orlin Alcantar called to order a refill of Kogenate FS . He has 1 dose(s) remaining at home and would like it delivered to his house via  to arrive on 11/10/2020.     The following doses were dispensed from our pharmacy:    Product: Kogenate FS    Doses: 4 doses of 3257 units    MICHAEL Keys, [facility]  Hemophilia Pharmacy Coordinator  [phone]  [email or web address]

## 2020-11-16 DIAGNOSIS — D66 SEVERE HEMOPHILIA A (H): ICD-10-CM

## 2020-11-16 DIAGNOSIS — Z85.51 PERSONAL HISTORY OF MALIGNANT NEOPLASM OF BLADDER: ICD-10-CM

## 2020-11-16 RX ORDER — ANTIHEMOPHILIC FACTOR (RECOMBINANT) 1000 (+/-)
KIT INTRAVENOUS
Qty: 13028 EACH | Refills: 0 | Status: SHIPPED | OUTPATIENT
Start: 2020-11-16 | End: 2020-11-23

## 2020-11-18 ENCOUNTER — DOCUMENTATION ONLY (OUTPATIENT)
Dept: HEMATOLOGY | Facility: CLINIC | Age: 70
End: 2020-11-18

## 2020-11-19 ENCOUNTER — DOCUMENTATION ONLY (OUTPATIENT)
Dept: HEMATOLOGY | Facility: CLINIC | Age: 70
End: 2020-11-19

## 2020-11-19 ENCOUNTER — TELEPHONE (OUTPATIENT)
Dept: PSYCHIATRY | Facility: CLINIC | Age: 70
End: 2020-11-19

## 2020-11-19 ENCOUNTER — ALLIED HEALTH/NURSE VISIT (OUTPATIENT)
Dept: HEMATOLOGY | Facility: CLINIC | Age: 70
End: 2020-11-19
Attending: INTERNAL MEDICINE
Payer: MEDICARE

## 2020-11-19 VITALS
TEMPERATURE: 97.7 F | DIASTOLIC BLOOD PRESSURE: 92 MMHG | BODY MASS INDEX: 22.42 KG/M2 | RESPIRATION RATE: 14 BRPM | WEIGHT: 165.5 LBS | HEIGHT: 72 IN | SYSTOLIC BLOOD PRESSURE: 145 MMHG | OXYGEN SATURATION: 97 % | HEART RATE: 97 BPM

## 2020-11-19 DIAGNOSIS — D66 SEVERE HEMOPHILIA A (H): Primary | ICD-10-CM

## 2020-11-19 PROCEDURE — 999N000103 HC STATISTIC NO CHARGE FACILITY FEE

## 2020-11-19 PROCEDURE — G0463 HOSPITAL OUTPT CLINIC VISIT: HCPCS

## 2020-11-19 ASSESSMENT — MIFFLIN-ST. JEOR: SCORE: 1548.7

## 2020-11-19 ASSESSMENT — PAIN SCALES - GENERAL: PAINLEVEL: NO PAIN (0)

## 2020-11-19 NOTE — TELEPHONE ENCOUNTER
Attempted to reach Orlin three times for scheduled appointment to discuss Zoloft trial.  No answer and unable to leave message.

## 2020-11-19 NOTE — NURSING NOTE
Orlin Alcantar  (MRN#:  [id number]) has severe Hemophilia A with a baseline factor VIII of <1%. He is presenting today for initiation of Emicizumab therapy.    Reviewed basic teaching of medication using the Introduction to Emicizumab slide deck.  Post test administered and scored - discussed answers with Orlin.    Orlin is motivated to be doing this at home as soon as possible.    Orlin chose to have RN verbally review the process step by step and actually did the drawing up and injection independently.    Dosing Schedule: Every 7 days - first loading dose given today  Weight: 75.1 kg  As per orders Orlin Alcantar received 225 mg emicizumab by subcutaneous injection and  received the total volume in 1 injection into left mid abdominal tissue.  This shot was given over approximately 5-10 seconds.  Site leakage or skin reaction noted: No  He was observed for 10 minutes.   Signs or symptoms of reaction: No  Did patient log: No - he plans to record on paper log when he gets home.    Orlin agreed to return to clinic for his 2nd Hemlibra dose on 11/25/2020 at 10:00.  He says that he anticipates wanting to inject weekly after the loading dose phase.  He verbalized understanding that the dosing will change after the first 4 weeks.    Reviewed that he should continue his factor VIII prophlylaxis for the first 2 weeks on HemLibra.  We discussed th challenge in going from treat with any concerns, to waiting and watching.     Treatment rec update in SnapShot.    Margot Reed, RN - Nurse Clinician - [facility] - [phone]

## 2020-11-23 DIAGNOSIS — Z85.51 PERSONAL HISTORY OF MALIGNANT NEOPLASM OF BLADDER: ICD-10-CM

## 2020-11-23 DIAGNOSIS — D66 SEVERE HEMOPHILIA A (H): ICD-10-CM

## 2020-11-23 RX ORDER — ANTIHEMOPHILIC FACTOR (RECOMBINANT) 1000 (+/-)
KIT INTRAVENOUS
Qty: 13028 EACH | Refills: 0 | Status: SHIPPED | OUTPATIENT
Start: 2020-11-23 | End: 2020-12-23

## 2020-11-23 NOTE — PROGRESS NOTES
Orlin Alcantar called to order a fill of Hemlibra . He has 0 dose(s) remaining at home and would like to pick it up on 11/19/2020.     The following doses were dispensed from our pharmacy:    Product: Hemlibra 105 mg/0.7 ml    Doses: 2.8 ml (4 vials)    SALINA Keys., [facility]  Hemophilia Pharmacy Coordinator  [phone]  [email or web address]

## 2020-11-23 NOTE — PROGRESS NOTES
Orlin Alcantar called to order a fill of Hemlibra. He has 0 dose(s) remaining at home and would like to pick it up on 11/19/2020.     The following doses were dispensed from our pharmacy:    Product: Hemlibra 60 mg/0.4 ml    Doses: 3.2 ml (8 vials)    SALINA Keys., [facility]  Hemophilia Pharmacy Coordinator  [phone]  [email or web address]

## 2020-11-23 NOTE — PROGRESS NOTES
Orlin Alcantar called to order a refill of Kogenate FS . He has 1 dose(s) remaining at home and would like it delivered to his house via  to arrive on 11/18/2020.     The following doses were dispensed from our pharmacy:    Product: Kogenate FS    Doses: 4 doses of 3257 units     MICHAEL Keys, [facility]  Hemophilia Pharmacy Coordinator  [phone]  [email or web address]

## 2020-11-25 ENCOUNTER — ALLIED HEALTH/NURSE VISIT (OUTPATIENT)
Dept: HEMATOLOGY | Facility: CLINIC | Age: 70
End: 2020-11-25
Payer: MEDICARE

## 2020-11-25 ENCOUNTER — TELEPHONE (OUTPATIENT)
Dept: HEMATOLOGY | Facility: CLINIC | Age: 70
End: 2020-11-25

## 2020-11-25 ENCOUNTER — VIRTUAL VISIT (OUTPATIENT)
Dept: PHARMACY | Facility: CLINIC | Age: 70
End: 2020-11-25
Payer: COMMERCIAL

## 2020-11-25 VITALS
SYSTOLIC BLOOD PRESSURE: 147 MMHG | HEART RATE: 95 BPM | HEIGHT: 72 IN | RESPIRATION RATE: 14 BRPM | OXYGEN SATURATION: 99 % | DIASTOLIC BLOOD PRESSURE: 93 MMHG | BODY MASS INDEX: 22.48 KG/M2 | WEIGHT: 166 LBS | TEMPERATURE: 97.8 F

## 2020-11-25 DIAGNOSIS — F41.1 GAD (GENERALIZED ANXIETY DISORDER): Primary | ICD-10-CM

## 2020-11-25 DIAGNOSIS — D66 SEVERE HEMOPHILIA A (H): Primary | ICD-10-CM

## 2020-11-25 PROCEDURE — 99606 MTMS BY PHARM EST 15 MIN: CPT | Mod: TEL | Performed by: PHARMACIST

## 2020-11-25 PROCEDURE — 99207 PR NO CHARGE NURSE ONLY: CPT | Performed by: INTERNAL MEDICINE

## 2020-11-25 PROCEDURE — 99607 MTMS BY PHARM ADDL 15 MIN: CPT | Mod: TEL | Performed by: PHARMACIST

## 2020-11-25 RX ORDER — EMICIZUMAB 150 MG/ML
120 INJECTION, SOLUTION SUBCUTANEOUS WEEKLY
Qty: 3.2 ML | Refills: 1 | Status: SHIPPED | OUTPATIENT
Start: 2020-12-03 | End: 2021-01-08

## 2020-11-25 ASSESSMENT — MIFFLIN-ST. JEOR: SCORE: 1550.97

## 2020-11-25 ASSESSMENT — PAIN SCALES - GENERAL: PAINLEVEL: NO PAIN (0)

## 2020-11-25 NOTE — TELEPHONE ENCOUNTER
[facility]  [facility]  [facility] [address], [address], [place] [address]  Main: [phone], Fax: [phone]      11/25/2020    Patient called clinic to discuss his anxiety medications.  He has been working with psychiatry team on alternative medications for anxiety to replace diazepam.  He is feeling that the current medication sertraline is not helping him.  He would like to discuss options with our team who knows him well and is currently prescribing his pain medications.      We also reviewed his new therapy (Hemlibra) for prevention of bleeds.  He received his second dose today and things went well. He is planning to self administer his next dose 12/3/2020.  He is planning to continue to use Kogenate (replacement factor VIII) for the next week.  Once he completes the 4 loading doses of Hemlibra he would like to transition to weekly dosing of Hemlibra.     PLAN:   Will discuss anxiety medication plan with team and reach out to Michelet Guthrie CNP who he is working with in the psychiatry clinic.      Rx will be sent for Hemlibra 120mg weekly for prevention of bleeds.  This would start on 12/17/2020.    Orlin will contact the clinic by phone with each weekly dose of Hemlibra for logging purposes.  We will plan to see him in 3 months for assessment of efficacy.     Yanira Branch PA-C

## 2020-11-25 NOTE — PROGRESS NOTES
"MTM ENCOUNTER  SUBJECTIVE/OBJECTIVE:                           Orlin Alcantar is a 70 year old male called for a follow-up visit. He was referred to me from Michelet REAVES CNP.  Today's visit is a follow-up MTM visit from 11/2/20.     Reason for visit: med change follow up    Allergies/ADRs: Reviewed in chart  Tobacco: He reports that he quit smoking about 7 years ago. He has a 20.00 pack-year smoking history. He has never used smokeless tobacco.  Alcohol: not currently using  Past Medical History: Reviewed in chart    Medication Adherence/Access: no issues reported    Anxiety: Pt is currently taking mirtazapine 45mg.  At last visit, sertraline was initiated, which patient began taking on 11/9/20. Today, he reported that he took 25mg x 6 days, then increased to 50mg daily through 11/22.  He reported that anxiety felt worse, as though he had consumed excessive caffeine, about 12 days after starting the medication (~11/20), but that he did not notice any improvement on the 25mg dose. He took his last dose on 11/22 due to the increased symptoms.  He reported that he is willing to make changes per recommendation of psychiatry provider, but feels frustrated that he is not feeling better with said changes.  He referred to a note he wrote to psychiatry indicating that his previous providers have been agreeable to his previous med combination of diazepam + oxycodone and wonders if a different provider would have different prescribing practices.  He wonders if previous psychiatry provider was \"engaging in malpractice\" by prescribing the diazepam.  Of note, psych follow up was scheduled on 11/19, notes of which indicate that patient did not answer the phone on multiple tries, though patient stated he would have been available.  Pt stated being told appointment was on 12/19, which he only just noted was on a Saturday. Pt requested contact from clinic manager.    ASSESSMENT:                              Medication Adherence: " No issues identified    Anxiety: Discussed the clinic's overall conservative approach to prescribing benzodiazepines with opioid pain medications, ideally not using benzodiazepines at all for long term anxiety treatment, but how each prescriber may have differing practices.  Reviewed different mechanisms of treating anxiety between SSRI and benzodiazepines, related to effectiveness for anxiety, safety, and potential long term effects. Thre are no drug interactions with patient's current medication that would cause higher than expected sertraline serum levels. Patient agreeable to restarting sertraline 25mg and continuing at that dose until follow up with this writer in two weeks.  Will ensure full tolerability at 25mg dose and consider increasing to 37.5mg at that time. Reviewed timeline to effectiveness and that he would not likely see benefit for a few weeks at more therapeutic doses (50mg), but importance of establishing tolerability for long term benefit. Message sent to clinic manager, per patient request.    PLAN:                             1. Restart sertraline 25mg x 2 weeks.    I spent 30 minutes with this patient today. A copy of the visit note was provided to the patient's psychiatry provider.    Will follow up in 2 weeks.    The patient was given a summary of these recommendations.     Giselle Manning, PharmD  Medication Therapy Management Pharmacist  [facility]  Phone: [phone]    Patient consented to a telehealth visit: yes  Telemedicine Visit Details  Type of service:  Telephone visit  Start Time: 12:25pm  End Time: 12:55pm  Originating Location (patient location): Home  Distant Location (provider location):  [facility] PSYCHIATRY  Mode of Communication:  Telephone

## 2020-11-25 NOTE — Clinical Note
Christoph Aguilar-  This is the patient I emailed you about, with my note from today's visit (11/25).  Thanks!  Giselle

## 2020-11-25 NOTE — Clinical Note
Christoph Tafoya- please see note. I had him restart sertraline and continue at 25mg x 2 weeks.  Ok to increase to 37.5mg at that time? How do we go about getting him rescheduled?  He was very  frustrated that someone told him 12/19 your visit and did not realize he had missed it on 11/19. I did send an email to Lauren to address his concerns about lack of BZD prescribing, fyi.  Thanks!  Giselle

## 2020-11-27 ENCOUNTER — DOCUMENTATION ONLY (OUTPATIENT)
Dept: HEMATOLOGY | Facility: CLINIC | Age: 70
End: 2020-11-27

## 2020-11-27 NOTE — PROGRESS NOTES
Orlin Alcantar called to order a refill of Kogenate FS . He has 2 dose(s) remaining at home and would like it delivered to his house via  to arrive on 11/27/2020.     The following doses were dispensed from our pharmacy:    Product: Kogenate FS    Doses: 4 doses of 3257 units    MICHAEL Keys, [facility]  Hemophilia Pharmacy Coordinator  [phone]  [email or web address]

## 2020-12-07 DIAGNOSIS — D66 SEVERE HEMOPHILIA A (H): ICD-10-CM

## 2020-12-07 DIAGNOSIS — G89.4 CHRONIC PAIN SYNDROME: ICD-10-CM

## 2020-12-07 RX ORDER — OXYCODONE HYDROCHLORIDE 5 MG/1
5 TABLET ORAL EVERY 8 HOURS PRN
Qty: 90 TABLET | Refills: 0 | Status: SHIPPED | OUTPATIENT
Start: 2020-12-07 | End: 2021-01-04

## 2020-12-09 ENCOUNTER — VIRTUAL VISIT (OUTPATIENT)
Dept: PHARMACY | Facility: CLINIC | Age: 70
End: 2020-12-09
Payer: COMMERCIAL

## 2020-12-09 DIAGNOSIS — F41.1 GAD (GENERALIZED ANXIETY DISORDER): Primary | ICD-10-CM

## 2020-12-09 PROCEDURE — 99606 MTMS BY PHARM EST 15 MIN: CPT | Mod: TEL | Performed by: PHARMACIST

## 2020-12-09 NOTE — PROGRESS NOTES
MTM ENCOUNTER  SUBJECTIVE/OBJECTIVE:                           Orlin Alcantar is a 70 year old male called for a follow-up visit. He was referred to me from Michelet REAVES CNP.  Today's visit is a follow-up MTM visit from 11/25/20.     Reason for visit: sertraline follow up.    Allergies/ADRs: Reviewed in chart  Tobacco: He reports that he quit smoking about 7 years ago. He has a 20.00 pack-year smoking history. He has never used smokeless tobacco.  Alcohol: not currently using  Past Medical History: Reviewed in chart    Medication Adherence/Access: no issues reported    Anxiety: Pt reported that he had been taking sertraline 25mg x 2 weeks and mirtazapine 45mg at bedtime.  Sertraline was started on 11/9 at 25mg x 6 days, but he had increased anxiety when dose was increased to 50mg, so dose was reduced to 25mg for longer time period in order to establish tolerability. Today, he reported that he has not noticed any positive effect on his anxiety and has not had any side effects.  He noted that he talked with clinic manager, Lauren Bach, yesterday regarding whether diazepam may again be a treatment option; she is going to follow up with prescriber.  He expressed frustration with current treatment plan, having an understanding that certain other medications need to first be tried before diazepam will again be prescribed.  Pt would like to increase sertraline to 50mg daily and follow up with Michelet REAVES CNP in one week.  He has 8 tablets remaining. He is no longer interested in MTM follow up.    ASSESSMENT:                              Medication Adherence: No issues identified    Anxiety: Acknowledged understanding of patient's frustration with medication changes. Briefly reviewed how benzodiazepines are not effective for long term anxiety treatment and pose certain safety risks (detailed in previous MTM notes). Reviewed time to efficacy with SSRI medications. Agree with increasing sertraline to 50mg x 1  week prior to next psychiatry follow up.    PLAN:                            1. Increase sertraline to 50mg daily per previous plan.  2. Follow up with Michelet Guthrie on 12/15/20.    I spent 15 minutes with this patient today. A copy of the visit note was provided to the patient's psychiatry provider.    Will follow up as needed, per patient request to end ongoing MTM.    The patient declined a summary of these recommendations.     Norma RendonD  Medication Therapy Management Pharmacist  [facility]  Phone: [phone]    Patient consented to a telehealth visit: yes  Telemedicine Visit Details  Type of service:  Telephone visit  Start Time: 1:02pm  End Time: 1:17pm  Originating Location (patient location): Home  Distant Location (provider location):  [facility] PSYCHIATRY  Mode of Communication:  Telephone      Medication Therapy Recommendations Needing Review  RATNA (generalized anxiety disorder)    Current Medication: sertraline (ZOLOFT) 50 MG tablet   Rationale: Dose too low - Dosage too low - Effectiveness   Recommendation: Increase Dose - increase from 25mg to 50mg   Status: Patient Agreed - Adherence/Education

## 2020-12-10 NOTE — NURSING NOTE
"Observed Mr. Cavanaugh draw up and inject his second \"loading\" dose of emicizumab. He did so with excellent technique and without difficulty.  Dosing Schedule: Every 7 days  Weight: 75.3 kg  As per orders Orlin Alcantar gave 225 mg emicizumab by subcutaneous injection into the right upper quadrant of the abdomen.  Due to volume restriction he received the total volume in one injection in the   These shots were given approximately 10 seconds / injection.  Site leakage or skin reaction noted: No  He was observed for 10 minutes. No  Did patient log: No- keeps paper log and will call in injections dose and times to be entered into [employer].       "

## 2020-12-15 ENCOUNTER — DOCUMENTATION ONLY (OUTPATIENT)
Dept: HEMATOLOGY | Facility: CLINIC | Age: 70
End: 2020-12-15

## 2020-12-15 ENCOUNTER — VIRTUAL VISIT (OUTPATIENT)
Dept: PSYCHIATRY | Facility: CLINIC | Age: 70
End: 2020-12-15
Attending: NURSE PRACTITIONER
Payer: MEDICARE

## 2020-12-15 DIAGNOSIS — F41.1 ANXIETY STATE: ICD-10-CM

## 2020-12-15 PROCEDURE — 99442 PR PHYSICIAN TELEPHONE EVALUATION 11-20 MIN: CPT | Mod: 95 | Performed by: NURSE PRACTITIONER

## 2020-12-15 RX ORDER — MIRTAZAPINE 45 MG/1
45 TABLET, FILM COATED ORAL AT BEDTIME
Qty: 90 TABLET | Refills: 0 | Status: SHIPPED | OUTPATIENT
Start: 2020-12-15 | End: 2021-03-15

## 2020-12-15 NOTE — PROGRESS NOTES
TELEPHONE VISIT  Orlin Alcantar is a 70 year old pt. who is being evaluated via a billable telephone visit.      The patient has been notified of the following:    We have found that certain health care needs can be provided without the need for a physical exam. This service lets us provide the care you need with a short phone conversation. If a prescription is necessary we can send it directly to your pharmacy. If lab work is needed we can place an order for that and you can then stop by our lab to have the test done at a later time. Insurers are generally covering virtual visits as they would in-office visits so billing should not be different than normal.  If for some reason you do get billed incorrectly, you should contact the billing office to correct it and that number is in the AVS .    Patient has given verbal consent for a telephone visit?:  Yes   How would the pt like to obtain the AVS?:  not requested  AVS SmartPhrase [PsychAVS] has been placed in 'Patient Instructions':  N/A     Start Time:  2:36 PM          End Time:  2:55am           [facility]  Psychiatry Clinic  PSYCHIATRIC PROGRESS NOTE     CARE TEAM: PCP: No primary care provider on file.              Other Providers: Hematology  Orlin Alcantar is a 70 year old pt who prefers the name Orlin and pronoun he, him.    Pertinent Background:  See previous notes.  Psych critical item history includes suicidal ideation and psych hosp (<3).     Interim History     [4, 4]     The patient is a good historian, reports good treatment adherence and was last seen 10/13/20.  Since the last visit, Orlin reports he has been taking Sertraline 50mg for past 6 days and is experiencing continued anxiety.  He does not want to continue with trial after being told that it can take at least 2 weeks for initial side effects to resolve and up to month to experience therapeutic benefit.  Orlin continues to request Valium.  Due to concerns with his  "age, long term use, and interaction with opioids, this provider informed Orlin he was not comfortable prescribing.  When informed that provider was not comfortable prescribing and concerned about taking Valium at his age and in combination with opioids, he responded \"your comfort level is not my concern.\"  Attempted to discuss other possible ssri or snri trials and Orlin stated \"I don't want to jump through any more hoops.\"  He did request refills of Mirtazapine.      10/13/20: Orlin reports he has been taking Mirtazapine 30mg for approximately weeks and requests an increase to 45mg.  He also requesting Valium again as Hydroxyzine has not been effective in managing anxiety. Continues to be unclear if anxiety is persistent or situational.  Orlin also adjusted report from initial visit when he reported that anxiety only worsens  prior to CT scan.  Today he reports that anxiety is fairly constant and valium was helpful.  Again informed Orlin of the dangers of taking Opioids and Benzodiazepines concurrently. Also informed Orlin that this provider will consult with pharmacy dept to find alternatives to Valium to manage anxiety.       9/8/20: Orlin reports he is \"fine overall.\"  Endorses anxiety stemming from medical issues, primarily re-emergence of bladder cancer.  He is getting CT scans every 6 months and anxiety worsens approximately 2-3 months prior to exams.  Orlin also diagnosed with hemophilia which has led to chronic joint pain.  Takes Oxycodone 5mg q8hrs.  Prescribed Mirtazapine 45mg and Valium 5mg daily PRN by previous psychiatric provider.  Last filled Mirtazapine on June 30th (without since July 30) and last filled Valium on July 14 (without since August 14).  Since stopping medications, his sleep has worsened and feels occasional \"unease, worry, and rumination.\"  Anxiety has worsened overall since introduction of Covid19 due to being in high risk category. No concerns with depression.  No previous medication " trials reported but according to MAR, Lexapro 15mg trial occurred in 2014.    Depression and anxiety started in teen years when he realized that he would have to live rest of life with chronic illness.  Missed a lot of school during these years due to medical complications.  Placed in foster system at 1 year old.  Lived with same foster family throughout childhood and adolescence.  Remains close with family.  No seizure history.  No history of head trauma.      Last psychiatric hospitalization in 2014 at [facility]. Anxiety and depression worsened at this time. He also experienced SI at this time.      Recent Symptoms:   Depression:  not endorsed today  Elevated:  none  Psychosis:  none  Anxiety:  frequent worry, rumination regarding medical issues  Panic Attack:  none  Trauma Related:  not endorsed     Recent Substance Use:  Alcohol- no , Tobacco- no , Caffeine- coffee/ tea [1 cup of coffee per day], Opioids- yes, prescribed Oxycodone 5mg q8hrs PRN    Narcan Kit- no , Cannabis- no  and Other Illicit Drugs-none        Social/ Family History      [1ea,1ea]            [per patient report]               FINANCIAL SUPPORT- long term . Unemployed since 1988.    CHILDREN- None       LIVING SITUATION- Lives alone in subsidized housing in [place]      EARLY HISTORY/ EDUCATION- Grew up in foster family in [place], [place].  1 year in [employer]  SOCIAL/ SPIRITUAL SUPPORT- foster family.    TRAUMA HISTORY (self-report)- None  FEELS SAFE AT HOME- Yes  FAMILY HISTORY-  UNKNOWN    Medical / Surgical History                                 Patient Active Problem List   Diagnosis     History of total hip arthroplasty     Hemophilia A (H)     Pain in joint, pelvic region and thigh     Anxiety state     Hypertension goal BP (blood pressure) < 140/90     Mood disorder in conditions classified elsewhere     Hematuria     Bladder cancer (H)     Examination of participant in clinical trial     Urothelial carcinoma of bladder (H)      Malignant neoplasm of overlapping sites of bladder (H)     Hemarthrosis     Pain     S/P ileal conduit (H)       Past Surgical History:   Procedure Laterality Date     ARTHROPLASTY REVISION HIP  4/26/2012    Procedure:ARTHROPLASTY REVISION HIP; Surgeon:ALEJANDRA RAYMOND; Location:[facility] OR     CYSTOSCOPY, TRANSURETHRAL RESECTION (TUR) TUMOR BLADDER, COMBINED N/A 2/19/2018    Procedure: COMBINED CYSTOSCOPY, TRANSURETHRAL RESECTION (TUR) TUMOR BLADDER;  Cystoscopy, Transurethral Resection Of Bladder Tumor ;  Surgeon: Cassidy Liao MD;  [facility]: [facility] OR     INJECT STEROID (LOCATION)      right hip x 2, 2 weeks before.      LAPAROSCOPIC CYSTOPROSTATECTOMY N/A 7/30/2018    Procedure: CYSTOPROSTATECTOMY;  Radical Cystoprostatectomy, Ileal Conduit, Bilateral Pelvic Lymphadenectomy;  Surgeon: Bebeto Shea MD;  Location: [facility] OR     left total knee arthroplasty  07/1992     LYMPHADENECTOMY ABDOMINAL N/A 7/30/2018    Procedure: LYMPHADENECTOMY ABDOMINAL;;  Surgeon: Bebeto Shea MD;  Location: [facility] OR     pins in left hip      hip fracture     radioactive synovectomy  03/2003     right total hip arthroplasty  12/1992     SYNOVECTOMY HIP  4/26/2012    Procedure:SYNOVECTOMY HIP; Right Hip Open Synovectomy, Right Total Hip Revision with Exchange of Poly Liner and Head; Surgeon:ALEJANDRA RAYMOND; Location:[facility] OR        Medical Review of Systems         [2,10]   The remainder of the review of systems is noncontributory  Allergy    Aspirin  Current Medications        Current Outpatient Medications   Medication Sig Dispense Refill     acetaminophen (TYLENOL) 325 MG tablet Take 2 tablets (650 mg) by mouth every 4 hours as needed for mild pain or fever 100 tablet 1     Antihemophilic Factor, Recomb, (KOGENATE FS) 1000 units KIT Directions: Infuse 3257 units units iv every Mon, Wed, Fri & Sun prn for breakthrough bleeding 25468 each 0     HEMLIBRA 105 MG/0.7ML injection 225mg (1.5ml) subcutaneous weekly x 4  loading doses.  Combine 2 x 60mg vials with 105mg vial. 2.8 mL 0     HEMLIBRA 60 MG/0.4ML injection Inject 0.8 mLs (120 mg) Subcutaneous once a week 3.2 mL 1     HEMLIBRA 60 MG/0.4ML injection 225mg (1.5ml) subcutaneous weekly x 4 loading doses.  Combine 2 x 60mg vials with 105mg vial. 3.2 mL 0     mirtazapine (REMERON) 45 MG tablet Take 1 tablet (45 mg) by mouth At Bedtime 30 tablet 2     naloxone (EVZIO) 0.4 MG/0.4ML auto-injector Inject 0.4 mLs (0.4 mg) into the muscle as needed for opioid reversal every 2-3 minutes until assistance arrives 0.8 mL 0     oxyCODONE (ROXICODONE) 5 MG tablet Take 1 tablet (5 mg) by mouth every 8 hours as needed for pain 90 tablet 0     sertraline (ZOLOFT) 50 MG tablet Take 50 mg by mouth daily       Vitals         [3, 3]   There were no vitals taken for this visit.   Mental Status Exam        [9, 14 cog gs]     Alertness: alert  and oriented  Appearance: unable to assess  Behavior/Demeanor: cooperative and pleasant, with unable to assess eye contact   Speech: regular rate and rhythm  Language: no problems  Psychomotor: unable to assess  Mood: irritable and agitated  Affect: unable to assess; was congruent to mood; was congruent to content  Thought Process/Associations: unremarkable  Thought Content:  Reports none;  Denies suicidal ideation and violent ideation  Perception:  Reports none;  Denies auditory hallucinations and visual hallucinations  Insight: good  Judgment: intact  Cognition: (6) does  appear grossly intact; formal cognitive testing was not done  Gait/Station and/or Muscle Strength/Tone: unable to assess    Labs and Data                          Rating Scales:    PHQ9    PHQ9 Today:  Not completed  PHQ 9/24/2019 2/14/2020 4/24/2020   PHQ-9 Total Score 1 0 0   Q9: Thoughts of better off dead/self-harm past 2 weeks Not at all Not at all Not at all          Assessment      [m2, h3]     Unspecified Anxiety Disorder  Unspecified Mood Disorder (Hx)    [place] Prescription  Monitoring Program [] was checked today:  indicates Oxycodone 5mg #90 filled 8/18/20, Oxycodone 5mg #10 filled 8/10/20, Oxycodone 5mg #90 filled 7/22/20, and Diazepam 5mg #30 filled 7/14/20.        Plan                                                                                                                     m2, h3     1) MEDICATION:  Continue Remeron 45mg at bedtime     Orlin not interested in another ssri trial    2) THERAPY:  Recommended Health Psychology.  Orlin is somewhat hesitant as therapy has not been beneficial in past.     3) MTM  Completed 11/25    RTC: 3 months    CRISIS NUMBERS:   Provided routinely in AVS.    Treatment Risk Statement:  The patient understands the risks, benefits, adverse effects and alternatives. Agrees to treatment with the capacity to do so. No medical contraindications to treatment. Agrees to call clinic for any problems. The patient understands to call 911 or go to the nearest ED if life threatening or urgent symptoms occur.     WHODAS 2.0  TODAY total score = N/A; [a 12-item WHODAS 2.0 assessment was not completed by the pt today and/or recorded in EPIC].       PROVIDER:  JELLY Cardenas CNP

## 2020-12-22 ENCOUNTER — TELEPHONE (OUTPATIENT)
Dept: HEMATOLOGY | Facility: CLINIC | Age: 70
End: 2020-12-22

## 2020-12-22 NOTE — TELEPHONE ENCOUNTER
[facility]  Social Work Note    Outreach to Orlin to further discuss [facility] Burial Assistance Application process as writer had learned applications are only accepted post-mortum. Orlin's goal is to donate his body to the [facility] Anatomy Bequest program, but decided to do the burial assistance application so he has a back-up plan on file if not accepted to Anatomy Bequest Program.  After further discussion, he plans to consider making contact with the [employer] to pre-register.  He also provided writer with his legal NOK's information, which writer has provided to [employer] to be entered into patient chart.    The NOK information is:  Amy Ivy  [address]  [place] [address]  [phone]  Niece      Orlin did not have other questions or concerns.    Analilia Dan) Rachid, MARIN, LICSW, ACM  Clinical   [facility]  [facility]  Phone: [phone]

## 2020-12-23 ENCOUNTER — DOCUMENTATION ONLY (OUTPATIENT)
Dept: HEMATOLOGY | Facility: CLINIC | Age: 70
End: 2020-12-23

## 2020-12-23 ENCOUNTER — DOCUMENTATION ONLY (OUTPATIENT)
Dept: OTHER | Facility: CLINIC | Age: 70
End: 2020-12-23

## 2020-12-23 ENCOUNTER — TELEPHONE (OUTPATIENT)
Dept: HEMATOLOGY | Facility: CLINIC | Age: 70
End: 2020-12-23

## 2020-12-23 DIAGNOSIS — Z85.51 PERSONAL HISTORY OF MALIGNANT NEOPLASM OF BLADDER: ICD-10-CM

## 2020-12-23 DIAGNOSIS — D66 SEVERE HEMOPHILIA A (H): ICD-10-CM

## 2020-12-23 RX ORDER — ANTIHEMOPHILIC FACTOR (RECOMBINANT) 1000 (+/-)
KIT INTRAVENOUS
Qty: 6266 EACH | Refills: 0 | Status: SHIPPED | OUTPATIENT
Start: 2020-12-23 | End: 2020-12-28

## 2020-12-23 NOTE — TELEPHONE ENCOUNTER
Mr. Cavanaugh called the Center to report that on 12/16/2020 he injected 120 mg (0.8 ml) of emicizumab in to his right abdomen. No skin reaction.   He also reports that on Saturday morning 12/19 he awoke he had a spontaneous left shoulder bleed (very limited ROM and pain) for which he infused 3000 units of Kogenate. By Monday the shoulder had returned to baseline. He plans later today to inject his weekly dose of emicizumab.

## 2020-12-24 NOTE — PROGRESS NOTES
Orlin Alcantar called to order a refill of Hemlibra . He has 0 dose(s) remaining at home and would like it delivered to his house via  to arrive on 12/15/2020.     The following doses were dispensed from our pharmacy:    Product: Hemlibra 60 mg/0.4 ml    Doses: 3.2 ml (8 vials)    SALINA Keys., [facility]  Hemophilia Pharmacy Coordinator  [phone]  [email or web address]

## 2020-12-24 NOTE — PROGRESS NOTES
Orlin Alcantar called to order a refill of Kogenate FS . He has 1 dose(s) remaining at home and would like it delivered to his house via  to arrive on 12/23/2020.     The following doses were dispensed from our pharmacy:    Product: Kogenate FS    Doses: 2 doses of 3133 units    MICHAEL Keys, [facility]  Hemophilia Pharmacy Coordinator  [phone]  [email or web address]

## 2020-12-28 ENCOUNTER — TELEPHONE (OUTPATIENT)
Dept: HEMATOLOGY | Facility: CLINIC | Age: 70
End: 2020-12-28

## 2020-12-28 DIAGNOSIS — D66 SEVERE HEMOPHILIA A (H): ICD-10-CM

## 2020-12-28 DIAGNOSIS — Z85.51 PERSONAL HISTORY OF MALIGNANT NEOPLASM OF BLADDER: ICD-10-CM

## 2020-12-28 RX ORDER — ANTIHEMOPHILIC FACTOR (RECOMBINANT) 1000 (+/-)
KIT INTRAVENOUS
Qty: 6266 EACH | Refills: 0 | Status: SHIPPED | OUTPATIENT
Start: 2020-12-28 | End: 2021-01-04

## 2020-12-28 NOTE — TELEPHONE ENCOUNTER
[facility]  [facility]ot[facility]  [facility] [address], [address], [place] [address]  Main: [phone], Fax: [phone]      12/28/2020    Patient called clinic to discuss his new therapy (Hemlibra) for prevention of bleeds.  He has been on the medication since 11/19/2020 and started maintenance dosing two weeks ago.  He has noted small spontaneous bleeds in the last 9 days (12/19 left shoulder, 12/24 left ankle, 12/26 right elbow) and feels the Hemlibra is not protecting him as well as Kogenate 40 units/kg every other day.      He is thinking he would like to go back to Kogenate therapy after his scheduled Hemlibra dose 1/6/2021.  He does struggle with peripheral infusions but for now can get it done.      He also notes that Michelet Guthrie will not be prescribing diazapam and he is not going to continue the selective serotonin reuptake inhibitor that was prescribed.  He is wondering if we would be willing to prescribe small amounts of diazapam going forward (#10 5mg tabs/month).  We are currently prescribing his pain medications.     PLAN:   Will transition back to Kogenate 3000 units every 48 hours for prevention of bleeds.  This will happen about a week after his 1/6/2021 dose of Hemlibra.      Pharmacy will be updated with plan and new Rx for Kogenate written.    Will discuss with Dr. Jiang the request for diazapam.    Yanira Branch PA-C    1/5/2021  Called patient back with decision about diazepam.  Dr. Jiang and team feel best to keep his sleep and anxiety medications with his current psychiatric provider or possibly a new primary care provider.  He currently does not have a primary care provider.  He had also inquired about COVID vaccine to see if we had it available yet.  Discussed that we do not have vaccine available and that would be best coming from his primary care provider as well.  He is not interested in a referral to primary care at this time but will  let us know if he changes his mind.      Yanira Branch PA-C

## 2020-12-28 NOTE — TELEPHONE ENCOUNTER
Mr. Cavanaugh left a  reporting two spontaneous bleeds (12/24 right ankle - minor did not treat with factor, 12/26 right elbow did treat with Kogenate). Requesting to stop emicizumab therapy and resume Kogenate infusions. Need to discuss with team today.

## 2020-12-29 ENCOUNTER — DOCUMENTATION ONLY (OUTPATIENT)
Dept: HEMATOLOGY | Facility: CLINIC | Age: 70
End: 2020-12-29

## 2021-01-04 DIAGNOSIS — G89.4 CHRONIC PAIN SYNDROME: ICD-10-CM

## 2021-01-04 DIAGNOSIS — D66 SEVERE HEMOPHILIA A (H): ICD-10-CM

## 2021-01-04 DIAGNOSIS — Z85.51 PERSONAL HISTORY OF MALIGNANT NEOPLASM OF BLADDER: ICD-10-CM

## 2021-01-04 RX ORDER — OXYCODONE HYDROCHLORIDE 5 MG/1
5 TABLET ORAL EVERY 8 HOURS PRN
Qty: 90 TABLET | Refills: 0 | Status: SHIPPED | OUTPATIENT
Start: 2021-01-04 | End: 2021-01-31

## 2021-01-04 RX ORDER — ANTIHEMOPHILIC FACTOR (RECOMBINANT) 1000 (+/-)
KIT INTRAVENOUS
Qty: 6266 EACH | Refills: 0 | Status: SHIPPED | OUTPATIENT
Start: 2021-01-04 | End: 2021-01-08

## 2021-01-04 NOTE — PROGRESS NOTES
Orlin Alcantar called to order a refill of Kogenate FS . He has 2 dose(s) remaining at home and would like it delivered to his house via  to arrive on 12/29/2020.     The following doses were dispensed from our pharmacy:    Product: Kogenate FS    Doses: 2 doses of 3133 units    MICHAEL Keys, [facility]  Hemophilia Pharmacy Coordinator  [phone]  [email or web address]

## 2021-01-05 ENCOUNTER — DOCUMENTATION ONLY (OUTPATIENT)
Dept: HEMATOLOGY | Facility: CLINIC | Age: 71
End: 2021-01-05

## 2021-01-06 ENCOUNTER — DOCUMENTATION ONLY (OUTPATIENT)
Dept: OTHER | Facility: CLINIC | Age: 71
End: 2021-01-06

## 2021-01-08 DIAGNOSIS — D66 SEVERE HEMOPHILIA A (H): ICD-10-CM

## 2021-01-08 DIAGNOSIS — Z85.51 PERSONAL HISTORY OF MALIGNANT NEOPLASM OF BLADDER: ICD-10-CM

## 2021-01-08 RX ORDER — ANTIHEMOPHILIC FACTOR (RECOMBINANT) 1000 (+/-)
KIT INTRAVENOUS
Qty: 12532 EACH | Refills: 1 | Status: SHIPPED | OUTPATIENT
Start: 2021-01-08 | End: 2021-01-19

## 2021-01-08 NOTE — PROGRESS NOTES
Orlin Alcantar called to order a refill of Kogenate FS . He has 1 dose(s) remaining at home and would like it delivered to his house via  to arrive on 1/5/2021.     The following doses were dispensed from our pharmacy:    Product: Kogenate FS    Doses: 2 doses 3133 units    SALINA Keys., [facility]  Hemophilia Pharmacy Coordinator  [phone]  [email or web address]

## 2021-01-12 ENCOUNTER — DOCUMENTATION ONLY (OUTPATIENT)
Dept: HEMATOLOGY | Facility: CLINIC | Age: 71
End: 2021-01-12

## 2021-01-14 ENCOUNTER — TELEPHONE (OUTPATIENT)
Dept: HEMATOLOGY | Facility: CLINIC | Age: 71
End: 2021-01-14

## 2021-01-14 NOTE — TELEPHONE ENCOUNTER
[facility]  Brief Social Work Note    Orlin is a 70 year old male followed by the [facility] for management of severe hemophilia A.  Received call from Orlin asking for assistance with obtaining free tax forms. He does not have access to the internet at home, and has avoided the [employer] due to the COVID-19 pandemic.  He has been on hold with the IRS for over a half hour and unable to reach them for a phone request.  Writer located forms, per Orlin's request, at IRS.gov and requested forms be sent to his home. He did not have other questions or concerns and expressed gratitude for the assistance.    Analilia (Marcie) MARIN Rashid, LICSW, ACM  Clinical   [facility] for [facility] Disorders  Phone: [phone]

## 2021-01-15 ENCOUNTER — NURSE TRIAGE (OUTPATIENT)
Dept: ONCOLOGY | Facility: CLINIC | Age: 71
End: 2021-01-15

## 2021-01-15 ENCOUNTER — TELEPHONE (OUTPATIENT)
Dept: ONCOLOGY | Facility: CLINIC | Age: 71
End: 2021-01-15

## 2021-01-15 NOTE — TELEPHONE ENCOUNTER
"Pt calls noting he has \"No real PCP\" and wondering about covid vaccine. Discussed that at this time, we do not have information on when the vaccine for COVID-19 will be made available to those outside of the current phase. This information and timing is determined by the [place] Department of Health ([facility]) and phase information can be reviewed on their website. We are also not keeping a running list as we do not know who will be administering those vaccines once they become available to more populations within [place]. We advise monitoring the website below and the local news for information.     News states should be able to get by end of February, was pleased with my willingness to route request to care team, while emphasizing that we do not have a wait list.   "

## 2021-01-19 DIAGNOSIS — D66 SEVERE HEMOPHILIA A (H): ICD-10-CM

## 2021-01-19 DIAGNOSIS — Z85.51 PERSONAL HISTORY OF MALIGNANT NEOPLASM OF BLADDER: ICD-10-CM

## 2021-01-19 RX ORDER — ANTIHEMOPHILIC FACTOR (RECOMBINANT) 1000 (+/-)
KIT INTRAVENOUS
Qty: 12532 EACH | Refills: 3 | Status: SHIPPED | OUTPATIENT
Start: 2021-01-19 | End: 2021-02-26

## 2021-01-20 ENCOUNTER — DOCUMENTATION ONLY (OUTPATIENT)
Dept: HEMATOLOGY | Facility: CLINIC | Age: 71
End: 2021-01-20

## 2021-01-26 ENCOUNTER — DOCUMENTATION ONLY (OUTPATIENT)
Dept: HEMATOLOGY | Facility: CLINIC | Age: 71
End: 2021-01-26

## 2021-01-31 DIAGNOSIS — D66 SEVERE HEMOPHILIA A (H): ICD-10-CM

## 2021-01-31 DIAGNOSIS — G89.4 CHRONIC PAIN SYNDROME: ICD-10-CM

## 2021-01-31 RX ORDER — OXYCODONE HYDROCHLORIDE 5 MG/1
5 TABLET ORAL EVERY 8 HOURS PRN
Qty: 90 TABLET | Refills: 0 | Status: SHIPPED | OUTPATIENT
Start: 2021-01-31 | End: 2021-02-26

## 2021-02-01 ENCOUNTER — DOCUMENTATION ONLY (OUTPATIENT)
Dept: HEMATOLOGY | Facility: CLINIC | Age: 71
End: 2021-02-01

## 2021-02-03 NOTE — PROGRESS NOTES
Orlin Alcantar called to order a refill of Kogenate FS . He has 1 dose(s) remaining at home and would like it delivered to his house via  to arrive on 2/1/2021.     The following doses were dispensed from our pharmacy:    Product: Kogenate FS    Doses: 4 doses of 3133 units    MICHAEL Keys, [facility]  Hemophilia Pharmacy Coordinator  [phone]  [email or web address]

## 2021-02-03 NOTE — PROGRESS NOTES
Orlin Alcantar called to order a refill of Kogenate FS . He has 1 dose(s) remaining at home and would like it delivered to his house via  to arrive on 1/20/2021.     The following doses were dispensed from our pharmacy:    Product: Kogenate FS    Doses: 4 doses of 3133 units    MICHAEL Keys, [facility]  Hemophilia Pharmacy Coordinator  [phone]  [email or web address]

## 2021-02-03 NOTE — PROGRESS NOTES
Orlin Alcantar called to order a refill of Kogenate FS . He has 1 dose(s) remaining at home and would like it delivered to his house via  to arrive on 1/26/2021.     The following doses were dispensed from our pharmacy:    Product: Kogenate FS    Doses: 4 doses of 3133 units    MICHAEL Keys, [facility]  Hemophilia Pharmacy Coordinator  [phone]  [email or web address]

## 2021-02-03 NOTE — PROGRESS NOTES
Orlin Alcantar called to order a refill of Kogenate FS . He has 1 dose(s) remaining at home and would like it delivered to his house via  to arrive on 1/12/2021.     The following doses were dispensed from our pharmacy:    Product: Kogenate FS    Doses: 4 doses of 3133 units    MICHAEL Keys, [facility]  Hemophilia Pharmacy Coordinator  [phone]  [email or web address]

## 2021-02-09 ENCOUNTER — TELEPHONE (OUTPATIENT)
Dept: HEMATOLOGY | Facility: CLINIC | Age: 71
End: 2021-02-09

## 2021-02-10 NOTE — TELEPHONE ENCOUNTER
[facility]  Brief Social Work Note    Orlin Alcantar is a 70 year old male with severe hemophilia A. His bleeding disorder is managed by [facility].    Orlin does not have a smart phone, computer, or access to the internet at home. He is over 65, and currently eligible to register to receive a COVID-19 vaccine when available.  He contacted writer to review options.  [facility] once options are available for appointments.  Mailed him a consent form for his files, as he has been avoiding using the [employer] during the COVID-19 pandemic.    Will follow up with Orlin again next week.    Analilia (Marcie) MARIN Rashid, LICSW, ACM  Clinical   [facility] for Bleeding and Clotting Disorders  Phone: [phone]

## 2021-02-11 ENCOUNTER — DOCUMENTATION ONLY (OUTPATIENT)
Dept: HEMATOLOGY | Facility: CLINIC | Age: 71
End: 2021-02-11

## 2021-02-16 ENCOUNTER — TELEPHONE (OUTPATIENT)
Dept: HEMATOLOGY | Facility: CLINIC | Age: 71
End: 2021-02-16

## 2021-02-17 NOTE — PROGRESS NOTES
Orlin Alcantar called to order a refill of Kogenate FS . He has 1 dose(s) remaining at home and would like it delivered to his house via  to arrive on 2/11/2021.     The following doses were dispensed from our pharmacy:    Product: Kogenate FS    Doses: 4 doses of 3133 units    MICHAEL Keys, [facility]  Hemophilia Pharmacy Coordinator  [phone]  [email or web address]

## 2021-02-18 ENCOUNTER — DOCUMENTATION ONLY (OUTPATIENT)
Dept: HEMATOLOGY | Facility: CLINIC | Age: 71
End: 2021-02-18

## 2021-02-19 ENCOUNTER — TELEPHONE (OUTPATIENT)
Dept: UROLOGY | Facility: CLINIC | Age: 71
End: 2021-02-19

## 2021-02-19 NOTE — TELEPHONE ENCOUNTER
[facility] Call Center    Phone Message    May a detailed message be left on voicemail: yes     Reason for Call: Order(s): Other:   Reason for requested: ostomy supplies  Date needed: asap  Provider name:     Please fax ostomy supply order to [facility] [employer], fax is [phone], thank you      Action Taken: Message routed to:  [facility] uro    Travel Screening: Not Applicable

## 2021-02-19 NOTE — TELEPHONE ENCOUNTER
Patient could only provide item numbers for his supplies. Coloplast-1570,Bard [id number], Lurdes-Convitec [id number],Cavillon 3344, [place] [address], [place] [address] and [place] [id number].     Patient was notified that we called [facility] pharmacy at [phone]. They close at 12:00 pm on Fridays to have them fax his ostomy supplies order to [phone].     Patient was notified that we will have to on 2/22/2021 when we are able to speak to a rep from [facility] pharmacy supply.      John Rockwell MA

## 2021-02-22 NOTE — TELEPHONE ENCOUNTER
Message was left on [facility] pharmacy voicemail stating to fax patient's medical supply order to [phone] or give us a call to discuss the names of the products for Coloplast-1570,Bard [id number], Lurdes-Convitec [id number],Cavillon 3344, [place] [address], [place] [address] and [place] [id number].       John Rockwell MA

## 2021-02-23 NOTE — TELEPHONE ENCOUNTER
Hi all,      [facility] pharmacy is faxing over the patient's ostomy supply order to [phone].    Taniya-please let Paul Peter, CLEM or Katie Renteria MA know when this medical supply order for the patient's ostomy supplies from [facility] is available in the provider folder (Dr. Jamir Contreras) to review and sign for clinic staff to fax back to [facility].      Thanks, John Rockwell MA

## 2021-02-24 NOTE — TELEPHONE ENCOUNTER
Signed forms with supporting documents faxed today and sent to scanning.     Paul Peter, EMT  02/24/21

## 2021-02-24 NOTE — PROGRESS NOTES
Orlin Alcantar called to order a refill of Kogenate FS . He has 1 dose(s) remaining at home and would like it delivered to his house via  to arrive on 2/18/2021.     The following doses were dispensed from our pharmacy:    Product: Kogenate FS    Doses: 4 doses of 3133 units    MICHAEL Keys, [facility]  Hemophilia Pharmacy Coordinator  [phone]  [email or web address]

## 2021-02-26 ENCOUNTER — TELEPHONE (OUTPATIENT)
Dept: HEMATOLOGY | Facility: CLINIC | Age: 71
End: 2021-02-26

## 2021-02-26 DIAGNOSIS — Z85.51 PERSONAL HISTORY OF MALIGNANT NEOPLASM OF BLADDER: ICD-10-CM

## 2021-02-26 DIAGNOSIS — G89.4 CHRONIC PAIN SYNDROME: ICD-10-CM

## 2021-02-26 DIAGNOSIS — D66 SEVERE HEMOPHILIA A (H): ICD-10-CM

## 2021-02-26 RX ORDER — ANTIHEMOPHILIC FACTOR (RECOMBINANT) 1000 (+/-)
KIT INTRAVENOUS
Qty: 12532 EACH | Refills: 3 | Status: SHIPPED | OUTPATIENT
Start: 2021-02-26 | End: 2021-03-29

## 2021-02-26 RX ORDER — OXYCODONE HYDROCHLORIDE 5 MG/1
5 TABLET ORAL EVERY 8 HOURS PRN
Qty: 90 TABLET | Refills: 0 | Status: SHIPPED | OUTPATIENT
Start: 2021-02-26 | End: 2021-03-29

## 2021-02-26 NOTE — TELEPHONE ENCOUNTER
[facility]  Brief Social Work Note    Spoke again with Orlin singh: Vaccine options.  He has gotten himself on a call list through [employer] and is hoping for appointment availability soon.    Analilia (Marcie) MARIN Rashid, LICSW, ACM  Clinical   [facility] for Bleeding and Clotting Disorders  Phone: [phone]

## 2021-03-01 ENCOUNTER — ANCILLARY PROCEDURE (OUTPATIENT)
Dept: CT IMAGING | Facility: CLINIC | Age: 71
End: 2021-03-01
Attending: PHYSICIAN ASSISTANT
Payer: MEDICARE

## 2021-03-01 ENCOUNTER — DOCUMENTATION ONLY (OUTPATIENT)
Dept: HEMATOLOGY | Facility: CLINIC | Age: 71
End: 2021-03-01

## 2021-03-01 DIAGNOSIS — C67.9 UROTHELIAL CARCINOMA OF BLADDER (H): ICD-10-CM

## 2021-03-01 LAB
ALBUMIN SERPL-MCNC: 4.4 G/DL (ref 3.4–5)
ALP SERPL-CCNC: 80 U/L (ref 40–150)
ALT SERPL W P-5'-P-CCNC: 19 U/L (ref 0–70)
ANION GAP SERPL CALCULATED.3IONS-SCNC: 10 MMOL/L (ref 3–14)
AST SERPL W P-5'-P-CCNC: 14 U/L (ref 0–45)
BASOPHILS # BLD AUTO: 0.1 10E9/L (ref 0–0.2)
BASOPHILS NFR BLD AUTO: 1.6 %
BILIRUB SERPL-MCNC: 0.7 MG/DL (ref 0.2–1.3)
BUN SERPL-MCNC: 27 MG/DL (ref 7–30)
CALCIUM SERPL-MCNC: 9.6 MG/DL (ref 8.5–10.1)
CHLORIDE SERPL-SCNC: 112 MMOL/L (ref 94–109)
CO2 SERPL-SCNC: 19 MMOL/L (ref 20–32)
CREAT BLD-MCNC: 1.1 MG/DL (ref 0.66–1.25)
CREAT SERPL-MCNC: 1.08 MG/DL (ref 0.66–1.25)
DIFFERENTIAL METHOD BLD: NORMAL
EOSINOPHIL # BLD AUTO: 0.3 10E9/L (ref 0–0.7)
EOSINOPHIL NFR BLD AUTO: 4.1 %
ERYTHROCYTE [DISTWIDTH] IN BLOOD BY AUTOMATED COUNT: 14.5 % (ref 10–15)
GFR SERPL CREATININE-BSD FRML MDRD: 66 ML/MIN/{1.73_M2}
GFR SERPL CREATININE-BSD FRML MDRD: 69 ML/MIN/{1.73_M2}
GLUCOSE SERPL-MCNC: 100 MG/DL (ref 70–99)
HCT VFR BLD AUTO: 44.5 % (ref 40–53)
HGB BLD-MCNC: 14.5 G/DL (ref 13.3–17.7)
IMM GRANULOCYTES # BLD: 0 10E9/L (ref 0–0.4)
IMM GRANULOCYTES NFR BLD: 0.6 %
LYMPHOCYTES # BLD AUTO: 1.6 10E9/L (ref 0.8–5.3)
LYMPHOCYTES NFR BLD AUTO: 25.2 %
MCH RBC QN AUTO: 28.3 PG (ref 26.5–33)
MCHC RBC AUTO-ENTMCNC: 32.6 G/DL (ref 31.5–36.5)
MCV RBC AUTO: 87 FL (ref 78–100)
MONOCYTES # BLD AUTO: 0.7 10E9/L (ref 0–1.3)
MONOCYTES NFR BLD AUTO: 10.2 %
NEUTROPHILS # BLD AUTO: 3.7 10E9/L (ref 1.6–8.3)
NEUTROPHILS NFR BLD AUTO: 58.3 %
NRBC # BLD AUTO: 0 10*3/UL
NRBC BLD AUTO-RTO: 0 /100
PLATELET # BLD AUTO: 267 10E9/L (ref 150–450)
POTASSIUM SERPL-SCNC: 3.9 MMOL/L (ref 3.4–5.3)
PROT SERPL-MCNC: 8.1 G/DL (ref 6.8–8.8)
RBC # BLD AUTO: 5.12 10E12/L (ref 4.4–5.9)
SODIUM SERPL-SCNC: 142 MMOL/L (ref 133–144)
WBC # BLD AUTO: 6.4 10E9/L (ref 4–11)

## 2021-03-01 PROCEDURE — 80053 COMPREHEN METABOLIC PANEL: CPT | Performed by: PHYSICIAN ASSISTANT

## 2021-03-01 PROCEDURE — 82565 ASSAY OF CREATININE: CPT | Performed by: PATHOLOGY

## 2021-03-01 PROCEDURE — 74178 CT ABD&PLV WO CNTR FLWD CNTR: CPT | Mod: MG | Performed by: STUDENT IN AN ORGANIZED HEALTH CARE EDUCATION/TRAINING PROGRAM

## 2021-03-01 PROCEDURE — 36415 COLL VENOUS BLD VENIPUNCTURE: CPT | Performed by: PATHOLOGY

## 2021-03-01 PROCEDURE — G1004 CDSM NDSC: HCPCS | Mod: GC | Performed by: STUDENT IN AN ORGANIZED HEALTH CARE EDUCATION/TRAINING PROGRAM

## 2021-03-01 PROCEDURE — 71260 CT THORAX DX C+: CPT | Mod: MG | Performed by: STUDENT IN AN ORGANIZED HEALTH CARE EDUCATION/TRAINING PROGRAM

## 2021-03-01 PROCEDURE — 85025 COMPLETE CBC W/AUTO DIFF WBC: CPT | Performed by: PHYSICIAN ASSISTANT

## 2021-03-01 RX ORDER — IOPAMIDOL 755 MG/ML
101 INJECTION, SOLUTION INTRAVASCULAR ONCE
Status: COMPLETED | OUTPATIENT
Start: 2021-03-01 | End: 2021-03-01

## 2021-03-01 RX ADMIN — IOPAMIDOL 101 ML: 755 INJECTION, SOLUTION INTRAVASCULAR at 08:42

## 2021-03-01 NOTE — NURSING NOTE
Chief Complaint   Patient presents with     Blood Draw     Labs drawn via PIV start by RN.      Labs drawn with vpt by rn.  Pt tolerated well.    Pt would only allow me to access a vein in his elbow. I asked the ac. There was a lot of scar tissue. I was only able to draw the green tube from initial access. I had to re-access the vein to draw the purple tube.    Dmitriy Castellon RN

## 2021-03-03 NOTE — PROGRESS NOTES
Orlin Alcantar called to order a refill of Kogenate FS . He has 1 dose(s) remaining at home and would like to pick it up on 3/1/2021    The following doses were dispensed from our pharmacy:    Product: Kogenate FS     Doses: 4 doses of 3133 units    MICHAEL Keys, [facility]  Hemophilia Pharmacy Coordinator  [phone]  [email or web address]

## 2021-03-03 NOTE — TELEPHONE ENCOUNTER
[facility]  Brief Social Work Note    Received call from patient and current options to register for a vaccine were discussed.  Orlin has gotten himself on call back lists with [employer] and [employer].  Discussed process via My Chart.  He did not have other questions or concerns.    Analilia (Marcie) MARIN Rashid, LICSW, ACM  Clinical   [facility] for Bleeding and Clotting Disorders  Phone: [phone]

## 2021-03-05 ENCOUNTER — IMMUNIZATION (OUTPATIENT)
Dept: NURSING | Facility: CLINIC | Age: 71
End: 2021-03-05
Payer: MEDICARE

## 2021-03-05 PROCEDURE — 91303 PR COVID VAC JANSSEN AD26 0.5ML: CPT

## 2021-03-05 PROCEDURE — 0031A PR COVID VAC JANSSEN AD26 0.5ML: CPT

## 2021-03-08 ENCOUNTER — VIRTUAL VISIT (OUTPATIENT)
Dept: ONCOLOGY | Facility: CLINIC | Age: 71
End: 2021-03-08
Attending: PHYSICIAN ASSISTANT
Payer: MEDICARE

## 2021-03-08 DIAGNOSIS — C67.9 UROTHELIAL CARCINOMA OF BLADDER (H): Primary | ICD-10-CM

## 2021-03-08 PROCEDURE — 99441 PR PHYSICIAN TELEPHONE EVALUATION 5-10 MIN: CPT | Mod: 95 | Performed by: PHYSICIAN ASSISTANT

## 2021-03-08 NOTE — PROGRESS NOTES
Orlin is a 71 year old who is being evaluated via a billable telephone visit.      What phone number would you like to be contacted at? [phone]  How would you like to obtain your AVS? 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   3/26/18: Consented for the Nivolumab-Gemcitabine-Cisplatin study [id number]             3/28/18: Screening visit for study.   4/4/18; c1d1 gemcitabine  4/11/18: c1d8 gemcitabine-nivolumab  4/25/18: C2D1 Gemcitabine-Cisplatin  5/2/18: C2D8 Gemcitabine-Nivolumab  5/16/18: C3D1 Gemcitabine-Cisplatin  5/23/18: C3D8 Gemcitabine-Nivolumab  6/6/18: C4d1 Gemcitabine-Cisplatin  6/13/18: C4D8 Gemcitabine-Nivolumab     7/30/18: Radical cystoprostatectomy with bilateral pelvic LN dissection and ileal conduit. Final pathology pT0N0         HPI:  71 year old male with PMH of hemophilia, chronic hepatis C who was seen by DR. Burns for evaluation of neoadjuvant therapy with Gemcitabine Cisplatin and Nivolumab.     Patient has a history of hemophilia and has undergone several surgeries in right hip replacement, knee replacement. Hemophilia managed by Dr. Marley.   He reports recurrent hematuria for a year, initially attributed to kidney stones, cystoscopy in Feb 2018 revealed MIBC. PET-CT scan - which has revealed muscle invasive bladder right lateral wall of bladder and right  obturator lymphnode, 1.1 cm short axis.   He has been treated for HCV in the 1990s and has not had any complications. His LFTs have remained normal. His hemophilia is very well controlled on current regimen of  factor replacement. He does not have any autoimmune disease and is not on immunesuppressants or steroids. His diabetes is diet controlled and he has not required any medications. Orlin was enrolled on our neoadjuvant Nivolumab-Rutledge-Cis trial and he completed 4 cycles of treatment and then underwent       Radical cystoprostatectomy and Bilateral pelvic lymph node dissection and ileal conduit on 7/30/18 with pathCR.      INTERVAL HISTORY; Orlin is doing well today. Since I saw him last- no major joint bleeding.  No neuropathy, tinnitus or residual chemo effects.  He feels perfectly well with no concerns.  Adjusted to his ilial conduit well, no changes in color or odor of the urine. Takes Senna on occasion for constipation, and sometimes this discolors his urine.      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  He has had his [employer] covid vaccination this last week and is very happy about this.          PAST MEDICAL HISTORY      Past Medical History           Past Medical History:   Diagnosis Date     Anxiety       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       Bladder tumor       Depression       DM II (diabetes mellitus, type II), controlled (H)       diet controlled     Hemophilia (H)       Type A     History of hepatitis C       treated successfully     HTN (hypertension)                CURRENT OUTPATIENT MEDICATIONS              Current Outpatient Medications   Medication Sig     acetaminophen (TYLENOL) 325 MG tablet Take 2 tablets (650 mg) by mouth every 4 hours as needed for mild pain or fever     Antihemophilic Factor, Recomb, (KOGENATE FS) 1000 units KIT Directions: Infuse 2949 units or 3185 units iv every Mon, Wed, Fri & Sun prn for breakthrough bleeding     diazepam (VALIUM) 5 MG tablet Take 1 tablet (5 mg) by mouth daily as needed for anxiety or sleep     mirtazapine (REMERON) 45 MG tablet Take 1 tablet (45 mg) by mouth At Bedtime And may take 1/2 tablet for insomnia up to 3 times a week.     order for DME  Cohesive Lurdes rings [phone]     order for DME Equipment being ordered: Walker Wheels () and Walker ()  Treatment Diagnosis: gait instability     oxyCODONE (ROXICODONE) 5 MG tablet Take 1 tablet (5 mg) by mouth every 8 hours as needed for pain      No current facility-administered medications for this visit.          ECOG performance status of 1.   There were no vitals taken for this visit.     Voice is clear and strong. No audible stridor, wheezing, or respiratory distress. The remainder of PE was deferred due to PHE.       3/1/2021 08:10   Sodium 142   Potassium 3.9   Chloride 112 (H)   Carbon Dioxide 19 (L)   Urea Nitrogen 27   Creatinine 1.08   GFR Estimate 69   GFR Estimate If Black 80   Calcium 9.6   Anion Gap 10   Albumin 4.4   Protein Total 8.1   Bilirubin Total 0.7   Alkaline Phosphatase 80   ALT 19   AST 14   Glucose 100 (H)   WBC 6.4   Hemoglobin 14.5   Hematocrit 44.5   Platelet Count 267   RBC Count 5.12   MCV 87   MCH 28.3   MCHC 32.6   RDW 14.5   Diff Method Automated Method   % Neutrophils 58.3   % Lymphocytes 25.2   % Monocytes 10.2   % Eosinophils 4.1   % Basophils 1.6   % Immature Granulocytes 0.6   Nucleated RBCs 0   Absolute Neutrophil 3.7                                                                      CT CAP IMPRESSION: In this patient with history of bladder cancer status post  cystoprostatectomy, urinary diversion, ileal conduit, right lower  quadrant ileostomy and chemotherapy:  1. No evidence for local recurrence or metastatic disease within the  chest, abdomen or pelvis.  2. Mild bilateral pelvocaliectasis appears similar to prior  examination. No abnormal filling defect or soft tissue thickening  along the course of either ureter.  3. Bilateral nonobstructive nephrolithiasis.  4. Scattered indeterminate sub-6 mm solid pulmonary nodules appear  stable from prior examination. No new or enlarging nodule.  5. Additional incidental findings as detailed in the body of  the  report.        ASSESSMENT/PLAN:      ONC: 71 year old male with T2N1(solitary node)  urothelial carcinoma,currently enrolled on the clinical trial of Nivolumab with Gemcitabine and Cisplatin for MIBC, completed C4D8 on 6/13/18. He has had a complete response at radical cystectomy final pathology showing PT0 N0 of note he was T2N1 prior to neoadjuvant therapy.  He is OZZIE on scans and is on SOC follow up for surveillance.  We discussed labs, urine cytology and CT CAP every 6 months, then dropping back to annual. We discussed the recent data with prognosis and that he had an excellent response to neoadjuvant therapy.  His risk of recurrence is low.       Sees Dr Contreras in Urology as well, no new issues to report.      Hemophilia management: Per Dr. Marley, no major bleeds recently  Encouraged him to see PCP for annual exam, colonoscopy, lipids, etc         Phone call duration: 10 minutes    Monica Harris PA-C

## 2021-03-09 ENCOUNTER — DOCUMENTATION ONLY (OUTPATIENT)
Dept: HEMATOLOGY | Facility: CLINIC | Age: 71
End: 2021-03-09

## 2021-03-15 ENCOUNTER — VIRTUAL VISIT (OUTPATIENT)
Dept: PSYCHIATRY | Facility: CLINIC | Age: 71
End: 2021-03-15
Attending: NURSE PRACTITIONER
Payer: MEDICARE

## 2021-03-15 DIAGNOSIS — F41.1 ANXIETY STATE: ICD-10-CM

## 2021-03-15 PROCEDURE — 99442 PR PHYSICIAN TELEPHONE EVALUATION 11-20 MIN: CPT | Mod: 95 | Performed by: NURSE PRACTITIONER

## 2021-03-15 RX ORDER — MIRTAZAPINE 45 MG/1
45 TABLET, FILM COATED ORAL AT BEDTIME
Qty: 90 TABLET | Refills: 1 | Status: SHIPPED | OUTPATIENT
Start: 2021-03-15 | End: 2021-09-28

## 2021-03-15 NOTE — PROGRESS NOTES
"TELEPHONE VISIT  Orlin Alcantar is a 70 year old pt. who is being evaluated via a billable telephone visit.      The patient has been notified of the following:    We have found that certain health care needs can be provided without the need for a physical exam. This service lets us provide the care you need with a short phone conversation. If a prescription is necessary we can send it directly to your pharmacy. If lab work is needed we can place an order for that and you can then stop by our lab to have the test done at a later time. Insurers are generally covering virtual visits as they would in-office visits so billing should not be different than normal.  If for some reason you do get billed incorrectly, you should contact the billing office to correct it and that number is in the AVS .    Patient has given verbal consent for a telephone visit?:  Yes   How would the pt like to obtain the AVS?:  not requested  AVS SmartPhrase [PsychAVS] has been placed in 'Patient Instructions':  N/A     Start Time:  1:05 PM          End Time:  1:19pm           [facility]  Psychiatry Clinic  PSYCHIATRIC PROGRESS NOTE     CARE TEAM: PCP: No primary care provider on file.              Other Providers: Hematology  Orlin Alcantar is a 71 year old pt who prefers the name Orlin and pronoun he, him.    Pertinent Background:  See previous notes.  Psych critical item history includes suicidal ideation and psych hosp (<3).     Interim History     [4, 4]     The patient is a good historian, reports good treatment adherence and was last seen 12/15/20.  Since the last visit, Orlin reports he is \"just fine.\"  Anxiety has been manageable.  No concerns with sleep.  Weight has been stable on Remeron.   Mood stable.  No changes to medical health.  Administered J&J covid vaccination approximately 10 days ago.      12/15/20:  Orlin reports he has been taking Sertraline 50mg for past 6 days and is experiencing continued " "anxiety.  He does not want to continue with trial after being told that it can take at least 2 weeks for initial side effects to resolve and up to month to experience therapeutic benefit.  Orlin continues to request Valium.  Due to concerns with his age, long term use, and interaction with opioids, this provider informed Orlin he was not comfortable prescribing.  When informed that provider was not comfortable prescribing and concerned about taking Valium at his age and in combination with opioids, he responded \"your comfort level is not my concern.\"  Attempted to discuss other possible ssri or snri trials and Orlin stated \"I don't want to jump through any more hoops.\"  He did request refills of Mirtazapine.      10/13/20: Orlin reports he has been taking Mirtazapine 30mg for approximately weeks and requests an increase to 45mg.  He also requesting Valium again as Hydroxyzine has not been effective in managing anxiety. Continues to be unclear if anxiety is persistent or situational.  Orlin also adjusted report from initial visit when he reported that anxiety only worsens  prior to CT scan.  Today he reports that anxiety is fairly constant and valium was helpful.  Again informed Orlin of the dangers of taking Opioids and Benzodiazepines concurrently. Also informed Orlin that this provider will consult with pharmacy dept to find alternatives to Valium to manage anxiety.       9/8/20: Orlin reports he is \"fine overall.\"  Endorses anxiety stemming from medical issues, primarily re-emergence of bladder cancer.  He is getting CT scans every 6 months and anxiety worsens approximately 2-3 months prior to exams.  Orlin also diagnosed with hemophilia which has led to chronic joint pain.  Takes Oxycodone 5mg q8hrs.  Prescribed Mirtazapine 45mg and Valium 5mg daily PRN by previous psychiatric provider.  Last filled Mirtazapine on June 30th (without since July 30) and last filled Valium on July 14 (without since August 14).  " "Since stopping medications, his sleep has worsened and feels occasional \"unease, worry, and rumination.\"  Anxiety has worsened overall since introduction of Covid19 due to being in high risk category. No concerns with depression.  No previous medication trials reported but according to MAR, Lexapro 15mg trial occurred in 2014.    Depression and anxiety started in teen years when he realized that he would have to live rest of life with chronic illness.  Missed a lot of school during these years due to medical complications.  Placed in foster system at 1 year old.  Lived with same foster family throughout childhood and adolescence.  Remains close with family.  No seizure history.  No history of head trauma.      Last psychiatric hospitalization in 2014 at [facility]. Anxiety and depression worsened at this time. He also experienced SI at this time.      Recent Symptoms:   Depression:  not endorsed today  Elevated:  none  Psychosis:  none  Anxiety:  not endorsed today  Panic Attack:  none  Trauma Related:  not endorsed     Recent Substance Use:  Alcohol- no , Tobacco- no , Caffeine- coffee/ tea [1 cup of coffee per day], Opioids- yes, prescribed Oxycodone 5mg q8hrs PRN    Narcan Kit- no , Cannabis- no  and Other Illicit Drugs-none        Social/ Family History      [1ea,1ea]            [per patient report]               FINANCIAL SUPPORT- penitentiary . Unemployed since 1988.    CHILDREN- None       LIVING SITUATION- Lives alone in subsidized housing in [place]      EARLY HISTORY/ EDUCATION- Grew up in foster family in [place], [place].  1 year in [employer]  SOCIAL/ SPIRITUAL SUPPORT- foster family.    TRAUMA HISTORY (self-report)- None  FEELS SAFE AT HOME- Yes  FAMILY HISTORY-  UNKNOWN    Medical / Surgical History                                 Patient Active Problem List   Diagnosis     History of total hip arthroplasty     Hemophilia A (H)     Pain in joint, pelvic region and thigh     Anxiety state     " Hypertension goal BP (blood pressure) < 140/90     Mood disorder in conditions classified elsewhere     Hematuria     Bladder cancer (H)     Examination of participant in clinical trial     Urothelial carcinoma of bladder (H)     Malignant neoplasm of overlapping sites of bladder (H)     Hemarthrosis     Pain     S/P ileal conduit (H)       Past Surgical History:   Procedure Laterality Date     ARTHROPLASTY REVISION HIP  4/26/2012    Procedure:ARTHROPLASTY REVISION HIP; Surgeon:ALEJANDRA RAYMOND; [facility] OR     CYSTOSCOPY, TRANSURETHRAL RESECTION (TUR) TUMOR BLADDER, COMBINED N/A 2/19/2018    Procedure: COMBINED CYSTOSCOPY, TRANSURETHRAL RESECTION (TUR) TUMOR BLADDER;  Cystoscopy, Transurethral Resection Of Bladder Tumor ;  Surgeon: Cassidy Liao MD;  [facility]     INJECT STEROID (LOCATION)      right hip x 2, 2 weeks before.      LAPAROSCOPIC CYSTOPROSTATECTOMY N/A 7/30/2018    Procedure: CYSTOPROSTATECTOMY;  Radical Cystoprostatectomy, Ileal Conduit, Bilateral Pelvic Lymphadenectomy;  Surgeon: Bebeto Shea MD;  [facility]     left total knee arthroplasty  07/1992     LYMPHADENECTOMY ABDOMINAL N/A 7/30/2018    Procedure: LYMPHADENECTOMY ABDOMINAL;;  Surgeon: Bebeto Shea MD;  [facility] OR     pins in left hip      hip fracture     radioactive synovectomy  03/2003     right total hip arthroplasty  12/1992     SYNOVECTOMY HIP  4/26/2012    Procedure:SYNOVECTOMY HIP; Right Hip Open Synovectomy, Right Total Hip Revision with Exchange of Poly Liner and Head; Surgeon:ALEJANDRA RAYMOND; Location:[facility] OR        Medical Review of Systems         [2,10]   The remainder of the review of systems is noncontributory  Allergy    Aspirin  Current Medications        Current Outpatient Medications   Medication Sig Dispense Refill     acetaminophen (TYLENOL) 325 MG tablet Take 2 tablets (650 mg) by mouth every 4 hours as needed for mild pain or fever 100 tablet 1     Antihemophilic  Factor, Recomb, (KOGENATE FS) 1000 units KIT Directions: Infuse 3133 units every 2 days and prn for breakthrough bleeding 42692 each 3     mirtazapine (REMERON) 45 MG tablet Take 1 tablet (45 mg) by mouth At Bedtime 90 tablet 0     naloxone (EVZIO) 0.4 MG/0.4ML auto-injector Inject 0.4 mLs (0.4 mg) into the muscle as needed for opioid reversal every 2-3 minutes until assistance arrives 0.8 mL 0     oxyCODONE (ROXICODONE) 5 MG tablet Take 1 tablet (5 mg) by mouth every 8 hours as needed for pain 90 tablet 0     Vitals         [3, 3]   There were no vitals taken for this visit.   Mental Status Exam        [9, 14 cog gs]     Alertness: alert  and oriented  Appearance: unable to assess  Behavior/Demeanor: cooperative and pleasant, with unable to assess eye contact   Speech: normal  Language: no problems  Psychomotor: unable to assess  Mood: 'ok'  Affect: unable to assess; was congruent to mood; was congruent to content  Thought Process/Associations: unremarkable  Thought Content:  Reports none;  Denies suicidal ideation and violent ideation  Perception:  Reports none;  Denies auditory hallucinations and visual hallucinations  Insight: good  Judgment: intact  Cognition: (6) does  appear grossly intact; formal cognitive testing was not done  Gait/Station and/or Muscle Strength/Tone: unable to assess    Labs and Data                          Rating Scales:    PHQ9    PHQ9 Today:  Not completed  PHQ 9/24/2019 2/14/2020 4/24/2020   PHQ-9 Total Score 1 0 0   Q9: Thoughts of better off dead/self-harm past 2 weeks Not at all Not at all Not at all          Assessment      [m2, h3]     Unspecified Anxiety Disorder  Unspecified Mood Disorder (Hx)    [place] Prescription Monitoring Program [] was checked today:  indicates Oxycodone 5mg #90 filled 8/18/20, Oxycodone 5mg #10 filled 8/10/20, Oxycodone 5mg #90 filled 7/22/20, and Diazepam 5mg #30 filled 7/14/20.        Plan                                                                                                                      m2, h3     1) MEDICATION:  Continue Remeron 45mg at bedtime     Orlin not interested in another ssri trial    2) THERAPY:  Recommended Health Psychology.  Orlin is somewhat hesitant as therapy has not been beneficial in past.     3) MTM  Completed 11/25    RTC: 6 months    CRISIS NUMBERS:   Provided routinely in AVS.    Treatment Risk Statement:  The patient understands the risks, benefits, adverse effects and alternatives. Agrees to treatment with the capacity to do so. No medical contraindications to treatment. Agrees to call clinic for any problems. The patient understands to call 911 or go to the nearest ED if life threatening or urgent symptoms occur.     WHODAS 2.0  TODAY total score = N/A; [a 12-item WHODAS 2.0 assessment was not completed by the pt today and/or recorded in EPIC].       PROVIDER:  JELLY Cardenas CNP

## 2021-03-17 ENCOUNTER — DOCUMENTATION ONLY (OUTPATIENT)
Dept: HEMATOLOGY | Facility: CLINIC | Age: 71
End: 2021-03-17

## 2021-03-17 NOTE — PROGRESS NOTES
Orlin Alcantar called to order a refill of Kogenate FS . He has 0 dose(s) remaining at home and would like it delivered to his house via  to arrive on 3/17/2021.     The following doses were dispensed from our pharmacy:    Product: Kogenate FS    Doses: 4 doses of 3133 units    MICHAEL Keys, [facility]  Hemophilia Pharmacy Coordinator  [phone]  [email or web address]

## 2021-03-23 ENCOUNTER — DOCUMENTATION ONLY (OUTPATIENT)
Dept: HEMATOLOGY | Facility: CLINIC | Age: 71
End: 2021-03-23

## 2021-03-23 NOTE — PROGRESS NOTES
Orlin Alcantar called to order a refill of Kogenate FS . He has 1 dose(s) remaining at home and would like it delivered to his house via  to arrive on 3/23/2021.     The following doses were dispensed from our pharmacy:    Product: Kogenate FS    Doses: 4 doses of 3133 units    MICHAEL Keys, [facility]  Hemophilia Pharmacy Coordinator  [phone]  [email or web address]

## 2021-03-29 ENCOUNTER — DOCUMENTATION ONLY (OUTPATIENT)
Dept: HEMATOLOGY | Facility: CLINIC | Age: 71
End: 2021-03-29

## 2021-03-29 DIAGNOSIS — D66 SEVERE HEMOPHILIA A (H): ICD-10-CM

## 2021-03-29 DIAGNOSIS — Z85.51 PERSONAL HISTORY OF MALIGNANT NEOPLASM OF BLADDER: ICD-10-CM

## 2021-03-29 DIAGNOSIS — G89.4 CHRONIC PAIN SYNDROME: ICD-10-CM

## 2021-03-29 RX ORDER — ANTIHEMOPHILIC FACTOR (RECOMBINANT) 1000 (+/-)
KIT INTRAVENOUS
Qty: 12532 EACH | Refills: 3 | Status: SHIPPED | OUTPATIENT
Start: 2021-03-29 | End: 2021-05-03

## 2021-03-29 RX ORDER — OXYCODONE HYDROCHLORIDE 5 MG/1
5 TABLET ORAL EVERY 8 HOURS PRN
Qty: 90 TABLET | Refills: 0 | Status: SHIPPED | OUTPATIENT
Start: 2021-03-29 | End: 2021-04-26

## 2021-03-30 NOTE — PROGRESS NOTES
Orlin Alcantar called to order a refill of Kogenate FS . He has 1 dose(s) remaining at home and would like it delivered to his house via  to arrive on 3/29/2021.    The following doses were dispensed from our pharmacy:    Product: Kogenate FS    Doses: 4 doses of 3133 units    MICHAEL Keys, [facility]  Hemophilia Pharmacy Coordinator  [phone]  [email or web address]

## 2021-04-01 ENCOUNTER — PRE VISIT (OUTPATIENT)
Dept: UROLOGY | Facility: CLINIC | Age: 71
End: 2021-04-01

## 2021-04-01 NOTE — TELEPHONE ENCOUNTER
Chief Complaint : Follow up - 6 months    Hx/Sx: bladder cancer (s/p cystoprostatectomy w/ ileal conduit and RPLND)    Records/Orders: Available    Pt Contacted: N/a    At Rooming: urine sample for cytology    Paul Peter, EMT

## 2021-04-06 ENCOUNTER — DOCUMENTATION ONLY (OUTPATIENT)
Dept: HEMATOLOGY | Facility: CLINIC | Age: 71
End: 2021-04-06

## 2021-04-07 ENCOUNTER — VIRTUAL VISIT (OUTPATIENT)
Dept: UROLOGY | Facility: CLINIC | Age: 71
End: 2021-04-07
Payer: MEDICARE

## 2021-04-07 DIAGNOSIS — C67.8 MALIGNANT NEOPLASM OF OVERLAPPING SITES OF BLADDER (H): Primary | ICD-10-CM

## 2021-04-07 DIAGNOSIS — D66 HEMOPHILIA A (H): ICD-10-CM

## 2021-04-07 DIAGNOSIS — Z93.6 OTHER ARTIFICIAL OPENINGS OF URINARY TRACT STATUS (H): ICD-10-CM

## 2021-04-07 DIAGNOSIS — Z93.6 S/P ILEAL CONDUIT (H): ICD-10-CM

## 2021-04-07 DIAGNOSIS — E11.9 TYPE 2 DIABETES MELLITUS WITHOUT COMPLICATION, UNSPECIFIED WHETHER LONG TERM INSULIN USE (H): ICD-10-CM

## 2021-04-07 PROCEDURE — 99442 PR PHYSICIAN TELEPHONE EVALUATION 11-20 MIN: CPT | Mod: TEL | Performed by: UROLOGY

## 2021-04-07 ASSESSMENT — PAIN SCALES - GENERAL: PAINLEVEL: NO PAIN (0)

## 2021-04-07 NOTE — PROGRESS NOTES
Orlin Alcantar is a 71 year old male who is being evaluated via a billable telephone visit.      What phone number would you like to be contacted at? [phone]  How would you like to obtain your AVS? MyChart    Chief Complaint   It was my pleasure to speak with Orlin Alcantar who is a 71 year old male for follow-up of bladder cancer.      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 Shea. Here for routine follow-up. No evidence of recurrence. No recent hematuria. No flank pain, abdominal pain, or bone pain.     TNM Stage: T2a, N0, M0  Number of nodes removed: 23  Number of positive nodes: 0  Surgical Margins Positive: No  Grade: High   Histology: urothelial without secondary histology.      Date of Initial Diagnosis: 2018 TURBT with Dr. Liao   Stage of Initial Diagnosis: invasive urothelial carcinoma   Grade at Initial Diagnosis: high grade   Site of First Diagnosis: R UO was involved in the resection   Any Recurrence?: No  Intravesical Treatments: gemcitabine initiated on 2018, 4 cycles of gemcitabine-nivolumab 2018-2018 and 4 cycles of Gemcitabine-Cisplatin from 2018-2018.   Date of Last Upper Tract Imagin2019   Any evidence of hydronephrosis? Mild     CT abd/pelvis 3/1/2021  IMPRESSION: In this patient with history of bladder cancer status post  cystoprostatectomy, urinary diversion, ileal conduit, right lower  quadrant ileostomy and chemotherapy:  1. No evidence for local recurrence or metastatic disease within the  chest, abdomen or pelvis.  2. Mild bilateral pelvocaliectasis appears similar to prior  examination. No abnormal filling defect or soft tissue thickening  along the course of either ureter.  3. Bilateral nonobstructive nephrolithiasis.  4. Scattered indeterminate sub-6 mm solid pulmonary nodules appear  stable from prior examination. No new or  enlarging nodule.  5. Additional incidental findings as detailed in the body of the  report.    I have reviewed and updated the patient's Past Medical History, Social History, Family History and Medication List.    Review of Systems   Constitutional, HEENT, cardiovascular, pulmonary, gi and gu systems are negative, except as otherwise noted.    ALLERGIES  Aspirin    Vitals:  No vitals were obtained today due to virtual visit.    Physical Exam   healthy, alert and no distress  PSYCH: Alert and oriented times 3; coherent speech, normal   rate and volume, able to articulate logical thoughts, able   to abstract reason, no tangential thoughts, no hallucinations   or delusions  His affect is normal and pleasant  RESP: No cough, no audible wheezing, able to talk in full sentences  Remainder of exam unable to be completed due to telephone visits      Outside and Past Medical records:    Review of external notes as documented above - medical oncology  Review of the result(s) of each unique test - CT, BMP, CBC    Assessment:  Orlin Alcantar  is a 70 year old male with a history of T2 bladder cancer s/p neoadjuvant gem-cis-nivo and radical cystoprostatectomy with IC and BPLND on 07/30/2018 with Dr. Shea. No evidence of recurrence and he overall is doing well.     Plan:  - Scans per zoe in 6 months  - Follow up with me in 12 months and clinic visit - cytology at that time    Phone call duration: 11 minutes    Jamir Contreras MD  Urology  [facility]

## 2021-04-07 NOTE — PROGRESS NOTES
Orlin Alcantar called to order a refill of Kogenate FS . He has 1 dose(s) remaining at home and would like it delivered to his house via  to arrive on 4/6/2021.     The following doses were dispensed from our pharmacy:    Product: Kogenate FS    Doses: 4 doses of 3133 units    MICHAEL Keys, [facility]  Hemophilia Pharmacy Coordinator  [phone]  [email or web address]

## 2021-04-07 NOTE — PATIENT INSTRUCTIONS
Please follow up with Dr. Contreras in one year in clinic.     It was a pleasure meeting with you today.  Thank you for allowing me and my team the privilege of caring for you today.  YOU are the reason we are here, and I truly hope we provided you with the excellent service you deserve.  Please let us know if there is anything else we can do for you so that we can be sure you are leaving completely satisfied with your care experience.

## 2021-04-08 ENCOUNTER — TELEPHONE (OUTPATIENT)
Dept: ONCOLOGY | Facility: CLINIC | Age: 71
End: 2021-04-08

## 2021-04-15 ENCOUNTER — DOCUMENTATION ONLY (OUTPATIENT)
Dept: HEMATOLOGY | Facility: CLINIC | Age: 71
End: 2021-04-15

## 2021-04-15 NOTE — PROGRESS NOTES
Community Counts! - CDC Public Health Surveillance Results  Orlin Alcantar, 1950, Labs from 10/27/2020 have returned from CDC Community Counts Registry. Results included:   HIV: Negative   Hepatitis C: High Positive   Factor VIII Inhibitor: 0.2 [place] units    Labs were drawn at the [facility] and sent to the CDC Surveillance Laboratory to result. See media tab for scanned document. Original copy available through CDC [place] web site.     Desiree Marquez   Program Registrar   [facility]  [phone]

## 2021-04-19 ENCOUNTER — DOCUMENTATION ONLY (OUTPATIENT)
Dept: HEMATOLOGY | Facility: CLINIC | Age: 71
End: 2021-04-19

## 2021-04-19 NOTE — PROGRESS NOTES
Orlin Alcantar called to order a refill of Kogenate FS . He has 0 dose(s) remaining at home and would like it delivered to his house via  to arrive on 4/16/21.     The following doses were dispensed from our pharmacy:    Product: Kogenate FS    Doses: 4 DOSES OF 3133    Kristel Duffy, Pharmacy [facility] for Ble[facility]ot[facility]  [phone]

## 2021-04-26 ENCOUNTER — DOCUMENTATION ONLY (OUTPATIENT)
Dept: HEMATOLOGY | Facility: CLINIC | Age: 71
End: 2021-04-26

## 2021-04-26 DIAGNOSIS — D66 SEVERE HEMOPHILIA A (H): ICD-10-CM

## 2021-04-26 DIAGNOSIS — G89.4 CHRONIC PAIN SYNDROME: ICD-10-CM

## 2021-04-26 RX ORDER — OXYCODONE HYDROCHLORIDE 5 MG/1
5 TABLET ORAL EVERY 8 HOURS PRN
Qty: 90 TABLET | Refills: 0 | Status: SHIPPED | OUTPATIENT
Start: 2021-04-26 | End: 2021-05-24

## 2021-04-26 RX ORDER — OXYCODONE HYDROCHLORIDE 5 MG/1
5 TABLET ORAL EVERY 8 HOURS PRN
Qty: 90 TABLET | Refills: 0 | OUTPATIENT
Start: 2021-04-26

## 2021-04-26 NOTE — PROGRESS NOTES
Orlin Alcantar called to order a refill of Kogenate FS . He has 0 dose(s) remaining at home and would like it delivered to his house via  to arrive on 4/26/21.      The following doses were dispensed from our pharmacy:     Product: Kogenate FS     Doses: 4 DOSES OF 3133     Kristel Duffy, Pharmacy [facility] for Bleeding [facility] Clotting Disorders  [phone]

## 2021-04-26 NOTE — TELEPHONE ENCOUNTER
Pt would like today so it can be sent out with his Kogenate  Thanks!  Kristel Duffy, Pharmacy [facility] for Bleeding and Clotting Disorders  [phone]

## 2021-05-03 DIAGNOSIS — D66 SEVERE HEMOPHILIA A (H): ICD-10-CM

## 2021-05-03 DIAGNOSIS — Z85.51 PERSONAL HISTORY OF MALIGNANT NEOPLASM OF BLADDER: ICD-10-CM

## 2021-05-03 RX ORDER — ANTIHEMOPHILIC FACTOR (RECOMBINANT) 1000 (+/-)
KIT INTRAVENOUS
Qty: 12532 EACH | Refills: 3 | Status: SHIPPED | OUTPATIENT
Start: 2021-05-03 | End: 2021-06-02

## 2021-05-03 NOTE — TELEPHONE ENCOUNTER
Julius Doyle,    We need a refill for Medicare patient, Orlin, please.     *Patient would like it by : Bon, 5/4/2021 morning, so if we could get an rx by today by 3:30 pm that would be helpful.    *Last comp visit: 10/27/2021     *Drug: Kogenate at 4 doses of 3133 units     Thanks,    MICHAEL Keys, [facility]  Pharmacy Coordinator  [phone]  [email or web address]

## 2021-05-04 ENCOUNTER — DOCUMENTATION ONLY (OUTPATIENT)
Dept: HEMATOLOGY | Facility: CLINIC | Age: 71
End: 2021-05-04

## 2021-05-05 NOTE — PROGRESS NOTES
Orlin Alcantar called to order a refill of Kogenate FS . Hehas 0 dose(s) remaining at home and would like it delivered to his house via  to arrive on 5/4/2021.     The following doses were dispensed from our pharmacy:    Product: Kogenate FS    Doses: 4 doses of 3133 units    MICHAEL Keys, [facility]  Pharmacy Coordinator  [phone]  [email or web address]

## 2021-05-08 ENCOUNTER — HEALTH MAINTENANCE LETTER (OUTPATIENT)
Age: 71
End: 2021-05-08

## 2021-05-12 ENCOUNTER — DOCUMENTATION ONLY (OUTPATIENT)
Dept: HEMATOLOGY | Facility: CLINIC | Age: 71
End: 2021-05-12

## 2021-05-12 NOTE — PROGRESS NOTES
Case Management Discharge Note    Final Note: Home w/ family and [facility]    Destination      No service has been selected for the patient.      Durable Medical Equipment - Selection Complete      Service Provider Request Status Selected Services Address Phone Number Fax Number    [facility] Selected Durable Medical Equipment [address] [address] [phone]      Dialysis/Infusion      No service has been selected for the patient.      Home Medical Care - Selection Complete      Service Provider Request Status Selected Services Address Phone Number Fax Number    [facility] Selected Home Health Services [address] [facility] [address] [id number] [phone]      Community Resources      No service has been selected for the patient.        Other: Other(pvt car- son)    Final Discharge Disposition Code: 06 - home with home health care   Orlin Alcantar called to order a refill of Kogenate FS . He has 0 dose(s) remaining at home and would like it delivered to his house via  to arrive on 5/12/2021.     The following doses were dispensed from our pharmacy:    Product: Kogenate FS    Doses: 4 doses of 3133 units    MICHAEL Keys, [facility]  Pharmacy Coordinator  [phone]  [email or web address]

## 2021-05-18 ENCOUNTER — DOCUMENTATION ONLY (OUTPATIENT)
Dept: HEMATOLOGY | Facility: CLINIC | Age: 71
End: 2021-05-18

## 2021-05-19 NOTE — PROGRESS NOTES
Orlin Alcantar called to order a refill of Kogenate FS . He has 0 dose(s) remaining at home and would like it delivered to his house via  to arrive on 5/18/2021.      The following doses were dispensed from our pharmacy:     Product: Kogenate FS     Doses: 4 doses of 3133 units     MICHAEL Keys, [facility]  Pharmacy Coordinator  [phone]  [email or web address]

## 2021-05-24 DIAGNOSIS — G89.4 CHRONIC PAIN SYNDROME: ICD-10-CM

## 2021-05-24 DIAGNOSIS — D66 SEVERE HEMOPHILIA A (H): ICD-10-CM

## 2021-05-24 RX ORDER — OXYCODONE HYDROCHLORIDE 5 MG/1
5 TABLET ORAL EVERY 8 HOURS PRN
Qty: 90 TABLET | Refills: 0 | Status: CANCELLED | OUTPATIENT
Start: 2021-05-24

## 2021-05-24 RX ORDER — OXYCODONE HYDROCHLORIDE 5 MG/1
5 TABLET ORAL EVERY 8 HOURS PRN
Qty: 90 TABLET | Refills: 0 | Status: SHIPPED | OUTPATIENT
Start: 2021-05-24 | End: 2021-06-22

## 2021-05-25 ENCOUNTER — DOCUMENTATION ONLY (OUTPATIENT)
Dept: HEMATOLOGY | Facility: CLINIC | Age: 71
End: 2021-05-25

## 2021-05-28 NOTE — PROGRESS NOTES
Orlin Alcantar called to order a refill of Kogenate FS . He has 1 dose(s) remaining at home and would like it delivered to his house via  to arrive on 5/25/2021.     The following doses were dispensed from our pharmacy:    Product: Kogenate FS    Doses: 4 doses of 3133 units    MICHAEL Keys, [facility]  Pharmacy Coordinator  [phone]  [email or web address]

## 2021-06-02 DIAGNOSIS — D66 SEVERE HEMOPHILIA A (H): ICD-10-CM

## 2021-06-02 DIAGNOSIS — Z85.51 PERSONAL HISTORY OF MALIGNANT NEOPLASM OF BLADDER: ICD-10-CM

## 2021-06-02 RX ORDER — ANTIHEMOPHILIC FACTOR (RECOMBINANT) 1000 (+/-)
KIT INTRAVENOUS
Qty: 11624 EACH | Refills: 3 | Status: SHIPPED | OUTPATIENT
Start: 2021-06-02 | End: 2021-06-30

## 2021-06-02 NOTE — TELEPHONE ENCOUNTER
*Need Rx by: Today, Wednesday, June 2nd by 3:30 pm    *Last comp visit: 10/27/2020     *Drug: Kogenate at 4 doses of 2906 units    *Refills left: 0    *Notes: Adjusted rx, please review before approving.     Thanks,    MICHAEL Keys, [facility]  Pharmacy Coordinator  [phone]  [email or web address]

## 2021-06-03 ENCOUNTER — DOCUMENTATION ONLY (OUTPATIENT)
Dept: HEMATOLOGY | Facility: CLINIC | Age: 71
End: 2021-06-03

## 2021-06-04 NOTE — PROGRESS NOTES
Orlin Alcantar called to order a refill of Kogenate FS . He has 1 dose(s) remaining at home and would like it delivered to his house via  to arrive on 6/3/2021.     The following doses were dispensed from our pharmacy:    Product: Kogenate FS    Doses: 4 doses of 2906 units    MICHAEL Keys, [facility]  Pharmacy Coordinator  [phone]  [email or web address]

## 2021-06-09 ENCOUNTER — DOCUMENTATION ONLY (OUTPATIENT)
Dept: HEMATOLOGY | Facility: CLINIC | Age: 71
End: 2021-06-09

## 2021-06-09 NOTE — PROGRESS NOTES
Orlin Alcantar called to order a refill of Kogenate FS . He has 1 dose(s) remaining at home and would like it delivered to his house via  to arrive on 6/9/2021.     The following doses were dispensed from our pharmacy:    Product: Kogenate FS    Doses: 4 doses of 2906 units    MICHAEL Keys, [facility]  Pharmacy Coordinator  [phone]  [email or web address]

## 2021-06-16 ENCOUNTER — DOCUMENTATION ONLY (OUTPATIENT)
Dept: HEMATOLOGY | Facility: CLINIC | Age: 71
End: 2021-06-16

## 2021-06-16 NOTE — PROGRESS NOTES
Orlin Alcantar called to order a refill of Kogenate FS . He has 1 dose(s) remaining at home and would like it delivered to his house via  to arrive on 6/16/2021.     The following doses were dispensed from our pharmacy:    Product: Kogenate FS    Doses: 4 doses of 2906 units    MICHAEL Keys, [facility]  Pharmacy Coordinator  [phone]  [email or web address]

## 2021-06-21 DIAGNOSIS — G89.4 CHRONIC PAIN SYNDROME: ICD-10-CM

## 2021-06-21 DIAGNOSIS — D66 SEVERE HEMOPHILIA A (H): ICD-10-CM

## 2021-06-21 NOTE — TELEPHONE ENCOUNTER
*RX needed by: Today, June 21st by  3:45 pm    *Last comp visit:10/27/2020     Thanks,    MICHAEL Keys, [facility]  Pharmacy Coordinator  [phone]  [email or web address]

## 2021-06-22 RX ORDER — OXYCODONE HYDROCHLORIDE 5 MG/1
5 TABLET ORAL EVERY 8 HOURS PRN
Qty: 90 TABLET | Refills: 0 | Status: SHIPPED | OUTPATIENT
Start: 2021-06-22 | End: 2021-07-19

## 2021-06-30 DIAGNOSIS — Z85.51 PERSONAL HISTORY OF MALIGNANT NEOPLASM OF BLADDER: ICD-10-CM

## 2021-06-30 DIAGNOSIS — D66 SEVERE HEMOPHILIA A (H): ICD-10-CM

## 2021-06-30 RX ORDER — ANTIHEMOPHILIC FACTOR (RECOMBINANT) 1000 (+/-)
KIT INTRAVENOUS
Qty: 11624 EACH | Refills: 3 | Status: SHIPPED | OUTPATIENT
Start: 2021-06-30 | End: 2021-07-27

## 2021-06-30 NOTE — TELEPHONE ENCOUNTER
*Need Rx by: Today, June 30th by 3:30 pm    *Last comp visit: 10/27/2020     *Drug: Kogenate at 4 doses of 2906 units    *Refills left: 0    *Notes: Same qty and dose size as previously prescribed    Thanks,    MICHAEL Keys, [facility]  Pharmacy Coordinator  [phone]  [email or web address]

## 2021-07-17 NOTE — PROGRESS NOTES
rOlin Alcantar called to order a refill of Kogenate FS . He has 1 dose(s) remaining at home and would like it delivered to his house via  to arrive on 6/21/2019.     The following doses were dispensed from our pharmacy:    Product: Kogenate FS    Doses: 4 doses of 3225 units    MICHAEL Keys, [facility]  Hemophilia Pharmacy Coordinator  [phone]  [email or web address]             
no

## 2021-07-19 DIAGNOSIS — G89.4 CHRONIC PAIN SYNDROME: ICD-10-CM

## 2021-07-19 DIAGNOSIS — D66 SEVERE HEMOPHILIA A (H): ICD-10-CM

## 2021-07-19 RX ORDER — OXYCODONE HYDROCHLORIDE 5 MG/1
5 TABLET ORAL EVERY 8 HOURS PRN
Qty: 90 TABLET | Refills: 0 | Status: SHIPPED | OUTPATIENT
Start: 2021-07-19 | End: 2021-07-27

## 2021-07-19 NOTE — TELEPHONE ENCOUNTER
Pt is asking for a refill of Oxycodone to be delivered with his Kogenate Tuesday 7/20 (before noon).  If ok please send over a new prescription.  Last sent 6/22/21 for qty 90  Thanks!  Kristel Duffy, Pharmacy [facility]  [phone]

## 2021-07-27 DIAGNOSIS — G89.4 CHRONIC PAIN SYNDROME: ICD-10-CM

## 2021-07-27 DIAGNOSIS — D66 SEVERE HEMOPHILIA A (H): ICD-10-CM

## 2021-07-27 DIAGNOSIS — Z85.51 PERSONAL HISTORY OF MALIGNANT NEOPLASM OF BLADDER: ICD-10-CM

## 2021-07-27 RX ORDER — OXYCODONE HYDROCHLORIDE 5 MG/1
5 TABLET ORAL EVERY 8 HOURS PRN
Qty: 90 TABLET | Refills: 0 | Status: SHIPPED | OUTPATIENT
Start: 2021-07-27 | End: 2021-08-16

## 2021-07-27 RX ORDER — ANTIHEMOPHILIC FACTOR (RECOMBINANT) 1000 (+/-)
KIT INTRAVENOUS
Qty: 11624 EACH | Refills: 3 | Status: SHIPPED | OUTPATIENT
Start: 2021-07-27 | End: 2021-08-31

## 2021-07-27 NOTE — TELEPHONE ENCOUNTER
*Need Rx by: Monday, August 2nd (Asking for Rx ahead of time, so we don't need to rush it when it's needed)    *Last comp visit: 10/27/2020     *Drug 1: Kogenate FS at 4 doses of 2906 units    *Drug 2: Oxycodone 5 mg, qty: 90 tablets (Epic is stating there is an issue with e-scribing, please review before accepting. We will profile this Rx and then fill it when it's the appropriate time)    *Refills left: 0    Thanks,    MICHAEL Keys, [facility]  Pharmacy Coordinator  [phone]  [email or web address]

## 2021-08-16 DIAGNOSIS — D66 SEVERE HEMOPHILIA A (H): ICD-10-CM

## 2021-08-16 DIAGNOSIS — G89.4 CHRONIC PAIN SYNDROME: ICD-10-CM

## 2021-08-16 RX ORDER — OXYCODONE HYDROCHLORIDE 5 MG/1
5 TABLET ORAL EVERY 8 HOURS PRN
Qty: 90 TABLET | Refills: 0 | Status: SHIPPED | OUTPATIENT
Start: 2021-08-16 | End: 2021-09-20

## 2021-08-16 NOTE — TELEPHONE ENCOUNTER
Good Morning! We are asking for a refill of Oxycodone for September to keep on file. We have one rx on file from [facility] for August's fill but we looking at ahead at next month.  If we could have one rx on file to fill next month since pt usually only gives us a day or less notice of getting medication delivered.   Thanks!  Kristel

## 2021-08-26 NOTE — PROGRESS NOTES
Outpatient Psychiatry Progress Note     Provider: JELLY Chapa CNS  Date: 2018  Service:  Medication follow up with counseling.   Patient Identification: Orlin Alcantar  : 1950   MRN: [id number]    Orlin Alcantar is a 68 year old year old male who presents for ongoing psychiatric care.  Orlin Alcantar was last seen in clinic on 3/13/18.   At that time,   Assessment & Plan       Orlin Alcantar is seen today for follow up and reports he was diagnosed with bladder cancer in January and had surgery in February. He will need further treatment. Has limited social support. Despite this feels that he his mood has been stable and medication for mood are helpful. Would like to continue current medications.  Encouraged to call before next appointment difficulty with support as he goes through cancer treatment.     Diagnosis  Anxiety, RATNA, Depression, Mood disorder in conditions classified elsewhere.        Plan:  Medication: Continue current medications  OTC Recommendations: none  Lab Orders:  none  Referrals: none  Release of Information: none   Future Treatment Considerations:per symptoms  Return for Follow Up:6 months      ____________________________________________________________________________________________________________________________________________    2018  Today Orlin reports he had surgery and chemo therapy and appears the bladder cancer is gone. He tolerated the chemo therapy well. He finished chemo in .  Had surgery .   Takes Diazepam about 2 times per week.  Hemophilia has been stable.  Side effects of medication include: none   Psychiatric Review of Systems:  Depression: In the last 2 weeks per PHQ score of Zero  Anxiety : stable  Usha na   Psychosis  na.   ADHD na    Review of Medical Systems:  Sleep: mild - sleeping too much since chemo and surgery  Energy: stable  Concentration: stable  Appetite: stable  GI Concerns: see subjective  Cardiac concerns:  Patient Class: Inpatient [101]   REQUIRED: Diagnosis: New onset seizure ([facility] [address] [038586]   Estimated Length of Stay: Estimated stay of less than 2 midnights   Telemetry/Cardiac Monitoring Required?: Yes none  Neurological concerns: none  Other medical concerns: no new concerns  Current Substance Use:  Alcohol:denies  Other drugs:denies  Caffeine:not reviewed  Nicotine: none  Past Medical History:   Past Medical History:   Diagnosis Date     Anxiety      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      Bladder tumor      Depression      DM II (diabetes mellitus, type II), controlled (H)     diet controlled     Hemophilia (H)     Type A     History of hepatitis C     treated successfully     HTN (hypertension)      Patient Active Problem List   Diagnosis     History of total hip arthroplasty     Hemophilia A (H)     Pain in joint, pelvic region and thigh     Anxiety state     Hypertension goal BP (blood pressure) < 140/90     Mood disorder in conditions classified elsewhere     Hematuria     Bladder cancer (H)     Examination of participant in clinical trial     Urothelial carcinoma of bladder (H)     Malignant neoplasm of overlapping sites of bladder (H)     Hemarthrosis     Pain       Allergies:   Allergies   Allergen Reactions     Aspirin      This drug inhibits platelets and is contraindicated due to hemophilia diagnosis.             Current Medications     Current Outpatient Prescriptions Ordered in [facility]   Medication Sig Dispense Refill     acetaminophen (TYLENOL) 325 MG tablet Take 2 tablets (650 mg) by mouth every 4 hours as needed for mild pain or fever 100 tablet 1     amLODIPine (NORVASC) 5 MG tablet Take 1 tablet (5 mg) by mouth daily (Patient taking differently: Take 5 mg by mouth every morning ) 90 tablet 3     Antihemophilic Factor, Recomb, (KOGENATE FS) 1000 units KIT Infuse 3000 iu (-5/+10%) IV every Mondays, Wednesdays and Fridays. 84146 each 3     Antihemophilic Factor, Recomb, (KOGENATE FS) 3000 units KIT Infuse Kogenate at 3000 Units +/- 10% IV Q 24 hours until 8/13/2018. 13266 Units 0     diazepam (VALIUM) 5 MG tablet Take up to one tablet by mouth daily as needed for anxiety (Patient taking differently: Take 5 mg  "by mouth as needed Take up to one tablet by mouth daily as needed for anxiety) 30 tablet 5     lisinopril (PRINIVIL/ZESTRIL) 40 MG tablet Take 1 tablet (40 mg) by mouth every morning (HOLD UNTIL FOLLOW-UP with Primary Care Provider) 30 tablet 0     mirtazapine (REMERON) 45 MG tablet Take 1 tablet (45 mg) by mouth At Bedtime And may take 1/2 tablet for insomnia up to 3 times a week. 37 tablet 5     order for DME Equipment being ordered: Walker Wheels () and Walker ()  Treatment Diagnosis: gait instability 1 each 0     oxyCODONE IR (ROXICODONE) 5 MG tablet Take 1 tablet (5 mg) by mouth every 4 hours as needed for moderate to severe pain 12 tablet 0     polyethylene glycol (MIRALAX/GLYCOLAX) Packet Take 17 g by mouth daily (to prevent constipation) 30 packet 1     Current Facility-Administered Medications Ordered in Epic   Medication Dose Route Frequency Provider Last Rate Last Dose     ethyl chloride spray   Topical Once Magalie Espinal MD            Mental Status Exam     Appearance:  Casually dressed and Well groomed  Behavior/relationship to examiner/demeanor:  Cooperative  Orientation: Oriented to person, place, time and situation  Psychomotor: normal   Speech Rate:  Normal  Speech Spontaneity:  Normal  Mood:  \"good\"  Affect:  Appropriate/mood-congruent  Thought Process (Associations):  Goal directed  Thought Content:  no overt psychosis, denies suicidal ideation, intent or thoughts and patient does not appear to be responding to internal stimuli  Abnormal Perception:  None  Attention/Concentration:  Normal  Insight:  Good  Judgment:  Good      Results     Vital signs: /78  Pulse 125  Wt 68.1 kg (150 lb 3.2 oz)  BMI 20.37 kg/m2    Laboratory Data:  reviewed from other providers. No concerns with psychotropic medications    Assessment & Plan      Orlin Alcantar is seen today for follow up and reports his mood good. He is very happy that he is cancer free.  He would like to continue current " medications.    Diagnosis  Encounter Diagnoses   Name Primary?     Anxiety state Yes     Mood disorder in conditions classified elsewhere        Plan:  Medication: no change  OTC Recommendations: none  Lab Orders:  none  Referrals: none  Release of Information: none  Future Treatment Considerations:per symptoms  Return for Follow Up:6 months   The risks, benefits, alternatives and side effects have been discussed and are understood by the patient. The patient understands the risks of using street drugs or alcohol. There are no medical contraindications, the patient agrees to treatment, and has the capacity to do so. The patient understands to call 911 or come to the nearest ED if life threatening or urgent symptoms present.  In addition time was spent counseling the patient and/or coordinating care regarding review of social and occupational functioning.  In addition patient was counseled on health and wellness practices to augment medication treatment of symptoms. See note for details.    Kaylin Rajna, JELLY CNS 9/11/2018

## 2021-08-28 ENCOUNTER — HEALTH MAINTENANCE LETTER (OUTPATIENT)
Age: 71
End: 2021-08-28

## 2021-08-30 DIAGNOSIS — D66 SEVERE HEMOPHILIA A (H): ICD-10-CM

## 2021-08-30 DIAGNOSIS — Z85.51 PERSONAL HISTORY OF MALIGNANT NEOPLASM OF BLADDER: ICD-10-CM

## 2021-08-30 RX ORDER — ANTIHEMOPHILIC FACTOR (RECOMBINANT) 1000 (+/-)
KIT INTRAVENOUS
Qty: 11624 EACH | Refills: 3 | OUTPATIENT
Start: 2021-08-30

## 2021-08-30 NOTE — TELEPHONE ENCOUNTER
*Medicare refill - please check units and qty*    *Need Rx by: 08/31/2021    *Last comp visit: 10/27/2020    *Drug: Kogenate at 4 doses of 2906 units    *Refills left: 0    *Notes: thanks!    Thanks,    Kristel Duffy, Pharmacy [facility]  [phone]

## 2021-08-31 DIAGNOSIS — D66 SEVERE HEMOPHILIA A (H): ICD-10-CM

## 2021-08-31 DIAGNOSIS — Z85.51 PERSONAL HISTORY OF MALIGNANT NEOPLASM OF BLADDER: ICD-10-CM

## 2021-08-31 RX ORDER — ANTIHEMOPHILIC FACTOR (RECOMBINANT) 1000 (+/-)
KIT INTRAVENOUS
Qty: 11624 EACH | Refills: 3 | Status: SHIPPED | OUTPATIENT
Start: 2021-08-31 | End: 2021-09-28

## 2021-08-31 NOTE — TELEPHONE ENCOUNTER
*Medicare refill - please check units and qty*    *Need Rx by: 08/31/21 pm    *Last comp visit: 10/27/2020     *Drug: Kogenate at 4 doses of 2906 units    *Refills left: 0    *Notes:     ThanksKristel, Pharmacy [facility]  [phone]

## 2021-09-08 ENCOUNTER — VIRTUAL VISIT (OUTPATIENT)
Dept: ONCOLOGY | Facility: CLINIC | Age: 71
End: 2021-09-08
Attending: PHYSICIAN ASSISTANT
Payer: MEDICARE

## 2021-09-08 ENCOUNTER — APPOINTMENT (OUTPATIENT)
Dept: LAB | Facility: CLINIC | Age: 71
End: 2021-09-08
Attending: PHYSICIAN ASSISTANT
Payer: MEDICARE

## 2021-09-08 ENCOUNTER — ANCILLARY PROCEDURE (OUTPATIENT)
Dept: CT IMAGING | Facility: CLINIC | Age: 71
End: 2021-09-08
Attending: PHYSICIAN ASSISTANT
Payer: MEDICARE

## 2021-09-08 VITALS
WEIGHT: 160.5 LBS | OXYGEN SATURATION: 100 % | HEART RATE: 77 BPM | TEMPERATURE: 97.7 F | SYSTOLIC BLOOD PRESSURE: 155 MMHG | RESPIRATION RATE: 16 BRPM | DIASTOLIC BLOOD PRESSURE: 107 MMHG | BODY MASS INDEX: 21.77 KG/M2

## 2021-09-08 DIAGNOSIS — C67.9 UROTHELIAL CARCINOMA OF BLADDER (H): ICD-10-CM

## 2021-09-08 LAB
ALBUMIN SERPL-MCNC: 4 G/DL (ref 3.4–5)
ALP SERPL-CCNC: 84 U/L (ref 40–150)
ALT SERPL W P-5'-P-CCNC: 17 U/L (ref 0–70)
ANION GAP SERPL CALCULATED.3IONS-SCNC: 8 MMOL/L (ref 3–14)
AST SERPL W P-5'-P-CCNC: 12 U/L (ref 0–45)
BASOPHILS # BLD AUTO: 0.1 10E3/UL (ref 0–0.2)
BASOPHILS NFR BLD AUTO: 1 %
BILIRUB SERPL-MCNC: 0.5 MG/DL (ref 0.2–1.3)
BUN SERPL-MCNC: 19 MG/DL (ref 7–30)
CALCIUM SERPL-MCNC: 9.2 MG/DL (ref 8.5–10.1)
CHLORIDE BLD-SCNC: 111 MMOL/L (ref 94–109)
CO2 SERPL-SCNC: 24 MMOL/L (ref 20–32)
CREAT BLD-MCNC: 1.1 MG/DL (ref 0.7–1.3)
CREAT SERPL-MCNC: 1.04 MG/DL (ref 0.66–1.25)
EOSINOPHIL # BLD AUTO: 0.2 10E3/UL (ref 0–0.7)
EOSINOPHIL NFR BLD AUTO: 4 %
ERYTHROCYTE [DISTWIDTH] IN BLOOD BY AUTOMATED COUNT: 14.9 % (ref 10–15)
GFR SERPL CREATININE-BSD FRML MDRD: 72 ML/MIN/1.73M2
GFR SERPL CREATININE-BSD FRML MDRD: >60 ML/MIN/1.73M2
GLUCOSE BLD-MCNC: 104 MG/DL (ref 70–99)
HCT VFR BLD AUTO: 41.1 % (ref 40–53)
HGB BLD-MCNC: 13.4 G/DL (ref 13.3–17.7)
IMM GRANULOCYTES # BLD: 0.1 10E3/UL
IMM GRANULOCYTES NFR BLD: 2 %
LYMPHOCYTES # BLD AUTO: 1 10E3/UL (ref 0.8–5.3)
LYMPHOCYTES NFR BLD AUTO: 20 %
MCH RBC QN AUTO: 27.6 PG (ref 26.5–33)
MCHC RBC AUTO-ENTMCNC: 32.6 G/DL (ref 31.5–36.5)
MCV RBC AUTO: 85 FL (ref 78–100)
MONOCYTES # BLD AUTO: 0.4 10E3/UL (ref 0–1.3)
MONOCYTES NFR BLD AUTO: 9 %
NEUTROPHILS # BLD AUTO: 3.3 10E3/UL (ref 1.6–8.3)
NEUTROPHILS NFR BLD AUTO: 64 %
NRBC # BLD AUTO: 0 10E3/UL
NRBC BLD AUTO-RTO: 0 /100
PLATELET # BLD AUTO: 267 10E3/UL (ref 150–450)
POTASSIUM BLD-SCNC: 4 MMOL/L (ref 3.4–5.3)
PROT SERPL-MCNC: 7.8 G/DL (ref 6.8–8.8)
RBC # BLD AUTO: 4.86 10E6/UL (ref 4.4–5.9)
SODIUM SERPL-SCNC: 143 MMOL/L (ref 133–144)
WBC # BLD AUTO: 5.1 10E3/UL (ref 4–11)

## 2021-09-08 PROCEDURE — 71260 CT THORAX DX C+: CPT | Mod: MG | Performed by: RADIOLOGY

## 2021-09-08 PROCEDURE — 80053 COMPREHEN METABOLIC PANEL: CPT | Performed by: PATHOLOGY

## 2021-09-08 PROCEDURE — 99442 PR PHYSICIAN TELEPHONE EVALUATION 11-20 MIN: CPT | Mod: 95 | Performed by: PHYSICIAN ASSISTANT

## 2021-09-08 PROCEDURE — 36415 COLL VENOUS BLD VENIPUNCTURE: CPT | Mod: TEL | Performed by: PHYSICIAN ASSISTANT

## 2021-09-08 PROCEDURE — 85025 COMPLETE CBC W/AUTO DIFF WBC: CPT | Mod: TEL | Performed by: PHYSICIAN ASSISTANT

## 2021-09-08 PROCEDURE — 80053 COMPREHEN METABOLIC PANEL: CPT | Mod: TEL | Performed by: PHYSICIAN ASSISTANT

## 2021-09-08 PROCEDURE — 88112 CYTOPATH CELL ENHANCE TECH: CPT | Mod: TC,TEL | Performed by: PHYSICIAN ASSISTANT

## 2021-09-08 PROCEDURE — 74177 CT ABD & PELVIS W/CONTRAST: CPT | Mod: MG | Performed by: RADIOLOGY

## 2021-09-08 PROCEDURE — 999N001193 HC VIDEO/TELEPHONE VISIT; NO CHARGE

## 2021-09-08 PROCEDURE — G1004 CDSM NDSC: HCPCS | Performed by: RADIOLOGY

## 2021-09-08 RX ORDER — IOPAMIDOL 755 MG/ML
99 INJECTION, SOLUTION INTRAVASCULAR ONCE
Status: COMPLETED | OUTPATIENT
Start: 2021-09-08 | End: 2021-09-08

## 2021-09-08 RX ADMIN — IOPAMIDOL 99 ML: 755 INJECTION, SOLUTION INTRAVASCULAR at 08:59

## 2021-09-08 ASSESSMENT — PAIN SCALES - GENERAL: PAINLEVEL: NO PAIN (0)

## 2021-09-08 NOTE — PROGRESS NOTES
Orlin is a 71 year old who is being evaluated via a billable telephone visit.      What phone number would you like to be contacted at? [phone]  How would you like to obtain your AVS? Mail a copy     [facility]  FOLLOW-UP VISIT NOTE  Sep 8, 2021                   REASON FOR VISIT: bladder cancer, 6 month follow up     ONCOLOGY TREATMENT HISTORY: Muscle Invasive Urothelial Carcinoma, soilitary, obturator node, no distant metastases.   3/26/18: Consented for the Nivolumab-Gemcitabine-Cisplatin study [id number]             3/28/18: Screening visit for study.   4/4/18; c1d1 gemcitabine  4/11/18: c1d8 gemcitabine-nivolumab  4/25/18: C2D1 Gemcitabine-Cisplatin  5/2/18: C2D8 Gemcitabine-Nivolumab  5/16/18: C3D1 Gemcitabine-Cisplatin  5/23/18: C3D8 Gemcitabine-Nivolumab  6/6/18: C4d1 Gemcitabine-Cisplatin  6/13/18: C4D8 Gemcitabine-Nivolumab     7/30/18: Radical cystoprostatectomy with bilateral pelvic LN dissection and ileal conduit. Final pathology pT0N0         HPI:  71 year old male with PMH of hemophilia, chronic hepatis C who was seen by DR. Burns for evaluation of neoadjuvant therapy with Gemcitabine Cisplatin and Nivolumab.     Patient has a history of hemophilia and has undergone several surgeries in right hip replacement, knee replacement. Hemophilia managed by Dr. Marley.   He reports recurrent hematuria for a year, initially attributed to kidney stones, cystoscopy in Feb 2018 revealed MIBC. PET-CT scan - which has revealed muscle invasive bladder right lateral wall of bladder and right  obturator lymphnode, 1.1 cm short axis.   He has been treated for HCV in the 1990s and has not had any complications. His LFTs have remained normal. His hemophilia is very well controlled on current regimen of factor replacement. He does not have any autoimmune disease and is not on immunesuppressants or steroids. His diabetes is diet controlled and he has not required any medications. Orlin was enrolled on our  neoadjuvant Nivolumab-[place]-Cis trial and he completed 4 cycles of treatment and then underwent       Radical cystoprostatectomy and Bilateral pelvic lymph node dissection and ileal conduit on 7/30/18 with pathCR.      INTERVAL HISTORY; Orlin is doing well today for our phone visit.     Since I saw him last- no major joint bleeding, just routine small joint bleeds.  No neuropathy, tinnitus or residual chemo effects.  He feels perfectly well with no concerns.      Adjusted to his ilial conduit well, no changes in color or odor of the urine. Takes Senna on occasion for constipation, and sometimes this discolors his urine. No bleeding or clots in the urine.     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 Sees Dr Fajardo team routinely.   He has had his J & J covid vaccination in March and is very happy about this.          PAST MEDICAL HISTORY      Past Medical History           Past Medical History:   Diagnosis Date     Anxiety       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       Bladder tumor       Depression       DM II (diabetes mellitus, type II), controlled (H)       diet controlled     Hemophilia (H)       Type A     History of hepatitis C       treated successfully     HTN (hypertension)                CURRENT OUTPATIENT MEDICATIONS              Current Outpatient Medications   Medication Sig     acetaminophen (TYLENOL) 325 MG tablet Take 2 tablets (650 mg) by mouth every 4 hours as needed for mild pain or fever     Antihemophilic Factor, Recomb, (KOGENATE FS) 1000 units KIT Directions: Infuse 2949 units or 3185 units iv every Mon, Wed, Fri & Sun prn for breakthrough bleeding     diazepam (VALIUM) 5 MG tablet Take 1 tablet (5 mg) by mouth daily as needed for anxiety or sleep     mirtazapine (REMERON) 45 MG tablet Take 1 tablet (45 mg) by mouth At Bedtime And may take 1/2 tablet for insomnia up to 3 times a week.     order for DME Cohesive Lurdes rings [phone]     order for DME Equipment being ordered: Walker Wheels  () and Walker ()  Treatment Diagnosis: gait instability     oxyCODONE (ROXICODONE) 5 MG tablet Take 1 tablet (5 mg) by mouth every 8 hours as needed for pain      No current facility-administered medications for this visit.         EXAM: BP (!) 155/107 (BP Location: Left arm, Patient Position: Sitting, Cuff Size: Adult Regular)   Pulse 77   Temp 97.7  F (36.5  C) (Oral)   Resp 16   Wt 72.8 kg (160 lb 8 oz)   SpO2 100%   BMI 21.77 kg/m    Wt Readings from Last 4 Encounters:   09/08/21 72.8 kg (160 lb 8 oz)   11/25/20 75.3 kg (166 lb)   11/19/20 75.1 kg (165 lb 8 oz)   10/27/20 75.4 kg (166 lb 4.8 oz)          ECOG performance status of 1.   There were no vitals taken for this visit.     Voice is clear and strong. No audible stridor, wheezing, or respiratory distress. The remainder of PE was deferred due to PHE.                                                         9/8/2021 08:33   Sodium 143   Potassium 4.0   Chloride 111 (H)   Carbon Dioxide 24   Urea Nitrogen 19   Creatinine 1.04   GFR Estimate 72   Calcium 9.2   Anion Gap 8   Albumin 4.0   Protein Total 7.8   Bilirubin Total 0.5   Alkaline Phosphatase 84   ALT 17   AST 12   Glucose 104 (H)   WBC 5.1   Hemoglobin 13.4   Hematocrit 41.1   Platelet Count 267   RBC Count 4.86   MCV 85           ASSESSMENT/PLAN:      ONC: 71 year old male with T2N1(solitary node)  urothelial carcinoma,currently enrolled on the clinical trial of Nivolumab with Gemcitabine and Cisplatin for MIBC, completed C4D8 on 6/13/18. He has had a complete response at radical cystectomy final pathology showing PT0 N0 of note he was T2N1 prior to neoadjuvant therapy and is 3 years out from surgery.  He is OZZIE on scans and is on SOC follow up for surveillance.      We discussed labs, urine cytology and CT CAP every 6 months, then dropping back to annual. We discussed the recent data with prognosis and that he had an excellent response to neoadjuvant therapy.  His risk of recurrence  is low.       Sees Dr Contreras in Urology as well, no new issues to report.  Would like to see annually.      Hemophilia management: Per Dr. Marley, no major bleeds recently      Encouraged him to see PCP for annual exam, colonoscopy, lipids, etc      Received J & J vaccination.     8 minutes spent on the date of the encounter doing chart review, review of test results and discussion with other provider(s), in addition to 8 minutes spent on the phone with the patient.     Monica Harris PA-C

## 2021-09-08 NOTE — NURSING NOTE
"Chief Complaint   Patient presents with     Blood Draw     labs drawn with piv start by rn.  vs taken     Labs drawn with PIV start by rn; urine collected and sent as well.  Pt tolerated well.  VS taken; elevated bps (see flowsheet) relayed to provider.  Pt states he gets \"worked up\" when he comes to clinic.    Madeleine Tello, RN    "

## 2021-09-09 ENCOUNTER — TELEPHONE (OUTPATIENT)
Dept: HEMATOLOGY | Facility: CLINIC | Age: 71
End: 2021-09-09

## 2021-09-09 LAB
PATH REPORT.COMMENTS IMP SPEC: NORMAL
PATH REPORT.FINAL DX SPEC: NORMAL
PATH REPORT.GROSS SPEC: NORMAL
PATH REPORT.MICROSCOPIC SPEC OTHER STN: NORMAL
PATH REPORT.RELEVANT HX SPEC: NORMAL

## 2021-09-09 PROCEDURE — 88112 CYTOPATH CELL ENHANCE TECH: CPT | Mod: 26 | Performed by: PATHOLOGY

## 2021-09-09 NOTE — TELEPHONE ENCOUNTER
RN received a phone call from Orlin Alcantar wondering if we offer the flu vaccine at our clinic location. He would like to have this done at his 11/2 comp clinic visit.     RN has addended appt notes.     Paola Ruiz, MSN, RN, PHN - Nurse Clinician - [facility] - [phone]

## 2021-09-14 DIAGNOSIS — G89.4 CHRONIC PAIN SYNDROME: ICD-10-CM

## 2021-09-14 DIAGNOSIS — D66 SEVERE HEMOPHILIA A (H): ICD-10-CM

## 2021-09-14 NOTE — TELEPHONE ENCOUNTER
Reason For Call:   Chief Complaint   Patient presents with     Refill Request     Oxycodone       Medication Name, Dose and Monthly Quantity:   Oxycodone: Dose 5mg Schedule every 8 hours Monthly Quantity 84     MN  Data Reviewed (at least once every 3 months) Date:   Checked 9/20 and good    Last Fill Date:   9/14/21    Due Date:   10/14/21    Last Visit with PCP:   10/22/20    Future Visits with PCP:   Yes:  Date 11/2/21.    Processing:   eprescribe to [facility] pharmacy    Samra Brown RPH

## 2021-09-20 RX ORDER — OXYCODONE HYDROCHLORIDE 5 MG/1
5 TABLET ORAL EVERY 8 HOURS PRN
Qty: 84 TABLET | Refills: 0 | Status: SHIPPED | OUTPATIENT
Start: 2021-09-20 | End: 2021-11-08

## 2021-09-28 ENCOUNTER — VIRTUAL VISIT (OUTPATIENT)
Dept: PSYCHIATRY | Facility: CLINIC | Age: 71
End: 2021-09-28
Attending: NURSE PRACTITIONER
Payer: MEDICARE

## 2021-09-28 DIAGNOSIS — F41.1 ANXIETY STATE: ICD-10-CM

## 2021-09-28 DIAGNOSIS — D66 SEVERE HEMOPHILIA A (H): ICD-10-CM

## 2021-09-28 DIAGNOSIS — Z85.51 PERSONAL HISTORY OF MALIGNANT NEOPLASM OF BLADDER: ICD-10-CM

## 2021-09-28 PROCEDURE — 99442 PR PHYSICIAN TELEPHONE EVALUATION 11-20 MIN: CPT | Mod: 95 | Performed by: NURSE PRACTITIONER

## 2021-09-28 RX ORDER — ANTIHEMOPHILIC FACTOR (RECOMBINANT) 1000 (+/-)
KIT INTRAVENOUS
Qty: 11624 EACH | Refills: 3 | Status: SHIPPED | OUTPATIENT
Start: 2021-09-28 | End: 2021-10-11

## 2021-09-28 RX ORDER — MIRTAZAPINE 30 MG/1
30 TABLET, FILM COATED ORAL AT BEDTIME
Qty: 90 TABLET | Refills: 1 | Status: SHIPPED | OUTPATIENT
Start: 2021-09-28 | End: 2022-05-23

## 2021-09-28 NOTE — PROGRESS NOTES
"TELEPHONE VISIT  Orlin Alcantar is a 70 year old pt. who is being evaluated via a billable telephone visit.      The patient has been notified of the following:    We have found that certain health care needs can be provided without the need for a physical exam. This service lets us provide the care you need with a short phone conversation. If a prescription is necessary we can send it directly to your pharmacy. If lab work is needed we can place an order for that and you can then stop by our lab to have the test done at a later time. Insurers are generally covering virtual visits as they would in-office visits so billing should not be different than normal.  If for some reason you do get billed incorrectly, you should contact the billing office to correct it and that number is in the AVS .    Patient has given verbal consent for a telephone visit?:  Yes   How would the pt like to obtain the AVS?:  not requested  AVS SmartPhrase [PsychAVS] has been placed in 'Patient Instructions':  N/A     Start Time:  5:06 PM          End Time:  5:19pm           [facility]  Psychiatry Clinic  PSYCHIATRIC PROGRESS NOTE     CARE TEAM: PCP: No primary care provider on file.              Other Providers: Hematology  Orlin Alcantar is a 71 year old pt who prefers the name Orlin and pronoun he, him.    Pertinent Background:  See previous notes.  Psych critical item history includes suicidal ideation and psych hosp (<3).     Interim History     [4, 4]     The patient is a good historian, reports good treatment adherence and was last seen 3/15/21.  Since the last visit, Orlin reports he is \"just fine.  No major problems.\"  No concerns with mood.  No changes to anxiety which is manageable.  Also no concerns with medical concerns.  Mirtazapine continues to be helpful with sleep.  He has been cutting tablet in half for past 4-6 weeks.  No change to sleep or anxiety. He requests to have dose reduced.  No side effect " "concerns.      3/15/21: Orlin reports he is \"just fine.\"  Anxiety has been manageable.  No concerns with sleep.  Weight has been stable on Remeron.   Mood stable.  No changes to medical health.  Administered J&J covid vaccination approximately 10 days ago.      12/15/20:  Orlin reports he has been taking Sertraline 50mg for past 6 days and is experiencing continued anxiety.  He does not want to continue with trial after being told that it can take at least 2 weeks for initial side effects to resolve and up to month to experience therapeutic benefit.  Orlin continues to request Valium.  Due to concerns with his age, long term use, and interaction with opioids, this provider informed Orlin he was not comfortable prescribing.  When informed that provider was not comfortable prescribing and concerned about taking Valium at his age and in combination with opioids, he responded \"your comfort level is not my concern.\"  Attempted to discuss other possible ssri or snri trials and Orlin stated \"I don't want to jump through any more hoops.\"  He did request refills of Mirtazapine.      10/13/20: Orlin reports he has been taking Mirtazapine 30mg for approximately weeks and requests an increase to 45mg.  He also requesting Valium again as Hydroxyzine has not been effective in managing anxiety. Continues to be unclear if anxiety is persistent or situational.  Orlin also adjusted report from initial visit when he reported that anxiety only worsens  prior to CT scan.  Today he reports that anxiety is fairly constant and valium was helpful.  Again informed Orlin of the dangers of taking Opioids and Benzodiazepines concurrently. Also informed Orlin that this provider will consult with pharmacy dept to find alternatives to Valium to manage anxiety.       9/8/20: Orlin reports he is \"fine overall.\"  Endorses anxiety stemming from medical issues, primarily re-emergence of bladder cancer.  He is getting CT scans every 6 months and anxiety " "worsens approximately 2-3 months prior to exams.  Orlin also diagnosed with hemophilia which has led to chronic joint pain.  Takes Oxycodone 5mg q8hrs.  Prescribed Mirtazapine 45mg and Valium 5mg daily PRN by previous psychiatric provider.  Last filled Mirtazapine on June 30th (without since July 30) and last filled Valium on July 14 (without since August 14).  Since stopping medications, his sleep has worsened and feels occasional \"unease, worry, and rumination.\"  Anxiety has worsened overall since introduction of Covid19 due to being in high risk category. No concerns with depression.  No previous medication trials reported but according to MAR, Lexapro 15mg trial occurred in 2014.    Depression and anxiety started in teen years when he realized that he would have to live rest of life with chronic illness.  Missed a lot of school during these years due to medical complications.  Placed in foster system at 1 year old.  Lived with same foster family throughout childhood and adolescence.  Remains close with family.  No seizure history.  No history of head trauma.      Last psychiatric hospitalization in 2014 at [facility]. Anxiety and depression worsened at this time. He also experienced SI at this time.      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 Unemployed since 1988.    CHILDREN- None       LIVING SITUATION- Lives alone in subsidized housing in [facility]      EARLY HISTORY/ EDUCATION- Grew up in foster family in [place], [place].  1 year in Community [place]  SOCIAL/ SPIRITUAL SUPPORT- foster family.    TRAUMA HISTORY (self-report)- None  FEELS SAFE AT HOME- Yes  FAMILY HISTORY-  UNKNOWN    Medical / Surgical History                                 Patient Active Problem List   Diagnosis     History of total hip arthroplasty     Hemophilia A (H)     Pain in joint, pelvic region and thigh     Anxiety state     Hypertension goal BP (blood pressure) < 140/90     Mood disorder in conditions classified elsewhere     Hematuria     Bladder cancer (H)     Examination of participant in clinical trial     Urothelial carcinoma of bladder (H)     Malignant neoplasm of overlapping sites of bladder (H)     Hemarthrosis     Pain     S/P ileal conduit (H)     Type 2 diabetes mellitus without complication, unspecified whether long term insulin use (H)       Past Surgical History:   Procedure Laterality Date     ARTHROPLASTY REVISION HIP  4/26/2012    Procedure:ARTHROPLASTY REVISION HIP; Surgeon:LAEJANDRA RAYMOND; Location:[facility] OR     CYSTOSCOPY, TRANSURETHRAL RESECTION (TUR) TUMOR BLADDER, COMBINED N/A 2/19/2018    Procedure: COMBINED CYSTOSCOPY, TRANSURETHRAL RESECTION (TUR) TUMOR BLADDER;  Cystoscopy, Transurethral Resection Of Bladder Tumor ;  Surgeon: Cassidy Liao MD;  Location: [facility] OR     INJECT STEROID (LOCATION)      right hip x 2, 2 weeks before.      LAPAROSCOPIC CYSTOPROSTATECTOMY N/A 7/30/2018    Procedure: CYSTOPROSTATECTOMY;  Radical Cystoprostatectomy, Ileal Conduit, Bilateral Pelvic Lymphadenectomy;  Surgeon: Bebeto Shea MD;  Location: [facility] OR     left total knee arthroplasty  07/1992     LYMPHADENECTOMY ABDOMINAL N/A 7/30/2018    Procedure: LYMPHADENECTOMY ABDOMINAL;;  Surgeon: Bebeto Shea MD;  Location: [facility] OR     pins in left hip      hip fracture     radioactive synovectomy  03/2003     right total hip arthroplasty  12/1992     SYNOVECTOMY HIP  4/26/2012    Procedure:SYNOVECTOMY HIP; Right Hip Open Synovectomy, Right Total Hip  "Revision with Exchange of Poly Liner and Head; Surgeon:ALEJANDRA RAYMOND; Location:[facility] OR        Medical Review of Systems         [2,10]   The remainder of the review of systems is noncontributory  Allergy    Aspirin  Current Medications        Current Outpatient Medications   Medication Sig Dispense Refill     acetaminophen (TYLENOL) 325 MG tablet Take 2 tablets (650 mg) by mouth every 4 hours as needed for mild pain or fever 100 tablet 1     Antihem Factor Recomb, rFVIII, (KOGENATE FS) 1000 units KIT Directions: Infuse 2906 units every 2 days and prn for breakthrough bleeding 59125 each 3     mirtazapine (REMERON) 45 MG tablet Take 1 tablet (45 mg) by mouth At Bedtime 90 tablet 1     naloxone (EVZIO) 0.4 MG/0.4ML auto-injector Inject 0.4 mLs (0.4 mg) into the muscle as needed for opioid reversal every 2-3 minutes until assistance arrives 0.8 mL 0     oxyCODONE (ROXICODONE) 5 MG tablet Take 1 tablet (5 mg) by mouth every 8 hours as needed for pain 84 tablet 0     Vitals         [3, 3]   There were no vitals taken for this visit.   Mental Status Exam        [9, 14 cog gs]     Alertness: alert  and oriented  Appearance: unable to assess  Behavior/Demeanor: cooperative and pleasant, with unable to assess eye contact   Speech: regular rate and rhythm  Language: good  Psychomotor: unable to assess  Mood: \"fine\"  Affect: unable to assess; was congruent to mood; was congruent to content  Thought Process/Associations: unremarkable  Thought Content:  Reports none;  Denies suicidal ideation and violent ideation  Perception:  Reports none;  Denies auditory hallucinations and visual hallucinations  Insight: good  Judgment: intact  Cognition: (6) does  appear grossly intact; formal cognitive testing was not done  Gait/Station and/or Muscle Strength/Tone: unable to assess    Labs and Data                          Rating Scales:    PHQ9    PHQ9 Today:  Not completed  PHQ 9/24/2019 2/14/2020 4/24/2020   PHQ-9 Total Score 1 0 0 "   Q9: Thoughts of better off dead/self-harm past 2 weeks Not at all Not at all Not at all          Assessment      [m2, h3]     Unspecified Anxiety Disorder  Unspecified Mood Disorder (Hx)    [place] Prescription Monitoring Program [] was checked today:  indicates Oxycodone 5mg #90 filled 8/18/20, Oxycodone 5mg #10 filled 8/10/20, Oxycodone 5mg #90 filled 7/22/20, and Diazepam 5mg #30 filled 7/14/20.        Plan                                                                                                                     m2, h3     1) MEDICATION:  Decrease Remeron to 30mg at bedtime     Orlin not interested in another ssri trial    2) THERAPY:  Recommended Health Psychology.  Orlin is somewhat hesitant as therapy has not been beneficial in past.     3) MTM  Completed 11/25    RTC: 6 months    CRISIS NUMBERS:   Provided routinely in AVS.    Treatment Risk Statement:  The patient understands the risks, benefits, adverse effects and alternatives. Agrees to treatment with the capacity to do so. No medical contraindications to treatment. Agrees to call clinic for any problems. The patient understands to call 911 or go to the nearest ED if life threatening or urgent symptoms occur.     WHODAS 2.0  TODAY total score = N/A; [a 12-item WHODAS 2.0 assessment was not completed by the pt today and/or recorded in EPIC].       PROVIDER:  JELLY Cardenas CNP

## 2021-09-28 NOTE — TELEPHONE ENCOUNTER
*Medicare refill - please check units and qty*    *Need Rx by: 09/28/21 afternoon    *Last comp visit: 10/27/2020 pt has appt 11/2/21    *Drug: Kogenate at 4 doses of 2906 units    *Refills left: 0    *Notes:     Kristel Tony, Pharmacy [facility]  [phone]

## 2021-10-11 DIAGNOSIS — D66 SEVERE HEMOPHILIA A (H): ICD-10-CM

## 2021-10-11 DIAGNOSIS — Z85.51 PERSONAL HISTORY OF MALIGNANT NEOPLASM OF BLADDER: ICD-10-CM

## 2021-10-11 RX ORDER — ANTIHEMOPHILIC FACTOR (RECOMBINANT) 1000 (+/-)
KIT INTRAVENOUS
Qty: 4 EACH | Refills: 3 | Status: SHIPPED | OUTPATIENT
Start: 2021-10-11 | End: 2021-11-08

## 2021-10-11 NOTE — TELEPHONE ENCOUNTER
*Medicare refill - please check units and qty*    *Need Rx by: 10/11/21 pm    *Last comp visit: 10/27/2020    *Drug: Kogenate at 4 doses of 3100 units    *Refills left: 2 - just need prescription adjusted for vial sizes we have on hand    *Notes: -    Thanks,    Kristel Duffy, Pharmacy [facility]  [phone]

## 2021-10-23 ENCOUNTER — HEALTH MAINTENANCE LETTER (OUTPATIENT)
Age: 71
End: 2021-10-23

## 2021-10-26 ENCOUNTER — CARE COORDINATION (OUTPATIENT)
Dept: HEMATOLOGY | Facility: CLINIC | Age: 71
End: 2021-10-26

## 2021-11-02 ENCOUNTER — TRANSFERRED RECORDS (OUTPATIENT)
Dept: HEMATOLOGY | Facility: CLINIC | Age: 71
End: 2021-11-02

## 2021-11-02 ENCOUNTER — OFFICE VISIT (OUTPATIENT)
Dept: HEMATOLOGY | Facility: CLINIC | Age: 71
End: 2021-11-02
Attending: INTERNAL MEDICINE
Payer: MEDICARE

## 2021-11-02 ENCOUNTER — HOSPITAL ENCOUNTER (OUTPATIENT)
Dept: PHYSICAL THERAPY | Facility: CLINIC | Age: 71
Setting detail: THERAPIES SERIES
End: 2021-11-02
Attending: PHYSICIAN ASSISTANT
Payer: MEDICARE

## 2021-11-02 VITALS
TEMPERATURE: 97.6 F | WEIGHT: 158.6 LBS | RESPIRATION RATE: 14 BRPM | HEART RATE: 87 BPM | OXYGEN SATURATION: 99 % | BODY MASS INDEX: 21.48 KG/M2 | DIASTOLIC BLOOD PRESSURE: 106 MMHG | HEIGHT: 72 IN | SYSTOLIC BLOOD PRESSURE: 130 MMHG

## 2021-11-02 DIAGNOSIS — D66 HEMOPHILIC ARTHROPATHY (H): ICD-10-CM

## 2021-11-02 DIAGNOSIS — M36.2 HEMOPHILIC ARTHROPATHY (H): ICD-10-CM

## 2021-11-02 DIAGNOSIS — D66 SEVERE HEMOPHILIA A (H): Primary | ICD-10-CM

## 2021-11-02 DIAGNOSIS — R26.9 ABNORMAL GAIT: ICD-10-CM

## 2021-11-02 PROCEDURE — G0463 HOSPITAL OUTPT CLINIC VISIT: HCPCS | Mod: 25

## 2021-11-02 PROCEDURE — 97161 PT EVAL LOW COMPLEX 20 MIN: CPT | Mod: GP | Performed by: PHYSICAL THERAPIST

## 2021-11-02 PROCEDURE — 97535 SELF CARE MNGMENT TRAINING: CPT | Mod: GP | Performed by: PHYSICAL THERAPIST

## 2021-11-02 PROCEDURE — 99215 OFFICE O/P EST HI 40 MIN: CPT | Performed by: INTERNAL MEDICINE

## 2021-11-02 ASSESSMENT — MIFFLIN-ST. JEOR: SCORE: 1512.4

## 2021-11-02 ASSESSMENT — PAIN SCALES - GENERAL: PAINLEVEL: NO PAIN (0)

## 2021-11-02 NOTE — PROGRESS NOTES
OUTPATIENT PHYSICAL THERAPY HEMOPHILIA CLINIC NOTE  [facility]      Orlin Alcantar     YOB: 1950  [id number]    Reason for visit: Comprehensive Clinic  Date of service:  11/2/2021  Referring provider: Dr. Jorge Jiang  Medical Diagnosis: Factor VIII -  Severe  Replacement therapy: Prophylaxis: Schedule- Kogenate every other day   present: No    Interval History (include personal factors and/or comorbidities that impact the plan of care):   Orlin was last seen on 10/27/2020 for a comp clinic. Today he reports no new concerns related to his bleeding disorder.  He has some questions about joint health and ankle fusion that will be addressed.    Bleeds: Orlin keeps paper records which show the following bleeds in the past year: 7 right elbow, 4 left ankle, 3 right hip and 1 right knee.  Each of these required one dose of factor.  Work: Retired for many years, was a .   Exercise/physical activity consists of walking outdoors spring through fall.  No consistent program in the winter months.       Orthopedic past medical history:   Right SUNG 1992  Right hip steroid injections multiple times between 6380-2185   Right hip open synovectomy 2005  Right hip SUNG revision with synovectomy 4/26/2012  Left TKA 1991  Left hip fracture with internal fixation 20+ years    Pain:  Chief complaint: Orlin describes right elbow pain with overuse or forced extension, this is infrequent as he is careful with use of this joint.  He also describes left ankle discomfort some days that also occurs with overuse or forced DF.  He states his pain is well managed daily with activity modifications and use of pain meds.      Fall Risk Screen:  Has the patient fallen 2 or more times in the last year? No, one fall with slip on ice and landed in snowbank without injury.   Has the patient fallen and had an injury in the past year? No  Timed Up and Go Score: NA  Is the patient a fall  risk? Yes, due to multiple joint involvement limiting his response to loss of balance, department fall risk interventions implemented    Physical Exam: Gross ROM assessment continues to show longstanding loss of motion in bilateral ankles, bilateral knees L>R, and bilateral elbows.  See table for gross measurements.    ROM and joint function: Patient is right handed.  Unchanged ROM and function reported by Orlin in past year.  He continues to have inability to touch his face with either palm, can get fingertips to face.  He affirms he is still able to feed himself and shave due to length of tools.   Swelling: Denied  Gait:  Orlin continues to wear high top hiking boots which are his footwear of choice.  He attempted to wear cowboy boots as he has in the past but found he could no longer get his left foot into the boot.  His gait is independent without an assistive device.  Shoe assisted abbreviated heel strike, bilat knee flex, decreased push off and step length, equal step length, decreased foot clearance bilat, decreased walking efficiency.  Equipment: Patient has sock aid, reacher, crutches, wheeled walker, as well as walk-in shower with grab bars.       ROM L elbow R elbow L Knee R Knee L Ankle R Ankle Other: Other:    Flex (DF) 120 100 90 105 -10 -5     Ext (PF) -45 -50 -10 -5 20 20     Pronation 45 45         Supination 45 45           Assessment: Orlin is doing well overall and continues to complete all desired daily activities.  He has good strategies for managing pain with activity modifications, pacing and use of equipment as needed.  Up from floor is very difficult.  He has limited activity in winter months. He continues to have reports of breakthrough bleeds, however these are all reported as minor that he recognizes early and treats immediately with factor as well as conservative management options such as rest with NWBing for ankle bleeds.   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  No change in function.    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  No imaging found.  Left knee: H/o hemophilic arthropathy addressed with TKR in 1991, continues to be functional with replaced joint.  Current decreased ROM secondary to soft tissue restriction likely present prior to surgery.  Right knee: Slight decrease in flexion as compared to age normative tables, no other clinical signs of hemarthropathy, no imaging found. Orlin reports h/o significant bleed in this joint when he was a child that led to about a year of immobility with wheelchair use.   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  Right hip: H/o hemophilic arthropathy addressed with SUNG many years ago and revision in 2012.  Hip has been much improved clinically since 2012 surgery.     Treatment diagnosis: Joint hypomobility, ROM limitations, gait impairment, limited knowledge of condition and / or self care - joint health, hemarthropathy.     Clinical Presentation: Stable/Uncomplicated  Clinical Presentation Rationale: No progression of chronic joint disease  Clinical Decision Making (Complexity): Low complexity  Treatment: Patient will benefit from skilled physical therapy consisting of:   -education in self care / home management training to include instruction in: joint health, arthritis vs bleed symptoms and management, postural adjustments and use of rearview mirror, strategies for getting up from floor, walking program including options for indoor at his building during the winter months, and winter safety.   -Educated  about the goal of having no bleeds as every bleed has the potential to cause further joint damage.    -Discussed the importance of supportive footwear and specific options.  He plans to continue with high top work boot style footwear.   -Discussed surgical options for ankle including fusion and replacement pros and cons.  No surgical interventions recommended at this time given current level of pain and function.     Plan of Care:   1. Orlin to continue with current activity, with incorporation of indoor walking program in the winter months.  No equipment needs identified.  2. Will plan to see at next clinic visit and be available for any questions/concerns in the interim.    Therapy Frequency and Predicted Duration of Therapy Intervention: One session evaluation and treatment  Goals: Patient verbalizes understanding of evaluation results, and home management recommendations provided today to maximize functional mobility and allow for continued safe participation in current home and leisure activities. -Goal Met    Recommendations: Follow up at next scheduled [facility] clinic visit  Educational assessment/Barriers to learning: No barriers noted  Treatment provided this date (including minutes): Self-care/home management: 15   Patient/Family Education:Early and adequate treatment principles, Signs and symptoms of a bleed, Adjunctive treatment/RICE, Splints/orthoses/equipment, General information on benefits of exercise and physical activity, Footwear selection, Home environment safety/falls prevention, Hemophilic arthropathy and Elective orthopedic surgery/rehabilitation   Risks and benefits of evaluation/treatment have been explained.  Patient, family and/or caregiver are in agreement with Plan of Care.    Timed Code Treatment Minutes: 15  Total Treatment Time (sum of timed and untimed services): 45    Signature: Maria Teresa Chadwick [facility]  Physical Therapist  [facility]  [phone]    Date:  11/2/2021    Certification:  Onset date: 11/2/2021  Start of care date: 11/2/2021  Certification date from 11/2/2021 to 11/2/2021    I CERTIFY THE NEED FOR THESE SERVICES FURNISHED UNDER        THIS PLAN OF TREATMENT AND WHILE UNDER MY CARE     (Physician co-signature of this document indicates review and certification of the therapy plan).              Jorge Jiang MD  Professor of Medicine  Division of Hematology, Oncology, and Transplantation  Director, [facility]

## 2021-11-02 NOTE — PATIENT INSTRUCTIONS
"Factor Plan:    Continue taking Kogenate factor concentrate at 3000 units every other day.    Please call with unusual breakthrough bleeding to discuss your plan of care.    If you have a severe blow to the head, please give 3000 units to boost your factor level and please go the the Emergency Room for evaluation and for head CT scan.    Continue to keep infusion records via your meticulous paper records.     Carry factor concentrate with you at all times. Call the center if you will be traveling out of the area. We will create a travel letter and letter with instructions on emergency care in hemophilia. For treatment centers around the world go to [email or web address], click on left link \"resources\" and then click on \"passport\" and type in travel destination.     General Recommendations:    Wear medic alert on your person for highest level of safety [email or web address]    Recommended to See dentist every 6 months. Call the center if you have dental concerns and we can assist you in finding a dental provider.    Get established with a primary care provider for general health maintenance.    For any questions, please contact your [facility] nurse, Margot Reed RN at [phone] or the main phone number [phone].    If you have emergency needs in the evening or weekend, please contact the page  [phone] and page the hematologist on call or Dr. Jorge Jiang.    Please return for comprehensive clinic in 12 months.      Margot Reed RN - Nurse Clinician - [facility] - [phone]        "

## 2021-11-03 NOTE — NURSING NOTE
Teaching Flowsheet   Orlin Alcantar  has a diagnosis of HEMOPHILIA A  with a baseline factor  FACTOR VIII of   <1% . He  presents today for his  comprehensive Hemophilia clinic evaluation.  Teaching Topic: hemophilia self care     Person(s) involved in teaching:   Patient     Motivation Level:  Asks Questions: Yes   Eager to Learn: Yes  Cooperative: Yes   Receptive (willing/able to accept information): Yes     Patient demonstrates understanding of the following:  Reason for the appointment, diagnosis and treatment plan: Yes  Knowledge of proper use of medications and conditions for which they are ordered (with special attention to potential side effects or drug interactions): Yes  Which situations necessitate calling provider and whom to contact: Yes      Proper use and care of   (medical equip, care aids, etc.): NA  Nutritional needs and diet plan: NA  Pain management techniques: Yes  Wound Care: NA  How and/when to access community resources: Yes        Orlin Alcantar   uses Kogenate brand factor VIII concentrate and treats prophylactically.  He treats himself using peripheral venipuncture independently and his typical dose   is 3000 units. He keeps track of his infusions by [employer] paper logs.       Reviewed procedure for home infusions of factor concentrates.  He uses his right antecubital consistently and is regularly successful in getting the vein.  He reports that his left antecubital is too scarred for use.    Emergency treatment recommendations were generated and are visible in the Snapshot view.     Discuss that if they suspect bleeding in head, neck, throat or abdomen, they should give dose of factor if able. They should then contact the hemophilia center immediately or report to the Emergency Room at [facility] or the nearest emergency room.      Discussed need for dental check-ups.  He has not seen a dentis in years and has no current dental concerns.  He reports careful  brushing to prevent problems.     Patient does not have a primary health care provider and was encouraged to consider a primary MD. [facility] would be a convenient location.  Provided him with the clinic information and a list of providers who are taking new patients. He has been seen in past for HTN - BP was high today.    Orlin reports pain in bilateral elbows (R>L) and bilateral ankles (L>R).  He uses oxycodone regularly for pain.    Patient got both season flu and COVID booster today.    Orlin is following with urology as recommended for his bladder cancer history (active in 2018).  He has been cancer free since then and is evaluated every 6 months.      Medications and allergies were reviewed and updated by rooming staff.    Reviewed and discussed the patient instructions point by point and patient verbalized understanding. Copy of the After Visit Summary (AVS) given to patient for future reference. Patient given the contact card for the [facility] and has the appropriate numbers to call with any questions.    Margot Reed RN - Nurse Clinician - [facility] - [phone]

## 2021-11-04 ENCOUNTER — TELEPHONE (OUTPATIENT)
Dept: HEMATOLOGY | Facility: CLINIC | Age: 71
End: 2021-11-04
Payer: MEDICARE

## 2021-11-04 NOTE — TELEPHONE ENCOUNTER
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  He had his annual comprehensive clinic visit on 11/2; writer was out of office. Follow-up call placed to patient today and was able to speak with him.    Orlin reported that his clinic visit went well, and not much has changed for him from a psychosocial or resource standpoint.  He stated he has a question that is more future-oriented; not an immediate need.  His current vehicle is several years old. He bought it used many years ago and it is \"on it's last legs.\"  He wondered if there are resources for senior citizens with low-incomes to access support with car payments or car loans.  He is aware of alternate resources, such as metromobility or public transportation, but from a mental health standpoint he does better having access to his own transportation if possible.  Writer will investigate and update patient.  Patient stated he did not need resources immediately and did not anticipate a need for several months, so he will reach back out to writer when needed and he was mentioning is more as an FYI for the future.    He did not have other questions or concerns.    Analilia Rashid, MARIN, LICSW, ACM  Clinical   [facility]  Phone: [phone]    "

## 2021-11-08 DIAGNOSIS — G89.4 CHRONIC PAIN SYNDROME: ICD-10-CM

## 2021-11-08 DIAGNOSIS — D66 SEVERE HEMOPHILIA A (H): ICD-10-CM

## 2021-11-08 DIAGNOSIS — Z85.51 PERSONAL HISTORY OF MALIGNANT NEOPLASM OF BLADDER: ICD-10-CM

## 2021-11-08 RX ORDER — OXYCODONE HYDROCHLORIDE 5 MG/1
5 TABLET ORAL EVERY 8 HOURS PRN
Qty: 84 TABLET | Refills: 0 | Status: SHIPPED | OUTPATIENT
Start: 2021-11-08 | End: 2021-12-06

## 2021-11-08 RX ORDER — ANTIHEMOPHILIC FACTOR (RECOMBINANT) 1000 (+/-)
KIT INTRAVENOUS
Qty: 4 EACH | Refills: 3 | Status: SHIPPED | OUTPATIENT
Start: 2021-11-08 | End: 2021-12-06

## 2021-11-08 NOTE — TELEPHONE ENCOUNTER
*RX needed by: Today, November 8th by 3 pm    *Last comp visit: 11/2/2021    *Last fill: 10/12/2021    Thanks,    MICHAEL Keys, [facility]  Pharmacy Coordinator  [phone]  [email or web address]

## 2021-11-10 ENCOUNTER — TELEPHONE (OUTPATIENT)
Dept: PSYCHIATRY | Facility: CLINIC | Age: 71
End: 2021-11-10
Payer: MEDICARE

## 2021-11-10 NOTE — TELEPHONE ENCOUNTER
"SW returned patient's call.    Orlin asked who he was going to see on-going in clinic. Writer reviewed that we would be unable to provide on-going care to patient. Orlin expressed strong frustration, noting he had seen Kaylin Rajan for 6 years before she left clinic and was then transferred to [facility]. He asked if only a few people were being asked to leave and noted clinic was \"kicking him to the curb.\" Writer reviewed that we want him to get his on-going needs met and this would be outside of our clinic at this time and that we were making this same plan with majority of patients. He asked criteria for staying in clinic, writer declined to provide at this time.    Orlin stated that he gets meds filled through a pharmacy at the [facility] and will check to see if his provider is able to fill. Write offered to reach out to clinic to make request for Orlin, noting she could do this through [employer].     Patient appeared to misunderstand and attempted to find additional information on his clinic and raised voice at writer when writer attempt to explain that she could find information in his chart.     Orlin provided name of Samra Brown as provider. Writer will message provider to make request. Orlin appeared to swear at writer, yelling \"Brian Vicente.\" Writer requested he not swear or call would be ended. Orlin stated he was swearing at himself and requested writer's name.    Writer will message [place] [facility] provider to see if they can take over medication prescribing and alert clinic leadership to patient's concerns.    Kajal Kohli, MARIN, [facility]  [phone]              ----- Message from Mercedes Villeda sent at 11/8/2021 10:43 AM CST -----  Regarding: Michelet Pt  Christoph hodges,     One of Michelet's pts calling and requesting a call back about options after Michelet leaves our clinic. Home phone number is the best way to reach him.     Thanks!   Mercedes"
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
## 2021-11-29 ENCOUNTER — TELEPHONE (OUTPATIENT)
Dept: PSYCHIATRY | Facility: CLINIC | Age: 71
End: 2021-11-29
Payer: MEDICARE

## 2021-11-29 ENCOUNTER — DOCUMENTATION ONLY (OUTPATIENT)
Dept: PSYCHIATRY | Facility: CLINIC | Age: 71
End: 2021-11-29
Payer: MEDICARE

## 2021-11-29 NOTE — TELEPHONE ENCOUNTER
SW called Orlin to follow up on plan for on-going care as Michelet Guthrie has left Psychiatry Clinic.    Writer reviewed that hematologist declined prescribing medication, noting they are not equipped to take over prescribing Remeron. Orlin reported that he did not think they would agree to this.    Writer requested to talk about recommendations for next steps. Orlin stated it was not on him to figure out what to do since provider left the clinic and that he was not going to take extra steps. He would rather stop medication. Writer encouraged him to review alternative options. Writer suggested obtaining primary care within [facility] (as they would likely be able to prescribe medication) and wanted to provide some clinics that may have access to psychiatric consults if needed.    Orlin requested writer send the information via letter. Writer will do so.    MARIN Puentes, [facility]  [phone]

## 2021-11-29 NOTE — PROGRESS NOTES
Erroneous encounter, please disregard.    Kajal Kohli, MARIN, [facility]  [phone]

## 2021-12-03 NOTE — PROGRESS NOTES
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    Orlin was raised in a foster family and he does not know his biological parents, so it is unclear whether or not there is a family history of hemophilia. He was first diagnosed by Dr. Filippo Quiñones many years ago here at the [facility] and had been followed by Dr. Quiñones initially for a few years. He subsequently was followed by a community hematologist until briefly re-establishing care here in  prior to right hip surgery.  He then returned to the care of his community hematologist until , when we again saw him prior to another right hip surgery.  He returned to us again in , and has been followed here since that time.      Summary of orthopedic procedures:  1992 -- left knee replacement  1992 -- right hip replacement  1995 -- left hip fracture s/p insertion of three pins  2003 -- radioactive synovectomy, right hip  2005 -- surgical synovectomy, right hip  2012 -- redo right hip surgical synovectomy, and exchange of polyethylene portion of previous hip arthroplasty    Orlin was diagnosed with hepatitis C infection in  and received combination therapy with interferon and ribavirin in  which successfully eradicated the virus.     He has been on prophylactic FVIII replacement therapy for the last several years.  He has used a variety of regimens, typically using 10,000  to 15,000 units of factor weekly.  He has preferred to use smaller factor doses more frequently, rather than trying to extend the interval between infusions.  He used Recombinate for many years, followed by Helixate, and most recently has been using Kogenate since approximately 2017.  A pharmacokinetic study done on Kogenate done in 7/2019 showed a half life of 19 (range 14-24) hours.    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  Over the last few years, his joint status has remained stable.  He used codeine to manage joint pain for many years, but transitioned to oxycodone in March 2019 due to some difficulty with codeine dosing and availability.  He now receives ninety 5 mg tablets each month (provided by us), with quarterly check in for ongoing monitoring.    Interim history since last comprehensive clinic visit:  As outlined in detail in previous notes, Orlin was diagnosed with bladder cancer in 2018, for which he received neoadjuvant chemotherapy followed by radical cyctectomy in July 2018.  He continues to be followed in Oncology Clinic here, and remains in remission as of his last check up in September 2021.    After his visit last year, he briefly transitioned to emicizumab.  However, he decided to switch back to factor prophylaxis as he felt like he had better control of his hemophilia when he was able to infuse factor on a regular basis.    Over the last year, his joint function has remained stable.  He reports that his pain is adequately controlled on his current regimen.    He reports the following bleeding episodes over the last year:  -- 7 right elbow bleeds  -- 4 left ankle bleeds  -- 1 right knee bleed  -- 3 right hip bleeds  All of these were treated with 1 additional dose of factor.  He acknowledges that some of these episodes may have been increased arthropathy symptoms secondary to overuse rather than true bleeds.    He  continues to treat with Kogenate 3000 units every other day.  If he takes an additional dose, then he waits 48 hours to resume dosing.    Over the last year, he reports 2 falls.  One occurred last winter and he fell into a snow bank and thus sustained no injuries.  The second episode occurred after he tripped on a carpet in his home.  Again he sustained no significant injury from that.    ROS:  Complete ROS is negative.     Current factor treatment regimen:  Kogenate 3000 units (which is about 40 Units/kg) every 48 hours, via self venipuncture.     Bleeding episodes since last comprehensive clinic visit:    The total number of bleeding events since last comprehensive hemophilia clinic visit requiring factor infusion -- 15    The patient is recalling his bleeding events by -- paper logs which he brings with him today      Emergency department visits or hospitalizations in the past year:  He has no emergency department visits in the past year.    He has no hemophilia related hospitalization in the past year.     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       Joint procedures (synovectomy or joints replacement) since last comprehensive clinic visit: None    Chronic pain: (No or every day / Most days / Some days):  Every day, multiple joints, but primarily right elbow and left ankle     Prescribed opioids for chronic pain (Yes / No):  Yes -- oxycodone     Overall activity level:   Activity level for: Unrestricted / Limited / Unable   Work/School Limited   Recreation Limited   Self-Care Limited       Infectious disease status:     Date of Serology done Immunization status    Hepatitis A Unknown    Hepatitis B 3/28/2018 Negative serology both surface Agn and Core Nidhi      Status (Positive or Negative) Most recent RNA Quant and date Treatment:   Hepatitis C Antibody positive but negative viral load 3/28/2018 RNA not detected. Interferon and Ribavirin  in 1999      Positive or Negative (date of test done)   HIV status Negative in 3/28/2018     Evidence of cirrhosis or liver disease on imaging? No; last CT scan (for h/o bladder cancer) was in September 2021. Normal LFTs.       Social history:  Single, lives alone in apartment.  Retired .  Denies any tobacco use (quit smoking back in 2013).  No significant alcohol use.  Denies any illicit drug use.  No marijuana use.       Objective:  Looks well.  Detailed exam not done -- see physical therapy note for joint assessment.      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  Has had no recurrence of bladder cancer.    Overall, Orlin is doing well with stable joint function over the last year.  His chronic pain related to hemophilic arthropathy remains well controlled on relatively low-dose opioids, although he uses these daily.  He receives all prescriptions from our clinic.  We have not had any concerns on inappropriate medication use.      Plan:  We will continue his current factor plan which is Kogenate 3000 units every 48 hours.  He does in excellent job of logging infusions and bleeding episodes.  He knows to call us with any new joint symptoms or other bleeding related concerns.  As long as things remain stable, we can see him back annually.      Ama FLOYD, Analilia Rashid LIC and  and Margot Reed, nurse clinician have also seen the patient independently, please refer to their notes for their evaluation and assessment.       Total time 40 minutes, including review of medical records and labs, clinic visit, and documentation.      Jorge Jiang MD  Professor of Medicine  Division of Hematology,  Oncology, and Transplantation  Director, [facility]

## 2021-12-06 DIAGNOSIS — D66 SEVERE HEMOPHILIA A (H): ICD-10-CM

## 2021-12-06 DIAGNOSIS — G89.4 CHRONIC PAIN SYNDROME: ICD-10-CM

## 2021-12-06 DIAGNOSIS — Z85.51 PERSONAL HISTORY OF MALIGNANT NEOPLASM OF BLADDER: ICD-10-CM

## 2021-12-06 RX ORDER — ANTIHEMOPHILIC FACTOR (RECOMBINANT) 1000 (+/-)
KIT INTRAVENOUS
Qty: 12400 EACH | Refills: 3 | Status: SHIPPED | OUTPATIENT
Start: 2021-12-06 | End: 2022-01-03

## 2021-12-06 RX ORDER — ANTIHEMOPHILIC FACTOR (RECOMBINANT) 1000 (+/-)
KIT INTRAVENOUS
Qty: 4 EACH | Refills: 3 | Status: SHIPPED | OUTPATIENT
Start: 2021-12-06 | End: 2021-12-06

## 2021-12-06 RX ORDER — OXYCODONE HYDROCHLORIDE 5 MG/1
5 TABLET ORAL EVERY 8 HOURS PRN
Qty: 84 TABLET | Refills: 0 | Status: SHIPPED | OUTPATIENT
Start: 2021-12-06 | End: 2021-12-30

## 2021-12-06 NOTE — TELEPHONE ENCOUNTER
*Medicare refill - please check units and qty*    *Need Rx by: 12/13/2021    *Last comp visit: 11/2/2021    *Drug: Kogenate at 4 doses of 3100 units = 12,400 units    *Refills left: 1    *Notes: Patient had one dose left on his rx, was not seen previously. This rx will be put on file for his fill next week. Needed to resend refill request with exact units for the quantity from for 4 to 12,400 units due to Medicare rules.     Thanks,    MICHAEL Keys, [facility]  Lead Pharmacy Coordinator  [phone]  [email or web address]   Please advise

## 2021-12-06 NOTE — TELEPHONE ENCOUNTER
*Medicare refill - please check units and qty*    *Need Rx by: Today, December 6th by 4 pm    *Last comp visit: 11/2/2021    *Drug: Kogenate at 4 doses of 3100 units    *Refills left: 0    Thanks,    MICHAEL Keys, [facility]  Lead Pharmacy Coordinator  [phone]  [email or web address]

## 2021-12-06 NOTE — TELEPHONE ENCOUNTER
*RX needed by: Today, December 6th by 4 pm    *Last comp visit: 11/2/2021    *Refills left: 0    *Last refilled: 11/9/2021    Thanks,    MICHAEL Keys, [facility]  Lead Pharmacy Coordinator  [phone]  [email or web address]

## 2021-12-18 ENCOUNTER — HEALTH MAINTENANCE LETTER (OUTPATIENT)
Age: 71
End: 2021-12-18

## 2021-12-30 DIAGNOSIS — D66 SEVERE HEMOPHILIA A (H): ICD-10-CM

## 2021-12-30 DIAGNOSIS — G89.4 CHRONIC PAIN SYNDROME: ICD-10-CM

## 2021-12-30 RX ORDER — OXYCODONE HYDROCHLORIDE 5 MG/1
5 TABLET ORAL EVERY 8 HOURS PRN
Qty: 84 TABLET | Refills: 0 | Status: SHIPPED | OUTPATIENT
Start: 2021-12-30 | End: 2022-01-03

## 2021-12-30 NOTE — TELEPHONE ENCOUNTER
*RX needed by: Monday, January 3rd by 12 pm    *Last comp visit: 11/2/2021    *Refills left: 0    Thanks,    MICHAEL Keys, [facility]  Lead Pharmacy Coordinator  [phone]  [email or web address]

## 2022-01-03 DIAGNOSIS — D66 SEVERE HEMOPHILIA A (H): ICD-10-CM

## 2022-01-03 DIAGNOSIS — G89.4 CHRONIC PAIN SYNDROME: ICD-10-CM

## 2022-01-03 DIAGNOSIS — Z85.51 PERSONAL HISTORY OF MALIGNANT NEOPLASM OF BLADDER: ICD-10-CM

## 2022-01-03 RX ORDER — ANTIHEMOPHILIC FACTOR (RECOMBINANT) 1000 (+/-)
KIT INTRAVENOUS
Qty: 12400 EACH | Refills: 3 | Status: SHIPPED | OUTPATIENT
Start: 2022-01-03 | End: 2022-01-17

## 2022-01-03 RX ORDER — OXYCODONE HYDROCHLORIDE 5 MG/1
5 TABLET ORAL EVERY 8 HOURS PRN
Qty: 84 TABLET | Refills: 0 | Status: SHIPPED | OUTPATIENT
Start: 2022-01-03 | End: 2022-02-10

## 2022-01-03 NOTE — TELEPHONE ENCOUNTER
*Medicare refill - please check units and qty*    Proactive Request    *Need Rx by: Monday, January 10th    *Last comp visit: 11/2/2021    *Drug 1: Kogenate at 4 doses of 3100 units    *Drug 2: Oxycodone 5 mg (we will profile this prescription for earliest fill date of 4 weeks from now 1/31/2022)    *Refills left: 0 for both    Thanks,    SALINA Keys., [facility]  Lead Pharmacy Coordinator  [phone]  [email or web address]

## 2022-01-12 NOTE — CONSULTS
Patient was not out of PACU until evening. I reviewed op note and labs. FVIII activity was good after surgery. Continue FVIII drip. I have ordered FVIII level daily for6 am the  the next few mornings. I will see patient tomorrow. Page if questions  Claudia Perez MD  Hematology     Use Enhanced Medication Counseling?: No

## 2022-01-17 DIAGNOSIS — D66 SEVERE HEMOPHILIA A (H): ICD-10-CM

## 2022-01-17 DIAGNOSIS — Z85.51 PERSONAL HISTORY OF MALIGNANT NEOPLASM OF BLADDER: ICD-10-CM

## 2022-01-17 RX ORDER — ANTIHEMOPHILIC FACTOR (RECOMBINANT) 1000 (+/-)
KIT INTRAVENOUS
Qty: 12652 EACH | Refills: 3 | Status: SHIPPED | OUTPATIENT
Start: 2022-01-17 | End: 2022-02-10

## 2022-01-17 NOTE — TELEPHONE ENCOUNTER
*Medicare refill - please check units and qty*    *Need Rx by: 01/17/22 pm (delivering Tuesday before noon)    *Last comp visit: 11/02/2021    *Drug: Kogenate at 4 doses of 3163 units    *Refills left: 3    *Notes: just updated script due to vial size changes    Thanks,    Kristel Duffy, Pharmacy [facility]  [phone]

## 2022-02-09 DIAGNOSIS — Z85.51 PERSONAL HISTORY OF MALIGNANT NEOPLASM OF BLADDER: ICD-10-CM

## 2022-02-09 DIAGNOSIS — D66 SEVERE HEMOPHILIA A (H): ICD-10-CM

## 2022-02-09 DIAGNOSIS — G89.4 CHRONIC PAIN SYNDROME: ICD-10-CM

## 2022-02-09 NOTE — TELEPHONE ENCOUNTER
*Medicare refill - please check units and qty*    *Need Rx by: Monday, February 14th    *Last comp visit: 11/2/2021    *Drug 1: Kogenate at 4 doses of 3163 units    *Drug 2: Oxycodone 5 mg (qty: 84), to keep on file for when he needs it next on 3/1/2022    *Refills left: 0    Thanks,    Sofía Mike B.S., [facility]  Lead Pharmacy Coordinator  [phone]  [email or web address]

## 2022-02-10 RX ORDER — ANTIHEMOPHILIC FACTOR (RECOMBINANT) 1000 (+/-)
KIT INTRAVENOUS
Qty: 12652 EACH | Refills: 3 | Status: SHIPPED | OUTPATIENT
Start: 2022-02-10 | End: 2022-03-21

## 2022-02-10 RX ORDER — OXYCODONE HYDROCHLORIDE 5 MG/1
5 TABLET ORAL EVERY 8 HOURS PRN
Qty: 84 TABLET | Refills: 0 | Status: SHIPPED | OUTPATIENT
Start: 2022-02-10 | End: 2022-03-01

## 2022-02-24 ENCOUNTER — TELEPHONE (OUTPATIENT)
Dept: HEMATOLOGY | Facility: CLINIC | Age: 72
End: 2022-02-24
Payer: MEDICARE

## 2022-02-24 NOTE — TELEPHONE ENCOUNTER
Orlin Alcantar  (MRN#:  [id number]) has severe Hemophilia A with a baseline factor VIII of <1%.  He is calling today with the following concern: hematuria    Orlin calling to say that he awoke with blood in his urine.  He denies dysuria or flank pain.  He is forcing fluids.     He treats himself with Kogenate 3000 units every other day.  He did treat with an extra dose so he will be treating daily for a few days.  He plans to continue to watch his symptoms and will call pharmacy to get an addition 2 doses of factor for the weekend.  He is usually dispensed a week's worth of treatment every Tuesday - this is dispensed through the [facility] pharmacy.     He had no injury that he could associate his hematuria with but is understandably concerned as he has a history of bladder cancer.  He had surgery for this 3.5 years ago and has an iliostomy. He has been cancer free since that point.  He does have a CAT scan scheduled in 2 weeks for his planned cancer surveillance.    Wished him well and asked him to keep us updated.    Margot MATAMOROSN, RN   Nurse Clinician  [facility]  Office: [phone]  Main Office: [phone] (ask to speak with a nurse)  Fax: [phone]

## 2022-02-28 DIAGNOSIS — D66 SEVERE HEMOPHILIA A (H): ICD-10-CM

## 2022-02-28 DIAGNOSIS — G89.4 CHRONIC PAIN SYNDROME: ICD-10-CM

## 2022-02-28 NOTE — TELEPHONE ENCOUNTER
Patient will be getting a 28 day supply tomorrow, March 1st. Sending request now to keep on file when he needs it in 28 days, so does not need to be done right away.    Thanks,    MICHAEL Keys, [facility]  Lead Pharmacy Coordinator  [phone]  [email or web address]

## 2022-03-01 ENCOUNTER — TELEPHONE (OUTPATIENT)
Dept: UROLOGY | Facility: CLINIC | Age: 72
End: 2022-03-01
Payer: MEDICARE

## 2022-03-01 ENCOUNTER — MEDICAL CORRESPONDENCE (OUTPATIENT)
Dept: HEALTH INFORMATION MANAGEMENT | Facility: CLINIC | Age: 72
End: 2022-03-01
Payer: MEDICARE

## 2022-03-01 RX ORDER — OXYCODONE HYDROCHLORIDE 5 MG/1
5 TABLET ORAL EVERY 8 HOURS PRN
Qty: 84 TABLET | Refills: 0 | Status: SHIPPED | OUTPATIENT
Start: 2022-03-01 | End: 2022-04-04

## 2022-03-01 NOTE — TELEPHONE ENCOUNTER
Health Call Center    Phone Message    May a detailed message be left on voicemail: yes     Reason for Call: Other: Pt called and stated that he goes through [facility] to get supplies for his Ileostomy. Pt stated that [facility] requires an annual prescription from Dr. Contreras. Please fax over orders to [facility] at [phone] or call if you have questions to [phone]. Please let pt know once annual prescription has been sent. Thanks!     Action Taken: Message routed to:  [facility] ([facility] Uro    Travel Screening: Not Applicable

## 2022-03-01 NOTE — TELEPHONE ENCOUNTER
Spoke to Nicole at [facility]. She reports that order form was faxed to clinic today about an hour ago.     Spoke to pt. Informed pt that we are waiting for the faxed form to be received at the clinic. Also advised pt to schedule annual visit with Dr. Contreras. Pt verbalized understanding and agrees to schedule.     Mackenzie Moreno RN MSN

## 2022-03-08 ENCOUNTER — LAB (OUTPATIENT)
Dept: LAB | Facility: CLINIC | Age: 72
End: 2022-03-08
Attending: PHYSICIAN ASSISTANT
Payer: MEDICARE

## 2022-03-08 ENCOUNTER — ANCILLARY PROCEDURE (OUTPATIENT)
Dept: CT IMAGING | Facility: CLINIC | Age: 72
End: 2022-03-08
Attending: PHYSICIAN ASSISTANT
Payer: MEDICARE

## 2022-03-08 VITALS
DIASTOLIC BLOOD PRESSURE: 101 MMHG | RESPIRATION RATE: 16 BRPM | BODY MASS INDEX: 21.81 KG/M2 | OXYGEN SATURATION: 98 % | SYSTOLIC BLOOD PRESSURE: 150 MMHG | HEART RATE: 91 BPM | WEIGHT: 160.8 LBS

## 2022-03-08 DIAGNOSIS — C67.9 UROTHELIAL CARCINOMA OF BLADDER (H): ICD-10-CM

## 2022-03-08 LAB
CREAT BLD-MCNC: 1.1 MG/DL (ref 0.7–1.3)
GFR SERPL CREATININE-BSD FRML MDRD: >60 ML/MIN/1.73M2

## 2022-03-08 PROCEDURE — 74177 CT ABD & PELVIS W/CONTRAST: CPT | Mod: MG | Performed by: RADIOLOGY

## 2022-03-08 PROCEDURE — 80053 COMPREHEN METABOLIC PANEL: CPT | Performed by: PATHOLOGY

## 2022-03-08 PROCEDURE — 88112 CYTOPATH CELL ENHANCE TECH: CPT | Mod: 26 | Performed by: PATHOLOGY

## 2022-03-08 PROCEDURE — 71260 CT THORAX DX C+: CPT | Mod: MG | Performed by: RADIOLOGY

## 2022-03-08 PROCEDURE — 88112 CYTOPATH CELL ENHANCE TECH: CPT | Mod: TC | Performed by: PHYSICIAN ASSISTANT

## 2022-03-08 PROCEDURE — G1004 CDSM NDSC: HCPCS | Mod: GC | Performed by: RADIOLOGY

## 2022-03-08 RX ORDER — IOPAMIDOL 755 MG/ML
97 INJECTION, SOLUTION INTRAVASCULAR ONCE
Status: COMPLETED | OUTPATIENT
Start: 2022-03-08 | End: 2022-03-08

## 2022-03-08 RX ADMIN — IOPAMIDOL 97 ML: 755 INJECTION, SOLUTION INTRAVASCULAR at 08:42

## 2022-03-08 NOTE — NURSING NOTE
Chief Complaint   Patient presents with     Labs Only     Labs drawn from previously placed PIV. Line removed by RN.     Rasta Larios RN

## 2022-03-11 ENCOUNTER — VIRTUAL VISIT (OUTPATIENT)
Dept: ONCOLOGY | Facility: CLINIC | Age: 72
End: 2022-03-11
Attending: PHYSICIAN ASSISTANT
Payer: MEDICARE

## 2022-03-11 DIAGNOSIS — C67.9 UROTHELIAL CARCINOMA OF BLADDER (H): Primary | ICD-10-CM

## 2022-03-11 PROCEDURE — 99442 PR PHYSICIAN TELEPHONE EVALUATION 11-20 MIN: CPT | Mod: 95 | Performed by: PHYSICIAN ASSISTANT

## 2022-03-11 NOTE — PROGRESS NOTES
Orlin is a 72 year old who is being evaluated via a billable telephone visit.      What phone number would you like to be contacted at? [phone]  How would you like to obtain your AVS? 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   3/26/18: Consented for the Nivolumab-Gemcitabine-Cisplatin study [id number]             3/28/18: Screening visit for study.   4/4/18; c1d1 gemcitabine  4/11/18: c1d8 gemcitabine-nivolumab  4/25/18: C2D1 Gemcitabine-Cisplatin  5/2/18: C2D8 Gemcitabine-Nivolumab  5/16/18: C3D1 Gemcitabine-Cisplatin  5/23/18: C3D8 Gemcitabine-Nivolumab  6/6/18: C4d1 Gemcitabine-Cisplatin  6/13/18: C4D8 Gemcitabine-Nivolumab     7/30/18: Radical cystoprostatectomy with bilateral pelvic LN dissection and ileal conduit. Final pathology pT0N0         HPI:  71 year old male with PMH of hemophilia, chronic hepatis C who was seen by DR. Burns for evaluation of neoadjuvant therapy with Gemcitabine Cisplatin and Nivolumab.     Patient has a history of hemophilia and has undergone several surgeries in right hip replacement, knee replacement. Hemophilia managed by Dr. Marley.   He reports recurrent hematuria for a year, initially attributed to kidney stones, cystoscopy in Feb 2018 revealed MIBC. PET-CT scan - which has revealed muscle invasive bladder right lateral wall of bladder and right  obturator lymphnode, 1.1 cm short axis.   He has been treated for HCV in the 1990s and has not had any complications. His LFTs have remained normal. His hemophilia is very well controlled on current regimen of factor replacement. He does not have any autoimmune disease and is not on immunesuppressants or steroids. His diabetes is diet controlled and he has not required any medications.  Orlin was enrolled on our neoadjuvant Nivolumab-Sibley-Cis trial and he completed 4 cycles of treatment and then underwent       Radical cystoprostatectomy and Bilateral pelvic lymph node dissection and ileal conduit on 7/30/18 with pathCR.      INTERVAL HISTORY; Orlin is doing well today for our phone visit.      Since I saw him last- no major joint bleeding, just routine small joint bleeds.  He did have hematuria once a week ago and took a dose of factor, it certainly rattle him a little with concerns of his h/o bladder cancer.  When he has prn bleeding, he usually takes extra factor and he has not had a recurrence.     No neuropathy, tinnitus or residual chemo effects.  He feels perfectly well with no concerns.  Eating/drinking well.      Adjusted to his ilial conduit well, no changes in color or odor of the urine. Takes Senna on occasion for constipation, and sometimes this discolors his urine.      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 Sees Dr Fajardo team routinely.   He has had his J & J covid vaccination initially and a booster in November 2021.     He takes factor every other day and then prn for a breakthrough bleed.          PAST MEDICAL HISTORY          Past Medical History            Past Medical History:   Diagnosis Date     Anxiety       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       Bladder tumor       Depression       DM II (diabetes mellitus, type II), controlled (H)       diet controlled     Hemophilia (H)       Type A     History of hepatitis C       treated successfully     HTN (hypertension)                CURRENT OUTPATIENT MEDICATIONS              Current Outpatient Medications   Medication Sig     acetaminophen (TYLENOL) 325 MG tablet Take 2 tablets (650 mg) by mouth every 4 hours as needed for mild pain or fever     Antihemophilic Factor, Recomb, (KOGENATE FS) 1000 units KIT Directions: Infuse 2949 units or 3185 units iv every Mon, Wed, Fri & Sun prn for breakthrough bleeding     diazepam  (VALIUM) 5 MG tablet Take 1 tablet (5 mg) by mouth daily as needed for anxiety or sleep     mirtazapine (REMERON) 45 MG tablet Take 1 tablet (45 mg) by mouth At Bedtime And may take 1/2 tablet for insomnia up to 3 times a week.     order for DME Cohesive Lurdes rings [phone]     order for DME Equipment being ordered: Walker Wheels () and Walker ()  Treatment Diagnosis: gait instability     oxyCODONE (ROXICODONE) 5 MG tablet Take 1 tablet (5 mg) by mouth every 8 hours as needed for pain      No current facility-administered medications for this visit.          EXAM: BP (!) 155/107 (BP Location: Left arm, Patient Position: Sitting, Cuff Size: Adult Regular)   Pulse 77   Temp 97.7  F (36.5  C) (Oral)   Resp 16   Wt 72.8 kg (160 lb 8 oz)   SpO2 100%   BMI 21.77 kg/m        Wt Readings from Last 4 Encounters:   09/08/21 72.8 kg (160 lb 8 oz)   11/25/20 75.3 kg (166 lb)   11/19/20 75.1 kg (165 lb 8 oz)   10/27/20 75.4 kg (166 lb 4.8 oz)            ECOG performance status of 1.   There were no vitals taken for this visit.     Voice is clear and strong. No audible stridor, wheezing, or respiratory distress. The remainder of PE was deferred due to PHE.        8/5/2020 10:38 3/1/2021 08:10 9/8/2021 08:33 3/8/2022 09:28   WBC 5.2 6.4 5.1 4.7   Hemoglobin 14.4 14.5 13.4 13.2 (L)   Hematocrit 45.0 44.5 41.1 40.8   Platelet Count 177 267 267 207   RBC Count 4.95 5.12 4.86 4.73   MCV 91 87 85 86      9/8/2021 08:33 9/8/2021 08:58 3/8/2022 08:38 3/8/2022 09:28   Sodium 143   138   Potassium 4.0   3.8   Chloride 111 (H)   106   Carbon Dioxide 24   24   Urea Nitrogen 19   28   Creatinine 1.04   1.02   Creatinine POCT  1.1 1.1    GFR Estimate 72   78   GFR, ESTIMATED POCT  >60 >60    Calcium 9.2   9.1   Anion Gap 8   8   Albumin 4.0   4.1   Protein Total 7.8   7.4   Bilirubin Total 0.5   0.8   Alkaline Phosphatase 84   75   ALT 17   19   AST 12   16                                                                                   IMPRESSION: In this patient with history of bladder cancer status post  cystoprostatectomy and ileal conduit:  1. No evidence of metastatic disease in the chest, abdomen, or pelvis.  2. Slightly increased mild hydroureteronephrosis. Urothelial  thickening and nonobstructing calyceal stones are unchanged.  3. No new or suspicious pulmonary nodules.     I have personally reviewed the examination and initial interpretation  and I agree with the findings.                     ASSESSMENT/PLAN:      ONC: 72 year old male with T2N1(solitary node)  urothelial carcinoma,currently enrolled on the clinical trial of Nivolumab with Gemcitabine and Cisplatin for MIBC, completed C4D8 on 6/13/18. He has had a complete response at radical cystectomy final pathology showing PT0 N0 of note he was T2N1 prior to neoadjuvant therapy and is 3 years out from surgery.  He is OZZIE on scans and is on SOC follow up for surveillance.       We discussed labs, urine cytology and CT CAP every 6 months, then dropping back to annual. We discussed the recent data with prognosis and that he had an excellent response to neoadjuvant therapy.  His risk of recurrence is low.       Sees Dr Contreras in Urology as well, no new issues to report except x 1 of hematuria which resolved with factor. Discussed negative urine cytology and no evidence of recurrence on scans.  Urology sees Orlin annually- he is set with Dr Contreras in one month      Hemophilia management: Per Dr. Marley, no major bleeds recently                             Encouraged him to see PCP for annual exam, colonoscopy, lipids, etc.  He has hypertension. Encouraged him to get a home cuff and start monitoring.  He previously was taking anti hypertensives and we should restart these if home BP is elevated as well.                              Received J & J vaccination and Covid booster in Nov     5 minutes spent on the date of the encounter doing chart review, review of test results and  discussion with other provider(s), in addition to 14 minutes spent on the phone with the patient.      Monica Harris PA-C

## 2022-03-18 ENCOUNTER — TELEPHONE (OUTPATIENT)
Dept: UROLOGY | Facility: CLINIC | Age: 72
End: 2022-03-18
Payer: MEDICARE

## 2022-03-18 NOTE — CONFIDENTIAL NOTE
Called the pt to ask if the pt was willing to come into the clinic. Explained that per Dr. Contreras's last note, the pt should come into the clinic. When patient asked why he should come in, writer explained that the provider wants urine sample from the pt. Pt replies that he has labs and imaging done and per Brianna Harris PA-C, all are within normal parameters. Writer gave the pt an option to do a video appt, to which the pt replied that he does not have internet connection. Asked if the pt was willing to come into the clinic, pt stated that he wants to do a telephone visit and asked if video visits are being recommended because telephone visits are not billable. Writer explained that it is true and asked for pt's preference. Pt thinks a telephone visit would suffice and has no interest in visiting the clinic. Explained that the video appt will be converted to a telephone visit. Pt was not aware that a video appt was scheduled. Pt is aware of the changed. Explained to the pt that the labs and imaging results are ready for the provider to view and that pt's preference of not coming into the clinic will be explained to the provider.

## 2022-03-21 DIAGNOSIS — Z85.51 PERSONAL HISTORY OF MALIGNANT NEOPLASM OF BLADDER: ICD-10-CM

## 2022-03-21 DIAGNOSIS — D66 SEVERE HEMOPHILIA A (H): ICD-10-CM

## 2022-03-21 RX ORDER — ANTIHEMOPHILIC FACTOR (RECOMBINANT) 1000 (+/-)
KIT INTRAVENOUS
Qty: 12652 EACH | Refills: 3 | Status: SHIPPED | OUTPATIENT
Start: 2022-03-21 | End: 2022-04-11

## 2022-03-21 NOTE — TELEPHONE ENCOUNTER
*Medicare refill - please check units and qty*    *Need Rx by: Today, March 21st by 4pm    *Last comp visit: 11/2/2021    *Drug: Kogenate at 4 doses of 3163 units    *Refills left: 0    Thanks,    MICHAEL Cameron, [facility]  Lead Pharmacy Coordinator  [facility]  [phone]

## 2022-04-04 DIAGNOSIS — D66 SEVERE HEMOPHILIA A (H): ICD-10-CM

## 2022-04-04 DIAGNOSIS — G89.4 CHRONIC PAIN SYNDROME: ICD-10-CM

## 2022-04-04 RX ORDER — OXYCODONE HYDROCHLORIDE 5 MG/1
5 TABLET ORAL EVERY 8 HOURS PRN
Qty: 84 TABLET | Refills: 0 | Status: SHIPPED | OUTPATIENT
Start: 2022-04-04 | End: 2022-04-26

## 2022-04-04 NOTE — TELEPHONE ENCOUNTER
Oxycodone Refill Request to keep on file for next fill in 3 weeks. Received 28 day supply on 3/29/2022.

## 2022-04-07 ENCOUNTER — PRE VISIT (OUTPATIENT)
Dept: UROLOGY | Facility: CLINIC | Age: 72
End: 2022-04-07
Payer: MEDICARE

## 2022-04-07 NOTE — CONFIDENTIAL NOTE
Reason for visit: Follow up w/ labs and imaging     Relevant information: Hx of bladder cancer s/p radical cystoprostatectomy 7/2018    Records/imaging/labs/orders: CT chest/abd/pelvis, cytology on 3/8    Pt called: N/A    At Rooming: Telephone

## 2022-04-09 ENCOUNTER — HEALTH MAINTENANCE LETTER (OUTPATIENT)
Age: 72
End: 2022-04-09

## 2022-04-11 DIAGNOSIS — D66 SEVERE HEMOPHILIA A (H): ICD-10-CM

## 2022-04-11 DIAGNOSIS — Z85.51 PERSONAL HISTORY OF MALIGNANT NEOPLASM OF BLADDER: ICD-10-CM

## 2022-04-11 RX ORDER — ANTIHEMOPHILIC FACTOR (RECOMBINANT) 1000 (+/-)
KIT INTRAVENOUS
Qty: 12716 EACH | Refills: 3 | Status: SHIPPED | OUTPATIENT
Start: 2022-04-11 | End: 2022-04-18

## 2022-04-11 NOTE — TELEPHONE ENCOUNTER
*Medicare refill - please check units and qty*    *Need Rx by: 4/11/22 afternoon (pt wants delivered Tuesday before noon)    *Last comp visit: 11/2/2021    *Drug: Kogenate at 4 doses of 3163 or 3227 units    *Refills left: 1    *Notes: pt will be getting 3 doses of 3163 (1072+2091) and 1 dose of 3227    Thanks,    Kristel Duffy, Pharmacy [facility]  [phone]

## 2022-04-12 ENCOUNTER — VIRTUAL VISIT (OUTPATIENT)
Dept: UROLOGY | Facility: CLINIC | Age: 72
End: 2022-04-12
Payer: MEDICARE

## 2022-04-12 DIAGNOSIS — C67.8 MALIGNANT NEOPLASM OF OVERLAPPING SITES OF BLADDER (H): Primary | ICD-10-CM

## 2022-04-12 PROCEDURE — 99441 PR PHYSICIAN TELEPHONE EVALUATION 5-10 MIN: CPT | Mod: 95 | Performed by: UROLOGY

## 2022-04-12 NOTE — PROGRESS NOTES
Orlin Alcantar is a 72 year old male who is being evaluated via a billable telephone visit.      What phone number would you like to be contacted at? [phone]   How would you like to obtain your AVS? Mail a copy    Chief Complaint  It was my pleasure to speak with Orlin Alcantar who is a 72 year old male for follow-up of bladder cancer.      HPI  Orlin Alcantar is a 72 year old male with a history of hemophilia and T2 bladder cancer s/p radical cystoprostatectomy with IC and BPLND on 2018 with Dr. Shea. Here for routine follow-up. No evidence of recurrence. No recent hematuria. No flank pain, abdominal pain, or bone pain.    Labs 3/8/2022  HGB 13.2  Cr 1.02  Cytology Neg     TNM Stage: T2a, N0, M0  Number of nodes removed: 23  Number of positive nodes: 0  Surgical Margins Positive: No  Grade: High   Histology: urothelial without secondary histology.    Date of Initial Diagnosis: 2018 TURBT with Dr. Liao   Stage of Initial Diagnosis: invasive urothelial carcinoma   Grade at Initial Diagnosis: high grade   Site of First Diagnosis: R UO was involved in the resection   Any Recurrence?: No  Intravesical Treatments: gemcitabine initiated on 2018, 4 cycles of gemcitabine-nivolumab 2018-2018 and 4 cycles of Gemcitabine-Cisplatin from 2018-2018.   Date of Last Upper Tract Imagin2019   Any evidence of hydronephrosis? Mild    CT abd/pelvis 3/8/2022  IMPRESSION: In this patient with history of bladder cancer status post  Cysto/prostatectomy and ileal conduit:  1. No evidence of metastatic disease in the chest, abdomen, or pelvis.  2. Slightly increased mild hydroureteronephrosis. Urothelial  thickening and nonobstructing calyceal stones are unchanged.  3. No new or suspicious pulmonary nodules.    I have reviewed and updated the patient's Past Medical History, Social History, Family History and Medication List.    Review of Systems   Constitutional, HEENT, cardiovascular, pulmonary, gi and  gu systems are negative, except as otherwise noted.    ALLERGIES  Aspirin    Vitals:  No vitals were obtained today due to virtual visit.    Physical Exam   healthy, alert and no distress  PSYCH: Alert and oriented times 3; coherent speech, normal   rate and volume, able to articulate logical thoughts, able   to abstract reason, no tangential thoughts, no hallucinations   or delusions  His affect is normal and pleasant  RESP: No cough, no audible wheezing, able to talk in full sentences  Remainder of exam unable to be completed due to telephone visits      Outside and Past Medical records:    Review of the result(s) of each unique test - CT, cytology, BMP    ASSESSMENT and PLAN  Orlin Alcantar  is a 72 year old male with a history of T2 bladder cancer s/p neoadjuvant gem-cis-nivo and radical cystoprostatectomy with IC and BPLND on 07/30/2018 with Dr. Shea. No evidence of recurrence and he overall is doing well.     Plan:  - Scans per Brianna Harris PA-C in 6 months  - Will continue surveillance with medical oncology and follow-up with me on an as needed basis    Phone call duration: 10 minutes    15 total minutes spent on the date of the encounter including direct interaction with the patient, performing chart review, documentation and further activities as noted above.    Jamir Contreras MD  Urology  [facility]

## 2022-04-26 DIAGNOSIS — G89.4 CHRONIC PAIN SYNDROME: ICD-10-CM

## 2022-04-26 DIAGNOSIS — D66 SEVERE HEMOPHILIA A (H): ICD-10-CM

## 2022-04-26 RX ORDER — OXYCODONE HYDROCHLORIDE 5 MG/1
5 TABLET ORAL EVERY 8 HOURS PRN
Qty: 84 TABLET | Refills: 0 | Status: SHIPPED | OUTPATIENT
Start: 2022-04-26 | End: 2022-05-23

## 2022-04-26 NOTE — TELEPHONE ENCOUNTER
Requesting Oxycodone 5 mg rx to put on file until he is due for it in 4 weeks. Patient received a 28 day supply today.

## 2022-05-05 NOTE — NURSING NOTE
Chief Complaint   Patient presents with     Recheck Medication     Mood disorder    Reviewed allergies, medications, pharmacy and smoking status.  Obtained weight, pain level, blood pressure and heart rate        Solaraze Counseling:  I discussed with the patient the risks of Solaraze including but not limited to erythema, scaling, itching, weeping, crusting, and pain.

## 2022-05-23 ENCOUNTER — OFFICE VISIT (OUTPATIENT)
Dept: FAMILY MEDICINE | Facility: CLINIC | Age: 72
End: 2022-05-23
Payer: MEDICARE

## 2022-05-23 VITALS
DIASTOLIC BLOOD PRESSURE: 96 MMHG | HEART RATE: 75 BPM | RESPIRATION RATE: 18 BRPM | SYSTOLIC BLOOD PRESSURE: 159 MMHG | TEMPERATURE: 97.8 F | WEIGHT: 156 LBS | BODY MASS INDEX: 21.16 KG/M2 | OXYGEN SATURATION: 98 %

## 2022-05-23 DIAGNOSIS — G89.4 CHRONIC PAIN SYNDROME: ICD-10-CM

## 2022-05-23 DIAGNOSIS — F51.04 PSYCHOPHYSIOLOGIC INSOMNIA: ICD-10-CM

## 2022-05-23 DIAGNOSIS — I10 HYPERTENSION GOAL BP (BLOOD PRESSURE) < 140/90: Primary | ICD-10-CM

## 2022-05-23 DIAGNOSIS — F41.1 ANXIETY STATE: ICD-10-CM

## 2022-05-23 DIAGNOSIS — D66 SEVERE HEMOPHILIA A (H): ICD-10-CM

## 2022-05-23 DIAGNOSIS — E11.9 TYPE 2 DIABETES MELLITUS WITHOUT COMPLICATION, UNSPECIFIED WHETHER LONG TERM INSULIN USE (H): ICD-10-CM

## 2022-05-23 PROCEDURE — 96127 BRIEF EMOTIONAL/BEHAV ASSMT: CPT | Mod: 59 | Performed by: FAMILY MEDICINE

## 2022-05-23 PROCEDURE — 99214 OFFICE O/P EST MOD 30 MIN: CPT | Performed by: FAMILY MEDICINE

## 2022-05-23 RX ORDER — LISINOPRIL 20 MG/1
TABLET ORAL
Qty: 85 TABLET | Refills: 0 | Status: SHIPPED | OUTPATIENT
Start: 2022-05-23 | End: 2022-08-23

## 2022-05-23 RX ORDER — MIRTAZAPINE 30 MG/1
30 TABLET, FILM COATED ORAL AT BEDTIME
Qty: 90 TABLET | Refills: 1 | Status: SHIPPED | OUTPATIENT
Start: 2022-05-23 | End: 2022-10-21

## 2022-05-23 RX ORDER — OXYCODONE HYDROCHLORIDE 5 MG/1
5 TABLET ORAL EVERY 8 HOURS PRN
Qty: 84 TABLET | Refills: 0 | Status: SHIPPED | OUTPATIENT
Start: 2022-05-23 | End: 2022-06-28

## 2022-05-23 ASSESSMENT — ANXIETY QUESTIONNAIRES
GAD7 TOTAL SCORE: 0
GAD7 TOTAL SCORE: 0
7. FEELING AFRAID AS IF SOMETHING AWFUL MIGHT HAPPEN: NOT AT ALL
5. BEING SO RESTLESS THAT IT IS HARD TO SIT STILL: NOT AT ALL
8. IF YOU CHECKED OFF ANY PROBLEMS, HOW DIFFICULT HAVE THESE MADE IT FOR YOU TO DO YOUR WORK, TAKE CARE OF THINGS AT HOME, OR GET ALONG WITH OTHER PEOPLE?: SOMEWHAT DIFFICULT
1. FEELING NERVOUS, ANXIOUS, OR ON EDGE: NOT AT ALL
4. TROUBLE RELAXING: NOT AT ALL
GAD7 TOTAL SCORE: 0
7. FEELING AFRAID AS IF SOMETHING AWFUL MIGHT HAPPEN: NOT AT ALL
6. BECOMING EASILY ANNOYED OR IRRITABLE: NOT AT ALL
3. WORRYING TOO MUCH ABOUT DIFFERENT THINGS: NOT AT ALL
2. NOT BEING ABLE TO STOP OR CONTROL WORRYING: NOT AT ALL

## 2022-05-23 ASSESSMENT — PATIENT HEALTH QUESTIONNAIRE - PHQ9
SUM OF ALL RESPONSES TO PHQ QUESTIONS 1-9: 2
10. IF YOU CHECKED OFF ANY PROBLEMS, HOW DIFFICULT HAVE THESE PROBLEMS MADE IT FOR YOU TO DO YOUR WORK, TAKE CARE OF THINGS AT HOME, OR GET ALONG WITH OTHER PEOPLE: SOMEWHAT DIFFICULT
SUM OF ALL RESPONSES TO PHQ QUESTIONS 1-9: 2

## 2022-05-23 NOTE — TELEPHONE ENCOUNTER
Using the last oxycodone script tomorrow for pt; requesting a refill to have on hand for next month.    Thank you!  Kristel Duffy, Pharmacy [facility]  [phone]

## 2022-05-23 NOTE — PROGRESS NOTES
Assessment & Plan     Hypertension goal BP (blood pressure) < 140/90  History of high blood pressure.  Was on 40 mg of lisinopril and amlodipine.  Blood pressure was running low and with weight loss medications were discontinued.  Taking his blood pressure regularly at home and those readings are running high.  We agreed to start with 10 mg and increase it to 20.  He is a reliable historian and he can go up to 40 mg depending upon his home readings.  He can call for refill with new dose and home readings.  Follow-up in 6 months.  Obtain BMP, microalbumin and A1c at that time.  - lisinopril (ZESTRIL) 20 MG tablet; Take 0.5 tablets (10 mg) by mouth daily for 10 days, THEN 1 tablet (20 mg) daily.    Anxiety state  History of malignancy.  Feeling well overall with his anxiety as cancer is in remission.  - mirtazapine (REMERON) 30 MG tablet; Take 1 tablet (30 mg) by mouth At Bedtime    Psychophysiologic insomnia  Longstanding use.  Tolerating it very well and helping him a lot with his sleep.  Wishes to continue the same.  His psychiatric provider left but I am okay with filling this prescription.  We discussed about side effects.  - mirtazapine (REMERON) 30 MG tablet; Take 1 tablet (30 mg) by mouth At Bedtime    Type 2 diabetes mellitus without complication, unspecified whether long term insulin use (H)  In remission as per patient.  No recent A1c but can be obtained with his labs next time.                   Return in about 6 months (around 11/23/2022).    Twin Le MD, MD  [facility]    Vito Ordaz is a 72 year old who presents for the following health issues     History of Present Illness       Hypertension: He presents for follow up of hypertension.  He does check blood pressure  regularly outside of the clinic. Outside blood pressures have been over 140/90. He follows a low salt diet.      Today's PHQ-9         PHQ-9 Total Score: 2    PHQ-9 Q9 Thoughts of better  off dead/self-harm past 2 weeks :   Not at all    How difficult have these problems made it for you to do your work, take care of things at home, or get along with other people: Somewhat difficult  Today's RATNA-7 Score: 0     HTN and insomnia.     HTN - saw PCP. Lisinopril and amlodipine as bp is low.   135- 155/15sy581.    Insomnia - mirtazapine from psych clinic. Psych provider left and could not get rx.   When dealing with cancer - more anxiety. Doing well now.   Trouble with staying a sleep - hard to sleep once wake up. No trouble falling a sleep.   Last month was last use.     1989 type 2 diabetes. Was diet controlled and no issue since weight loss.     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        Review of Systems         Objective    BP (!) 159/96 (BP Location: Right arm, Patient Position: Sitting, Cuff Size: Adult Regular)   Pulse 75   Temp 97.8  F (36.6  C) (Temporal)   Resp 18   Wt 70.8 kg (156 lb)   SpO2 98%   BMI 21.16 kg/m    Body mass index is 21.16 kg/m .  Physical Exam

## 2022-05-31 DIAGNOSIS — Z85.51 PERSONAL HISTORY OF MALIGNANT NEOPLASM OF BLADDER: ICD-10-CM

## 2022-05-31 DIAGNOSIS — D66 SEVERE HEMOPHILIA A (H): ICD-10-CM

## 2022-05-31 RX ORDER — ANTIHEMOPHILIC FACTOR (RECOMBINANT) 1000 (+/-)
KIT INTRAVENOUS
Qty: 13288 EACH | Refills: 3 | Status: SHIPPED | OUTPATIENT
Start: 2022-05-31 | End: 2022-06-29

## 2022-05-31 NOTE — TELEPHONE ENCOUNTER
*Medicare refill - please check units and qty*    *Need Rx by: Wednesday, June 1st by 3 pm    *Last comp visit: 11/2/2021    *Drug: Kogenate at 4 doses of 3322 units    *Refills left: 1    *Notes: Patient only requests 1 week fills at a time due to low storage availability     Thanks,    MICHAEL Cameron, [facility]  Lead Pharmacy Coordinator  [facility]  [phone]

## 2022-06-04 ENCOUNTER — HEALTH MAINTENANCE LETTER (OUTPATIENT)
Age: 72
End: 2022-06-04

## 2022-06-28 DIAGNOSIS — D66 SEVERE HEMOPHILIA A (H): ICD-10-CM

## 2022-06-28 DIAGNOSIS — G89.4 CHRONIC PAIN SYNDROME: ICD-10-CM

## 2022-06-28 RX ORDER — OXYCODONE HYDROCHLORIDE 5 MG/1
5 TABLET ORAL EVERY 8 HOURS PRN
Qty: 84 TABLET | Refills: 0 | Status: SHIPPED | OUTPATIENT
Start: 2022-06-28 | End: 2022-07-23

## 2022-06-28 NOTE — TELEPHONE ENCOUNTER
Patient last filled a 28 day supply on Tuesday, June 21st. Sending refill request to keep on file for his next fill. Thanks!    SALINA Cameron., [facility]  Lead Pharmacy Coordinator  [facility]  [phone]

## 2022-06-29 DIAGNOSIS — D66 SEVERE HEMOPHILIA A (H): ICD-10-CM

## 2022-06-29 DIAGNOSIS — Z85.51 PERSONAL HISTORY OF MALIGNANT NEOPLASM OF BLADDER: ICD-10-CM

## 2022-06-29 RX ORDER — ANTIHEMOPHILIC FACTOR (RECOMBINANT) 1000 (+/-)
KIT INTRAVENOUS
Qty: 13288 EACH | Refills: 0 | Status: SHIPPED | OUTPATIENT
Start: 2022-06-29 | End: 2022-07-11

## 2022-06-29 NOTE — TELEPHONE ENCOUNTER
*Medicare refill - please check units and qty*    *Need Rx by: Tuesday, July 5th     *Last comp visit: 11/2/2021    *Drug: Kogenate at 4 doses of 3322 units    *Refills left: 0    Thanks,    MICHAEL Cameron, [facility]  Lead Pharmacy Coordinator  [facility]  [phone]

## 2022-07-22 DIAGNOSIS — D66 SEVERE HEMOPHILIA A (H): ICD-10-CM

## 2022-07-22 DIAGNOSIS — G89.4 CHRONIC PAIN SYNDROME: ICD-10-CM

## 2022-07-22 NOTE — TELEPHONE ENCOUNTER
Patient received 28 day supply on 7/19/2022, sending refill request to keep on file until he's due in 4 weeks.     Thanks,    MICHAEL Cameron, [facility]  Lead Pharmacy Coordinator  [facility]  [phone]

## 2022-07-23 RX ORDER — OXYCODONE HYDROCHLORIDE 5 MG/1
5 TABLET ORAL EVERY 8 HOURS PRN
Qty: 84 TABLET | Refills: 0 | Status: SHIPPED | OUTPATIENT
Start: 2022-07-23 | End: 2022-08-15

## 2022-07-25 ENCOUNTER — TELEPHONE (OUTPATIENT)
Dept: HEMATOLOGY | Facility: CLINIC | Age: 72
End: 2022-07-25

## 2022-07-25 NOTE — TELEPHONE ENCOUNTER
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  He is currently on Kogenate infusions every other day to prevent bleeding.  Kogenate will not longer be made after this year.  Per Dr. Jiang, he would suggest Kovaltry brand factor VIII if Orlin would like to stay with a similar product.  As it is an extended half life product, Orlin's treatment routine may need to be changed.  Orlin would like to stay on the Kogenate for now and have a discussion with Dr. Jiang at his Comp clinic visit (now scheduled for 10.18.2022 @ 3200).  If Kogenate supply is depleted before that point, and phone conversation will be arranged.    Margot DAS, RN   Nurse Clinician  [facility]  Office: [phone]  Main Office: [phone] (ask to speak with a nurse)  Fax: [phone]

## 2022-07-30 ENCOUNTER — HEALTH MAINTENANCE LETTER (OUTPATIENT)
Age: 72
End: 2022-07-30

## 2022-08-15 DIAGNOSIS — G89.4 CHRONIC PAIN SYNDROME: ICD-10-CM

## 2022-08-15 DIAGNOSIS — Z85.51 PERSONAL HISTORY OF MALIGNANT NEOPLASM OF BLADDER: ICD-10-CM

## 2022-08-15 DIAGNOSIS — D66 SEVERE HEMOPHILIA A (H): ICD-10-CM

## 2022-08-15 RX ORDER — ANTIHEMOPHILIC FACTOR (RECOMBINANT) 1000 (+/-)
KIT INTRAVENOUS
Qty: 13288 EACH | Refills: 1 | Status: SHIPPED | OUTPATIENT
Start: 2022-08-15 | End: 2022-08-29

## 2022-08-15 RX ORDER — OXYCODONE HYDROCHLORIDE 5 MG/1
5 TABLET ORAL EVERY 8 HOURS PRN
Qty: 84 TABLET | Refills: 0 | Status: SHIPPED | OUTPATIENT
Start: 2022-08-15 | End: 2022-10-10

## 2022-08-15 NOTE — TELEPHONE ENCOUNTER
*Medicare refill - please check units and qty*    *Need Rx by: 08/15/22 pm    *Last comp visit: 11/2/21    *Drug: Kogenate at 4 doses of 3322 units    *Refills left: 0    *Notes: also pt is requesting Oxycodone; we will use the one on file and are now requesting a refill of oxycodone for next month to keep on file.    Thanks,    Kristel Duffy, Pharmacy Coordinator   [facility]  [phone]

## 2022-08-18 DIAGNOSIS — I10 HYPERTENSION GOAL BP (BLOOD PRESSURE) < 140/90: ICD-10-CM

## 2022-08-23 RX ORDER — LISINOPRIL 20 MG/1
20 TABLET ORAL DAILY
Qty: 90 TABLET | Refills: 2 | Status: SHIPPED | OUTPATIENT
Start: 2022-08-23 | End: 2023-01-16

## 2022-08-23 NOTE — TELEPHONE ENCOUNTER
--I called patient and he says he has been taking 20 mg per day for months now, and has not been taking his blood pressure very often in last few months.  --He says the few times he has taken it, it has been 130/85.  --I will route back to you  as I am guessing you will keep him on 20 mg per day but I am not sure.    --Patient has no access to his [employer], so I suggested he close that account and gave him the phone number.

## 2022-08-23 NOTE — TELEPHONE ENCOUNTER
Can you check with him about his home readings and current dose that he is using?  Enter home vitals in patient reported vitals and OK to adjust rx and sign rx pending his response.

## 2022-08-23 NOTE — TELEPHONE ENCOUNTER
Routing refill request to provider for review/approval because:  --Blood pressure not at goal.      --Last Written Prescription Date:    Disp Refills Start End HORACE   lisinopril (ZESTRIL) 20 MG tablet 85 tablet 0 5/23/2022 8/21/2022 --   Sig - Route: Take 0.5 tablets (10 mg) by mouth daily for 10 days, THEN 1 tablet (20 mg) daily         --Last visit at [facility]:  5/23/2022 Rey for HTN +3.- We agreed to start with 10 mg and increase it to 20.  He is a reliable historian and he can go up to 40 mg depending upon his home readings.  He can call for refill with new dose and home readings.  Follow-up in 6 months    --Future Visit: none with FP.        BP Readings from Last 6 Encounters:   05/23/22 (!) 159/96   03/08/22 (!) 150/101   11/02/21 (!) 130/106   09/08/21 (!) 155/107   11/25/20 (!) 147/93   11/19/20 (!) 145/92

## 2022-08-29 DIAGNOSIS — D66 SEVERE HEMOPHILIA A (H): ICD-10-CM

## 2022-08-29 DIAGNOSIS — Z85.51 PERSONAL HISTORY OF MALIGNANT NEOPLASM OF BLADDER: ICD-10-CM

## 2022-08-29 RX ORDER — ANTIHEMOPHILIC FACTOR (RECOMBINANT) 1000 (+/-)
KIT INTRAVENOUS
Qty: 13288 EACH | Refills: 1 | Status: SHIPPED | OUTPATIENT
Start: 2022-08-29 | End: 2022-09-12

## 2022-08-29 NOTE — TELEPHONE ENCOUNTER
*Medicare refill - please check units and qty*    *Need Rx by: 08/29/2022    *Last comp visit: 11/2/2021    *Drug: Kogenate at 4 doses of 3322 units    *Refills left: 0    *Notes: 0    Thanks,    Kristel Duffy, Pharmacy Coordinator   [facility]  [phone]

## 2022-09-12 ENCOUNTER — TELEPHONE (OUTPATIENT)
Dept: HEMATOLOGY | Facility: CLINIC | Age: 72
End: 2022-09-12

## 2022-09-12 DIAGNOSIS — Z85.51 PERSONAL HISTORY OF MALIGNANT NEOPLASM OF BLADDER: ICD-10-CM

## 2022-09-12 DIAGNOSIS — D66 SEVERE HEMOPHILIA A (H): ICD-10-CM

## 2022-09-12 RX ORDER — ANTIHEMOPHILIC FACTOR (RECOMBINANT) 1000 (+/-)
KIT INTRAVENOUS
Qty: 13288 EACH | Refills: 0 | Status: SHIPPED | OUTPATIENT
Start: 2022-09-12 | End: 2022-09-19

## 2022-09-14 NOTE — TELEPHONE ENCOUNTER
[facility]  Brief Social Work Note    Received call from patient who stated he had been trying, unsuccessfully, for over 30 minutes to try to cancel follow-up appointments (at another clinic).  He was seeking assistance with cancellation.  Explained our clinic can not cancel other clinic appointment slots.  Verified phone numbers, and provided patient with updated contact information for cancellation.  He stated he would call back if these numbers did not work.    Analilia Rashid, MARIN, LICSW, ACM  Clinical   [facility]  Phone: [phone]

## 2022-09-19 DIAGNOSIS — D66 SEVERE HEMOPHILIA A (H): ICD-10-CM

## 2022-09-19 DIAGNOSIS — Z85.51 PERSONAL HISTORY OF MALIGNANT NEOPLASM OF BLADDER: ICD-10-CM

## 2022-09-19 RX ORDER — ANTIHEMOPHILIC FACTOR (RECOMBINANT) 1000 (+/-)
KIT INTRAVENOUS
Qty: 13288 EACH | Refills: 3 | Status: SHIPPED | OUTPATIENT
Start: 2022-09-19 | End: 2022-10-03

## 2022-09-19 NOTE — TELEPHONE ENCOUNTER
*Medicare refill - please check units and qty*    *Need Rx by: Today, September 19th by 3:30 pm    *Last comp visit: 11/2/2021     *Drug: Kogenate at 4 doses of 3322 units (refills would be appreciated or we do need to send a refill request every week)    *Refills left: 0    *Notes: Scheduled for comp visit on 10/18/2022,     Thanks,    MICHAEL Cameron, [facility]  Lead Pharmacy Coordinator  [facility]  [phone]

## 2022-09-22 ENCOUNTER — ANCILLARY PROCEDURE (OUTPATIENT)
Dept: CT IMAGING | Facility: CLINIC | Age: 72
End: 2022-09-22
Attending: PHYSICIAN ASSISTANT
Payer: MEDICARE

## 2022-09-22 ENCOUNTER — LAB (OUTPATIENT)
Dept: LAB | Facility: CLINIC | Age: 72
End: 2022-09-22
Payer: MEDICARE

## 2022-09-22 VITALS
WEIGHT: 159.4 LBS | DIASTOLIC BLOOD PRESSURE: 91 MMHG | TEMPERATURE: 97.7 F | OXYGEN SATURATION: 98 % | BODY MASS INDEX: 21.62 KG/M2 | SYSTOLIC BLOOD PRESSURE: 172 MMHG | HEART RATE: 80 BPM | RESPIRATION RATE: 16 BRPM

## 2022-09-22 DIAGNOSIS — C67.9 UROTHELIAL CARCINOMA OF BLADDER (H): ICD-10-CM

## 2022-09-22 LAB
ALBUMIN SERPL BCG-MCNC: 4.9 G/DL (ref 3.5–5.2)
ALP SERPL-CCNC: 98 U/L (ref 40–129)
ALT SERPL W P-5'-P-CCNC: 9 U/L (ref 10–50)
ANION GAP SERPL CALCULATED.3IONS-SCNC: 13 MMOL/L (ref 7–15)
AST SERPL W P-5'-P-CCNC: 19 U/L (ref 10–50)
BASOPHILS # BLD AUTO: 0.1 10E3/UL (ref 0–0.2)
BASOPHILS NFR BLD AUTO: 1 %
BILIRUB SERPL-MCNC: 0.7 MG/DL
BUN SERPL-MCNC: 25.2 MG/DL (ref 8–23)
CALCIUM SERPL-MCNC: 10.4 MG/DL (ref 8.8–10.2)
CHLORIDE SERPL-SCNC: 104 MMOL/L (ref 98–107)
CREAT BLD-MCNC: 1.1 MG/DL (ref 0.7–1.3)
CREAT SERPL-MCNC: 1.04 MG/DL (ref 0.67–1.17)
DEPRECATED HCO3 PLAS-SCNC: 23 MMOL/L (ref 22–29)
EOSINOPHIL # BLD AUTO: 0.2 10E3/UL (ref 0–0.7)
EOSINOPHIL NFR BLD AUTO: 5 %
ERYTHROCYTE [DISTWIDTH] IN BLOOD BY AUTOMATED COUNT: 14.4 % (ref 10–15)
GFR SERPL CREATININE-BSD FRML MDRD: 76 ML/MIN/1.73M2
GFR SERPL CREATININE-BSD FRML MDRD: >60 ML/MIN/1.73M2
GLUCOSE SERPL-MCNC: 104 MG/DL (ref 70–99)
HCT VFR BLD AUTO: 42.9 % (ref 40–53)
HGB BLD-MCNC: 13.9 G/DL (ref 13.3–17.7)
IMM GRANULOCYTES # BLD: 0 10E3/UL
IMM GRANULOCYTES NFR BLD: 0 %
LYMPHOCYTES # BLD AUTO: 1.2 10E3/UL (ref 0.8–5.3)
LYMPHOCYTES NFR BLD AUTO: 24 %
MCH RBC QN AUTO: 27.7 PG (ref 26.5–33)
MCHC RBC AUTO-ENTMCNC: 32.4 G/DL (ref 31.5–36.5)
MCV RBC AUTO: 86 FL (ref 78–100)
MONOCYTES # BLD AUTO: 0.5 10E3/UL (ref 0–1.3)
MONOCYTES NFR BLD AUTO: 9 %
NEUTROPHILS # BLD AUTO: 3.1 10E3/UL (ref 1.6–8.3)
NEUTROPHILS NFR BLD AUTO: 61 %
NRBC # BLD AUTO: 0 10E3/UL
NRBC BLD AUTO-RTO: 0 /100
PLATELET # BLD AUTO: 230 10E3/UL (ref 150–450)
POTASSIUM SERPL-SCNC: 4.1 MMOL/L (ref 3.4–5.3)
PROT SERPL-MCNC: 7.9 G/DL (ref 6.4–8.3)
RBC # BLD AUTO: 5.02 10E6/UL (ref 4.4–5.9)
SODIUM SERPL-SCNC: 140 MMOL/L (ref 136–145)
WBC # BLD AUTO: 5.1 10E3/UL (ref 4–11)

## 2022-09-22 PROCEDURE — 74177 CT ABD & PELVIS W/CONTRAST: CPT | Mod: MG | Performed by: RADIOLOGY

## 2022-09-22 PROCEDURE — 999N000127 HC STATISTIC PERIPHERAL IV START W US GUIDANCE

## 2022-09-22 PROCEDURE — 80053 COMPREHEN METABOLIC PANEL: CPT | Performed by: PATHOLOGY

## 2022-09-22 PROCEDURE — 85025 COMPLETE CBC W/AUTO DIFF WBC: CPT

## 2022-09-22 PROCEDURE — 36415 COLL VENOUS BLD VENIPUNCTURE: CPT

## 2022-09-22 PROCEDURE — 80053 COMPREHEN METABOLIC PANEL: CPT

## 2022-09-22 PROCEDURE — 999N000285 HC STATISTIC VASC ACCESS LAB DRAW WITH PIV START

## 2022-09-22 PROCEDURE — 71260 CT THORAX DX C+: CPT | Mod: MG | Performed by: RADIOLOGY

## 2022-09-22 PROCEDURE — G1004 CDSM NDSC: HCPCS | Performed by: RADIOLOGY

## 2022-09-22 RX ORDER — IOPAMIDOL 755 MG/ML
97 INJECTION, SOLUTION INTRAVASCULAR ONCE
Status: COMPLETED | OUTPATIENT
Start: 2022-09-22 | End: 2022-09-22

## 2022-09-22 RX ADMIN — IOPAMIDOL 97 ML: 755 INJECTION, SOLUTION INTRAVASCULAR at 08:16

## 2022-09-22 ASSESSMENT — PAIN SCALES - GENERAL: PAINLEVEL: NO PAIN (0)

## 2022-09-22 NOTE — NURSING NOTE
Chief Complaint   Patient presents with     Blood Draw     Labs drawn with PIV start by vascular access. Vitals taken.     Labs drawn with PIV start by vascular access. Pt tolerated well. Vitals taken.    Sakina Toussaint RN

## 2022-09-27 ENCOUNTER — VIRTUAL VISIT (OUTPATIENT)
Dept: ONCOLOGY | Facility: CLINIC | Age: 72
End: 2022-09-27
Attending: PHYSICIAN ASSISTANT
Payer: MEDICARE

## 2022-09-27 DIAGNOSIS — C67.9 UROTHELIAL CARCINOMA OF BLADDER (H): Primary | ICD-10-CM

## 2022-09-27 PROCEDURE — 99442 PR PHYSICIAN TELEPHONE EVALUATION 11-20 MIN: CPT | Mod: 95 | Performed by: PHYSICIAN ASSISTANT

## 2022-09-27 NOTE — PROGRESS NOTES
Orlin is a 72 year old who is being evaluated via a billable telephone visit.      What phone number would you like to be contacted at? [phone]   How would you like to obtain your AVS? Jessica Raines  [facility]  FOLLOW-UP VISIT NOTE  Sep 27, 2022                         REASON FOR VISIT: bladder cancer, 6 month follow up     ONCOLOGY TREATMENT HISTORY: Muscle Invasive Urothelial Carcinoma, soilitary, obturator node, no distant metastases.   3/26/18: Consented for the Nivolumab-Gemcitabine-Cisplatin study [id number]             3/28/18: Screening visit for study.   4/4/18; c1d1 gemcitabine  4/11/18: c1d8 gemcitabine-nivolumab  4/25/18: C2D1 Gemcitabine-Cisplatin  5/2/18: C2D8 Gemcitabine-Nivolumab  5/16/18: C3D1 Gemcitabine-Cisplatin  5/23/18: C3D8 Gemcitabine-Nivolumab  6/6/18: C4d1 Gemcitabine-Cisplatin  6/13/18: C4D8 Gemcitabine-Nivolumab     7/30/18: Radical cystoprostatectomy with bilateral pelvic LN dissection and ileal conduit. Final pathology pT0N0         HPI:  72 year old male with PMH of hemophilia, chronic hepatis C who was seen by DR. Burns for evaluation of neoadjuvant therapy with Gemcitabine Cisplatin and Nivolumab.     Patient has a history of hemophilia and has undergone several surgeries in right hip replacement, knee replacement. Hemophilia managed by Dr. Marley.   He reports recurrent hematuria for a year, initially attributed to kidney stones, cystoscopy in Feb 2018 revealed MIBC. PET-CT scan - which has revealed muscle invasive bladder right lateral wall of bladder and right  obturator lymphnode, 1.1 cm short axis.   He has been treated for HCV in the 1990s and has not had any complications. His LFTs have remained normal. His hemophilia is very well controlled on current regimen of factor replacement. He does not have any autoimmune disease and is not on immunesuppressants or steroids. His diabetes is diet controlled and he has not required any medications. Orlin was  enrolled on our neoadjuvant Nivolumab-[place]-Cis trial and he completed 4 cycles of treatment and then underwent       Radical cystoprostatectomy and Bilateral pelvic lymph node dissection and ileal conduit on 7/30/18 with pathCR.      INTERVAL HISTORY; Orlin is doing well today for our phone visit.      Since I saw him last- no major joint bleeding, just routine small joint bleeds.  He did have hematuria last spring- this has not occurred again. When he has prn bleeding, he usually takes extra factor and he has not had a recurrence.      No neuropathy, tinnitus or residual chemo effects.  He feels perfectly well with no concerns.  Eating/drinking well.      Adjusted to his ilial conduit well, no changes in color or odor of the urine. Takes Senna on occasion for constipation, and sometimes this discolors his urine.      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 Sees Dr Fajardo team routinely.   He has had his J & J covid vaccination initially and a booster in November 2021 and March of 2022.     He takes factor every other day and then prn for a breakthrough bleed.          PAST MEDICAL HISTORY                  Past Medical History              Past Medical History:   Diagnosis Date     Anxiety       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       Bladder tumor       Depression       DM II (diabetes mellitus, type II), controlled (H)       diet controlled     Hemophilia (H)       Type A     History of hepatitis C       treated successfully     HTN (hypertension)                CURRENT OUTPATIENT MEDICATIONS              Current Outpatient Medications   Medication Sig     acetaminophen (TYLENOL) 325 MG tablet Take 2 tablets (650 mg) by mouth every 4 hours as needed for mild pain or fever     Antihemophilic Factor, Recomb, (KOGENATE FS) 1000 units KIT Directions: Infuse 2949 units or 3185 units iv every Mon, Wed, Fri & Sun prn for breakthrough bleeding     diazepam (VALIUM) 5 MG tablet Take 1 tablet (5 mg) by mouth daily  as needed for anxiety or sleep     mirtazapine (REMERON) 45 MG tablet Take 1 tablet (45 mg) by mouth At Bedtime And may take 1/2 tablet for insomnia up to 3 times a week.     order for DME Cohesive Lurdes rings [phone]     order for DME Equipment being ordered: Walker Wheels () and Walker ()  Treatment Diagnosis: gait instability     oxyCODONE (ROXICODONE) 5 MG tablet Take 1 tablet (5 mg) by mouth every 8 hours as needed for pain      No current facility-administered medications for this visit.          EXAM: There were no vitals taken for this visit.  Wt Readings from Last 4 Encounters:   09/22/22 72.3 kg (159 lb 6.4 oz)   05/23/22 70.8 kg (156 lb)   03/08/22 72.9 kg (160 lb 12.8 oz)   11/02/21 71.9 kg (158 lb 9.6 oz)          ECOG performance status of 1.   Voice is clear and strong. No audible stridor, wheezing, or respiratory distress. The remainder of PE was deferred due to PHE.           03/08/22 09:28 09/22/22 07:37   Sodium 138 140   Potassium 3.8    Potassium  4.1   Chloride 106    Chloride  104   Carbon Dioxide 24    Carbon Dioxide (CO2)  23   Urea Nitrogen 28    Urea Nitrogen  25.2 (H)   Creatinine 1.02 1.04   GFR Estimate 78 76   Calcium 9.1 10.4 (H)   Anion Gap 8 13   Albumin 4.1    Albumin  4.9   Protein Total 7.4 7.9   Alkaline Phosphatase 75 98   ALT 19 9 (L)   AST 16 19   Bilirubin Total 0.8 0.7   Glucose  104 (H)   Glucose 98    WBC 4.7 5.1   Hemoglobin 13.2 (L) 13.9   Hematocrit 40.8 42.9   Platelet Count 207 230   RBC Count 4.73 5.02   MCV 86 86                IMPRESSION: In this patient with history of bladder cancer status post  cystoprostatectomy and ileal conduit:  1. No evidence of metastatic disease in the chest, abdomen, or pelvis.  2. Urothelial thickening with smooth enhancement unchanged.  3. Slightly increased sized of nonobstructing stones now measuring up  to 1.4 cm on the left side.  4. No new or enlarging pulmonary nodules.  5. Colonic diverticulosis without evidence of  diverticulitis.     ASSESSMENT/PLAN:      ONC: 72 year old male with T2N1(solitary node)  urothelial carcinoma, enrolled on the clinical trial of Nivolumab with Gemcitabine and Cisplatin for MIBC, completed C4D8 on 6/13/18. He has had a complete response at radical cystectomy final pathology showing PT0 N0 of note he was T2N1 prior to neoadjuvant therapy and is 3 years out from surgery.  He is OZZIE on scans and is on SOC follow up for surveillance.       We discussed labs, urine cytology and CT CAP every 6 months, then dropping back to annual. We discussed the recent data with prognosis and that he had an excellent response to neoadjuvant therapy.  His risk of recurrence is low.       Sees Dr Contreras in Urology as well, no new issues to report except x 1 of hematuria which resolved with factor. Discussed negative urine cytology and no evidence of recurrence on scans.  Urology now is prn.  Has some kidney stones, but no new concerning symptoms.      Hemophilia management: Per Dr. Marley, no major bleeds recently                             Encouraged him to see PCP for annual exam, colonoscopy, lipids, etc.  He has hypertension. Encouraged him to get a home cuff and start monitoring.                               Received J & J vaccination and Covid booster in Nov, March of 2022.  Can get a flu shot and bivalent covid.      5 minutes spent on the date of the encounter doing chart review, review of test results and discussion with other provider(s), in addition to 15 minutes spent on the phone with the patient.      Monica Harris PA-C

## 2022-10-03 DIAGNOSIS — Z85.51 PERSONAL HISTORY OF MALIGNANT NEOPLASM OF BLADDER: ICD-10-CM

## 2022-10-03 DIAGNOSIS — D66 SEVERE HEMOPHILIA A (H): ICD-10-CM

## 2022-10-03 RX ORDER — ANTIHEMOPHILIC FACTOR (RECOMBINANT) 1000 (+/-)
KIT INTRAVENOUS
Qty: 12812 EACH | Refills: 3 | Status: SHIPPED | OUTPATIENT
Start: 2022-10-03 | End: 2022-10-04

## 2022-10-03 NOTE — TELEPHONE ENCOUNTER
*Medicare refill - please check units and qty*    *Need Rx by: Today, October 3rd by 3:30 pm    *Last comp visit: 11/2/2021    *Drug: Kogenate at 2 doses of 3212 units + 2 doses of 3194 units    Thanks,    MICHAEL Cameron, [facility]  Lead Pharmacy Coordinator  [facility]  [phone]

## 2022-10-04 DIAGNOSIS — Z85.51 PERSONAL HISTORY OF MALIGNANT NEOPLASM OF BLADDER: ICD-10-CM

## 2022-10-04 DIAGNOSIS — D66 SEVERE HEMOPHILIA A (H): ICD-10-CM

## 2022-10-04 RX ORDER — ANTIHEMOPHILIC FACTOR (RECOMBINANT) 1000 (+/-)
KIT INTRAVENOUS
Qty: 12786 EACH | Refills: 0 | Status: SHIPPED | OUTPATIENT
Start: 2022-10-04 | End: 2022-10-19

## 2022-10-04 NOTE — TELEPHONE ENCOUNTER
*Medicare refill - please check units and qty*    *Need Rx by: Today please    *Last comp visit: 11/2/2021    *Drug: Kogenate at 2 doses of 3199 units + 2 doses of 3194 units    Thanks,    MICHAEL Cameron, [facility]  Lead Pharmacy Coordinator  [facility]  [phone]

## 2022-10-09 ENCOUNTER — HEALTH MAINTENANCE LETTER (OUTPATIENT)
Age: 72
End: 2022-10-09

## 2022-10-10 ENCOUNTER — CARE COORDINATION (OUTPATIENT)
Dept: HEMATOLOGY | Facility: CLINIC | Age: 72
End: 2022-10-10

## 2022-10-10 DIAGNOSIS — M36.2 HEMOPHILIC ARTHROPATHY (H): ICD-10-CM

## 2022-10-10 DIAGNOSIS — D66 HEMOPHILIC ARTHROPATHY (H): ICD-10-CM

## 2022-10-10 DIAGNOSIS — G89.4 CHRONIC PAIN SYNDROME: ICD-10-CM

## 2022-10-10 DIAGNOSIS — D66 SEVERE HEMOPHILIA A (H): Primary | ICD-10-CM

## 2022-10-10 DIAGNOSIS — D66 SEVERE HEMOPHILIA A (H): ICD-10-CM

## 2022-10-10 RX ORDER — OXYCODONE HYDROCHLORIDE 5 MG/1
5 TABLET ORAL EVERY 8 HOURS PRN
Qty: 84 TABLET | Refills: 0 | Status: SHIPPED | OUTPATIENT
Start: 2022-10-10 | End: 2022-10-19

## 2022-10-10 NOTE — PROGRESS NOTES
"[facility]  PRE-VISIT CARE COORDINATION NOTE     NAME: Orlin Alcantar  :  1950   AGE:  72 year old    Comp Clinic appointment date/time:       at 10am     Confirmation of appointment:   LICSW spoke with patient by phone who confirmed plans to attend.    Goals for visit:    None - \"business as usual. Just my annual check in or check up!\" Need to discuss alternative to Kogenate which he is currently on.    Last CC or Office Visit:  2021       Diagnosis:   Severe Hemophilia A    Factor level:  Factor VIII < 1%      Inhibitor Hx:   No history of inhibitor    Viral issues:    HIV neg, HCV cleared in , HAV status unknown, HBV core liberty and surface antigen negative  Other health issues:  Type 2 DM, hx of bladder cancer, S/p ileal conduit, mood disorder/anxiety, HTN, chronic pain  Planned procedures or surgeries:    Hemophilia treatment plan:  Kogenate 3000 units every other day  Method of logging infusions:   Paper records       Insurance:  Medicare, [facility]  Pharmacy:  [facility]  Pharmacy Notes: Pharmacy would like to see patient. Pt fills weekly.  Pt is approved for  HTC Assistance.   PDC:  0.97  Pharmacokinetics: completed on Advate    Joint issues: Hemarthropathy in all 6 index joints except right knee, and also 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  Primary functional concern is daily pain in ankles and elbows.    Orthopedic past medical history:     Right SUNG   Right hip steroid injections multiple times between [id number]   Right hip open synovectomy   Right hip SUNG revision with synovectomy 2012  Left TKA   Left hip fracture with internal fixation 20+ years  PT orders:  Placed LBB    Eligible studies:  Eligible for subsequent registry with labs      Psychosocial:    Orlin lives in a section 8 apartment in [place] on the 3rd floor with access to an elevator.  He has a limited support network by choice; Dr. Jiang is his " primary health care care agent and NOK information is on file if needed; he is registered with the [employer] program. He moved into a foster home at age 1, and his parents/siblings have passed away within the past decade.  Orlin is very engaged in his care and is proactive at reaching out for support when needed. He has a history of anxiety and depression.    Genetics:  Please ask if he'd like to see genetics.   It seems that he has never met with genetics. He does not have a pedigree in his chart nor record of genetic testing.         HPI      ROS      Physical Exam

## 2022-10-17 NOTE — PROGRESS NOTES
[facility]  OUTPATIENT PHYSICAL THERAPY HEMOPHILIA CLINIC NOTE    Orlin Alcantar    YOB: 1950  MRN# [id number]   Age: 72 year old  Date of service:  10/18/22    present:No, Language: English.  Referring provider: Monica Mortensen PA-C  Reason for visit: Comprehensive Clinic. Performed: In Person.     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  Primary functional concern is daily pain in ankles and sometimes R elbow.    Orthopedic past medical history:     Right SUNG 1992  Right hip steroid injections multiple times between 6517-1810   Right hip open synovectomy 2005  Right hip SUNG revision with synovectomy 4/26/2012  Left TKA 1991  Left hip fracture with internal fixation 1995  Last visit was 11/02/2021. Today he reports L ankle pain with reduced weight bearing and using crutch on L side.  He reports his R elbow is intermittently an issue, and is R handed, but not flared up today.    Bleeds: (from Monica Mortensen PA-C's note)  Right Elbow 11/20/2021     Right Ankle 12/3/2021      Left ankle 12/12/2021     Left elbow 12/23/2021     Right elbow 12/30/2021     Left ankle 1/10/2022     Right shoulder 1/19/2022     Hematuria 2/23/2022          Right hip bleed 3/26/2022          Right hip bleed  3/9/2022          Right elbow bleed 4/5/2022           Left ankle bleed 4/20/2022     Left ankle bleed 4/25/2022     Right hip bleed 5/14/2022     Left ankle bleed 5/31/2022    Right hip 7/3/2022     Right elbow 7/28/2022     Right elbow 7/29/2022     Left ankle 8/3/2022     Left ankle 8/6/2022      Left ankle 8/11/2022     Right hip 8/28/2022     Right hip 8/31/2022     Right elbow 9/5/2022     Left ankle 9/10/2022     Left ankle 9/13/2022     Left ankle 9/16/2022     Right elbow 9/23/2022     Right hip 9/30/2022     Left ankle 10/9/2022    Left ankle 10/10/2022      Left elbow 10/15/2022     Work/school/family: Orlin has been retired for many years, was a . Orlin lives in a 3rd floor apartment in [place] with elevator acess, though takes the stairs for exercise. He has a limited support network by choice. He moved into a foster home at age 1, and his parents/siblings have passed away.   Exercise/physical activity: Exercise/physical activity consists of daily activities and errands.  No regular stretching or strengthening program.  He does take the 3 flights of stairs to/from his apartment some days for exercise instead of using the elevator.     Equipment: Patient has access to hot towels, elastic bandages, crutches, shoe inserts, sock aid, reacher, crutches, wheeled walker, as well as walk-in shower with grab bars.   Target joints (3+ bleeds within 6 months): Right elbow, Left ankle and R hip    Patient/family rehab goal: decrease recurrent L ankle bleeds & improve comfort.     Pain:  L ankle currently 3/10, at best 2/10, and at worst 3/10, and reports bleed-associated pain is ~ 4-5/10 with weight bearing. Uses a crutch and reduced weight bearing when arthritic flare in attempt to prevent a bleed. L ankle and R elbow aggravated by activity/overuse such as walking and lifting items. Elbow and ankle pain managed with oxycodone 3x/day & daily tylenol. L ankle improves with decreased weight  bearing, intermittent elevation but must place on floor and wait a moment before weight bearing on LE    Fall Risk Screen:  Has the patient fallen 2 or more times in the last year? No  Has the patient fallen and had an injury in the past year? No  Is the patient a fall risk? Yes, department fall risk interventions implemented. Due to joint ROM limitations, pt is a fall risk.    Physical Exam:   Bleeding history-lifetime (patient recall, ATHN dataset categories: None, 1-3, 4-19, 20+, Unable to recall)     LEFT 0 1-3 4-19 20+ Unable  to Recall   RIGHT 0 1-3 4-19 20+ Unable  to Recall   Shoulder  x    Shoulder  x      Elbow    x  Elbow    x    Hip  x    Hip    x About 4-6x/yr   Knee  x    Knee  x      Ankle    x  Ankle    x      72 Current Diff: Norm Diff: Prior Diff: Contra Current Diff: Norm Diff: Prior Current Diff: Norm Diff: Prior Diff: Contra Current Diff: Norm Diff: Prior Current Diff: Norm Diff: Prior Diff: Contra Current Diff: Norm    L Elbow    R Elbow   L Knee    R Knee   L Ankle    R Ankle    Flex (PF) 126 -6  21 105 -27  84 -36  2 86 -34  29 -4  13 42 9   Ext (DF) 60 67  6 54 61  9 13  10 19 23  22 20  8 14 12   Pronation limited    limited                  Supination limited    limited                      Hemophilia Joint Health Score 2.1 - Summary Score Sheet    2006, International Prophylaxis Study Group    HJHS Total Score 34                 Joint Score         30 L Elbow R Elbow L Knee R Knee L Ankle R Ankle   Swelling 0 0 0 0 1 0   Duration, Swelling 6 mos 0 0 0 0 0 0   Muscle Atrophy 0 0 0 0 0 0   Crepitus on Motion 0 0 0 0 0 0   Flexion Loss 2 3 3 3 1 0   Extension Loss 3 3 3 3 0 0   Joint Pain 0 1 0  2 0   Strength 0 0 0 0 2 0   Individual Joint Total 5 7 6 6 6 0     NE =  Non-Evaluable  Swellin = No swelling, 1 = Mild, 2 = Moderate, 3 = Severe  Duration: 0 = No swelling or < 6 months, 1 = >/= 6 months  Muscle Atrophy:  0 = None, 1 = Mild, 2 = Severe  Crepitus on Motion:  0 = None,1 = Mild,  2 = Severe  Flexion Loss Contralateral: 0 = <5 degrees, 1 = 5-10 degrees, 2 = 11-20 degrees, 3 = > 20 degrees  Extension Loss Contralateral: 0 = <5 degrees, 1 = 5-10 degrees, 2 = 11-20 degrees, 3 = > 20 degrees  Joint Pain: 0 = No pain through active ROM, 1 = No pain through active range, only pain on overpressure or palpation, 2 = Pain through active range  Strength, Within Available Range:  0 = Holds test position against gravity with max resistance (gr 5), 1 = Holds test position against gravity with mod resistance, but breaks with maximal resistance (gr 4), 2 = Holds test position with min resistance (gr 3+) or holds test position against gravity (gr 3), 3 = Able to partially complete ROM against gravity (gr 3-/2+), or able to move through ROM gravity eliminated (gr 2), or through partial ROM gravity eliminated (gr 2-), 4 = Trace (gr 1) or no muscle contraction (gr 0)  Ankle PF: 0= 4-5 reps PF, 1= 2-3, 2= clears heel, 3= full AROM grav elim, 4= trace or no musclecontraction    Global Gait (Walking, Stairs, Running, Hopping on 1 leg): Score: 4,Pt unable to hop or run.  Walking and stairs limited by decreased weight bearing L ankle with US  0 = All skills are within normal limits, 1 =  One skill is not within normal limits, 2 = Two skills are not within normal limits, 3 = Three skills are not within normal limits, 4 = No skills are within normal limits, NE = Non-evaluable  N=Normal, L=limp, TW=toe walking, WSF=walking on side of foot, US=uneven strides, NPO=no push off, TYRON=abnormal weight shift, FTO=foot turned out  Ankle-equal weight shift, heel-toe pattern, PF push-off, steps of equal length and lázaro, toes symmetrically forward    Knee-Equal weight shift, HS with full ext, knee ext push-off, steps of equal length and lázaro, toes)     Left Right Comments   Hand to mouth WFL WFL Limited supination   Hand behind head WFL WFL    Hand behind low back WFL WFL    Figure 4 WFL WFL    Squat WFL WFL  mini squat with  "crutch   Step-over WFL WFL  with crutch     Balance: decreased dynamic balance  Functional mobility: Independent transfers without AD. Reports indepent floor transfers  Gait: Independent without AD, decreased heel strike, B knee flexion with Left more bent than R, decreased foot clearance, push off and step length.  Posture/Alignment: Bilateral foot overpronation with L calcaneal valgus greater than R. Forwards shoulder with shoulders anterior to hips.  L knee flexion in stance.  L ankle turned out greater than R ankle.    Assessment: Orlin Alcantar is limited by repeated ankle and elbow joint bleeds and arthropathy with L ankle and R elbow most frequently affected.  His progression of activity and participation in activities are limited by recurrent bleeds and joint discomfort.    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  No change in function.    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  No imaging found.  Left knee: H/o hemophilic arthropathy addressed with TKR in 1991, continues to be functional with replaced joint.  Current decreased ROM secondary to soft tissue restriction likely present prior to surgery.  Right knee: Slight decrease in flexion as compared to age normative tables, no other clinical signs of hemarthropathy, no imaging found. Orlin reports h/o significant bleed in this joint when he was a child that led to about a year of immobility with wheelchair use.   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  Right hip: H/o hemophilic " "arthropathy addressed with SUNG many years ago and revision in 2012.  Hip has been much improved clinically since 2012 surgery.\"     Treatment diagnosis: Limited knowledge of condition and / or self care - inability to control symptoms, Joint hypomobility, Muscle flexibility limitations, ROM limitations, Gait impairment, Balance impairment    Clinical Presentation: Stable/Uncomplicated  Clinical Presentation Rationale: PMH and clinical judgment  Clinical Decision Making (Complexity): Low complexity  Treatment:  Orlin Alcantar will benefit from skilled physical therapy to address joint hemarthropathy and home management of bleeding disorder.  Treatment consisting of:     Self care / Home management training education: daily symptom control techniques and flare management;   Verbal instruction in: use of RICE for acute bleeding with specific instruction on ice applications including timing of using heat which pt likes to use. Pt indicates he prefers to avoid application of cold and compression.     Verbal and written instruction in: use of moist heat for arthritic pain and stiffness  Educated about the goal of having no bleeds as every bleed has the potential to cause further joint damage.    Educated in home management of pain including daily symptom control techniques, and management of pain flares.  Demonstrated and educated in using B crutches rather than single crutch for NWB after bleed and the importance of NWB of affected joint after bleed rather than WBAT. Instructed on when progressing from NWB with B crutches to using single crutch on non-impaired side (currently uses since crutch on affected LE side).  Pt declines to switch single crutch to unimpaired side as feels awkward.  Patient asking about total ankle replacement vs fusion.  Educated pt on ankle fusion still more common than replacement and option for referral to Orthopedics.  Pt declines referral at this time, and feels comfortable asking for this " when he is ready to pursue additional information.    Therapeutic exercise to develop: strength, endurance, flexibility and core stability through HEP instruction.  Reviewed current exercise program (walking) and encouraged continued regular cardio, strengthening and stretching. Pt declines increased aerobic activity, strengthening and stretching at this time due to concern for increased bleeds despite educated about the importance of having a strong musculoskeletal system with long, strong muscles. Verbal instruction in: ankle gentle daily ROM with patient agreeing to perform on his R ankle, hesitant to perform on L ankle.  Educated about the importance of supportive footwear, instructed to wear laced up shoes with adequate arch & ankle support and good non-slip tread with heel toe offset.     Equipment issued: None    Plan of Care:   1. Patient to initiate home exercise program for aerobic activity and ankle ROM  2. Will plan to see at next scheduled clinic visit and be available in the interim for any questions/concerns related to bleeding disorder management.    3. Patient will report improved comfort and offload with B crutch use.    Therapy Frequency and Predicted Duration of Therapy Intervention: One session evaluation and treatment  Goals: Patient verbalizes understanding of evaluation results, home management and home exercise recommendations provided today to maximize functional mobility and allow for continued safe participation in current home and work activities. -Goal Met    Recommendations: Follow up at next scheduled [facility] clinic visit  Educational assessment/Barriers to learning: No barriers noted  Patient/Family Education:Early and adequate treatment principles, Adjunctive treatment/RICE, Splints/orthoses/equipment, General information on benefits of exercise and physical activity and Aerobic activity guidelines and Home Exercise Program  Risks and benefits of evaluation/treatment have been  explained.  Patient, family and/or caregiver are in agreement with Plan of Care.  Treatment provided this date (including minutes): Self Care: 17, Therapeutic Exercise: 8,      Timed Code Treatment Minutes: 25  Total Treatment Time Minutes (sum of timed and untimed services): 56    Signature:   Concepcion Sol PT  Physical Therapist  [facility]  [phone]    Date: 10/18/22    Certification:  Onset date: 10/18/22  Start of care date: 10/18/22  Certification date from 10/18/22 to 10/18/22    I CERTIFY THE NEED FOR THESE SERVICES FURNISHED UNDER        THIS PLAN OF TREATMENT AND WHILE UNDER MY CARE     (Physician co-signature of this document indicates review and certification of the therapy plan).

## 2022-10-17 NOTE — PROGRESS NOTES
[facility]  [facility]  [facility] [address], [address], [place] [address]  Main: [phone], Fax: [phone]      Comprehensive Hemophilia Clinic Visit        Patient: Orlin Alcantar  MRN: [id number]  : 1950  MAR: 2022  Last Comprehensive Clinic: 2021  Subsequent/Initial Registry: Subsequent eligible     HISTORY   Hemophilia History:  Mr. 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 He has a history of HCV that was cleared in  with interferon and ribavirin therapy. He has known hemophilic arthropathy and is on oxycodone for pain management. He has been on Kogenate 3000 unit(s) every other day.      Orlin was raised by a foster family and does not know his biological parents or family health history. He was diagnosed by Dr. Quiñones at the [facility] many years ago and was followed by a community hematologist until re-establishing care at the [facility] in  prior to right hip surgery. He then returned to the care of his community hematologist until  when he was seen again prior to surgery. He then returned to our care since  and has followed with our [facility] since then. He has been on prophylactic factor VIII replacement therapy for the last several years. He has been on a variety of regimens between 10,000 - 15,000 unit(s) weekly. He has preferred to use smaller factor doses more frequently rather than extend interval between infusions. At one point he was treated with 500 unit(s) of factor three times per day. He has used Recombinate, Helixate, and Kogenate since 2017. PK has been performed on Kogenate in 2019 that showed half life of 19 hours (14-24). He was briefly transitioned to emicizumab in  however he switched back to factor replacement prophylaxis as he felt like he had  better control over his hemophilia when he was able to infuse factor on a regular basis. He is currently on 3200 unit(s) of Kogenate every other day.     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 He has chronic pain associated with his arthropathy. He transitioned from codeine to oxycodone in 3/2019. This has been managed by [facility]. He received 84 tablets every 28 days and takes 1 tablet three times per day. He has not tried celebrex or salsalate.     He developed hematuria in 2018 and was found to have invasive urothelial carcinoma. He underwent chemotherapy and radical cystoprostatectomy with ileal conduit. He follows with Oncology for this and remains in remission.     He uses paper records to document prophylaxis and breakthrough bleeding episodes and treatment.      Joint History:    July 1992 -- left knee replacement    December 1992 -- right hip replacement    February 1995 -- left hip fracture s/p insertion of three pins    March 2003 -- radioactive synovectomy, right hip    April 2005 -- surgical synovectomy, right hip    April 2012 -- redo right hip surgical synovectomy, and exchange of polyethylene portion of previous hip arthroplasty       Infectious Disease History:    HIV negative     HCV diagnosed in 1991, cleared in 1999     HAV unknown    HBV Core antibody negative    Other Pertinent Medical History:    Type 2 DM - reportedly diet controlled, last A1C elevated in 2018, not checked since then.    Invasive urothelial carcinoma s/p radical cyctoprostatectomy and lymph node dissection, ileal conduit, adjuvant chemotherapy. In remission. Followed by [facility] oncologist     Mood disorder/anxiety     Hypertension - on Lisinopril     Chronic pain syndrome     Nephrolithiasis     Current Outpatient Medications   Medication     acetaminophen (TYLENOL) 325 MG tablet     Antihem Factor  Recomb, rFVIII, (KOGENATE FS) 1000 units KIT     lisinopril (ZESTRIL) 20 MG tablet     mirtazapine (REMERON) 30 MG tablet     naloxone (EVZIO) 0.4 MG/0.4ML auto-injector     oxyCODONE (ROXICODONE) 5 MG tablet     No current facility-administered medications for this visit.     Facility-Administered Medications Ordered in Other Visits   Medication     ethyl chloride spray       Allergies   Allergen Reactions     Aspirin      This drug inhibits platelets and is contraindicated due to hemophilia diagnosis.          Family History:  No known details of biological family history, raised in foster care. He does not have any children.    Social History:  Please refer to Analilia Rashid [facility]'s note for detailed social history.       CURRENT TREATMENT REGIMEN  Prophylaxis: Kogenate 3227 unit(s) every other day ~ 46  unit(s)/kg     Breakthrough/On-Demand: Kogenate 3000 unit(s) PRN     INTERVAL HISTORY  Last comprehensive clinic visit was 11/2/2021. He continues on above factor regimen. He self infuses in the right AC without difficulty. He historically used the left AC however this site is no longer accessible due to scarring. He has never had a port. He notes that has had recurrent left ankle bleeds within the last year. He notes that his left ankle is the joint of most concern to him and what causes his daily pain. He treats with Roxicodone three times per day and 1000 mg APAP twice daily. He notes that on the morning of his scheduled infusion, he often has significant ankle pains. He queries the benefit of smaller but daily dosing schedule to avoid troughs. He notes that when he has done this in the past, he had less bleeding and arthritic symptoms. He notes that it can sometimes be difficult to distinguish hemophilic arthropathy pain versus bleed. He does report that he treats a suspected bleed to help prevent progression to more severe bleeding episodes. He reports about 2 severe bleeding episodes per month. He does use  occasional heat and ice. He does not find compression useful. He is currently using a single crutch due to left ankle pain. He has been wearing supportive boots. He does not participate in a formal exercise regimen but does walk around the block. He denies any falls or near falls in the past year.  He keeps meticulous paper records that are brought in for review today.     He has been on Xyntha in the past but did not like the solofuse. He also was tried on Hemlibra briefly. He notes that he did well for a few weeks but reported some breakthrough bleeding episodes and thus went back to factor. He reports that he would be interested in retrial of Hemlibra should he have increased vascular access issues.     Interval reported breakthrough bleeding events include:       Right Elbow 11/20/2021 3100 unit(s)     Right Ankle 12/3/2021 3100 unit(s)      Left ankle 12/12/2021 3100 unit(s)     Left elbow 12/23/2021 3100 unit(s)     Right elbow 12/30/2021 3100 unit(s)     Left ankle 1/10/2022 3163 unit(s)     Right shoulder 1/19/2022 3163 unit(s)     Hematuria 2/23/2022 2091 unit(s) + 1072 unit(s)     Right hip bleed 3/26/2022 2091 unit(s) + 1072 unit(s)     Right hip bleed 3/9/2022 2091 unit(s) + 1072 unit(s)     Right elbow bleed 4/5/2022  2091 unit(s) + 1072 unit(s)     Left ankle bleed 4/20/2022 3227 unit(s)     Left ankle bleed 4/25/2022 3227 unit(s)     Right hip bleed 5/14/2022 3227 unit(s)     Left ankle bleed 5/31/2022 3227 unit(s)    Right hip 7/3/2022 3322 unit(s)     Right elbow 7/28/2022 3322 unit(s)     Right elbow 7/29/2022 3322 unit(s)     Left ankle 8/3/2022 3322 unit(s)     Left ankle 8/6/2022 3322 unit(s)      Left ankle 8/11/2022 3322 unit(s)     Right hip 8/28/2022 3322 unit(s)     Right hip 8/31/2022 3322 unit(s)     Right elbow 9/5/2022 3322 unit(s)     Left ankle 9/10/2022 3322 unit(s)     Left ankle 9/13/2022 3322 unit(s)     Left ankle 9/16/2022 3322 unit(s)     Right elbow 9/23/2022 3322 unit(s)      Right hip 9/30/2022 3322 unit(s)     Left ankle 10/9/2022 3322 unit(s)    Left ankle 10/10/2022 2091 unit(s) + 1103 unit(s)     Left elbow 10/15/2022 3322 unit(s)     Bleeding episodes in the past year:  Location Number of bleeds Other information related to each bleed   Ankle, Left 13     Ankle, Right 1     Elbow, Left 2     Elbow, Right 7     Hip, Left 0     Hip, Right 7     Knee, Left 0     Knee, Right 0     Shoulder, Left 0     Shoulder, Right 1     Other 1 Hematuria       Activities of Daily Living Assessment: Limited school or work and limited recreational activity levels     Chronic pain: Every day chronic pain of left ankle    Pain management: Opioids and APAP    Primary care provider: Dr. Le at [facility]    Dentist: He does not follow with a dentist regularly. He reports that it has been many years. He denies any dental concerns. No reports of gingival bleeding.     No ED visits/hospitalizations in the past year.    No orthopedic procedures in the past year.    Please refer to MRAIEL Xie's note for detailed interval joint history. Please refer to Analilia DARLING's note for detailed interval social history.    ROS:  Complete ROS is negative, except as noted above.       OBJECTIVE  Exam:  BP (!) 144/90 (BP Location: Right arm, Patient Position: Sitting, Cuff Size: Adult Regular)   Pulse 77   Wt 70.3 kg (155 lb)   SpO2 98%   BMI 21.02 kg/m    General: No acute distress  Constitutional: Appears well, no distress  HEENT: Pupils equal and round. No thyromeagaly  CV: regular rate and rhythm, no murmurs  Respiratory: clear to ascultation bilaterally, no increased work of breathing.  GI: abdomen soft, nontender, without guarding or rebound. No hepatomegaly. No splenomegaly.   Mus/Skele: no edema. Reduced flexion of bilateral elbows to 90'. Crepitus and reduced flexion of bilateral knees and ankles. Kyphosis noted. Please see Concepcion FLOYD's note for full joint  evaluation.   Skin: no petechiae, no ecchymosis.  Neuro: CN II-XII intact. Antalgic, slow, gait. AOx3  Heme/Lymph: no supraclavicular adenopathy.      Past Pharmacokinetic Studies:  PK has been performed on Kogenate in 2019 that showed half life of 19 hours (14-24).     Labs:    Lab Results   Component Value Date    WBC 5.1 09/22/2022    WBC 6.4 03/01/2021     Lab Results   Component Value Date    HGB 13.9 09/22/2022    HGB 14.5 03/01/2021     Lab Results   Component Value Date    MCV 86 09/22/2022    MCV 87 03/01/2021     Lab Results   Component Value Date    MCH 27.7 09/22/2022    MCH 28.3 03/01/2021     Lab Results   Component Value Date     09/22/2022     03/01/2021     Last Comprehensive Metabolic Panel:  Sodium   Date Value Ref Range Status   09/22/2022 140 136 - 145 mmol/L Final   03/01/2021 142 133 - 144 mmol/L Final     Potassium   Date Value Ref Range Status   09/22/2022 4.1 3.4 - 5.3 mmol/L Final   03/08/2022 3.8 3.4 - 5.3 mmol/L Final   03/01/2021 3.9 3.4 - 5.3 mmol/L Final     Chloride   Date Value Ref Range Status   09/22/2022 104 98 - 107 mmol/L Final   03/08/2022 106 94 - 109 mmol/L Final   03/01/2021 112 (H) 94 - 109 mmol/L Final     Carbon Dioxide   Date Value Ref Range Status   03/01/2021 19 (L) 20 - 32 mmol/L Final     Carbon Dioxide (CO2)   Date Value Ref Range Status   09/22/2022 23 22 - 29 mmol/L Final   03/08/2022 24 20 - 32 mmol/L Final     Anion Gap   Date Value Ref Range Status   09/22/2022 13 7 - 15 mmol/L Final   03/08/2022 8 3 - 14 mmol/L Final   03/01/2021 10 3 - 14 mmol/L Final     Glucose   Date Value Ref Range Status   09/22/2022 104 (H) 70 - 99 mg/dL Final   03/08/2022 98 70 - 99 mg/dL Final   03/01/2021 100 (H) 70 - 99 mg/dL Final     Urea Nitrogen   Date Value Ref Range Status   09/22/2022 25.2 (H) 8.0 - 23.0 mg/dL Final   03/08/2022 28 7 - 30 mg/dL Final   03/01/2021 27 7 - 30 mg/dL Final     Creatinine   Date Value Ref Range Status   09/22/2022 1.04 0.67 - 1.17  mg/dL Final   03/01/2021 1.08 0.66 - 1.25 mg/dL Final     Creatinine POCT   Date Value Ref Range Status   09/22/2022 1.1 0.7 - 1.3 mg/dL Final     GFR Estimate   Date Value Ref Range Status   09/22/2022 76 >60 mL/min/1.73m2 Final     Comment:     Effective December 21, 2021 eGFRcr in adults is calculated using the 2021 CKD-EPI creatinine equation which includes age and gender (Balaji et al., [place], DOI: 10.1056/RBIXts7892984)   03/01/2021 66 >60 mL/min/[1.73_m2] Final     GFR, ESTIMATED POCT   Date Value Ref Range Status   09/22/2022 >60 >60 mL/min/1.73m2 Final     Calcium   Date Value Ref Range Status   09/22/2022 10.4 (H) 8.8 - 10.2 mg/dL Final   03/01/2021 9.6 8.5 - 10.1 mg/dL Final     Lab Results   Component Value Date    A1C 6.9 07/25/2018    A1C 5.9 02/21/2018    A1C 6.1 02/20/2018    A1C 5.0 10/13/2014    A1C 5.5 05/01/2012         Imaging:  CT C/A/P 9/22/2022   IMPRESSION: In this patient with history of bladder cancer status post  cystoprostatectomy and ileal conduit:  1. No evidence of metastatic disease in the chest, abdomen, or pelvis.  2. Urothelial thickening with smooth enhancement unchanged.  3. Slightly increased sized of nonobstructing stones now measuring up  to 1.4 cm on the left side.  4. No new or enlarging pulmonary nodules.  5. Colonic diverticulosis without evidence of diverticulitis.     Right HIP XR 2013  Impression   IMPRESSION:   1. Postsurgical changes of total hip arthroplasty on the right and 3   screws transfixing the femoral head and neck on the left, stable.   2. Slightly increased heterotopic bone subjacent to the right greater   trochanter.        ASSESSMENT / RECOMMENDATIONS  In summary, Orlin Alcantar is a 72 year old year old man with severe Hemophilia A with a baseline factor VIII level of <1% without history of inhibitor, history of hepatitis C s/p clearance, bladder cancer s/p cystoprostatectomy with adjuvant chemotherapy in remission, hypertension, DMII, and hemophilic  arthropathy of all 6 index joints with associated chronic pain who returns to clinic today for routine comprehensive hemophilia clinic visit. Mr. Alcantar has been self infusing Kogenate at 3200 unit(s) ~ 46 unit(s) /kg every other day. He keeps meticulous logs of prophy and breakthrough bleeding episodes and treatment. He reported 31 breakthrough bleeding episodes for which he treated within the past year. Majority of these are left ankle bleeds which has been his joint of concern and source of chronic pain within this last year. He is not yet ready to seek orthopedic consultation at this time. He declines XR imaging today. He does endorse some difficulty distinguishing between arthropathy pain and early mild bleeding. He tends to treat as precaution. He notes that he has never used anti-inflammatory medications but is open to trying them. He also would like to try using smaller doses of factor alternating between 2000 unit(s) and 1000 unit(s) every other day to avoid low trough levels. He notes that the peaks are not as important to him given his limited activities. He denies any right AC access concerns. He can no longer use left AC for infusions.     We discussed trial of Celebrex 100mg twice daily in addition to his Roxicodone three times daily for pain management. We would like to assess his response to anti-inflammatory medications over the next month prior to factor regimen adjustment. If he is a Celebrex responder, this may help him discern between arthropathy pain versus bleeding symptoms. We will touch base in a month to reassess symptoms and to discuss possibly changing to 2000 unit(s) and 1000 unit(s) alternating schedule for factor replacement.     I did have a discussion with Orlin regarding Kogenate discontinuation and alternative options for factor VIII replacement products. He is open to changing to the most similar [place] product, Kovaltry, once Kogenate is no longer available. We anticipate that he  should still be able to obtain Kogenate for a while through our pharmacy albeit the available vial sizes may eventually become a nuisance.      Factor replacement plan:    Continue with Kogente 3000 unit(s) every other day for now.     We will consider changing to alternative prophylaxis regimen alternating between 2000 unit(s) and 1000 unit(s) every other day after we reassess his response to Celebrex.     Will eventually transition to Kovaltry once Kogenate no longer is available.       Hemophilic arthropathy management plan:    Roxicodone three times a day    Will trial Celebrex 100mg twice daily     APAP use PRN reasonable. Discussed importance of keeping to <3000 mg of acetaminophen per day.     Home exercise stretching plan provided to patient today.     Recommended use of bilateral crutches for conservative treatment strategy in setting of acute bleed.     Offered updated XR imaging of L ankle and referral to orthopedics for possible replacement vs more likely fusion. He declines at this time. He will call when he is ready to seek next steps.     Please refer to MARIEL Xie's note for detailed hemophilic arthropathy plan.    Labs done and follow-up plan:    Subsequent registry labs today.     Patient declined A1C testing.     Routine Health Maintenance:    Primary care appointment every 6 months. Discussed checking home BP and bringing his machine into his next clinic appointment to determine reliability. Recommended updated A1C testing. He declines today. He did receive a Pfizer covid booster today as well as his influenza vaccination.     Recommended he have a dental cleaning as he is long overdue. He declines.       Follow up clinic visit timing:  Follow up phone call in 1 month to assess Celebrex response and discuss factor regimen.   Comprehensive clinic visit in 1 year.   He will contact the office should he have any unforseen surgeries or procedures arise.     The following individuals have seen  this patient independently today.    Concepcion Sol MPT; Analilia Rashid [facility] and RAJINDER CAMACHO, nurse clinician, and a hemophilia pharmacy liaison have all seen the patient independently, please refer to their notes for their evaluation and assessment.      Dr. Jorge Jiang, staff hematologist has also seen this patient today in clinic.       Monica Freedman, MPH, PA-C  [facility]      HEMATOLOGY STAFF    Patient seen and evaluated as part of a shared visit.  I have reviewed the clinical history, physical exam, relevant labs, and treatment plan with the BARB, as well as other members of the comprehensive hemophilia care team.      72-year-old male with severe hemophilia A.  No history of factor VIII inhibitor.  Has advanced hemophilic arthropathy in all 6 index joints which is longstanding.  History of hepatitis C status post successful treatment in 1999.  HIV negative.  Past medical history also remarkable for bladder cancer for which he underwent surgery and adjuvant chemotherapy in 2018.  He remains in remission.    Has been followed intermittently here over many years, but regularly since 2014.  Has been on factor VIII prophylaxis for the last several years.  He has been on a variety of different regimens but generally his factor consumption has been between 10,000 and 15,000 units/week.  He prefers to use smaller more frequent doses of factor rather than trying to extend the interval between infusions.  He was briefly transitioned to emicizumab in 2021 however he switched back to factor replacement prophylaxis as he felt he had better control over his treatment with factor versus nonfactor therapy.  Current regimen is Kogenate 3200 units (approximately 45 units/kg) every other day.  Continues to keep meticulous paper records of his infusions and bleeding episodes.    Continues to report very frequent breakthrough bleeds although acknowledges that he has  difficulty distinguishing between bleeds and flareups of hemophilic arthropathy.  Overall he feels his joint status has been stable in the last year.    Has used chronic opioids to manage his hemophilic arthropathy related pain.  We are his sole prescriber and have not had any concerns about inappropriate medication use.  We discussed today a trial of Celebrex to see if this might give him additional symptom relief and help to avoid opioid dose escalation.    We also discussed that Kogenate has been discontinued by the  and after discussing the options, he would like to transition to Kovaltry when our supply of Kogenate runs out.    We will have a follow-up phone call in 1 month to assess his response to Celebrex.  We will plan to see him back for a comprehensive clinic evaluation in 12 months, sooner with new questions or concerns.    Total time 40 minutes, including review of medical records and labs, clinic visit, and documentation.      Jorge Jiang MD  Professor of Medicine  Division of Hematology, Oncology, and Transplantation  Director, [facility]

## 2022-10-18 ENCOUNTER — ALLIED HEALTH/NURSE VISIT (OUTPATIENT)
Dept: HEMATOLOGY | Facility: CLINIC | Age: 72
End: 2022-10-18
Payer: MEDICARE

## 2022-10-18 ENCOUNTER — HOSPITAL ENCOUNTER (OUTPATIENT)
Dept: PHYSICAL THERAPY | Facility: CLINIC | Age: 72
Setting detail: THERAPIES SERIES
Discharge: HOME | End: 2022-10-18
Attending: PHYSICIAN ASSISTANT
Payer: MEDICARE

## 2022-10-18 ENCOUNTER — DOCUMENTATION ONLY (OUTPATIENT)
Dept: OTHER | Facility: CLINIC | Age: 72
End: 2022-10-18

## 2022-10-18 ENCOUNTER — OFFICE VISIT (OUTPATIENT)
Dept: HEMATOLOGY | Facility: CLINIC | Age: 72
End: 2022-10-18
Attending: INTERNAL MEDICINE
Payer: MEDICARE

## 2022-10-18 VITALS
HEART RATE: 77 BPM | DIASTOLIC BLOOD PRESSURE: 90 MMHG | BODY MASS INDEX: 21.02 KG/M2 | OXYGEN SATURATION: 98 % | SYSTOLIC BLOOD PRESSURE: 144 MMHG | WEIGHT: 155 LBS

## 2022-10-18 DIAGNOSIS — D66 SEVERE HEMOPHILIA A (H): Primary | ICD-10-CM

## 2022-10-18 DIAGNOSIS — I10 BENIGN ESSENTIAL HYPERTENSION: ICD-10-CM

## 2022-10-18 DIAGNOSIS — D66 HEMOPHILIC ARTHROPATHY (H): ICD-10-CM

## 2022-10-18 DIAGNOSIS — Z86.19 HISTORY OF HEPATITIS C: ICD-10-CM

## 2022-10-18 DIAGNOSIS — M36.2 HEMOPHILIC ARTHROPATHY (H): ICD-10-CM

## 2022-10-18 DIAGNOSIS — R26.9 ABNORMAL GAIT: ICD-10-CM

## 2022-10-18 DIAGNOSIS — D66 SEVERE HEMOPHILIA A (H): ICD-10-CM

## 2022-10-18 DIAGNOSIS — Z71.9 ENCOUNTER FOR COUNSELING: Primary | ICD-10-CM

## 2022-10-18 DIAGNOSIS — E11.9 TYPE 2 DIABETES MELLITUS WITHOUT COMPLICATION, WITHOUT LONG-TERM CURRENT USE OF INSULIN (H): ICD-10-CM

## 2022-10-18 PROCEDURE — 99215 OFFICE O/P EST HI 40 MIN: CPT | Performed by: INTERNAL MEDICINE

## 2022-10-18 PROCEDURE — G0463 HOSPITAL OUTPT CLINIC VISIT: HCPCS

## 2022-10-18 PROCEDURE — 97110 THERAPEUTIC EXERCISES: CPT | Mod: GP | Performed by: REHABILITATION PRACTITIONER

## 2022-10-18 PROCEDURE — 97161 PT EVAL LOW COMPLEX 20 MIN: CPT | Mod: GP | Performed by: REHABILITATION PRACTITIONER

## 2022-10-18 PROCEDURE — 97535 SELF CARE MNGMENT TRAINING: CPT | Mod: GP | Performed by: REHABILITATION PRACTITIONER

## 2022-10-18 RX ORDER — CELECOXIB 100 MG/1
100 CAPSULE ORAL 2 TIMES DAILY PRN
Qty: 60 CAPSULE | Refills: 3 | Status: SHIPPED | OUTPATIENT
Start: 2022-10-18 | End: 2023-07-03

## 2022-10-18 NOTE — PATIENT INSTRUCTIONS
"Medication change:  We are prescribing Celebrex today.  Please take this every day as directed.  Give us an update in 2 weeks about how this is addressing your pain.    Factor Plan:  Continue taking Kogenate factor concentrate at 3000 units  every other day.  Please call with any breakthrough bleeding to discuss your plan of care.  We have talked about changing your dosing to 2000 units and 1000 units alternating.  We can institute that after your have had your trial with Celebrex.  When the supply of Kogenate is diminished, we will switch to Kovaltry at the same dosing.  If you have a severe blow to the head, please give dose of Kogenate to boost your factor level if you have not treated that day,  and go to the the Emergency Room for evaluation and for head CT scan.  Continue to keep infusion records via paper records   Carry factor concentrate with you at all times. Call the center if you will be traveling out of the area. We will create a travel letter and letter with instructions on emergency care in hemophilia. For treatment centers around the world go to [email or web address], click on left link \"resources\" and then click on \"passport\" and type in travel destination.     General Recommendations:  Wear medic alert on your person for highest level of safety [email or web address]  It is recommended that you see a dentist every 6 months. Call the center for recommendations for dental visits that involve more than an examination and cleaning. Since you have had a joint replacement, you will need  antibiotic coverage for dental cleaning.  See primary care provider for general health maintenance.  For any questions, please contact your [facility] nurse, Margot Reed RN at [phone] or the main phone number [phone].  If you have emergency needs in the evening or weekend, please contact the page  [phone] and page the hematologist on call or Dr. Jorge Jiang.  Please return for comprehensive clinic in one " Detail Level: Detailed year.      Margot Reed RN - Nurse Clinician - [facility] - [phone]                                [facility] [phone]    What is Celebrex?   Celebrex is a non-steroidal anti-inflammatory drug (NSAID).  It is used to reduce pain and swelling. Unlike many NSAIDs, it does not affect the function of platelets which help the body to stop bleeding.    How is Celebrex usually dosed?  200 mg daily (occasionally prescribed 100mg 1-2 times daily).  It is recommended to use the lowest effective dose for the shortest possible duration.  What are the possible side effects of Celebrex?  Common side effects include: Upset stomach, dizziness or nausea, diarrhea, gas, rash.  Call your healthcare provider if you experience any side effects.    Get emergency medical help if you have any of these signs of an allergic reaction: hives; difficulty breathing; swelling of your face, lips, tongue, or throat.   Stop taking Celebrex and call your doctor at once if you have any of these rare but very serious side effects: easy bruising or bleeding, rash, fast or pounding heartbeat, change in amount of urine, or signs of infection (e.g. fever).  Tell your provider immediately if any of these unlikely but serious side effects occur:  severe stomach pain, heartburn, swelling of ankles/feet/hands, sudden unexplained weight gain, or hearing changes (e.g. ringing in the ears, decreased hearing).  This is not a complete list of all side effects that may occur.    Many people who take this medication do not experience serious side effects.  Is Celebrex safe to take during pregnancy or breastfeeding?   The safety of taking Celebrex during pregnancy or while breastfeeding is unknown. Please inform your doctor if you are pregnant or are currently breastfeeding before starting this medication.  How is this drug best taken?   If stomach upset occurs, take with food, milk, or  antacid.  Ask your provider about checking your blood pressure.  Who should not use Celebrex?   Let your doctor know if you have had heart problems or bypass surgery, kidney or liver problems, high blood pressure, history of bleeding in your stomach or intestine, or allergy to other NSAIDs or sulfa.

## 2022-10-18 NOTE — PROGRESS NOTES
[facility]  Social Work Evaluation    Name:  Orlin Alcantar  Age:   72 year old  :  1950    Presenting Information:  Orlin is followed by the [facility] for management of severe hemophilia A.  He presented to clinic today for a routine comprehensive clinic evaluation.  Met with the patient 1:1 to complete an updated psychosocial evaluation.    Living Situation:   Orlin lives in a section 8 apartment in [place].  This is a 3rd floor apartment and he has access to an elevator, though takes the stairs for exercise most of the time.  He is very happy in his current location and does not plan to try to move anytime soon.  He has been living in this apartment for ~6 years.    Family/Social Support:  Orlin has a limited support network and he stated this is due to his own preferences.  He has stated if he needs support, he trusts [facility] staff the most.  He moved into a foster home at the age of 1, and his foster parents and siblings are all . His remaining family include neices, whom he has limited contact with.    Functional Status: Independent with ADLs and IADLs, including motor vehicle .  He has a set of crutches and a walker at home.  He has ongoing pain in his left ankle and is using one crutch as a result of a related issue today.    Current Community Services:    [facility]  Psychiatric Pharmacy Assistance  [facility]  PCP    Past Community Services:   Inpatient psychiatry at [facility] in .  Outpatient Psychiatry for medication mangement through  (reported his provider left)  [facility]    Chemical Use:    Orlin reported minimal alcohol use and that he may have a Guiness once in a while.  He has tried legal CBD gummies but did not care for them.  He does not use tobacco.    Mental/Emotional Health:   PHQ-9: 1.  RATNA-7: 2.  Scores were discussed and he feels this is his baseline overall.  He stated his PCP prescribes remeron for him  "which helps with his sleep; he gets 8 hours a night.  He stated in the past he had a PRN prescription for diazapam which was helpful; but he hasn't discussed it with anyone since he left the psychiatry clinic.  He feels that would help with dips in mood when they occur and he was encouraged to discuss this with his PCP.    The patient reported a history of the following mental health concerns and/or diagnoses: anxiety and depression.       Orlin  reported manageable stress levels and that they cope with stress by listening to music and reading.  He got a new vehicle within the past year and sold his old one (which had been \"on its last leg\") which has been immensely helpful for his overall worry.    Pain Management Strategies:   Orlin has a prescription for oxycodone and he reports taking this 3x/day.  He also takes 1000mg tylenol. Team will be discussing use of celebrex.  Patient asked general questions about medical marijuana    Abuse Concerns:  No concerns indicated.    Education: Orlin is a high school graduate and he completed 1 year of post-secondary education.    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    School/Work Attendance:   N/A    Financial/Income:    Social security disability income up until he retired and he now receives social security income.  He stated finances are tight, but he budgets and makes ends meet.    Health Insurance:   Medicare and BCBS.  He is connected to PAN premium assistance and FPS 340B program.   He did not have questions or concerns related to out of pocket costs or coverage.    Health Literacy/Adjustment to Illness:    Orlin keeps an annual visit schedule, and reaches out to the center as needed.  He is proactive in meeting his health care needs and is very organized when managing his benefits and finances.  He has maintained access to health insurance, resources, and community resources.      Medicalert:  Has old bracelet, not " interested in wearing jewelry.  Will order [employer] ID wallet Card for patient.    Advanced Care Planning:  Updated HCD was brought to today's visit.  A copy was obtained and sent to [employer] for updating.  Also requested [employer] update pt's NOK from his niece Amy to his niece Anny.    Authorization to discuss PHI:  Not completed, pt would not want to list anyone on this form.  Electronic Consent for email: Declined, does not use.  Electronic Consent for text messaging:  Declined, does not use.    Pt's mychart was deactivated today per his request as well.    Interventions Utilized:  Assessment of strengths and needs  Assessment of impact of living with a chronic health condition, overall adjustment, and current coping skills  Normalized and validated feelings and experiences  Provided positive feedback and encouragement  Encouraged ongoing self-care and continued attention to self-care  Discussed Advanced Care Planning  Discussed Applicable Community Resources: [employer] wallet card.  Case Coordination with CBCD Team    Impressions/Recommendations:    Orlin Alcantar is a 72 year old male who presented to [facility] at [facility] this date for a routine comprehensive clinic evaluation.  He presented today as alert, oriented and engaged and they were welcoming of social work visit.    Orlin keeps an annual visit schedule, and reaches out to the center as needed.  He is proactive in meeting his health care needs and is very organized when managing his benefits and finances.  He has maintained access to health insurance, resources, and community resources. Orlin reported purchasing a used car at a great price within the past year, which has alleviated many of his mental health concerns, as maintaining independence is very important to him.  His HCD document was reviewed today and a copy was sent to [employer] for his medical record. He has  good coping skills and advocates well for himself and his needs.    Plan:   [employer] wallet card was requested for patient via [facility].  There were no ongoing psychosocial needs identified.  Writer remains available to assist as needs arise, and Orlin was encouraged to contact writer as needed.    MARIN Alberts,LICSW, ACM  Clinical   [facility]  Office: [phone]  Main: [phone]

## 2022-10-18 NOTE — NURSING NOTE
Teaching Flowsheet   Orlin Alcantar  has a diagnosis of HEMOPHILIA A  with a baseline factor  FACTOR VIII of   <1 % . He  presents today for his  comprehensive Hemophilia clinic evaluation.  Teaching Topic: hemophilia self care     Person(s) involved in teaching:   Patient     Motivation Level:  Asks Questions: Yes   Eager to Learn: Yes  Cooperative: Yes   Receptive (willing/able to accept information): Yes     Patient demonstrates understanding of the following:  Reason for the appointment, diagnosis and treatment plan: Yes  Knowledge of proper use of medications and conditions for which they are ordered (with special attention to potential side effects or drug interactions): Yes  Which situations necessitate calling provider and whom to contact: Yes      Proper use and care of   (medical equip, care aids, etc.): NA  Nutritional needs and diet plan: NA  Pain management techniques: Yes  Wound Care: NA  How and/when to access community resources: Yes        Orlin Alcantar   uses Kogenate brand factor VIII concentrate and treats prophylactically; using peripheral venipuncture independently mostly into right antecubital. His typical dose   is 3000 units. He keeps track of his infusions on paper records.       Reviewed procedure for home infusions of factor concentrates.      Emergency treatment recommendations were generated and are visible in the Snapshot view.      Reviewed severe/life threatening hemorrhage and recommended that if they suspect bleeding in head, neck, throat or abdomen, they should give dose of factor if able. They should then contact the hemophilia center immediately or report to the Emergency Room at [facility] or the nearest emergency room.      Discussed need for dental check-ups and prophylactic antibiotics for dental procedures.      Patient's primary provider is [facility]. Twin Le [facility].    Medications and allergies were reviewed and updated by  rooming staff.    Reviewed and discussed the patient instructions point by point and patient verbalized understanding. Copy of the After Visit Summary (AVS) given to patient for future reference. Patient given the contact card for the [facility] and has the appropriate numbers to call with any questions.    Margot Reed RN - Nurse Clinician - [facility] - [phone]

## 2022-10-19 ENCOUNTER — DOCUMENTATION ONLY (OUTPATIENT)
Dept: HEMATOLOGY | Facility: CLINIC | Age: 72
End: 2022-10-19

## 2022-10-19 DIAGNOSIS — Z85.51 PERSONAL HISTORY OF MALIGNANT NEOPLASM OF BLADDER: ICD-10-CM

## 2022-10-19 DIAGNOSIS — G89.4 CHRONIC PAIN SYNDROME: ICD-10-CM

## 2022-10-19 DIAGNOSIS — D66 SEVERE HEMOPHILIA A (H): ICD-10-CM

## 2022-10-19 RX ORDER — ANTIHEMOPHILIC FACTOR (RECOMBINANT) 1000 (+/-)
KIT INTRAVENOUS
Qty: 12786 EACH | Refills: 0 | Status: SHIPPED | OUTPATIENT
Start: 2022-10-19 | End: 2022-10-19

## 2022-10-19 RX ORDER — ANTIHEMOPHILIC FACTOR (RECOMBINANT) 1000 (+/-)
KIT INTRAVENOUS
Qty: 13288 EACH | Refills: 0 | Status: SHIPPED | OUTPATIENT
Start: 2022-10-19 | End: 2022-10-21

## 2022-10-19 RX ORDER — OXYCODONE HYDROCHLORIDE 5 MG/1
5 TABLET ORAL EVERY 8 HOURS PRN
Qty: 84 TABLET | Refills: 0 | Status: SHIPPED | OUTPATIENT
Start: 2022-10-19 | End: 2022-11-23

## 2022-10-19 NOTE — TELEPHONE ENCOUNTER
*Medicare refill - please check units and qty*    *Need Rx by: Monday, October 24th by 12 pm    *Last comp visit: 10/18/2022    *Drug: Kogenate at 4 doses of 3322 units    *Refills left: 0    Thanks,    MICHAEL Cameron, [facility]  Lead Pharmacy Coordinator  [facility]  [phone]

## 2022-10-19 NOTE — PROGRESS NOTES
Sofía met with Orlin Alcantar on October 18, 2022 during his annual comprehensive clinic visit with the [facility].    We discussed how his weekly factor delivery was going.  He would like to keep schedule. He was changed from tylenol to Celebrex.  He took his factor and his new rx for Celebrex home with him.  He also got a flu shot and a pfizer covid booster.  He will continue to get Kogenate while it is available.     I spent 10  minutes face to face in clinic today with Orlni Duffy, Pharmacy Coordinator   [facility]  [phone]

## 2022-10-19 NOTE — TELEPHONE ENCOUNTER
*Medicare refill - please check units and qty*    *Need Rx by: 10/24/2022    *Last comp visit: 10/18/2022    *Drug: Kogenate at 4 doses of 3322 units & Oxycodone 5 mg for 84 tabs to keep on file until patient is due for it    *Refills left: 0    Thanks,    MICHAEL Cameron, [facility]  Lead Pharmacy Coordinator  [facility]  [phone]

## 2022-10-20 DIAGNOSIS — F41.1 ANXIETY STATE: ICD-10-CM

## 2022-10-20 DIAGNOSIS — F51.04 PSYCHOPHYSIOLOGIC INSOMNIA: ICD-10-CM

## 2022-10-21 RX ORDER — MIRTAZAPINE 30 MG/1
TABLET, FILM COATED ORAL
Qty: 90 TABLET | Refills: 0 | Status: SHIPPED | OUTPATIENT
Start: 2022-10-21 | End: 2023-04-19 | Stop reason: DRUGHIGH

## 2022-10-21 NOTE — TELEPHONE ENCOUNTER
Prescription approved per [facility] Refill Protocol.    No MyChart; please reach out to patient to schedule follow up in late November per last progress note.    ANISHA Field RN  [facility]

## 2022-11-01 DIAGNOSIS — Z85.51 PERSONAL HISTORY OF MALIGNANT NEOPLASM OF BLADDER: ICD-10-CM

## 2022-11-01 DIAGNOSIS — D66 SEVERE HEMOPHILIA A (H): ICD-10-CM

## 2022-11-01 RX ORDER — ANTIHEMOPHILIC FACTOR (RECOMBINANT) 1000 (+/-)
KIT INTRAVENOUS
Qty: 12864 EACH | Refills: 4 | Status: SHIPPED | OUTPATIENT
Start: 2022-11-01 | End: 2022-11-23

## 2022-11-01 NOTE — TELEPHONE ENCOUNTER
*Medicare refill - please check units and qty*    *Need Rx by: Monday, November 7th    *Last comp visit: 10/18/2022    *Drug: Kogenate at 4 doses of 3216 units    *Refills left: 3    *Notes: Patient is willing to use up 44 x 1072 unit vials that both [facility] pharmacy has in stock since Kogenate is being discontinued.    Thanks,    MICHAEL Cameron, [facility]  Lead Pharmacy Coordinator  [facility]  [phone]

## 2022-11-21 ENCOUNTER — TELEPHONE (OUTPATIENT)
Dept: ONCOLOGY | Facility: CLINIC | Age: 72
End: 2022-11-21

## 2022-11-21 NOTE — TELEPHONE ENCOUNTER
Patient calling to request that a CT scan be scheduled - last was 9/10/2022 and he gets CT and blood work q 6 months.      He is wondering if these can be scheduled and if someone can call him.

## 2022-11-23 DIAGNOSIS — G89.4 CHRONIC PAIN SYNDROME: ICD-10-CM

## 2022-11-23 DIAGNOSIS — Z85.51 PERSONAL HISTORY OF MALIGNANT NEOPLASM OF BLADDER: ICD-10-CM

## 2022-11-23 DIAGNOSIS — D66 SEVERE HEMOPHILIA A (H): ICD-10-CM

## 2022-11-23 RX ORDER — OXYCODONE HYDROCHLORIDE 5 MG/1
5 TABLET ORAL EVERY 8 HOURS PRN
Qty: 84 TABLET | Refills: 0 | Status: SHIPPED | OUTPATIENT
Start: 2022-11-23 | End: 2022-12-13

## 2022-11-23 RX ORDER — ANTIHEMOPHILIC FACTOR (RECOMBINANT) 1000 (+/-)
KIT INTRAVENOUS
Qty: 12866 EACH | Refills: 0 | Status: SHIPPED | OUTPATIENT
Start: 2022-11-23 | End: 2022-12-05

## 2022-11-23 NOTE — TELEPHONE ENCOUNTER
*Medicare refill - please check units and qty*    *Need Rx by: Monday, November 28th by 2 pm    *Last comp visit: 10/18/2022    *Drug 1: Kogenate at 3 doses of 3216 units + 1 dose of 3218     *Refills left: 2    *Drug 2: Oxycodone 5 mg, qty: 84 tablets (Need rx to keep on file for his next fill in a couple weeks)     Thanks,    MICHAEL Cameron, [facility]  Lead Pharmacy Coordinator  [facility]  [phone]

## 2022-12-05 DIAGNOSIS — Z85.51 PERSONAL HISTORY OF MALIGNANT NEOPLASM OF BLADDER: ICD-10-CM

## 2022-12-05 DIAGNOSIS — D66 SEVERE HEMOPHILIA A (H): ICD-10-CM

## 2022-12-05 RX ORDER — ANTIHEMOPHILIC FACTOR (RECOMBINANT) 1000 (+/-)
KIT INTRAVENOUS
Qty: 13163 EACH | Refills: 0 | Status: SHIPPED | OUTPATIENT
Start: 2022-12-05 | End: 2022-12-13

## 2022-12-05 NOTE — TELEPHONE ENCOUNTER
*Medicare refill - please check units and qty*    *Need Rx by: Today, Edison 5th by 3:30 pm    *Last comp visit: 10/18/2022    *Drug: Kogeante at 1 doses of 3380 units + 3 doses of 3261 units     *Refills left: 0    Thanks,    DEMETRIUS Cameron.S., [facility]  Lead Pharmacy Coordinator  [facility]  [phone]

## 2022-12-06 ENCOUNTER — OFFICE VISIT (OUTPATIENT)
Dept: FAMILY MEDICINE | Facility: CLINIC | Age: 72
End: 2022-12-06
Payer: MEDICARE

## 2022-12-06 VITALS
HEIGHT: 72 IN | BODY MASS INDEX: 21.54 KG/M2 | WEIGHT: 159 LBS | TEMPERATURE: 97.1 F | HEART RATE: 73 BPM | SYSTOLIC BLOOD PRESSURE: 150 MMHG | OXYGEN SATURATION: 97 % | DIASTOLIC BLOOD PRESSURE: 84 MMHG | RESPIRATION RATE: 16 BRPM

## 2022-12-06 DIAGNOSIS — I10 HYPERTENSION GOAL BP (BLOOD PRESSURE) < 140/90: ICD-10-CM

## 2022-12-06 DIAGNOSIS — F41.1 ANXIETY STATE: Primary | ICD-10-CM

## 2022-12-06 DIAGNOSIS — F51.04 PSYCHOPHYSIOLOGIC INSOMNIA: ICD-10-CM

## 2022-12-06 PROCEDURE — 99214 OFFICE O/P EST MOD 30 MIN: CPT | Performed by: FAMILY MEDICINE

## 2022-12-06 RX ORDER — MIRTAZAPINE 45 MG/1
45 TABLET, FILM COATED ORAL AT BEDTIME
Qty: 90 TABLET | Refills: 1 | Status: SHIPPED | OUTPATIENT
Start: 2022-12-06 | End: 2023-04-19

## 2022-12-06 ASSESSMENT — ANXIETY QUESTIONNAIRES
5. BEING SO RESTLESS THAT IT IS HARD TO SIT STILL: NOT AT ALL
1. FEELING NERVOUS, ANXIOUS, OR ON EDGE: NOT AT ALL
8. IF YOU CHECKED OFF ANY PROBLEMS, HOW DIFFICULT HAVE THESE MADE IT FOR YOU TO DO YOUR WORK, TAKE CARE OF THINGS AT HOME, OR GET ALONG WITH OTHER PEOPLE?: SOMEWHAT DIFFICULT
2. NOT BEING ABLE TO STOP OR CONTROL WORRYING: NOT AT ALL
IF YOU CHECKED OFF ANY PROBLEMS ON THIS QUESTIONNAIRE, HOW DIFFICULT HAVE THESE PROBLEMS MADE IT FOR YOU TO DO YOUR WORK, TAKE CARE OF THINGS AT HOME, OR GET ALONG WITH OTHER PEOPLE: SOMEWHAT DIFFICULT
3. WORRYING TOO MUCH ABOUT DIFFERENT THINGS: SEVERAL DAYS
7. FEELING AFRAID AS IF SOMETHING AWFUL MIGHT HAPPEN: SEVERAL DAYS
4. TROUBLE RELAXING: SEVERAL DAYS
GAD7 TOTAL SCORE: 3
7. FEELING AFRAID AS IF SOMETHING AWFUL MIGHT HAPPEN: SEVERAL DAYS
6. BECOMING EASILY ANNOYED OR IRRITABLE: NOT AT ALL
GAD7 TOTAL SCORE: 3
GAD7 TOTAL SCORE: 3

## 2022-12-06 ASSESSMENT — PATIENT HEALTH QUESTIONNAIRE - PHQ9
10. IF YOU CHECKED OFF ANY PROBLEMS, HOW DIFFICULT HAVE THESE PROBLEMS MADE IT FOR YOU TO DO YOUR WORK, TAKE CARE OF THINGS AT HOME, OR GET ALONG WITH OTHER PEOPLE: SOMEWHAT DIFFICULT
SUM OF ALL RESPONSES TO PHQ QUESTIONS 1-9: 1
SUM OF ALL RESPONSES TO PHQ QUESTIONS 1-9: 1

## 2022-12-06 NOTE — PROGRESS NOTES
Assessment & Plan     Anxiety state  Discussed he is on the max recommended dose.  Also discussed side effects with it and he understood.  Tolerating it well and we agreed to stick to the same Rx for now but if symptoms are worsening we should consider reevaluation of the treatment options and he agreed.  - mirtazapine (REMERON) 45 MG tablet; Take 1 tablet (45 mg) by mouth At Bedtime    Psychophysiologic insomnia  See above.  - mirtazapine (REMERON) 45 MG tablet; Take 1 tablet (45 mg) by mouth At Bedtime    Hypertension goal BP (blood pressure) < 140/90  Blood pressure is above goal.  Chronic NSAID use and chronic pain mainly worsening symptoms.  On 20 mg of lisinopril.  He is going to contact us back in a month or so and blood pressure is above goal we will increase lisinopril dose..                 No follow-ups on file.    Twin Le MD, MD  [facility]    Vito Ordaz is a 72 year old, presenting for the following health issues:  Recheck Medication (Anxiety Med Follow up  )      History of Present Illness       Mental Health Follow-up:  Patient presents to follow-up on Anxiety.    Patient's anxiety since last visit has been:  Medium  The patient is not having other symptoms associated with anxiety.  Any significant life events: No  Patient is not feeling anxious or having panic attacks.  Patient has no concerns about alcohol or drug use.    He eats 2-3 servings of fruits and vegetables daily.He consumes 1 sweetened beverage(s) daily.He exercises with enough effort to increase his heart rate 9 or less minutes per day.  He exercises with enough effort to increase his heart rate 3 or less days per week.   He is taking medications regularly.    Today's PHQ-9         PHQ-9 Total Score: 1    PHQ-9 Q9 Thoughts of better off dead/self-harm past 2 weeks :   Not at all    How difficult have these problems made it for you to do your work, take care of things at home, or get  along with other people: Somewhat difficult  Today's RATNA-7 Score: 3     Taking remeron 30mg. Used to take 45 mg dose.   Takes 30mg qpm but wakes up around 5 hrs later and takes another 1/2 tab at that time.   Would like to go to 45 mg dose.     Trouble falling a sleep and mind does not shut down.     kary had lisinopril this morning. havent check bp at home.     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    Had flu and covid shot though pharmacy.       Review of Systems         Objective    Pulse 73   Temp 97.1  F (36.2  C)   Resp 16   Ht 1.829 m (6')   Wt 72.1 kg (159 lb)   SpO2 97%   BMI 21.56 kg/m    Body mass index is 21.56 kg/m .  Physical Exam   Left elbow flexion contracture and unable to extend it completely.

## 2022-12-06 NOTE — PATIENT INSTRUCTIONS
Check your bp periodically and goal is below 140/90.  Call clinic and ask for triage nurse - report your blood pressure in a month or so.     =======================================  FYI:     System will autorelease results as soon as they are available. I wait for all results to be ready before sending you a comment or message.  Be assured I will review and comment on all of your results as soon as I can.     I am at [facility]  ([phone]) usually Monday, Tuesday and Wednesday.   Messages, evisits, phone calls received on Thursday and Friday may not get responded very urgently but I will try to respond as soon as possible.     2) My schedule has been booking out far in advance.  I apologize for the lack of timely access.  If you need to be seen for a chronic condition or preventive (wellness) visit, please be sure to schedule that appointment 2-3 months in advance.  If you have a concern that you feel cannot wait until my next available appointment (such as a hospital follow-up or new symptom of concern) please ask to speak to one of the [place] nurses who may be able to access a sooner appointment.      You can schedule a video visit for follow-up appointments as well as future appointments for certain conditions.  Please see the below link.     Video Visits ([email or web address])     If you have not already done so,  I encourage you to sign up for [email or web address] ([email or web address]).  This will allow you to review your results, securely communicate with a provider, and schedule virtual visits as well.

## 2022-12-12 ENCOUNTER — MEDICAL CORRESPONDENCE (OUTPATIENT)
Dept: HEALTH INFORMATION MANAGEMENT | Facility: CLINIC | Age: 72
End: 2022-12-12

## 2022-12-13 DIAGNOSIS — D66 SEVERE HEMOPHILIA A (H): ICD-10-CM

## 2022-12-13 DIAGNOSIS — G89.4 CHRONIC PAIN SYNDROME: ICD-10-CM

## 2022-12-13 DIAGNOSIS — Z85.51 PERSONAL HISTORY OF MALIGNANT NEOPLASM OF BLADDER: ICD-10-CM

## 2022-12-13 RX ORDER — ANTIHEMOPHILIC FACTOR (RECOMBINANT) 1000 (+/-)
KIT INTRAVENOUS
Qty: 13044 EACH | Refills: 6 | Status: SHIPPED | OUTPATIENT
Start: 2022-12-13 | End: 2023-01-30

## 2022-12-13 RX ORDER — OXYCODONE HYDROCHLORIDE 5 MG/1
5 TABLET ORAL EVERY 8 HOURS PRN
Qty: 84 TABLET | Refills: 0 | Status: SHIPPED | OUTPATIENT
Start: 2022-12-13 | End: 2023-01-30

## 2022-12-13 NOTE — TELEPHONE ENCOUNTER
*Medicare refill - please check units and qty*    *Need Rx by: Today, December 13th by 3 pm    *Last comp visit: 10/18/2022    *Drug 1: Kogenate at 4 doses of 3261 units    *Refills left: 0    *Notes: Please add refills to Kogenate, rx only is a 1 week supply at a time.     *Drug 2: Oxycodone 5 mg (to keep on file for his next fill in 3 weeks or so)    Thanks,    MICHAEL Cameron, [facility]  Lead Pharmacy Coordinator  [facility]  [phone]

## 2023-01-16 ENCOUNTER — TELEPHONE (OUTPATIENT)
Dept: FAMILY MEDICINE | Facility: CLINIC | Age: 73
End: 2023-01-16

## 2023-01-16 DIAGNOSIS — I10 HYPERTENSION GOAL BP (BLOOD PRESSURE) < 140/90: ICD-10-CM

## 2023-01-16 RX ORDER — LISINOPRIL 30 MG/1
30 TABLET ORAL DAILY
Qty: 90 TABLET | Refills: 0 | Status: SHIPPED | OUTPATIENT
Start: 2023-01-16 | End: 2023-04-19

## 2023-01-16 NOTE — TELEPHONE ENCOUNTER
I called pt and relayed message. He understands and agrees with plan.    SHILOH ThompsonN RN  [facility]

## 2023-01-16 NOTE — TELEPHONE ENCOUNTER
Patient faxed home bp readings (will scan it). - average is around 140-145/85-93.  So we should go up on dose of lisinpril to 30mg per day.   New rx signed.  Ok to use 1.5 tablet of 20mg dose.   He should continue home bp monitoring and submit report again in a month.

## 2023-02-26 ENCOUNTER — MEDICAL CORRESPONDENCE (OUTPATIENT)
Dept: HEALTH INFORMATION MANAGEMENT | Facility: CLINIC | Age: 73
End: 2023-02-26

## 2023-02-27 DIAGNOSIS — D66 SEVERE HEMOPHILIA A (H): ICD-10-CM

## 2023-02-27 DIAGNOSIS — G89.4 CHRONIC PAIN SYNDROME: ICD-10-CM

## 2023-02-27 RX ORDER — OXYCODONE HYDROCHLORIDE 5 MG/1
5 TABLET ORAL EVERY 8 HOURS PRN
Qty: 84 TABLET | Refills: 0 | Status: SHIPPED | OUTPATIENT
Start: 2023-02-27 | End: 2023-03-13

## 2023-02-27 NOTE — TELEPHONE ENCOUNTER
Need by: Tomorrow, February 28th by 0 am    Patient last filled 28 day supply of Oxycodone on 1/30/2023    MICHAEL Cameron, [facility]  Lead Pharmacy Coordinator  [facility]  [phone]

## 2023-03-13 DIAGNOSIS — D66 SEVERE HEMOPHILIA A (H): Primary | ICD-10-CM

## 2023-03-13 DIAGNOSIS — D66 HEMOPHILIC ARTHROPATHY (H): ICD-10-CM

## 2023-03-13 DIAGNOSIS — M36.2 HEMOPHILIC ARTHROPATHY (H): ICD-10-CM

## 2023-03-13 RX ORDER — CODEINE SULFATE 30 MG/1
30 TABLET ORAL EVERY 8 HOURS
Qty: 84 TABLET | Refills: 0 | Status: SHIPPED | OUTPATIENT
Start: 2023-03-13 | End: 2023-04-13

## 2023-03-13 RX ORDER — ACETAMINOPHEN 500 MG
500-1000 TABLET ORAL PRN
Status: ON HOLD | COMMUNITY
Start: 2023-03-13 | End: 2023-05-18

## 2023-03-13 NOTE — CONFIDENTIAL NOTE
Patient requesting transition from oxycodone to Codeine.   Currently taking APAP 500mg up to 5 tabs per day.   Taking celebrex 100mg once daily as needed - typically twice per week.   Discussed taking celebrex and apap daily and then using codeine PRN.  Will trial for one month to determine efficacy.    Patient open to phone call follow up in 3 weeks for update.     NETTIE CLINE PA-C on 3/13/2023 at 9:55 AM

## 2023-03-27 ENCOUNTER — LAB (OUTPATIENT)
Dept: LAB | Facility: CLINIC | Age: 73
End: 2023-03-27
Attending: PHYSICIAN ASSISTANT
Payer: MEDICARE

## 2023-03-27 ENCOUNTER — ANCILLARY PROCEDURE (OUTPATIENT)
Dept: CT IMAGING | Facility: CLINIC | Age: 73
End: 2023-03-27
Attending: PHYSICIAN ASSISTANT
Payer: MEDICARE

## 2023-03-27 ENCOUNTER — TELEPHONE (OUTPATIENT)
Dept: UROLOGY | Facility: CLINIC | Age: 73
End: 2023-03-27

## 2023-03-27 DIAGNOSIS — D66 SEVERE HEMOPHILIA A (H): ICD-10-CM

## 2023-03-27 DIAGNOSIS — Z85.51 PERSONAL HISTORY OF MALIGNANT NEOPLASM OF BLADDER: ICD-10-CM

## 2023-03-27 DIAGNOSIS — C67.9 UROTHELIAL CARCINOMA OF BLADDER (H): ICD-10-CM

## 2023-03-27 LAB
ALBUMIN SERPL BCG-MCNC: 4.7 G/DL (ref 3.5–5.2)
ALP SERPL-CCNC: 99 U/L (ref 40–129)
ALT SERPL W P-5'-P-CCNC: 9 U/L (ref 10–50)
ANION GAP SERPL CALCULATED.3IONS-SCNC: 16 MMOL/L (ref 7–15)
AST SERPL W P-5'-P-CCNC: 15 U/L (ref 10–50)
BASOPHILS # BLD AUTO: 0 10E3/UL (ref 0–0.2)
BASOPHILS NFR BLD AUTO: 0 %
BILIRUB SERPL-MCNC: 1 MG/DL
BUN SERPL-MCNC: 28.1 MG/DL (ref 8–23)
CALCIUM SERPL-MCNC: 9.9 MG/DL (ref 8.8–10.2)
CHLORIDE SERPL-SCNC: 104 MMOL/L (ref 98–107)
CREAT SERPL-MCNC: 1 MG/DL (ref 0.67–1.17)
DEPRECATED HCO3 PLAS-SCNC: 20 MMOL/L (ref 22–29)
EOSINOPHIL # BLD AUTO: 0.3 10E3/UL (ref 0–0.7)
EOSINOPHIL NFR BLD AUTO: 3 %
ERYTHROCYTE [DISTWIDTH] IN BLOOD BY AUTOMATED COUNT: 14.6 % (ref 10–15)
GFR SERPL CREATININE-BSD FRML MDRD: 79 ML/MIN/1.73M2
GLUCOSE SERPL-MCNC: 140 MG/DL (ref 70–99)
HCT VFR BLD AUTO: 41.8 % (ref 40–53)
HGB BLD-MCNC: 13.7 G/DL (ref 13.3–17.7)
IMM GRANULOCYTES # BLD: 0 10E3/UL
IMM GRANULOCYTES NFR BLD: 0 %
LYMPHOCYTES # BLD AUTO: 0.9 10E3/UL (ref 0.8–5.3)
LYMPHOCYTES NFR BLD AUTO: 9 %
MCH RBC QN AUTO: 27.5 PG (ref 26.5–33)
MCHC RBC AUTO-ENTMCNC: 32.8 G/DL (ref 31.5–36.5)
MCV RBC AUTO: 84 FL (ref 78–100)
MONOCYTES # BLD AUTO: 0.8 10E3/UL (ref 0–1.3)
MONOCYTES NFR BLD AUTO: 8 %
NEUTROPHILS # BLD AUTO: 8.1 10E3/UL (ref 1.6–8.3)
NEUTROPHILS NFR BLD AUTO: 80 %
NRBC # BLD AUTO: 0 10E3/UL
NRBC BLD AUTO-RTO: 0 /100
PLATELET # BLD AUTO: 211 10E3/UL (ref 150–450)
POTASSIUM SERPL-SCNC: 4 MMOL/L (ref 3.4–5.3)
PROT SERPL-MCNC: 8 G/DL (ref 6.4–8.3)
RBC # BLD AUTO: 4.98 10E6/UL (ref 4.4–5.9)
SODIUM SERPL-SCNC: 140 MMOL/L (ref 136–145)
WBC # BLD AUTO: 10.1 10E3/UL (ref 4–11)

## 2023-03-27 PROCEDURE — 74177 CT ABD & PELVIS W/CONTRAST: CPT | Mod: MG | Performed by: RADIOLOGY

## 2023-03-27 PROCEDURE — 71260 CT THORAX DX C+: CPT | Mod: MG | Performed by: RADIOLOGY

## 2023-03-27 PROCEDURE — 88112 CYTOPATH CELL ENHANCE TECH: CPT | Mod: 26 | Performed by: PATHOLOGY

## 2023-03-27 PROCEDURE — G1010 CDSM STANSON: HCPCS | Performed by: RADIOLOGY

## 2023-03-27 PROCEDURE — 999N000128 HC STATISTIC PERIPHERAL IV START W/O US GUIDANCE

## 2023-03-27 PROCEDURE — 88112 CYTOPATH CELL ENHANCE TECH: CPT | Mod: TC

## 2023-03-27 PROCEDURE — 85025 COMPLETE CBC W/AUTO DIFF WBC: CPT

## 2023-03-27 PROCEDURE — 80053 COMPREHEN METABOLIC PANEL: CPT

## 2023-03-27 PROCEDURE — 999N000285 HC STATISTIC VASC ACCESS LAB DRAW WITH PIV START

## 2023-03-27 PROCEDURE — 36415 COLL VENOUS BLD VENIPUNCTURE: CPT

## 2023-03-27 RX ORDER — ANTIHEMOPHILIC FACTOR (RECOMBINANT) 1000 (+/-)
KIT INTRAVENOUS
Qty: 13044 EACH | Refills: 0 | Status: SHIPPED | OUTPATIENT
Start: 2023-03-27 | End: 2023-03-27

## 2023-03-27 RX ORDER — ANTIHEMOPHILIC FACTOR (RECOMBINANT) 1000 (+/-)
KIT INTRAVENOUS
Qty: 13044 EACH | Refills: 3 | Status: SHIPPED | OUTPATIENT
Start: 2023-03-27 | End: 2023-04-20

## 2023-03-27 RX ORDER — IOPAMIDOL 755 MG/ML
104 INJECTION, SOLUTION INTRAVASCULAR ONCE
Status: COMPLETED | OUTPATIENT
Start: 2023-03-27 | End: 2023-03-27

## 2023-03-27 RX ADMIN — IOPAMIDOL 104 ML: 755 INJECTION, SOLUTION INTRAVASCULAR at 08:37

## 2023-03-27 NOTE — CONFIDENTIAL NOTE
RNCC phoned patient and discussed stone and consult.    Pt having some blood in the urine x 2 weeks off and on but no other symptoms.    Scheduled for vv with Dr Thomas    Patient verbalized understanding and agreeable to plan      [facility]  [phone]

## 2023-03-27 NOTE — TELEPHONE ENCOUNTER
*Medicare refill - please check units and qty*    *Need Rx by: Today, March 27th by 3:30 pm    *Last comp visit: 10/18/2022    *Drug: Kogenate at 4 doses of 3261 units    *Refills left: 0    Thanks,    MICHAEL Cameron, [facility]  Lead Pharmacy Coordinator  [facility]  [phone]

## 2023-03-27 NOTE — NURSING NOTE
Chief Complaint   Patient presents with     Blood Draw     Labs drawn via PIV start.     Labs drawn with PIV start by rn.  Pt tolerated well.      Dmitriy Castellon RN

## 2023-03-28 ENCOUNTER — TELEPHONE (OUTPATIENT)
Dept: HEMATOLOGY | Facility: CLINIC | Age: 73
End: 2023-03-28

## 2023-03-28 ENCOUNTER — VIRTUAL VISIT (OUTPATIENT)
Dept: UROLOGY | Facility: CLINIC | Age: 73
End: 2023-03-28
Payer: MEDICARE

## 2023-03-28 DIAGNOSIS — N20.0 URIC ACID KIDNEY STONE: Primary | ICD-10-CM

## 2023-03-28 PROCEDURE — 99442 PR PHYSICIAN TELEPHONE EVALUATION 11-20 MIN: CPT | Performed by: UROLOGY

## 2023-03-28 RX ORDER — POTASSIUM CITRATE 10 MEQ/1
20 TABLET, EXTENDED RELEASE ORAL 2 TIMES DAILY
Qty: 60 TABLET | Refills: 3 | Status: SHIPPED | OUTPATIENT
Start: 2023-03-28 | End: 2023-05-10

## 2023-03-28 RX ORDER — SODIUM BICARBONATE 650 MG/1
650 TABLET ORAL 2 TIMES DAILY
Qty: 60 TABLET | Refills: 1 | Status: SHIPPED | OUTPATIENT
Start: 2023-03-28 | End: 2023-05-10

## 2023-03-28 NOTE — LETTER
3/28/2023       RE: Orlin Alcantar  [address]  [place] [address]     Dear Colleague,    Thank you for referring your patient, Orlin Alcantar, to the [facility] [place] [facility]. Please see a copy of my visit note below.          UROLOGY TELEPHONE VISIT      Chief Complaint:   Left Renal Stone      Synopsis    Orlin Alcantar is a very pleasant AGE: 73 year old year old person    Visit conducted over telephone as patient does not have internet access  He was recently seen by his primary medical physician for gross hematuria  Work-up revealed a large greater than 2 cm left renal pelvis stone  Of note medical history is complicated by history of severe hemophilia  Urologic history notable for prior cystoprostatectomy with ileal conduit  He has no evidence of pain he has not had prior stones he has no systemic signs of illness or infection    On my personal review of the CT scan the stone is not visible on the  films, the Hounsfield units are in the 500 range suggesting uric acid         Medications     Current Outpatient Medications   Medication     acetaminophen (TYLENOL) 500 MG tablet     Antihem Factor Recomb, rFVIII, (KOGENATE FS) 1000 units KIT     celecoxib (CELEBREX) 100 MG capsule     codeine 30 MG tablet     lisinopril (ZESTRIL) 30 MG tablet     mirtazapine (REMERON) 45 MG tablet     naloxone (EVZIO) 0.4 MG/0.4ML auto-injector     potassium citrate (UROCIT-K) 10 MEQ (1080 MG) CR tablet     sodium bicarbonate 650 MG tablet     mirtazapine (REMERON) 30 MG tablet     No current facility-administered medications for this visit.     Facility-Administered Medications Ordered in Other Visits   Medication     ethyl chloride spray         The following  distinct labs were reviewed    I personally reviewed all applicable laboratory data and went over findings with patient  Significant for:    CBC RESULTS:  Recent Labs   Lab  Test 03/27/23  0744 09/22/22  0737 03/08/22  0928 09/08/21  0833   WBC 10.1 5.1 4.7 5.1   HGB 13.7 13.9 13.2* 13.4    230 207 267        BMP RESULTS:  Recent Labs   Lab Test 03/27/23  0744 09/22/22  0812 09/22/22  0737 03/08/22  0928 09/08/21  0858 09/08/21  0833 03/01/21  0841 03/01/21  0810 09/26/20  0819 08/05/20  1105   0000     --  140 138  --  143  --  142  --   --   --    POTASSIUM 4.0  --  4.1 3.8  --  4.0  --  3.9  --   --   --    CHLORIDE 104  --  104 106  --  111*  --  112*  --   --   --    CO2 20*  --  23 24  --  24  --  19*  --   --   --    ANIONGAP 16*  --  13 8  --  8  --  10  --   --   --    *  --  104* 98  --  104*  --  100*  --   --   --    BUN 28.1*  --  25.2* 28  --  19  --  27  --   --   --    CR 1.00 1.1 1.04 1.02   < > 1.04  --  1.08  --   --    < >   GFRESTIMATED 79 >60 76 78   < > 72 66 69 74 66   < >   GFRESTBLACK  --   --   --   --   --   --  80 80 89 80  --     < > = values in this interval not displayed.       CALCIUM RESULTS:  Recent Labs   Lab Test 03/27/23  0744 09/22/22  0737 03/08/22  0928 09/08/21  0833   MARLA 9.9 10.4* 9.1 9.2       HGB A1C RESULTS:  Lab Results   Component Value Date    A1C 6.9 07/25/2018    A1C 5.9 02/21/2018    A1C 6.1 02/20/2018    A1C 5.0 10/13/2014    A1C 5.5 05/01/2012       UA RESULTS:   Recent Labs   Lab Test 07/25/18  1306 02/07/18  1335 01/19/18  1200   SG 1.016 1.014 1.017   URINEPH 5.0 5.0 5.0   NITRITE Negative Negative Negative   RBCU 20* >182* >182*   WBCU >182* >182* >182*         Recent Imaging Report    I personally reviewed all applicable imaging and went over the below findings with patient.    Results for orders placed or performed in visit on 03/27/23   CT Chest/Abdomen/Pelvis w Contrast    Narrative    EXAM: CT CHEST/ABDOMEN/PELVIS W CONTRAST  LOCATION: [facility]  DATE/TIME: 3/27/2023 8:53 AM    INDICATION: History of bladder cancer.  COMPARISON: None.  TECHNIQUE: CT scan of  the chest, abdomen, and pelvis was performed following injection of IV contrast. Abdominal pelvic imaging performed utilizing urogram technique with multiphasic imaging. Multiplanar reformats were obtained. Dose reduction techniques   were used.   CONTRAST: Isovue 370   104 mL    FINDINGS:   LUNGS AND PLEURA: Lungs are clear. No pleural effusions. No new/enlarging suspicious nodules. Several scattered solid nodules measuring maximally 5 mm left lung base lingula and right lower lobe are unchanged. Calcified right upper lobe granulomas.   Unchanged emphysema and mild lung base interstitial scarring. Central airways are patent.    MEDIASTINUM/AXILLAE: No lymphadenopathy. Normal heart size without pericardial effusion. Normal caliber aorta.    CORONARY ARTERY CALCIFICATION: Mild.    HEPATOBILIARY: Few scattered stable tiny probable cysts. No suspicious new/enlarging or enhancing masses. Gallbladder distention with small gallstones. No evidence for wall thickening/inflammation or significant bile duct dilatation.    PANCREAS: Chronic atrophy. No mass, ductal dilatation, or pancreatitis.    SPLEEN: Normal size with punctate granulomas.    ADRENAL GLANDS: Normal.    KIDNEYS/BLADDER:     Upper tracts: Large calculus left renal pelvis has significantly increased now 22 x 10 x 15 mm with average density 520 Hounsfield units. Mild left pyelocaliectasis and diffuse thickening urothelium left collecting system indicating inflammation around   the stone. No evidence for significant obstruction, although stone could potentially intermittently partially obstruct the UPJ. Additional smaller left intrarenal calculi measure up to 8 - 10 mm. Single 3 mm nonobstructing right lower pole calculus.   Minor right pyelocaliectasis without obstruction. No ureteral calculi.    Mild chronic parenchymal lobulation with few small subcentimeter cysts requiring no follow-up. No enhancing masses.    Bladder: Surgical changes cystoprostatectomy with  right lower quadrant ileal conduit. No evidence for local recurrence.    BOWEL: No obstruction or inflammatory change. Surgical changes small bowel with anastomosis. Moderate formed colonic stool.    LYMPH NODES: No lymphadenopathy.    VASCULATURE: Normal caliber aorta. Moderate calcified atherosclerosis.    PELVIC ORGANS: Cystoprostatectomy. No CT evidence for recurrence.    MUSCULOSKELETAL: No acute findings or suspicious bone lesions visualized. Right total hip arthroplasty. Screw fixation left femoral neck.      Impression    IMPRESSION:  1.  No lymphadenopathy or other evidence for recurrent/metastatic malignancy.  2.  Cystoprostatectomy with right lower quadrant ileal conduit.  3.  Large left renal pelvis calculus measuring up to 2.2 cm, significantly enlarged with increased left collecting system urothelial thickening/inflammation and mild pyelocaliectasis. No current evidence for significant obstruction, but findings suggest   stone may intermittently obstruct the UPJ.  4.  Additional smaller nonobstructing left intrarenal calculi and single tiny right renal calculus.  5.  Stable scattered tiny pulmonary nodules. No new/enlarging nodules.  6.  No other acute findings or significant change.              Assessment/Plan   73 year old year old person with large left renal stone in the setting of hemophilia and urinary diversion  -We discussed the surgical management options for stones.  His anatomy would likely preclude successful retrograde procedure and the stone is too big for shockwave lithotripsy.  Typically a percutaneous nephrolithotomy would be recommended for stone of the size however this would be a high risk procedure given his history of hemophilia.  -I suspect the stone is uric acid in composition based on the low Hounsfield units.  We have thus made the shared decision to make a trial for urinary alkalinization.  We will start him on potassiums citrate at 20 mEq twice daily as well as sodium  bicarbonate at 650 mEq twice daily.  -I will have him come into the office for a basic metabolic and urine pH check in 1 week  -We will also update a KUB to ensure that the stone is not visible  -Would recommend getting his urine pH to a high level and then repeating CT imaging in the next 3 to 6 weeks  -I cautioned him about signs and symptoms of obstruction or infection he is aware to contact us or seek emergency care should he develop any acute renal colic    CC:  No Ref-Primary, Physician    30 minutes spent on clinical encounter including  Review of medical records  Review of outside records  Documentation  Coordinating follow-up medical care      11:00-11:18 on phone        Again, thank you for allowing me to participate in the care of your patient.      Sincerely,    Sacha Thomas MD

## 2023-03-28 NOTE — LETTER
Date:March 30, 2023      Provider requested that no letter be sent. Do not send.       [facility]

## 2023-03-28 NOTE — PROGRESS NOTES
UROLOGY TELEPHONE VISIT      Chief Complaint:   Left Renal Stone      Synopsis    Orlin Alcantar is a very pleasant AGE: 73 year old year old person    Visit conducted over telephone as patient does not have internet access  He was recently seen by his primary medical physician for gross hematuria  Work-up revealed a large greater than 2 cm left renal pelvis stone  Of note medical history is complicated by history of severe hemophilia  Urologic history notable for prior cystoprostatectomy with ileal conduit  He has no evidence of pain he has not had prior stones he has no systemic signs of illness or infection    On my personal review of the CT scan the stone is not visible on the  films, the Hounsfield units are in the 500 range suggesting uric acid         Medications     Current Outpatient Medications   Medication     acetaminophen (TYLENOL) 500 MG tablet     Antihem Factor Recomb, rFVIII, (KOGENATE FS) 1000 units KIT     celecoxib (CELEBREX) 100 MG capsule     codeine 30 MG tablet     lisinopril (ZESTRIL) 30 MG tablet     mirtazapine (REMERON) 45 MG tablet     naloxone (EVZIO) 0.4 MG/0.4ML auto-injector     potassium citrate (UROCIT-K) 10 MEQ (1080 MG) CR tablet     sodium bicarbonate 650 MG tablet     mirtazapine (REMERON) 30 MG tablet     No current facility-administered medications for this visit.     Facility-Administered Medications Ordered in Other Visits   Medication     ethyl chloride spray         The following  distinct labs were reviewed    I personally reviewed all applicable laboratory data and went over findings with patient  Significant for:    CBC RESULTS:  Recent Labs   Lab Test 03/27/23  0744 09/22/22  0737 03/08/22  0928 09/08/21  0833   WBC 10.1 5.1 4.7 5.1   HGB 13.7 13.9 13.2* 13.4    230 207 267        BMP RESULTS:  Recent Labs   Lab Test 03/27/23  0744 09/22/22  0812 09/22/22  0737 03/08/22  0928 09/08/21  0858 09/08/21  0833 03/01/21  0841 03/01/21  0810 09/26/20  0819  08/05/20  1105   0000     --  140 138  --  143  --  142  --   --   --    POTASSIUM 4.0  --  4.1 3.8  --  4.0  --  3.9  --   --   --    CHLORIDE 104  --  104 106  --  111*  --  112*  --   --   --    CO2 20*  --  23 24  --  24  --  19*  --   --   --    ANIONGAP 16*  --  13 8  --  8  --  10  --   --   --    *  --  104* 98  --  104*  --  100*  --   --   --    BUN 28.1*  --  25.2* 28  --  19  --  27  --   --   --    CR 1.00 1.1 1.04 1.02   < > 1.04  --  1.08  --   --    < >   GFRESTIMATED 79 >60 76 78   < > 72 66 69 74 66   < >   GFRESTBLACK  --   --   --   --   --   --  80 80 89 80  --     < > = values in this interval not displayed.       CALCIUM RESULTS:  Recent Labs   Lab Test 03/27/23  0744 09/22/22  0737 03/08/22  0928 09/08/21  0833   MARLA 9.9 10.4* 9.1 9.2       HGB A1C RESULTS:  Lab Results   Component Value Date    A1C 6.9 07/25/2018    A1C 5.9 02/21/2018    A1C 6.1 02/20/2018    A1C 5.0 10/13/2014    A1C 5.5 05/01/2012       UA RESULTS:   Recent Labs   Lab Test 07/25/18  1306 02/07/18  1335 01/19/18  1200   SG 1.016 1.014 1.017   URINEPH 5.0 5.0 5.0   NITRITE Negative Negative Negative   RBCU 20* >182* >182*   WBCU >182* >182* >182*         Recent Imaging Report    I personally reviewed all applicable imaging and went over the below findings with patient.    Results for orders placed or performed in visit on 03/27/23   CT Chest/Abdomen/Pelvis w Contrast    Narrative    EXAM: CT CHEST/ABDOMEN/PELVIS W CONTRAST  LOCATION: [facility]  DATE/TIME: 3/27/2023 8:53 AM    INDICATION: History of bladder cancer.  COMPARISON: None.  TECHNIQUE: CT scan of the chest, abdomen, and pelvis was performed following injection of IV contrast. Abdominal pelvic imaging performed utilizing urogram technique with multiphasic imaging. Multiplanar reformats were obtained. Dose reduction techniques   were used.   CONTRAST: Isovue 370   104 mL    FINDINGS:   LUNGS AND PLEURA: Lungs  are clear. No pleural effusions. No new/enlarging suspicious nodules. Several scattered solid nodules measuring maximally 5 mm left lung base lingula and right lower lobe are unchanged. Calcified right upper lobe granulomas.   Unchanged emphysema and mild lung base interstitial scarring. Central airways are patent.    MEDIASTINUM/AXILLAE: No lymphadenopathy. Normal heart size without pericardial effusion. Normal caliber aorta.    CORONARY ARTERY CALCIFICATION: Mild.    HEPATOBILIARY: Few scattered stable tiny probable cysts. No suspicious new/enlarging or enhancing masses. Gallbladder distention with small gallstones. No evidence for wall thickening/inflammation or significant bile duct dilatation.    PANCREAS: Chronic atrophy. No mass, ductal dilatation, or pancreatitis.    SPLEEN: Normal size with punctate granulomas.    ADRENAL GLANDS: Normal.    KIDNEYS/BLADDER:     Upper tracts: Large calculus left renal pelvis has significantly increased now 22 x 10 x 15 mm with average density 520 Hounsfield units. Mild left pyelocaliectasis and diffuse thickening urothelium left collecting system indicating inflammation around   the stone. No evidence for significant obstruction, although stone could potentially intermittently partially obstruct the UPJ. Additional smaller left intrarenal calculi measure up to 8 - 10 mm. Single 3 mm nonobstructing right lower pole calculus.   Minor right pyelocaliectasis without obstruction. No ureteral calculi.    Mild chronic parenchymal lobulation with few small subcentimeter cysts requiring no follow-up. No enhancing masses.    Bladder: Surgical changes cystoprostatectomy with right lower quadrant ileal conduit. No evidence for local recurrence.    BOWEL: No obstruction or inflammatory change. Surgical changes small bowel with anastomosis. Moderate formed colonic stool.    LYMPH NODES: No lymphadenopathy.    VASCULATURE: Normal caliber aorta. Moderate calcified atherosclerosis.    PELVIC  ORGANS: Cystoprostatectomy. No CT evidence for recurrence.    MUSCULOSKELETAL: No acute findings or suspicious bone lesions visualized. Right total hip arthroplasty. Screw fixation left femoral neck.      Impression    IMPRESSION:  1.  No lymphadenopathy or other evidence for recurrent/metastatic malignancy.  2.  Cystoprostatectomy with right lower quadrant ileal conduit.  3.  Large left renal pelvis calculus measuring up to 2.2 cm, significantly enlarged with increased left collecting system urothelial thickening/inflammation and mild pyelocaliectasis. No current evidence for significant obstruction, but findings suggest   stone may intermittently obstruct the UPJ.  4.  Additional smaller nonobstructing left intrarenal calculi and single tiny right renal calculus.  5.  Stable scattered tiny pulmonary nodules. No new/enlarging nodules.  6.  No other acute findings or significant change.              Assessment/Plan   73 year old year old person with large left renal stone in the setting of hemophilia and urinary diversion  -We discussed the surgical management options for stones.  His anatomy would likely preclude successful retrograde procedure and the stone is too big for shockwave lithotripsy.  Typically a percutaneous nephrolithotomy would be recommended for stone of the size however this would be a high risk procedure given his history of hemophilia.  -I suspect the stone is uric acid in composition based on the low Hounsfield units.  We have thus made the shared decision to make a trial for urinary alkalinization.  We will start him on potassiums citrate at 20 mEq twice daily as well as sodium bicarbonate at 650 mEq twice daily.  -I will have him come into the office for a basic metabolic and urine pH check in 1 week  -We will also update a KUB to ensure that the stone is not visible  -Would recommend getting his urine pH to a high level and then repeating CT imaging in the next 3 to 6 weeks  -I cautioned him  about signs and symptoms of obstruction or infection he is aware to contact us or seek emergency care should he develop any acute renal colic    CC:  No Ref-Primary, Physician    30 minutes spent on clinical encounter including  Review of medical records  Review of outside records  Documentation  Coordinating follow-up medical care      11:00-11:18 on phone

## 2023-03-28 NOTE — NURSING NOTE
Is the patient currently in the state of [place]? YES    Visit mode:TELEPHONE    If the visit is dropped, the patient can be reconnected by: TELEPHONE VISIT: Phone number: [phone]    Will anyone else be joining the visit? NO      How would you like to obtain your AVS? Mail a copy    Are changes needed to the allergy or medication list? NO    Reason for visit: Return Kidney Stone    Sakina Joseph VF

## 2023-03-30 ENCOUNTER — PRE VISIT (OUTPATIENT)
Dept: UROLOGY | Facility: CLINIC | Age: 73
End: 2023-03-30
Payer: MEDICARE

## 2023-03-30 NOTE — TELEPHONE ENCOUNTER
Reason for visit: Follow-Up    Dx/Hx/Sx: Kidney stone    Records/imaging/labs/orders: In EPIC    At Rooming: standard    Rafael Pedraza, CLEM  March 30, 2023  7:55 AM

## 2023-03-30 NOTE — TELEPHONE ENCOUNTER
[id number]  Orlin Alcantar  73 year old male  [facility] Diagnosis: severe Hemophilia A  [facility] Provider: Jorge Jiang MD    Orlin Alcantar called to provide an update.  He had a CT scan on 3/27 AM to evaluate for possible kidney stone and anticipating he might need and inpatient procedure.  He reports that he has not had major pain, but has occasional tight feeling in his lower left abdomen.  He has hematuria intermittently but not severe.He continues on his regular every other day factor VIII infusions.    He called back after meeting with Dr. Thomas to review the CT results.  They plan to try to reduce the stone with medications for the time being and he will have periodic appts with urology for evaluation.  They will plan to do a repeat CT scan in one month.    Orlin wanted out team to be aware.  I assured Orlin that this sounds like a good approach and we are available as needed should a more invasive procedure be required.    Wished him well.    Margot MATAMOROSN, RN   Nurse Clinician  [facility]  Office: [phone]  Main Office: [phone] (ask to speak with a nurse)  Fax: [phone]

## 2023-03-31 LAB
PATH REPORT.COMMENTS IMP SPEC: NORMAL
PATH REPORT.FINAL DX SPEC: NORMAL
PATH REPORT.GROSS SPEC: NORMAL
PATH REPORT.RELEVANT HX SPEC: NORMAL

## 2023-04-04 ENCOUNTER — VIRTUAL VISIT (OUTPATIENT)
Dept: ONCOLOGY | Facility: CLINIC | Age: 73
End: 2023-04-04
Attending: PHYSICIAN ASSISTANT
Payer: MEDICARE

## 2023-04-04 ENCOUNTER — OFFICE VISIT (OUTPATIENT)
Dept: UROLOGY | Facility: CLINIC | Age: 73
End: 2023-04-04
Payer: MEDICARE

## 2023-04-04 ENCOUNTER — ANCILLARY PROCEDURE (OUTPATIENT)
Dept: GENERAL RADIOLOGY | Facility: CLINIC | Age: 73
End: 2023-04-04
Attending: UROLOGY
Payer: MEDICARE

## 2023-04-04 ENCOUNTER — PRE VISIT (OUTPATIENT)
Dept: UROLOGY | Facility: CLINIC | Age: 73
End: 2023-04-04

## 2023-04-04 DIAGNOSIS — N20.0 KIDNEY STONE: ICD-10-CM

## 2023-04-04 DIAGNOSIS — C67.9 UROTHELIAL CARCINOMA OF BLADDER (H): Primary | ICD-10-CM

## 2023-04-04 DIAGNOSIS — N20.9 URIC ACID STONE IN URINE: Primary | ICD-10-CM

## 2023-04-04 DIAGNOSIS — N20.0 URIC ACID KIDNEY STONE: ICD-10-CM

## 2023-04-04 LAB
ALBUMIN UR-MCNC: 100 MG/DL
APPEARANCE UR: CLEAR
BILIRUB UR QL STRIP: NEGATIVE
COLOR UR AUTO: YELLOW
GLUCOSE UR STRIP-MCNC: NEGATIVE MG/DL
HGB UR QL STRIP: ABNORMAL
KETONES UR STRIP-MCNC: NEGATIVE MG/DL
LEUKOCYTE ESTERASE UR QL STRIP: ABNORMAL
NITRATE UR QL: POSITIVE
PH UR STRIP: 8.5 [PH] (ref 5–8)
SP GR UR STRIP: 1.01 (ref 1–1.03)
UROBILINOGEN UR STRIP-ACNC: 0.2 E.U./DL

## 2023-04-04 PROCEDURE — 99214 OFFICE O/P EST MOD 30 MIN: CPT | Performed by: UROLOGY

## 2023-04-04 PROCEDURE — 99442 PR PHYSICIAN TELEPHONE EVALUATION 11-20 MIN: CPT | Mod: 95 | Performed by: PHYSICIAN ASSISTANT

## 2023-04-04 PROCEDURE — 74019 RADEX ABDOMEN 2 VIEWS: CPT | Performed by: RADIOLOGY

## 2023-04-04 PROCEDURE — 87086 URINE CULTURE/COLONY COUNT: CPT | Performed by: UROLOGY

## 2023-04-04 PROCEDURE — 81003 URINALYSIS AUTO W/O SCOPE: CPT | Performed by: PATHOLOGY

## 2023-04-04 NOTE — TELEPHONE ENCOUNTER
Reason for visit: Follow-Up    Dx/Hx/Sx: Uric acid stone in urine    Records/imaging/labs/orders: In EPIC    At Rooming: standard video    Rafael Pedraza, EMT  April 4, 2023  2:31 PM

## 2023-04-04 NOTE — LETTER
April 4, 2023       TO: Orlin Alcantar  [address]  [place] [address]       DearMr.Ortiz,    We are writing to inform you of your test results.    {[facility] results letter list:[id number]}    No results found from the In Basket message.    ***

## 2023-04-04 NOTE — PROGRESS NOTES
Virtual Visit Details    Type of service:  phone visit   Video Start Time: 9:22  Video End Time:9:34    Originating Location (pt. Location): [facility]  Distant Location (provider location): OFF SITE  Platform used for Video Visit: [facility]  FOLLOW-UP VISIT NOTE  Apr 4, 2023          REASON FOR VISIT: bladder cancer, 6 month follow up     ONCOLOGY TREATMENT HISTORY: Muscle Invasive Urothelial Carcinoma, soilitary, obturator node, no distant metastases.   3/26/18: Consented for the Nivolumab-Gemcitabine-Cisplatin study [id number]             3/28/18: Screening visit for study.   4/4/18; c1d1 gemcitabine  4/11/18: c1d8 gemcitabine-nivolumab  4/25/18: C2D1 Gemcitabine-Cisplatin  5/2/18: C2D8 Gemcitabine-Nivolumab  5/16/18: C3D1 Gemcitabine-Cisplatin  5/23/18: C3D8 Gemcitabine-Nivolumab  6/6/18: C4d1 Gemcitabine-Cisplatin  6/13/18: C4D8 Gemcitabine-Nivolumab     7/30/18: Radical cystoprostatectomy with bilateral pelvic LN dissection and ileal conduit. Final pathology pT0N0         HPI:  73 year old male with PMH of hemophilia, chronic hepatis C who was seen by DR. Burns for evaluation of neoadjuvant therapy with Gemcitabine Cisplatin and Nivolumab.     Patient has a history of hemophilia and has undergone several surgeries in right hip replacement, knee replacement. Hemophilia managed by Dr. Marley.   He reports recurrent hematuria for a year, initially attributed to kidney stones, cystoscopy in Feb 2018 revealed MIBC. PET-CT scan - which has revealed muscle invasive bladder right lateral wall of bladder and right  obturator lymphnode, 1.1 cm short axis.   He has been treated for HCV in the 1990s and has not had any complications. His LFTs have remained normal. His hemophilia is very well controlled on current regimen of factor replacement. He does not have any autoimmune disease and is not on immunesuppressants or steroids. His diabetes is diet controlled and he has not required any medications.  Orlin was enrolled on our neoadjuvant Nivolumab-[place]-Cis trial and he completed 4 cycles of treatment and then underwent       Radical cystoprostatectomy and Bilateral pelvic lymph node dissection and ileal conduit on 7/30/18 with pathCR.      INTERVAL HISTORY; Orlin is doing well today for our phone visit. He has had some anxiety recently regarding some hematuria he has been having for the last month.  He eventually was evaluated and obtained a CT scan showing a rather large stone.  He has been in contact with Dr. Thomas.  They are attempting a demineralization plan to try and break up the stone given his anatomical changes in his hemophilia, it makes it rather hard for conventional treatment.    He is going to see Dr. Thomas today with an x-ray and for lab work.    He did start the potassium citrate in the sodium bicarbonate on Friday 3/28.  He has not had any hematuria since then.     His anxiety has been peaked because of the new events but he otherwise has been physically well.  He sees Dr. Marley in the hemophilia clinic annually.  He takes factor 3000 units every other day.  He does sometimes alternate with 2000/ 1000 units so that he has extra to take for as needed bleeds.     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  This is managed by the hemophilia clinic as well.     He has routine follow-up with his PCP who helps manage his insomnia, mood, and hypertension.         PAST MEDICAL HISTORY                  Past Medical History              Past Medical History:   Diagnosis Date     Anxiety       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       Bladder tumor       Depression       DM II (diabetes mellitus, type II), controlled (H)       diet controlled     Hemophilia (H)       Type A     History of hepatitis C       treated successfully     HTN (hypertension)                CURRENT OUTPATIENT MEDICATIONS              Current Outpatient Medications   Medication Sig     acetaminophen (TYLENOL) 325 MG tablet  Take 2 tablets (650 mg) by mouth every 4 hours as needed for mild pain or fever     Antihemophilic Factor, Recomb, (KOGENATE FS) 1000 units KIT Directions: Infuse 2949 units or 3185 units iv every Mon, Wed, Fri & Sun prn for breakthrough bleeding     diazepam (VALIUM) 5 MG tablet Take 1 tablet (5 mg) by mouth daily as needed for anxiety or sleep     mirtazapine (REMERON) 45 MG tablet Take 1 tablet (45 mg) by mouth At Bedtime And may take 1/2 tablet for insomnia up to 3 times a week.     order for DME Cohesive Lurdes rings [phone]     order for DME Equipment being ordered: Walker Wheels () and Walker ()  Treatment Diagnosis: gait instability     oxyCODONE (ROXICODONE) 5 MG tablet Take 1 tablet (5 mg) by mouth every 8 hours as needed for pain      No current facility-administered medications for this visit.          EXAM: There were no vitals taken for this visit.  Wt Readings from Last 4 Encounters:   12/06/22 72.1 kg (159 lb)   10/18/22 70.3 kg (155 lb)   09/22/22 72.3 kg (159 lb 6.4 oz)   05/23/22 70.8 kg (156 lb)          ECOG performance status of 1.   Voice is clear and strong. No audible stridor, wheezing, or respiratory distress. The remainder of PE was deferred due to PHE.        LABS:       03/27/23 07:44   Sodium 140   Potassium 4.0   Chloride 104   Carbon Dioxide (CO2) 20 (L)   Urea Nitrogen 28.1 (H)   Creatinine 1.00   GFR Estimate 79   Calcium 9.9   Anion Gap 16 (H)   Albumin 4.7   Protein Total 8.0   Alkaline Phosphatase 99   ALT 9 (L)   AST 15   Bilirubin Total 1.0      09/08/21 08:33 03/08/22 09:28 09/22/22 07:37 03/27/23 07:44   WBC 5.1 4.7 5.1 10.1   Hemoglobin 13.4 13.2 (L) 13.9 13.7   Hematocrit 41.1 40.8 42.9 41.8   Platelet Count 267 207 230 211   RBC Count 4.86 4.73 5.02 4.98   MCV 85 86 86 84     IMPRESSION:  1.  No lymphadenopathy or other evidence for recurrent/metastatic malignancy.  2.  Cystoprostatectomy with right lower quadrant ileal conduit.  3.  Large left renal pelvis  calculus measuring up to 2.2 cm, significantly enlarged with increased left collecting system urothelial thickening/inflammation and mild pyelocaliectasis. No current evidence for significant obstruction, but findings suggest   stone may intermittently obstruct the UPJ.  4.  Additional smaller nonobstructing left intrarenal calculi and single tiny right renal calculus.  5.  Stable scattered tiny pulmonary nodules. No new/enlarging nodules.  6.  No other acute findings or significant change.      ASSESSMENT/PLAN:      ONC: 73 year old male with T2N1(solitary node)  urothelial carcinoma, enrolled on the clinical trial of Nivolumab with Gemcitabine and Cisplatin for MIBC, completed C4D8 on 6/13/18. He has had a complete response at radical cystectomy final pathology showing PT0 N0 of note he was T2N1 prior to neoadjuvant therapy and is 3 years out from surgery.  He is OZZIE on scans and is on SOC follow up for surveillance.       We discussed labs, urine cytology and CT CAP every 6 months, then dropping back to annual. We discussed the recent data with prognosis and that he had an excellent response to neoadjuvant therapy.  His risk of recurrence is low.       Sees Dr Contreras in Urology as well.  Discussed negative urine cytology and no evidence of recurrence on scans.  He is now seeing Dr. Thomas due to this recent growth of kidney stones.  He has started potassiums citrate and sodium bicarbonate for hopefully demineralization.  He is routinely going to be seeing Dr. Thomas for management.     Hemophilia management: Per Dr. Marley, no major bleeds recently                             He now has established care with a PCP for annual exam, colonoscopy, lipids, etc.    he is following up on his blood pressure and compliant with his lisinopril.                            I will see Orlin gerber in 6 months, at his 5-year surveillance visit.     5 minutes spent on the date of the encounter doing chart review, review of  test results and discussion with other provider(s), in addition to 12 minutes spent on the phone with the patient.      Monica Harris PA-C

## 2023-04-04 NOTE — LETTER
4/4/2023         RE: Orlin Alcantar  [address]  [place] [address]        Dear Colleague,    Thank you for referring your patient, Orlin Alcantar, to the [facility]. Please see a copy of my visit note below.    Virtual Visit Details    Type of service:  phone visit   Video Start Time: 9:22  Video End Time:9:34    Originating Location (pt. Location): [facility]  Distant Location (provider location): OFF SITE  Platform used for Video Visit: [facility]  FOLLOW-UP VISIT NOTE  Apr 4, 2023          REASON FOR VISIT: bladder cancer, 6 month follow up     ONCOLOGY TREATMENT HISTORY: Muscle Invasive Urothelial Carcinoma, soilitary, obturator node, no distant metastases.   3/26/18: Consented for the Nivolumab-Gemcitabine-Cisplatin study [id number]             3/28/18: Screening visit for study.   4/4/18; c1d1 gemcitabine  4/11/18: c1d8 gemcitabine-nivolumab  4/25/18: C2D1 Gemcitabine-Cisplatin  5/2/18: C2D8 Gemcitabine-Nivolumab  5/16/18: C3D1 Gemcitabine-Cisplatin  5/23/18: C3D8 Gemcitabine-Nivolumab  6/6/18: C4d1 Gemcitabine-Cisplatin  6/13/18: C4D8 Gemcitabine-Nivolumab     7/30/18: Radical cystoprostatectomy with bilateral pelvic LN dissection and ileal conduit. Final pathology pT0N0         HPI:  73 year old male with PMH of hemophilia, chronic hepatis C who was seen by DR. Burns for evaluation of neoadjuvant therapy with Gemcitabine Cisplatin and Nivolumab.     Patient has a history of hemophilia and has undergone several surgeries in right hip replacement, knee replacement. Hemophilia managed by Dr. Marley.   He reports recurrent hematuria for a year, initially attributed to kidney stones, cystoscopy in Feb 2018 revealed MIBC. PET-CT scan - which has revealed muscle invasive bladder right lateral wall of bladder and right  obturator lymphnode, 1.1 cm short axis.   He has been treated for HCV in the 1990s and has not had any complications. His LFTs have  remained normal. His hemophilia is very well controlled on current regimen of factor replacement. He does not have any autoimmune disease and is not on immunesuppressants or steroids. His diabetes is diet controlled and he has not required any medications. Orlin was enrolled on our neoadjuvant Nivolumab-[place]-Cis trial and he completed 4 cycles of treatment and then underwent       Radical cystoprostatectomy and Bilateral pelvic lymph node dissection and ileal conduit on 7/30/18 with pathCR.      INTERVAL HISTORY; Orlin is doing well today for our phone visit. He has had some anxiety recently regarding some hematuria he has been having for the last month.  He eventually was evaluated and obtained a CT scan showing a rather large stone.  He has been in contact with Dr. Thomas.  They are attempting a demineralization plan to try and break up the stone given his anatomical changes in his hemophilia, it makes it rather hard for conventional treatment.    He is going to see Dr. Thomas today with an x-ray and for lab work.    He did start the potassium citrate in the sodium bicarbonate on Friday 3/28.  He has not had any hematuria since then.     His anxiety has been peaked because of the new events but he otherwise has been physically well.  He sees Dr. Marley in the hemophilia clinic annually.  He takes factor 3000 units every other day.  He does sometimes alternate with 2000/ 1000 units so that he has extra to take for as needed bleeds.     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  This is managed by the hemophilia clinic as well.     He has routine follow-up with his PCP who helps manage his insomnia, mood, and hypertension.         PAST MEDICAL HISTORY                  Past Medical History              Past Medical History:   Diagnosis Date    Anxiety      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      Bladder tumor      Depression      DM II (diabetes mellitus, type II), controlled (H)       diet controlled     Hemophilia (H)       Type A    History of hepatitis C       treated successfully    HTN (hypertension)                CURRENT OUTPATIENT MEDICATIONS              Current Outpatient Medications   Medication Sig    acetaminophen (TYLENOL) 325 MG tablet Take 2 tablets (650 mg) by mouth every 4 hours as needed for mild pain or fever    Antihemophilic Factor, Recomb, (KOGENATE FS) 1000 units KIT Directions: Infuse 2949 units or 3185 units iv every Mon, Wed, Fri & Sun prn for breakthrough bleeding    diazepam (VALIUM) 5 MG tablet Take 1 tablet (5 mg) by mouth daily as needed for anxiety or sleep    mirtazapine (REMERON) 45 MG tablet Take 1 tablet (45 mg) by mouth At Bedtime And may take 1/2 tablet for insomnia up to 3 times a week.    order for DME Cohesive Lurdes rings [phone]    order for DME Equipment being ordered: Walker Wheels () and Walker ()  Treatment Diagnosis: gait instability    oxyCODONE (ROXICODONE) 5 MG tablet Take 1 tablet (5 mg) by mouth every 8 hours as needed for pain      No current facility-administered medications for this visit.          EXAM: There were no vitals taken for this visit.  Wt Readings from Last 4 Encounters:   12/06/22 72.1 kg (159 lb)   10/18/22 70.3 kg (155 lb)   09/22/22 72.3 kg (159 lb 6.4 oz)   05/23/22 70.8 kg (156 lb)          ECOG performance status of 1.   Voice is clear and strong. No audible stridor, wheezing, or respiratory distress. The remainder of PE was deferred due to PHE.        LABS:       03/27/23 07:44   Sodium 140   Potassium 4.0   Chloride 104   Carbon Dioxide (CO2) 20 (L)   Urea Nitrogen 28.1 (H)   Creatinine 1.00   GFR Estimate 79   Calcium 9.9   Anion Gap 16 (H)   Albumin 4.7   Protein Total 8.0   Alkaline Phosphatase 99   ALT 9 (L)   AST 15   Bilirubin Total 1.0      09/08/21 08:33 03/08/22 09:28 09/22/22 07:37 03/27/23 07:44   WBC 5.1 4.7 5.1 10.1   Hemoglobin 13.4 13.2 (L) 13.9 13.7   Hematocrit 41.1 40.8 42.9 41.8   Platelet Count 267 207 230 211    RBC Count 4.86 4.73 5.02 4.98   MCV 85 86 86 84     IMPRESSION:  1.  No lymphadenopathy or other evidence for recurrent/metastatic malignancy.  2.  Cystoprostatectomy with right lower quadrant ileal conduit.  3.  Large left renal pelvis calculus measuring up to 2.2 cm, significantly enlarged with increased left collecting system urothelial thickening/inflammation and mild pyelocaliectasis. No current evidence for significant obstruction, but findings suggest   stone may intermittently obstruct the UPJ.  4.  Additional smaller nonobstructing left intrarenal calculi and single tiny right renal calculus.  5.  Stable scattered tiny pulmonary nodules. No new/enlarging nodules.  6.  No other acute findings or significant change.      ASSESSMENT/PLAN:      ONC: 73 year old male with T2N1(solitary node)  urothelial carcinoma, enrolled on the clinical trial of Nivolumab with Gemcitabine and Cisplatin for MIBC, completed C4D8 on 6/13/18. He has had a complete response at radical cystectomy final pathology showing PT0 N0 of note he was T2N1 prior to neoadjuvant therapy and is 3 years out from surgery.  He is OZZIE on scans and is on SOC follow up for surveillance.       We discussed labs, urine cytology and CT CAP every 6 months, then dropping back to annual. We discussed the recent data with prognosis and that he had an excellent response to neoadjuvant therapy.  His risk of recurrence is low.       Sees Dr Contreras in Urology as well.  Discussed negative urine cytology and no evidence of recurrence on scans.  He is now seeing Dr. Thomas due to this recent growth of kidney stones.  He has started potassiums citrate and sodium bicarbonate for hopefully demineralization.  He is routinely going to be seeing Dr. Thomas for management.     Hemophilia management: Per Dr. Marley, no major bleeds recently                             He now has established care with a PCP for annual exam, colonoscopy, lipids, etc.    he is  following up on his blood pressure and compliant with his lisinopril.                            I will see Orlin back in 6 months, at his 5-year surveillance visit.     5 minutes spent on the date of the encounter doing chart review, review of test results and discussion with other provider(s), in addition to 12 minutes spent on the phone with the patient.      Monica Harris PA-C

## 2023-04-04 NOTE — LETTER
4/4/2023       RE: Orlin Alcantar  [address]  [place] [address]     Dear Colleague,    Thank you for referring your patient, Orlin Alcantar, to the [facility]OLOG[facility]IC [place] [facility]. Please see a copy of my visit note below.          UROLOGY OUTPATIENT VISIT      Chief Complaint:   Kidney Stone      Synopsis    Orlin Alcantar is a very pleasant AGE: 73 year old year old person    He is being seen in follow up of a large left renal stone  He has a hx of hemophelia, stone is low HFU and suggestive of uric acid  He is on sodium bicarb and urocitk in an attempt for medical dissolultion  We performed KUB today, the stone is faintly visible on one of two films  Urine pH from conduit shows alkaline urine  Serum studies pending         Medications     Current Outpatient Medications   Medication    acetaminophen (TYLENOL) 500 MG tablet    Antihem Factor Recomb, rFVIII, (KOGENATE FS) 1000 units KIT    celecoxib (CELEBREX) 100 MG capsule    codeine 30 MG tablet    lisinopril (ZESTRIL) 30 MG tablet    mirtazapine (REMERON) 30 MG tablet    mirtazapine (REMERON) 45 MG tablet    naloxone (EVZIO) 0.4 MG/0.4ML auto-injector    potassium citrate (UROCIT-K) 10 MEQ (1080 MG) CR tablet    sodium bicarbonate 650 MG tablet     No current facility-administered medications for this visit.     Facility-Administered Medications Ordered in Other Visits   Medication    ethyl chloride spray         The following  distinct labs were reviewed    I personally reviewed all applicable laboratory data and went over findings with patient  Significant for:    CBC RESULTS:  Recent Labs   Lab Test 03/27/23  0744 09/22/22  0737 03/08/22  0928 09/08/21  0833   WBC 10.1 5.1 4.7 5.1   HGB 13.7 13.9 13.2* 13.4    230 207 267        BMP RESULTS:  Recent Labs   Lab Test 03/27/23  0744 09/22/22  0812 09/22/22  0737 03/08/22  0928 09/08/21  0858 09/08/21  0833  03/01/21  0841 03/01/21  0810 09/26/20  0819 08/05/20  1105   0000     --  140 138  --  143  --  142  --   --   --    POTASSIUM 4.0  --  4.1 3.8  --  4.0  --  3.9  --   --   --    CHLORIDE 104  --  104 106  --  111*  --  112*  --   --   --    CO2 20*  --  23 24  --  24  --  19*  --   --   --    ANIONGAP 16*  --  13 8  --  8  --  10  --   --   --    *  --  104* 98  --  104*  --  100*  --   --   --    BUN 28.1*  --  25.2* 28  --  19  --  27  --   --   --    CR 1.00 1.1 1.04 1.02   < > 1.04  --  1.08  --   --    < >   GFRESTIMATED 79 >60 76 78   < > 72 66 69 74 66   < >   GFRESTBLACK  --   --   --   --   --   --  80 80 89 80  --     < > = values in this interval not displayed.       CALCIUM RESULTS:  Recent Labs   Lab Test 03/27/23  0744 09/22/22  0737 03/08/22  0928 09/08/21  0833   MARLA 9.9 10.4* 9.1 9.2       HGB A1C RESULTS:  Lab Results   Component Value Date    A1C 6.9 07/25/2018    A1C 5.9 02/21/2018    A1C 6.1 02/20/2018    A1C 5.0 10/13/2014    A1C 5.5 05/01/2012       UA RESULTS:   Recent Labs   Lab Test 04/04/23  1147 07/25/18  1306 02/07/18  1335 01/19/18  1200   SG 1.015 1.016 1.014 1.017   URINEPH 8.5* 5.0 5.0 5.0   NITRITE Positive* Negative Negative Negative   RBCU  --  20* >182* >182*   WBCU  --  >182* >182* >182*         Recent Imaging Report    I personally reviewed all applicable imaging and went over the below findings with patient.    Results for orders placed or performed in visit on 04/04/23   XR Abdomen 2 Views    Narrative    Exam: XR ABDOMEN 2 VIEWS, 4/4/2023 12:39 PM    Indication: Uric acid kidney stone    Comparison: 3/27/2023    Findings:   Upright and supine AP views of the abdomen are obtained.  Nonobstructive bowel gas pattern. No pneumatosis, portal venous gas,  or free air. Moderate stool burden. Extensive surgical clips  throughout the pelvis. The enteric anastomoses project over the lower  pelvis. A rounded density projects over the hilum of the left kidney  and  measures approximately 2.6 cm. Smaller 5 mm calcification projects  over the mid left kidney. No convincing ureteral stones. Partially  imaged postsurgical changes in the proximal femurs bilaterally.      Impression    Impression:   Grossly stable left renal stones.     LEONARD MARTINEZ MD         SYSTEM ID:  [id number]            Assessment/Plan   73 year old year old person with left renal stone, ileal conduit, hemophelia  -Continue to try medical dissolution  -Update serum labs  -CT in 3 weeks to see if stone shrinking    CC:  No Ref-Primary, Physician    20 minutes spent on clinical encounter including  Review of medical records  Review of outside records  Documentation  Coordinating follow-up medical care      Sincerely,    Sacha Thomas MD

## 2023-04-05 LAB — BACTERIA UR CULT: NORMAL

## 2023-04-06 NOTE — PROGRESS NOTES
UROLOGY OUTPATIENT VISIT      Chief Complaint:   Kidney Stone      Synopsis    Orlin Alcantar is a very pleasant AGE: 73 year old year old person    He is being seen in follow up of a large left renal stone  He has a hx of hemophelia, stone is low HFU and suggestive of uric acid  He is on sodium bicarb and urocitk in an attempt for medical dissolultion  We performed KUB today, the stone is faintly visible on one of two films  Urine pH from conduit shows alkaline urine  Serum studies pending         Medications     Current Outpatient Medications   Medication     acetaminophen (TYLENOL) 500 MG tablet     Antihem Factor Recomb, rFVIII, (KOGENATE FS) 1000 units KIT     celecoxib (CELEBREX) 100 MG capsule     codeine 30 MG tablet     lisinopril (ZESTRIL) 30 MG tablet     mirtazapine (REMERON) 30 MG tablet     mirtazapine (REMERON) 45 MG tablet     naloxone (EVZIO) 0.4 MG/0.4ML auto-injector     potassium citrate (UROCIT-K) 10 MEQ (1080 MG) CR tablet     sodium bicarbonate 650 MG tablet     No current facility-administered medications for this visit.     Facility-Administered Medications Ordered in Other Visits   Medication     ethyl chloride spray         The following  distinct labs were reviewed    I personally reviewed all applicable laboratory data and went over findings with patient  Significant for:    CBC RESULTS:  Recent Labs   Lab Test 03/27/23  0744 09/22/22  0737 03/08/22  0928 09/08/21  0833   WBC 10.1 5.1 4.7 5.1   HGB 13.7 13.9 13.2* 13.4    230 207 267        BMP RESULTS:  Recent Labs   Lab Test 03/27/23  0744 09/22/22  0812 09/22/22  0737 03/08/22  0928 09/08/21  0858 09/08/21  0833 03/01/21  0841 03/01/21  0810 09/26/20  0819 08/05/20  1105   0000     --  140 138  --  143  --  142  --   --   --    POTASSIUM 4.0  --  4.1 3.8  --  4.0  --  3.9  --   --   --    CHLORIDE 104  --  104 106  --  111*  --  112*  --   --   --    CO2 20*  --  23 24  --  24  --  19*  --   --   --    ANIONGAP 16*   --  13 8  --  8  --  10  --   --   --    *  --  104* 98  --  104*  --  100*  --   --   --    BUN 28.1*  --  25.2* 28  --  19  --  27  --   --   --    CR 1.00 1.1 1.04 1.02   < > 1.04  --  1.08  --   --    < >   GFRESTIMATED 79 >60 76 78   < > 72 66 69 74 66   < >   GFRESTBLACK  --   --   --   --   --   --  80 80 89 80  --     < > = values in this interval not displayed.       CALCIUM RESULTS:  Recent Labs   Lab Test 03/27/23  0744 09/22/22  0737 03/08/22  0928 09/08/21  0833   MARLA 9.9 10.4* 9.1 9.2       HGB A1C RESULTS:  Lab Results   Component Value Date    A1C 6.9 07/25/2018    A1C 5.9 02/21/2018    A1C 6.1 02/20/2018    A1C 5.0 10/13/2014    A1C 5.5 05/01/2012       UA RESULTS:   Recent Labs   Lab Test 04/04/23  1147 07/25/18  1306 02/07/18  1335 01/19/18  1200   SG 1.015 1.016 1.014 1.017   URINEPH 8.5* 5.0 5.0 5.0   NITRITE Positive* Negative Negative Negative   RBCU  --  20* >182* >182*   WBCU  --  >182* >182* >182*         Recent Imaging Report    I personally reviewed all applicable imaging and went over the below findings with patient.    Results for orders placed or performed in visit on 04/04/23   XR Abdomen 2 Views    Narrative    Exam: XR ABDOMEN 2 VIEWS, 4/4/2023 12:39 PM    Indication: Uric acid kidney stone    Comparison: 3/27/2023    Findings:   Upright and supine AP views of the abdomen are obtained.  Nonobstructive bowel gas pattern. No pneumatosis, portal venous gas,  or free air. Moderate stool burden. Extensive surgical clips  throughout the pelvis. The enteric anastomoses project over the lower  pelvis. A rounded density projects over the hilum of the left kidney  and measures approximately 2.6 cm. Smaller 5 mm calcification projects  over the mid left kidney. No convincing ureteral stones. Partially  imaged postsurgical changes in the proximal femurs bilaterally.      Impression    Impression:   Grossly stable left renal stones.     LEONARD MARTINEZ MD         SYSTEM ID:  [id number]             Assessment/Plan   73 year old year old person with left renal stone, ileal conduit, hemophelia  -Continue to try medical dissolution  -Update serum labs  -CT in 3 weeks to see if stone shrinking    CC:  No Ref-Primary, Physician    20 minutes spent on clinical encounter including  Review of medical records  Review of outside records  Documentation  Coordinating follow-up medical care

## 2023-04-13 ENCOUNTER — TELEPHONE (OUTPATIENT)
Dept: HEMATOLOGY | Facility: CLINIC | Age: 73
End: 2023-04-13
Payer: MEDICARE

## 2023-04-13 DIAGNOSIS — D66 HEMOPHILIC ARTHROPATHY (H): ICD-10-CM

## 2023-04-13 DIAGNOSIS — D66 SEVERE HEMOPHILIA A (H): ICD-10-CM

## 2023-04-13 DIAGNOSIS — M36.2 HEMOPHILIC ARTHROPATHY (H): ICD-10-CM

## 2023-04-13 RX ORDER — CODEINE SULFATE 30 MG/1
30 TABLET ORAL EVERY 8 HOURS
Qty: 84 TABLET | Refills: 0 | Status: ON HOLD | OUTPATIENT
Start: 2023-04-13 | End: 2023-05-18

## 2023-04-13 NOTE — TELEPHONE ENCOUNTER
[facility]  [facility]  [facility], [address], [place] [address]  Main: [phone], Fax: [phone]         Contacted patient for update on transition from oxycodone to codeine. He has tolerated the transition well. No acute concerns. Having less drowsiness and constipation on codeine. Still taking TID regimen. APAP as needed. No acute bleeding concerns reported.      Will put refills on prescription.     NETTIE CLINE PA-C on 4/13/2023 at 2:21 PM

## 2023-04-17 ENCOUNTER — ANCILLARY PROCEDURE (OUTPATIENT)
Dept: CT IMAGING | Facility: CLINIC | Age: 73
End: 2023-04-17
Attending: UROLOGY
Payer: MEDICARE

## 2023-04-17 ENCOUNTER — LAB (OUTPATIENT)
Dept: LAB | Facility: CLINIC | Age: 73
End: 2023-04-17
Payer: MEDICARE

## 2023-04-17 DIAGNOSIS — E11.9 TYPE 2 DIABETES MELLITUS WITHOUT COMPLICATION, UNSPECIFIED WHETHER LONG TERM INSULIN USE (H): ICD-10-CM

## 2023-04-17 DIAGNOSIS — E11.9 TYPE 2 DIABETES MELLITUS WITHOUT COMPLICATION, WITHOUT LONG-TERM CURRENT USE OF INSULIN (H): ICD-10-CM

## 2023-04-17 DIAGNOSIS — D66 SEVERE HEMOPHILIA A (H): ICD-10-CM

## 2023-04-17 DIAGNOSIS — N20.9 URIC ACID STONE IN URINE: ICD-10-CM

## 2023-04-17 LAB
ALBUMIN SERPL BCG-MCNC: 4.5 G/DL (ref 3.5–5.2)
ALP SERPL-CCNC: 85 U/L (ref 40–129)
ALT SERPL W P-5'-P-CCNC: 11 U/L (ref 10–50)
ANION GAP SERPL CALCULATED.3IONS-SCNC: 10 MMOL/L (ref 7–15)
AST SERPL W P-5'-P-CCNC: 15 U/L (ref 10–50)
BILIRUB SERPL-MCNC: 0.5 MG/DL
BUN SERPL-MCNC: 24.3 MG/DL (ref 8–23)
CALCIUM SERPL-MCNC: 9.8 MG/DL (ref 8.8–10.2)
CHLORIDE SERPL-SCNC: 104 MMOL/L (ref 98–107)
CREAT SERPL-MCNC: 1.03 MG/DL (ref 0.67–1.17)
DEPRECATED HCO3 PLAS-SCNC: 27 MMOL/L (ref 22–29)
GFR SERPL CREATININE-BSD FRML MDRD: 77 ML/MIN/1.73M2
GLUCOSE SERPL-MCNC: 102 MG/DL (ref 70–99)
HBA1C MFR BLD: 5.6 %
POTASSIUM SERPL-SCNC: 4.5 MMOL/L (ref 3.4–5.3)
PROT SERPL-MCNC: 7.5 G/DL (ref 6.4–8.3)
SODIUM SERPL-SCNC: 141 MMOL/L (ref 136–145)
URATE SERPL-MCNC: 5.7 MG/DL (ref 3.4–7)

## 2023-04-17 PROCEDURE — 80053 COMPREHEN METABOLIC PANEL: CPT | Performed by: PATHOLOGY

## 2023-04-17 PROCEDURE — G1010 CDSM STANSON: HCPCS | Mod: GC | Performed by: RADIOLOGY

## 2023-04-17 PROCEDURE — 80061 LIPID PANEL: CPT | Performed by: PATHOLOGY

## 2023-04-17 PROCEDURE — 36415 COLL VENOUS BLD VENIPUNCTURE: CPT | Performed by: PATHOLOGY

## 2023-04-17 PROCEDURE — 83036 HEMOGLOBIN GLYCOSYLATED A1C: CPT | Performed by: PHYSICIAN ASSISTANT

## 2023-04-17 PROCEDURE — 84550 ASSAY OF BLOOD/URIC ACID: CPT | Performed by: PATHOLOGY

## 2023-04-17 PROCEDURE — 74176 CT ABD & PELVIS W/O CONTRAST: CPT | Mod: MG | Performed by: RADIOLOGY

## 2023-04-19 ENCOUNTER — OFFICE VISIT (OUTPATIENT)
Dept: FAMILY MEDICINE | Facility: CLINIC | Age: 73
End: 2023-04-19
Payer: MEDICARE

## 2023-04-19 VITALS
TEMPERATURE: 97.9 F | OXYGEN SATURATION: 97 % | RESPIRATION RATE: 16 BRPM | DIASTOLIC BLOOD PRESSURE: 86 MMHG | SYSTOLIC BLOOD PRESSURE: 136 MMHG | BODY MASS INDEX: 21.7 KG/M2 | HEART RATE: 73 BPM | WEIGHT: 160 LBS

## 2023-04-19 DIAGNOSIS — L98.9 SKIN LESION: ICD-10-CM

## 2023-04-19 DIAGNOSIS — E11.9 TYPE 2 DIABETES MELLITUS WITHOUT COMPLICATION, UNSPECIFIED WHETHER LONG TERM INSULIN USE (H): ICD-10-CM

## 2023-04-19 DIAGNOSIS — F51.04 PSYCHOPHYSIOLOGIC INSOMNIA: ICD-10-CM

## 2023-04-19 DIAGNOSIS — F41.1 ANXIETY STATE: ICD-10-CM

## 2023-04-19 DIAGNOSIS — I10 HYPERTENSION GOAL BP (BLOOD PRESSURE) < 140/90: Primary | ICD-10-CM

## 2023-04-19 LAB
CHOLEST SERPL-MCNC: 186 MG/DL
HDLC SERPL-MCNC: 49 MG/DL
LDLC SERPL CALC-MCNC: 106 MG/DL
NONHDLC SERPL-MCNC: 137 MG/DL
TRIGL SERPL-MCNC: 154 MG/DL

## 2023-04-19 PROCEDURE — 99214 OFFICE O/P EST MOD 30 MIN: CPT | Performed by: FAMILY MEDICINE

## 2023-04-19 RX ORDER — UREA 200 MG/G
CREAM TOPICAL 2 TIMES DAILY
Qty: 480 G | Refills: 0 | Status: ON HOLD | OUTPATIENT
Start: 2023-04-19 | End: 2023-05-18

## 2023-04-19 RX ORDER — LISINOPRIL 30 MG/1
30 TABLET ORAL DAILY
Qty: 90 TABLET | Refills: 3 | Status: SHIPPED | OUTPATIENT
Start: 2023-04-19 | End: 2024-04-23

## 2023-04-19 RX ORDER — MIRTAZAPINE 45 MG/1
45 TABLET, FILM COATED ORAL AT BEDTIME
Qty: 90 TABLET | Refills: 1 | Status: SHIPPED | OUTPATIENT
Start: 2023-04-19 | End: 2023-11-16

## 2023-04-19 ASSESSMENT — PAIN SCALES - GENERAL: PAINLEVEL: NO PAIN (0)

## 2023-04-19 ASSESSMENT — PATIENT HEALTH QUESTIONNAIRE - PHQ9
SUM OF ALL RESPONSES TO PHQ QUESTIONS 1-9: 0
SUM OF ALL RESPONSES TO PHQ QUESTIONS 1-9: 0

## 2023-04-19 NOTE — PROGRESS NOTES
Assessment & Plan     Hypertension goal BP (blood pressure) < 140/90  Patient is tolerating current medication without any major side effects of concerns and current dose seems reasonable too.  Current medication regime is effective. Continue current treatment without any changes.   - lisinopril (ZESTRIL) 30 MG tablet; Take 1 tablet (30 mg) by mouth daily    Anxiety state  Using it with good benefit. On max dose. Tolerating it well and helping with sleep.     - mirtazapine (REMERON) 45 MG tablet; Take 1 tablet (45 mg) by mouth At Bedtime    Psychophysiologic insomnia  Using it with good benefit. On max dose. Tolerating it well and helping with sleep.    - mirtazapine (REMERON) 45 MG tablet; Take 1 tablet (45 mg) by mouth At Bedtime    Type 2 diabetes mellitus without complication, unspecified whether long term insulin use (H)  One time high a1c but mostly prediabetic a1c. Doing well with diet controlled. No lipid panel at baseline and not on statin. Obtain it today.  - Lipid panel reflex to direct LDL Non-fasting; Future    Skin lesion  On right lower shin. If not improving, needs derm appt. She understood. Urea to remove hypertrophied skin. Unclear etiology.   - urea (GORMEL) 20 % external cream; Apply topically 2 times daily For 3-4 weeks on right ankle.    Declined colonoscopy and immunization.          Twin Le MD, MD  [facility]    Vito Ordaz is a 73 year old, presenting for the following health issues:  Follow Up and Hypertension        4/19/2023     8:47 AM   Additional Questions   Roomed by Marj PENA     History of Present Illness       Hypertension: He presents for follow up of hypertension.  He does check blood pressure  regularly outside of the clinic. Outpatient blood pressures have not been over 140/90. He follows a low salt diet.     Reason for visit:  FollowupHe exercises with enough effort to increase his heart rate 10 to 19 minutes per day.       Today's PHQ-9         PHQ-9 Total Score: 0    PHQ-9 Q9 Thoughts of better off dead/self-harm past 2 weeks :   Not at all         bp is working well.  Other meds are working well.     Rash on right ankle. No itching. Scratching and noticed around 6 weeks ago. Improving now.     celebrex takes as needed.     Taking codeine 30mg tid. Used to be on codeine and switched to oxycodone and now back on codeine. Getting it through hematology. 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 Defer rx to hematology.     Taking potassium and sodium bicarbonate through urologist.        Review of Systems         Objective    /86   Pulse 73   Temp 97.9  F (36.6  C) (Temporal)   Resp 16   Wt 72.6 kg (160 lb)   SpO2 97%   BMI 21.70 kg/m    Body mass index is 21.7 kg/m .  Physical Exam   Right lower shin just above ankle large patch of hyperkeratotic lesion and some erythema.

## 2023-04-20 DIAGNOSIS — D66 SEVERE HEMOPHILIA A (H): ICD-10-CM

## 2023-04-20 DIAGNOSIS — Z85.51 PERSONAL HISTORY OF MALIGNANT NEOPLASM OF BLADDER: ICD-10-CM

## 2023-04-20 RX ORDER — ANTIHEMOPHILIC FACTOR (RECOMBINANT) 1000 (+/-)
KIT INTRAVENOUS
Qty: 13044 EACH | Refills: 5 | Status: ON HOLD | OUTPATIENT
Start: 2023-04-20 | End: 2023-05-18

## 2023-04-20 NOTE — TELEPHONE ENCOUNTER
*Medicare refill - please check units and qty*    *Need Rx by: Monday, April 24th by 10 am    *Last comp visit: 10/18/2022    *Drug: Kogenate at 4 doses of 3261 units    *Refills left: 0    *Notes: We do have 6 fills worth of this dosing on hand.     Thanks,    MICHAEL Cameron, [facility]  Lead Pharmacy Coordinator  [facility]  [phone]

## 2023-04-20 NOTE — MR AVS SNAPSHOT
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 We suggest that you continue to avoid caffeine, fruit juice, and alcohol for the next 24 hours, however, you are encouraged to return to your normal activities.         A few things that are considered normal after your cystoscopy:     * Small amount of bleeding (or spotting) that clears within the next 24 hours     * Slight burning sensation with urination     * Sensation to of needing to avoid more frequently     * The feeling of \"air\" in your urine     * Mild discomfort that is relieved with Tylenol        Please contact our office promptly if you:     * Develop a fever above 101 degrees     * Are unable to urinate     * Develop bright red blood that does not stop     * Severe pain or swelling         Please contact our office with any concerns or questions @[facility].          Follow-ups after your visit        Additional Services     PAC Visit Referral (For [facility] Only)       Does this visit require an Anesthesia consult?  No -  If this visit is not for evaluation for co-morbidity (such as patient request due to anxiety), what is the purpose of the visit?: h and P    H&P done by:  N/A      Please be aware that coverage of these services is subject to the terms and limitations of your health insurance plan.  Call " -- DO NOT REPLY / DO NOT REPLY ALL --  -- Message is from Engagement Center Operations (ECO) --    Delaney from [employer] called needing a fix on the prior auth. She said the prescribing provider is Dr. Ismael Nicholas for this and is needing a call back. Please follow up. Ref ID # [id number]             member services at your health plan with any benefit or coverage questions.      Please bring the following to your appointment:  >>   Any x-rays, CTs or MRIs which have been performed.  Contact the facility where they were done to arrange for  prior to your scheduled appointment.  Any new CT, MRI or other procedures ordered by your specialist must be performed at a [facility] or coordinated by your clinic's referral office.    >>   List of current medications  >>   This referral request   >>   Any documents/labs given to you for this referral                  Follow-up notes from your care team     Return in 12 months (on 1/19/2019).      Your next 10 appointments already scheduled     Feb 07, 2018 11:00 AM CST   (Arrive by 10:45 AM)   PAC EVALUATION with  Pac Moira 7   [facility])    [address] [place] [address]   [phone]            Feb 07, 2018 12:00 PM CST   (Arrive by 11:45 AM)   PAC RN ASSESSMENT with Gloria Pac Rn   [facility] ([facility])    [address] [place] [address]   [phone]            Feb 07, 2018 12:40 PM CST   (Arrive by 12:25 PM)   PAC Anesthesia Consult with  Pac Anesthesiologist   [facility])    [address] [place] [id number]   [phone]            Feb 19, 2018   Procedure with Cassidy Liao MD   [facility] [place], Same Day Surgery (--)    [address] [facility] [address]   [phone]            Mar 02, 2018  1:45 PM CST   (Arrive by 1:30 PM)   Cystoscopy with Cassidy Liao MD   [facility] ([facility])    [address] [place] [address]   [phone]            Mar 13, 2018 10:15  "AM CDT   Adult Med Follow UP with JELLY Chapa CNS   Psychiatry Clinic ([facility])    [place] [id number] [facility] [address] [phone]              Who to contact     Please call your clinic at [phone] to:    Ask questions about your health    Make or cancel appointments    Discuss your medicines    Learn about your test results    Speak to your doctor   If you have compliments or concerns about an experience at your clinic, or if you wish to file a complaint, please contact [facility] Patient Relations at [phone] or email us at [email or web address]         Additional Information About Your Visit        [employer] Information     [employer] gives you secure access to your electronic health record. If you see a primary care provider, you can also send messages to your care team and make appointments. If you have questions, please call your primary care clinic.  If you do not have a primary care provider, please call [phone] and they will assist you.      [employer] is an electronic gateway that provides easy, online access to your medical records. With [employer], you can request a clinic appointment, read your test results, renew a prescription or communicate with your care team.     To access your existing account, please contact your [facility] or call [phone] for assistance.        Care EveryWhere ID     This is your Care EveryWhere ID. This could be used by other organizations to access your [place] medical records  [id number]        Your Vitals Were     Pulse Height BMI (Body Mass Index)             117 1.778 m (5' 10\") 24.11 kg/m2          Blood Pressure from Last 3 Encounters:   01/19/18 108/75   12/28/17 120/86   11/08/17 121/84    Weight from Last 3 Encounters:   01/19/18 76.2 kg (168 lb)   12/28/17 76.6 kg (168 lb 14.4 oz)   11/08/17 81.6 kg (180 lb)    "           We Performed the Following     CYSTOURETHROSCOPY     PAC Visit Referral (For [facility] Only)     Brenda-Operative Worksheet  (Urology General)     Routine UA with microscopic - No culture     Urine Culture Aerobic Bacterial          Today's Medication Changes          These changes are accurate as of: 1/19/18  6:24 PM.  If you have any questions, ask your nurse or doctor.               These medicines have changed or have updated prescriptions.        Dose/Directions    * RECOMBINATE 801-1240 UNITS Solr   This may have changed:  Another medication with the same name was added. Make sure you understand how and when to take each.   Used for:  Severe hemophilia A (H), Hematuria   Generic drug:  factor VIII (recomb)   Changed by:  Jorge Jiang MD        Infuse 0720-8820 units Recombinate Saturday 1/13 and Sunday 1/14   Quantity:  5000 each   Refills:  0       * RECOMBINATE 801-1240 UNITS Solr   This may have changed:  Another medication with the same name was added. Make sure you understand how and when to take each.   Used for:  Severe hemophilia A (H), Hematuria   Generic drug:  factor VIII (recomb)   Changed by:  Jorge Jiang MD        Infuse 4866-1951 units Recombinate Saturday 1/13 and Sunday 1/14   Quantity:  5000 each   Refills:  0       * KOGENATE FS 1000 UNITS Kit   This may have changed:  Another medication with the same name was added. Make sure you understand how and when to take each.   Used for:  Severe hemophilia A (H)   Changed by:  Jorge Jiang MD        Infuse 2000 to 3000 units (-5/+10%) IV 2 to 3 times a week and as needed for bleeding episodes   Quantity:  08208 each   Refills:  1       * Antihemophilic Factor (Recomb) 0114-5954 UNITS Solr   This may have changed:  You were already taking a medication with the same name, and this prescription was added. Make sure you understand how and when to take each.   Used for:  Severe hemophilia A (H)   Changed by:  Clement Griffin,  GERA        Infuse recombinate 2000 units every 24 hours until Monday 1/22/2018 post cystoscopy.   Quantity:  6000 each   Refills:  0       * Notice:  This list has 4 medication(s) that are the same as other medications prescribed for you. Read the directions carefully, and ask your doctor or other care provider to review them with you.         Where to get your medicines      These medications were sent to [facility] - [facility] [place], [place] - [address] [place] [address] [address]  [address], [facility] [address]     Phone:  [phone]     Antihemophilic Factor (Recomb) 4829-8458 UNITS Solr                Primary Care Provider Office Phone # Fax #    Katie Ramos [phone] [address]  [facility] [address]        Equal Access to Services     ANDREW FLANAGAN : Hadii violette owens hadasho Soomaali, waaxda luqadaha, qaybta kaalmada adeegyada, lloyd rene . So [facility] [phone].    ATENCIÓN: Si habla español, tiene a hernadez disposición servicios gratuitos de asistencia lingüística. Llame al [phone].    We comply with applicable federal civil rights laws and [place] laws. We do not discriminate on the basis of race, color, national origin, age, disability, sex, sexual orientation, or gender identity.            Thank you!     Thank you for choosing [facility] AND [facility] FOR PROSTATE AND UROLOGIC CANCERS  for your care. Our goal is always to provide you with excellent care. Hearing back from our patients is one way we can continue to improve our services. Please take a few minutes to complete the written survey that you may receive in the mail after your visit with us. Thank you!             Your Updated Medication List - Protect others around you: Learn how to safely use, store and throw away your medicines at www.disposemymeds.org.          This list is accurate as of: 1/19/18  6:24 PM.  Always use your most recent med list.                    Brand Name Dispense Instructions for use Diagnosis    amLODIPine 5 MG tablet    NORVASC    90 tablet    Take 1 tablet (5 mg) by mouth daily    Hypertension goal BP (blood pressure) < 140/90       codeine 30 MG tablet     168 tablet    Take 2 tablets (60 mg) by mouth every 6 hours as needed for moderate to severe pain (up to 8 tabs in 24 hours)    Hemophilic arthropathy       diazepam 5 MG tablet    VALIUM    30 tablet    Take up to one tablet by mouth daily as needed for anxiety    Depressive disorder       lisinopril 40 MG tablet    PRINIVIL/ZESTRIL    90 tablet    Take 1 tablet (40 mg) by mouth daily    Hypertension goal BP (blood pressure) < 140/90       mirtazapine 45 MG tablet    REMERON    37 tablet    Take 1 tablet (45 mg) by mouth At Bedtime And may take 1/2 tablet for insomnia up to 3 times a week.    Generalized anxiety disorder       predniSONE 20 MG tablet    DELTASONE    8 tablet    Take 2 tablets daily for 4 days    Severe hemophilia A (H), Hematuria, unspecified type       * RECOMBINATE 801-1240 UNITS Solr   Generic drug:  factor VIII (recomb)     5000 each    Infuse 6307-7582 units Recombinate Saturday 1/13 and Sunday 1/14    Severe hemophilia A (H), Hematuria       * RECOMBINATE 801-1240 UNITS Solr   Generic drug:  factor VIII (recomb)     5000 each    Infuse 6838-8918 units Recombinate Saturday 1/13 and Sunday 1/14    Severe hemophilia A (H), Hematuria       * KOGENATE FS 1000 UNITS Kit     64680 each    Infuse 2000 to 3000 units (-5/+10%) IV 2 to 3 times a week and as needed for bleeding episodes    Severe hemophilia A (H)       * Antihemophilic Factor (Recomb) 3381-2074 UNITS Solr     6000 each    Infuse recombinate 2000 units every 24 hours until Monday 1/22/2018 post cystoscopy.    Severe hemophilia A (H)       * Notice:  This list has 4 medication(s) that are the same as other medications prescribed for you. Read the directions carefully, and ask your doctor or other care provider to  review them with you.

## 2023-04-24 ENCOUNTER — TELEPHONE (OUTPATIENT)
Dept: HEMATOLOGY | Facility: CLINIC | Age: 73
End: 2023-04-24
Payer: MEDICARE

## 2023-04-24 NOTE — TELEPHONE ENCOUNTER
[id number]  Orlin Alcantar  73 year old male  CBCD Diagnosis: severe Hemophilia A  CBCD Provider: Jorge Jiang MD    Reached out to Orlin Alcantar to communicate his normal Hemoglobin A1C result drawn on 4/17/2023.  Reviewed that this had been elevated in the past.  Reviewed the meaning of the test and he appreciated that this was now normal. He did say that he had been diagnosed with Type 2 diabetes decades ago but in not on any current treatment for this.    Orlin has recent hx of kidney stones and intermittent hematuria.  He has a telephone visit with urology tomorrow morning and is concerned that he may be headed for surgery.  [facility] with [facility] and [facility].  Did reassure him that we have dealt with other patients that have had kidney stones.  Discussed treatment for hemophilia around procedures in general and our availability to work with him once we know the details.    He appreciated the call and will leave a VM after his morning visit with urology to let us know the plans.     Margot DAS, RN   Nurse Clinician  [facility]  Office: [phone]  Main Office: [phone] (ask to speak with a nurse)  Fax: [phone]     Orlin called to say that Dr. Thomas is recommending the following surgery:  NEPHROLITHOTOMY, PERCUTANEOUS, USING HOLMIUM LASER.  This is scheduled for 5.17.2023 at the  OR.  Staff alerted.    Margot DAS, RN   Nurse Clinician  [facility]  Office: [phone]  Main Office: [phone] (ask to speak with a nurse)  Fax: [phone]

## 2023-04-25 ENCOUNTER — DOCUMENTATION ONLY (OUTPATIENT)
Dept: HEMATOLOGY | Facility: CLINIC | Age: 73
End: 2023-04-25

## 2023-04-25 ENCOUNTER — VIRTUAL VISIT (OUTPATIENT)
Dept: UROLOGY | Facility: CLINIC | Age: 73
End: 2023-04-25
Payer: MEDICARE

## 2023-04-25 ENCOUNTER — TELEPHONE (OUTPATIENT)
Dept: UROLOGY | Facility: CLINIC | Age: 73
End: 2023-04-25

## 2023-04-25 DIAGNOSIS — N20.0 KIDNEY STONE: Primary | ICD-10-CM

## 2023-04-25 PROCEDURE — 99215 OFFICE O/P EST HI 40 MIN: CPT | Mod: 95 | Performed by: UROLOGY

## 2023-04-25 NOTE — TELEPHONE ENCOUNTER
Spoke with:Patient  Patient       Date of surgery: Wednesday May 17th 2023       Location: [place]      Informed patient they will need a adult : YES      Pre op with provider: SORAYA      H&P Scheduled in PAC- patient is scheduled for a in person PAC EVAL on May 10th 2023         Pre procedure covid :Not required       Additional imaging: NA        Surgery Packet : Mailed out to patient       Additional comments:- Please call patient with surgery teaching

## 2023-04-25 NOTE — PROGRESS NOTES
Medication/Dose: Codeine Sulfate 30 mg Tablets  Cover My Meds Key: [id number]    If notification received from pharmacy:   Pharmacy name: [facility]  Pharmacy Phone: [phone]  Insurance name: [employer] PDP ([employer])    PA denied.    Reason given: Formulary medications available: acetaminophen/codeine, hydrocodone/acetaminophen, hydromorphone, morphine, tramadol   Patient notified: Yes   Patient informed directly/PT. informed of right to appeal. 4/21/23: Patient would prefer to continue filling medication without approval. Of note, patient is also covered by [employer] assistance.

## 2023-04-25 NOTE — NURSING NOTE
Is the patient currently in the state of [place]? YES    Visit mode:TELEPHONE    If the visit is dropped, the patient can be reconnected by: TELEPHONE VISIT: Phone number: [phone]    Will anyone else be joining the visit? NO      How would you like to obtain your AVS? Mail a copy    Are changes needed to the allergy or medication list? NO    Reason for visit: Video Visit (3 week follow-up )

## 2023-04-25 NOTE — LETTER
4/25/2023       RE: Orlin Alcantar  [address]  [place] [address]     Dear Colleague,    Thank you for referring your patient, Orlin Alcantar, to the [facility] UROLOGY [facility] [place] at [facility]. Please see a copy of my visit note below.    Dr. Jorge Angulo - [phone]          UROLOGY OUTPATIENT VISIT      Chief Complaint:   Renal Stone      Synopsis    Orlin Alcantar is a very pleasant AGE: 73 year old year old person    See prior notes for full hx  Has hemophilia and large left renal stone  Also with ileal conduit  Suspected uric acid, tried several weeks of urinary alkalinization but stone did not change on CT scan  Has had one instance gross hematuria since last visit, no UTI symptoms or flank pain    I reviewed CT scan today personally, stone has not change, remains around 550 HFU     I also looked at KUB from last visit, the stone is visible on one of the two images         Medications     Current Outpatient Medications   Medication    acetaminophen (TYLENOL) 500 MG tablet    Antihem Factor Recomb, rFVIII, (KOGENATE FS) 1000 units KIT    celecoxib (CELEBREX) 100 MG capsule    codeine 30 MG tablet    lisinopril (ZESTRIL) 30 MG tablet    mirtazapine (REMERON) 45 MG tablet    naloxone (EVZIO) 0.4 MG/0.4ML auto-injector    potassium citrate (UROCIT-K) 10 MEQ (1080 MG) CR tablet    sodium bicarbonate 650 MG tablet    urea (GORMEL) 20 % external cream     No current facility-administered medications for this visit.     Facility-Administered Medications Ordered in Other Visits   Medication    ethyl chloride spray         The following  distinct labs were reviewed    I personally reviewed all applicable laboratory data and went over findings with patient  Significant for:    CBC RESULTS:  Recent Labs   Lab Test 03/27/23  0744 09/22/22  0737 03/08/22  0928 09/08/21  0833   WBC 10.1 5.1 4.7 5.1   HGB 13.7 13.9 13.2* 13.4     230 207 267        BMP RESULTS:  Recent Labs   Lab Test 04/17/23  1335 03/27/23  0744 09/22/22  0812 09/22/22  0737 03/08/22  0928 09/08/21  0833 03/01/21  0841 03/01/21  0810 09/26/20  0819 08/05/20  1105    140  --  140 138   < >  --  142  --   --    POTASSIUM 4.5 4.0  --  4.1 3.8   < >  --  3.9  --   --    CHLORIDE 104 104  --  104 106   < >  --  112*  --   --    CO2 27 20*  --  23 24   < >  --  19*  --   --    ANIONGAP 10 16*  --  13 8   < >  --  10  --   --    * 140*  --  104* 98   < >  --  100*  --   --    BUN 24.3* 28.1*  --  25.2* 28   < >  --  27  --   --    CR 1.03 1.00 1.1 1.04 1.02   < >  --  1.08  --   --    GFRESTIMATED 77 79 >60 76 78   < > 66 69 74 66   GFRESTBLACK  --   --   --   --   --   --  80 80 89 80    < > = values in this interval not displayed.       CALCIUM RESULTS:  Recent Labs   Lab Test 04/17/23  1335 03/27/23  0744 09/22/22  0737 03/08/22  0928   MARLA 9.8 9.9 10.4* 9.1       HGB A1C RESULTS:  Lab Results   Component Value Date    A1C 5.6 04/17/2023    A1C 6.9 07/25/2018    A1C 5.9 02/21/2018    A1C 6.1 02/20/2018    A1C 5.0 10/13/2014    A1C 5.5 05/01/2012       UA RESULTS:   Recent Labs   Lab Test 04/04/23  1147 07/25/18  1306 02/07/18  1335 01/19/18  1200   SG 1.015 1.016 1.014 1.017   URINEPH 8.5* 5.0 5.0 5.0   NITRITE Positive* Negative Negative Negative   RBCU  --  20* >182* >182*   WBCU  --  >182* >182* >182*         Recent Imaging Report    I personally reviewed all applicable imaging and went over the below findings with patient.    Results for orders placed or performed in visit on 04/17/23   CT Abdomen Pelvis w/o Contrast    Narrative    EXAMINATION: CT ABDOMEN PELVIS W/O CONTRAST, 4/17/2023 1:13 PM    INDICATION: Uric acid stone in urine    COMPARISON STUDY: X-ray 4/4/2023, CT 3/27/2023, 3/1/2021    TECHNIQUE: CT scan of the abdomen and pelvis was performed on  multidetector CT scanner using volumetric acquisition technique and  images were reconstructed  in multiple planes with variable thickness  and reviewed on dedicated workstations.     CONTRAST: None    CT scan radiation dose is optimized to minimum requisite dose using  automated dose modulation techniques.    FINDINGS:    Lower thorax: Few pulmonary nodules are not significantly changed  compared to 3/1/2021. For example, 6 mm solid nodule in the posterior  left upper lobe and 6 mm solid nodule in the peripheral right lower  lobe. Heart size is normal. No pericardial effusion. Bibasilar left  greater than right centrilobular emphysematous change similar to  previous.    Abdomen/pelvis:  Unremarkable noncontrast appearance of the liver. Cholelithiasis  without gallbladder wall thickening or pericholecystic fluid. No  intrahepatic or extrahepatic biliary dilation. Mild fatty atrophy of  the pancreas. Granulomatous disease of the spleen. The adrenal glands  are normal.    Grossly stable radiodense renal stones compared to 3/27/2023 with  large calculus at the left renal pelvis measuring approximately 2.4 x  1.3 cm, previously 2.2 x 1.3 cm when measured in a similar fashion  (series 3 image 150). No definite evidence of obstruction.  Postoperative changes of cystoprostatectomy with right lower quadrant  ileal conduit.    No abnormally dilated loops of small and large bowel. Colonic  diverticulosis without focal adjacent inflammatory change. No  lymphadenopathy. No free air or free fluid. Mild atherosclerotic  disease of the abdominal aorta which is normal in caliber.    Bones/soft tissues:  No acute or aggressive osseous lesions. Right total hip arthroplasty.  Left femoral neck screw fixation.      Impression    IMPRESSION:   1. Similar appearance of bilateral nephrolithiasis with large stone in  the left renal pelvis measuring up to 2.4 cm, previously up to 2.2 cm.  No definite evidence of obstruction.  2. Cystoprostatectomy with right lower quadrant ileal conduit.  3. Few bibasilar pulmonary nodules which are  not significantly changed  dating back to 3/1/2021, statistically benign.    I have personally reviewed the examination and initial interpretation  and I agree with the findings.    MIKEY METZ MD         SYSTEM ID:  [id number]       Personal Review of Recent Imaging    I personally reviewed the above CT and based on my own interpretation stone is unchanged           Assessment/Plan   73 year old year old person with large left renal stone, ileal conduit    We discussed the surgical treatment options for renal stones including shock wave lithotripsy, ureteroscopy, and percutaneous nephrolithotomy.  We discussed the risks and benefits of each approach in the context of the patient's current stone burden.  After consideration of each the decision was made to proceed with left pcnl because of staghorn size and failure to respond at all to alkalinization    We discussed the risks of bleeding, infection, incomplete stone removal, damage to adjacent organs, and need for staged procedures.        Complex Medical Decision Making: Yes.  Hemophelia Therapy  Discussed with patient the higher potential risk of perioperative and post-procedural bleeding as a result of increased bleeding risk due to comorbidities and/or anticoagulant use.  Will need assistance in coagulation factor management from hematology.    CC:  No Ref-Primary, Physician    20 minutes spent on clinical encounter including  Review of medical records  Documentation  Coordinating follow-up medical care          Virtual Visit Details    Type of service:  Telephone Visit   Phone call duration: 10 minutes       Sincerely,    Sacha Thomas MD

## 2023-04-25 NOTE — PROGRESS NOTES
Dr. Jorge Angulo - [phone]          UROLOGY OUTPATIENT VISIT      Chief Complaint:   Renal Stone      Synopsis    Orlin Alcantar is a very pleasant AGE: 73 year old year old person    See prior notes for full hx  Has hemophilia and large left renal stone  Also with ileal conduit  Suspected uric acid, tried several weeks of urinary alkalinization but stone did not change on CT scan  Has had one instance gross hematuria since last visit, no UTI symptoms or flank pain    I reviewed CT scan today personally, stone has not change, remains around 550 HFU     I also looked at KUB from last visit, the stone is visible on one of the two images         Medications     Current Outpatient Medications   Medication     acetaminophen (TYLENOL) 500 MG tablet     Antihem Factor Recomb, rFVIII, (KOGENATE FS) 1000 units KIT     celecoxib (CELEBREX) 100 MG capsule     codeine 30 MG tablet     lisinopril (ZESTRIL) 30 MG tablet     mirtazapine (REMERON) 45 MG tablet     naloxone (EVZIO) 0.4 MG/0.4ML auto-injector     potassium citrate (UROCIT-K) 10 MEQ (1080 MG) CR tablet     sodium bicarbonate 650 MG tablet     urea (GORMEL) 20 % external cream     No current facility-administered medications for this visit.     Facility-Administered Medications Ordered in Other Visits   Medication     ethyl chloride spray         The following  distinct labs were reviewed    I personally reviewed all applicable laboratory data and went over findings with patient  Significant for:    CBC RESULTS:  Recent Labs   Lab Test 03/27/23  0744 09/22/22  0737 03/08/22  0928 09/08/21  0833   WBC 10.1 5.1 4.7 5.1   HGB 13.7 13.9 13.2* 13.4    230 207 267        BMP RESULTS:  Recent Labs   Lab Test 04/17/23  1335 03/27/23  0744 09/22/22  0812 09/22/22  0737 03/08/22  0928 09/08/21  0833 03/01/21  0841 03/01/21  0810 09/26/20  0819 08/05/20  1105    140  --  140 138   < >  --  142  --   --    POTASSIUM 4.5 4.0  --  4.1 3.8   < >  --   3.9  --   --    CHLORIDE 104 104  --  104 106   < >  --  112*  --   --    CO2 27 20*  --  23 24   < >  --  19*  --   --    ANIONGAP 10 16*  --  13 8   < >  --  10  --   --    * 140*  --  104* 98   < >  --  100*  --   --    BUN 24.3* 28.1*  --  25.2* 28   < >  --  27  --   --    CR 1.03 1.00 1.1 1.04 1.02   < >  --  1.08  --   --    GFRESTIMATED 77 79 >60 76 78   < > 66 69 74 66   GFRESTBLACK  --   --   --   --   --   --  80 80 89 80    < > = values in this interval not displayed.       CALCIUM RESULTS:  Recent Labs   Lab Test 04/17/23  1335 03/27/23  0744 09/22/22  0737 03/08/22  0928   MARLA 9.8 9.9 10.4* 9.1       HGB A1C RESULTS:  Lab Results   Component Value Date    A1C 5.6 04/17/2023    A1C 6.9 07/25/2018    A1C 5.9 02/21/2018    A1C 6.1 02/20/2018    A1C 5.0 10/13/2014    A1C 5.5 05/01/2012       UA RESULTS:   Recent Labs   Lab Test 04/04/23  1147 07/25/18  1306 02/07/18  1335 01/19/18  1200   SG 1.015 1.016 1.014 1.017   URINEPH 8.5* 5.0 5.0 5.0   NITRITE Positive* Negative Negative Negative   RBCU  --  20* >182* >182*   WBCU  --  >182* >182* >182*         Recent Imaging Report    I personally reviewed all applicable imaging and went over the below findings with patient.    Results for orders placed or performed in visit on 04/17/23   CT Abdomen Pelvis w/o Contrast    Narrative    EXAMINATION: CT ABDOMEN PELVIS W/O CONTRAST, 4/17/2023 1:13 PM    INDICATION: Uric acid stone in urine    COMPARISON STUDY: X-ray 4/4/2023, CT 3/27/2023, 3/1/2021    TECHNIQUE: CT scan of the abdomen and pelvis was performed on  multidetector CT scanner using volumetric acquisition technique and  images were reconstructed in multiple planes with variable thickness  and reviewed on dedicated workstations.     CONTRAST: None    CT scan radiation dose is optimized to minimum requisite dose using  automated dose modulation techniques.    FINDINGS:    Lower thorax: Few pulmonary nodules are not significantly changed  compared to  3/1/2021. For example, 6 mm solid nodule in the posterior  left upper lobe and 6 mm solid nodule in the peripheral right lower  lobe. Heart size is normal. No pericardial effusion. Bibasilar left  greater than right centrilobular emphysematous change similar to  previous.    Abdomen/pelvis:  Unremarkable noncontrast appearance of the liver. Cholelithiasis  without gallbladder wall thickening or pericholecystic fluid. No  intrahepatic or extrahepatic biliary dilation. Mild fatty atrophy of  the pancreas. Granulomatous disease of the spleen. The adrenal glands  are normal.    Grossly stable radiodense renal stones compared to 3/27/2023 with  large calculus at the left renal pelvis measuring approximately 2.4 x  1.3 cm, previously 2.2 x 1.3 cm when measured in a similar fashion  (series 3 image 150). No definite evidence of obstruction.  Postoperative changes of cystoprostatectomy with right lower quadrant  ileal conduit.    No abnormally dilated loops of small and large bowel. Colonic  diverticulosis without focal adjacent inflammatory change. No  lymphadenopathy. No free air or free fluid. Mild atherosclerotic  disease of the abdominal aorta which is normal in caliber.    Bones/soft tissues:  No acute or aggressive osseous lesions. Right total hip arthroplasty.  Left femoral neck screw fixation.      Impression    IMPRESSION:   1. Similar appearance of bilateral nephrolithiasis with large stone in  the left renal pelvis measuring up to 2.4 cm, previously up to 2.2 cm.  No definite evidence of obstruction.  2. Cystoprostatectomy with right lower quadrant ileal conduit.  3. Few bibasilar pulmonary nodules which are not significantly changed  dating back to 3/1/2021, statistically benign.    I have personally reviewed the examination and initial interpretation  and I agree with the findings.    MIKEY METZ MD         SYSTEM ID:  [id number]       Personal Review of Recent Imaging    I personally reviewed the above  CT and based on my own interpretation stone is unchanged           Assessment/Plan   73 year old year old person with large left renal stone, ileal conduit    We discussed the surgical treatment options for renal stones including shock wave lithotripsy, ureteroscopy, and percutaneous nephrolithotomy.  We discussed the risks and benefits of each approach in the context of the patient's current stone burden.  After consideration of each the decision was made to proceed with left pcnl because of staghorn size and failure to respond at all to alkalinization    We discussed the risks of bleeding, infection, incomplete stone removal, damage to adjacent organs, and need for staged procedures.        Complex Medical Decision Making: Yes.  Hemophelia Therapy  Discussed with patient the higher potential risk of perioperative and post-procedural bleeding as a result of increased bleeding risk due to comorbidities and/or anticoagulant use.  Will need assistance in coagulation factor management from hematology.    CC:  No Ref-Primary, Physician    20 minutes spent on clinical encounter including  Review of medical records  Documentation  Coordinating follow-up medical care          Virtual Visit Details    Type of service:  Telephone Visit   Phone call duration: 10 minutes

## 2023-04-26 NOTE — TELEPHONE ENCOUNTER
FUTURE VISIT INFORMATION      SURGERY INFORMATION:    Date: 5/17/23    Location: uu or    Surgeon:  Sacha Thomas MD    Anesthesia Type:  General    Procedure: NEPHROLITHOTOMY, PERCUTANEOUS, USING HOLMIUM LASER    Consult: Virtual visit 4/25/23    RECORDS REQUESTED FROM:       Primary Care Provider: [facility]    Most recent EKG+ Tracing: 3/26/18

## 2023-05-03 ENCOUNTER — DOCUMENTATION ONLY (OUTPATIENT)
Dept: HEMATOLOGY | Facility: CLINIC | Age: 73
End: 2023-05-03
Payer: MEDICARE

## 2023-05-03 DIAGNOSIS — D66 SEVERE HEMOPHILIA A (H): Primary | ICD-10-CM

## 2023-05-03 DIAGNOSIS — N20.0 NEPHROLITHIASIS: ICD-10-CM

## 2023-05-03 RX ORDER — ANTIHEMOPHILIC FACTOR (RECOMBINANT) 3000 (+/-)
3630 KIT INTRAVENOUS ONCE
Qty: 3630 EACH | Refills: 0 | Status: CANCELLED
Start: 2023-05-03 | End: 2023-05-03

## 2023-05-03 NOTE — PROGRESS NOTES
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     He was recently evaluated for hematuria and found to have nephrolithiasis. Dr. Thomas is recommending the following surgery:  NEPHROLITHOTOMY, PERCUTANEOUS, USING HOLMIUM LASER.  This is scheduled for 2023 at the  OR. He will spend one night in the hospital prior to discharge.    Procedural Hemophilia Plan:       Infuse Kogenate 50 unit(s)/kg +/- 10% IV 30-90 minutes prior to procedure (2023). Dose to be administered in preop by the hospital. Dose ordered by [facility] provider    Infuse Kogenate 50 unit(s)/kg +/- 10% IV on post operative day 1 at 0800 prior to discharge. Dose ordered by [facility] provider. (2023)    After discharge, Orlin will infuse Kogenate 45 unit(s)/kg +/- 10% IV daily (q 24 hours) for three additional days starting on post operative day 1 (2023 - 2023)    Then transition back to his usual prophylaxis with Kogenate 45 unit(s)/kg every other day (q 48 hours) as long as he is not having any further hematuria (2023).     Avoid antifibrinolytics    If any bleeding concerns, please page the inpatient Hematology team. Dr. Perez will be on service.     Preop order placed for Kogenate.   [facility] to send postoperative doses of Kogenate to Orlin. Orders placed. Coordinated with [facility] pharmacy team.     NETTIE CLINE PA-C on 5/3/2023 at 7:14 AM

## 2023-05-04 ENCOUNTER — PATIENT OUTREACH (OUTPATIENT)
Dept: UROLOGY | Facility: CLINIC | Age: 73
End: 2023-05-04
Payer: MEDICARE

## 2023-05-04 DIAGNOSIS — N39.0 UTI (URINARY TRACT INFECTION): Primary | ICD-10-CM

## 2023-05-04 RX ORDER — CIPROFLOXACIN 500 MG/1
500 TABLET, FILM COATED ORAL 2 TIMES DAILY
Qty: 14 TABLET | Refills: 0 | Status: SHIPPED | OUTPATIENT
Start: 2023-05-04 | End: 2023-05-10

## 2023-05-04 RX ORDER — ANTIHEMOPHILIC FACTOR (RECOMBINANT) 1000 (+/-)
KIT INTRAVENOUS
Qty: 13044 EACH | Refills: 0 | Status: SHIPPED | OUTPATIENT
Start: 2023-05-18 | End: 2023-05-11

## 2023-05-04 NOTE — TELEPHONE ENCOUNTER
Per Dr. Thomas, pt should purchase a thermometer. If fever 101 or greater, needs to present to ED for poss nephrostomy tube. Pt advised of this, and agrees to order thermometer for next day delivery    cipro 500bid for 7 days sent for pt to begin if symptoms of low grade fever or infection present over the weekend.

## 2023-05-04 NOTE — TELEPHONE ENCOUNTER
Spoke with pt and reviewed upcoming surgery in detail. He will leave a [facility] tomorrow at lab. He states he has had intermittent hematuria the last 10 days and has had days where he believes he has a fever, however his home thermometer does not support this. He suggests it may be broken. He is asking for abx to be sent to pharmacy Friday when he completed his urine sample so if fever presents over the weekend he may begin an abx while we wait on the culture. His last ucx was pos, mixed 100,000- 4/4/23. Note routed to provider.   Pt has pre-op with PAC 5/10 where he is hoping to address some questions, but is also looking for urology to coordinate some plans prior to admittance also.     He is requesting close coordination with his surgical/anesthesia team and hemophilia team  Per pt, he will need clotting factor prior to procedure with dx severe hemophilia.   Would like the center for bleeding and clotting center involved in planning as to how much or little clotting factor should be infused. He is also asking their advisement on whether he should plan to stay admitted more than the one night that is typical.     Of note-pt states during the procedure, we cannot straighten elbows, or will hemorrhage.      Note routed to provider and IRON Frost for guidance/FYI as well.     Bleeding and clotting clinic- mekhi espana RN (ph) [phone]     RNCC will update pt when hears back from bleeding and clotting, as well as Dr. Thomas for poss abx    IRON Lau  Care Coordinator  [phone]

## 2023-05-05 ENCOUNTER — LAB (OUTPATIENT)
Dept: LAB | Facility: CLINIC | Age: 73
End: 2023-05-05
Payer: MEDICARE

## 2023-05-05 DIAGNOSIS — N39.0 UTI (URINARY TRACT INFECTION): ICD-10-CM

## 2023-05-05 DIAGNOSIS — E11.9 TYPE 2 DIABETES MELLITUS WITHOUT COMPLICATION, UNSPECIFIED WHETHER LONG TERM INSULIN USE (H): Primary | ICD-10-CM

## 2023-05-05 LAB
ALBUMIN UR-MCNC: 100 MG/DL
APPEARANCE UR: CLEAR
BACTERIA #/AREA URNS HPF: ABNORMAL /HPF
BILIRUB UR QL STRIP: NEGATIVE
COLOR UR AUTO: YELLOW
CREAT UR-MCNC: 51.9 MG/DL
GLUCOSE UR STRIP-MCNC: NEGATIVE MG/DL
HGB UR QL STRIP: ABNORMAL
KETONES UR STRIP-MCNC: NEGATIVE MG/DL
LEUKOCYTE ESTERASE UR QL STRIP: ABNORMAL
MICROALBUMIN UR-MCNC: 208 MG/L
MICROALBUMIN/CREAT UR: 400.77 MG/G CR (ref 0–17)
NITRATE UR QL: POSITIVE
PH UR STRIP: 8.5 [PH] (ref 5–7)
RBC #/AREA URNS AUTO: ABNORMAL /HPF
SP GR UR STRIP: 1.01 (ref 1–1.03)
TRI-PHOS CRY #/AREA URNS HPF: ABNORMAL /HPF
UROBILINOGEN UR STRIP-ACNC: 0.2 E.U./DL
WBC #/AREA URNS AUTO: ABNORMAL /HPF

## 2023-05-05 PROCEDURE — 87086 URINE CULTURE/COLONY COUNT: CPT

## 2023-05-05 PROCEDURE — 87088 URINE BACTERIA CULTURE: CPT

## 2023-05-05 PROCEDURE — 81001 URINALYSIS AUTO W/SCOPE: CPT

## 2023-05-05 PROCEDURE — 82570 ASSAY OF URINE CREATININE: CPT

## 2023-05-05 PROCEDURE — 82043 UR ALBUMIN QUANTITATIVE: CPT

## 2023-05-05 PROCEDURE — 87186 SC STD MICRODIL/AGAR DIL: CPT | Mod: 59

## 2023-05-08 ENCOUNTER — TELEPHONE (OUTPATIENT)
Dept: HEMATOLOGY | Facility: CLINIC | Age: 73
End: 2023-05-08
Payer: MEDICARE

## 2023-05-08 LAB
BACTERIA UR CULT: ABNORMAL

## 2023-05-08 RX ORDER — SULFAMETHOXAZOLE/TRIMETHOPRIM 800-160 MG
1 TABLET ORAL 2 TIMES DAILY
Qty: 20 TABLET | Refills: 0 | Status: ON HOLD | OUTPATIENT
Start: 2023-05-08 | End: 2023-05-18

## 2023-05-08 NOTE — TELEPHONE ENCOUNTER
"[id number]  Orlin Alcantar  73 year old male  [facility] Diagnosis: Severe Hemophilia A  [facility] Provider: Monica Felipe PA-C     Incoming call from Orlin. Orlin asked writer, \"do you have staff meetings on Monday?\" RN responded that we did have some staff meetings on Mondays. Orlin then asked, \"Was Monica at the staff meeting?\" RN explained that Monica was at the staff meeting and is currently seeing patients. Orlin then said, \"I thought Monica just saw patients on Tuesdays.\" RN responded that Monica sees patients every day she is here. Orlin then said, \"I will wait for Monica's call back then.\" Patient then hung up before RN could get more information about the nature of Orlin's request. RN will route to provider.      Tayla Gill RN, BSN, PCCN  Nurse Clinician    [facility] for Bleeding [facility] Cl[facility]  [address] [place] [address], [address], [place] [address]   Office, direct: [phone]  Main office number: [phone]  Pronouns: She, her, hers       "

## 2023-05-08 NOTE — TELEPHONE ENCOUNTER
Pt phoned with ucx results. Per Dr. Thomas, discontinue cipro and begin bactrim. Pt phoned and agreeable to plan, rx sent.

## 2023-05-08 NOTE — TELEPHONE ENCOUNTER
Per center for bleeding and clotting disorders,   Monica will place orders (signed and held orders) for the dose of factor VIII to be given in the pre-anesthesia area prior to the procedure.  Our pharmacy is working with inpatient pharmacy to make sure they have what is needed. This dose should bring his factor VIII levels up to normal, but we do not need laboratory confirmation of this before proceeding with the procedure.       Orlin is skilled in self infusion of factor VIII and will administer doses daily for 4 days after and then go back to his baseline line treatment of every other day.  Our team will order and dispense these doses to Orlin.     I did speak to Orlin about this plan.  We will adjust the plan to give him a dose in hospital the next morning.     Urology RNCC phoned pt who states this plan is his understanding of the plan as well.

## 2023-05-09 LAB
ABO/RH(D): NORMAL
ANTIBODY SCREEN: NEGATIVE
SPECIMEN EXPIRATION DATE: NORMAL

## 2023-05-10 ENCOUNTER — OFFICE VISIT (OUTPATIENT)
Dept: SURGERY | Facility: CLINIC | Age: 73
End: 2023-05-10
Payer: MEDICARE

## 2023-05-10 ENCOUNTER — PRE VISIT (OUTPATIENT)
Dept: SURGERY | Facility: CLINIC | Age: 73
End: 2023-05-10

## 2023-05-10 ENCOUNTER — ANESTHESIA EVENT (OUTPATIENT)
Dept: SURGERY | Facility: CLINIC | Age: 73
End: 2023-05-10
Payer: MEDICARE

## 2023-05-10 ENCOUNTER — LAB (OUTPATIENT)
Dept: LAB | Facility: CLINIC | Age: 73
End: 2023-05-10
Payer: MEDICARE

## 2023-05-10 VITALS
HEART RATE: 82 BPM | HEIGHT: 72 IN | OXYGEN SATURATION: 98 % | TEMPERATURE: 97.8 F | SYSTOLIC BLOOD PRESSURE: 125 MMHG | DIASTOLIC BLOOD PRESSURE: 84 MMHG | BODY MASS INDEX: 20.99 KG/M2 | RESPIRATION RATE: 16 BRPM | WEIGHT: 155 LBS

## 2023-05-10 DIAGNOSIS — N20.0 KIDNEY STONE: ICD-10-CM

## 2023-05-10 DIAGNOSIS — Z01.818 PRE-OP EVALUATION: Primary | ICD-10-CM

## 2023-05-10 DIAGNOSIS — Z01.818 PRE-OP EVALUATION: ICD-10-CM

## 2023-05-10 LAB
ERYTHROCYTE [DISTWIDTH] IN BLOOD BY AUTOMATED COUNT: 14.3 % (ref 10–15)
HCT VFR BLD AUTO: 42.8 % (ref 40–53)
HGB BLD-MCNC: 13.8 G/DL (ref 13.3–17.7)
MCH RBC QN AUTO: 27.7 PG (ref 26.5–33)
MCHC RBC AUTO-ENTMCNC: 32.2 G/DL (ref 31.5–36.5)
MCV RBC AUTO: 86 FL (ref 78–100)
PLATELET # BLD AUTO: 288 10E3/UL (ref 150–450)
RBC # BLD AUTO: 4.99 10E6/UL (ref 4.4–5.9)
WBC # BLD AUTO: 6.8 10E3/UL (ref 4–11)

## 2023-05-10 PROCEDURE — 85027 COMPLETE CBC AUTOMATED: CPT | Performed by: PATHOLOGY

## 2023-05-10 PROCEDURE — 86850 RBC ANTIBODY SCREEN: CPT | Performed by: PHYSICIAN ASSISTANT

## 2023-05-10 PROCEDURE — 99204 OFFICE O/P NEW MOD 45 MIN: CPT | Performed by: PHYSICIAN ASSISTANT

## 2023-05-10 PROCEDURE — 86901 BLOOD TYPING SEROLOGIC RH(D): CPT | Performed by: PHYSICIAN ASSISTANT

## 2023-05-10 PROCEDURE — 36415 COLL VENOUS BLD VENIPUNCTURE: CPT | Performed by: PATHOLOGY

## 2023-05-10 ASSESSMENT — LIFESTYLE VARIABLES: TOBACCO_USE: 1

## 2023-05-10 ASSESSMENT — PAIN SCALES - GENERAL: PAINLEVEL: NO PAIN (0)

## 2023-05-10 NOTE — H&P (VIEW-ONLY)
Pre-Operative H & P     CC:  Preoperative exam to assess for increased cardiopulmonary risk while undergoing surgery and anesthesia.    Date of Encounter: 5/10/2023  Primary Care Physician:  No Ref-Primary, Physician     Reason for visit:   Encounter Diagnoses   Name Primary?     Pre-op evaluation Yes     Kidney stone        HPI  Orlin Alcantar is a 73 year old male who presents for pre-operative H & P in preparation for  Procedure Information     Case: [id number] Date/Time: 05/17/23 1330    Procedure: NEPHROLITHOTOMY, PERCUTANEOUS, USING HOLMIUM LASER (Left: Flank)    Anesthesia type: General    Diagnosis: Kidney stone [N20.0]    Pre-op diagnosis: Kidney stone [N20.0]    [facility]  [facility]    Providers: Sacha Thomas MD        Patient is being evaluated for comorbid conditions of HTN, hyperlipidemia, hemophilia, chronic pain syndrome/hemophilic arthropathy, type 2 diabetes, chronic hepatitis C, bladder cancer s/p resection with ileal conduit and chemo 2018, insomnia, anxiety, and depression.     The patient has been following with urology for known large left renal stone. He has tried nonsurgical options without relief. During his last visit with Dr. Thomas on 4/25/2023 treatment options were discussed and a plan was made to proceed with surgery as scheduled above.     History is obtained from the patient and chart review    Hx of abnormal bleeding or anti-platelet use: Hemophilia    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      Bladder tumor      Depression      DM II (diabetes mellitus, type II), controlled (H)     diet controlled     Hemophilia (H)     Type A     History of hepatitis C     treated successfully     HTN (hypertension)        Past Surgical History  Past Surgical History:   Procedure Laterality Date     ARTHROPLASTY REVISION HIP  4/26/2012    Procedure:ARTHROPLASTY REVISION HIP; Surgeon:ALEJANDRA RAYMOND; [facility]      CYSTOSCOPY, TRANSURETHRAL RESECTION (TUR) TUMOR BLADDER, COMBINED N/A 2/19/2018    Procedure: COMBINED CYSTOSCOPY, TRANSURETHRAL RESECTION (TUR) TUMOR BLADDER;  Cystoscopy, Transurethral Resection Of Bladder Tumor ;  Surgeon: Cassidy Liao MD;  [facility]     INJECT STEROID (LOCATION)      right hip x 2, 2 weeks before.      LAPAROSCOPIC CYSTOPROSTATECTOMY N/A 7/30/2018    Procedure: CYSTOPROSTATECTOMY;  Radical Cystoprostatectomy, Ileal Conduit, Bilateral Pelvic Lymphadenectomy;  Surgeon: Bebeto Shea MD;  Location[facility]     left total knee arthroplasty  07/1992     LYMPHADENECTOMY ABDOMINAL N/A 7/30/2018    Procedure: LYMPHADENECTOMY ABDOMINAL;;  Surgeon: Bebeto Shea MD;  [facility]     pins in left hip      hip fracture     radioactive synovectomy  03/2003     right total hip arthroplasty  12/1992     SYNOVECTOMY HIP  4/26/2012    Procedure:SYNOVECTOMY HIP; Right Hip Open Synovectomy, Right Total Hip Revision with Exchange of Poly Liner and Head; Surgeon:ALEJANDRA RAYMOND; Location:[facility]       Prior to Admission Medications  Current Outpatient Medications   Medication Sig Dispense Refill     acetaminophen (TYLENOL) 500 MG tablet Take 500-1,000 mg by mouth as needed for mild pain       [START ON 5/18/2023] Antihem Factor Recomb, rFVIII, (KOGENATE FS) 1000 units KIT Infuse 3261 unit(s) IV once daily for four days after your procedure, then resume your prior regimen 48099 each 0     Antihem Factor Recomb, rFVIII, (KOGENATE FS) 1000 units KIT Directions: Infuse 3261 units every 2 days and prn for breakthrough bleeding (Patient taking differently: Inject 3,000 Units into the vein every 48 hours Directions: Infuse 3261 units every 2 days and prn for breakthrough bleeding  Last injection 05/09) 13106 each 5     celecoxib (CELEBREX) 100 MG capsule Take 1 capsule (100 mg) by mouth 2 times daily as needed for moderate pain 60 capsule 3     codeine 30 MG tablet Take 1 tablet  (30 mg) by mouth every 8 hours (Patient taking differently: Take 30 mg by mouth daily) 84 tablet 0     lisinopril (ZESTRIL) 30 MG tablet Take 1 tablet (30 mg) by mouth daily 90 tablet 3     mirtazapine (REMERON) 45 MG tablet Take 1 tablet (45 mg) by mouth At Bedtime 90 tablet 1     naloxone (EVZIO) 0.4 MG/0.4ML auto-injector Inject 0.4 mLs (0.4 mg) into the muscle as needed for opioid reversal every 2-3 minutes until assistance arrives 0.8 mL 0     sulfamethoxazole-trimethoprim (BACTRIM DS) 800-160 MG tablet Take 1 tablet by mouth 2 times daily for 10 days 20 tablet 0     urea (GORMEL) 20 % external cream Apply topically 2 times daily For 3-4 weeks on right ankle. (Patient taking differently: Apply topically daily For 3-4 weeks on right ankle.) 480 g 0       Allergies  Allergies   Allergen Reactions     Aspirin      This drug inhibits platelets and is contraindicated due to hemophilia diagnosis.          Social History  Social History     Socioeconomic History     Marital status: Single     Spouse name: Not on file     Number of children: 0     Years of education: Not on file     Highest education level: Not on file   Occupational History     Not on file   Tobacco Use     Smoking status: Former     Packs/day: 1.00     Years: 20.00     Pack years: 20.00     Types: Cigarettes     Quit date: 2013     Years since quittin.8     Smokeless tobacco: Never     Tobacco comments:     e cig no longer using   Vaping Use     Vaping status: Never Used   Substance and Sexual Activity     Alcohol use: Never     Drug use: Never     Sexual activity: Yes     Partners: Female     Birth control/protection: Condom   Other Topics Concern     Parent/sibling w/ CABG, MI or angioplasty before 65F 55M? No   Social History Narrative     Not on file     Social Determinants of Health     Financial Resource Strain: Not on file   Food Insecurity: Not on file   Transportation Needs: Not on file   Physical Activity: Not on file   Stress: Not  on file   Social Connections: Not on file   Intimate Partner Violence: Not on file   Housing Stability: Not on file       Family History  Family History   Adopted: Yes   Problem Relation Age of Onset     Unknown/Adopted Other        Review of Systems  The complete review of systems is negative other than noted in the HPI or here.   Anesthesia Evaluation   Pt has had prior anesthetic.     No history of anesthetic complications       ROS/MED HX  ENT/Pulmonary:     (+) LILLIE risk factors, hypertension, tobacco use, Past use,  (-) asthma and recent URI   Neurologic:  - neg neurologic ROS     Cardiovascular:     (+) Dyslipidemia hypertension-----Previous cardiac testing   Echo: Date: Results:    Stress Test: Date: 2012 Results:  Normal per patient. Results not available.   ECG Reviewed: Date: 2/2018 Results:  Normal sinus rhythm  Cath: Date: Results:   (-) FARRIS   METS/Exercise Tolerance: 4 - Raking leaves, gardening Comment: Walks ~2 blocks per day. Limited due to hemophilic arthropathy   Hematologic: Comments: Hemophilia A    (+) history of blood transfusion, no previous transfusion reaction,     Musculoskeletal: Comment: Hemophilic arthropathy      GI/Hepatic:     (+) hepatitis resolved hepatitis type C,  (-) GERD   Renal/Genitourinary: Comment: UTI 5/5/23  Hx of bladder cancer s/p surgery and chemo now with ileal conduit    (+) Nephrolithiasis ,     Endo:     (+) type II DM, Last HgA1c: 6.9, date: 4/17/2023,  (-) thyroid disease and chronic steroid usage   Psychiatric/Substance Use:     (+) psychiatric history anxiety and depression     Infectious Disease: Comment: ESBL      Malignancy:   (+) Malignancy, History of Other.Other CA Bladder cancer Remission status post Surgery and Chemo.    Other:  - neg other ROS    (+) , H/O Chronic Pain,        /84 (BP Location: Right arm, Patient Position: Sitting, Cuff Size: Adult Regular)   Pulse 82   Temp 97.8  F (36.6  C) (Oral)   Resp 16   Ht 1.829 m (6')   Wt 70.3 kg  (155 lb)   SpO2 98%   BMI 21.02 kg/m      Physical Exam   Constitutional: Pleasant male, no apparent distress, and appears stated age.  Eyes: Pupils equal, round and reactive to light, extra ocular muscles intact, sclera clear, conjunctiva normal.  HENT: Normocephalic and atraumatic, oral pharynx with moist mucus membranes, good dentition. No goiter appreciated.   Respiratory: Clear to auscultation bilaterally, no crackles or wheezing.  Cardiovascular: Regular rate and rhythm, normal S1 and S2, and no murmur noted.  Carotids +2, no bruits. No edema. Palpable pulses to radial  DP and PT arteries.   GI: Normal bowel sounds, soft, non-distended, non-tender, no masses palpated, no hepatosplenomegaly.  Lymph/Hematologic: No cervical lymphadenopathy and no supraclavicular lymphadenopathy.  Genitourinary:  Deferred  Skin: Warm and dry.  No rashes on exposed skin.  Musculoskeletal: Full ROM of neck. Mild swelling of left foot. There is no redness, warmth, or swelling of other visible joints. Gross motor strength is normal.    Neurologic: Awake, alert, oriented to name, place and time. Cranial nerves II-XII are grossly intact. Gait is slow, favors left leg.   Neuropsychiatric: Calm, cooperative. Normal affect.     Prior Labs/Diagnostic Studies   All labs and imaging personally reviewed      Latest Reference Range & Units 04/17/23 13:35   Sodium 136 - 145 mmol/L 141   Potassium 3.4 - 5.3 mmol/L 4.5   Chloride 98 - 107 mmol/L 104   Carbon Dioxide (CO2) 22 - 29 mmol/L 27   Urea Nitrogen 8.0 - 23.0 mg/dL 24.3 (H)   Creatinine 0.67 - 1.17 mg/dL 1.03   GFR Estimate >60 mL/min/1.73m2 77   Calcium 8.8 - 10.2 mg/dL 9.8   Anion Gap 7 - 15 mmol/L 10   Albumin 3.5 - 5.2 g/dL 4.5   Protein Total 6.4 - 8.3 g/dL 7.5   Alkaline Phosphatase 40 - 129 U/L 85   ALT 10 - 50 U/L 11   AST 10 - 50 U/L 15   Bilirubin Total <=1.2 mg/dL 0.5   Cholesterol <200 mg/dL 186   Glucose 70 - 99 mg/dL 102 (H)   HDL Cholesterol >=40 mg/dL 49   Hemoglobin A1C  <5.7 % 5.6   LDL Cholesterol Calculated <=100 mg/dL 106 (H)   Non HDL Cholesterol <130 mg/dL 137 (H)   Triglycerides <150 mg/dL 154 (H)   Uric Acid 3.4 - 7.0 mg/dL 5.7   (H): Data is abnormally high     Latest Reference Range & Units 03/27/23 07:44   WBC 4.0 - 11.0 10e3/uL 10.1   Hemoglobin 13.3 - 17.7 g/dL 13.7   Hematocrit 40.0 - 53.0 % 41.8   Platelet Count 150 - 450 10e3/uL 211   RBC Count 4.40 - 5.90 10e6/uL 4.98   MCV 78 - 100 fL 84   MCH 26.5 - 33.0 pg 27.5   MCHC 31.5 - 36.5 g/dL 32.8   RDW 10.0 - 15.0 % 14.6   % Neutrophils % 80   % Lymphocytes % 9   % Monocytes % 8   % Eosinophils % 3   % Basophils % 0   Absolute Basophils 0.0 - 0.2 10e3/uL 0.0   Absolute Eosinophils 0.0 - 0.7 10e3/uL 0.3   Absolute Immature Granulocytes <=0.4 10e3/uL 0.0   Absolute Lymphocytes 0.8 - 5.3 10e3/uL 0.9   Absolute Monocytes 0.0 - 1.3 10e3/uL 0.8   % Immature Granulocytes % 0   Absolute Neutrophils 1.6 - 8.3 10e3/uL 8.1   Absolute NRBCs 10e3/uL 0.0   NRBCs per 100 WBC <1 /100 0       EKG/ stress test - if available please see in ROS above   No results found.    The patient's records and results personally reviewed by this provider.     Outside records reviewed from: Care Everywhere    LAB/DIAGNOSTIC STUDIES TODAY:  CBC, T&S    Assessment  Orlin Alcantar is a 73 year old male seen as a PAC referral for risk assessment and optimization for anesthesia.    Plan/Recommendations  Pt will be optimized for the proposed procedure.  See below for details on the assessment, risk, and preoperative recommendations    NEUROLOGY  - No history of TIA, CVA or seizure  - Chronic Pain  On chronic opiates, morphine equivilant = 13.5 MME/Day   -Post Op delirium risk factors:  Age    ENT  - No current airway concerns.  Will need to be reassessed day of surgery.  Mallampati: I  TM: > 3    CARDIAC  - No history of CAD and Afib  - Hypertension  Well controlled  - Hyperlipidemia  Controlled with lifestyle modification  - METS (Metabolic  Equivalents)  Patient performs 4 or more METS exercise without symptoms            Total Score: 0      RCRI-Very low risk: Class 1 0.4% complication rate            Total Score: 0        PULMONARY    LILLIE Medium Risk            Total Score: 3    LILLIE: Hypertension    LILLIE: Over 50 ys old    LILLIE: Male      - Denies asthma or inhaler use  - Tobacco History    History   Smoking Status     Former     Packs/day: 1.00     Years: 20.00     Types: Cigarettes     Quit date: 6/30/2013   Smokeless Tobacco     Never       GI    PONV Medium Risk  Total Score: 2           1 AN PONV: Patient is not a current smoker    1 AN PONV: Intended Post Op Opioids      - History of hepatitis C 1999, resolved.    /RENAL  - Hx of bladder cancer s/p surgery and chemo, now with ileal conduit  - Recent UTI 5/5/2023, initially treated with ciprofloxacin but was switched to bactrim based on culture results (ESBL). Last dose of bactrim will be 5/19  - Nephrolithiasis: surgery as scheduled above  - Baseline Creatinine  1.03    ENDOCRINE    - BMI: Estimated body mass index is 21.02 kg/m  as calculated from the following:    Height as of this encounter: 1.829 m (6').    Weight as of this encounter: 70.3 kg (155 lb).  Healthy Weight (BMI 18.5-24.9)  - Diabetes  Hemoglobin A1C (%)   Date Value   04/17/2023 5.6   07/25/2018 6.9 (H)     Diabetes Type 2, controlled with lifestyle modification. Recommend close monitoring of the patient's blood glucose levels throughout the perioperative period.    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 Follows closely with [facility]. Procedural hemophilia plan as below - see note from GERA Freedman on 5/3/2023 for further details.    Procedural Hemophilia Plan:        Infuse Kogenate  50 unit(s)/kg +/- 10% IV 30-90 minutes prior to procedure (5/17/2023). Dose to be administered in preop by the hospital. Dose ordered by [facility] provider    Infuse Kogenate 50 unit(s)/kg +/- 10% IV on post operative day 1 at 0800 prior to discharge. Dose ordered by [facility] provider. (5/18/2023)    After discharge, Orlin will infuse Kogenate 45 unit(s)/kg +/- 10% IV daily (q 24 hours) for three additional days starting on post operative day 1 (5/19/2023 - 5/21/2023)    Then transition back to his usual prophylaxis with Kogenate 45 unit(s)/kg every other day (q 48 hours) as long as he is not having any further hematuria (5/25/2023).     Avoid antifibrinolytics     If any bleeding concerns, please page the inpatient Hematology team. Dr. Perez will be on service.     MSK  - Hemophilic arthropathy: Affects multiple joints. Currently patient is unable to fully straighten both elbows. Recommend consideration for careful positioning to limit patient discomfort.     PSYCH  - Anxiety/Depression: no current concerns.    Different anesthesia methods/types have been discussed with the patient, but they are aware that the final plan will be decided by the assigned anesthesia provider on the date of service.  Patient was discussed with Dr. Jo    The patient is optimized for their procedure. AVS with information on surgery time/arrival time, meds and NPO status given by nursing staff. No further diagnostic testing indicated.      On the day of service:     Prep time: 15 minutes  Visit time: 15 minutes  Documentation time: 15 minutes  ------------------------------------------  Total time: 45 minutes      Samra Patino PA-C  Preoperative Assessment Center  [facility]  Phone: [phone]  Fax: [phone]

## 2023-05-10 NOTE — H&P
Pre-Operative H & P     CC:  Preoperative exam to assess for increased cardiopulmonary risk while undergoing surgery and anesthesia.    Date of Encounter: 5/10/2023  Primary Care Physician:  No Ref-Primary, Physician     Reason for visit:   Encounter Diagnoses   Name Primary?     Pre-op evaluation Yes     Kidney stone        HPI  Orlin Alcantar is a 73 year old male who presents for pre-operative H & P in preparation for  Procedure Information     Case: [id number] Date/Time: 05/17/23 1330    Procedure: NEPHROLITHOTOMY, PERCUTANEOUS, USING HOLMIUM LASER (Left: Flank)    Anesthesia type: General    Diagnosis: Kidney stone [N20.0]    Pre-op diagnosis: Kidney stone [N20.0]    [facility]  [facility]    Providers: Sacha Thomas MD        Patient is being evaluated for comorbid conditions of HTN, hyperlipidemia, hemophilia, chronic pain syndrome/hemophilic arthropathy, type 2 diabetes, chronic hepatitis C, bladder cancer s/p resection with ileal conduit and chemo 2018, insomnia, anxiety, and depression.     The patient has been following with urology for known large left renal stone. He has tried nonsurgical options without relief. During his last visit with Dr. Thomas on 4/25/2023 treatment options were discussed and a plan was made to proceed with surgery as scheduled above.     History is obtained from the patient and chart review    Hx of abnormal bleeding or anti-platelet use: Hemophilia    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      Bladder tumor      Depression      DM II (diabetes mellitus, type II), controlled (H)     diet controlled     Hemophilia (H)     Type A     History of hepatitis C     treated successfully     HTN (hypertension)        Past Surgical History  Past Surgical History:   Procedure Laterality Date     ARTHROPLASTY REVISION HIP  4/26/2012    Procedure:ARTHROPLASTY REVISION HIP; Surgeon:ALEJANDRA RAYMOND; [facility]      CYSTOSCOPY, TRANSURETHRAL RESECTION (TUR) TUMOR BLADDER, COMBINED N/A 2/19/2018    Procedure: COMBINED CYSTOSCOPY, TRANSURETHRAL RESECTION (TUR) TUMOR BLADDER;  Cystoscopy, Transurethral Resection Of Bladder Tumor ;  Surgeon: Cassidy Liao MD;  [facility]     INJECT STEROID (LOCATION)      right hip x 2, 2 weeks before.      LAPAROSCOPIC CYSTOPROSTATECTOMY N/A 7/30/2018    Procedure: CYSTOPROSTATECTOMY;  Radical Cystoprostatectomy, Ileal Conduit, Bilateral Pelvic Lymphadenectomy;  Surgeon: Bebeto Shea MD;  [facility]     left total knee arthroplasty  07/1992     LYMPHADENECTOMY ABDOMINAL N/A 7/30/2018    Procedure: LYMPHADENECTOMY ABDOMINAL;;  Surgeon: Bebeto Shea MD;  [facility]     pins in left hip      hip fracture     radioactive synovectomy  03/2003     right total hip arthroplasty  12/1992     SYNOVECTOMY HIP  4/26/2012    Procedure:SYNOVECTOMY HIP; Right Hip Open Synovectomy, Right Total Hip Revision with Exchange of Poly Liner and Head; Surgeon:ALEJANDRA RAYMOND; Location[facility]       Prior to Admission Medications  Current Outpatient Medications   Medication Sig Dispense Refill     acetaminophen (TYLENOL) 500 MG tablet Take 500-1,000 mg by mouth as needed for mild pain       [START ON 5/18/2023] Antihem Factor Recomb, rFVIII, (KOGENATE FS) 1000 units KIT Infuse 3261 unit(s) IV once daily for four days after your procedure, then resume your prior regimen 56870 each 0     Antihem Factor Recomb, rFVIII, (KOGENATE FS) 1000 units KIT Directions: Infuse 3261 units every 2 days and prn for breakthrough bleeding (Patient taking differently: Inject 3,000 Units into the vein every 48 hours Directions: Infuse 3261 units every 2 days and prn for breakthrough bleeding  Last injection 05/09) 41855 each 5     celecoxib (CELEBREX) 100 MG capsule Take 1 capsule (100 mg) by mouth 2 times daily as needed for moderate pain 60 capsule 3     codeine 30 MG tablet Take 1 tablet  (30 mg) by mouth every 8 hours (Patient taking differently: Take 30 mg by mouth daily) 84 tablet 0     lisinopril (ZESTRIL) 30 MG tablet Take 1 tablet (30 mg) by mouth daily 90 tablet 3     mirtazapine (REMERON) 45 MG tablet Take 1 tablet (45 mg) by mouth At Bedtime 90 tablet 1     naloxone (EVZIO) 0.4 MG/0.4ML auto-injector Inject 0.4 mLs (0.4 mg) into the muscle as needed for opioid reversal every 2-3 minutes until assistance arrives 0.8 mL 0     sulfamethoxazole-trimethoprim (BACTRIM DS) 800-160 MG tablet Take 1 tablet by mouth 2 times daily for 10 days 20 tablet 0     urea (GORMEL) 20 % external cream Apply topically 2 times daily For 3-4 weeks on right ankle. (Patient taking differently: Apply topically daily For 3-4 weeks on right ankle.) 480 g 0       Allergies  Allergies   Allergen Reactions     Aspirin      This drug inhibits platelets and is contraindicated due to hemophilia diagnosis.          Social History  Social History     Socioeconomic History     Marital status: Single     Spouse name: Not on file     Number of children: 0     Years of education: Not on file     Highest education level: Not on file   Occupational History     Not on file   Tobacco Use     Smoking status: Former     Packs/day: 1.00     Years: 20.00     Pack years: 20.00     Types: Cigarettes     Quit date: 2013     Years since quittin.8     Smokeless tobacco: Never     Tobacco comments:     e cig no longer using   Vaping Use     Vaping status: Never Used   Substance and Sexual Activity     Alcohol use: Never     Drug use: Never     Sexual activity: Yes     Partners: Female     Birth control/protection: Condom   Other Topics Concern     Parent/sibling w/ CABG, MI or angioplasty before 65F 55M? No   Social History Narrative     Not on file     Social Determinants of Health     Financial Resource Strain: Not on file   Food Insecurity: Not on file   Transportation Needs: Not on file   Physical Activity: Not on file   Stress: Not  on file   Social Connections: Not on file   Intimate Partner Violence: Not on file   Housing Stability: Not on file       Family History  Family History   Adopted: Yes   Problem Relation Age of Onset     Unknown/Adopted Other        Review of Systems  The complete review of systems is negative other than noted in the HPI or here.   Anesthesia Evaluation   Pt has had prior anesthetic.     No history of anesthetic complications       ROS/MED HX  ENT/Pulmonary:     (+) LILLIE risk factors, hypertension, tobacco use, Past use,  (-) asthma and recent URI   Neurologic:  - neg neurologic ROS     Cardiovascular:     (+) Dyslipidemia hypertension-----Previous cardiac testing   Echo: Date: Results:    Stress Test: Date: 2012 Results:  Normal per patient. Results not available.   ECG Reviewed: Date: 2/2018 Results:  Normal sinus rhythm  Cath: Date: Results:   (-) FARRIS   METS/Exercise Tolerance: 4 - Raking leaves, gardening Comment: Walks ~2 blocks per day. Limited due to hemophilic arthropathy   Hematologic: Comments: Hemophilia A    (+) history of blood transfusion, no previous transfusion reaction,     Musculoskeletal: Comment: Hemophilic arthropathy      GI/Hepatic:     (+) hepatitis resolved hepatitis type C,  (-) GERD   Renal/Genitourinary: Comment: UTI 5/5/23  Hx of bladder cancer s/p surgery and chemo now with ileal conduit    (+) Nephrolithiasis ,     Endo:     (+) type II DM, Last HgA1c: 6.9, date: 4/17/2023,  (-) thyroid disease and chronic steroid usage   Psychiatric/Substance Use:     (+) psychiatric history anxiety and depression     Infectious Disease: Comment: ESBL      Malignancy:   (+) Malignancy, History of Other.Other CA Bladder cancer Remission status post Surgery and Chemo.    Other:  - neg other ROS    (+) , H/O Chronic Pain,        /84 (BP Location: Right arm, Patient Position: Sitting, Cuff Size: Adult Regular)   Pulse 82   Temp 97.8  F (36.6  C) (Oral)   Resp 16   Ht 1.829 m (6')   Wt 70.3 kg  (155 lb)   SpO2 98%   BMI 21.02 kg/m      Physical Exam   Constitutional: Pleasant male, no apparent distress, and appears stated age.  Eyes: Pupils equal, round and reactive to light, extra ocular muscles intact, sclera clear, conjunctiva normal.  HENT: Normocephalic and atraumatic, oral pharynx with moist mucus membranes, good dentition. No goiter appreciated.   Respiratory: Clear to auscultation bilaterally, no crackles or wheezing.  Cardiovascular: Regular rate and rhythm, normal S1 and S2, and no murmur noted.  Carotids +2, no bruits. No edema. Palpable pulses to radial  DP and PT arteries.   GI: Normal bowel sounds, soft, non-distended, non-tender, no masses palpated, no hepatosplenomegaly.  Lymph/Hematologic: No cervical lymphadenopathy and no supraclavicular lymphadenopathy.  Genitourinary:  Deferred  Skin: Warm and dry.  No rashes on exposed skin.  Musculoskeletal: Full ROM of neck. Mild swelling of left foot. There is no redness, warmth, or swelling of other visible joints. Gross motor strength is normal.    Neurologic: Awake, alert, oriented to name, place and time. Cranial nerves II-XII are grossly intact. Gait is slow, favors left leg.   Neuropsychiatric: Calm, cooperative. Normal affect.     Prior Labs/Diagnostic Studies   All labs and imaging personally reviewed      Latest Reference Range & Units 04/17/23 13:35   Sodium 136 - 145 mmol/L 141   Potassium 3.4 - 5.3 mmol/L 4.5   Chloride 98 - 107 mmol/L 104   Carbon Dioxide (CO2) 22 - 29 mmol/L 27   Urea Nitrogen 8.0 - 23.0 mg/dL 24.3 (H)   Creatinine 0.67 - 1.17 mg/dL 1.03   GFR Estimate >60 mL/min/1.73m2 77   Calcium 8.8 - 10.2 mg/dL 9.8   Anion Gap 7 - 15 mmol/L 10   Albumin 3.5 - 5.2 g/dL 4.5   Protein Total 6.4 - 8.3 g/dL 7.5   Alkaline Phosphatase 40 - 129 U/L 85   ALT 10 - 50 U/L 11   AST 10 - 50 U/L 15   Bilirubin Total <=1.2 mg/dL 0.5   Cholesterol <200 mg/dL 186   Glucose 70 - 99 mg/dL 102 (H)   HDL Cholesterol >=40 mg/dL 49   Hemoglobin A1C  <5.7 % 5.6   LDL Cholesterol Calculated <=100 mg/dL 106 (H)   Non HDL Cholesterol <130 mg/dL 137 (H)   Triglycerides <150 mg/dL 154 (H)   Uric Acid 3.4 - 7.0 mg/dL 5.7   (H): Data is abnormally high     Latest Reference Range & Units 03/27/23 07:44   WBC 4.0 - 11.0 10e3/uL 10.1   Hemoglobin 13.3 - 17.7 g/dL 13.7   Hematocrit 40.0 - 53.0 % 41.8   Platelet Count 150 - 450 10e3/uL 211   RBC Count 4.40 - 5.90 10e6/uL 4.98   MCV 78 - 100 fL 84   MCH 26.5 - 33.0 pg 27.5   MCHC 31.5 - 36.5 g/dL 32.8   RDW 10.0 - 15.0 % 14.6   % Neutrophils % 80   % Lymphocytes % 9   % Monocytes % 8   % Eosinophils % 3   % Basophils % 0   Absolute Basophils 0.0 - 0.2 10e3/uL 0.0   Absolute Eosinophils 0.0 - 0.7 10e3/uL 0.3   Absolute Immature Granulocytes <=0.4 10e3/uL 0.0   Absolute Lymphocytes 0.8 - 5.3 10e3/uL 0.9   Absolute Monocytes 0.0 - 1.3 10e3/uL 0.8   % Immature Granulocytes % 0   Absolute Neutrophils 1.6 - 8.3 10e3/uL 8.1   Absolute NRBCs 10e3/uL 0.0   NRBCs per 100 WBC <1 /100 0       EKG/ stress test - if available please see in ROS above   No results found.    The patient's records and results personally reviewed by this provider.     Outside records reviewed from: Care Everywhere    LAB/DIAGNOSTIC STUDIES TODAY:  CBC, T&S    Assessment  Orlin Alcantar is a 73 year old male seen as a PAC referral for risk assessment and optimization for anesthesia.    Plan/Recommendations  Pt will be optimized for the proposed procedure.  See below for details on the assessment, risk, and preoperative recommendations    NEUROLOGY  - No history of TIA, CVA or seizure  - Chronic Pain  On chronic opiates, morphine equivilant = 13.5 MME/Day   -Post Op delirium risk factors:  Age    ENT  - No current airway concerns.  Will need to be reassessed day of surgery.  Mallampati: I  TM: > 3    CARDIAC  - No history of CAD and Afib  - Hypertension  Well controlled  - Hyperlipidemia  Controlled with lifestyle modification  - METS (Metabolic  Equivalents)  Patient performs 4 or more METS exercise without symptoms            Total Score: 0      RCRI-Very low risk: Class 1 0.4% complication rate            Total Score: 0        PULMONARY    LILLIE Medium Risk            Total Score: 3    LILLIE: Hypertension    LILLIE: Over 50 ys old    LILLIE: Male      - Denies asthma or inhaler use  - Tobacco History    History   Smoking Status     Former     Packs/day: 1.00     Years: 20.00     Types: Cigarettes     Quit date: 6/30/2013   Smokeless Tobacco     Never       GI    PONV Medium Risk  Total Score: 2           1 AN PONV: Patient is not a current smoker    1 AN PONV: Intended Post Op Opioids      - History of hepatitis C 1999, resolved.    /RENAL  - Hx of bladder cancer s/p surgery and chemo, now with ileal conduit  - Recent UTI 5/5/2023, initially treated with ciprofloxacin but was switched to bactrim based on culture results (ESBL). Last dose of bactrim will be 5/19  - Nephrolithiasis: surgery as scheduled above  - Baseline Creatinine  1.03    ENDOCRINE    - BMI: Estimated body mass index is 21.02 kg/m  as calculated from the following:    Height as of this encounter: 1.829 m (6').    Weight as of this encounter: 70.3 kg (155 lb).  Healthy Weight (BMI 18.5-24.9)  - Diabetes  Hemoglobin A1C (%)   Date Value   04/17/2023 5.6   07/25/2018 6.9 (H)     Diabetes Type 2, controlled with lifestyle modification. Recommend close monitoring of the patient's blood glucose levels throughout the perioperative period.    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 Follows closely with [facility]. Procedural hemophilia plan as below - see note from GERA Freedman on 5/3/2023 for further details.    Procedural Hemophilia Plan:        Infuse Kogenate  50 unit(s)/kg +/- 10% IV 30-90 minutes prior to procedure (5/17/2023). Dose to be administered in preop by the hospital. Dose ordered by [facility] provider    Infuse Kogenate 50 unit(s)/kg +/- 10% IV on post operative day 1 at 0800 prior to discharge. Dose ordered by [facility] provider. (5/18/2023)    After discharge, Orlin will infuse Kogenate 45 unit(s)/kg +/- 10% IV daily (q 24 hours) for three additional days starting on post operative day 1 (5/19/2023 - 5/21/2023)    Then transition back to his usual prophylaxis with Kogenate 45 unit(s)/kg every other day (q 48 hours) as long as he is not having any further hematuria (5/25/2023).     Avoid antifibrinolytics     If any bleeding concerns, please page the inpatient Hematology team. Dr. Perez will be on service.     MSK  - Hemophilic arthropathy: Affects multiple joints. Currently patient is unable to fully straighten both elbows. Recommend consideration for careful positioning to limit patient discomfort.     PSYCH  - Anxiety/Depression: no current concerns.    Different anesthesia methods/types have been discussed with the patient, but they are aware that the final plan will be decided by the assigned anesthesia provider on the date of service.  Patient was discussed with Dr. Jo    The patient is optimized for their procedure. AVS with information on surgery time/arrival time, meds and NPO status given by nursing staff. No further diagnostic testing indicated.      On the day of service:     Prep time: 15 minutes  Visit time: 15 minutes  Documentation time: 15 minutes  ------------------------------------------  Total time: 45 minutes      Samra Patino PA-C  Preoperative Assessment Center  [facility]  Cl[facility] and [facility] Center  Phone: [phone]  Fax: [phone]

## 2023-05-10 NOTE — PATIENT INSTRUCTIONS
Preparing for Your Surgery      Name:  Orlin Alcantar   MRN:  [id number]   :  1950   Today's Date:  5/10/2023       Arriving for surgery:  Surgery date:  23  Arrival time:  11:30 am    Please come to:     [facility] [address]  [address] [place]  [address]    -   parking is available in front of the hospital for those with mobility difficulties.     -  Parking is also available at the Patient Visitor Ramp on [facility].    -  When entering the hospital, you will be asked some Covid screening questions and directed to Patient Registration. Patient Registration will then direct you to the 3rd floor Surgery Waiting Room.  [phone]?     - ?If you are in need of directions, wheelchair or escort please stop at the Information Desk in the lobby.  Inform the information person that you are here for surgery; a wheelchair and escort to [address] will be provided.?     What can I eat or drink?  -  You may eat and drink normally up to 8 hours prior to arrival time. (Until 3:30 am)  -  You may have clear liquids until 2 hours prior to arrival time. (Until 9:30 am)    Examples of clear liquids:  Water  Clear broth  Juices (apple, white grape, white cranberry  and cider) without pulp  Noncarbonated, powder based beverages  (lemonade and Jack-Aid)  Sodas (Sprite, 7-Up, ginger ale and seltzer)  Coffee or tea (without milk or cream)  Gatorade    -  No Alcohol for at least 24 hours before surgery.     Which medicines can I take?  Hold Aspirin for 7 days before surgery.   Hold Multivitamins for 7 days before surgery.  Hold Supplements for 7 days before surgery.  Hold Ibuprofen (Advil, Motrin) for 1 day before surgery--unless otherwise directed by surgeon.  Hold Naproxen (Aleve) for 4 days before surgery.  Acetaminophen (Tylenol) is okay to take if needed.     Hold Celecoxib (Celebrex) for 3 days prior to surgery. May take the last  dose of Celebrex if needed on 5-13-23, and then hold until surgery.    -  DO NOT take these medications the day of surgery:  Lisinopril (Zestril)    -  PLEASE TAKE these medications the day of surgery:  Codeine  Sulfamethoxazole-Trimethoprim (Bactrim DS)  Acetaminophen (Tylenol) if needed    How do I prepare myself?  - Please take 2 showers (one the night prior to surgery and one the morning of surgery) using Scrubcare or Hibiclens soap.    Use this soap only from the neck to your toes.     Leave the soap on your skin for one minute--then rinse thoroughly.      You may use your own shampoo and conditioner. No other hair products.   - Please remove all jewelry and body piercings.  - No lotions, deodorants or fragrance.  - Bring your ID and insurance card.    -If you have a Deep Brain Stimulator, Spinal Cord Stimulator, or any Neuro Stimulator device---you must bring the remote control to the hospital.      ALL PATIENTS GOING HOME THE SAME DAY OF SURGERY ARE REQUIRED TO HAVE A RESPONSIBLE ADULT TO DRIVE AND BE IN ATTENDANCE WITH THEM FOR 24 HOURS FOLLOWING SURGERY.    Covid testing policy as of 12/06/2022  Your surgeon will notify and schedule you for a COVID test if one is needed before surgery--please direct any questions or COVID symptoms to your surgeon      Questions or Concerns:    - For any questions regarding the day of surgery or your hospital stay, please contact the Pre Admission Nursing Office at [phone].       - If you have health changes between today and your surgery, please call your surgeon.       - For questions after surgery, please call your surgeons office.           Current Visitor Guidelines       Surgeries and procedures: Adult patients can have 2 visitors all through the surgery process.     Visiting hours: 8 a.m. to 8:30 p.m.     Hospital: Adult patients and children under age 18 can have 4 visitor at a time       No visitors under the age of 5 are allowed for hospital patients.        Double occupancy rooms: Patients can have only two visitors at a time.       Patients confirmed or suspected to have symptoms of COVID 19 or flu:     No visitors allowed for adult patients.   Children (under age 18) can have 1 named visitor.     People who are sick or showing symptoms of COVID 19 or flu:    Are not allowed to visit patients--we can only make exceptions in special situations.       Please follow these guidelines for your visit:          Please maintain social distance          Masking is optional--however at times you may be asked to wear a mask for the safety of yourself and others     Clean your hands with alcohol hand . Do this when you arrive at and leave the building and patient room,    And again after you touch your mask or anything in the room.     Go directly to and from the room you are visiting.     Stay in the patient s room during your visit. Limit going to other places in the hospital as much as possible     Leave bags and jackets at home or in the car.     For everyone s health, please don t come and go during your visit. That includes for smoking   during your visit.

## 2023-05-11 RX ORDER — ANTIHEMOPHILIC FACTOR (RECOMBINANT) 3000 (+/-)
KIT INTRAVENOUS
Qty: 13380 EACH | Refills: 0 | Status: ON HOLD | OUTPATIENT
Start: 2023-05-11 | End: 2023-05-18

## 2023-05-16 ASSESSMENT — LIFESTYLE VARIABLES: TOBACCO_USE: 1

## 2023-05-17 ENCOUNTER — HOSPITAL ENCOUNTER (OUTPATIENT)
Facility: CLINIC | Age: 73
Discharge: HOME | End: 2023-05-18
Attending: UROLOGY | Admitting: UROLOGY
Payer: MEDICARE

## 2023-05-17 ENCOUNTER — APPOINTMENT (OUTPATIENT)
Dept: GENERAL RADIOLOGY | Facility: CLINIC | Age: 73
End: 2023-05-17
Attending: STUDENT IN AN ORGANIZED HEALTH CARE EDUCATION/TRAINING PROGRAM
Payer: MEDICARE

## 2023-05-17 ENCOUNTER — APPOINTMENT (OUTPATIENT)
Dept: GENERAL RADIOLOGY | Facility: CLINIC | Age: 73
End: 2023-05-17
Attending: UROLOGY
Payer: MEDICARE

## 2023-05-17 ENCOUNTER — ANESTHESIA (OUTPATIENT)
Dept: SURGERY | Facility: CLINIC | Age: 73
End: 2023-05-17
Payer: MEDICARE

## 2023-05-17 DIAGNOSIS — D66 SEVERE HEMOPHILIA A (H): ICD-10-CM

## 2023-05-17 DIAGNOSIS — N20.0 NEPHROLITHIASIS: ICD-10-CM

## 2023-05-17 DIAGNOSIS — D66 HEMOPHILIA A (H): Primary | ICD-10-CM

## 2023-05-17 LAB
ABO/RH(D): NORMAL
ANION GAP SERPL CALCULATED.3IONS-SCNC: 15 MMOL/L (ref 7–15)
ANTIBODY SCREEN: NEGATIVE
BASOPHILS # BLD AUTO: 0 10E3/UL (ref 0–0.2)
BASOPHILS NFR BLD AUTO: 0 %
BUN SERPL-MCNC: 38.5 MG/DL (ref 8–23)
CALCIUM SERPL-MCNC: 9.1 MG/DL (ref 8.8–10.2)
CHLORIDE SERPL-SCNC: 107 MMOL/L (ref 98–107)
CREAT SERPL-MCNC: 1.5 MG/DL (ref 0.67–1.17)
DEPRECATED HCO3 PLAS-SCNC: 18 MMOL/L (ref 22–29)
EOSINOPHIL # BLD AUTO: 0.1 10E3/UL (ref 0–0.7)
EOSINOPHIL NFR BLD AUTO: 2 %
ERYTHROCYTE [DISTWIDTH] IN BLOOD BY AUTOMATED COUNT: 15 % (ref 10–15)
GFR SERPL CREATININE-BSD FRML MDRD: 49 ML/MIN/1.73M2
GLUCOSE BLDC GLUCOMTR-MCNC: 104 MG/DL (ref 70–99)
GLUCOSE BLDC GLUCOMTR-MCNC: 97 MG/DL (ref 70–99)
GLUCOSE SERPL-MCNC: 103 MG/DL (ref 70–99)
HCT VFR BLD AUTO: 39.7 % (ref 40–53)
HGB BLD-MCNC: 12.8 G/DL (ref 13.3–17.7)
IMM GRANULOCYTES # BLD: 0.1 10E3/UL
IMM GRANULOCYTES NFR BLD: 1 %
LYMPHOCYTES # BLD AUTO: 0.3 10E3/UL (ref 0.8–5.3)
LYMPHOCYTES NFR BLD AUTO: 6 %
MCH RBC QN AUTO: 27.9 PG (ref 26.5–33)
MCHC RBC AUTO-ENTMCNC: 32.2 G/DL (ref 31.5–36.5)
MCV RBC AUTO: 87 FL (ref 78–100)
MONOCYTES # BLD AUTO: 0.2 10E3/UL (ref 0–1.3)
MONOCYTES NFR BLD AUTO: 5 %
NEUTROPHILS # BLD AUTO: 3.8 10E3/UL (ref 1.6–8.3)
NEUTROPHILS NFR BLD AUTO: 86 %
NRBC # BLD AUTO: 0 10E3/UL
NRBC BLD AUTO-RTO: 0 /100
PLATELET # BLD AUTO: 212 10E3/UL (ref 150–450)
POTASSIUM SERPL-SCNC: 5 MMOL/L (ref 3.4–5.3)
RBC # BLD AUTO: 4.59 10E6/UL (ref 4.4–5.9)
SODIUM SERPL-SCNC: 140 MMOL/L (ref 136–145)
SPECIMEN EXPIRATION DATE: NORMAL
WBC # BLD AUTO: 4.5 10E3/UL (ref 4–11)

## 2023-05-17 PROCEDURE — 82310 ASSAY OF CALCIUM: CPT | Performed by: STUDENT IN AN ORGANIZED HEALTH CARE EDUCATION/TRAINING PROGRAM

## 2023-05-17 PROCEDURE — 250N000015 HC RX FACTOR IP MED 636 OP 636: Performed by: PHYSICIAN ASSISTANT

## 2023-05-17 PROCEDURE — 250N000025 HC SEVOFLURANE, PER MIN: Performed by: UROLOGY

## 2023-05-17 PROCEDURE — 258N000003 HC RX IP 258 OP 636

## 2023-05-17 PROCEDURE — 71045 X-RAY EXAM CHEST 1 VIEW: CPT

## 2023-05-17 PROCEDURE — 250N000013 HC RX MED GY IP 250 OP 250 PS 637: Performed by: STUDENT IN AN ORGANIZED HEALTH CARE EDUCATION/TRAINING PROGRAM

## 2023-05-17 PROCEDURE — 71045 X-RAY EXAM CHEST 1 VIEW: CPT | Mod: 26 | Performed by: STUDENT IN AN ORGANIZED HEALTH CARE EDUCATION/TRAINING PROGRAM

## 2023-05-17 PROCEDURE — 250N000009 HC RX 250: Performed by: REGISTERED NURSE

## 2023-05-17 PROCEDURE — C1887 CATHETER, GUIDING: HCPCS | Performed by: UROLOGY

## 2023-05-17 PROCEDURE — 272N000001 HC OR GENERAL SUPPLY STERILE: Performed by: UROLOGY

## 2023-05-17 PROCEDURE — C1758 CATHETER, URETERAL: HCPCS | Performed by: UROLOGY

## 2023-05-17 PROCEDURE — 82962 GLUCOSE BLOOD TEST: CPT

## 2023-05-17 PROCEDURE — 258N000003 HC RX IP 258 OP 636: Performed by: REGISTERED NURSE

## 2023-05-17 PROCEDURE — C1725 CATH, TRANSLUMIN NON-LASER: HCPCS | Performed by: UROLOGY

## 2023-05-17 PROCEDURE — 999N000141 HC STATISTIC PRE-PROCEDURE NURSING ASSESSMENT: Performed by: UROLOGY

## 2023-05-17 PROCEDURE — 82365 CALCULUS SPECTROSCOPY: CPT | Performed by: UROLOGY

## 2023-05-17 PROCEDURE — 272N000002 HC OR SUPPLY OTHER OPNP: Performed by: UROLOGY

## 2023-05-17 PROCEDURE — 250N000011 HC RX IP 250 OP 636: Performed by: UROLOGY

## 2023-05-17 PROCEDURE — 50437 DILAT XST TRC NEW ACCESS RCS: CPT | Mod: 59 | Performed by: UROLOGY

## 2023-05-17 PROCEDURE — 85014 HEMATOCRIT: CPT | Performed by: STUDENT IN AN ORGANIZED HEALTH CARE EDUCATION/TRAINING PROGRAM

## 2023-05-17 PROCEDURE — 250N000009 HC RX 250

## 2023-05-17 PROCEDURE — 255N000002 HC RX 255 OP 636: Performed by: UROLOGY

## 2023-05-17 PROCEDURE — 258N000003 HC RX IP 258 OP 636: Performed by: UROLOGY

## 2023-05-17 PROCEDURE — 86850 RBC ANTIBODY SCREEN: CPT | Performed by: UROLOGY

## 2023-05-17 PROCEDURE — 50081 PERQ NL/PL LITHOTRP CPLX>2CM: CPT | Mod: LT | Performed by: UROLOGY

## 2023-05-17 PROCEDURE — 250N000011 HC RX IP 250 OP 636

## 2023-05-17 PROCEDURE — 87077 CULTURE AEROBIC IDENTIFY: CPT | Performed by: UROLOGY

## 2023-05-17 PROCEDURE — 370N000017 HC ANESTHESIA TECHNICAL FEE, PER MIN: Performed by: UROLOGY

## 2023-05-17 PROCEDURE — 999N000180 XR SURGERY CARM FLUORO LESS THAN 5 MIN: Mod: TC

## 2023-05-17 PROCEDURE — 250N000011 HC RX IP 250 OP 636: Performed by: REGISTERED NURSE

## 2023-05-17 PROCEDURE — 360N000085 HC SURGERY LEVEL 5 W/ FLUORO, PER MIN: Performed by: UROLOGY

## 2023-05-17 PROCEDURE — 710N000010 HC RECOVERY PHASE 1, LEVEL 2, PER MIN: Performed by: UROLOGY

## 2023-05-17 PROCEDURE — 36415 COLL VENOUS BLD VENIPUNCTURE: CPT | Performed by: STUDENT IN AN ORGANIZED HEALTH CARE EDUCATION/TRAINING PROGRAM

## 2023-05-17 PROCEDURE — C1769 GUIDE WIRE: HCPCS | Performed by: UROLOGY

## 2023-05-17 PROCEDURE — 87176 TISSUE HOMOGENIZATION CULTR: CPT | Performed by: UROLOGY

## 2023-05-17 RX ORDER — ONDANSETRON 2 MG/ML
4 INJECTION INTRAMUSCULAR; INTRAVENOUS EVERY 30 MIN PRN
Status: DISCONTINUED | OUTPATIENT
Start: 2023-05-17 | End: 2023-05-17 | Stop reason: HOSPADM

## 2023-05-17 RX ORDER — CELECOXIB 100 MG/1
100 CAPSULE ORAL 2 TIMES DAILY PRN
Status: DISCONTINUED | OUTPATIENT
Start: 2023-05-17 | End: 2023-05-18 | Stop reason: HOSPADM

## 2023-05-17 RX ORDER — ANTIHEMOPHILIC FACTOR (RECOMBINANT) 2000 (+/-)
3345 KIT INTRAVENOUS
Status: COMPLETED | OUTPATIENT
Start: 2023-05-17 | End: 2023-05-17

## 2023-05-17 RX ORDER — IOPAMIDOL 510 MG/ML
INJECTION, SOLUTION INTRAVASCULAR PRN
Status: DISCONTINUED | OUTPATIENT
Start: 2023-05-17 | End: 2023-05-17 | Stop reason: HOSPADM

## 2023-05-17 RX ORDER — LEVOFLOXACIN 5 MG/ML
500 INJECTION, SOLUTION INTRAVENOUS EVERY 24 HOURS
Status: DISCONTINUED | OUTPATIENT
Start: 2023-05-17 | End: 2023-05-17 | Stop reason: HOSPADM

## 2023-05-17 RX ORDER — AMOXICILLIN 250 MG
1 CAPSULE ORAL 2 TIMES DAILY
Status: DISCONTINUED | OUTPATIENT
Start: 2023-05-17 | End: 2023-05-18 | Stop reason: HOSPADM

## 2023-05-17 RX ORDER — POLYETHYLENE GLYCOL 3350 17 G/17G
17 POWDER, FOR SOLUTION ORAL DAILY
Status: DISCONTINUED | OUTPATIENT
Start: 2023-05-18 | End: 2023-05-18 | Stop reason: HOSPADM

## 2023-05-17 RX ORDER — ACETAMINOPHEN 325 MG/1
975 TABLET ORAL EVERY 8 HOURS
Status: DISCONTINUED | OUTPATIENT
Start: 2023-05-17 | End: 2023-05-18 | Stop reason: HOSPADM

## 2023-05-17 RX ORDER — ONDANSETRON 2 MG/ML
4 INJECTION INTRAMUSCULAR; INTRAVENOUS EVERY 6 HOURS PRN
Status: DISCONTINUED | OUTPATIENT
Start: 2023-05-17 | End: 2023-05-18 | Stop reason: HOSPADM

## 2023-05-17 RX ORDER — NALOXONE HYDROCHLORIDE 0.4 MG/ML
0.2 INJECTION, SOLUTION INTRAMUSCULAR; INTRAVENOUS; SUBCUTANEOUS
Status: DISCONTINUED | OUTPATIENT
Start: 2023-05-17 | End: 2023-05-18 | Stop reason: HOSPADM

## 2023-05-17 RX ORDER — ONDANSETRON 4 MG/1
4 TABLET, ORALLY DISINTEGRATING ORAL EVERY 6 HOURS PRN
Status: DISCONTINUED | OUTPATIENT
Start: 2023-05-17 | End: 2023-05-18 | Stop reason: HOSPADM

## 2023-05-17 RX ORDER — NALOXONE HYDROCHLORIDE 0.4 MG/ML
0.4 INJECTION, SOLUTION INTRAMUSCULAR; INTRAVENOUS; SUBCUTANEOUS
Status: DISCONTINUED | OUTPATIENT
Start: 2023-05-17 | End: 2023-05-18 | Stop reason: HOSPADM

## 2023-05-17 RX ORDER — ONDANSETRON 4 MG/1
4 TABLET, ORALLY DISINTEGRATING ORAL EVERY 30 MIN PRN
Status: DISCONTINUED | OUTPATIENT
Start: 2023-05-17 | End: 2023-05-17 | Stop reason: HOSPADM

## 2023-05-17 RX ORDER — BISACODYL 10 MG
10 SUPPOSITORY, RECTAL RECTAL DAILY PRN
Status: DISCONTINUED | OUTPATIENT
Start: 2023-05-17 | End: 2023-05-18 | Stop reason: HOSPADM

## 2023-05-17 RX ORDER — SODIUM CHLORIDE, SODIUM LACTATE, POTASSIUM CHLORIDE, CALCIUM CHLORIDE 600; 310; 30; 20 MG/100ML; MG/100ML; MG/100ML; MG/100ML
INJECTION, SOLUTION INTRAVENOUS CONTINUOUS
Status: DISCONTINUED | OUTPATIENT
Start: 2023-05-17 | End: 2023-05-17 | Stop reason: HOSPADM

## 2023-05-17 RX ORDER — DEXAMETHASONE SODIUM PHOSPHATE 4 MG/ML
INJECTION, SOLUTION INTRA-ARTICULAR; INTRALESIONAL; INTRAMUSCULAR; INTRAVENOUS; SOFT TISSUE PRN
Status: DISCONTINUED | OUTPATIENT
Start: 2023-05-17 | End: 2023-05-17

## 2023-05-17 RX ORDER — LIDOCAINE 40 MG/G
CREAM TOPICAL
Status: DISCONTINUED | OUTPATIENT
Start: 2023-05-17 | End: 2023-05-18 | Stop reason: HOSPADM

## 2023-05-17 RX ORDER — HYDROMORPHONE HCL IN WATER/PF 6 MG/30 ML
0.4 PATIENT CONTROLLED ANALGESIA SYRINGE INTRAVENOUS EVERY 5 MIN PRN
Status: DISCONTINUED | OUTPATIENT
Start: 2023-05-17 | End: 2023-05-17 | Stop reason: HOSPADM

## 2023-05-17 RX ORDER — ONDANSETRON 2 MG/ML
INJECTION INTRAMUSCULAR; INTRAVENOUS PRN
Status: DISCONTINUED | OUTPATIENT
Start: 2023-05-17 | End: 2023-05-17

## 2023-05-17 RX ORDER — SODIUM CHLORIDE, SODIUM LACTATE, POTASSIUM CHLORIDE, CALCIUM CHLORIDE 600; 310; 30; 20 MG/100ML; MG/100ML; MG/100ML; MG/100ML
INJECTION, SOLUTION INTRAVENOUS CONTINUOUS PRN
Status: DISCONTINUED | OUTPATIENT
Start: 2023-05-17 | End: 2023-05-17

## 2023-05-17 RX ORDER — OXYCODONE HYDROCHLORIDE 10 MG/1
10 TABLET ORAL
Status: DISCONTINUED | OUTPATIENT
Start: 2023-05-17 | End: 2023-05-17 | Stop reason: HOSPADM

## 2023-05-17 RX ORDER — SIMETHICONE 80 MG
80 TABLET,CHEWABLE ORAL EVERY 6 HOURS PRN
Status: DISCONTINUED | OUTPATIENT
Start: 2023-05-17 | End: 2023-05-18 | Stop reason: HOSPADM

## 2023-05-17 RX ORDER — FENTANYL CITRATE 50 UG/ML
50 INJECTION, SOLUTION INTRAMUSCULAR; INTRAVENOUS EVERY 5 MIN PRN
Status: DISCONTINUED | OUTPATIENT
Start: 2023-05-17 | End: 2023-05-17 | Stop reason: HOSPADM

## 2023-05-17 RX ORDER — FENTANYL CITRATE 50 UG/ML
INJECTION, SOLUTION INTRAMUSCULAR; INTRAVENOUS PRN
Status: DISCONTINUED | OUTPATIENT
Start: 2023-05-17 | End: 2023-05-17

## 2023-05-17 RX ORDER — HYDROMORPHONE HCL IN WATER/PF 6 MG/30 ML
0.2 PATIENT CONTROLLED ANALGESIA SYRINGE INTRAVENOUS
Status: DISCONTINUED | OUTPATIENT
Start: 2023-05-17 | End: 2023-05-18

## 2023-05-17 RX ORDER — HYDROXYZINE HYDROCHLORIDE 10 MG/1
10 TABLET, FILM COATED ORAL EVERY 6 HOURS PRN
Status: DISCONTINUED | OUTPATIENT
Start: 2023-05-17 | End: 2023-05-18 | Stop reason: HOSPADM

## 2023-05-17 RX ORDER — CALCIUM CARBONATE 500 MG/1
500 TABLET, CHEWABLE ORAL 4 TIMES DAILY PRN
Status: DISCONTINUED | OUTPATIENT
Start: 2023-05-17 | End: 2023-05-18 | Stop reason: HOSPADM

## 2023-05-17 RX ORDER — LIDOCAINE HYDROCHLORIDE 20 MG/ML
INJECTION, SOLUTION INFILTRATION; PERINEURAL PRN
Status: DISCONTINUED | OUTPATIENT
Start: 2023-05-17 | End: 2023-05-17

## 2023-05-17 RX ORDER — OXYCODONE HYDROCHLORIDE 5 MG/1
5 TABLET ORAL
Status: DISCONTINUED | OUTPATIENT
Start: 2023-05-17 | End: 2023-05-17 | Stop reason: HOSPADM

## 2023-05-17 RX ORDER — LEVOFLOXACIN 250 MG/1
250 TABLET, FILM COATED ORAL DAILY
Status: DISCONTINUED | OUTPATIENT
Start: 2023-05-18 | End: 2023-05-18 | Stop reason: HOSPADM

## 2023-05-17 RX ORDER — PROPOFOL 10 MG/ML
INJECTION, EMULSION INTRAVENOUS PRN
Status: DISCONTINUED | OUTPATIENT
Start: 2023-05-17 | End: 2023-05-17

## 2023-05-17 RX ORDER — PROCHLORPERAZINE MALEATE 5 MG
5 TABLET ORAL EVERY 6 HOURS PRN
Status: DISCONTINUED | OUTPATIENT
Start: 2023-05-17 | End: 2023-05-18 | Stop reason: HOSPADM

## 2023-05-17 RX ORDER — HYDROMORPHONE HCL IN WATER/PF 6 MG/30 ML
0.2 PATIENT CONTROLLED ANALGESIA SYRINGE INTRAVENOUS EVERY 5 MIN PRN
Status: DISCONTINUED | OUTPATIENT
Start: 2023-05-17 | End: 2023-05-17 | Stop reason: HOSPADM

## 2023-05-17 RX ORDER — HYDROMORPHONE HCL IN WATER/PF 6 MG/30 ML
0.4 PATIENT CONTROLLED ANALGESIA SYRINGE INTRAVENOUS
Status: DISCONTINUED | OUTPATIENT
Start: 2023-05-17 | End: 2023-05-18

## 2023-05-17 RX ORDER — FENTANYL CITRATE 50 UG/ML
25 INJECTION, SOLUTION INTRAMUSCULAR; INTRAVENOUS EVERY 5 MIN PRN
Status: DISCONTINUED | OUTPATIENT
Start: 2023-05-17 | End: 2023-05-17 | Stop reason: HOSPADM

## 2023-05-17 RX ORDER — CALCIUM CARBONATE 500 MG/1
500 TABLET, CHEWABLE ORAL DAILY PRN
Status: DISCONTINUED | OUTPATIENT
Start: 2023-05-17 | End: 2023-05-18

## 2023-05-17 RX ORDER — ACETAMINOPHEN 325 MG/1
650 TABLET ORAL EVERY 4 HOURS PRN
Status: DISCONTINUED | OUTPATIENT
Start: 2023-05-20 | End: 2023-05-18 | Stop reason: HOSPADM

## 2023-05-17 RX ORDER — MIRTAZAPINE 15 MG/1
45 TABLET, FILM COATED ORAL AT BEDTIME
Status: DISCONTINUED | OUTPATIENT
Start: 2023-05-17 | End: 2023-05-18 | Stop reason: HOSPADM

## 2023-05-17 RX ORDER — OXYCODONE HYDROCHLORIDE 5 MG/1
5 TABLET ORAL EVERY 4 HOURS PRN
Status: DISCONTINUED | OUTPATIENT
Start: 2023-05-17 | End: 2023-05-18 | Stop reason: HOSPADM

## 2023-05-17 RX ORDER — LABETALOL HYDROCHLORIDE 5 MG/ML
10 INJECTION, SOLUTION INTRAVENOUS
Status: DISCONTINUED | OUTPATIENT
Start: 2023-05-17 | End: 2023-05-17 | Stop reason: HOSPADM

## 2023-05-17 RX ORDER — OXYCODONE HYDROCHLORIDE 10 MG/1
10 TABLET ORAL EVERY 4 HOURS PRN
Status: DISCONTINUED | OUTPATIENT
Start: 2023-05-17 | End: 2023-05-18 | Stop reason: HOSPADM

## 2023-05-17 RX ADMIN — SODIUM CHLORIDE, POTASSIUM CHLORIDE, SODIUM LACTATE AND CALCIUM CHLORIDE: 600; 310; 30; 20 INJECTION, SOLUTION INTRAVENOUS at 14:38

## 2023-05-17 RX ADMIN — LEVOFLOXACIN 500 MG: 500 INJECTION, SOLUTION INTRAVENOUS at 12:58

## 2023-05-17 RX ADMIN — Medication 50 MG: at 14:46

## 2023-05-17 RX ADMIN — MIRTAZAPINE 45 MG: 15 TABLET, FILM COATED ORAL at 21:50

## 2023-05-17 RX ADMIN — Medication 20 MG: at 15:31

## 2023-05-17 RX ADMIN — ACETAMINOPHEN 975 MG: 325 TABLET ORAL at 17:16

## 2023-05-17 RX ADMIN — ONDANSETRON 4 MG: 2 INJECTION INTRAMUSCULAR; INTRAVENOUS at 16:19

## 2023-05-17 RX ADMIN — PHENYLEPHRINE HYDROCHLORIDE 100 MCG: 10 INJECTION INTRAVENOUS at 14:56

## 2023-05-17 RX ADMIN — PROPOFOL 40 MG: 10 INJECTION, EMULSION INTRAVENOUS at 15:33

## 2023-05-17 RX ADMIN — GENTAMICIN SULFATE 280 MG: 40 INJECTION, SOLUTION INTRAMUSCULAR; INTRAVENOUS at 14:15

## 2023-05-17 RX ADMIN — FENTANYL CITRATE 50 MCG: 50 INJECTION, SOLUTION INTRAMUSCULAR; INTRAVENOUS at 16:06

## 2023-05-17 RX ADMIN — ANTIHEMOPHILIC FACTOR (RECOMBINANT) 3345 UNITS: KIT at 13:11

## 2023-05-17 RX ADMIN — FENTANYL CITRATE 100 MCG: 50 INJECTION, SOLUTION INTRAMUSCULAR; INTRAVENOUS at 14:46

## 2023-05-17 RX ADMIN — HYDROXYZINE HYDROCHLORIDE 10 MG: 10 TABLET ORAL at 21:50

## 2023-05-17 RX ADMIN — PHENYLEPHRINE HYDROCHLORIDE 100 MCG: 10 INJECTION INTRAVENOUS at 14:54

## 2023-05-17 RX ADMIN — MIDAZOLAM 2 MG: 1 INJECTION INTRAMUSCULAR; INTRAVENOUS at 14:46

## 2023-05-17 RX ADMIN — DEXAMETHASONE SODIUM PHOSPHATE 4 MG: 4 INJECTION, SOLUTION INTRA-ARTICULAR; INTRALESIONAL; INTRAMUSCULAR; INTRAVENOUS; SOFT TISSUE at 15:31

## 2023-05-17 RX ADMIN — PHENYLEPHRINE HYDROCHLORIDE 100 MCG: 10 INJECTION INTRAVENOUS at 15:22

## 2023-05-17 RX ADMIN — OXYCODONE HYDROCHLORIDE 5 MG: 5 TABLET ORAL at 20:40

## 2023-05-17 RX ADMIN — SUGAMMADEX 200 MG: 100 INJECTION, SOLUTION INTRAVENOUS at 16:38

## 2023-05-17 RX ADMIN — PHENYLEPHRINE HYDROCHLORIDE 0.5 MCG/KG/MIN: 10 INJECTION INTRAVENOUS at 15:22

## 2023-05-17 RX ADMIN — SODIUM CHLORIDE, POTASSIUM CHLORIDE, SODIUM LACTATE AND CALCIUM CHLORIDE: 600; 310; 30; 20 INJECTION, SOLUTION INTRAVENOUS at 15:39

## 2023-05-17 RX ADMIN — PROPOFOL 150 MG: 10 INJECTION, EMULSION INTRAVENOUS at 14:46

## 2023-05-17 RX ADMIN — PHENYLEPHRINE HYDROCHLORIDE 200 MCG: 10 INJECTION INTRAVENOUS at 15:09

## 2023-05-17 RX ADMIN — PROPOFOL 40 MG: 10 INJECTION, EMULSION INTRAVENOUS at 14:47

## 2023-05-17 RX ADMIN — PHENYLEPHRINE HYDROCHLORIDE 200 MCG: 10 INJECTION INTRAVENOUS at 14:59

## 2023-05-17 RX ADMIN — FENTANYL CITRATE 50 MCG: 50 INJECTION, SOLUTION INTRAMUSCULAR; INTRAVENOUS at 16:17

## 2023-05-17 RX ADMIN — Medication 20 MG: at 15:33

## 2023-05-17 RX ADMIN — Medication 10 MG: at 16:01

## 2023-05-17 RX ADMIN — LIDOCAINE HYDROCHLORIDE 100 MG: 20 INJECTION, SOLUTION INFILTRATION; PERINEURAL at 14:46

## 2023-05-17 ASSESSMENT — ACTIVITIES OF DAILY LIVING (ADL)
ADLS_ACUITY_SCORE: 33
ADLS_ACUITY_SCORE: 24
ADLS_ACUITY_SCORE: 35

## 2023-05-17 NOTE — ANESTHESIA PREPROCEDURE EVALUATION
Pre-Operative H & P     CC:  Preoperative exam to assess for increased cardiopulmonary risk while undergoing surgery and anesthesia.    Date of Encounter: 5/10/2023  Primary Care Physician:  No Ref-Primary, Physician     Reason for visit:   No diagnosis found.    PAYAL Alcantar is a 73 year old male who presents for pre-operative H & P in preparation for  Procedure Information     Case: [id number] Date/Time: 05/17/23 1330    Procedure: NEPHROLITHOTOMY, PERCUTANEOUS, USING HOLMIUM LASER (Left: Flank)    Anesthesia type: General    Diagnosis: Kidney stone [N20.0]    Pre-op diagnosis: Kidney stone [N20.0]    Location: [facility] OR 02 /  OR    Providers: Sacha Thomas MD        Patient is being evaluated for comorbid conditions of HTN, hyperlipidemia, hemophilia, chronic pain syndrome/hemophilic arthropathy, type 2 diabetes, chronic hepatitis C, bladder cancer s/p resection with ileal conduit and chemo 2018, insomnia, anxiety, and depression.     The patient has been following with urology for known large left renal stone. He has tried nonsurgical options without relief. During his last visit with Dr. Thomas on 4/25/2023 treatment options were discussed and a plan was made to proceed with surgery as scheduled above.     History is obtained from the patient and chart review    Hx of abnormal bleeding or anti-platelet use: Hemophilia    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      Bladder tumor      Depression      DM II (diabetes mellitus, type II), controlled (H)     diet controlled     Hemophilia (H)     Type A     History of hepatitis C     treated successfully     HTN (hypertension)        Past Surgical History  Past Surgical History:   Procedure Laterality Date     ARTHROPLASTY REVISION HIP  4/26/2012    Procedure:ARTHROPLASTY REVISION HIP; Surgeon:ALEJANDRA RAYMOND; [facility]     CYSTOSCOPY, TRANSURETHRAL RESECTION (TUR) TUMOR BLADDER,  COMBINED N/A 2018    Procedure: COMBINED CYSTOSCOPY, TRANSURETHRAL RESECTION (TUR) TUMOR BLADDER;  Cystoscopy, Transurethral Resection Of Bladder Tumor ;  Surgeon: Cassidy Liao MD;  Location: [facility]     INJECT STEROID (LOCATION)      right hip x 2, 2 weeks before.      LAPAROSCOPIC CYSTOPROSTATECTOMY N/A 2018    Procedure: CYSTOPROSTATECTOMY;  Radical Cystoprostatectomy, Ileal Conduit, Bilateral Pelvic Lymphadenectomy;  Surgeon: Bebeto Shea MD;  [facility]     left total knee arthroplasty  1992     LYMPHADENECTOMY ABDOMINAL N/A 2018    Procedure: LYMPHADENECTOMY ABDOMINAL;;  Surgeon: Bebeto Shea MD;  Location: [facility]     pins in left hip      hip fracture     radioactive synovectomy  2003     right total hip arthroplasty  1992     SYNOVECTOMY HIP  2012    Procedure:SYNOVECTOMY HIP; Right Hip Open Synovectomy, Right Total Hip Revision with Exchange of Poly Liner and Head; Surgeon:ALEJANDRA RAYMOND; Location:[facility]       Prior to Admission Medications  No current outpatient medications on file.       Allergies  Allergies   Allergen Reactions     Aspirin      This drug inhibits platelets and is contraindicated due to hemophilia diagnosis.          Social History  Social History     Socioeconomic History     Marital status: Single     Spouse name: Not on file     Number of children: 0     Years of education: Not on file     Highest education level: Not on file   Occupational History     Not on file   Tobacco Use     Smoking status: Former     Packs/day: 1.00     Years: 20.00     Pack years: 20.00     Types: Cigarettes     Quit date: 2013     Years since quittin.8     Smokeless tobacco: Never     Tobacco comments:     e cig no longer using   Vaping Use     Vaping status: Never Used   Substance and Sexual Activity     Alcohol use: Never     Drug use: Never     Sexual activity: Yes     Partners: Female     Birth control/protection: Condom   Other  Topics Concern     Parent/sibling w/ CABG, MI or angioplasty before 65F 55M? No   Social History Narrative     Not on file     Social Determinants of Health     Financial Resource Strain: Not on file   Food Insecurity: Not on file   Transportation Needs: Not on file   Physical Activity: Not on file   Stress: Not on file   Social Connections: Not on file   Intimate Partner Violence: Not on file   Housing Stability: Not on file       Family History  Family History   Adopted: Yes   Problem Relation Age of Onset     Unknown/Adopted Other        Review of Systems  The complete review of systems is negative other than noted in the HPI or here.   Anesthesia Evaluation   Pt has had prior anesthetic.     No history of anesthetic complications       ROS/MED HX  ENT/Pulmonary:     (+) LILLIE risk factors, hypertension, tobacco use, Past use,  (-) asthma and recent URI   Neurologic:  - neg neurologic ROS     Cardiovascular:     (+) Dyslipidemia hypertension-----Previous cardiac testing   Echo: Date: Results:    Stress Test: Date: 2012 Results:  Normal per patient. Results not available.   ECG Reviewed: Date: 2/2018 Results:  Normal sinus rhythm  Cath: Date: Results:   (-) FARRIS   METS/Exercise Tolerance: 4 - Raking leaves, gardening Comment: Walks ~2 blocks per day. Limited due to hemophilic arthropathy   Hematologic: Comments: Hemophilia A    (+) history of blood transfusion, no previous transfusion reaction,     Musculoskeletal: Comment: Hemophilic arthropathy      GI/Hepatic:     (+) hepatitis resolved hepatitis type C,  (-) GERD   Renal/Genitourinary: Comment: UTI 5/5/23  Hx of bladder cancer s/p surgery and chemo now with ileal conduit    (+) Nephrolithiasis ,     Endo:     (+) type II DM, Last HgA1c: 6.9, date: 4/17/2023,  (-) thyroid disease and chronic steroid usage   Psychiatric/Substance Use:     (+) psychiatric history anxiety and depression     Infectious Disease: Comment: ESBL      Malignancy:   (+) Malignancy, History  of Other.Other CA Bladder cancer Remission status post Surgery and Chemo.    Other:  - neg other ROS    (+) , H/O Chronic Pain,        There were no vitals taken for this visit.    Physical Exam   Constitutional: Pleasant male, no apparent distress, and appears stated age.  Eyes: Pupils equal, round and reactive to light, extra ocular muscles intact, sclera clear, conjunctiva normal.  HENT: Normocephalic and atraumatic, oral pharynx with moist mucus membranes, good dentition. No goiter appreciated.   Respiratory: Clear to auscultation bilaterally, no crackles or wheezing.  Cardiovascular: Regular rate and rhythm, normal S1 and S2, and no murmur noted.  Carotids +2, no bruits. No edema. Palpable pulses to radial  DP and PT arteries.   GI: Normal bowel sounds, soft, non-distended, non-tender, no masses palpated, no hepatosplenomegaly.  Lymph/Hematologic: No cervical lymphadenopathy and no supraclavicular lymphadenopathy.  Genitourinary:  Deferred  Skin: Warm and dry.  No rashes on exposed skin.  Musculoskeletal: Full ROM of neck. Mild swelling of left foot. There is no redness, warmth, or swelling of other visible joints. Gross motor strength is normal.    Neurologic: Awake, alert, oriented to name, place and time. Cranial nerves II-XII are grossly intact. Gait is slow, favors left leg.   Neuropsychiatric: Calm, cooperative. Normal affect.     Prior Labs/Diagnostic Studies   All labs and imaging personally reviewed      Latest Reference Range & Units 04/17/23 13:35   Sodium 136 - 145 mmol/L 141   Potassium 3.4 - 5.3 mmol/L 4.5   Chloride 98 - 107 mmol/L 104   Carbon Dioxide (CO2) 22 - 29 mmol/L 27   Urea Nitrogen 8.0 - 23.0 mg/dL 24.3 (H)   Creatinine 0.67 - 1.17 mg/dL 1.03   GFR Estimate >60 mL/min/1.73m2 77   Calcium 8.8 - 10.2 mg/dL 9.8   Anion Gap 7 - 15 mmol/L 10   Albumin 3.5 - 5.2 g/dL 4.5   Protein Total 6.4 - 8.3 g/dL 7.5   Alkaline Phosphatase 40 - 129 U/L 85   ALT 10 - 50 U/L 11   AST 10 - 50 U/L 15    Bilirubin Total <=1.2 mg/dL 0.5   Cholesterol <200 mg/dL 186   Glucose 70 - 99 mg/dL 102 (H)   HDL Cholesterol >=40 mg/dL 49   Hemoglobin A1C <5.7 % 5.6   LDL Cholesterol Calculated <=100 mg/dL 106 (H)   Non HDL Cholesterol <130 mg/dL 137 (H)   Triglycerides <150 mg/dL 154 (H)   Uric Acid 3.4 - 7.0 mg/dL 5.7   (H): Data is abnormally high     Latest Reference Range & Units 03/27/23 07:44   WBC 4.0 - 11.0 10e3/uL 10.1   Hemoglobin 13.3 - 17.7 g/dL 13.7   Hematocrit 40.0 - 53.0 % 41.8   Platelet Count 150 - 450 10e3/uL 211   RBC Count 4.40 - 5.90 10e6/uL 4.98   MCV 78 - 100 fL 84   MCH 26.5 - 33.0 pg 27.5   MCHC 31.5 - 36.5 g/dL 32.8   RDW 10.0 - 15.0 % 14.6   % Neutrophils % 80   % Lymphocytes % 9   % Monocytes % 8   % Eosinophils % 3   % Basophils % 0   Absolute Basophils 0.0 - 0.2 10e3/uL 0.0   Absolute Eosinophils 0.0 - 0.7 10e3/uL 0.3   Absolute Immature Granulocytes <=0.4 10e3/uL 0.0   Absolute Lymphocytes 0.8 - 5.3 10e3/uL 0.9   Absolute Monocytes 0.0 - 1.3 10e3/uL 0.8   % Immature Granulocytes % 0   Absolute Neutrophils 1.6 - 8.3 10e3/uL 8.1   Absolute NRBCs 10e3/uL 0.0   NRBCs per 100 WBC <1 /100 0       EKG/ stress test - if available please see in ROS above   No results found.    The patient's records and results personally reviewed by this provider.     Outside records reviewed from: Care Everywhere    LAB/DIAGNOSTIC STUDIES TODAY:  CBC, T&S    Assessment  Orlin Alcantar is a 73 year old male seen as a PAC referral for risk assessment and optimization for anesthesia.    Plan/Recommendations  Pt will be optimized for the proposed procedure.  See below for details on the assessment, risk, and preoperative recommendations    NEUROLOGY  - No history of TIA, CVA or seizure  - Chronic Pain  On chronic opiates, morphine equivilant = 13.5 MME/Day   -Post Op delirium risk factors:  Age    ENT  - No current airway concerns.  Will need to be reassessed day of surgery.  Mallampati: I  TM: > 3    CARDIAC  - No history  of CAD and Afib  - Hypertension  Well controlled  - Hyperlipidemia  Controlled with lifestyle modification  - METS (Metabolic Equivalents)  Patient performs 4 or more METS exercise without symptoms            Total Score: 0      RCRI-Very low risk: Class 1 0.4% complication rate            Total Score: 0        PULMONARY    LILLIE Medium Risk            Total Score: 3    LILLIE: Hypertension    LILLIE: Over 50 ys old    LILLIE: Male      - Denies asthma or inhaler use  - Tobacco History    History   Smoking Status     Former     Packs/day: 1.00     Years: 20.00     Types: Cigarettes     Quit date: 6/30/2013   Smokeless Tobacco     Never       GI    PONV Medium Risk  Total Score: 2           1 AN PONV: Patient is not a current smoker    1 AN PONV: Intended Post Op Opioids      - History of hepatitis C 1999, resolved.    /RENAL  - Hx of bladder cancer s/p surgery and chemo, now with ileal conduit  - Recent UTI 5/5/2023, initially treated with ciprofloxacin but was switched to bactrim based on culture results (ESBL). Last dose of bactrim will be 5/19  - Nephrolithiasis: surgery as scheduled above  - Baseline Creatinine  1.03    ENDOCRINE    - BMI: Estimated body mass index is 20.6 kg/m  as calculated from the following:    Height as of an earlier encounter on 5/17/23: 1.829 m (6').    Weight as of an earlier encounter on 5/17/23: 68.9 kg (151 lb 14.4 oz).  Healthy Weight (BMI 18.5-24.9)  - Diabetes  Hemoglobin A1C (%)   Date Value   04/17/2023 5.6   07/25/2018 6.9 (H)     Diabetes Type 2, controlled with lifestyle modification. Recommend close monitoring of the patient's blood glucose levels throughout the perioperative period.    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 Follows closely with [facility]. Procedural hemophilia plan as below - see note from GERA Freedman on 5/3/2023 for further details.    Procedural Hemophilia Plan:        Infuse Kogenate 50 unit(s)/kg +/- 10% IV 30-90 minutes prior to procedure (5/17/2023). Dose to be administered in preop by the hospital. Dose ordered by [facility] provider    Infuse Kogenate 50 unit(s)/kg +/- 10% IV on post operative day 1 at 0800 prior to discharge. Dose ordered by [facility] provider. (5/18/2023)    After discharge, Orlin will infuse Kogenate 45 unit(s)/kg +/- 10% IV daily (q 24 hours) for three additional days starting on post operative day 1 (5/19/2023 - 5/21/2023)    Then transition back to his usual prophylaxis with Kogenate 45 unit(s)/kg every other day (q 48 hours) as long as he is not having any further hematuria (5/25/2023).     Avoid antifibrinolytics     If any bleeding concerns, please page the inpatient Hematology team. Dr. Perez will be on service.     MSK  - Hemophilic arthropathy: Affects multiple joints. Currently patient is unable to fully straighten both elbows. Recommend consideration for careful positioning to limit patient discomfort.     PSYCH  - Anxiety/Depression: no current concerns.    Different anesthesia methods/types have been discussed with the patient, but they are aware that the final plan will be decided by the assigned anesthesia provider on the date of service.  Patient was discussed with Dr. Jo    The patient is optimized for their procedure. AVS with information on surgery time/arrival time, meds and NPO status given by nursing staff. No further diagnostic testing indicated.      On the day of service:     Prep time: 15 minutes  Visit time: 15 minutes  Documentation time: 15 minutes  ------------------------------------------  Total time: 45 minutes      Samra Patino PA-C  Preoperative Assessment Center  [facility]  Phone: [phone]  Fax:  [phone]  Pre-Operative H & P     CC:  Preoperative exam to assess for increased cardiopulmonary risk while undergoing surgery and anesthesia.    Date of Encounter: 5/10/2023  Primary Care Physician:  No Ref-Primary, Physician     Reason for visit:   No diagnosis found.    PAYAL Alcantar is a 73 year old male who presents for pre-operative H & P in preparation for  Procedure Information     Case: [id number] Date/Time: 05/17/23 1330    Procedure: NEPHROLITHOTOMY, PERCUTANEOUS, USING HOLMIUM LASER (Left: Flank)    Anesthesia type: General    Diagnosis: Kidney stone [N20.0]    Pre-op diagnosis: Kidney stone [N20.0]    [facility]    Providers: Sacah Thomas MD        Patient is being evaluated for comorbid conditions of HTN, hyperlipidemia, hemophilia, chronic pain syndrome/hemophilic arthropathy, type 2 diabetes, chronic hepatitis C, bladder cancer s/p resection with ileal conduit and chemo 2018, insomnia, anxiety, and depression.     The patient has been following with urology for known large left renal stone. He has tried nonsurgical options without relief. During his last visit with Dr. Thomas on 4/25/2023 treatment options were discussed and a plan was made to proceed with surgery as scheduled above.     History is obtained from the patient and chart review    Hx of abnormal bleeding or anti-platelet use: Hemophilia    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      Bladder tumor      Depression      DM II (diabetes mellitus, type II), controlled (H)     diet controlled     Hemophilia (H)     Type A     History of hepatitis C     treated successfully     HTN (hypertension)        Past Surgical History  Past Surgical History:   Procedure Laterality Date     ARTHROPLASTY REVISION HIP  4/26/2012    Procedure:ARTHROPLASTY REVISION HIP; Surgeon:ALEJANDRA RAYMOND; [facility]     CYSTOSCOPY, TRANSURETHRAL RESECTION (TUR)  TUMOR BLADDER, COMBINED N/A 2/19/2018    Procedure: COMBINED CYSTOSCOPY, TRANSURETHRAL RESECTION (TUR) TUMOR BLADDER;  Cystoscopy, Transurethral Resection Of Bladder Tumor ;  Surgeon: Cassidy Liao MD;  [facility]     INJECT STEROID (LOCATION)      right hip x 2, 2 weeks before.      LAPAROSCOPIC CYSTOPROSTATECTOMY N/A 7/30/2018    Procedure: CYSTOPROSTATECTOMY;  Radical Cystoprostatectomy, Ileal Conduit, Bilateral Pelvic Lymphadenectomy;  Surgeon: Bebeto Shea MD;  Location[facility]     left total knee arthroplasty  07/1992     LYMPHADENECTOMY ABDOMINAL N/A 7/30/2018    Procedure: LYMPHADENECTOMY ABDOMINAL;;  Surgeon: Bebeto Shea MD;  Location: [facility]     pins in left hip      hip fracture     radioactive synovectomy  03/2003     right total hip arthroplasty  12/1992     SYNOVECTOMY HIP  4/26/2012    Procedure:SYNOVECTOMY HIP; Right Hip Open Synovectomy, Right Total Hip Revision with Exchange of Poly Liner and Head; Surgeon:ALEJANDRA RAYMOND; Location:[facility]       Prior to Admission Medications  Current Outpatient Medications   Medication Sig Dispense Refill     acetaminophen (TYLENOL) 500 MG tablet Take 500-1,000 mg by mouth as needed for mild pain       Antihem Factor Recomb, rFVIII, (KOGENATE FS) 1000 units KIT Directions: Infuse 3261 units every 2 days and prn for breakthrough bleeding (Patient taking differently: Inject 3,000 Units into the vein every 48 hours Directions: Infuse 3261 units every 2 days and prn for breakthrough bleeding  Last injection 05/09) 27593 each 5     Antihem Factor Recomb, rFVIII, (KOGENATE FS) 3000 units KIT Infuse Kogenate 3345 unit(s) IV daily on 5/19, 5/20, 5/21 then resume prior regimen. 78667 each 0     celecoxib (CELEBREX) 100 MG capsule Take 1 capsule (100 mg) by mouth 2 times daily as needed for moderate pain 60 capsule 3     codeine 30 MG tablet Take 1 tablet (30 mg) by mouth every 8 hours (Patient taking differently: Take 30 mg by mouth  daily) 84 tablet 0     lisinopril (ZESTRIL) 30 MG tablet Take 1 tablet (30 mg) by mouth daily 90 tablet 3     mirtazapine (REMERON) 45 MG tablet Take 1 tablet (45 mg) by mouth At Bedtime 90 tablet 1     naloxone (EVZIO) 0.4 MG/0.4ML auto-injector Inject 0.4 mLs (0.4 mg) into the muscle as needed for opioid reversal every 2-3 minutes until assistance arrives 0.8 mL 0     sulfamethoxazole-trimethoprim (BACTRIM DS) 800-160 MG tablet Take 1 tablet by mouth 2 times daily for 10 days 20 tablet 0     urea (GORMEL) 20 % external cream Apply topically 2 times daily For 3-4 weeks on right ankle. (Patient taking differently: Apply topically daily For 3-4 weeks on right ankle.) 480 g 0       Allergies  Allergies   Allergen Reactions     Aspirin      This drug inhibits platelets and is contraindicated due to hemophilia diagnosis.          Social History  Social History     Socioeconomic History     Marital status: Single     Spouse name: Not on file     Number of children: 0     Years of education: Not on file     Highest education level: Not on file   Occupational History     Not on file   Tobacco Use     Smoking status: Former     Packs/day: 1.00     Years: 20.00     Pack years: 20.00     Types: Cigarettes     Quit date: 2013     Years since quittin.8     Smokeless tobacco: Never     Tobacco comments:     e cig no longer using   Vaping Use     Vaping status: Never Used   Substance and Sexual Activity     Alcohol use: Never     Drug use: Never     Sexual activity: Yes     Partners: Female     Birth control/protection: Condom   Other Topics Concern     Parent/sibling w/ CABG, MI or angioplasty before 65F 55M? No   Social History Narrative     Not on file     Social Determinants of Health     Financial Resource Strain: Not on file   Food Insecurity: Not on file   Transportation Needs: Not on file   Physical Activity: Not on file   Stress: Not on file   Social Connections: Not on file   Intimate Partner Violence: Not on  file   Housing Stability: Not on file       Family History  Family History   Adopted: Yes   Problem Relation Age of Onset     Unknown/Adopted Other        Review of Systems  The complete review of systems is negative other than noted in the HPI or here.   Anesthesia Evaluation   Pt has had prior anesthetic.     No history of anesthetic complications       ROS/MED HX  ENT/Pulmonary:     (+) LILLIE risk factors, hypertension, tobacco use, Past use,  (-) asthma and recent URI   Neurologic:  - neg neurologic ROS     Cardiovascular:     (+) Dyslipidemia hypertension-----Previous cardiac testing   Echo: Date: Results:    Stress Test: Date: 2012 Results:  Normal per patient. Results not available.   ECG Reviewed: Date: 2/2018 Results:  Normal sinus rhythm  Cath: Date: Results:   (-) FARRIS   METS/Exercise Tolerance: 4 - Raking leaves, gardening Comment: Walks ~2 blocks per day. Limited due to hemophilic arthropathy   Hematologic: Comments: Hemophilia A. [facility]. And post op x 3 days    (+) history of blood transfusion, no previous transfusion reaction,     Musculoskeletal: Comment: Hemophilic arthropathy  (+) arthritis,     GI/Hepatic:     (+) hepatitis resolved hepatitis type C,  (-) GERD   Renal/Genitourinary: Comment: UTI 5/5/23  Hx of bladder cancer s/p surgery and chemo now with ileal conduit    (+) Nephrolithiasis ,     Endo:     (+) type II DM, Last HgA1c: 6.9, date: 4/17/2023,  (-) thyroid disease and chronic steroid usage   Psychiatric/Substance Use:     (+) psychiatric history anxiety and depression H/O chronic opiod use .     Infectious Disease: Comment: ESBL UTI      Malignancy:   (+) Malignancy, History of Other.Other CA Bladder cancer Remission status post Surgery and Chemo.    Other:  - neg other ROS    (+) , H/O Chronic Pain,        There were no vitals taken for this visit.    Physical Exam   Constitutional: Pleasant male, no apparent distress, and appears  stated age.  Eyes: Pupils equal, round and reactive to light, extra ocular muscles intact, sclera clear, conjunctiva normal.  HENT: Normocephalic and atraumatic, oral pharynx with moist mucus membranes, good dentition. No goiter appreciated.   Respiratory: Clear to auscultation bilaterally, no crackles or wheezing.  Cardiovascular: Regular rate and rhythm, normal S1 and S2, and no murmur noted.  Carotids +2, no bruits. No edema. Palpable pulses to radial  DP and PT arteries.   GI: Normal bowel sounds, soft, non-distended, non-tender, no masses palpated, no hepatosplenomegaly.  Lymph/Hematologic: No cervical lymphadenopathy and no supraclavicular lymphadenopathy.  Genitourinary:  Deferred  Skin: Warm and dry.  No rashes on exposed skin.  Musculoskeletal: Full ROM of neck. Mild swelling of left foot. There is no redness, warmth, or swelling of other visible joints. Gross motor strength is normal.    Neurologic: Awake, alert, oriented to name, place and time. Cranial nerves II-XII are grossly intact. Gait is slow, favors left leg.   Neuropsychiatric: Calm, cooperative. Normal affect.     Prior Labs/Diagnostic Studies   All labs and imaging personally reviewed      Latest Reference Range & Units 04/17/23 13:35   Sodium 136 - 145 mmol/L 141   Potassium 3.4 - 5.3 mmol/L 4.5   Chloride 98 - 107 mmol/L 104   Carbon Dioxide (CO2) 22 - 29 mmol/L 27   Urea Nitrogen 8.0 - 23.0 mg/dL 24.3 (H)   Creatinine 0.67 - 1.17 mg/dL 1.03   GFR Estimate >60 mL/min/1.73m2 77   Calcium 8.8 - 10.2 mg/dL 9.8   Anion Gap 7 - 15 mmol/L 10   Albumin 3.5 - 5.2 g/dL 4.5   Protein Total 6.4 - 8.3 g/dL 7.5   Alkaline Phosphatase 40 - 129 U/L 85   ALT 10 - 50 U/L 11   AST 10 - 50 U/L 15   Bilirubin Total <=1.2 mg/dL 0.5   Cholesterol <200 mg/dL 186   Glucose 70 - 99 mg/dL 102 (H)   HDL Cholesterol >=40 mg/dL 49   Hemoglobin A1C <5.7 % 5.6   LDL Cholesterol Calculated <=100 mg/dL 106 (H)   Non HDL Cholesterol <130 mg/dL 137 (H)   Triglycerides <150  mg/dL 154 (H)   Uric Acid 3.4 - 7.0 mg/dL 5.7   (H): Data is abnormally high     Latest Reference Range & Units 03/27/23 07:44   WBC 4.0 - 11.0 10e3/uL 10.1   Hemoglobin 13.3 - 17.7 g/dL 13.7   Hematocrit 40.0 - 53.0 % 41.8   Platelet Count 150 - 450 10e3/uL 211   RBC Count 4.40 - 5.90 10e6/uL 4.98   MCV 78 - 100 fL 84   MCH 26.5 - 33.0 pg 27.5   MCHC 31.5 - 36.5 g/dL 32.8   RDW 10.0 - 15.0 % 14.6   % Neutrophils % 80   % Lymphocytes % 9   % Monocytes % 8   % Eosinophils % 3   % Basophils % 0   Absolute Basophils 0.0 - 0.2 10e3/uL 0.0   Absolute Eosinophils 0.0 - 0.7 10e3/uL 0.3   Absolute Immature Granulocytes <=0.4 10e3/uL 0.0   Absolute Lymphocytes 0.8 - 5.3 10e3/uL 0.9   Absolute Monocytes 0.0 - 1.3 10e3/uL 0.8   % Immature Granulocytes % 0   Absolute Neutrophils 1.6 - 8.3 10e3/uL 8.1   Absolute NRBCs 10e3/uL 0.0   NRBCs per 100 WBC <1 /100 0       EKG/ stress test - if available please see in ROS above   No results found.    The patient's records and results personally reviewed by this provider.     Outside records reviewed from: Care Everywhere    LAB/DIAGNOSTIC STUDIES TODAY:  CBC, T&S    Assessment  Orlin Alcantar is a 73 year old male seen as a PAC referral for risk assessment and optimization for anesthesia.    Plan/Recommendations  Pt will be optimized for the proposed procedure.  See below for details on the assessment, risk, and preoperative recommendations    NEUROLOGY  - No history of TIA, CVA or seizure  - Chronic Pain  On chronic opiates, morphine equivilant = 13.5 MME/Day   -Post Op delirium risk factors:  Age    ENT  - No current airway concerns.  Will need to be reassessed day of surgery.  Mallampati: I  TM: > 3    CARDIAC  - No history of CAD and Afib  - Hypertension  Well controlled  - Hyperlipidemia  Controlled with lifestyle modification  - METS (Metabolic Equivalents)  Patient performs 4 or more METS exercise without symptoms            Total Score: 0      RCRI-Very low risk: Class 1 0.4%  complication rate            Total Score: 0        PULMONARY    LILLIE Medium Risk            Total Score: 3    LILLIE: Hypertension    LILLIE: Over 50 ys old    LILLIE: Male      - Denies asthma or inhaler use  - Tobacco History    History   Smoking Status     Former     Packs/day: 1.00     Years: 20.00     Types: Cigarettes     Quit date: 6/30/2013   Smokeless Tobacco     Never       GI    PONV Medium Risk  Total Score: 2           1 AN PONV: Patient is not a current smoker    1 AN PONV: Intended Post Op Opioids      - History of hepatitis C 1999, resolved.    /RENAL  - Hx of bladder cancer s/p surgery and chemo, now with ileal conduit  - Recent UTI 5/5/2023, initially treated with ciprofloxacin but was switched to bactrim based on culture results (ESBL). Last dose of bactrim will be 5/19  - Nephrolithiasis: surgery as scheduled above  - Baseline Creatinine  1.03    ENDOCRINE    - BMI: Estimated body mass index is 21.02 kg/m  as calculated from the following:    Height as of 5/10/23: 1.829 m (6').    Weight as of 5/10/23: 70.3 kg (155 lb).  Healthy Weight (BMI 18.5-24.9)  - Diabetes  Hemoglobin A1C (%)   Date Value   04/17/2023 5.6   07/25/2018 6.9 (H)     Diabetes Type 2, controlled with lifestyle modification. Recommend close monitoring of the patient's blood glucose levels throughout the perioperative period.    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 Follows closely with [facility]. Procedural hemophilia plan as below - see note from GERA Freedman on 5/3/2023 for further details.    Procedural Hemophilia Plan:        Infuse Kogenate 50 unit(s)/kg +/- 10% IV 30-90 minutes prior to procedure (5/17/2023). Dose to be administered in preop by the hospital. Dose ordered by [facility]  provider    Infuse Kogenate 50 unit(s)/kg +/- 10% IV on post operative day 1 at 0800 prior to discharge. Dose ordered by [facility] provider. (5/18/2023)    After discharge, Orlin will infuse Kogenate 45 unit(s)/kg +/- 10% IV daily (q 24 hours) for three additional days starting on post operative day 1 (5/19/2023 - 5/21/2023)    Then transition back to his usual prophylaxis with Kogenate 45 unit(s)/kg every other day (q 48 hours) as long as he is not having any further hematuria (5/25/2023).     Avoid antifibrinolytics     If any bleeding concerns, please page the inpatient Hematology team. Dr. Perez will be on service.     MSK  - Hemophilic arthropathy: Affects multiple joints. Currently patient is unable to fully straighten both elbows. Recommend consideration for careful positioning to limit patient discomfort.     PSYCH  - Anxiety/Depression: no current concerns.    Different anesthesia methods/types have been discussed with the patient, but they are aware that the final plan will be decided by the assigned anesthesia provider on the date of service.  Patient was discussed with Dr. Jo    The patient is optimized for their procedure. AVS with information on surgery time/arrival time, meds and NPO status given by nursing staff. No further diagnostic testing indicated.      On the day of service:     Prep time: 15 minutes  Visit time: 15 minutes  Documentation time: 15 minutes  ------------------------------------------  Total time: 45 minutes      Samra Patino PA-C  Preoperative Assessment Center  [facility]  Phone: [phone]  Fax: [phone]    Physical Exam    Airway        Mallampati: I   TM distance: > 3 FB   Neck ROM: limited   Mouth opening: > 3 cm    Respiratory Devices and Support         Dental       (+) Modest Abnormalities - crowns, retainers, 1 or 2 missing teeth    B=Bridge, C=Chipped, L=Loose, M=Missing    Cardiovascular   cardiovascular exam normal           Pulmonary   pulmonary exam normal                  Anesthesia Plan    ASA Status:  3      Anesthesia Type: General.     - Airway: LMA   Induction: Intravenous, Propofol (ketamin 50mcg).   Maintenance: Balanced (patient received Kogenate factor VIII preop at 1:40pm).   Techniques and Equipment:     Airway: bleeding risk. have cmac in room.         Consents    Anesthesia Plan(s) and associated risks, benefits, and realistic alternatives discussed. Questions answered and patient/representative(s) expressed understanding.     - Discussed: Risks, Benefits and Alternatives for BOTH SEDATION and the PROCEDURE were discussed     - Discussed with:  Patient      - Extended Intubation/Ventilatory Support Discussed: No.      - Patient is DNR/DNI Status: No    Use of blood products discussed: No .     Postoperative Care    Pain management: IV analgesics, Oral pain medications.   PONV prophylaxis: Ondansetron (or other 5HT-3), Dexamethasone or Solumedrol     Comments:

## 2023-05-17 NOTE — BRIEF OP NOTE
[facility]    Brief Operative Note    Pre-operative diagnosis: Kidney stone [N20.0]  Post-operative diagnosis Same as pre-operative diagnosis    Procedure: Procedure(s):  NEPHROLITHOTOMY, PERCUTANEOUS, STANDBY HOLMIUM LASER  Surgeon: Surgeon(s) and Role:     * Sacha Thomas MD - Primary     * Randall Ontiveros MD - Resident - Assisting     * Estela Bolton MD - Fellow - Assisting  Anesthesia: General   Estimated Blood Loss: Minimal    Drains: EVERTON-1, PNT both left side  Specimens:   ID Type Source Tests Collected by Time Destination   A : Left Kidney stone  Calculus/Stone Kidney, Left STONE ANALYSIS Sacha Thomas MD 5/17/2023  4:01 PM    B : Left kidney stone  Calculus/Stone Kidney, Left AEROBIC BACTERIAL CULTURE ROUTINE Sacha Thomas MD 5/17/2023  4:04 PM      Findings:   see op report.  Complications: None.  Implants: * No implants in log *

## 2023-05-17 NOTE — ANESTHESIA CARE TRANSFER NOTE
Patient: Orlin Alcantar    Procedure: Procedure(s):  NEPHROLITHOTOMY, PERCUTANEOUS, STANDBY HOLMIUM LASER       Diagnosis: Kidney stone [N20.0]  Diagnosis Additional Information: No value filed.    Anesthesia Type:   General     Note:    Oropharynx: oropharynx clear of all foreign objects and spontaneously breathing  Level of Consciousness: awake  Oxygen Supplementation: room air    Independent Airway: airway patency satisfactory and stable    Vital Signs Stable: post-procedure vital signs reviewed and stable  Report to RN Given: handoff report given  Patient transferred to: PACU    Handoff Report: Identifed the Patient, Identified the Reponsible Provider, Reviewed the pertinent medical history, Discussed the surgical course, Reviewed Intra-OP anesthesia mangement and issues during anesthesia, Set expectations for post-procedure period and Allowed opportunity for questions and acknowledgement of understanding      Vitals:  Vitals Value Taken Time   /73 05/17/23 1700   Temp     Pulse 86 05/17/23 1701   Resp 26 05/17/23 1701   SpO2 96 % 05/17/23 1701   Vitals shown include unvalidated device data.    Electronically Signed By: JELLY Munguia CRNA  May 17, 2023  5:02 PM

## 2023-05-17 NOTE — ANESTHESIA POSTPROCEDURE EVALUATION
Patient: Orlin Alcantar    Procedure: Procedure(s):  NEPHROLITHOTOMY, PERCUTANEOUS, STANDBY HOLMIUM LASER       Anesthesia Type:  General    Note:  Disposition: Admission   Postop Pain Control: Uneventful            Sign Out: Well controlled pain   PONV: No   Neuro/Psych: Uneventful            Sign Out: Acceptable/Baseline neuro status   Airway/Respiratory: Uneventful            Sign Out: Acceptable/Baseline resp. status   CV/Hemodynamics: Uneventful            Sign Out: Acceptable CV status; No obvious hypovolemia; No obvious fluid overload   Other NRE: NONE   DID A NON-ROUTINE EVENT OCCUR? No           Last vitals:  Vitals Value Taken Time   /73 05/17/23 1700   Temp 36.5  C (97.7  F) 05/17/23 1700   Pulse 81 05/17/23 1712   Resp 17 05/17/23 1712   SpO2 96 % 05/17/23 1712   Vitals shown include unvalidated device data.    Electronically Signed By: Jaskaran Benoit MD  May 17, 2023  5:13 PM

## 2023-05-17 NOTE — OP NOTE
OPERATIVE REPORT    PREOPERATIVE DIAGNOSIS:  Kidney stone [N20.0]     POSTOPERATIVE DIAGNOSIS:  Same    PROCEDURES PERFORMED:     Left Percutaneous nephrolithotomy, lithotripsy stone extraction, antegrade ureteroscopy, nephrostomy tube  placement, complex (72943) - stones in multiple locations    Dilation of tract, percutaneous, new access into the renal collecting system   (73033)     STAFF SURGEON: Sacha Thomas MD was present and participatory for the entire case.   RESIDENT(S): Randall Ontiveros MD  FELLOW: Estela Bolton MD    ANESTHESIA: General    ESTIMATED BLOOD LOSS: 50 ml    DRAINS/TUBES:   Left 10 fr PCN to drainage  Left 5 fr open ended catheter going down from back into the ileal conduit, capped    IV FLUIDS: Please see dictated anesthesia record  COMPLICATIONS: None.   SPECIMEN:   Left renal stone for analysis and culture    SIGNIFICANT FINDINGS:   -Left renal stone removed via Lower Pole access.   -Visually stone free at the end of the case.    BRIEF OPERATIVE INDICATIONS: Orlin Alcantar who is a 73 year old male here for percutaneous nephrolithotomy. Has hemophilia and large left renal stone Also with ileal conduit Suspected uric acid, tried several weeks of urinary alkalinization but stone did not change on CT scan Has had one instance gross hematuria since last visit, no UTI symptoms or flank pain  . he elected to proceed with percutaneous nephrolithotomy.  he is aware of the risks of surgery.    OPERATIVE DETAILS: After informed consent was obtained, the patient was taken back to the operating room. Anesthesia was induced and the patient was intubated. IV culture directed antibiotics were given and bilateral sequential compression devices were placed. Prep and drape was done in the usual fashion. A timeout was taken to ensure proper patient, placement and procedure.     The patient was then carefully placed in the prone position onto the operating room table ensuring padding of shoulders and  all pressure points.   imaging and retrograde pyelogram revealed  imaging demonstrates radio-opaque renal stone consistent with pre-operative imaging.   Percutaneous access to the lower pole was undertaken using ultrasound.  A Advent Solarson glidewire was passed through the obturator of the 18g access needle and navigated down the ureter using an angle tipped catheter.  The glidewire was then exchnged for a superstiff wire. An Dual-lumen was passed over the wire after a skin incision was made 1cm along the wire. A sensor wire was passed to use as a safety wire.  With 2 wires in place we then used a 24F balloon dilator over the Super Stiff guidewire and used this to dilate our percutaneous tract. This was monitored with fluoroscopy. The sheath was advanced into the calyx of access. At this point we deflated the balloon and removed the dilator. We then inserted the rigid nephroscope into the dilated tract and removed removed the large stone in the renal pelvis with the ultrasonic lithotrite.  The stone was 3cm..  We then switched to the flexible cystoscope to clear the remaining calyces. There were 3 stones in the mid calyx that were basket extracted.     We performed antegrade ureteroscopy to remove stone fragments from the ureter ensured that the ureter was patent with no evidence of residual ureteral calculi or ureteral trauma.  We were able to pass the scope all the way to the ileal conduit.   We placed a 10 Libyan cope loop nephrostomy tube into the kidney which we ensured had a full curl.  We performed an antegrade nephrostogram confirming the tube was in the appropriate position and that there was no extravasation We place an antegrade 5 Libyan catheter for potential second stage procedure  We removed the Amplatz access sheath.   We secured the tubes to the skin.    We placed a clean dressing over the wound and held several minutes or pressure ensuring there was no significant bleeding.  At this point, the  patient was carefully placed back into the supine position, awakened from anesthesia and extubated. he was transferred to the PACU in stable condition. he tolerated the procedure well without complication.      PLAN: observation   Labs CBC/BMP in PACU and CBC/BMP in AM  Continue gentamicin and levofloxacin while in hospital  Imaging CT in the morning  Home with PCN and 5 Yi for 1 week which can be removed in clinic.    I, Sacha Thomas, was present and participatory for the entirety of the procedure

## 2023-05-17 NOTE — ANESTHESIA PROCEDURE NOTES
Airway       Patient location during procedure: OR       Procedure Start/Stop Times: 5/17/2023 2:49 PM  Staff -        Anesthesiologist:  Katie Hurt MD       Resident/Fellow: Randall Ellis MD       Performed By: resident  Consent for Airway        Urgency: elective  Indications and Patient Condition       Indications for airway management: lauri-procedural       Induction type:intravenous       Mask difficulty assessment: 1 - vent by mask    Final Airway Details       Final airway type: endotracheal airway       Successful airway: ETT - single and Oral  Endotracheal Airway Details        ETT size (mm): 7.5       Cuffed: yes       Successful intubation technique: direct laryngoscopy       DL Blade Type: [place] 2       Grade View of Cords: 1       Adjucts: stylet       Position: Right       Measured from: lips       Secured at (cm): 23       Bite block used: None    Post intubation assessment        Placement verified by: capnometry, equal breath sounds and chest rise        Number of attempts at approach: 1       Number of other approaches attempted: 0       Secured with: pink tape       Ease of procedure: easy       Dentition: Unchanged and Intact    Medication(s) Administered   Medication Administration Time: 5/17/2023 2:49 PM

## 2023-05-18 ENCOUNTER — APPOINTMENT (OUTPATIENT)
Dept: CT IMAGING | Facility: CLINIC | Age: 73
End: 2023-05-18
Attending: STUDENT IN AN ORGANIZED HEALTH CARE EDUCATION/TRAINING PROGRAM
Payer: MEDICARE

## 2023-05-18 VITALS
WEIGHT: 155 LBS | TEMPERATURE: 98 F | OXYGEN SATURATION: 97 % | HEART RATE: 95 BPM | HEIGHT: 72 IN | DIASTOLIC BLOOD PRESSURE: 52 MMHG | SYSTOLIC BLOOD PRESSURE: 112 MMHG | RESPIRATION RATE: 18 BRPM | BODY MASS INDEX: 20.99 KG/M2

## 2023-05-18 LAB
ANION GAP SERPL CALCULATED.3IONS-SCNC: 14 MMOL/L (ref 7–15)
BUN SERPL-MCNC: 34.9 MG/DL (ref 8–23)
CALCIUM SERPL-MCNC: 8.9 MG/DL (ref 8.8–10.2)
CHLORIDE SERPL-SCNC: 106 MMOL/L (ref 98–107)
CREAT SERPL-MCNC: 1.29 MG/DL (ref 0.67–1.17)
DEPRECATED HCO3 PLAS-SCNC: 17 MMOL/L (ref 22–29)
ERYTHROCYTE [DISTWIDTH] IN BLOOD BY AUTOMATED COUNT: 15.1 % (ref 10–15)
GFR SERPL CREATININE-BSD FRML MDRD: 59 ML/MIN/1.73M2
GLUCOSE SERPL-MCNC: 108 MG/DL (ref 70–99)
HCT VFR BLD AUTO: 36.9 % (ref 40–53)
HGB BLD-MCNC: 11.7 G/DL (ref 13.3–17.7)
MCH RBC QN AUTO: 27.4 PG (ref 26.5–33)
MCHC RBC AUTO-ENTMCNC: 31.7 G/DL (ref 31.5–36.5)
MCV RBC AUTO: 86 FL (ref 78–100)
PLATELET # BLD AUTO: 215 10E3/UL (ref 150–450)
POTASSIUM SERPL-SCNC: 4.8 MMOL/L (ref 3.4–5.3)
RBC # BLD AUTO: 4.27 10E6/UL (ref 4.4–5.9)
SODIUM SERPL-SCNC: 137 MMOL/L (ref 136–145)
WBC # BLD AUTO: 6.2 10E3/UL (ref 4–11)

## 2023-05-18 PROCEDURE — G1010 CDSM STANSON: HCPCS

## 2023-05-18 PROCEDURE — 85027 COMPLETE CBC AUTOMATED: CPT | Performed by: STUDENT IN AN ORGANIZED HEALTH CARE EDUCATION/TRAINING PROGRAM

## 2023-05-18 PROCEDURE — 93010 ELECTROCARDIOGRAM REPORT: CPT | Performed by: INTERNAL MEDICINE

## 2023-05-18 PROCEDURE — 93005 ELECTROCARDIOGRAM TRACING: CPT

## 2023-05-18 PROCEDURE — 250N000011 HC RX IP 250 OP 636: Performed by: UROLOGY

## 2023-05-18 PROCEDURE — 99221 1ST HOSP IP/OBS SF/LOW 40: CPT | Mod: GC | Performed by: INTERNAL MEDICINE

## 2023-05-18 PROCEDURE — 999N000147 HC STATISTIC PT IP EVAL DEFER

## 2023-05-18 PROCEDURE — G1010 CDSM STANSON: HCPCS | Mod: GC | Performed by: RADIOLOGY

## 2023-05-18 PROCEDURE — 250N000013 HC RX MED GY IP 250 OP 250 PS 637: Performed by: STUDENT IN AN ORGANIZED HEALTH CARE EDUCATION/TRAINING PROGRAM

## 2023-05-18 PROCEDURE — 258N000003 HC RX IP 258 OP 636: Performed by: UROLOGY

## 2023-05-18 PROCEDURE — 36415 COLL VENOUS BLD VENIPUNCTURE: CPT | Performed by: STUDENT IN AN ORGANIZED HEALTH CARE EDUCATION/TRAINING PROGRAM

## 2023-05-18 PROCEDURE — 82310 ASSAY OF CALCIUM: CPT | Performed by: STUDENT IN AN ORGANIZED HEALTH CARE EDUCATION/TRAINING PROGRAM

## 2023-05-18 PROCEDURE — 250N000015 HC RX FACTOR IP MED 636 OP 636: Performed by: PHYSICIAN ASSISTANT

## 2023-05-18 PROCEDURE — 999N000197 HC STATISTIC WOC PT EDUCATION, 0-15 MIN

## 2023-05-18 PROCEDURE — 74176 CT ABD & PELVIS W/O CONTRAST: CPT | Mod: 26 | Performed by: RADIOLOGY

## 2023-05-18 PROCEDURE — 999N000157 HC STATISTIC RCP TIME EA 10 MIN

## 2023-05-18 RX ORDER — ANTIHEMOPHILIC FACTOR (RECOMBINANT) 3000 (+/-)
KIT INTRAVENOUS
Qty: 13380 EACH | Refills: 0 | Status: CANCELLED | OUTPATIENT
Start: 2023-05-18

## 2023-05-18 RX ORDER — TAMSULOSIN HYDROCHLORIDE 0.4 MG/1
0.4 CAPSULE ORAL DAILY
Qty: 10 CAPSULE | Refills: 0 | Status: SHIPPED | OUTPATIENT
Start: 2023-05-18 | End: 2024-04-23

## 2023-05-18 RX ORDER — ACETAMINOPHEN 325 MG/1
975 TABLET ORAL EVERY 8 HOURS PRN
Qty: 50 TABLET | Status: SHIPPED | OUTPATIENT
Start: 2023-05-18

## 2023-05-18 RX ORDER — UREA 200 MG/G
CREAM TOPICAL DAILY
COMMUNITY
End: 2024-06-19

## 2023-05-18 RX ORDER — SULFAMETHOXAZOLE/TRIMETHOPRIM 800-160 MG
1 TABLET ORAL 2 TIMES DAILY
Qty: 14 TABLET | Refills: 0 | Status: SHIPPED | OUTPATIENT
Start: 2023-05-18 | End: 2023-05-25

## 2023-05-18 RX ORDER — CODEINE SULFATE 30 MG/1
30 TABLET ORAL EVERY 8 HOURS PRN
COMMUNITY
End: 2023-05-22

## 2023-05-18 RX ORDER — SULFAMETHOXAZOLE/TRIMETHOPRIM 800-160 MG
1 TABLET ORAL 2 TIMES DAILY
Qty: 14 TABLET | Refills: 0 | Status: SHIPPED | OUTPATIENT
Start: 2023-05-18 | End: 2023-05-18

## 2023-05-18 RX ORDER — ANTIHEMOPHILIC FACTOR (RECOMBINANT) 3000 (+/-)
KIT INTRAVENOUS
Qty: 13380 EACH | Refills: 0 | Status: SHIPPED | OUTPATIENT
Start: 2023-05-18 | End: 2023-05-22

## 2023-05-18 RX ADMIN — GENTAMICIN SULFATE 280 MG: 40 INJECTION, SOLUTION INTRAMUSCULAR; INTRAVENOUS at 13:59

## 2023-05-18 RX ADMIN — ANTIHEMOPHILIC FACTOR (RECOMBINANT) 3345 UNITS: KIT at 08:24

## 2023-05-18 RX ADMIN — ACETAMINOPHEN 975 MG: 325 TABLET ORAL at 01:49

## 2023-05-18 RX ADMIN — ACETAMINOPHEN 975 MG: 325 TABLET ORAL at 09:20

## 2023-05-18 RX ADMIN — LEVOFLOXACIN 250 MG: 250 TABLET, FILM COATED ORAL at 08:27

## 2023-05-18 ASSESSMENT — ACTIVITIES OF DAILY LIVING (ADL)
ADLS_ACUITY_SCORE: 26

## 2023-05-18 NOTE — PHARMACY-AMINOGLYCOSIDE DOSING SERVICE
Pharmacy Aminoglycoside Initial Note  Date of Service May 17, 2023  Patient's  1950  73 year old, male    Weight (Actual):  70.3 kg    Indication: Surgical Prophylaxis, UTI    Current estimated CrCl = Estimated Creatinine Clearance: 50.7 mL/min (A) (based on SCr of 1.29 mg/dL (H)).    Creatinine for last 3 days  2023:  6:01 PM Creatinine 1.50 mg/dL  2023:  5:27 AM Creatinine 1.29 mg/dL     Nephrotoxins and other renal medications (From now, onward)    Start     Dose/Rate Route Frequency Ordered Stop    23 1400  gentamicin (GARAMYCIN) 280 mg in sodium chloride 0.9 % 50 mL intermittent infusion         280 mg  over 60 Minutes Intravenous EVERY 24 HOURS 23 0837            Contrast Orders - past 72 hours (72h ago, onward)    Start     Dose/Rate Route Frequency Stop    23 1626  iopamidol (ISOVUE-250) solution  Status:  Discontinued           PRN 23 1654          Aminoglycoside Levels - past 2 days  No results found for requested labs within last 2 days.    Aminoglycosides IV Administrations (past 72 hours)                   gentamicin (GARAMYCIN) 280 mg in sodium chloride 0.9 % 50 mL intermittent infusion (mg) 280 mg New Bag 23 1415                 Plan:  1.  Start Gentamicin 280 mg (4 mg/kg) IV q24h.   2.  Target goals based on extended interval dosing  3.  Goal peak level: 15-24 mg/L  4.  Goal trough level: <0.5mg/L  5.  Pharmacy will continue to follow and check levels as appropriate in 1-3 Days      Ly Marinelli [facility]

## 2023-05-18 NOTE — PROGRESS NOTES
Urology  Progress Note    NAEO  Pain well controlled  Tolerating clears - no n/v  Passing gas  Not ambulating  Voiding spont, has L nephrostomy and ileal conduit    Exam  /74 (BP Location: Right arm)   Pulse 93   Temp 98.2  F (36.8  C) (Oral)   Resp 22   Ht 1.829 m (6')   Wt 70.3 kg (155 lb)   SpO2 94%   BMI 21.02 kg/m    No acute distress  Unlabored breathing  Abdomen soft,  appropriately tender, nondistended. Incisions c/d/i of PNT site  Nephrostomy draining old blood; flushed well  Ileal Conduit draining cherry red      UOP:  L Nephrostomy 50/265            Ileal Conduit 870/250      Labs  PACU labs: 1 pt drop Hgb, bump in Cr to 1.50 from 1.03  Recent Labs   Lab Test 05/18/23  0527 05/17/23  1801 05/10/23  1208 04/17/23  1335 03/27/23  0744   WBC 6.2 4.5 6.8  --  10.1   HGB 11.7* 12.8* 13.8  --  13.7   CR  --  1.50*  --  1.03 1.00          AM labs pending    Assessment/Plan  73 year old male POD#1 s/p  Left PCNL, antegrade ureteroscopy, nephrostomy tube placement    Neuro: Tylenol, prn oxy (dilaudid dc'd due to no use) for pain control  CV: ARDEN  Pulm: IS while awake  FEN/GI: regular diet. Bowel regimen.  Endo: ARDEN  : PCN and 5f catheter to remain for 1 week, follow up in clinic for removal. F/u CT today to ensure removal of stone burden  Heme/ID: Hgb stable, f/u AM labs; hematology consult   Activity: Up ad mely  PPx: SCDs.   Dispo: home today    Seen and examined with the chief resident. Will discuss with Dr. Thomas.    Kyle Segal MD  Urology Resident     Contacting the Urology Team     Please use the following job codes to reach the Urology Team. Note that you must use an in house phone and that job codes cannot receive text pages.     On weekdays, dial [phone] (or star-star-star [phone] on the new [facility]) then [phone] to reach the Adult Urology resident or PA on call    On weekdays, dial [phone] (or star-star-star [phone] on the new [employer] telephones) then [phone] to reach the Pediatric Urology  resident    On weeknights and weekends, dial [phone] (or star-star-star [phone] on the new [employer] telephones) then [phone] to reach the Urology resident on call (for both Adult and Pediatrics)

## 2023-05-18 NOTE — CONSULTS
WOC Service Consult Note    WOC service consulted for established urostomy, met with pt who states no issues with urostomy and has extra supplies with him, plan to discharge later today  WOC will sign off    Smiley Donohue RN, CWOCN

## 2023-05-18 NOTE — PLAN OF CARE
"Received orders for PT to evaluate and treat for a safe discharge and to provide DME as needed. Ordering provider unable to specify any restrictions as patient with current, active bleed in L ankle and with recent bleed ~1 week prior. Spoke with patient who confirmed he currently has a bleed in his L ankle. Patient reported he has crutches and a walker available at home; however, patient has no support nearby who could bring the equipment to the hospital or be present when he arrives home to give him the crutches/walker to assist with mobility into his apartment. Patient to take taxi home. Time spent discussing restrictions with PT at hemophilia clinic who reported patient was to be non-weightbearing through L ankle secondary to his active bleed.  Discussed any alternative methods for assisting patient into his home; RNCC reported a wheelchair ride would be an out-of-pocket cost with patient's insurance not having coverage. When re-approaching patient to complete PT evaluation, patient reporting \"I actually don't need you because I've been walking to the bathroom with my boot on and it (L ankle) feels fine.\" Educated patient on NWB precautions for L ankle per [facility]. Discussed needing to utilize DME to assist with walking to avoid WB through L ankle. Patient unable to confirm when he was issued his walker or crutches. Educated on insurance not covering equipment if less than 5 years since the issue date. Patient then became frustrated and reported \"well I'm not taking any equipment unless you can guarantee it's covered by insurance because I'm not paying out of pocket.\" Discussed safety concerns for ambulating with weight through L ankle; patient reporting \"well you can let the MD know that the patient is going to trump their decision since I won't be paying for any equipment.\" Patient inquiring about using a cane; once again educated that B UE would need to be utilized to " maintain NWB in L ankle and cane would not provide this support. Time spent discussing with patient's primary Hematology MD regarding recommendation for patient to be NWB for 5-7 days with an active bleed. MD reported she will contact that patient regarding restrictions and returning home. Defer PT at this time. Please re-consult PT if any new needs arise. Defer discharge recommendation to medical team.

## 2023-05-18 NOTE — DISCHARGE INSTRUCTIONS
Activity  - please avoid weightbearing on left ankle until clear by hematology.    Diet:  You may resume your regular diet.     Common symptoms related to the stent:  - You will likely notice some blood in the urine for as long as the stent is in place. You may also notice more blood in the urine after physical activity. Normal urine color can range from yellow to dark red. This is not problematic as long as you are able to empty your bladder. Although urine may seem quite bloody, a few drops of blood can color the entire toilet bowl red- much like food coloring. Increase your fluid intake to help flush the blood out of your bladder.   - You may also notice a twinge of pain in your kidney during urination. This can be severe, but should get better after the first few days with the stent. This is due to urine traveling backward (up the stent into your kidney) when the bladder squeezes. It is normal and not a cause for concern.  - You may feel like you need to urinate more frequently than usual. Some patients experience a lower abdominal cramping or  spasm . You may notice urine leakage from your urethra after this happens. This is due to the stent rubbing against your bladder wall and should get better over the next few days.   - You may experience some burning with urination due to minor trauma from the scope used during your surgery. This should disappear over the next few days.     Medications:  Anti-inflammatories/NSAIDs (Ibuprofen, Advil, Aleve) are the most effective pain relievers for stent-related discomfort. You may safely use these in combination with Tylenol.   You may also take Tylenol for pain control- but not more than 3000 mg daily.  Flomax (Tamsulosin): This is a medication used to relax the ureter. It should help with flank pain associated with the stent. Take 0.4 mg (1 pill) every day.  We recommend over the counter fiber (metamucil or benefiber) and stool softeners (miralax, docusate or senna) to  "prevent postoperative constipation, but stop if you develop diarrhea.    Follow-Up:  - Follow up with Dr. Caldwell's clinic on 5/26 for your nephrostomy removal.  - Call or return sooner than your regularly scheduled visit if you develop any of the following: fever (greater than 101.5), uncontrolled pain, uncontrolled nausea or vomiting, or inability to urinate.    Phone numbers:   - Monday through Friday 8am to 4:30pm: Call [phone] with questions, requests for medication refills, or to schedule or confirm an appointment.  - Nights or weekends: call the after hours emergency pager - [phone] and tell the  \"I would like to page the Urology Resident on call.\" Please note, due to prescribing laws, resident physicians are unable to prescribe narcotics after-hours. If you feel as though you will need a refill of a narcotic pain medication, you will need to call the clinic during business hours OR seek emergency care.  - For emergencies, call [phone]   "

## 2023-05-18 NOTE — PROGRESS NOTES
Patient will be discharging this afternoon, alert and oriented, was able to walk to the BR from her bed and stated that he has been dealing with these for a long time, to know when he needs walker/crutches regarding his NWB.  Patient got his IV factor med this morning.  Patient talked to the team about his discharge.  Continue to monitor till discharge time and MD wants him to use wheelchair to go downstairs to catch his ride home.

## 2023-05-18 NOTE — PLAN OF CARE
"Received orders for PT to evaluate and treat for a safe discharge and to provide DME as needed. Ordering provider unable to specify any restrictions as patient with current, active bleed in L ankle and with recent bleed ~1 week prior. Spoke with patient who confirmed he currently has a bleed in his L ankle. Patient reported he has crutches and a walker available at home; however, patient has no support nearby who could bring the equipment to the hospital or be present when he arrives home to give him the crutches/walker to assist with mobility into his apartment. Patient to take taxi home. Time spent discussing restrictions with PT at hemophilia clinic who reported patient was to be non-weightbearing through L ankle secondary to his active bleed.  Discussed any alternative methods for assisting patient into his home; RNCC reported a wheelchair ride would be an out-of-pocket cost with patient's insurance not having coverage. When re-approaching patient to complete PT evaluation, patient reporting \"I actually don't need you because I've been walking to the bathroom with my boot on and it (L ankle) feels fine.\" Educated patient on NWB precautions for L ankle per [facility] at [facility]. Discussed needing to utilize DME to assist with walking to avoid WB through L ankle. Patient unable to confirm when he was issued his walker or crutches. Educated on insurance not covering equipment if less than 5 years since the issue date. Patient then became frustrated and reported \"well I'm not taking any equipment unless you can guarantee it's covered by insurance because I'm not paying out of pocket.\" Discussed safety concerns for ambulating with weight through L ankle; patient reporting \"well you can let the MD know that the patient is going to trump their decision since I won't be paying for any equipment.\" Patient inquiring about using a cane; once again educated that B UE would need to be utilized to " maintain NWB in L ankle and cane would not provide this support. Time spent discussing with patient's primary Hematology MD regarding recommendation for patient to be NWB for 5-7 days with an active bleed. MD reported she will contact that patient regarding restrictions and returning home. Defer PT at this time. Please re-consult PT if any new needs arise.

## 2023-05-18 NOTE — PROGRESS NOTES
[facility]  [facility]  [address], [address], [place] [address]  Main: [phone], Fax: [phone]         Brief Chart Review/Update:     Contacted by inpatient hematology team and physical therapy team regarding new acute left ankle bleed. Orlin has received appropriate recombinant factor VIII replacement to manage his acute bleed. He reports improvement in symptoms in the last 24 hours. He should have wheelchair assistance to his taxi ride home. Once home, he will use his crutches to avoid weight bearing on left ankle. Reviewed plan with RN, PT, hematology team and the patient.     I will call him on Monday 5/22.   Please page me if any questions or concerns.       Monica Freedman, MPH, PA-C  [facility]  Pager: [phone]

## 2023-05-18 NOTE — PROGRESS NOTES
Blood pressure 114/74, pulse 93, temperature 98.2  F (36.8  C), temperature source Oral, resp. rate 22, height 1.829 m (6'), weight 70.3 kg (155 lb), SpO2 94 %.    Goal Outcome Evaluation:     Plan of Care Reviewed With: patient   Overall Patient Progress: No change     Status: S/p NEPHROLITHOTOMY, PERCUTANEOUS, STANDBY HOLMIUM LASER   Neuros: A&O x4, CMS intact   Cardiac: WNL   Respiratory: WNL on RA   GI/: R urostomy pink/macarena, L Nephrostomy w/ cherry color urine. +BM    Diet/Nausea: Tolerating clears. No nausea   Skin: Admitted/transferred from:   2 RN full   skin assessment completed by José Manuel Bonner RN and Solis CAMERON RN.  Skin assessment finding: issues found R ursomty, L PNT, R + L PIV, dried cracked BLEs    Interventions/actions: skin interventions pt educated on importance of frequent repositioning    Will continue to monitor.  LDA: L PNT, R Urostomy, L + R PNT   Labs: Reviewed   Pain: Pt c/o of pain by PNT, managed w/ oxy x1 w/ hydroxyzine   Activity: Pt repositioning in bed independently   Plan: Plan for dose of Factor this AM w/ possible discharge

## 2023-05-18 NOTE — CONSULTS
"[facility]    Hematology Consult Note  Patient Name: Orlin Alcantar   MRN: [id number]  YOB: 1950      Date of Service: May 18, 2023  Admission Date: 5/17/2023  Hospital Day # 0  Primary Outpatient Hematologist: Dr. 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 Dose ordered by [facility] provider. (5/18/2023)      After discharge, Orlin will infuse Kogenate 45 unit(s)/kg +/- 10% IV daily (q 24 hours) for five days or until hematuria stops additional days starting on post operative day 1 (5/19/2023 - 5/23/2023)     Then transition back to his usual prophylaxis with Kogenate 45 unit(s)/kg every other day (q 48 hours) as long as he is not having any further hematuria (5/25/2023).     Avoid antifibrinolytics     PT consult for crutches given immobility with left ankle bleed    [facility] aware of current status and provider will follow up with patient by phone within the next 72 hours. Patient also instructed to call with questions or concerns.     Thank you for involving us in the care of this patient. We will continue to follow during the hospitalization.    Patient was seen and plan of care developed with Dr. Perez.    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     Of note he believed he had a bleed into his left ankle on Tuesday and he noticed reduced mobility on Wednesday morning. He currently is unsure that he can walk. He noted that there was bleeding at incision site yesterday post procedure and dressing was changed.    He developed hematuria in 2018 and was found to have invasive urothelial carcinoma. He underwent chemotherapy and radical cystoprostatectomy with ileal conduit and lymph node dissection. He follows with Oncology for this and remains in remission.     He has a history of HCV that was cleared in 1999 with interferon and ribavirin therapy. He has known hemophilic arthropathy and is on oxycodone for pain management. .    Joint History:    July 1992 -- left knee replacement    December 1992 -- right hip replacement    February 1995 -- left hip fracture s/p insertion of three pins    March 2003 -- radioactive synovectomy, right hip    April 2005 -- surgical synovectomy, right hip    April 2012 -- redo right hip surgical synovectomy, and exchange of polyethylene portion of previous hip arthroplasty    Review of Systems    The 10 point Review of Systems is negative other than noted in the HPI.    Past Medical History    Past Medical History:   Diagnosis Date     Anxiety      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      Bladder tumor      Depression      DM II (diabetes mellitus, type II), controlled (H)     diet controlled     Hemophilia (H)     Type A     History of hepatitis C     treated successfully     HTN (hypertension)        Past Surgical History   Past Surgical History:   Procedure Laterality Date      ARTHROPLASTY REVISION HIP  2012    Procedure:ARTHROPLASTY REVISION HIP; Surgeon:ALEJANDRA RAYMOND; Location:[facility] OR     CYSTOSCOPY, TRANSURETHRAL RESECTION (TUR) TUMOR BLADDER, COMBINED N/A 2018    Procedure: COMBINED CYSTOSCOPY, TRANSURETHRAL RESECTION (TUR) TUMOR BLADDER;  Cystoscopy, Transurethral Resection Of Bladder Tumor ;  Surgeon: Cassidy Liao MD;  Location: [facility] OR     INJECT STEROID (LOCATION)      right hip x 2, 2 weeks before.      LAPAROSCOPIC CYSTOPROSTATECTOMY N/A 2018    Procedure: CYSTOPROSTATECTOMY;  Radical Cystoprostatectomy, Ileal Conduit, Bilateral Pelvic Lymphadenectomy;  Surgeon: Bebeto Shea MD;  Location: [facility] OR     left total knee arthroplasty  1992     LYMPHADENECTOMY ABDOMINAL N/A 2018    Procedure: LYMPHADENECTOMY ABDOMINAL;;  Surgeon: Bebeto Shea MD;  Location: [facility] OR     pins in left hip      hip fracture     radioactive synovectomy  2003     right total hip arthroplasty  1992     SYNOVECTOMY HIP  2012    Procedure:SYNOVECTOMY HIP; Right Hip Open Synovectomy, Right Total Hip Revision with Exchange of Poly Liner and Head; Surgeon:ALEJANDRA RAYMOND; Location:[facility] OR       Social History   Social History     Tobacco Use     Smoking status: Former     Packs/day: 1.00     Years: 20.00     Pack years: 20.00     Types: Cigarettes     Quit date: 2013     Years since quittin.8     Smokeless tobacco: Never     Tobacco comments:     e cig no longer using   Vaping Use     Vaping status: Never Used   Substance Use Topics     Alcohol use: Never     Drug use: Never       Family History   Family History   Adopted: Yes   Problem Relation Age of Onset     Unknown/Adopted Other        Prior to Admission Medications   Prior to Admission Medications   Prescriptions Last Dose Informant Patient Reported? Taking?   Antihem Factor Recomb, rFVIII, (KOGENATE FS) 1000 units KIT 5/15/2023  No No   Sig: Directions: Infuse 3261 units every 2  days and prn for breakthrough bleeding   Patient taking differently: Inject 3,000 Units into the vein every 48 hours Directions: Infuse 3261 units every 2 days and prn for breakthrough bleeding  Last injection 05/09   Antihem Factor Recomb, rFVIII, (KOGENATE FS) 3000 units KIT   No No   Sig: Infuse Kogenate 3345 unit(s) IV daily on 5/19, 5/20, 5/21 then resume prior regimen.   acetaminophen (TYLENOL) 500 MG tablet 5/16/2023  Yes Yes   Sig: Take 500-1,000 mg by mouth as needed for mild pain   celecoxib (CELEBREX) 100 MG capsule 5/16/2023  No Yes   Sig: Take 1 capsule (100 mg) by mouth 2 times daily as needed for moderate pain   codeine 30 MG tablet 5/16/2023  No Yes   Sig: Take 1 tablet (30 mg) by mouth every 8 hours   Patient taking differently: Take 30 mg by mouth daily   lisinopril (ZESTRIL) 30 MG tablet 5/16/2023  No Yes   Sig: Take 1 tablet (30 mg) by mouth daily   mirtazapine (REMERON) 45 MG tablet 5/16/2023  No Yes   Sig: Take 1 tablet (45 mg) by mouth At Bedtime   naloxone (EVZIO) 0.4 MG/0.4ML auto-injector   No No   Sig: Inject 0.4 mLs (0.4 mg) into the muscle as needed for opioid reversal every 2-3 minutes until assistance arrives   sulfamethoxazole-trimethoprim (BACTRIM DS) 800-160 MG tablet 5/17/2023  No Yes   Sig: Take 1 tablet by mouth 2 times daily for 10 days   urea (GORMEL) 20 % external cream 5/15/2023  No No   Sig: Apply topically 2 times daily For 3-4 weeks on right ankle.   Patient taking differently: Apply topically daily For 3-4 weeks on right ankle.      Facility-Administered Medications: None       Allergies      Allergies   Allergen Reactions     Aspirin      This drug inhibits platelets and is contraindicated due to hemophilia diagnosis.          Physical Exam   Vital Signs: Temp: 98.4  F (36.9  C) Temp src: Oral BP: 115/72 Pulse: 84   Resp: 20 SpO2: 96 % O2 Device: None (Room air)    Weight: 155 lbs 0 oz    GENERAL: Alert, interactive, NAD laying in bed supine  HEENT: AT/NC, sclera  anicteric, , EOMI, OP   RESP: anterior fields clear to auscultation bilaterally  CARDIAC: regular rate and rhythm,no murmur appreciated  ABDOMEN: Soft, nontender, nondistended. Procedure site left flank, dressing clean dry intact, no active bleeding or oozing  EXTREMITIES: No LE edema, reduced flexion bl ankles left worse than right  SKIN: Warm and dry, no jaundice or rash  NEURO: speech fluent  PSYCH: mood appropriate, calm cooperative      Data   I have personally reviewed the following labs/imaging:  CBC  Recent Labs   Lab 05/18/23 0527 05/17/23 1801   WBC 6.2 4.5   RBC 4.27* 4.59   HGB 11.7* 12.8*   HCT 36.9* 39.7*   MCV 86 87   MCH 27.4 27.9   MCHC 31.7 32.2   RDW 15.1* 15.0    212     CMP  Recent Labs   Lab 05/18/23 0527 05/17/23 1807 05/17/23  1801 05/17/23  1341     --  140  --    POTASSIUM 4.8  --  5.0  --    CHLORIDE 106  --  107  --    CO2 17*  --  18*  --    ANIONGAP 14  --  15  --    * 97 103* 104*   BUN 34.9*  --  38.5*  --    CR 1.29*  --  1.50*  --    GFRESTIMATED 59*  --  49*  --    MARLA 8.9  --  9.1  --

## 2023-05-18 NOTE — PHARMACY-ADMISSION MEDICATION HISTORY
Pharmacist Admission Medication History    Admission medication history is complete. The information provided in this note is only as accurate as the sources available at the time of the update.    Medication reconciliation/reorder completed by provider prior to medication history? Yes    Information Source(s): Patient, Clinic records and CareEverywhere/SureScripts via phone due to contact precautions    Pertinent Information: Patient knows his medication regimen very well. States the plan is for him to take ~3000 units of Kogenate the next 3 days then resume his prior dosing of ~3000 units every other day.    Changes made to PTA medication list:    Added: None    Deleted: Provider discontinued Bactrim and old Kogenate regimen    Changed:   o Codeine from Q8H to Q8H PRN per patient  o Urea cream from BID to daily per patient      Allergies reviewed with patient and updates made in EHR: yes    Medication History Completed By: Lincoln Ratliff [facility] 5/18/2023 9:56 AM    Medication Sig Last Dose Taking? Auth Provider   acetaminophen (TYLENOL) 325 MG tablet Take 3 tablets (975 mg) by mouth every 8 hours as needed for mild pain  Yes Kyle Segal MD   Antihem Factor Recomb, rFVIII, (KOGENATE FS) 3000 units KIT Infuse Kogenate 3345 unit(s) IV daily on 5/19, 5/20, 5/21 then resume prior regimen.  Yes Monica Thapa PA-C   celecoxib (CELEBREX) 100 MG capsule Take 1 capsule (100 mg) by mouth 2 times daily as needed for moderate pain 5/16/2023 Yes Monica Thapa PA-C   codeine 30 MG tablet Take 30 mg by mouth every 8 hours as needed for pain 5/16/2023 Yes Unknown, Entered By History   lisinopril (ZESTRIL) 30 MG tablet Take 1 tablet (30 mg) by mouth daily 5/16/2023 Yes Twin Le MD   mirtazapine (REMERON) 45 MG tablet Take 1 tablet (45 mg) by mouth At Bedtime 5/16/2023 Yes Twin Le MD   urea (GORMEL) 20 % external cream Apply topically daily Apply topically to right  ankle daily for 3-4 weeks 5/15/2023 Yes Unknown, Entered By History   naloxone (EVZIO) 0.4 MG/0.4ML auto-injector Inject 0.4 mLs (0.4 mg) into the muscle as needed for opioid reversal every 2-3 minutes until assistance arrives   Yanira Branch, PA

## 2023-05-18 NOTE — PLAN OF CARE
Goal Outcome Evaluation:    Pt. Is alert and oriented x4. Discharge instructions given and was understood. Home meds given to the px from discharge pharmacy. Out of the unit around 16:25, transported via wheelchair. Going home.

## 2023-05-18 NOTE — PROGRESS NOTES
Patient will be discharging soon and IV gentamycin will soon be done.  Dressing change was done and some supplies given to patient.

## 2023-05-19 LAB
ATRIAL RATE - MUSE: 93 BPM
DIASTOLIC BLOOD PRESSURE - MUSE: NORMAL MMHG
INTERPRETATION ECG - MUSE: NORMAL
P AXIS - MUSE: 52 DEGREES
PR INTERVAL - MUSE: 166 MS
QRS DURATION - MUSE: 82 MS
QT - MUSE: 340 MS
QTC - MUSE: 422 MS
R AXIS - MUSE: -15 DEGREES
SYSTOLIC BLOOD PRESSURE - MUSE: NORMAL MMHG
T AXIS - MUSE: 25 DEGREES
VENTRICULAR RATE- MUSE: 93 BPM

## 2023-05-19 NOTE — DISCHARGE SUMMARY
Discharge Summary     Orlin Alcantar MRN# [id number]   YOB: 1950 Age: 73 year old     Date of Admission:  5/17/2023  Date of Discharge::  5/18/2023  6:40 PM  Admitting Physician:  Sacha Thomas MD  Discharge Physician:  Kyle Segal MD  Primary Care Physician:         Twin Le          Admission Diagnoses:   Kidney stone [N20.0]  Nephrolithiasis [N20.0]            Discharge Diagnosis:   Same as above           Procedures:    Procedure(s):  NEPHROLITHOTOMY, PERCUTANEOUS, STANDBY HOLMIUM LASER        Non-operative procedures:   none          Consultations:   HEMATOLOGY ADULT IP CONSULT  WOUND OSTOMY CONTINENCE NURSE  IP CONSULT  PHYSICAL THERAPY ADULT IP CONSULT           Imaging Studies:     Results for orders placed or performed during the hospital encounter of 05/17/23   XR Surgery JANNA Fluoro Less Than 5 Min    Narrative    This exam was marked as non-reportable because it will not be read by a   radiologist or a [facility] non-radiologist provider.         XR Chest Port 1 View    Narrative    EXAM: XR CHEST PORT 1 VIEW  5/17/2023 5:55 PM     HISTORY:  evaluate for pneumo       COMPARISON:  CT 3/27/2023    TECHNIQUE: Single portable AP radiograph of the chest    FINDINGS:   The trachea is midline. The cardiomediastinal silhouette is within  normal limits. Atherosclerotic chest indications aortic arch. The  pulmonary vasculature is distinct. No appreciable pneumothorax or  pleural effusion. No focal airspace consolidation. No acute osseous  abnormality.      Impression    IMPRESSION: No pneumothorax. No acute airspace disease.    I have personally reviewed the examination and initial interpretation  and I agree with the findings.    MARCELLO ALVAREZ MD         SYSTEM ID:  [id number]   CT Abdomen Pelvis w/o Contrast    Narrative    EXAMINATION: CT ABDOMEN PELVIS W/O CONTRAST, 5/18/2023 6:14 AM    TECHNIQUE:  Helical CT images from the lung bases through  the  symphysis pubis were obtained without contrast.  Coronal reformatted  images were generated at a workstation for further assessment.    COMPARISON: CT CAP 4/17/2023    HISTORY: evaluate for residual stone    FINDINGS:    LOWER THORAX:  Bibasilar atelectasis/scarring. Bibasilar left greater than right  centrilobular emphysematous changes. Stable scattered sub-6 mm  pulmonary nodules. For example a 4 mm subpleural pulmonary nodule in  the right lower lobe (series 5, image 10).     ABDOMEN AND PELVIS:     Liver: No suspicious liver lesions. No intra or extrahepatic biliary  dilatation. .     Gallbladder: Cholelithiasis. No evidence of acute cholecystitis.    Spleen: Few calcified granulomas.    Pancreas: Normal noncontrast appearance. The pancreatic duct is not  dilated.    Adrenal glands: No adrenal nodules.    Kidneys: Left percutaneous nephrostomy tube and retrograde nephrostomy  tube placed. No residual stones in the left collecting system.  Punctate nonobstructing calyceal stone in the right lower pole. No  hydronephrosis or obstructing renal stones. No definite renal mass  identified allowing for noncontrast technique    Bladder / Pelvic organs: Post surgical changes of cystoprostatectomy  with right lower quadrant ileal conduit..    Bowel: No small or large bowel dilatation. The appendix is  unremarkable.    Lymph nodes: No retroperitoneal, mesenteric, or pelvic  lymphadenopathy.    Mesentery/peritoneum/retroperitoneum: No mass. No free fluid or air.    Vessels: No infrarenal aortic aneurysm. Atherosclerotic calcifications  of the abdominal aorta and its major branches.    Bones and soft tissues: No suspicious osseous lesions. Right hip  arthroplasty. Left hip fixation screw.       Impression    IMPRESSION:   1.  No residual stones in the left renal collecting system status post  percutaneous nephrolithotomy. Mildly decreased stone burden in the  right lower pole with residual punctate nonobstructing  stone.  2.  Postoperative changes of cystoprostatectomy with right lower  quadrant ileal conduit. Interval placement of left percutaneous  nephrostomy tube and left retrograde nephrostomy tube.    I have personally reviewed the examination and initial interpretation  and I agree with the findings.    MIKEY METZ MD         SYSTEM ID:  [id number]     *Note: Due to a large number of results and/or encounters for the requested time period, some results have not been displayed. A complete set of results can be found in Results Review.            Medications Prior to Admission:     No medications prior to admission.            Discharge Medications:     Discharge Medication List as of 5/18/2023  5:17 PM        START taking these medications    Details   tamsulosin (FLOMAX) 0.4 MG capsule Take 1 capsule (0.4 mg) by mouth daily, Disp-10 capsule, R-0, E-Prescribe           CONTINUE these medications which have CHANGED    Details   acetaminophen (TYLENOL) 325 MG tablet Take 3 tablets (975 mg) by mouth every 8 hours as needed for mild pain, Disp-50 tablet, R--, Local Print      Antihem Factor Recomb, rFVIII, (KOGENATE FS) 3000 units KIT Infuse Kogenate 3345 unit(s) IV daily on 5/19, 5/20, 5/21, 5/22 and 5/23 then resume prior regimen., Disp-08790 each, R-0, HORACE, E-Prescribe      sulfamethoxazole-trimethoprim (BACTRIM DS) 800-160 MG tablet Take 1 tablet by mouth 2 times daily for 7 days, Disp-14 tablet, R-0, E-Prescribe           CONTINUE these medications which have NOT CHANGED    Details   celecoxib (CELEBREX) 100 MG capsule Take 1 capsule (100 mg) by mouth 2 times daily as needed for moderate pain, Disp-60 capsule, R-3, E-Prescribe      codeine 30 MG tablet Take 30 mg by mouth every 8 hours as needed for pain, Historical      lisinopril (ZESTRIL) 30 MG tablet Take 1 tablet (30 mg) by mouth daily, Disp-90 tablet, R-3, E-Prescribe      mirtazapine (REMERON) 45 MG tablet Take 1 tablet (45 mg) by mouth At Bedtime, Disp-90  tablet, R-1, E-PrescribeProfile Rx: patient will contact pharmacy when needed      naloxone (EVZIO) 0.4 MG/0.4ML auto-injector Inject 0.4 mLs (0.4 mg) into the muscle as needed for opioid reversal every 2-3 minutes until assistance arrives, Disp-0.8 mL, R-0, E-Prescribe      urea (GORMEL) 20 % external cream Apply topically daily Apply topically to right ankle daily for 3-4 weeksHistorical                    Brief History of Illness:   Reason for admission requiring a surgical or invasive procedure:   Kidney stone [N20.0]   The patient underwent the following procedure(s):   See above   There were no immediate complications during this procedure.    Please refer to the full operative summary for details.           Hospital Course:   The patient's hospital course was unremarkable.  Orlin Alcantar recovered as anticipated and experienced no post-operative complications.  Hematology was consulted due to history of hemophilia and provided recommendations in regards to his factor replacement. Due to some left ankle bleeding, hemophilia PT evaluated patient and recommended nonweightbearing on his L ankle until their follow up.     On POD#1 patient was ambulating without assitance, tolerating a regular diet, had pain controlled with PO medications to go home with, and requiring no IV medications or fluids. Patient was discharged home with appropriate contact information, follow-up and instructions as seen below in the discharge paperwork.         Final Pathology Result:   Pending at time of discharge         Discharge Instructions and Follow-Up:     Discharge Procedure Orders   Other Activity restrictions (specify):   Order Comments: Other Activity restrictions (specify): please avoid weightbearing on L ankle until cleared by Hematology     Order Specific Question Answer Comments   Is discharge order? Yes      Regular Diet Adult     Order Specific Question Answer Comments   Is discharge order? Yes             Discharge  Disposition:     Discharged to Home      Condition at discharge: Good    --    Kyle Segal MD  Urology Resident    5:54 AM, 5/19/2023

## 2023-05-21 LAB
APPEARANCE STONE: NORMAL
COMPN STONE: NORMAL
SPECIMEN WT: 67 MG

## 2023-05-22 ENCOUNTER — PATIENT OUTREACH (OUTPATIENT)
Dept: UROLOGY | Facility: CLINIC | Age: 73
End: 2023-05-22
Payer: MEDICARE

## 2023-05-22 DIAGNOSIS — D66 SEVERE HEMOPHILIA A (H): Primary | ICD-10-CM

## 2023-05-22 DIAGNOSIS — R31.9 HEMATURIA, UNSPECIFIED TYPE: ICD-10-CM

## 2023-05-22 LAB
BACTERIA SPEC CULT: ABNORMAL
BACTERIA SPEC CULT: ABNORMAL

## 2023-05-22 RX ORDER — ANTIHEMOPHILIC FACTOR (RECOMBINANT) 3000 (+/-)
KIT INTRAVENOUS
Qty: 23415 EACH | Refills: 0 | Status: SHIPPED | OUTPATIENT
Start: 2023-05-22 | End: 2023-05-22

## 2023-05-22 RX ORDER — ANTIHEMOPHILIC FACTOR (RECOMBINANT) 3000 (+/-)
KIT INTRAVENOUS
Qty: 13044 EACH | Refills: 3 | Status: SHIPPED | OUTPATIENT
Start: 2023-05-22 | End: 2023-07-03

## 2023-05-22 RX ORDER — ANTIHEMOPHILIC FACTOR (RECOMBINANT) 3000 (+/-)
KIT INTRAVENOUS
Qty: 36795 EACH | Refills: 0 | Status: SHIPPED | OUTPATIENT
Start: 2023-05-22 | End: 2023-12-20

## 2023-05-22 RX ORDER — CODEINE SULFATE 30 MG/1
30 TABLET ORAL EVERY 8 HOURS PRN
Qty: 84 TABLET | Refills: 0 | Status: SHIPPED | OUTPATIENT
Start: 2023-05-22 | End: 2023-06-19

## 2023-05-22 NOTE — PROGRESS NOTES
[facility]  [facility]  [facility], [address], [place] [address]  Main: [phone], Fax: [phone]         Contacted Orlin today to follow up post surgery and report of acute left ankle bleed in the hospital. He has been doing well however he has been experiencing ongoing hematuria and oozing at stent site (as expected postoperatively). He has a stent removal scheduled Friday 5/26.     I recommended daily factor replacement with Kogenate ~3000 unit(s) +/- 10% until after stent removal and resolution of hematuria and then he can resume his typical prophylaxis q 48 hours and PRN schedule.     Will follow up with him on 5/30.     NETTIE CLINE PA-C on 5/22/2023 at 11:56 AM

## 2023-05-22 NOTE — TELEPHONE ENCOUNTER
Pt calls into clinic with an update, states Sunday he was in recliner and accidentally tugged on something in his back. Upon inspection, pt states he pulled his stent out, of which he was not aware was in place. Nephrostomy drainage tube still in place and working well. Asking for advisement.     Per operative note,   DRAINS/TUBES:   Left 10 fr PCN to drainage  Left 5 fr open ended catheter going down from back into the ileal conduit, capped    Provider phoned, okay to follow up as planned, as long as no pain and neph tube in place and draining well. Pt agreeable to plan. No complaints at this time. Will call back to clinic if things change    .[email or web address]     No

## 2023-05-22 NOTE — TELEPHONE ENCOUNTER
*RX needed by: Today, May 22nd by 3:30 pm    *Last comp visit: 10/18/2022    *Refills left: 0    *Notes: Patient last received 28 day supply on 4/24/2023    Thanks,    MICHAEL Cameron, [facility]  Lead Pharmacy Coordinator  [facility]  [phone]

## 2023-05-22 NOTE — TELEPHONE ENCOUNTER
*Medicare refill - please check units and qty*    *Need Rx by: Today, May 22nd by 3:30 pm    *Last comp visit: 10/18/2023    *Drug: Kogenate at 4 doses of 3216 units    *Refills left: 0    *Notes: We have total of 3 fills in stock of this dose    Thanks,    MICHAEL Cameron, [facility]  Lead Pharmacy Coordinator  [facility]  [phone]

## 2023-05-25 ENCOUNTER — PRE VISIT (OUTPATIENT)
Dept: UROLOGY | Facility: CLINIC | Age: 73
End: 2023-05-25
Payer: MEDICARE

## 2023-05-25 DIAGNOSIS — N20.0 KIDNEY STONE: Primary | ICD-10-CM

## 2023-05-25 RX ORDER — SULFAMETHOXAZOLE/TRIMETHOPRIM 800-160 MG
1 TABLET ORAL ONCE
Status: ACTIVE | OUTPATIENT
Start: 2023-05-26

## 2023-05-25 NOTE — CONFIDENTIAL NOTE
Reason for nurse visit: 5 Serbian PNT removal    Diagnosis: kidney stone    Ordering provider: Dr. Thomas    Last seen by provider: 4/25/2023       Allergies   Allergen Reactions     Aspirin      This drug inhibits platelets and is contraindicated due to hemophilia diagnosis.           Amalia Izaguirre

## 2023-05-26 ENCOUNTER — HOSPITAL ENCOUNTER (EMERGENCY)
Facility: CLINIC | Age: 73
Discharge: HOME | End: 2023-05-26
Attending: STUDENT IN AN ORGANIZED HEALTH CARE EDUCATION/TRAINING PROGRAM | Admitting: STUDENT IN AN ORGANIZED HEALTH CARE EDUCATION/TRAINING PROGRAM
Payer: MEDICARE

## 2023-05-26 ENCOUNTER — DOCUMENTATION ONLY (OUTPATIENT)
Dept: ONCOLOGY | Facility: CLINIC | Age: 73
End: 2023-05-26

## 2023-05-26 ENCOUNTER — OFFICE VISIT (OUTPATIENT)
Dept: UROLOGY | Facility: CLINIC | Age: 73
End: 2023-05-26
Payer: MEDICARE

## 2023-05-26 VITALS
WEIGHT: 153.88 LBS | DIASTOLIC BLOOD PRESSURE: 68 MMHG | BODY MASS INDEX: 20.87 KG/M2 | SYSTOLIC BLOOD PRESSURE: 125 MMHG | TEMPERATURE: 97.6 F | OXYGEN SATURATION: 99 % | HEART RATE: 118 BPM

## 2023-05-26 VITALS — DIASTOLIC BLOOD PRESSURE: 63 MMHG | HEART RATE: 137 BPM | SYSTOLIC BLOOD PRESSURE: 97 MMHG

## 2023-05-26 DIAGNOSIS — R79.89 ELEVATED SERUM CREATININE: ICD-10-CM

## 2023-05-26 DIAGNOSIS — R00.0 TACHYCARDIA: ICD-10-CM

## 2023-05-26 DIAGNOSIS — N20.0 KIDNEY STONE: Primary | ICD-10-CM

## 2023-05-26 LAB
ANION GAP SERPL CALCULATED.3IONS-SCNC: 14 MMOL/L (ref 7–15)
BASOPHILS # BLD AUTO: 0.1 10E3/UL (ref 0–0.2)
BASOPHILS NFR BLD AUTO: 1 %
BUN SERPL-MCNC: 37.8 MG/DL (ref 8–23)
CALCIUM SERPL-MCNC: 9.6 MG/DL (ref 8.8–10.2)
CHLORIDE SERPL-SCNC: 104 MMOL/L (ref 98–107)
CREAT SERPL-MCNC: 1.61 MG/DL (ref 0.67–1.17)
DEPRECATED HCO3 PLAS-SCNC: 19 MMOL/L (ref 22–29)
EOSINOPHIL # BLD AUTO: 0.1 10E3/UL (ref 0–0.7)
EOSINOPHIL NFR BLD AUTO: 2 %
ERYTHROCYTE [DISTWIDTH] IN BLOOD BY AUTOMATED COUNT: 14.6 % (ref 10–15)
GFR SERPL CREATININE-BSD FRML MDRD: 45 ML/MIN/1.73M2
GLUCOSE SERPL-MCNC: 127 MG/DL (ref 70–99)
HCT VFR BLD AUTO: 39.2 % (ref 40–53)
HGB BLD-MCNC: 12.2 G/DL (ref 13.3–17.7)
HOLD SPECIMEN: NORMAL
IMM GRANULOCYTES # BLD: 0.1 10E3/UL
IMM GRANULOCYTES NFR BLD: 2 %
LYMPHOCYTES # BLD AUTO: 0.6 10E3/UL (ref 0.8–5.3)
LYMPHOCYTES NFR BLD AUTO: 11 %
MCH RBC QN AUTO: 27.2 PG (ref 26.5–33)
MCHC RBC AUTO-ENTMCNC: 31.1 G/DL (ref 31.5–36.5)
MCV RBC AUTO: 87 FL (ref 78–100)
MONOCYTES # BLD AUTO: 0.7 10E3/UL (ref 0–1.3)
MONOCYTES NFR BLD AUTO: 14 %
NEUTROPHILS # BLD AUTO: 3.8 10E3/UL (ref 1.6–8.3)
NEUTROPHILS NFR BLD AUTO: 70 %
NRBC # BLD AUTO: 0 10E3/UL
NRBC BLD AUTO-RTO: 0 /100
PLATELET # BLD AUTO: 282 10E3/UL (ref 150–450)
POTASSIUM SERPL-SCNC: 4.5 MMOL/L (ref 3.4–5.3)
RBC # BLD AUTO: 4.49 10E6/UL (ref 4.4–5.9)
SODIUM SERPL-SCNC: 137 MMOL/L (ref 136–145)
WBC # BLD AUTO: 5.4 10E3/UL (ref 4–11)

## 2023-05-26 PROCEDURE — 258N000003 HC RX IP 258 OP 636: Performed by: NURSE PRACTITIONER

## 2023-05-26 PROCEDURE — 99283 EMERGENCY DEPT VISIT LOW MDM: CPT | Performed by: STUDENT IN AN ORGANIZED HEALTH CARE EDUCATION/TRAINING PROGRAM

## 2023-05-26 PROCEDURE — 80048 BASIC METABOLIC PNL TOTAL CA: CPT | Performed by: EMERGENCY MEDICINE

## 2023-05-26 PROCEDURE — 36415 COLL VENOUS BLD VENIPUNCTURE: CPT | Performed by: EMERGENCY MEDICINE

## 2023-05-26 PROCEDURE — 85025 COMPLETE CBC W/AUTO DIFF WBC: CPT | Performed by: STUDENT IN AN ORGANIZED HEALTH CARE EDUCATION/TRAINING PROGRAM

## 2023-05-26 PROCEDURE — 85025 COMPLETE CBC W/AUTO DIFF WBC: CPT | Performed by: EMERGENCY MEDICINE

## 2023-05-26 PROCEDURE — 80048 BASIC METABOLIC PNL TOTAL CA: CPT | Performed by: STUDENT IN AN ORGANIZED HEALTH CARE EDUCATION/TRAINING PROGRAM

## 2023-05-26 PROCEDURE — 99024 POSTOP FOLLOW-UP VISIT: CPT

## 2023-05-26 PROCEDURE — 99284 EMERGENCY DEPT VISIT MOD MDM: CPT | Mod: FS | Performed by: STUDENT IN AN ORGANIZED HEALTH CARE EDUCATION/TRAINING PROGRAM

## 2023-05-26 RX ADMIN — SODIUM CHLORIDE 1000 ML: 9 INJECTION, SOLUTION INTRAVENOUS at 15:11

## 2023-05-26 ASSESSMENT — ACTIVITIES OF DAILY LIVING (ADL): ADLS_ACUITY_SCORE: 33

## 2023-05-26 NOTE — NURSING NOTE
Rapid Response Epic Documentation     Situation:   Pt is found lying supine on a clinic bed. Pt is conscious and alert, speaking in full sentences. Pt is answering all questions appropriately. Pt appears pale. Pt denies CP/SOB. Pt came into clinic to have a PNT removed and started feeling dizzy. Staff would like him transferred to ED for further eval.         Assessment:            BP:  90/59  Pulse: 125  Respiration: 16  SPO2: 97 %  Mental Status: Alert  CMS: Intact  Stroke Scale: Not Applicable  EKG: Not Performed      Treatment:    IV: Size 20 gauge,  Location RAC, w/ 500cc NS bag hung, 100cc given.       Location:          Disposition:      Transfer to Emergency Room Via: 911    Protocol Used:     Hypotension

## 2023-05-26 NOTE — DISCHARGE INSTRUCTIONS
TODAY'S VISIT:  You were sent to the emergency department for hypotension and appearing pale with concern for possible bleed.  You are not found to be hypotensive while you are here, however you were found to be slightly tachycardic and your labs revealed an elevation in your creatinine that we would have expected to have improved with the neph tube placement.  -Your hemoglobin was stable.  -Continue to stay well hydrated, drink plenty of water.  -We gave you IV fluids in the emergency department today to help with your elevated heart rate as well as your elevated creatinine, to help your kidney function.  - If you had any labs or imaging/radiology tests performed today, you should also discuss these tests with your usual provider.     FOLLOW-UP:  Please make an appointment to follow up with:  - Urology Clinic (phone: [phone]) in 1-2 days for nephrostomy tube removal.  - Have your provider review the results from today's visit with you again to make sure no further follow-up or additional testing is needed based on those results.     PRESCRIPTIONS / MEDICATIONS:  -Continue to take your home medications as prescribed    OTHER INSTRUCTIONS:  -If you develop bleeding from your left ribs or anywhere else in your body please return to the emergency department.    RETURN TO THE EMERGENCY DEPARTMENT  Return to the Emergency Department at any time for any new or worsening symptoms or any concerns.

## 2023-05-26 NOTE — ED TRIAGE NOTES
Patient came into [facility] clinic today, had surgery for kidney stones, hemophilia made complicated, had urostomy tube, went in to get it removed, BP was 90/52, the gave him fluids and refused to take out the nephrostomy tube.  Patient says he feels fine. A and O x4      
Pt says he was at [facility] today and they did not like that he looked pale so they called 911. He feels like he is not pale. He has no complaints today.      Triage Assessment     Row Name 05/26/23 1307       Triage Assessment (Adult)    Airway WDL WDL       Skin Circulation/Temperature WDL    Skin Circulation/Temperature WDL X;all    Skin Circulation pallor              
sob

## 2023-05-26 NOTE — ED PROVIDER NOTES
ED Provider Note  [facility]      History     Chief Complaint   Patient presents with     Hypotension     HPI  Orlin Alcantar is a 73 year old male with past medical history significant for hypertension, hyperlipidemia, hemophilia, chronic pain, DM2, HCV, bladder cancer s/p resection with ileal conduit and chemotherapy in 2018, insomnia, anxiety, depression who presents from urology clinic for concern for hypotension.    Patient was recently admitted from 5/17/2023 to 5/18/2023 during which time he had a percutaneous neph tube placed due to a large left ureteral stone. He was in urology clinic for removal of this neph tube today.    Per report, staff at the clinic felt patient was very pale in the setting of also being hypotensive 90s/60s EMS was called.  He did not see urology and did not have his nephrostomy tube removed.  Upon arrival to the emergency department patient no longer hypotensive, mildly tachycardic, and reported feeling fine.  Patient reports he never felt lightheaded or dizzy.  Denies any symptoms while in clinic today.    Patient reports that he had approximately 5 days of hematuria in his neph tube following placement, after which time urine returned to normal color.  He reports he has been having very good urine output, he has not noticed a decrease in urine output or change in color or odor of urine.  He denies any fever or chills at home, reports good p.o. intake.  Denies any other symptoms other than wanting to have the neph tube removed as was scheduled to be done earlier today in clinic.    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      Bladder tumor      Depression      DM II (diabetes mellitus, type II), controlled (H)     diet controlled     Hemophilia (H)     Type A     History of hepatitis C     treated successfully     HTN (hypertension)      Past Surgical History:   Procedure Laterality Date      ARTHROPLASTY REVISION HIP  4/26/2012    Procedure:ARTHROPLASTY REVISION HIP; Surgeon:ALEJANDRA RAYMOND; [facility]     CYSTOSCOPY, TRANSURETHRAL RESECTION (TUR) TUMOR BLADDER, COMBINED N/A 2/19/2018    Procedure: COMBINED CYSTOSCOPY, TRANSURETHRAL RESECTION (TUR) TUMOR BLADDER;  Cystoscopy, Transurethral Resection Of Bladder Tumor ;  Surgeon: Cassidy Liao MD;  [facility]: [facility]     INJECT STEROID (LOCATION)      right hip x 2, 2 weeks before.      LAPAROSCOPIC CYSTOPROSTATECTOMY N/A 7/30/2018    Procedure: CYSTOPROSTATECTOMY;  Radical Cystoprostatectomy, Ileal Conduit, Bilateral Pelvic Lymphadenectomy;  Surgeon: Bebeto Shea MD;  Location: [facility]     LASER HOLMIUM NEPHROLITHOTOMY VIA PERCUTANEOUS NEPHROSTOMY Left 5/17/2023    Procedure: NEPHROLITHOTOMY, PERCUTANEOUS, STANDBY HOLMIUM LASER;  Surgeon: Sacha Thomas MD;  Location: [facility]     left total knee arthroplasty  07/1992     LYMPHADENECTOMY ABDOMINAL N/A 7/30/2018    Procedure: LYMPHADENECTOMY ABDOMINAL;;  Surgeon: Bebeto Shea MD;  Location: [facility] OR     pins in left hip      hip fracture     radioactive synovectomy  03/2003     right total hip arthroplasty  12/1992     SYNOVECTOMY HIP  4/26/2012    Procedure:SYNOVECTOMY HIP; Right Hip Open Synovectomy, Right Total Hip Revision with Exchange of Poly Liner and Head; Surgeon:ALEJANDRA RAYMOND; Location:[facility] OR     acetaminophen (TYLENOL) 325 MG tablet  Antihem Factor Recomb, rFVIII, (KOGENATE FS) 3000 units KIT  Antihem Factor Recomb, rFVIII, (KOGENATE FS) 3000 units KIT  celecoxib (CELEBREX) 100 MG capsule  codeine 30 MG tablet  lisinopril (ZESTRIL) 30 MG tablet  mirtazapine (REMERON) 45 MG tablet  naloxone (EVZIO) 0.4 MG/0.4ML auto-injector  tamsulosin (FLOMAX) 0.4 MG capsule  urea (GORMEL) 20 % external cream      Allergies   Allergen Reactions     Aspirin      This drug inhibits platelets and is contraindicated due to hemophilia diagnosis.        Family History  Family  History   Adopted: Yes   Problem Relation Age of Onset     Unknown/Adopted Other      Social History   Social History     Tobacco Use     Smoking status: Former     Packs/day: 1.00     Years: 20.00     Pack years: 20.00     Types: Cigarettes     Quit date: 2013     Years since quittin.9     Smokeless tobacco: Never     Tobacco comments:     e cig no longer using   Vaping Use     Vaping status: Never Used   Substance Use Topics     Alcohol use: Never     Drug use: Never         A medically appropriate review of systems was performed with pertinent positives and negatives noted in the HPI, and all other systems negative.    Physical Exam   BP: 110/74  Pulse: 118  Temp: 97.6  F (36.4  C)  Weight: 69.8 kg (153 lb 14.1 oz)  SpO2: 99 %  Physical Exam  Vitals and nursing note reviewed.   Constitutional:       Appearance: Normal appearance.   HENT:      Head: Normocephalic and atraumatic.   Cardiovascular:      Rate and Rhythm: Regular rhythm. Tachycardia present.      Pulses: Normal pulses.      Heart sounds: Normal heart sounds.   Pulmonary:      Effort: Pulmonary effort is normal.      Breath sounds: Normal breath sounds.   Abdominal:      General: Abdomen is flat.      Palpations: Abdomen is soft.      Tenderness: There is no right CVA tenderness or left CVA tenderness.      Comments: Left-sided neph tube insertion site without erythema   Musculoskeletal:         General: Normal range of motion.   Skin:     General: Skin is warm and dry.      Capillary Refill: Capillary refill takes less than 2 seconds.   Neurological:      General: No focal deficit present.      Mental Status: He is alert.   Psychiatric:         Mood and Affect: Mood normal.         Behavior: Behavior normal.         ED Course, Procedures, & Data      Procedures        Results for orders placed or performed during the hospital encounter of 23   Basic metabolic panel     Status: Abnormal   Result Value Ref Range    Sodium 137 136 - 145  mmol/L    Potassium 4.5 3.4 - 5.3 mmol/L    Chloride 104 98 - 107 mmol/L    Carbon Dioxide (CO2) 19 (L) 22 - 29 mmol/L    Anion Gap 14 7 - 15 mmol/L    Urea Nitrogen 37.8 (H) 8.0 - 23.0 mg/dL    Creatinine 1.61 (H) 0.67 - 1.17 mg/dL    Calcium 9.6 8.8 - 10.2 mg/dL    Glucose 127 (H) 70 - 99 mg/dL    GFR Estimate 45 (L) >60 mL/min/1.73m2   CBC with platelets and differential     Status: Abnormal   Result Value Ref Range    WBC Count 5.4 4.0 - 11.0 10e3/uL    RBC Count 4.49 4.40 - 5.90 10e6/uL    Hemoglobin 12.2 (L) 13.3 - 17.7 g/dL    Hematocrit 39.2 (L) 40.0 - 53.0 %    MCV 87 78 - 100 fL    MCH 27.2 26.5 - 33.0 pg    MCHC 31.1 (L) 31.5 - 36.5 g/dL    RDW 14.6 10.0 - 15.0 %    Platelet Count 282 150 - 450 10e3/uL    % Neutrophils 70 %    % Lymphocytes 11 %    % Monocytes 14 %    % Eosinophils 2 %    % Basophils 1 %    % Immature Granulocytes 2 %    NRBCs per 100 WBC 0 <1 /100    Absolute Neutrophils 3.8 1.6 - 8.3 10e3/uL    Absolute Lymphocytes 0.6 (L) 0.8 - 5.3 10e3/uL    Absolute Monocytes 0.7 0.0 - 1.3 10e3/uL    Absolute Eosinophils 0.1 0.0 - 0.7 10e3/uL    Absolute Basophils 0.1 0.0 - 0.2 10e3/uL    Absolute Immature Granulocytes 0.1 <=0.4 10e3/uL    Absolute NRBCs 0.0 10e3/uL   [facility] Draw     Status: None    Narrative    The following orders were created for panel order [facility] Draw.  Procedure                               Abnormality         Status                     ---------                               -----------         ------                     Extra Blue Top Tube[[id number]]                              Final result               Extra Red Top Tube[[id number]]                               Final result               Extra Blood Bank Purple ...[[id number]]                      Final result                 Please view results for these tests on the individual orders.   Extra Blue Top Tube     Status: None   Result Value Ref Range    Hold Specimen [facility]    Extra Red Top Tube     Status: None   Result  Value Ref Range    Hold Specimen [facility] Blood Bank Purple Top Tube     Status: None   Result Value Ref Range    Hold Specimen [facility]    CBC with platelets differential     Status: Abnormal    Narrative    The following orders were created for panel order CBC with platelets differential.  Procedure                               Abnormality         Status                     ---------                               -----------         ------                     CBC with platelets and d...[[id number]]  Abnormal            Final result                 Please view results for these tests on the individual orders.     Medications   0.9% sodium chloride BOLUS (0 mLs Intravenous Stopped 5/26/23 1518)     Labs Ordered and Resulted from Time of ED Arrival to Time of ED Departure   BASIC METABOLIC PANEL - Abnormal       Result Value    Sodium 137      Potassium 4.5      Chloride 104      Carbon Dioxide (CO2) 19 (*)     Anion Gap 14      Urea Nitrogen 37.8 (*)     Creatinine 1.61 (*)     Calcium 9.6      Glucose 127 (*)     GFR Estimate 45 (*)    CBC WITH PLATELETS AND DIFFERENTIAL - Abnormal    WBC Count 5.4      RBC Count 4.49      Hemoglobin 12.2 (*)     Hematocrit 39.2 (*)     MCV 87      MCH 27.2      MCHC 31.1 (*)     RDW 14.6      Platelet Count 282      % Neutrophils 70      % Lymphocytes 11      % Monocytes 14      % Eosinophils 2      % Basophils 1      % Immature Granulocytes 2      NRBCs per 100 WBC 0      Absolute Neutrophils 3.8      Absolute Lymphocytes 0.6 (*)     Absolute Monocytes 0.7      Absolute Eosinophils 0.1      Absolute Basophils 0.1      Absolute Immature Granulocytes 0.1      Absolute NRBCs 0.0       No orders to display          Critical care was not performed.     Medical Decision Making  The patient's presentation was of moderate complexity (an acute illness with systemic symptoms).    The patient's evaluation involved:  an assessment requiring an independent historian (EMS)  review of external  "note(s) from 3+ sources (see separate area of note for details)  ordering and/or review of 3+ test(s) in this encounter (see separate area of note for details)  review of 3+ test result(s) ordered prior to this encounter (see separate area of note for details)    The patient's management necessitated moderate risk (prescription drug management including medications given in the ED).      Assessment & Plan    Orlin Alcantar is a 73 year old male with past medical history significant for hypertension, hyperlipidemia, hemophilia, chronic pain, DM2, HCV, bladder cancer s/p resection with ileal conduit and chemotherapy in 2018, insomnia, anxiety, depression who presents from urology clinic for concern for hypotension.  Upon arrival to the emergency department patient is normotensive, mildly tachycardic with a heart rate of 118, afebrile.  Due to his hemophilia, will obtain comprehensive labs for concern for possible bleeding source of the hypertension and \"pale\" complexion noted in clinic.  He appears clinically stable without any obvious sources of bleeding.  He is not currently having any pain and it is quite possible that his left neph tube could have been removed today, however given the fact that his creatinine is actually uptrending since neph tube placement, will not plan to remove this today.    Patient will be given a liter of IV fluids and discharged home provided his heart rate returns to normal following fluids. This was discussed with the patient who stated he felt this whole visit was a waste of time as we were not going to remove his neph tubes here in the emergency department, patient states he feels he was sent here inappropriately and just wants to leave.  He did agree to stay for a liter of fluids as I did express my concern to him that his creatinine is actually increased and we would be expecting it to decrease prior to removing neph tube.  I discussed with him the need to follow-up in clinic next " week for repeat labs as well as neph tube removal.  Patient should of course return to the emergency department at any time should he notice change in color of his urine, flank or abdominal pain, dizziness or lightheadedness, fever or chills or any other symptoms that are concerning to him.    I have reviewed the nursing notes. I have reviewed the findings, diagnosis, plan and need for follow up with the patient.  Patient in agreement with this plan of discharge and follow-up was discharged from the emergency department.    Discharge Medication List as of 5/26/2023  3:19 PM          Final diagnoses:   Elevated serum creatinine   Tachycardia       JELLY Romero [facility] EMERGENCY DEPARTMENT  5/26/2023    --    ED Attending Physician Attestation    I Jaskaran Mendez MD, cared for this patient with the Advanced Practice Provider (BARB). I have performed a history and physical examination of the patient independent of the BARB. I reviewed the BARB's documentation above and agree with the documented findings and plan of care. I personally provided a substantive portion of the care for this patient, including the complete Medical Decision Making. Please see the BARB's documentation for full details.    Summary of HPI, PE, ED Course   Patient is a 73 year old male evaluated in the emergency department for pallor, borderline hypotension in clinic. Exam and ED course notable for reassuring BP, sinus tachycardia, slightly elevated creatinine. Plan was for IVF, repeat vitals to ensure resolution of tachycardia. Patient very upset about being sent in from urology clinic without seeing a provider as he felt asymptomatic. He barely agreed to stay for IVF. He is afebrile and without fever, no pain or other acute complaints. Lower concern for infection. He declined additional workup. Suspicion for acutely life threatening pathology is lower, but we could not completely exclude infection. After the  completion of care in the emergency department, the patient was discharged.    Jaskaran Mendez MD  Emergency Medicine        Jaskaran Mendez MD  05/26/23 8038

## 2023-05-30 ENCOUNTER — TELEPHONE (OUTPATIENT)
Dept: UROLOGY | Facility: CLINIC | Age: 73
End: 2023-05-30

## 2023-05-30 ENCOUNTER — APPOINTMENT (OUTPATIENT)
Dept: UROLOGY | Facility: CLINIC | Age: 73
End: 2023-05-30
Payer: MEDICARE

## 2023-05-30 NOTE — PROGRESS NOTES
Orlin Alcantar came into clinic at the request of Dr. Thomas for a PNT removal with the diagnosis of kidney stone.    Upon arrival, pt was pale and light headed and requested to have wheelchair to use for after visit. Suze Godinez CMA and myself decided to consult with Jorge Correia RN to assess the pt. After orthostatic vitals and further observation of the pt, we then decided to call rapid response to have the pt assessed at the hospital. Pt exhibited diaphoretic symptoms and we did not feel comfortable removing his PNT during the visit given his symptoms.    Amalia Izaguirre  5/26/2023

## 2023-05-30 NOTE — TELEPHONE ENCOUNTER
Pt scheduled for today for PNT removal and agreeable to plan, Dr. Thomas reviewed chart as well, okay to remove    Judi, IRON  Care Coordinator  [phone]

## 2023-05-30 NOTE — TELEPHONE ENCOUNTER
[facility] Call Center    Phone Message    May a detailed message be left on voicemail: yes     Reason for Call: FAITH Anderson with [facility] called to speak with someone to coordinate an appointment to have the patients nephrostomy tube removed. Please call Monica [phone]. Thank you.    Action Taken: Message routed to:  [facility] ([facility] Urology    Travel Screening: Not Applicable

## 2023-05-30 NOTE — TELEPHONE ENCOUNTER
PA called back. We perform this. Pt was scheduled but had to rush to ER. No appts until Tuesday. Writer asking for an overbook.

## 2023-06-19 DIAGNOSIS — D66 SEVERE HEMOPHILIA A (H): ICD-10-CM

## 2023-06-19 RX ORDER — CODEINE SULFATE 30 MG/1
30 TABLET ORAL EVERY 8 HOURS PRN
Qty: 84 TABLET | Refills: 0 | Status: SHIPPED | OUTPATIENT
Start: 2023-06-19 | End: 2023-07-17

## 2023-06-19 RX ORDER — CODEINE SULFATE 30 MG/1
30 TABLET ORAL EVERY 8 HOURS PRN
Qty: 84 TABLET | Refills: 0 | Status: CANCELLED | OUTPATIENT
Start: 2023-06-19

## 2023-06-19 NOTE — TELEPHONE ENCOUNTER
Patient last filled 28 day supply on 5/23/2023. We are planning to send codeine rx with his Kogenate rx tomorrow morning. We'd like an order by 2:30 pm today, June 19th.     Thanks,     MICHAEL Cameron, [facility]  Lead Pharmacy Coordinator

## 2023-06-19 NOTE — TELEPHONE ENCOUNTER
Patient last filled 28 day supply on 5/23/2023. We are planning to send codeine rx with his Kogenate rx tomorrow morning. We'd like an order by 2:30 pm today, June 19th.    Thanks,    MICHAEL Cameron, [facility]  Lead Pharmacy Coordinator  [facility]  [phone]

## 2023-07-03 DIAGNOSIS — D66 SEVERE HEMOPHILIA A (H): ICD-10-CM

## 2023-07-03 DIAGNOSIS — D66 HEMOPHILIC ARTHROPATHY (H): ICD-10-CM

## 2023-07-03 DIAGNOSIS — M36.2 HEMOPHILIC ARTHROPATHY (H): ICD-10-CM

## 2023-07-03 DIAGNOSIS — R26.9 ABNORMAL GAIT: ICD-10-CM

## 2023-07-03 RX ORDER — ANTIHEMOPHILIC FACTOR (RECOMBINANT) 3000 (+/-)
KIT INTRAVENOUS
Qty: 13380 EACH | Refills: 1 | Status: SHIPPED | OUTPATIENT
Start: 2023-07-03 | End: 2023-07-17

## 2023-07-03 RX ORDER — CELECOXIB 100 MG/1
100 CAPSULE ORAL 2 TIMES DAILY PRN
Qty: 60 CAPSULE | Refills: 3 | Status: SHIPPED | OUTPATIENT
Start: 2023-07-03 | End: 2024-02-16

## 2023-07-03 NOTE — TELEPHONE ENCOUNTER
*Medicare refill - please check units and qty*    *Need Rx by: Today, July 3rd by 2:30 pm    *Last comp visit: 10/18/2022    *Drug: Kogenate at 4 doses of 3345 units    *Refills left: 0    *Notes: We have enough 3345 units on hand for several more refills.    Thanks,    MICHAEL Cameron, [facility]  Lead Pharmacy Coordinator  [facility]  [phone]

## 2023-07-17 DIAGNOSIS — D66 SEVERE HEMOPHILIA A (H): ICD-10-CM

## 2023-07-17 RX ORDER — ANTIHEMOPHILIC FACTOR (RECOMBINANT) 3000 (+/-)
KIT INTRAVENOUS
Qty: 13380 EACH | Refills: 1 | Status: SHIPPED | OUTPATIENT
Start: 2023-07-17 | End: 2023-08-02

## 2023-07-17 RX ORDER — CODEINE SULFATE 30 MG/1
30 TABLET ORAL EVERY 8 HOURS PRN
Qty: 84 TABLET | Refills: 0 | Status: SHIPPED | OUTPATIENT
Start: 2023-07-17 | End: 2023-08-02

## 2023-07-17 NOTE — TELEPHONE ENCOUNTER
We are requesting Kogenate and Codeine Sulfate      *Medicare refill - please check units and qty*    *Need Rx by: 07/17/23 (delivering Tuesday 7/18 morning)    *Last comp visit: 10/18/22    *Drug: Kogenate at 4 doses of 3345 units    *Refills left: 0    *Notes: We have many vials of 3345 on hand if you want to add extra refills    Thanks,    Kristel Duffy, Pharmacy Coordinator   [facility]  [phone]        Also Pt is asking for a refill of Codeine Sulfate 30mg.  We last filled qty 84 on 6/20/23.    Thank you!!

## 2023-08-02 DIAGNOSIS — R31.9 HEMATURIA, UNSPECIFIED TYPE: ICD-10-CM

## 2023-08-02 DIAGNOSIS — D66 SEVERE HEMOPHILIA A (H): ICD-10-CM

## 2023-08-02 RX ORDER — CODEINE SULFATE 30 MG/1
30 TABLET ORAL EVERY 8 HOURS PRN
Qty: 84 TABLET | Refills: 0 | Status: SHIPPED | OUTPATIENT
Start: 2023-08-02 | End: 2023-09-08

## 2023-08-02 RX ORDER — ANTIHEMOPHILIC FACTOR (RECOMBINANT) 3000 (+/-)
KIT INTRAVENOUS
Qty: 13380 EACH | Refills: 3 | Status: SHIPPED | OUTPATIENT
Start: 2023-08-02 | End: 2023-08-30

## 2023-08-02 NOTE — TELEPHONE ENCOUNTER
*Medicare refill - please check units and qty*    *Need Rx by: Today, August 2nd by 2 pm    *Last comp visit: 10/18/2022    *Drug 1: Kogenate at 4 doses of 3345 units    *Drug 2: Codeine rx is to keep on file for his next fill in a few weeks.     *Refills left: 0    *Notes: We have several 3345 unit vials, so if we get amount of refills that would be appreciate it.     Thanks,    MICHAEL Cameron, [facility]  Lead Pharmacy Coordinator  [facility]  [phone]

## 2023-08-30 DIAGNOSIS — D66 SEVERE HEMOPHILIA A (H): ICD-10-CM

## 2023-08-30 RX ORDER — ANTIHEMOPHILIC FACTOR (RECOMBINANT) 3000 (+/-)
KIT INTRAVENOUS
Qty: 13380 EACH | Refills: 0 | Status: SHIPPED | OUTPATIENT
Start: 2023-08-30 | End: 2023-09-08

## 2023-08-30 NOTE — TELEPHONE ENCOUNTER
*Medicare refill - please check units and qty*    *Need Rx by: 08/30/2023 (delivering Thursday 8/31 morning)    *Last comp visit: 10/18/2022    *Drug: Kogenate at 4 doses of 3345 units    *Refills left: 0    *Note: Sorry so late, I forgot to route it properly.    Thanks,    Jocelyn Henry, Pharmacy Coordinator  [facility]  [phone]

## 2023-10-06 ENCOUNTER — ANCILLARY PROCEDURE (OUTPATIENT)
Dept: CT IMAGING | Facility: CLINIC | Age: 73
End: 2023-10-06
Attending: PHYSICIAN ASSISTANT
Payer: MEDICARE

## 2023-10-06 ENCOUNTER — LAB (OUTPATIENT)
Dept: LAB | Facility: CLINIC | Age: 73
End: 2023-10-06
Attending: PHYSICIAN ASSISTANT
Payer: MEDICARE

## 2023-10-06 DIAGNOSIS — C67.9 UROTHELIAL CARCINOMA OF BLADDER (H): ICD-10-CM

## 2023-10-06 LAB
ALBUMIN SERPL BCG-MCNC: 4.7 G/DL (ref 3.5–5.2)
ALP SERPL-CCNC: 88 U/L (ref 40–129)
ALT SERPL W P-5'-P-CCNC: 12 U/L (ref 0–70)
ANION GAP SERPL CALCULATED.3IONS-SCNC: 12 MMOL/L (ref 7–15)
AST SERPL W P-5'-P-CCNC: 17 U/L (ref 0–45)
BASO+EOS+MONOS # BLD AUTO: NORMAL 10*3/UL
BASO+EOS+MONOS NFR BLD AUTO: NORMAL %
BASOPHILS # BLD AUTO: 0.1 10E3/UL (ref 0–0.2)
BASOPHILS NFR BLD AUTO: 1 %
BILIRUB SERPL-MCNC: 0.7 MG/DL
BUN SERPL-MCNC: 24.1 MG/DL (ref 8–23)
CALCIUM SERPL-MCNC: 9.8 MG/DL (ref 8.8–10.2)
CHLORIDE SERPL-SCNC: 106 MMOL/L (ref 98–107)
CREAT BLD-MCNC: 1 MG/DL (ref 0.7–1.3)
CREAT SERPL-MCNC: 0.92 MG/DL (ref 0.67–1.17)
DEPRECATED HCO3 PLAS-SCNC: 22 MMOL/L (ref 22–29)
EGFRCR SERPLBLD CKD-EPI 2021: 88 ML/MIN/1.73M2
EGFRCR SERPLBLD CKD-EPI 2021: >60 ML/MIN/1.73M2
EOSINOPHIL # BLD AUTO: 0.3 10E3/UL (ref 0–0.7)
EOSINOPHIL NFR BLD AUTO: 7 %
ERYTHROCYTE [DISTWIDTH] IN BLOOD BY AUTOMATED COUNT: 13.8 % (ref 10–15)
GLUCOSE SERPL-MCNC: 101 MG/DL (ref 70–99)
HCT VFR BLD AUTO: 41.6 % (ref 40–53)
HGB BLD-MCNC: 14 G/DL (ref 13.3–17.7)
IMM GRANULOCYTES # BLD: 0 10E3/UL
IMM GRANULOCYTES NFR BLD: 0 %
LYMPHOCYTES # BLD AUTO: 1 10E3/UL (ref 0.8–5.3)
LYMPHOCYTES NFR BLD AUTO: 25 %
MCH RBC QN AUTO: 28.3 PG (ref 26.5–33)
MCHC RBC AUTO-ENTMCNC: 33.7 G/DL (ref 31.5–36.5)
MCV RBC AUTO: 84 FL (ref 78–100)
MONOCYTES # BLD AUTO: 0.4 10E3/UL (ref 0–1.3)
MONOCYTES NFR BLD AUTO: 10 %
NEUTROPHILS # BLD AUTO: 2.4 10E3/UL (ref 1.6–8.3)
NEUTROPHILS NFR BLD AUTO: 57 %
NRBC # BLD AUTO: 0 10E3/UL
NRBC BLD AUTO-RTO: 0 /100
PLATELET # BLD AUTO: 200 10E3/UL (ref 150–450)
POTASSIUM SERPL-SCNC: 4.2 MMOL/L (ref 3.4–5.3)
PROT SERPL-MCNC: 7.3 G/DL (ref 6.4–8.3)
RBC # BLD AUTO: 4.94 10E6/UL (ref 4.4–5.9)
SODIUM SERPL-SCNC: 140 MMOL/L (ref 135–145)
WBC # BLD AUTO: 4.2 10E3/UL (ref 4–11)

## 2023-10-06 PROCEDURE — 80053 COMPREHEN METABOLIC PANEL: CPT | Performed by: PATHOLOGY

## 2023-10-06 PROCEDURE — 80053 COMPREHEN METABOLIC PANEL: CPT

## 2023-10-06 PROCEDURE — 85025 COMPLETE CBC W/AUTO DIFF WBC: CPT

## 2023-10-06 PROCEDURE — G1010 CDSM STANSON: HCPCS | Performed by: RADIOLOGY

## 2023-10-06 PROCEDURE — 74177 CT ABD & PELVIS W/CONTRAST: CPT | Mod: MG | Performed by: RADIOLOGY

## 2023-10-06 PROCEDURE — 36415 COLL VENOUS BLD VENIPUNCTURE: CPT

## 2023-10-06 PROCEDURE — 71260 CT THORAX DX C+: CPT | Mod: MG | Performed by: RADIOLOGY

## 2023-10-06 RX ORDER — IOPAMIDOL 755 MG/ML
83 INJECTION, SOLUTION INTRAVASCULAR ONCE
Status: COMPLETED | OUTPATIENT
Start: 2023-10-06 | End: 2023-10-06

## 2023-10-06 RX ADMIN — IOPAMIDOL 83 ML: 755 INJECTION, SOLUTION INTRAVASCULAR at 09:04

## 2023-10-06 NOTE — NURSING NOTE
Chief Complaint   Patient presents with    Labs Only     Labs drawn from PIV placed by RN. Line flushed with saline.      Dori Rucker RN

## 2023-10-06 NOTE — DISCHARGE INSTRUCTIONS

## 2023-10-09 DIAGNOSIS — D66 SEVERE HEMOPHILIA A (H): ICD-10-CM

## 2023-10-09 RX ORDER — CODEINE SULFATE 30 MG/1
30 TABLET ORAL EVERY 8 HOURS PRN
Qty: 84 TABLET | Refills: 0 | Status: SHIPPED | OUTPATIENT
Start: 2023-10-09 | End: 2023-11-06

## 2023-10-09 NOTE — TELEPHONE ENCOUNTER
Last sent day a 28 supply to Orlin on 9/12/2023. Patient needs order sent out tomorrow morning.     MICHAEL Cameron, [facility]  Lead Pharmacy Coordinator  [facility]  [phone]

## 2023-10-10 ENCOUNTER — VIRTUAL VISIT (OUTPATIENT)
Dept: ONCOLOGY | Facility: CLINIC | Age: 73
End: 2023-10-10
Attending: PHYSICIAN ASSISTANT
Payer: MEDICARE

## 2023-10-10 VITALS — BODY MASS INDEX: 20.99 KG/M2 | WEIGHT: 155 LBS | HEIGHT: 72 IN

## 2023-10-10 DIAGNOSIS — N20.0 KIDNEY STONE: ICD-10-CM

## 2023-10-10 DIAGNOSIS — C67.9 UROTHELIAL CARCINOMA OF BLADDER (H): Primary | ICD-10-CM

## 2023-10-10 PROCEDURE — 99442 PR PHYSICIAN TELEPHONE EVALUATION 11-20 MIN: CPT | Mod: 95 | Performed by: PHYSICIAN ASSISTANT

## 2023-10-10 ASSESSMENT — PAIN SCALES - GENERAL: PAINLEVEL: NO PAIN (0)

## 2023-10-10 NOTE — Clinical Note
10/10/2023         RE: Orlin Alcantar  [address]  [place] [address]        Dear Colleague,    Thank you for referring your patient, Orlin Alcantar, to the [facility]. Please see a copy of my visit note below.    Virtual Visit Details    Type of service:  Telephone Visit   Phone call duration: *** minutes     Virtual Visit Details    Type of service:  Telephone Visit   Phone call duration: 20 minutes           [facility]  FOLLOW-UP VISIT NOTE  Oct 10, 2023          REASON FOR VISIT: bladder cancer, 6 month follow up     ONCOLOGY TREATMENT HISTORY: Muscle Invasive Urothelial Carcinoma, soilitary, obturator node, no distant metastases.   3/26/18: Consented for the Nivolumab-Gemcitabine-Cisplatin study [id number]             3/28/18: Screening visit for study.   4/4/18; c1d1 gemcitabine  4/11/18: c1d8 gemcitabine-nivolumab  4/25/18: C2D1 Gemcitabine-Cisplatin  5/2/18: C2D8 Gemcitabine-Nivolumab  5/16/18: C3D1 Gemcitabine-Cisplatin  5/23/18: C3D8 Gemcitabine-Nivolumab  6/6/18: C4d1 Gemcitabine-Cisplatin  6/13/18: C4D8 Gemcitabine-Nivolumab     7/30/18: Radical cystoprostatectomy with bilateral pelvic LN dissection and ileal conduit. Final pathology pT0N0         HPI:  73 year old male with PMH of hemophilia, chronic hepatis C who was seen by DR. Burns for evaluation of neoadjuvant therapy with Gemcitabine Cisplatin and Nivolumab.     Patient has a history of hemophilia and has undergone several surgeries in right hip replacement, knee replacement. Hemophilia managed by Dr. Marley.   He reports recurrent hematuria for a year, initially attributed to kidney stones, cystoscopy in Feb 2018 revealed MIBC. PET-CT scan - which has revealed muscle invasive bladder right lateral wall of bladder and right  obturator lymphnode, 1.1 cm short axis.   He has been treated for HCV in the 1990s and has not had any complications. His LFTs have remained normal. His hemophilia is  very well controlled on current regimen of factor replacement. He does not have any autoimmune disease and is not on immunesuppressants or steroids. His diabetes is diet controlled and he has not required any medications. Orlin was enrolled on our neoadjuvant Nivolumab-[place]-Cis trial and he completed 4 cycles of treatment and then underwent       Radical cystoprostatectomy and Bilateral pelvic lymph node dissection and ileal conduit on 7/30/18 with pathCR.      INTERVAL HISTORY; Orlin is doing well today for our phone visit.     At our last visit 6 months ago- he was dealing with hematuria from a large stone.  He was workign with DR Fuller and they tried a demineralization plan to try and break up the stone given his anatomical changes and his hemophilia, which would make it rather hard for conventional treatment.  However, with growth of the stone, hewas admitted 5/17-5/18 for percutaneous nephrolithotomy.  He had a PNT post op ~week and then was able to have this removed.  He has subsequently been doing well with no new  concerns.  His urostomy functions well and no further hematuria.      He sees Dr. Marley in the hemophilia clinic annually.  He takes factor 3000 units every other day.  He does sometimes alternate with 2000/ 1000 units so that he has extra to take for as needed bleeds.     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  This is managed by the hemophilia clinic as well.     He has routine follow-up with his PCP who helps manage his insomnia, mood, and hypertension.         PAST MEDICAL HISTORY                  Past Medical History              Past Medical History:   Diagnosis Date    Anxiety      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      Bladder tumor      Depression      DM II (diabetes mellitus, type II), controlled (H)       diet controlled    Hemophilia (H)       Type A    History of hepatitis C       treated successfully    HTN (hypertension)                CURRENT OUTPATIENT MEDICATIONS               Current Outpatient Medications   Medication Sig    acetaminophen (TYLENOL) 325 MG tablet Take 2 tablets (650 mg) by mouth every 4 hours as needed for mild pain or fever    Antihemophilic Factor, Recomb, (KOGENATE FS) 1000 units KIT Directions: Infuse 2949 units or 3185 units iv every Mon, Wed, Fri & Sun prn for breakthrough bleeding    diazepam (VALIUM) 5 MG tablet Take 1 tablet (5 mg) by mouth daily as needed for anxiety or sleep    mirtazapine (REMERON) 45 MG tablet Take 1 tablet (45 mg) by mouth At Bedtime And may take 1/2 tablet for insomnia up to 3 times a week.    order for DME Cohesive Lurdes rings [phone]    order for DME Equipment being ordered: Walker Wheels () and Walker ()  Treatment Diagnosis: gait instability    oxyCODONE (ROXICODONE) 5 MG tablet Take 1 tablet (5 mg) by mouth every 8 hours as needed for pain      No current facility-administered medications for this visit.          EXAM: Ht 1.829 m (6')   Wt 70.3 kg (155 lb)   BMI 21.02 kg/m    Wt Readings from Last 4 Encounters:   10/10/23 70.3 kg (155 lb)   05/26/23 69.8 kg (153 lb 14.1 oz)   05/17/23 70.3 kg (155 lb)   05/10/23 70.3 kg (155 lb)          ECOG performance status of 1.   Voice is clear and strong. No audible stridor, wheezing, or respiratory distress. The remainder of PE was deferred due to PHE.        LABS:    10/06/23 08:47   Sodium 140   Potassium 4.2   Chloride 106   Carbon Dioxide (CO2) 22   Urea Nitrogen 24.1 (H)   Creatinine 0.92   GFR Estimate 88   Calcium 9.8   Anion Gap 12   Albumin 4.7   Protein Total 7.3   Alkaline Phosphatase 88   ALT 12   AST 17             05/26/23 13:15 10/06/23 08:47   WBC 5.4 4.2   Hemoglobin 12.2 (L) 14.0   Hematocrit 39.2 (L) 41.6   Platelet Count 282 200   RBC Count 4.49 4.94   MCV 87 84       CT CAP IMPRESSION:  1.  No evidence for metastatic disease in the chest, abdomen or pelvis.  2.  Cystoprostatectomy with no evidence for local tumor recurrence.  3.  Stable benign  pulmonary nodules demonstrating greater than two-year stability.      ASSESSMENT/PLAN:      ONC: 73 year old male with T2N1(solitary node)  urothelial carcinoma, enrolled on the clinical trial of Nivolumab with Gemcitabine and Cisplatin for MIBC, completed C4D8 on 6/13/18. He has had a complete response at radical cystectomy final pathology (7/30/18) showing PT0 N0 of note he was T2N1 prior to neoadjuvant therapy and is 5 years out from surgery.  He is OZZIE on scans and is on SOC follow up for surveillance.       We discussed the recent data with prognosis and that he had an excellent response to neoadjuvant therapy.  He has completed his 5 year surveillance imaging.  We did discuss that he should be alerted to hematuria in the future and that Dr Morocho is available to him with any prn urology needs.         Hemophilia management: Per Dr. Marley, no major bleeds recently                             He now has established care with a PCP for annual exam, colonoscopy, lipids, etc.  Encouraged following up on his blood pressure and compliant with his lisinopril.                          It has been a pleasure caring for Orlin, he knows he has resources avail if he has oncologic concerns in the future, we are always here but he will continue with follow up with his other MDs at this time.  I told him I was OK refilling his urostomy supplies in January 2024, but then he will transition these to his PCP.      5 minutes spent on the date of the encounter doing chart review, review of test results and discussion with other provider(s), in addition to 20 minutes spent on the phone with the patient.      Monica Harris PA-C                   Again, thank you for allowing me to participate in the care of your patient.        Sincerely,        Brianna Harris PA-C

## 2023-10-10 NOTE — NURSING NOTE
Is the patient currently in the state of [place]? YES    Visit mode:TELEPHONE    If the visit is dropped, the patient can be reconnected by: TELEPHONE VISIT: Phone number: [phone]    Will anyone else be joining the visit? NO  (If patient encounters technical issues they should call [phone]    How would you like to obtain your AVS? Mail a copy    Are changes needed to the allergy or medication list? Pt stated no changes to allergies and Pt stated no med changes    Reason for visit: RECHECK    Nicole MERCHANT

## 2023-10-10 NOTE — PROGRESS NOTES
Virtual Visit Details    Type of service:  Telephone Visit   Phone call duration: 20 minutes           [facility]  FOLLOW-UP VISIT NOTE  Oct 10, 2023          REASON FOR VISIT: bladder cancer, 6 month follow up     ONCOLOGY TREATMENT HISTORY: Muscle Invasive Urothelial Carcinoma, soilitary, obturator node, no distant metastases.   3/26/18: Consented for the Nivolumab-Gemcitabine-Cisplatin study [id number]             3/28/18: Screening visit for study.   4/4/18; c1d1 gemcitabine  4/11/18: c1d8 gemcitabine-nivolumab  4/25/18: C2D1 Gemcitabine-Cisplatin  5/2/18: C2D8 Gemcitabine-Nivolumab  5/16/18: C3D1 Gemcitabine-Cisplatin  5/23/18: C3D8 Gemcitabine-Nivolumab  6/6/18: C4d1 Gemcitabine-Cisplatin  6/13/18: C4D8 Gemcitabine-Nivolumab     7/30/18: Radical cystoprostatectomy with bilateral pelvic LN dissection and ileal conduit. Final pathology pT0N0         HPI:  73 year old male with PMH of hemophilia, chronic hepatis C who was seen by DR. Burns for evaluation of neoadjuvant therapy with Gemcitabine Cisplatin and Nivolumab.     Patient has a history of hemophilia and has undergone several surgeries in right hip replacement, knee replacement. Hemophilia managed by Dr. Marley.   He reports recurrent hematuria for a year, initially attributed to kidney stones, cystoscopy in Feb 2018 revealed MIBC. PET-CT scan - which has revealed muscle invasive bladder right lateral wall of bladder and right  obturator lymphnode, 1.1 cm short axis.   He has been treated for HCV in the 1990s and has not had any complications. His LFTs have remained normal. His hemophilia is very well controlled on current regimen of factor replacement. He does not have any autoimmune disease and is not on immunesuppressants or steroids. His diabetes is diet controlled and he has not required any medications. Orlin was enrolled on our neoadjuvant Nivolumab-[place]-Cis trial and he completed 4 cycles of treatment and then underwent       Radical  cystoprostatectomy and Bilateral pelvic lymph node dissection and ileal conduit on 7/30/18 with pathCR.      INTERVAL HISTORY; Orlin is doing well today for our phone visit.     At our last visit 6 months ago- he was dealing with hematuria from a large stone.  He was workign with DR Fuller and they tried a demineralization plan to try and break up the stone given his anatomical changes and his hemophilia, which would make it rather hard for conventional treatment.  However, with growth of the stone, hewas admitted 5/17-5/18 for percutaneous nephrolithotomy.  He had a PNT post op ~week and then was able to have this removed.  He has subsequently been doing well with no new  concerns.  His urostomy functions well and no further hematuria.      He sees Dr. Marley in the hemophilia clinic annually.  He takes factor 3000 units every other day.  He does sometimes alternate with 2000/ 1000 units so that he has extra to take for as needed bleeds.     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  This is managed by the hemophilia clinic as well.     He has routine follow-up with his PCP who helps manage his insomnia, mood, and hypertension.         PAST MEDICAL HISTORY                  Past Medical History              Past Medical History:   Diagnosis Date    Anxiety      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      Bladder tumor      Depression      DM II (diabetes mellitus, type II), controlled (H)       diet controlled    Hemophilia (H)       Type A    History of hepatitis C       treated successfully    HTN (hypertension)                CURRENT OUTPATIENT MEDICATIONS              Current Outpatient Medications   Medication Sig    acetaminophen (TYLENOL) 325 MG tablet Take 2 tablets (650 mg) by mouth every 4 hours as needed for mild pain or fever    Antihemophilic Factor, Recomb, (KOGENATE FS) 1000 units KIT Directions: Infuse 2949 units or 3185 units iv every Mon, Wed, Fri & Sun prn for breakthrough bleeding     diazepam (VALIUM) 5 MG tablet Take 1 tablet (5 mg) by mouth daily as needed for anxiety or sleep    mirtazapine (REMERON) 45 MG tablet Take 1 tablet (45 mg) by mouth At Bedtime And may take 1/2 tablet for insomnia up to 3 times a week.    order for DME Cohesive Lurdes rings [phone]    order for DME Equipment being ordered: Walker Wheels () and Walker ()  Treatment Diagnosis: gait instability    oxyCODONE (ROXICODONE) 5 MG tablet Take 1 tablet (5 mg) by mouth every 8 hours as needed for pain      No current facility-administered medications for this visit.          EXAM: Ht 1.829 m (6')   Wt 70.3 kg (155 lb)   BMI 21.02 kg/m    Wt Readings from Last 4 Encounters:   10/10/23 70.3 kg (155 lb)   05/26/23 69.8 kg (153 lb 14.1 oz)   05/17/23 70.3 kg (155 lb)   05/10/23 70.3 kg (155 lb)          ECOG performance status of 1.   Voice is clear and strong. No audible stridor, wheezing, or respiratory distress. The remainder of PE was deferred due to PHE.        LABS:    10/06/23 08:47   Sodium 140   Potassium 4.2   Chloride 106   Carbon Dioxide (CO2) 22   Urea Nitrogen 24.1 (H)   Creatinine 0.92   GFR Estimate 88   Calcium 9.8   Anion Gap 12   Albumin 4.7   Protein Total 7.3   Alkaline Phosphatase 88   ALT 12   AST 17             05/26/23 13:15 10/06/23 08:47   WBC 5.4 4.2   Hemoglobin 12.2 (L) 14.0   Hematocrit 39.2 (L) 41.6   Platelet Count 282 200   RBC Count 4.49 4.94   MCV 87 84       CT CAP IMPRESSION:  1.  No evidence for metastatic disease in the chest, abdomen or pelvis.  2.  Cystoprostatectomy with no evidence for local tumor recurrence.  3.  Stable benign pulmonary nodules demonstrating greater than two-year stability.      ASSESSMENT/PLAN:      ONC: 73 year old male with T2N1(solitary node)  urothelial carcinoma, enrolled on the clinical trial of Nivolumab with Gemcitabine and Cisplatin for MIBC, completed C4D8 on 6/13/18. He has had a complete response at radical cystectomy final pathology (7/30/18)  showing PT0 N0 of note he was T2N1 prior to neoadjuvant therapy and is 5 years out from surgery.  He is OZZIE on scans and is on SOC follow up for surveillance.       We discussed the recent data with prognosis and that he had an excellent response to neoadjuvant therapy.  He has completed his 5 year surveillance imaging.  We did discuss that he should be alerted to hematuria in the future and that Dr Morocho is available to him with any prn urology needs.         Hemophilia management: Per Dr. Marley, no major bleeds recently                             He now has established care with a PCP for annual exam, colonoscopy, lipids, etc.  Encouraged following up on his blood pressure and compliant with his lisinopril.                          It has been a pleasure caring for Orlin, he knows he has resources avail if he has oncologic concerns in the future, we are always here but he will continue with follow up with his other MDs at this time.  I told him I was OK refilling his urostomy supplies in January 2024, but then he will transition these to his PCP.      5 minutes spent on the date of the encounter doing chart review, review of test results and discussion with other provider(s), in addition to 20 minutes spent on the phone with the patient.      Monica Harris PA-C

## 2023-11-06 DIAGNOSIS — D66 SEVERE HEMOPHILIA A (H): ICD-10-CM

## 2023-11-06 RX ORDER — CODEINE SULFATE 30 MG/1
30 TABLET ORAL EVERY 8 HOURS PRN
Qty: 84 TABLET | Refills: 0 | Status: SHIPPED | OUTPATIENT
Start: 2023-11-06 | End: 2023-12-04

## 2023-11-06 NOTE — TELEPHONE ENCOUNTER
Last filled: 10/9/2023 for 28 day supply, will need to send out tomorrow morning, so if we could receive an rx by this afternoon that would be helpful.    MICHAEL Cameron, [facility]  Lead Pharmacy Coordinator  [facility]  [phone]

## 2023-11-16 DIAGNOSIS — F51.04 PSYCHOPHYSIOLOGIC INSOMNIA: ICD-10-CM

## 2023-11-16 DIAGNOSIS — F41.1 ANXIETY STATE: ICD-10-CM

## 2023-11-16 RX ORDER — MIRTAZAPINE 45 MG/1
45 TABLET, FILM COATED ORAL AT BEDTIME
Qty: 90 TABLET | Refills: 0 | Status: SHIPPED | OUTPATIENT
Start: 2023-11-16 | End: 2024-02-15

## 2023-11-16 NOTE — TELEPHONE ENCOUNTER
Prescription approved per [facility] Refill Protocol.  Taniya DIAZ RN  [facility]

## 2023-12-04 ENCOUNTER — CARE COORDINATION (OUTPATIENT)
Dept: HEMATOLOGY | Facility: CLINIC | Age: 73
End: 2023-12-04
Payer: MEDICARE

## 2023-12-04 DIAGNOSIS — D66 HEMOPHILIC ARTHROPATHY (H): ICD-10-CM

## 2023-12-04 DIAGNOSIS — D66 SEVERE HEMOPHILIA A (H): Primary | ICD-10-CM

## 2023-12-04 DIAGNOSIS — D66 SEVERE HEMOPHILIA A (H): ICD-10-CM

## 2023-12-04 DIAGNOSIS — M36.2 HEMOPHILIC ARTHROPATHY (H): ICD-10-CM

## 2023-12-04 RX ORDER — CODEINE SULFATE 30 MG/1
30 TABLET ORAL EVERY 8 HOURS PRN
Qty: 84 TABLET | Refills: 0 | Status: SHIPPED | OUTPATIENT
Start: 2023-12-04 | End: 2023-12-29

## 2023-12-04 NOTE — PROGRESS NOTES
"[facility] [place] [facility]  COMPREHENSIVE CLINIC PRE-VISIT CARE COORDINATION NOTE     NAME:  Orlin Alcantar  :  1950   AGE:  73 year old    Appointment date/time:         at 10am    Assigned RN: Margot Reed RN  Diagnosis:     Severe Hemophilia A  Factor level:      Factor VIII < 1%  Inhibitor Hx:    no history of inhibitor   Viral issues:  HIV negative, HCV diagnosed in , cleared in ; HAV unknown; HBV Core antibody negative    Other health issues:    Type 2 DM - reportedly diet controlled, last A1C elevated in 2018, not checked since then.  Invasive urothelial carcinoma s/p radical cyctoprostatectomy and lymph node dissection, ileal conduit, adjuvant chemotherapy. In remission. Followed by [facility] oncologist   Mood disorder/anxiety   Hypertension - on Lisinopril   Chronic pain syndrome - opioids prescribed by [facility]  Nephrolithiasis   Planned procedures or surgeries:  Had PNT placed 2023 as part of kidney stone removal procedure, still in?  Hemophilia treatment plan:  Kogenate 3000 units every other day  Method of logging infusions:   paper records        Psychosocial:  Orlin lives in [place] [facility] [place]. He has a limited support network by choice. Orlin moved into a foster home at 2 y/o and his foster parents and siblings are all .  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  Orlin reaches out to the clinic proactively when support is needed.  Last CC or Office Visit:  Last comp clinic was 10/18/2022   Confirmation of appointment:      LICSW spoke with pt who confirmed plans to attend.  Goals for Visit:  None reported, \"things have been going well for the last 6 months or so\"      Insert dot phrase [facility]       Pharmacy Insurance:  Medicare (Primary) BCBS (secondary)  Specialty Pharmacy:  [facility]  Copay Assistance: ineligible  HTC Assistance Fund: Approved at 100%  PDC:  " 0.93  Pharmacokinetics: Completed on Selventa  Pharmacy Notes:  Pharmacy to see (Y/N): Yes    Joint concerns: Hemarthropathy in all 6 index joints except right knee, and also 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  Primary functional concern is daily pain in ankles and sometimes R elbow.    Orthopedic past medical history:     Right SUNG 1992  Right hip steroid injections multiple times between 0779-0589   Right hip open synovectomy 2005  Right hip SUNG revision with synovectomy 4/26/2012  Left TKA 1991  Left hip fracture with internal fixation 1995  Imaging:   10/13/14 X ray B elbow: Severe joint space loss of both elbows, with likely flexion deformity. Bone spurring present B, worse on Right compared with Left. There is marked remodeling along the articular surfaces.  and Left ankle: severe joint space loss of the left ankle, talonavicular and likely naviculocuneiform joints, with extensive bone remodeling. Subchondral cystic changes... some joint space narrowing of the subtalar joint. Prominent bone spurs are identified particularly along the dorsal aspect of the talonavicular joint.  2/2/2012 Lumbosacral X ray:  Mild loss of vertebral body heights at the level of L5, 2. Multilevel degenerative mild disc disease of the lumbar spine.   CTs and PET scans due to h/o bladder cancer, Xray/MRI related to Right hip SUNG  PT orders:  Placed LBB     Eligible studies: Subsequent registry with labs and COVID-19 antibody testing      Genetics:  Available if interested  He has never met with genetics. He does not have a pedigree in his chart nor record of genetic testing.   He declined to meet with genetics in 2022.

## 2023-12-07 NOTE — PROGRESS NOTES
PHYSICAL THERAPY EVALUATION  Type of Visit: Evaluation Comp: B ankles & R elbow issues, hemarthropathy: B ankles, Left knee, Right hip, B elbows     See electronic medical record for Abuse and Falls Screening details.    Subjective       Presenting condition or subjective complaint:    Date of onset: 12/12/23    Relevant medical history:     Dates & types of surgery:      Prior diagnostic imaging/testing results:       Prior therapy history for the same diagnosis, illness or injury:        Prior Level of Function  Transfers: Independent  Ambulation: Independent  ADL: Independent  IADL:     Living Environment  Social support:   limited network by choice. He moved into a foster home at age 1, and his parents/siblings have passed away.  Type of home:   Orlin lives in a 3rd floor apartment in [place]   Stairs to enter the home:         Ramp:     Stairs inside the home:       Elevator access, though takes the stairs for exercise. He has a limited support network by choice.    Help at home:    Equipment owned:  Patient has access to hot towels, (has but does NOT use elastic bandages as feels that it delays healing/bleed recovery), crutches (tends to use crutch on Left side since intermittent Right elbow issues and is Right handed), shoe inserts, sock aid, reacher, crutches, wheeled walker, as well as walk-in shower with grab bars.     Employment:    Orlin retired from [employer] in late 1980's.   Hobbies/Interests:  Visits library often. Reading nonfiction, biographies currently - regency poets,crossword puzzles, music.  Physical Activity: Consists of daily activities and errands.  No regular stretching or strengthening program.  He does take the 3 flights of stairs to/from his apartment approx 2x/yr instead of using the elevator.       OUTPATIENT PHYSICAL THERAPY HEMOPHILIA CLINIC NOTE    Orlin Alcantar   1950  [id number]   73 year old   present:No, Language: English.  Performed: In  Person.  Referring provider:  Monica Mortensen PA-C   Reason for visit: [facility].  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  Invasive urothelial carcinoma s/p radical cyctoprostatectomy and lymph node dissection, ileal conduit, adjuvant chemotherapy. In remission. Followed by [facility] oncologist   Mood disorder/anxiety   Hypertension - on Lisinopril   Chronic pain syndrome - opioids prescribed by [facility]  Nephrolithiasis   Planned procedures or surgeries:  Had PNT placed 5/17/2023 as part of kidney stone removal procedure, still in?  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  Primary functional concern is daily pain in ankles and sometimes R elbow.    Orthopedic past medical history:     Right SUNG 1992  Right hip steroid injections multiple times between [id number]   Right hip open synovectomy 2005  Right hip SUNG revision with synovectomy 4/26/2012  Left TKA 1991  Left hip fracture with internal fixation 1995  Imaging:   10/13/14 X ray B elbow: Severe joint space loss of both elbows, with likely flexion deformity. Bone spurring present B, worse on Right compared with Left. There is marked remodeling along the articular surfaces.  and Left ankle: severe joint space loss of the left ankle, talonavicular and likely naviculocuneiform joints, with extensive bone remodeling. Subchondral cystic changes...  some joint space narrowing of the subtalar joint. Prominent bone spurs are identified particularly along the dorsal aspect of the talonavicular joint.  2/2/2012 Lumbosacral X ray:  Mild loss of vertebral body heights at the level of L5, 2. Multilevel degenerative mild disc disease of the lumbar spine.   CTs and PET scans due to h/o bladder cancer, Xray/MRI related to Right hip SUNG  Bleeds: See referring provider's note.    Patient/family rehab goal: Avoid worsening of joints and function    Reported Pain/Stiffness: daily chronic pain of B ankles.  Fall Risk Screen:  Has the patient fallen 2 or more times in the last year? No  Has the patient fallen and had an injury in the past year? No  Is the patient a fall risk? Yes, department fall risk interventions implemented. Due to joint ROM limitations, pt is a fall risk.    Bleeding history-lifetime (patient recall, ATHN dataset categories) Unchanged from 10/18/22  L = Left  R = Right 0 1-3 4-19 20+ Unable to Recall Comments   Shoulder  L,R       Elbow    L,R     Hip  L  R  Often 4-6 bleeds/yr   Knee  L,R       Ankle    L,R       Physical Exam:   ROM (negative = hyperext, 0 = neutral, positive = flexion. Ankle DF: negative = unable to attain neutral)  73 year old  Age                        73 Current    Current   Current    Current   Current    Current      L Elbow Norm- Prior- Contra- R Elbow Norm- Prior- L Knee Norm- Prior- Contra- R Knee Norm- Prior- L Ankle Norm- Prior- Contra- R Ankle Norm- Prior-   Flex (PF) 135 4 9 25 110 -22 5 91 -29 7 0 91 -29 5 23 -10 -6 17 40 7 -2   Ext (DF) 55 47 5 2 57 49 -3 16 13 -7 1 17 14 2 -16 -18 6 1 -15 -17 -1     Hemophilia Joint Health Score 2.1 -Summary Score Sheet   2006, International Prophylaxis Study Group  HJHS Total Score 38 out of max of  124               Joint Score         34 L Elbow R Elbow L Knee R Knee L Ankle R Ankle   Swelling 0 0 0 0 1 0   Duration Swelling 6mo 0 0 0 0 1 0   Muscle Atrophy 0 0 0 0 0 0   Crepitus on  Motion 0 0 0 0 0 0   Flexion Loss 0 3 3 3 2 0   Extension Loss 3 3 3 3 3 3   Joint Pain 0 0 0 0 1 0   Strength 0 0 0 0 2 0   Individual Joint Total 3 6 6 6 10 3     NE =  Non-Evaluable  Swellin = No swelling, 1 = Mild, 2 = Moderate, 3 = Severe  Duration: 0 = No swelling or < 6 months, 1 = >/= 6 months  Muscle Atrophy:  0 = None, 1 = Mild, 2 = Severe  Crepitus on Motion:  0 = None,1 = Mild, 2 = Severe  Flexion Loss Contralateral: 0 = <5 degrees, 1 = 5-10 degrees, 2 = 11-20 degrees, 3 = > 20 degrees  Extension Loss Contralateral: 0 = <5 degrees, 1 = 5-10 degrees, 2 = 11-20 degrees, 3 = > 20 degrees  Joint Pain: 0 = No pain through active ROM, 1 = No pain through active range, only pain on overpressure or palpation, 2 = Pain through active range  Strength, Within Available Range:  0 = Holds test position against gravity with max resistance (gr 5), 1 = Holds test position against gravity with mod resistance, but breaks with maximal resistance (gr 4), 2 = Holds test position with min resistance (gr 3+) or holds test position against gravity (gr 3), 3 = Able to partially complete ROM against gravity (gr 3-/2+), or able to move through ROM gravity eliminated (gr 2), or through partial ROM gravity eliminated (gr 2-), 4 = Trace (gr 1) or no muscle contraction (gr 0)  Ankle PF: 0= 4-5 reps PF, 1= 2-3, 2= clears heel, 3= full AROM grav elim, 4= trace or no musclecontraction    Global Gait (Walking, Stairs, Running, Hopping on 1 leg): Score: 4  0 = All skills are within normal limits, 1 =  One skill is not within normal limits, 2 = Two skills are not within normal limits, 3 = Three skills are not within normal limits, 4 = No skills are within normal limits, NE = Non-evaluable  N=Normal, L=limp, TW=toe walking, WSF=walking on side of foot, US=uneven strides, NPO=no push off, TYRON=abnormal weight shift, FTO=foot turned out  Ankle-equal weight shift, heel-toe pattern, PF push-off, steps of equal length and lázaro, toes  symmetrically forward    Knee-Equal weight shift, HS with full ext, knee ext push-off, steps of equal length and lázaro, toes)    Abdominal Muscle Endurance (rectus abdominis, external oblique, internal oblique & transversus abdominis)  Score: NT due to time/prioritization.  Fitness category  Males  Females  Excellent  >32  >25  Satisfactory  25 - 32  15 - 25  Needs improvement <25  <15  Technique: Supine with with knees bent and feet comfortably flat on the floor, neutral lumbar spine. Then curl up your head and bring your chin towards your chest, fixing your neck position (as if you were holding an tennis ball between your chin and neck). Place hands behind ears with elbows pointing out to the side. Focus on abdominals, curling upper back off the floor, not moving any other body part, keeping arms, shoulders, neck and legs relaxed as the abs pull you up. If the movement is performed correctly, the head and arms will curl up as one unit with your shoulders. Stop when the upper back is off the floor. Pause for one count at the top. Slowly lower the upper back down to the floor.  Perform at 1 rep per 2 seconds    Function Left Right Comments   Hand to mouth WFL WFL    Hand behind head WFL WFL Left fingers about 1 inch lower than Right   Hand behind low back WFL WFL    Figure 4 WFL WFL Right ankle over left knee is harder, and places distal calf rather than ankle over knee   Squat WFL WFL    Step-over WFL WFL    Functional mobility: Independent transfers with extra time and heavy BUE support with gradually moving feet posterior as rises.   Gait: Independent without AD. Equal but decreased step length, equal time in stance, Bilateral knee flexion (Left more flexed than Right) throughout gait cycle, good lázaro, stable with no balance concerns.  Adjusts speed and negotiates obstacles with moderate difficulty. Push-off and heelstrike decreased  Stairs: Reports mod Indep with gait deviations  Posture/Alignment: Forward  head, bilateral forward shoulder (with shoulders anterior to hips) and bilateral shoulder internal rotation, bilateral pes planus with bilateral foot over pronation with right toe out and severe right foot over pronation.  Left > Right calcaneal valgus. Left knee remains flexed in stance.  Muscle Length:  NT due to patient priorities  Single leg stance: NT due to patient priorities  Patient's age: 73 year old Normative data from Gauthier B, Loyd R, Margoth T, et at. 2007 and Yung et. Al. 2016. Eyes open:  Age                 Time: Men (seconds)       Time: Women (seconds)  20-29                           66.4                                       72.7      30-39                           66.6                                       67.5  40-49                           60.2                                       58.9  50-59                           50.3                                       50.7  60-64                           33.8                                       39.5  65-69                           33.8                                       30.4  70-79                           25.9                                       16.7       80-99                            8.7                                        10.6    Eyes closed:  Age        Time (seconds) (std dev):  20-49  20.7  (9.5)  50-59   6.1    (4.7)  60-69  2  (4.7)  70-79  1 (3.9)    Treatment:  Orlin will benefit from skilled physical therapy to address joint hemarthropathy risk factors and home management of bleeding disorder.  Treatment consisting of:     Self care / Home management training education: daily symptom control techniques and flare management;     -Provided instruction on RICE (Rest, Ice, Compression, Elevation) for acute bleeding and/or chronic pain including offloading importance & options, Declines compression  -Artificially using 72 hrs since start of a bleed as indicator to switch from cold to heat. Educated on instead using  criteria of bleed has to have stopped to initiate heat.  Even though arthritic pain often responds well to heat, it is not required that he switches from cold to heat. If cold provides greater relief, cold can be used at any time with respect to bleeds.     -Instructed in use of moist heat for arthritic pain and stiffness. Reminded to avoid heat application to any area currently involved in a bleeding episode.    Therapeutic exercise to develop strength, endurance, flexibility and core stability through HEP instruction.  -Reviewed current exercise program and encouraged continued regular cardio, strengthening, and stretching.   -Educated about the importance of supportive footwear, instructed to wear laced up shoes with adequate arch & ankle support and good non-slip tread. May wear shoes indoors for additional support as needed.  Consider purchasing shoes with the assistance of knowledgeable shoe staff.  Adequate heel - toe offset may minimize ankle symptoms. Patient does not want to switch from current low heel work boots due to concern for increased bleeds.  Reports when had higher heeled western boots he didn't overpronate and toe out as much on the right. Declines using heel lift after education on this as well.  -Educated in federal guidelines of at least 150-300 minutes of moderate aerobic activity/wk (ex: 30 min 5x/wk) or 75 to 150 min of vigorous aerobic activity/wk. If health conditions prevent meeting this, move more and sit less, avoid inactivity, and engage in regular physical activity within current abilities. Any physical activity has some health benefits. Immediate benefits include reduced anxiety and blood pressure and improved quality of sleep and insulin sensitivity. Long term benefits include lower risk or improved management of heart health, cancer, falls, type 2 diabetes, depression.  -Educated in options for physical activity and assistance available for HFA for need based exercise equipment  financial support.     Equipment issued: None   Plan of Care:   1. Patient to trial recommendations & initiate home exercise program.  2. Will plan to see at next scheduled clinic visit and be available in the interim for any questions/concerns related to bleeding disorder management.      Therapy Frequency and Predicted Duration of Therapy Intervention: One session evaluation and treatment   Goals: Patient verbalizes understanding of evaluation results, home management and home exercise recommendations provided today to maximize functional mobility and allow for continued safe participation in current home and work activities. -Goal Met    Assessment & Plan   CLINICAL IMPRESSIONS  Medical Diagnosis: Hemophilia    Treatment Diagnosis: Hemophilia   Impression/Assessment: Patient is a 73 year old male with ankle and R elbow complaints.  The following significant findings have been identified: Pain, Decreased ROM/flexibility, Decreased joint mobility, Decreased strength, Impaired balance, Edema, Impaired gait, Impaired muscle performance, Decreased activity tolerance, and Impaired posture. These impairments interfere with their ability to perform self care tasks, work tasks, recreational activities, household chores, household mobility, and community mobility as compared to previous level of function.     Orlin Alcantar is limited by repeated ankle and elbow joint bleeds and arthropathy with L ankle and R elbow most frequently affected.  His progression of activity and participation in activities are limited by recurrent bleeds and joint discomfort. He indicates he is satisfied with current level of physical activity as he is not SOB with his activity.  His symptoms remain stable and he is content with his current plan for management.  He was receptive to education on why initial NWB for LE bleed is important, and that is not required to switch to heat at 72 hrs after start of bleed, and shouldn't switch to heat if  still actively bleeding.  Clinical Decision Making (Complexity):  Clinical Presentation: Stable/Uncomplicated  Clinical Presentation Rationale: based on medical and personal factors listed in PT evaluation  Clinical Decision Making (Complexity): Low complexity    PLAN OF CARE  Treatment Interventions:  Modalities: Cryotherapy, Hot Pack  Interventions: Therapeutic Exercise, Self-Care/Home Management    Long Term Goals     PT Goal 1  Goal Identifier: Hemo Comp  Goal Description: Patient verbalizes understanding of evaluation results, home management and home exercise recommendations provided today to maximize functional mobility and allow for continued safe participation in current home and work activities.  Goal Progress: Met today      Frequency of Treatment: one time eval and treat  Duration of Treatment: today only    Recommended Referrals to Other Professionals:   Education Assessment:   Learner/Method: Patient;Listening;Demonstration;Pictures/Video;No Barriers to Learning    Risks and benefits of evaluation/treatment have been explained.   Patient/Family/caregiver agrees with Plan of Care.     Evaluation Time:     PT Eval, Low Complexity Minutes (41245): 22       Signing Clinician: Concepcion Sol, PT      [facility]                                                                                   OUTPATIENT PHYSICAL THERAPY      PLAN OF TREATMENT FOR OUTPATIENT REHABILITATION   Patient's Last Name, First Name, Orlin Sauer    YOB: 1950   Provider's Name   [facility]   Medical Record No.  [id number]     Onset Date: 12/12/23  Start of Care Date: 12/12/23     Medical Diagnosis:  Hemophilia      PT Treatment Diagnosis:  Hemophilia Plan of Treatment  Frequency/Duration: one time eval and treat/ today only    Certification date from 12/12/23 to 12/12/23         See note for plan of treatment details and functional goals     Concepcion CAMERON  Ebenezer, PT                         I CERTIFY THE NEED FOR THESE SERVICES FURNISHED UNDER        THIS PLAN OF TREATMENT AND WHILE UNDER MY CARE     (Physician attestation of this document indicates review and certification of the therapy plan).              Referring Provider:  Monica Mortensen PA-C     Initial Assessment  See Epic Evaluation- Start of Care Date: 12/12/23

## 2023-12-11 NOTE — PROGRESS NOTES
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 He has a history of HCV that was cleared in  with interferon and ribavirin therapy. He has known hemophilic arthropathy and is on codeine for pain management. He has been on standard half life factor VIII product, Kogenate, at 3000 unit(s) every other day.     Orlin was raised by a foster family and does not know his biological parents or family health history. He was diagnosed by Dr. Quiñones at the [facility] many years ago and was followed by a community hematologist until re-establishing care at the [facility] in  prior to right hip surgery. He then returned to the care of his community hematologist until  when he was seen again prior to surgery. He then returned to our care since  and has followed with our C since then. He has been on prophylactic factor VIII replacement therapy for the last several years. He has been on a variety of regimens between 10,000 - 15,000 unit(s) weekly. He has preferred to use smaller factor doses more frequently rather than extend interval between infusions. At one point he was treated with 500 unit(s) of factor three times per day. He has used Recombinate, Helixate, and Kogenate since 2017. PK has been performed on Kogenate in 2019 that showed half life of 19 hours (14-24). He was briefly transitioned to emicizumab in  however he switched back to factor  replacement prophylaxis as he felt like he had better control over his hemophilia when he was able to infuse factor on a regular basis. He is currently on 3200 unit(s) of Kogenate every other day.      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 He has chronic pain associated with his arthropathy. He transitioned from codeine to oxycodone in 3/2019. He then transitioned back to codeine at his comp clinic visit in 2022. [facility]      He developed hematuria in 2018 and was found to have invasive urothelial carcinoma. He underwent chemotherapy and radical cystoprostatectomy with ileal conduit. He follows with Oncology for this and remains in remission.      He uses paper records to document prophylaxis and breakthrough bleeding episodes and treatment.    Joint History:      Hemarthropathy in all 6 index joints except right knee, and also 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  Primary functional concern is daily pain in left ankle and R elbow.   Right SUGN 1992  Right hip steroid injections multiple times between 2710-6920   Right hip open synovectomy 2005  Right hip SUNG revision with synovectomy 4/26/2012  Left TKA 1991  Left hip fracture with internal fixation 1995      Infectious Disease History:  HIV negative   HCV diagnosed in 1991, cleared in 1999   HAV unknown   HBV Core antibody negative       Other Pertinent Medical History:  Type 2 DM - reportedly diet controlled, last A1C elevated in 2018, not checked since then.  Invasive urothelial carcinoma s/p radical cyctoprostatectomy and lymph node dissection, ileal conduit, adjuvant chemotherapy. In remission. Followed by [facility] oncologist   Mood disorder/anxiety   Hypertension - on Lisinopril   Chronic pain syndrome - codeine prescribed by JONELLED  Nephrolithiasis w/ stent placement and removal in 5/2023    Current Outpatient  Medications   Medication    acetaminophen (TYLENOL) 325 MG tablet    Antihem Factor Recomb, rFVIII, (KOGENATE FS) 3000 units KIT    Antihem Factor Recomb, rFVIII, (KOGENATE FS) 3000 units KIT    celecoxib (CELEBREX) 100 MG capsule    codeine 30 MG tablet    lisinopril (ZESTRIL) 30 MG tablet    mirtazapine (REMERON) 45 MG tablet    naloxone (EVZIO) 0.4 MG/0.4ML auto-injector    tamsulosin (FLOMAX) 0.4 MG capsule    urea (GORMEL) 20 % external cream     Current Facility-Administered Medications   Medication    sulfamethoxazole-trimethoprim (BACTRIM DS) 800-160 MG per tablet 1 tablet     Facility-Administered Medications Ordered in Other Visits   Medication    ethyl chloride spray       Allergies   Allergen Reactions    Aspirin      This drug inhibits platelets and is contraindicated due to hemophilia diagnosis.            Family History:  Please refer to Genetics Counselor's previous note for detailed family history.  No record of prior genetic testing. No pedigree on file.     Social History:  Please refer to LISSET Mao's note for detailed social history.      CURRENT TREATMENT REGIMEN  Prophylaxis: Kogenate 3200 unit(s) every other day IV +/- 10 %     Breakthrough/On-Demand:Kogenate 3200 unit(s) every 24 hours as needed IV +/- 10 % for breakthrough bleeding    INTERVAL HISTORY  Today, Orlin reports that he is doing fairly well since he has recovered from his bout of nephrolithiasis earlier this year. On 5/17/2023, he was admitted for scheduled percutaneous nephrolithotomy. He received Kogenate at 50 unit(s)/kg prior to procedure and on post op day 1. He then infused Kogenate at 45 unit(s)/kg daily through post op day five then transitioned back to his every other day schedule. He had nephrostomy tube removed 5/30/2023. He tolerated the procedure well. He has not had any recurrent symptoms.     He brings in his infusion records today for review. Below are the documented bleeding events. He notes that with his  reported bleeds they all resolve with a single dose of factor. Episodes are reportedly spontaneous. Symptoms include increased pain, occasional swelling and reduced range of motion. Pain is well controlled with daily codeine.     - 11/24/2023 - Left ankle bleed   - 11/11/2023 - right elbow bleed   - 11/6/2023 - Right elbow bleed   - 10/18/2023 - Left ankle bleed   - 9/20/2023 -  Right elbow, left ankle   - 9/15/2023 - Right elbow bleed   - 9/12/2023 -  left ankle bleed   - 8/13/2023 - left ankle bleed   - 7/19/2023 - left ankle bleed   - 7/13/2023 - left ankle, right elbow bleed   - 3/3/2023 - hematuria - before diagnosis of kidney stone   - 2/23/2023 - left ankle bleed  - 12/2/2022 - left ankle bleed     His last two infusions were on 12/10/2023 at 10 am and 12/11/2023 at 8pm. He denies any concerns with venipuncture.     No orthopedic procedures in the past year. Please see note from KVNG Xie regarding interval orthopedic history.     He has a primary care provider. He was discharged from oncology clinic after his bladder cancer and expresses need for new provider to refill ilial conduit supplies. He denies any dental concerns. No recent visits to dentist for cleanings.      Interval events include:    Bleeding episodes in the past year:  Location Number of bleeds Other information related to each bleed   Ankle, Left 9     Ankle, Right 0     Elbow, Left 0     Elbow, Right 5     Hip, Left 0     Hip, Right 0     Knee, Left 0     Knee, Right 0     Shoulder, Left 0     Shoulder, Right 0     Other 0        Activities of Daily Living Assessment: Limited school or work and limited recreational activity levels     Chronic pain: Every day chronic pain    Pain management: Other Codeine    Primary care provider: Yes  Dentist: No      ROS:  Complete ROS is negative, except as noted above.       OBJECTIVE  Exam:  BP (!) 140/82   Pulse 76   Temp 97.4  F (36.3  C) (Oral)   Ht 1.829 m (6')   Wt 72.5 kg (159 lb 14.4  oz)   SpO2 97%   BMI 21.69 kg/m    General: No acute distress  Constitutional: Appears well, no distress  HEENT: Pupils equal and round. No scleral icterus or hemorrhage. Nares without evidence of telangiectasia. Mucous membranes moist with no wet purpura.   CV: regular rate and rhythm, no murmurs  Respiratory: clear to auscultation bilat.  GI: abdomen soft, nontender, without guarding or rebound. No hepatomegaly. No splenomegaly.   Mus/Skele: no edema.reduced flexion of left elbow > right. Diminished ROM of right ankle>left Please see FRANC XieT's note for joint evaluation.   Skin: no petechiae, no ecchymosis.  Neuro: CN II-XII intact. Antalgic gait. AOx3       Past Pharmacokinetic Studies:  Completed on Kogenate in the past, T1/2 19 hours (balanced)    Labs:    Lab Results   Component Value Date    WBC 4.2 10/06/2023    WBC 6.4 03/01/2021     Lab Results   Component Value Date    RBC 4.94 10/06/2023    RBC 5.12 03/01/2021     Lab Results   Component Value Date    HGB 14.0 10/06/2023    HGB 14.5 03/01/2021     Lab Results   Component Value Date    HCT 41.6 10/06/2023    HCT 44.5 03/01/2021     Lab Results   Component Value Date    MCV 84 10/06/2023    MCV 87 03/01/2021     Lab Results   Component Value Date    MCH 28.3 10/06/2023    MCH 28.3 03/01/2021     Lab Results   Component Value Date    MCHC 33.7 10/06/2023    MCHC 32.6 03/01/2021     Lab Results   Component Value Date    RDW 13.8 10/06/2023    RDW 14.5 03/01/2021     Lab Results   Component Value Date     10/06/2023     03/01/2021     Last Comprehensive Metabolic Panel:  Lab Results   Component Value Date     10/06/2023    POTASSIUM 4.2 10/06/2023    CHLORIDE 106 10/06/2023    CO2 22 10/06/2023    ANIONGAP 12 10/06/2023     (H) 10/06/2023    BUN 24.1 (H) 10/06/2023    CR 1.0 10/06/2023    GFRESTIMATED >60 10/06/2023    MARLA 9.8 10/06/2023           Imaging:  10/13/14 X ray B elbow: Severe joint space loss of both elbows,  with likely flexion deformity. Bone spurring present B, worse on Right compared with Left. There is marked remodeling along the articular surfaces.  and Left ankle: severe joint space loss of the left ankle, talonavicular and likely naviculocuneiform joints, with extensive bone remodeling. Subchondral cystic changes... some joint space narrowing of the subtalar joint. Prominent bone spurs are identified particularly along the dorsal aspect of the talonavicular joint.  2/2/2012 Lumbosacral X ray:  Mild loss of vertebral body heights at the level of L5, 2. Multilevel degenerative mild disc disease of the lumbar spine.   CTs and PET scans due to h/o bladder cancer, Xray/MRI related to Right hip SUNG    ASSESSMENT  In summary, Orlin Alcantar is a 73 year old man with severe hemophilia A with baseline factor VIII level of 1%,  without inhibitor returns to clinic today for routine comprehensive hemophilia clinic visit. 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  He has been self infusing Kogenate at 3200 unit(s)~ 45 unit(s)/kg every other day for bleed prophylaxis. He prefers to stay on SHL product. He does report some breakthrough bleeding episodes of left ankle and right elbow this last year that resolved with single dose of factor. It is probable that these are flares of hemophilic arthropathy rather than true bleeds however the number of reported episodes has reduced this year. He takes daily codeine for pain control. His joint range of motion is stable in comparison to last year.     We discussed alternative options once Kogenate is no longer available. The prior plan was to change to Kovaltry which is the most similar Ronda product. We also discussed other L  options, non factor options. He expressed interest in Altuviiio however I am concerned about use of additional factor for possible non bleed events and risk of thrombosis. We plan to discuss this again in the future.       PLAN  Factor replacement plan:  Kogenate 3000 unit(s) every other day and as needed for breakthrough bleeding q 24 hours     Hemophilic arthropathy management plan:  Home exercise plan, balance regimen   Celebrex 100mg twice daily for arthropathy pain  Continue Codeine for pain  Please refer to Concepcion Sol DPT's note for detailed hemophilic arthropathy plan.    Labs done and follow-up plan:  Registry inhibitor testing completed today.   Added factor 8 assay for an additional validation point for pharmacokinetic studies.     Routine Health Maintenance:  Recommended he continue to see primary care provider every year or as directed.  Recommend he have dental cleanings every 6 months   Covid booster given today.     Follow up clinic visit timing:  Return to clinic for routine comprehensive clinic visit in 1 year, sooner if any concerns or to discuss alternative products if Kogenate at decent vial sizes become unavailable.        The following individuals have seen this patient independently today.    Concepcion Sol DPT; Analilia Rashid [facility] and RAJINDER CAMACHO , nurse clinician, Xiomararay Velazquezr, [facility] and a hemophilia pharmacy liaison have all seen the patient independently, please refer to their notes for their evaluation and assessment.      Dr. Jorge Jiang, staff hematologist has also seen this patient today in clinic.       Monica Freedman, MPH, PA-C  [facility]      HEMATOLOGY STAFF    Patient seen and evaluated as part of a shared visit.  I have reviewed the clinical history, physical exam, relevant labs, and treatment plan with the BARB, as well as other members of the comprehensive hemophilia care team.      Orlin Ortiz is a 73-year-old  male with severe hemophilia A,  without history of inhibitor.  Returns today for routine comprehensive clinic visit.  He has a complex past medical history including bladder cancer for which he underwent chemotherapy and surgery and which remains in remission now approximately 5 years out.  He also has hypertension and type 2 diabetes.    He has advanced hemophilic arthropathy in all 6 index joints and has had both hips and the left knee replaced.  He has chronic pain related to his arthropathy for which he uses Celebrex and codeine.  We have tried other regimens but this has been the most effective for him.  We have had no concerns about inappropriate pain medication use.  He is HIV negative but did develop HCV which was cleared with interferon and ribavirin in 1999.    He has treated with standard half-life factor concentrates for many years and continues to treat every other day.  On several occasions we have discussed alternate treatment options including extended half-life factor concentrates and emicizumab.  He did briefly transition to emicizumab in 2021 but returned to his previous treatment as he felt he had better control over his hemophilia by using frequent infusions of standard half-life concentrate.  He continues to have frequent treated bleeding episodes, with an ABR of approximately 12.  Some of these may be flares of hemophilic arthropathy rather than true breakthrough bleeds.  Nevertheless, he is satisfied with his current treatment regimen and his overall joint function has remained stable.    We will continue with his current treatment plan, and continue to see him back annually unless there are new issues or concerns.    Total time on date of encounter 40 minutes, including review of medical records and labs, clinic visit, and documentation.      Jorge Jiang MD  Professor of Medicine  Division of Hematology, Oncology, and Transplantation  Director, [facility]        ADDENDUM:    Summary: Community Counts! - CDC Public Health Surveillance Results   Community Counts! - CDC Public Health Surveillance Results  Orlin Alcantar, 1950, Labs from 12/12/2023 have returned from CDC Community Counts Registry. Results included:                            Factor VIII Inhibitor: 0.1 Nijmegen-Dexter units        NETTIE CLINE PA-C on 12/20/2023 at 8:39 AM

## 2023-12-12 ENCOUNTER — THERAPY VISIT (OUTPATIENT)
Dept: PHYSICAL THERAPY | Facility: CLINIC | Age: 73
End: 2023-12-12
Attending: INTERNAL MEDICINE
Payer: MEDICARE

## 2023-12-12 ENCOUNTER — ALLIED HEALTH/NURSE VISIT (OUTPATIENT)
Dept: HEMATOLOGY | Facility: CLINIC | Age: 73
End: 2023-12-12
Payer: MEDICARE

## 2023-12-12 ENCOUNTER — TRANSFERRED RECORDS (OUTPATIENT)
Dept: HEMATOLOGY | Facility: CLINIC | Age: 73
End: 2023-12-12

## 2023-12-12 ENCOUNTER — OFFICE VISIT (OUTPATIENT)
Dept: HEMATOLOGY | Facility: CLINIC | Age: 73
End: 2023-12-12
Attending: INTERNAL MEDICINE
Payer: MEDICARE

## 2023-12-12 VITALS
HEART RATE: 76 BPM | BODY MASS INDEX: 21.66 KG/M2 | SYSTOLIC BLOOD PRESSURE: 140 MMHG | OXYGEN SATURATION: 97 % | HEIGHT: 72 IN | DIASTOLIC BLOOD PRESSURE: 82 MMHG | TEMPERATURE: 97.4 F | WEIGHT: 159.9 LBS

## 2023-12-12 DIAGNOSIS — D66 SEVERE HEMOPHILIA A (H): Primary | ICD-10-CM

## 2023-12-12 DIAGNOSIS — Z71.9 ENCOUNTER FOR COUNSELING: Primary | ICD-10-CM

## 2023-12-12 PROCEDURE — G0463 HOSPITAL OUTPT CLINIC VISIT: HCPCS | Mod: 25 | Performed by: INTERNAL MEDICINE

## 2023-12-12 PROCEDURE — 99215 OFFICE O/P EST HI 40 MIN: CPT | Mod: FS | Performed by: INTERNAL MEDICINE

## 2023-12-12 PROCEDURE — 97161 PT EVAL LOW COMPLEX 20 MIN: CPT | Mod: GP | Performed by: REHABILITATION PRACTITIONER

## 2023-12-12 PROCEDURE — 36415 COLL VENOUS BLD VENIPUNCTURE: CPT | Performed by: INTERNAL MEDICINE

## 2023-12-12 PROCEDURE — G0463 HOSPITAL OUTPT CLINIC VISIT: HCPCS | Performed by: INTERNAL MEDICINE

## 2023-12-12 PROCEDURE — 85240 CLOT FACTOR VIII AHG 1 STAGE: CPT | Performed by: INTERNAL MEDICINE

## 2023-12-12 NOTE — PROGRESS NOTES
"  [facility]  Social Work Evaluation    Name:  Orlin Alcantar  Age:   73 year old  :  1950    Presenting Information:  Orlin is followed by the [facility] for management of severe hemophilia A.  He presented to clinic today for a routine comprehensive clinic evaluation.  Met with the patient 1:1 to complete an updated psychosocial evaluation.    Living Situation:   Orlin lives in a section 8 apartment in [place].  This is a 3rd floor apartment and he has access to an elevator.  He is very happy in his current location and does not plan to try to move anytime soon.  He has been living in this apartment for ~7 years.     Family/Social Support:  Orlin has a limited support network and he stated this is due to his own preferences.  He has stated if he needs support, he trusts [facility] staff the most.  He moved into a foster home at the age of 1, and his foster parents and siblings are all . His remaining family include neices, whom he has limited contact with.     Functional Status: Independent with ADLs and IADLs, including motor vehicle .  He has a set of crutches and a walker at home, though reported they are \"gathering dust\" as he has not needed them recently.    Current Community Services:    [facility]  [facility]  [facility]  PCP    Past Community Services:   Inpatient psychiatry at [facility] in .  Outpatient Psychiatry for medication mangement through  (reported his provider left)  [facility]    Chemical Use:    Orlin reported minimal alcohol use and that he may have a Guiness once in a while.  He has tried legal CBD gummies but did not care for them.  He does not use tobacco.    Mental/Emotional Health:   PHQ-9: 0.  RATNA-7: 0.  Scores were discussed and Orlin reported mental health has been very stable.    The patient reported a history of the following mental health concerns and/or diagnoses: anxiety and " depression.       Orlin  reported manageable stress levels and that they cope with stress by listening to music and reading.  We discussed the ANS and symptoms within chronic health conditions.    Pain Management Strategies:  Orlin has a prescription for oxycodone and he reports taking this 3x/day.     Abuse Concerns:  No concerns indicated.     Education: Orlin is a high school graduate and he completed 1 year of post-secondary education.     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     School/Work Attendance:   N/A    Financial/Income:    Social security disability income up until he retired and he now receives social security income.  He stated finances are tight, but he budgets and makes ends meet.  He denied specific financial issues during today's visit.    Health Insurance:    Medicare and BCBS.  He is connected to PAN premium assistance and [employer]B program.   He did not have questions or concerns related to out of pocket costs or coverage.    Health Literacy/Adjustment to Illness:    Orlin keeps an annual visit schedule, and reaches out to the center as needed.  He is proactive in meeting his health care needs and is very organized when managing his benefits and finances.  He has maintained access to health insurance, resources, and community resources.       Medical Alert ID Tag:  A wallet card was ordered in 2022 for patient via [employer].    Advanced Care Planning:  Documents are in Epic and validated by  honoring choices..    Authorization to discuss PHI:  Declined, as he would not want to list anyone on the form.  Electronic Consent for email: Declined, does not use.  Electronic Consent for text messaging:  Declined, does not use.    Interventions Utilized:  Assessment of strengths and needs  Assessment of impact of living with a chronic health condition, overall adjustment, and current coping skills  Normalized and validated feelings and experiences  Provided  positive feedback and encouragement  Encouraged ongoing self-care and continued attention to self-care  Discussed Advanced Care Planning  Case Coordination with [facility]CD Team    Impressions/Recommendations:    Orlin Alcantar is a 72 year old male who presented to the [facility] at [facility] this date for a routine comprehensive clinic evaluation.  He presented today as alert, oriented and engaged and they were welcoming of social work visit.     Orlin keeps an annual visit schedule, and reaches out to the center as needed.  He is proactive in meeting his health care needs and is very organized when managing his benefits and finances.  He has maintained access to health insurance, resources, and community resources.  Orlin reported his mental health is well managed at this time; we discussed medical trauma and the ANS. He has good coping skills and a small support network by choice.    Plan:   There were no ongoing psychosocial needs identified.  Writer remains available to assist as needs arise, and Orlin was encouraged to contact writer as needed.    Analilia Rashid, [facility]  Clinical   [facility]  Office: [phone]

## 2023-12-12 NOTE — PATIENT INSTRUCTIONS
Emergency Treatment Recommendations    Date: 2023    Name: Orlin Alcantar  MRN: [id number]     : 1950  Diagnosis:  severe Hemophilia A  Baseline factor VIII level: <1 %  Inhibitor status: No history of inhibitor  Patient weight: 72.5 kg  Current treatment:  Kogenate 3000 units every other day  Half-life information:  Half-life on Kogenate studied on November 15, 2018 was 14 hours     For bleeding events:   Treat with: recombinant factor VIII 50 units/kg   Factor replacement should be administered emergently and prior to imaging or any invasive/surgical procedure   For suspected intracranial hemorrhage or life / limb threatening bleed, draw a peak factor VIII level 5-10 minutes after administration   For mucosal bleeding, antifibrinolytics may be useful; these are contraindicated in hematuria  Aminocaproic acid (Amicar) 50mg/kg IV or PO q 6 hours  Tranexamic acid (Lysteda) 1300 mg po TID or 10mg/kg IV q 8 hours     A single dose of factor replacement will temporarily correct the bleeding disorder-- further dosing may be required.  For guidance, please contact [facility]: [phone]  After Hours please call [phone] and ask for hematologist on call      Factor Plan:   Dose of factor (Kogenate) concentrate = 3000 units given Every other day and as needed for bleeding.  For emergency head trauma, or signs of serious bleeing,  please treat with Kogenate 3000 to boost your factor level and seek emergency care for head CT scan.  Continue to keep infusion records via paper records.    General Recommendations:  See primary care provider for general health maintenance.  If you plan to see a dentist, please call for antibiotic recommendations for dental cleaning.  Wear medic alert on your person for highest level of safety www.medicalert.org  Carry factor concentrate with you at all times. Call the center if you will be traveling out of the area. We can create a  "travel letter prior to your departure . For treatment centers around the world go to www.[email or web address].org, click on top right link \"RESOURCES\" and then scroll down to  \"Find a Treatment [place]\". Enter country, then scroll down to city closest to destination.  For any questions, your nurse clinician is Margot Reed RN, [phone].  If she is unavailable and you have immediate concerns, please call [phone] and ask for a nurse.   If you have emergency needs in the evening or weekend, please call [phone] and ask for the hematologist on call. Please return for comprehensive clinic in 1 year.    Patient Education & Resources  If you would like further information about your bleeding disorder, the following links may be of help:  The National Hemophilia Foundation (includes HANDI educational resource link)   Www.hemophilia.org  The World Federation of Hemophilia (includes Global Treatment [place] Directory)   Www.wf.org    Additional Patient Information  Here is the information reviewed you today regarding [facility] patient choice information:    Per the 2005 [employer] Manual for Participating in the Drug Pricing Program Established by Section 340B of the Public Health Service Act I. Freedom of Choice Regarding Factor Replacement Products and Avoidance of Conflict of Interest It is a requirement of the [facility] National Hemophilia Program (NHP) that all [facility] funded hemophilia treatment centers (HTCs) have a \"Freedom of Choice\" policy where patients are informed of choices they have regarding factor replacement products (FRP) and where these products might be purchased. It is important that this policy be exercised with all patients and it is especially important that this policy be exercised by [facility] funded HTCs that sell FRP since income generated from this activity is used to further the provision of services by the [facility]. To avoid any appearance of conflict of interest, patients must be informed about their choices and be " encouraged to make whatever decision they desire.    Margot MATAMOROSN, RN   Nurse Clinician  [facility]  [facility]  Office: [phone]  Main Office: [phone] (ask to speak with a nurse)  Fax: [phone]

## 2023-12-12 NOTE — NURSING NOTE
Orlin Alcantar  [id number]  1950    Teaching Flowsheet   Orlin Alcantar  has a diagnosis of hemophilia A with a baseline factor VIII level of  <1% . Orlin Alcantar  presents today for his comprehensive Hemophilia clinic evaluation.    Teaching Topic: hemophilia self care     Learning Assessment  Person(s) involved in teaching:   Patient     Motivation Level:  Asks Questions: Yes  Eager to Learn: Yes  Cooperative: Yes  Receptive (willing/able to accept information): Yes     Patient demonstrates understanding of the following:  Reason for the appointment, diagnosis and treatment plan: Yes  Knowledge of proper use of medications and conditions for which they are ordered (with special attention to potential side effects or drug interactions): Yes  Which situations necessitate calling provider and whom to contact: Yes    Pain management techniques: Yes  How and/when to access community resources: Yes     Nursing Summary  Orlin Alcantar uses  Kogenate brand factor VIII concentrate  and treats prophylactically every two days.  He treats himself using independent peripheral venipuncture and his typical dose is 3000 units. He keeps track of his infusions by using paper records. .    Patient's goal for today's visit is: none identified.    Other Health Maintenance  Patient's primary care provider is Dr. Twin Le  Patient not followed by dentist and has no dental concerns.    Nursing Education Provided:  - Reviewed procedure for home infusions of factor concentrates.  See attached treatment recommendations.   - Reviewed severe/life threatening hemorrhage and handout given that recommends appropriate dosing.  If suspect bleeding in head, neck, throat or abdomen, give dose of factor if able, contact the hemophilia center immediately or report to the Emergency Room at [facility] or the nearest emergency room.   - Discussed need for dental check-ups and prophylactic antibiotics for dental procedures.    - Reviewed resources/weblinks with patient.  The National Hemophilia Foundation (HANDI educational resource request)   Www.hemophilia.org  The World Federation of Hemophilia (Find a treatment center)   Www.wfh.org  -Patient was given [facility] Contact Card.    Margot MATAMOROSN, RN   Nurse Clinician  [facility]  [facility]  Office: [phone]  Main Office: [phone] (ask to speak with a nurse)  Fax: [phone]

## 2023-12-13 LAB — FACT VIII ACT/NOR PPP: 34 % (ref 55–200)

## 2023-12-19 ENCOUNTER — DOCUMENTATION ONLY (OUTPATIENT)
Dept: HEMATOLOGY | Facility: CLINIC | Age: 73
End: 2023-12-19
Payer: MEDICARE

## 2023-12-19 NOTE — PROGRESS NOTES
Community Counts! - CDC Public Health Surveillance Results  Orlin Alcantar, 1950, Labs from 12/12/2023 have returned from CDC Community Counts Registry. Results included:      Factor VIII Inhibitor: 0.1 Nijmegen-Arena units        Labs were drawn at the [facility] and sent to the CDC Surveillance Laboratory to result. See media tab for scanned document. Original copy available through [facility] web site. Results were mailed to the patient.      Zonia Schaffer, MPH    [facility] for [facility]  [phone] (p) [phone] (f)

## 2023-12-20 DIAGNOSIS — D66 SEVERE HEMOPHILIA A (H): ICD-10-CM

## 2023-12-20 DIAGNOSIS — R31.9 HEMATURIA, UNSPECIFIED TYPE: ICD-10-CM

## 2023-12-20 RX ORDER — ANTIHEMOPHILIC FACTOR (RECOMBINANT) 3000 (+/-)
KIT INTRAVENOUS
Qty: 26760 EACH | Refills: 6 | Status: SHIPPED | OUTPATIENT
Start: 2023-12-20 | End: 2024-04-17

## 2023-12-20 RX ORDER — ANTIHEMOPHILIC FACTOR (RECOMBINANT) 3000 (+/-)
KIT INTRAVENOUS
Qty: 40140 EACH | Refills: 0 | Status: SHIPPED | OUTPATIENT
Start: 2023-12-20 | End: 2024-04-03

## 2023-12-20 NOTE — TELEPHONE ENCOUNTER
*Medicare refill - please check units and qty*    *Need Rx by: Today, December 20 th by 12 pm    *Last comp visit: 12/12/2023    *Drug: Kogenate at 8 doses of 3345 units    *Refills left: 0    *Notes: We have several months of Kogenate at 3345 units left, so if  we could have refills on the rx that would be extremely helpful    Thanks,    SALINA Cameron., [facility]  Lead Pharmacy Coordinator  [facility]  [phone]

## 2023-12-29 DIAGNOSIS — D66 SEVERE HEMOPHILIA A (H): ICD-10-CM

## 2023-12-29 RX ORDER — CODEINE SULFATE 30 MG/1
30 TABLET ORAL EVERY 8 HOURS PRN
Qty: 84 TABLET | Refills: 0 | Status: SHIPPED | OUTPATIENT
Start: 2023-12-29 | End: 2024-01-29

## 2024-01-05 ENCOUNTER — TELEPHONE (OUTPATIENT)
Dept: HEMATOLOGY | Facility: CLINIC | Age: 74
End: 2024-01-05
Payer: MEDICARE

## 2024-01-05 NOTE — TELEPHONE ENCOUNTER
[facility]    Outreach to patient today to discuss [employer] Fund.  Patient took down information and will consider applying in applicable in the future.    Analilia Rashid, [facility]  Clinical   [facility]  Office: [phone]

## 2024-01-23 ENCOUNTER — PATIENT OUTREACH (OUTPATIENT)
Dept: ONCOLOGY | Facility: CLINIC | Age: 74
End: 2024-01-23
Payer: MEDICARE

## 2024-01-23 NOTE — TELEPHONE ENCOUNTER
[facility]: Cancer Care                                                                                          Patient called requesting Brianna Harris place order for his urostomy supplies.  A faxed was received and signed by Brianna Harris, however, when faxed back the required office notes were not included.    Writer called and spoke with the vendor representative Nicole ([phone]) and she explained, Orlin's account has been audited and he has straight Medicare. The guidelines are quite particular, in that the office notes needs to have verbiage stating urostomy supplies were discussed.  It is unclear if last notes have the correct info.Faxed office notes to Nicole at  [phone]    Nicole will follow up with Orlin, he may need an office visit with his PCP to obtain more detailed office notes.    Orlin will be using his PCP for future supplies.      Signature:  Samreen Horan LPN

## 2024-01-29 ENCOUNTER — TELEPHONE (OUTPATIENT)
Dept: UROLOGY | Facility: CLINIC | Age: 74
End: 2024-01-29
Payer: MEDICARE

## 2024-01-29 ENCOUNTER — TELEPHONE (OUTPATIENT)
Dept: HEMATOLOGY | Facility: CLINIC | Age: 74
End: 2024-01-29
Payer: MEDICARE

## 2024-01-29 DIAGNOSIS — D66 SEVERE HEMOPHILIA A (H): ICD-10-CM

## 2024-01-29 RX ORDER — CODEINE SULFATE 30 MG/1
30 TABLET ORAL EVERY 8 HOURS PRN
Qty: 84 TABLET | Refills: 0 | Status: SHIPPED | OUTPATIENT
Start: 2024-01-29 | End: 2024-02-26

## 2024-01-29 NOTE — TELEPHONE ENCOUNTER
[facility] Call Center    Phone Message    May a detailed message be left on voicemail: yes     Reason for Call: Other: Pt called and states he needs to make an appt with Ben for  ILEAL CONDUIT SUPPLIES. He states he is working with an NP who won't order this for him. Pt scheduled with Ben on 4/16/24. Please follow-up with pt    Action Taken: Message routed to:  [facility] Urology    Travel Screening: Not Applicable

## 2024-01-29 NOTE — TELEPHONE ENCOUNTER
[id number]  Orlin Alcantar  73 year old male    Incoming message from  stating that patient is having difficulty obtaining urostomy supplies.     A one month order was signed by oncology provider Brianna Harris, but [facility] needs a detailed note from provider in addition to order on why these supplies are needed.     RN will route high priority to urology team. He has seen Dr. Thomas in the past. RN tried to call patient to inform him that RN will route concerns to urology team however phone line was busy.    Tayla Gill RN, BSN, PCCN  Nurse Clinician    [facility]  [address] [place] [address], [address], [place] [address]   Office, direct: [phone]  Main office number: [phone]  Pronouns: She, her, hers

## 2024-01-30 NOTE — TELEPHONE ENCOUNTER
[facility] Call Center    Phone Message    May a detailed message be left on voicemail: no    Reason for Call: Other: Patient would like to switch to [employer] home supply company instead of where he is getting his supplies now. He would like a call back to discuss before moving forward with the switch. Patient does not have an option to do mychart, phone call only. Thank you!     Action Taken: Message routed to:  Other: uro    Travel Screening: Not Applicable

## 2024-01-30 NOTE — TELEPHONE ENCOUNTER
[facility]  Brief Social Work Note    Pt called and reported he is about to run out of urology supplies and he is wondering if [facility] could prescribe.  Reviewed chart and routed to nursing to address.    Analilia Rashid, [facility]  Clinical   [facility]  Office: [phone]    Addendum 1/30:  Received another call from patient.  He has not had a call back from the urology clinic. Spoke with [facility] RN who will call the patient.

## 2024-01-31 ENCOUNTER — TELEPHONE (OUTPATIENT)
Dept: HEMATOLOGY | Facility: CLINIC | Age: 74
End: 2024-01-31
Payer: MEDICARE

## 2024-01-31 NOTE — TELEPHONE ENCOUNTER
[id number]  Orlin Alcantar  73 year old male  [facility] Diagnosis: severe hemophilia A  [facility] Provider: Jorge Jiang MD    Orlin Alcantar has history of bladder cancer and has a urostomy.  He is in need of urostomy supplies which have previously been prescribed by his Oncologist, Dr. Gardner.  As Orlin is no longer under oncology care, that provider can no longer order those supplies.      Orlin has gotten his supplies through [facility] @ [phone] - contact name is Nicole.    Orlin has an appt in April with Dr. Contreras in urology but will need supplies prior to that time.      Spoke with Hanane Lechuga RN, urology nurse, who took down the details and will reach out to Orlin to assist in obtaining these supplies. Updated Orlin and he will await Hanane's call.    Margot DAS, RN   Nurse Clinician  [facility]  Office: [phone]  Main Office: [phone] (ask to speak with a nurse)  Fax: [phone]     Addendum on 2/1/2024:   Orlin Alcantar called in to find out status of him getting urostomy supplies.  I did tell him that I have not heard an update and sent secure chat to Hanane Lechuga RN asking for an update.  Have not heard back.    Margot DAS, RN   Nurse Clinician  [facility]  Office: [phone]  Main Office: [phone] (ask to speak with a nurse)  Fax: [phone]

## 2024-02-01 ENCOUNTER — TELEPHONE (OUTPATIENT)
Dept: UROLOGY | Facility: CLINIC | Age: 74
End: 2024-02-01
Payer: MEDICARE

## 2024-02-01 NOTE — TELEPHONE ENCOUNTER
[facility] Call Center    Phone Message    May a detailed message be left on voicemail: yes     Reason for Call: Other: Pt is in need of information on the home supply delivery for Iliostomy. Please call pt.back asap. (Prescription needed before May 28th appt)     Action Taken: Message routed to:  [facility] Urology    Travel Screening: Not Applicable

## 2024-02-02 NOTE — TELEPHONE ENCOUNTER
Writer reached out to pt to verify supply need.     Pt notes that he would like to continue receiving supplies through [facility]. Pt further notes that he would like the rx to be sent to [facility] so he can place the order as needed by simply calling in his order.     Pt further noted that [facility] is requesting office notes to support the need for the supplies. Writer noted that he has not seen the provider that performed his surgery since 2022. Writer emphasized that due to urology not following for supplies that the order might be denied until pt has his follow up appt. Pt expressed understanding.     Writer will fax the below order with last office note to .    Writer received the following supply numbers via pt:  04583-Dnnrhechl ring-6 boxes every 2 months  53377-Eieopcspm bag-3 boxes every 2 months  8450-George appliance bag  5170-Coloplast urine collection leg bag-2 every other month  453170-Caxq Latex closed drainage bag.     Writer attempted to call [facility] at . Writer needed to leave a detailed VM with the above information.     Will continue to follow as needed.     [facility]    Once completed please fax to     A. Please include patient demographics and chart notes (ex: indefinite retention) with this order     Name: Orlin Alcantar   : 1950   Phone: [phone]  Address: [address] [place] [address]    B. Diagnosis     Retention of urine (788.20/R33.9)  x Urinary Incontinence (788.30/R30)    Incomplete Bladder Emptying (788.21/R39-14)   Urge Incontinence (788.31/N39.41)   x Other Specified Retention of Urine (788.29/R33.8)  x Other: hx of bladder cancer     C. Order Information/Start Date: 2024    Number of Refills: unlimted  Length of Need: lifetime     Patient may receive up to a 90-day supply at one time; therefore, quantity will be three (3) times amount below.                                        CHECK PRODUCT [employer] FREQ OF USE QUANTITY PER STRAIGHT  COUDE    ONE  SIZE PER DAY MONTH TO DISPESE      Intermittent Catheter         Intermittent Catheter with         Insertion Supplies          Condom or External Catheters          ADDITIONAL PRODUCTS/ITEMS ORDERED (check all that apply)     PRODUCT FREQ OF USE QUANTITY PER  ___ Patient Choice     PER MONTH MONTH TO DISPENSE  ___ Bard   x 17488-Pdwvrrmzk ring 6 boxes every 2 months   ___ Coloplast   x 29434-Pgflpstoa bag 3 boxes every 2 months   ___ Cure Medical   x 8450-Raven appliance bag 3 boxes every 2 months   ___ GentleCath   x 5170-Coloplast urine collection leg bag 2 every other month   ___ Hi-Slip   x 466764-Cmaj Latex closed drainage bag.  2 every other month   ___Lo Fric        ___Magic3        ___ MTG        ___ Tad-Teleflex        ___ PatdiCatnaldo PRESTON Physician's Information:      Physician's Name: Dr Contreras-Urology  Phone: [phone]  Date: 02/02/24    [facility]  [facility]  [address] [place] [address] [id number] [address]

## 2024-02-15 DIAGNOSIS — F51.04 PSYCHOPHYSIOLOGIC INSOMNIA: ICD-10-CM

## 2024-02-15 DIAGNOSIS — F41.1 ANXIETY STATE: ICD-10-CM

## 2024-02-15 RX ORDER — MIRTAZAPINE 45 MG/1
45 TABLET, FILM COATED ORAL AT BEDTIME
Qty: 90 TABLET | Refills: 3 | Status: SHIPPED | OUTPATIENT
Start: 2024-02-15

## 2024-02-16 DIAGNOSIS — R26.9 ABNORMAL GAIT: ICD-10-CM

## 2024-02-16 DIAGNOSIS — D66 HEMOPHILIC ARTHROPATHY (H): ICD-10-CM

## 2024-02-16 DIAGNOSIS — M36.2 HEMOPHILIC ARTHROPATHY (H): ICD-10-CM

## 2024-02-16 DIAGNOSIS — D66 SEVERE HEMOPHILIA A (H): ICD-10-CM

## 2024-02-16 RX ORDER — CELECOXIB 100 MG/1
100 CAPSULE ORAL 2 TIMES DAILY PRN
Qty: 60 CAPSULE | Refills: 9 | Status: SHIPPED | OUTPATIENT
Start: 2024-02-16

## 2024-02-16 NOTE — TELEPHONE ENCOUNTER
Orlin Alcantar [facility] for their history of Severe Hemophilia A and Hemophilic Arthropathy.     They were last evaluated by our team on 12/12/2023 with the plan to keep taking Celebrex 100mg twice daily for arthropathy pain .    Refills sent to last until December 2024.    Tayla Gill RN, BSN, PCCN  Nurse Clinician    [facility]  [address] [place] [address], [address], [place] [address]   Office, direct: [phone]  Main office number: [phone]  Pronouns: She, her, hers

## 2024-02-26 DIAGNOSIS — D66 SEVERE HEMOPHILIA A (H): ICD-10-CM

## 2024-02-26 RX ORDER — CODEINE SULFATE 30 MG/1
30 TABLET ORAL EVERY 8 HOURS PRN
Qty: 84 TABLET | Refills: 0 | Status: SHIPPED | OUTPATIENT
Start: 2024-02-26 | End: 2024-03-25

## 2024-03-25 DIAGNOSIS — D66 SEVERE HEMOPHILIA A (H): ICD-10-CM

## 2024-03-25 RX ORDER — CODEINE SULFATE 30 MG/1
30 TABLET ORAL EVERY 8 HOURS PRN
Qty: 84 TABLET | Refills: 0 | Status: SHIPPED | OUTPATIENT
Start: 2024-03-25 | End: 2024-04-22

## 2024-04-03 DIAGNOSIS — D66 SEVERE HEMOPHILIA A (H): ICD-10-CM

## 2024-04-03 DIAGNOSIS — R31.9 HEMATURIA, UNSPECIFIED TYPE: ICD-10-CM

## 2024-04-03 RX ORDER — ANTIHEMOPHILIC FACTOR (RECOMBINANT) 3000 (+/-)
KIT INTRAVENOUS
Qty: 40140 EACH | Refills: 0 | Status: SHIPPED | OUTPATIENT
Start: 2024-04-03 | End: 2024-07-08 | Stop reason: ALTCHOICE

## 2024-04-03 NOTE — TELEPHONE ENCOUNTER
*Medicare refill - please check units and qty*    *Need Rx by: 04/04/24    *Last comp visit: 12/12/23    *Drug: Kogenate at 8 doses of 3345 units    *Refills left: 0    Thanks,    Jocelyn Henry, Pharmacy Coordinator  [facility] for [facility]ot[facility]  [phone]

## 2024-04-08 DIAGNOSIS — D66 SEVERE HEMOPHILIA A (H): Primary | ICD-10-CM

## 2024-04-08 RX ORDER — ANTIHEMOPHILIC FACTOR (RECOMBINANT) 3000 (+/-)
2960 KIT INTRAVENOUS
Qty: 23680 EACH | Refills: 4 | Status: SHIPPED | OUTPATIENT
Start: 2024-04-08

## 2024-04-10 NOTE — PROGRESS NOTES
Jennifer Hale is a 79 y.o. female seen today for a new nodule she discovered a proximally 2-1/2 week is ago to soak his xiphoid process.  Patient also has a history of thick breast tissue in a family history of breast cancer.  Patient has also noted a nodule in the right axillary tail and we discussed an ultrasound of the breasts bilaterally as he has a chest wall ultrasound.      Past Medical History:   Diagnosis Date    Basal cell carcinoma     Cardiac murmur     GERD (gastroesophageal reflux disease)     Hyperlipidemia     Hypertension      Family History   Problem Relation Age of Onset    Breast cancer Mother     Lymphoma Mother     Hypertension Mother     Lymphoma Father     Breast cancer Sister     Lymphoma Sister     Colon cancer Maternal Grandfather     Lymphoma Brother      Current Outpatient Medications on File Prior to Visit   Medication Sig Dispense Refill    ascorbic acid, vitamin C, (VITAMIN C) 500 MG tablet Vitamin C 500 MG Oral Tablet QTY: 0 tablet Days: 0 Refills: 0  Written: 05/11/22 Patient Instructions:      cholecalciferol, vitamin D3, (VITAMIN D3) 50 mcg (2,000 unit) Cap capsule Vitamin D3 50 MCG (2000 UT) Oral Capsule QTY: 0 capsule Days: 0 Refills: 0  Written: 05/11/22 Patient Instructions:      fluticasone propionate (FLONASE) 50 mcg/actuation nasal spray 1 spray (50 mcg total) by Each Nostril route once daily. 48 g 3    multivitamin capsule Take 1 capsule by mouth once daily.      omega 3-dha-epa-fish oil (FISH OIL) 60- mg Cap 1 capsule.      triamterene-hydrochlorothiazide 37.5-25 mg (MAXZIDE-25) 37.5-25 mg per tablet TAKE 1 TABLET BY MOUTH EVERY DAY 90 tablet 1    amoxicillin-clavulanate 875-125mg (AUGMENTIN) 875-125 mg per tablet Take 1 tablet by mouth 2 (two) times daily. (Patient not taking: Reported on 4/10/2024) 20 tablet 0    pantoprazole (PROTONIX) 40 MG tablet Take 40 mg by mouth once daily. (Patient not taking: Reported on 4/10/2024)      [DISCONTINUED] tolterodine (DETROL  Orlin Alcantar called to order a refill of Kogenate FS . He has 1 dose(s) remaining at home and would like it delivered to his house via  to arrive on 10/13/2020.     The following doses were dispensed from our pharmacy:    Product: Kogenate FS    Doses: 4 doses of 3108 units    SALINA Keys., [facility]  Hemophilia Pharmacy Coordinator  [phone]  [email or web address]              [email or web address]) 4 MG 24 hr capsule Take 1 capsule (4 mg total) by mouth once daily. (Patient not taking: Reported on 1/12/2022) 30 capsule 2     No current facility-administered medications on file prior to visit.     Immunization History   Administered Date(s) Administered    COVID-19 MRNA, LN-S PF (MODERNA HALF 0.25 ML DOSE) 11/05/2021, 07/01/2022    COVID-19, MRNA, LN-S, PF (MODERNA FULL 0.5 ML DOSE) 03/01/2021    COVID-19, mRNA, LNP-S, bivalent booster, PF (Moderna Omicron)12 + YEARS 12/16/2022    Influenza 10/01/2021       Review of Systems   Constitutional:  Negative for fever, malaise/fatigue and weight loss.   Respiratory:  Negative for shortness of breath.    Cardiovascular:  Negative for chest pain and palpitations.   Gastrointestinal:  Negative for nausea and vomiting.   Psychiatric/Behavioral:  Negative for depression.       Vitals:    04/10/24 1528   BP: 128/80   Pulse: 69   Resp: 18   Temp: 97.5 °F (36.4 °C)       Physical Exam  Vitals reviewed.   Eyes:      Conjunctiva/sclera: Conjunctivae normal.   Pulmonary:      Effort: Pulmonary effort is normal.   Chest:   Breasts:     Right: Mass present. No tenderness.          Comments: Patient has a firm mobile 1 cm nodule affecting the right axillary tail.  Patient also has a soft 1.2 cm subcutaneous mass consistent with a lipoma just over the xiphoid process.  Neurological:      General: No focal deficit present.   Psychiatric:         Mood and Affect: Mood normal.         Behavior: Behavior normal.         Thought Content: Thought content normal.         Judgment: Judgment normal.        Assessment and Plan  1. Mass of chest wall  -     US Chest Mediastinum; Future; Expected date: 04/10/2024    2. Mass of axillary tail of right breast  -     US Breast Bilateral Complete; Future; Expected date: 04/10/2024             Return to clinic once her ultrasound reports are in.    Health Maintenance Topics with due status: Not Due       Topic Last Completion Date    DEXA Scan  09/07/2023    Lipid Panel 11/30/2023

## 2024-04-15 ENCOUNTER — TELEPHONE (OUTPATIENT)
Dept: HEMATOLOGY | Facility: CLINIC | Age: 74
End: 2024-04-15
Payer: MEDICARE

## 2024-04-17 DIAGNOSIS — D66 SEVERE HEMOPHILIA A (H): ICD-10-CM

## 2024-04-17 RX ORDER — ANTIHEMOPHILIC FACTOR (RECOMBINANT) 3000 (+/-)
KIT INTRAVENOUS
Qty: 20070 EACH | Refills: 0 | Status: SHIPPED | OUTPATIENT
Start: 2024-04-17 | End: 2024-07-08 | Stop reason: ALTCHOICE

## 2024-04-17 NOTE — TELEPHONE ENCOUNTER
*Medicare refill - please check units and qty*    *Need Rx by: Today, April 17th by 2 pm    *Last comp visit: 12/12/2023    *Drug: Kogenate at 6 doses of 3345 units    *Refills left: 0    *Notes: We have the 6 vials of 3345 units remaining of Kogenate that Orlin was willing to use up before switching over to Xyntha.     Thanks,    MICHAEL Cameron, [facility]  Lead Pharmacy Coordinator  [facility]  [phone]

## 2024-04-18 NOTE — TELEPHONE ENCOUNTER
[facility]  Social Work Note    Orlin Alcantar is a 74 year old male with severe hemophilia A.  Received a call from Orlin today who reported his michelet with the [employer] for Premium Assistance has run out, and he learned they are closed for renewals.    Assisted Orlin with getting placed on PAN's waitlist for when/if they open back up.  Reviewed potential alternate options:  one-time assistance in the future from [facility], [facility], [employer] (closed) and [employer] (closed).    Orlin reported he has enough in savings for one more 3-month premium.  Explored potential for MA coverage.  Encouraged Orlin to contact a navigator at the Senior Linkage Line to ensure he is in the best plan for him.  Briefly reviewed spenddown.    He did not have other questions or concerns.    Analilia Rashid, [facility]  Clinical   [facility]  Office: [phone]

## 2024-04-22 DIAGNOSIS — D66 SEVERE HEMOPHILIA A (H): ICD-10-CM

## 2024-04-22 RX ORDER — CODEINE SULFATE 30 MG/1
30 TABLET ORAL EVERY 8 HOURS PRN
Qty: 84 TABLET | Refills: 0 | Status: SHIPPED | OUTPATIENT
Start: 2024-04-22 | End: 2024-05-29

## 2024-04-23 ENCOUNTER — OFFICE VISIT (OUTPATIENT)
Dept: FAMILY MEDICINE | Facility: CLINIC | Age: 74
End: 2024-04-23
Payer: MEDICARE

## 2024-04-23 ENCOUNTER — TELEPHONE (OUTPATIENT)
Dept: FAMILY MEDICINE | Facility: CLINIC | Age: 74
End: 2024-04-23

## 2024-04-23 VITALS
BODY MASS INDEX: 23.11 KG/M2 | TEMPERATURE: 98.2 F | RESPIRATION RATE: 16 BRPM | HEIGHT: 71 IN | OXYGEN SATURATION: 95 % | DIASTOLIC BLOOD PRESSURE: 84 MMHG | WEIGHT: 165.1 LBS | SYSTOLIC BLOOD PRESSURE: 137 MMHG | HEART RATE: 61 BPM

## 2024-04-23 DIAGNOSIS — I10 HYPERTENSION GOAL BP (BLOOD PRESSURE) < 140/90: ICD-10-CM

## 2024-04-23 DIAGNOSIS — Z93.6 S/P ILEAL CONDUIT (H): ICD-10-CM

## 2024-04-23 DIAGNOSIS — C67.8 MALIGNANT NEOPLASM OF OVERLAPPING SITES OF BLADDER (H): ICD-10-CM

## 2024-04-23 DIAGNOSIS — E11.9 TYPE 2 DIABETES MELLITUS WITHOUT COMPLICATION, UNSPECIFIED WHETHER LONG TERM INSULIN USE (H): ICD-10-CM

## 2024-04-23 DIAGNOSIS — Z00.00 ENCOUNTER FOR MEDICARE ANNUAL WELLNESS EXAM: Primary | ICD-10-CM

## 2024-04-23 DIAGNOSIS — Z93.6 S/P ILEAL CONDUIT (H): Primary | ICD-10-CM

## 2024-04-23 DIAGNOSIS — Z12.11 SCREEN FOR COLON CANCER: ICD-10-CM

## 2024-04-23 LAB — HBA1C MFR BLD: 5.4 % (ref 0–5.6)

## 2024-04-23 PROCEDURE — 83036 HEMOGLOBIN GLYCOSYLATED A1C: CPT | Performed by: FAMILY MEDICINE

## 2024-04-23 PROCEDURE — 82570 ASSAY OF URINE CREATININE: CPT | Performed by: FAMILY MEDICINE

## 2024-04-23 PROCEDURE — 80053 COMPREHEN METABOLIC PANEL: CPT | Performed by: FAMILY MEDICINE

## 2024-04-23 PROCEDURE — 36415 COLL VENOUS BLD VENIPUNCTURE: CPT | Performed by: FAMILY MEDICINE

## 2024-04-23 PROCEDURE — 82043 UR ALBUMIN QUANTITATIVE: CPT | Performed by: FAMILY MEDICINE

## 2024-04-23 PROCEDURE — G0439 PPPS, SUBSEQ VISIT: HCPCS | Performed by: FAMILY MEDICINE

## 2024-04-23 PROCEDURE — 80061 LIPID PANEL: CPT | Performed by: FAMILY MEDICINE

## 2024-04-23 PROCEDURE — 99214 OFFICE O/P EST MOD 30 MIN: CPT | Mod: 25 | Performed by: FAMILY MEDICINE

## 2024-04-23 RX ORDER — LISINOPRIL 40 MG/1
40 TABLET ORAL DAILY
Qty: 90 TABLET | Refills: 3 | Status: SHIPPED | OUTPATIENT
Start: 2024-04-23

## 2024-04-23 RX ORDER — RESPIRATORY SYNCYTIAL VIRUS VACCINE 120MCG/0.5
0.5 KIT INTRAMUSCULAR ONCE
Qty: 1 EACH | Refills: 0 | Status: CANCELLED | OUTPATIENT
Start: 2024-04-23 | End: 2024-04-23

## 2024-04-23 ASSESSMENT — PATIENT HEALTH QUESTIONNAIRE - PHQ9
SUM OF ALL RESPONSES TO PHQ QUESTIONS 1-9: 0
10. IF YOU CHECKED OFF ANY PROBLEMS, HOW DIFFICULT HAVE THESE PROBLEMS MADE IT FOR YOU TO DO YOUR WORK, TAKE CARE OF THINGS AT HOME, OR GET ALONG WITH OTHER PEOPLE: NOT DIFFICULT AT ALL
SUM OF ALL RESPONSES TO PHQ QUESTIONS 1-9: 0

## 2024-04-23 ASSESSMENT — PAIN SCALES - GENERAL: PAINLEVEL: MILD PAIN (3)

## 2024-04-23 NOTE — PROGRESS NOTES
Preventive Care Visit  [facility]  Twin Arturo Le MD, MD, Family Medicine  Apr 23, 2024      Assessment & Plan     Encounter for Medicare annual wellness exam  Seeing hematology for hemophilia.    Type 2 diabetes mellitus without complication, unspecified whether long term insulin use (H)  5 years ago A1c was 6.9 only one time.  Since then his A1c has been stable.  Not sure if it was an error versus temporary rise.  At this point we will continue to keep an eye on it but it is mostly diet controlled blood sugar.  - HEMOGLOBIN A1C; Future  - Lipid panel reflex to direct LDL Non-fasting; Future  - Albumin Random Urine Quantitative with Creat Ratio; Future  - Comprehensive metabolic panel (BMP + Alb, Alk Phos, ALT, AST, Total. Bili, TP); Future  - HEMOGLOBIN A1C  - Lipid panel reflex to direct LDL Non-fasting  - Albumin Random Urine Quantitative with Creat Ratio  - Comprehensive metabolic panel (BMP + Alb, Alk Phos, ALT, AST, Total. 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  History of radical cystoprostatectomy and bilateral pelvic node dissection.  Has urostomy.  Needs a supply.  - Ostomy Supplies Order for DME - ONLY FOR DME    S/P ileal conduit (H)  See above.  Filled out paperwork but if it does not go through he should follow-up with urology.  He understood.    Hypertension goal BP (blood pressure) < 140/90  Home blood pressure readings are on the higher side and we agreed to increase lisinopril to 40 mg/day.  He is tolerating it well.  - lisinopril (ZESTRIL) 40 MG tablet; Take 1 tablet (40 mg) by mouth daily                 Counseling  Appropriate preventive services were discussed with this patient, including applicable screening as appropriate for fall prevention, nutrition, physical activity, Tobacco-use cessation, weight loss and cognition.  Checklist reviewing preventive services  available has been given to the patient.  Reviewed patient's diet, addressing concerns and/or questions.   The patient was instructed to see the dentist every 6 months.   I have reviewed Opioid Use Disorder and Substance Use Disorder risk factors and made any needed referrals.           Vito Ordaz is a 74 year old, presenting for the following:  Medicare Visit        4/23/2024     9:51 AM   Additional Questions   Roomed by Jerica NORTON       Health Care Directive  Patient has a Health Care Directive on file  Advance care planning document is on file and is current.    HPI    No new concerns.     Home bp in 140-150s/ 80s range.     6 years ago. Bladder cancer and prostate cancer.  Iliostomy.  Not followed by [facility].   Needs supply order for urostomy. Was told no need to see oncology anymore.   Codeine for joint pain through hematology.         4/23/2024   General Health   How would you rate your overall physical health? (!) FAIR   Feel stress (tense, anxious, or unable to sleep) Not at all         4/23/2024   Nutrition   Diet: Regular (no restrictions)          No data to display                  4/23/2024   Social Factors   Worry food won't last until get money to buy more Patient declined   Food not last or not have enough money for food? Patient declined   Do you have housing?  Patient declined   Are you worried about losing your housing? Patient declined   Lack of transportation? Patient declined   Unable to get utilities (heat,electricity)? Patient declined         4/23/2024   Fall Risk   Fallen 2 or more times in the past year? No   Trouble with walking or balance? No          4/23/2024   Activities of Daily Living- Home Safety   Needs help with the following daily activites None of the above   Safety concerns in the home None of the above         4/23/2024   Dental   Dentist two times every year? (!) NO         4/23/2024   Hearing Screening   Hearing concerns? None of the above          4/23/2024   Driving Risk Screening   Patient/family members have concerns about driving No         4/23/2024   General Alertness/Fatigue Screening   Have you been more tired than usual lately? No         4/23/2024   Urinary Incontinence Screening   Bothered by leaking urine in past 6 months No         4/23/2024   TB Screening   Were you born outside of the [place]? No     Today's PHQ-9 Score:       4/23/2024     9:48 AM   PHQ-9 SCORE   PHQ-9 Total Score MyChart 0   PHQ-9 Total Score 0         4/23/2024   Substance Use   Alcohol more than 3/day or more than 7/wk Not Applicable   Do you have a current opioid prescription? (!) YES   How severe/bad is pain from 1 to 10? 3/10   Do you use any other substances recreationally? No          No data to display              Low Risk (0-3)  Moderate Risk (4-7)  High Risk (>8)  Social History     Tobacco Use    Smoking status: Former     Current packs/day: 0.00     Average packs/day: 1 pack/day for 20.0 years (20.0 ttl pk-yrs)     Types: Cigarettes     Start date: 6/30/1993     Quit date: 6/30/2013     Years since quitting: 10.8    Smokeless tobacco: Never    Tobacco comments:     e cig no longer using   Vaping Use    Vaping status: Never Used   Substance Use Topics    Alcohol use: Never    Drug use: Never       ASCVD Risk   The 10-year ASCVD risk score (Harry HERNANDEZ, et al., 2019) is: 48.6%    Values used to calculate the score:      Age: 74 years      Sex: Male      Is Non- : No      Diabetic: Yes      Tobacco smoker: No      Systolic Blood Pressure: 137 mmHg      Is BP treated: Yes      HDL Cholesterol: 49 mg/dL      Total Cholesterol: 186 mg/dL            Reviewed and updated as needed this visit by Provider                      Current providers sharing in care for this patient include:  Patient Care Team:  Twin Le MD as PCP - General (Family Medicine)  Kaylin Rajan APRN CNS as Nurse Practitioner (Clinical Nurse  Specialist)  Isadora Sood, RN as Nurse Coordinator (Psychiatry)  Mely Smith PA-C as Physician Assistant (Physician Assistant)  Cassidy Liao MD as MD (Urology)  Ligia Ram, RN as Registered Nurse (Urology)  Katie Ramos MD as Referring Physician (Family Practice)  Bebeto Shea MD as MD (Urology)  Katie Ramos MD as Referring Physician (Family Practice)  Nguyễn Morris MD as MD (Urology)  Michelet Guthrie, APRN CNP as Nurse Practitioner (Nurse Practitioner)  Giselle Manning, PharmD as Pharmacist (Pharmacist)  Louann Solis RN as Specialty Care Coordinator (Hematology)  Analilia Rashid LICSW as  (Hematology)  Twin Le MD as Assigned Pain Medication Provider  Sacha Thomas MD as Assigned Surgical Provider  Brianna Harris PA-C as Assigned Cancer Care Provider  Twin Le MD as Assigned PCP  Jamir Contreras MD as MD (Urology)    The following health maintenance items are reviewed in Epic and correct as of today:  Health Maintenance   Topic Date Due    DIABETIC FOOT EXAM  Never done    URINE DRUG SCREEN  Never done    EYE EXAM  Never done    MENINGITIS IMMUNIZATION (1 - Risk 2-dose series) Never done    COLORECTAL CANCER SCREENING  Never done    DTAP/TDAP/TD IMMUNIZATION (1 - Tdap) Never done    ZOSTER IMMUNIZATION (1 of 2) Never done    HEPATITIS B IMMUNIZATION (1 of 3 - Risk 3-dose series) Never done    RSV VACCINE (Pregnancy & 60+) (1 - 1-dose 60+ series) Never done    Pneumococcal Vaccine: 65+ Years (2 of 2 - PPSV23 or PCV20) 06/18/2015    MEDICARE ANNUAL WELLNESS VISIT  09/07/2017    A1C  07/17/2023    COVID-19 Vaccine (7 - 2023-24 season) 04/12/2024    LIPID  04/17/2024    ANNUAL REVIEW OF HM ORDERS  04/19/2024    MICROALBUMIN  05/05/2024    PHQ-9  05/23/2024    BMP  10/06/2024    LUNG CANCER SCREENING  10/06/2024    FALL RISK ASSESSMENT  04/23/2025    ADVANCE CARE PLANNING   "10/18/2027    PHQ-2 (once per calendar year)  Completed    INFLUENZA VACCINE  Completed    AORTIC ANEURYSM SCREENING (SYSTEM ASSIGNED)  Completed    IPV IMMUNIZATION  Aged Out    HPV IMMUNIZATION  Aged Out    RSV MONOCLONAL ANTIBODY  Aged Out            Objective    Exam  /84   Pulse 61   Temp 98.2  F (36.8  C) (Temporal)   Resp 16   Ht 1.81 m (5' 11.26\")   Wt 74.9 kg (165 lb 1.6 oz)   SpO2 95%   BMI 22.86 kg/m     Estimated body mass index is 22.86 kg/m  as calculated from the following:    Height as of this encounter: 1.81 m (5' 11.26\").    Weight as of this encounter: 74.9 kg (165 lb 1.6 oz).    Physical Exam  GENERAL: alert and no distress  EYES: Eyes grossly normal to inspection, PERRL and conjunctivae and sclerae normal  HENT: ear canals and TM's normal, nose and mouth without ulcers or lesions  NECK: no adenopathy, no asymmetry, masses, or scars  RESP: lungs clear to auscultation - no rales, rhonchi or wheezes  CV: regular rate and rhythm, normal S1 S2, no S3 or S4, no murmur, click or rub, no peripheral edema  MS: no gross musculoskeletal defects noted, no edema  SKIN: no suspicious lesions or rashes  NEURO: Normal strength and tone, mentation intact and speech normal  PSYCH: mentation appears normal, affect normal/bright  Right lower quadrant urostomy bag present. Surrounding stoma looks healthy.       4/23/2024   Mini Cog   Clock Draw Score 2 Normal   3 Item Recall 3 objects recalled   Mini Cog Total Score 5              Signed Electronically by: Twin Le MD, MD    Answers submitted by the patient for this visit:  Patient Health Questionnaire (Submitted on 4/23/2024)  If you checked off any problems, how difficult have these problems made it for you to do your work, take care of things at home, or get along with other people?: Not difficult at all  PHQ9 TOTAL SCORE: 0    "

## 2024-04-23 NOTE — TELEPHONE ENCOUNTER
Patient Returning Call    Reason for call:  prescription is wrong.     [facility] medical equipment - incorrect     Nurse or Dr. Le Needs to send prescription to address below      [facility]   [address] [place] [address]   [phone]  Fax: [phone]  [email or web address]         Information relayed to patient: Contacted. Pt. Does not use MyChart.  Patient has additional questions:        Okay to leave a detailed message?: Yes at Cell number on file:    No relevant phone numbers on file.      [phone]

## 2024-04-23 NOTE — LETTER
April 24, 2024      Orlin Alcantar  [address]   [place] [address]        Dear ,    We are writing to inform you of your test results.    Urine test shows some leaking of protein but it is better than before.  Diabetes screening test, kidney function, liver function test are within normal limit.  Cholesterol has room for improvement.  Bad cholesterol LDL is somewhat on the higher side.  Continue to watch your diet and ideally should be on low-dose of cholesterol medication.   Please do a phone or video visit and we can talk more about cholesterol medication treatment options     Resulted Orders   HEMOGLOBIN A1C   Result Value Ref Range    Hemoglobin A1C 5.4 0.0 - 5.6 %      Comment:      Normal <5.7%   Prediabetes 5.7-6.4%    Diabetes 6.5% or higher     Note: Adopted from ADA consensus guidelines.   Lipid panel reflex to direct LDL Non-fasting   Result Value Ref Range    Cholesterol 199 <200 mg/dL    Triglycerides 161 (H) <150 mg/dL    Direct Measure HDL 55 >=40 mg/dL    LDL Cholesterol Calculated 112 (H) <=100 mg/dL    Non HDL Cholesterol 144 (H) <130 mg/dL    Patient Fasting > 8hrs? Unknown     Narrative    Cholesterol  Desirable:  <200 mg/dL    Triglycerides  Normal:  Less than 150 mg/dL  Borderline High:  150-199 mg/dL  High:  200-499 mg/dL  Very High:  Greater than or equal to 500 mg/dL    Direct Measure HDL  Female:  Greater than or equal to 50 mg/dL   Male:  Greater than or equal to 40 mg/dL    LDL Cholesterol  Desirable:  <100mg/dL  Above Desirable:  100-129 mg/dL   Borderline High:  130-159 mg/dL   High:  160-189 mg/dL   Very High:  >= 190 mg/dL    Non HDL Cholesterol  Desirable:  130 mg/dL  Above Desirable:  130-159 mg/dL  Borderline High:  160-189 mg/dL  High:  190-219 mg/dL  Very High:  Greater than or equal to 220 mg/dL   Albumin Random Urine Quantitative with Creat Ratio   Result Value Ref Range    Creatinine Urine mg/dL 44.9 mg/dL      Comment:      The reference ranges have  not been established in urine creatinine. The results should be integrated into the clinical context for interpretation.    Albumin Urine mg/L 24.7 mg/L      Comment:      The reference ranges have not been established in urine albumin. The results should be integrated into the clinical context for interpretation.    Albumin Urine mg/g Cr 55.01 (H) 0.00 - 17.00 mg/g Cr      Comment:      Microalbuminuria is defined as an albumin:creatinine ratio of 17 to 299 for males and 25 to 299 for females. A ratio of albumin:creatinine of 300 or higher is indicative of overt proteinuria.  Due to biologic variability, positive results should be confirmed by a second, first-morning random or 24-hour timed urine specimen. If there is discrepancy, a third specimen is recommended. When 2 out of 3 results are in the microalbuminuria range, this is evidence for incipient nephropathy and warrants increased efforts at glucose control, blood pressure control, and institution of therapy with an angiotensin-converting-enzyme (ACE) inhibitor (if the patient can tolerate it).     Comprehensive metabolic panel (BMP + Alb, Alk Phos, ALT, AST, Total. Bili, TP)   Result Value Ref Range    Sodium 141 135 - 145 mmol/L      Comment:      Reference intervals for this test were updated on 09/26/2023 to more accurately reflect our healthy population. There may be differences in the flagging of prior results with similar values performed with this method. Interpretation of those prior results can be made in the context of the updated reference intervals.     Potassium 4.4 3.4 - 5.3 mmol/L    Carbon Dioxide (CO2) 25 22 - 29 mmol/L    Anion Gap 11 7 - 15 mmol/L    Urea Nitrogen 20.6 8.0 - 23.0 mg/dL    Creatinine 1.05 0.67 - 1.17 mg/dL    GFR Estimate 74 >60 mL/min/1.73m2    Calcium 9.8 8.8 - 10.2 mg/dL    Chloride 105 98 - 107 mmol/L    Glucose 93 70 - 99 mg/dL    Alkaline Phosphatase 86 40 - 150 U/L      Comment:      Reference intervals for this test  were updated on 11/14/2023 to more accurately reflect our healthy population. There may be differences in the flagging of prior results with similar values performed with this method. Interpretation of those prior results can be made in the context of the updated reference intervals.    AST 20 0 - 45 U/L      Comment:      Reference intervals for this test were updated on 6/12/2023 to more accurately reflect our healthy population. There may be differences in the flagging of prior results with similar values performed with this method. Interpretation of those prior results can be made in the context of the updated reference intervals.    ALT 16 0 - 70 U/L      Comment:      Reference intervals for this test were updated on 6/12/2023 to more accurately reflect our healthy population. There may be differences in the flagging of prior results with similar values performed with this method. Interpretation of those prior results can be made in the context of the updated reference intervals.      Protein Total 7.4 6.4 - 8.3 g/dL    Albumin 4.8 3.5 - 5.2 g/dL    Bilirubin Total 0.9 <=1.2 mg/dL       If you have any questions or concerns, please call the clinic at the number listed above.       Sincerely,      Twin Le MD

## 2024-04-23 NOTE — PATIENT INSTRUCTIONS
Preventive Care Advice   This is general advice given by our system to help you stay healthy. However, your care team may have specific advice just for you. Please talk to your care team about your preventive care needs.  Nutrition  Eat 5 or more servings of fruits and vegetables each day.  Try wheat bread, brown rice and whole grain pasta (instead of white bread, rice, and pasta).  Get enough calcium and vitamin D. Check the label on foods and aim for 100% of the RDA (recommended daily allowance).  Lifestyle  Exercise at least 150 minutes each week   (30 minutes a day, 5 days a week).  Do muscle strengthening activities 2 days a week. These help control your weight and prevent disease.  No smoking.  Wear sunscreen to prevent skin cancer.  Have a dental exam and cleaning every 6 months.  Yearly exams  See your health care team every year to talk about:  Any changes in your health.  Any medicines your care team has prescribed.  Preventive care, family planning, and ways to prevent chronic diseases.  Shots (vaccines)   HPV shots (up to age 26), if you've never had them before.  Hepatitis B shots (up to age 59), if you've never had them before.  COVID-19 shot: Get this shot when it's due.  Flu shot: Get a flu shot every year.  Tetanus shot: Get a tetanus shot every 10 years.  Pneumococcal, hepatitis A, and RSV shots: Ask your care team if you need these based on your risk.  Shingles shot (for age 50 and up).  General health tests  Diabetes screening:  Starting at age 35, Get screened for diabetes at least every 3 years.  If you are younger than age 35, ask your care team if you should be screened for diabetes.  Cholesterol test: At age 39, start having a cholesterol test every 5 years, or more often if advised.  Bone density scan (DEXA): At age 50, ask your care team if you should have this scan for osteoporosis (brittle bones).  Hepatitis C: Get tested at least once in your life.  STIs (sexually transmitted  infections)  Before age 24: Ask your care team if you should be screened for STIs.  After age 24: Get screened for STIs if you're at risk. You are at risk for STIs (including HIV) if:  You are sexually active with more than one person.  You don't use condoms every time.  You or a partner was diagnosed with a sexually transmitted infection.  If you are at risk for HIV, ask about PrEP medicine to prevent HIV.  Get tested for HIV at least once in your life, whether you are at risk for HIV or not.  Cancer screening tests  Cervical cancer screening: If you have a cervix, begin getting regular cervical cancer screening tests at age 21. Most people who have regular screenings with normal results can stop after age 65. Talk about this with your provider.  Breast cancer scan (mammogram): If you've ever had breasts, begin having regular mammograms starting at age 40. This is a scan to check for breast cancer.  Colon cancer screening: It is important to start screening for colon cancer at age 45.  Have a colonoscopy test every 10 years (or more often if you're at risk) Or, ask your provider about stool tests like a FIT test every year or Cologuard test every 3 years.  To learn more about your testing options, visit: https://www.[employer]/874965.pdf.  For help making a decision, visit: https://bit.ly/ik75494.  Prostate cancer screening test: If you have a prostate and are age 55 to 69, ask your provider if you would benefit from a yearly prostate cancer screening test.  Lung cancer screening: If you are a current or former smoker age 50 to 80, ask your care team if ongoing lung cancer screenings are right for you.  For informational purposes only. Not to replace the advice of your health care provider. Copyright   2023 [facility]. All rights reserved. Clinically reviewed by the [facility] Transitions Program. [employer] [id number] - REV 01/24.    Chronic Pain: Care Instructions  Your Care Instructions      Chronic pain is pain that lasts a long time (months or even years) and may or may not have a clear cause. It is different from acute pain, which usually does have a clear cause--like an injury or illness--and gets better over time. Chronic pain:  Lasts over time but may vary from day to day.  Does not go away despite efforts to end it.  May disrupt your sleep and lead to fatigue.  May cause depression or anxiety.  May make your muscles tense, causing more pain.  Can disrupt your work, hobbies, home life, and relationships with friends and family.  Chronic pain is a very real condition. It is not just in your head. Treatment can help and usually includes several methods used together, such as medicines, physical therapy, exercise, and other treatments. Learning how to relax and changing negative thought patterns can also help you cope.  Chronic pain is complex. Taking an active role in your treatment will help you better manage your pain. Tell your doctor if you have trouble dealing with your pain. You may have to try several things before you find what works best for you.  Follow-up care is a key part of your treatment and safety. Be sure to make and go to all appointments, and call your doctor if you are having problems. It's also a good idea to know your test results and keep a list of the medicines you take.  How can you care for yourself at home?  Pace yourself. Break up large jobs into smaller tasks. Save harder tasks for days when you have less pain, or go back and forth between hard tasks and easier ones. Take rest breaks.  Relax, and reduce stress. Relaxation techniques such as deep breathing or meditation can help.  Keep moving. Gentle, daily exercise can help reduce pain over the long run. Try low- or no-impact exercises such as walking, swimming, and stationary biking. Do stretches to stay flexible.  Try heat, cold packs, and massage.  Get enough sleep. Chronic pain can make you tired and drain your  energy. Talk with your doctor if you have trouble sleeping because of pain.  Think positive. Your thoughts can affect your pain level. Do things that you enjoy to distract yourself when you have pain instead of focusing on the pain. See a movie, read a book, listen to music, or spend time with a friend.  If you think you are depressed, talk to your doctor about treatment.  Keep a daily pain diary. Record how your moods, thoughts, sleep patterns, activities, and medicine affect your pain. You may find that your pain is worse during or after certain activities or when you are feeling a certain emotion. Having a record of your pain can help you and your doctor find the best ways to treat your pain.  Take pain medicines exactly as directed.  If the doctor gave you a prescription medicine for pain, take it as prescribed.  If you are not taking a prescription pain medicine, ask your doctor if you can take an over-the-counter medicine.  Reducing constipation caused by pain medicine  Talk to your doctor about a laxative. If a laxative doesn't work, your doctor may suggest a prescription medicine.  Include fruits, vegetables, beans, and whole grains in your diet each day. These foods are high in fiber.  If your doctor recommends it, get more exercise. Walking is a good choice. Bit by bit, increase the amount you walk every day. Try for at least 30 minutes on most days of the week.  Schedule time each day for a bowel movement. A daily routine may help. Take your time and do not strain when having a bowel movement.  When should you call for help?   Call your doctor now or seek immediate medical care if:    Your pain gets worse or is out of control.     You feel down or blue, or you do not enjoy things like you once did. You may be depressed, which is common in people with chronic pain. Depression can be treated.     You have vomiting or cramps for more than 2 hours.   Watch closely for changes in your health, and be sure to  "contact your doctor if:    You cannot sleep because of pain.     You are very worried or anxious about your pain.     You have trouble taking your pain medicine.     You have any concerns about your pain medicine.     You have trouble with bowel movements, such as:  No bowel movement in 3 days.  Blood in the anal area, in your stool, or on the toilet paper.  Diarrhea for more than 24 hours.   Where can you learn more?  Go to https://www.[employer].net/patiented  Enter N004 in the search box to learn more about \"Chronic Pain: Care Instructions.\"  Current as of: July 10, 2023               Content Version: 14.0    6751-0404 [employer].   Care instructions adapted under license by your healthcare professional. If you have questions about a medical condition or this instruction, always ask your healthcare professional. [employer] disclaims any warranty or liability for your use of this information.      "

## 2024-04-24 LAB
ALBUMIN SERPL BCG-MCNC: 4.8 G/DL (ref 3.5–5.2)
ALP SERPL-CCNC: 86 U/L (ref 40–150)
ALT SERPL W P-5'-P-CCNC: 16 U/L (ref 0–70)
ANION GAP SERPL CALCULATED.3IONS-SCNC: 11 MMOL/L (ref 7–15)
AST SERPL W P-5'-P-CCNC: 20 U/L (ref 0–45)
BILIRUB SERPL-MCNC: 0.9 MG/DL
BUN SERPL-MCNC: 20.6 MG/DL (ref 8–23)
CALCIUM SERPL-MCNC: 9.8 MG/DL (ref 8.8–10.2)
CHLORIDE SERPL-SCNC: 105 MMOL/L (ref 98–107)
CHOLEST SERPL-MCNC: 199 MG/DL
CREAT SERPL-MCNC: 1.05 MG/DL (ref 0.67–1.17)
CREAT UR-MCNC: 44.9 MG/DL
DEPRECATED HCO3 PLAS-SCNC: 25 MMOL/L (ref 22–29)
EGFRCR SERPLBLD CKD-EPI 2021: 74 ML/MIN/1.73M2
FASTING STATUS PATIENT QL REPORTED: ABNORMAL
GLUCOSE SERPL-MCNC: 93 MG/DL (ref 70–99)
HDLC SERPL-MCNC: 55 MG/DL
LDLC SERPL CALC-MCNC: 112 MG/DL
MICROALBUMIN UR-MCNC: 24.7 MG/L
MICROALBUMIN/CREAT UR: 55.01 MG/G CR (ref 0–17)
NONHDLC SERPL-MCNC: 144 MG/DL
POTASSIUM SERPL-SCNC: 4.4 MMOL/L (ref 3.4–5.3)
PROT SERPL-MCNC: 7.4 G/DL (ref 6.4–8.3)
SODIUM SERPL-SCNC: 141 MMOL/L (ref 135–145)
TRIGL SERPL-MCNC: 161 MG/DL

## 2024-04-24 NOTE — TELEPHONE ENCOUNTER
Pt tried to fill his ostomy supplies today and was told the fax was not received.    [facility] is requesting the ostomy supply order (under Chart Review/Other Orders) as well as OV notes from 4/23/24 demonstrating necessity for insurance purposes re-faxed to:    [facility]  Fax: [phone]    Taniya DIAZ RN  [facility]

## 2024-05-02 ENCOUNTER — DOCUMENTATION ONLY (OUTPATIENT)
Dept: HEMATOLOGY | Facility: CLINIC | Age: 74
End: 2024-05-02
Payer: MEDICARE

## 2024-05-02 NOTE — PROGRESS NOTES
[id number]  Orlin Alcantar  74 year old male  CBCD Diagnosis: Severe Hemophilia A  CBCD Provider: Apolinar    Xyntha prescription sent for prophylaxis due to discontinuation of Kogenate. Plan will be for Xyntha 2960 units to be infused every 48 hours and every 24 hours as needed for breakthrough bleeding.     Paola Ruiz  MSN, RN, PHN  [facility]ot[facility]  Office: [phone]  Fax: [phone]

## 2024-05-29 DIAGNOSIS — D66 SEVERE HEMOPHILIA A (H): ICD-10-CM

## 2024-05-29 RX ORDER — CODEINE SULFATE 30 MG/1
30 TABLET ORAL EVERY 8 HOURS PRN
Qty: 84 TABLET | Refills: 0 | Status: SHIPPED | OUTPATIENT
Start: 2024-05-29 | End: 2024-06-26

## 2024-06-19 DIAGNOSIS — L85.3 DRY SKIN: Primary | ICD-10-CM

## 2024-06-19 RX ORDER — UREA 20 %
CREAM (GRAM) TOPICAL
Qty: 480 G | Refills: 0 | Status: SHIPPED | OUTPATIENT
Start: 2024-06-19

## 2024-06-20 ENCOUNTER — TELEPHONE (OUTPATIENT)
Dept: HEMATOLOGY | Facility: CLINIC | Age: 74
End: 2024-06-20
Payer: MEDICARE

## 2024-06-20 DIAGNOSIS — D66 SEVERE HEMOPHILIA A (H): Primary | ICD-10-CM

## 2024-06-20 DIAGNOSIS — D66 SEVERE HEMOPHILIA A (H): ICD-10-CM

## 2024-06-20 NOTE — TELEPHONE ENCOUNTER
[id number]  Orlin Alcantar  74 year old male  CBCD Diagnosis: severe hem A  CBCD Provider: Dr. Jiang    Communication received from CBCD pharmacy team that Orlin is interested in try other factor products. In a voicemail Orlin left pharmacy yesterday, he noted that he feels that he's having too many breakthrough bleeds on Xyntha and would like to try Altuviiio or Esperoct.    This staff reviewed this request with Dr. Jiang who ordered 4 doses of Altuviiio.    Call placed to Orlin to discuss Altuviiio dosing interval and reconstitution. Staff reviewed that Altuviiio is once every 7 days dosing and explained how the reconstitution differs from Xyntha. Staff offered to send a demo kit with his delivery or to have Orlin come to the CBCD for his first dose.    Orlin accepts a demo kit but declines a clinic visit. Orlin did not realize Altuviiio was q7day dosing and verbalized hesitancy around moving away from his q48hr dosing. Staff discussed the coverage that Altuviiio provides and that we don't recommend dosing sooner than every 7 days and if he had concern for a breakthrough bleed our team would likely recommend a dose of Xyntha in that scenario. Orlin will plan to start Altuviiio when he receives his shipment from our pharmacy.     Orlin thanked staff for this information and requested to speak to Sofía in the pharmacy again.     Gaye MATAMOROSN, RN, PHN   [facility]ot[facility]   Office: [phone]  Fax: [phone]

## 2024-06-25 ENCOUNTER — DOCUMENTATION ONLY (OUTPATIENT)
Dept: HEMATOLOGY | Facility: CLINIC | Age: 74
End: 2024-06-25
Payer: MEDICARE

## 2024-06-25 DIAGNOSIS — D66 SEVERE HEMOPHILIA A (H): Primary | ICD-10-CM

## 2024-06-25 NOTE — PROGRESS NOTES
[facility]  Center for Bleeding and Clotting Disorders  [address], [address], [place] [address]  Main: [phone], Fax: [phone]         Telephone Call     Patient contacted CBCD team last week reporting 2-3 weeks of increased breakthrough bleeding. He noted a right hip, left ankle, and right elbow bleed without significantly increased activity level. He inquired about alternative treatment options. He was prescribed Altuviiio at 50 unit(s)/kg every 7 days. He has one dose of Xyntha left at home. He took his first dose of Altuviiio on 6/21/2024. He notes that infusion went well. He has not had any breakthrough bleeding since.     Discussed recommendation for inhibitor screen as we would not want to miss development of an inhibitor as this would change treatment recommendations. Fortunately, he seems to be responding to Altuviiio well thus this is unlikely but should still be checked.     He is agreeable to continue on Altuviiio once weekly. Reinforced sticking to the every 7 day regimen. Discussed use of Xyntha for breakthrough bleeding. Reviewed that the Altuviiio should give him normal levels for more than 4 days after infusion so he should not be experiencing breakthrough bleeding in that window. If he does, he should notify the office.     D/W pharmacy about sending additional doses of Altuviiio.     Set up follow up appointment with me on 8/7 at 11:30     NETTIE CLINE PA-C on 6/25/2024 at 10:00 AM

## 2024-06-26 ENCOUNTER — TELEPHONE (OUTPATIENT)
Dept: HEMATOLOGY | Facility: CLINIC | Age: 74
End: 2024-06-26
Payer: MEDICARE

## 2024-06-26 ENCOUNTER — ALLIED HEALTH/NURSE VISIT (OUTPATIENT)
Dept: HEMATOLOGY | Facility: CLINIC | Age: 74
End: 2024-06-26
Payer: MEDICARE

## 2024-06-26 DIAGNOSIS — D66 SEVERE HEMOPHILIA A (H): ICD-10-CM

## 2024-06-26 DIAGNOSIS — D66 SEVERE HEMOPHILIA A (H): Primary | ICD-10-CM

## 2024-06-26 PROCEDURE — 999N000103 HC STATISTIC NO CHARGE FACILITY FEE

## 2024-06-26 PROCEDURE — 85240 CLOT FACTOR VIII AHG 1 STAGE: CPT

## 2024-06-26 PROCEDURE — 36415 COLL VENOUS BLD VENIPUNCTURE: CPT

## 2024-06-26 RX ORDER — CODEINE SULFATE 30 MG/1
30 TABLET ORAL EVERY 8 HOURS PRN
Qty: 84 TABLET | Refills: 0 | Status: SHIPPED | OUTPATIENT
Start: 2024-06-26 | End: 2024-08-05

## 2024-06-26 NOTE — CONFIDENTIAL NOTE
[id number]  Orlin Alcantar  74 year old male  CBCD Diagnosis: Severe Hemophilia A  CBCD Provider: Monica Felipe PA-C, Dr. Apolinar PERDUE received a phone call from Orlin regarding lab appointment. He reports he had a telephone conference with Monica Felipe PA-C yesterday in which they discussed his Altuviiio regimen and a follow up appointment in August. He would like to move up his lab appointment to today if possible. He reports he has ongoing bleeding issues in his right hip, right elbow and right ankle. He is not sure that his decision to switch from q48 hour dosing to weekly dosing was the best decision while he was having active bleeds. He reports he is in pain and these are seeming to get worse. He would like to get his lab drawn to make sure his factor is working or to determine if he needs more frequent dosing. RN did review that a bethesda was ordered. RN will route this messaging to Monica Felipe PA-C to see if labs are indicated and if she would also want a Factor VIII assay. His last dose of factor (Altuviiio) was given last Friday at 12:00 pm.     Paola ORLANDO, RN, PHN  [facility]ot[facility]  Office: [phone]  Fax: [phone]    Addendum  Per Monica Felipe PA-C okay to come for lab draw today. Will change orders to stat and alert .     Paola ORLANDO, RN, PHN  [facility] for Bleeding and Clotting Disorders  Office: [phone]  Fax: [phone]

## 2024-06-27 ENCOUNTER — TELEPHONE (OUTPATIENT)
Dept: HEMATOLOGY | Facility: CLINIC | Age: 74
End: 2024-06-27
Payer: MEDICARE

## 2024-06-27 LAB
FACT VIII ACT/NOR PPP: 30 % (ref 55–200)
FACT VIII ACT/NOR PPP: NORMAL %

## 2024-06-27 NOTE — TELEPHONE ENCOUNTER
Center for Ble[facility] Clotting Disorders  Social Work Note    Spoke with Orlin today and with his permission, completed an application to the [facility] Hemophilia Fund on his behalf. He provided supporting information on income and a confirmation of application was mailed to his home.  He will get more information from [facility] by mail in 5-7 days.    He did not have other questions or concerns.    Analilia Rashid, [facility]  Clinical   Center for Bleeding and Clotting Disorders  Office: [phone]

## 2024-06-27 NOTE — CONFIDENTIAL NOTE
"[id number]  Orlin Alcantar  74 year old male  CBCD Diagnosis: Severe Hemophilia A  CBCD Provider: Jasmina Felipe PA-C, RN called rOlin to discuss Factor VIII results:     Latest Reference Range & Units 06/26/24 12:23   [facility] for Factor 8  See Comment   Factor 8 Assay 55 - 200 % 30 (L)   (L): Data is abnormally low    Per Monica Felipe PA-C- because of the medication Orlin is on, we need to double this value. RN shared this information with Orlin and also let him know that we do not think the pain he is feeling is bleeding related but perhaps arthritic in nature. He has celebrex and codeine on hand at home for pain. Orlin was very relieved to hear about the high factor level. He attributes the pain to overuse after rest. He will work to slowly increase activity. He feels as though he has \"peace of mind\" and was very appreciative of the chance to obtain this information. He mentions it feels \"weird but good\" only infusing once a week. He will keep us posted if he has any more questions about his regimen.     Of note, he also asked if he could mail his infusion records to our team to document, we should expect them in the mail in the next few weeks.     Paola Ruiz  MSN, RN, PHN  [facility] for Bleeding and Clotting Disorders  Office: [phone]  Fax: [phone]      "

## 2024-07-08 ENCOUNTER — TELEPHONE (OUTPATIENT)
Dept: HEMATOLOGY | Facility: CLINIC | Age: 74
End: 2024-07-08
Payer: MEDICARE

## 2024-07-08 DIAGNOSIS — D66 SEVERE HEMOPHILIA A (H): Primary | ICD-10-CM

## 2024-07-08 RX ORDER — ANTIHEMOPHILIC FACTOR (RECOMBINANT) 3000 (+/-)
2876 KIT INTRAVENOUS
Qty: 23008 EACH | Refills: 11 | Status: SHIPPED | OUTPATIENT
Start: 2024-07-08 | End: 2024-07-08

## 2024-07-08 RX ORDER — ANTIHEMOPHILIC FACTOR (RECOMBINANT) 3000 (+/-)
2876 KIT INTRAVENOUS
Qty: 23008 EACH | Refills: 11 | Status: SHIPPED | OUTPATIENT
Start: 2024-07-08

## 2024-07-08 NOTE — PROGRESS NOTES
[facility]  Center for Bleeding and Clotting Disorders  [facility] [address], [address], [place] [address]  Main: [phone], Fax: [phone]      Patient switched from ALTUVIIIO to every other day Advate per patient request. Rx changed.

## 2024-07-08 NOTE — CONFIDENTIAL NOTE
[id number]  Orlin Alcantar  74 year old male  CBCD Diagnosis: Severe Hemophilia A  CBCD Provider:  Jasmina Felipe PA-C/Apolinar PERDUE received a phone call from Orlin. He reports he has now completed his trial of Altuviiio and states he does not believe it provides him as good of coverage as his q48 hour dosing. He would like to switch to a SHL product with more frequent dosing. RN did review with CBCD providers who are okay with him transitioning to Advate 3000 units +/- 10% q48 hours. A prescription was placed. Orlin was updated and has our phone number for any further questions, comments or concerns.     Paola Ruiz  MSN, RN, PHN  [facility] Clotting [facility]  Office: [phone]  Fax: [phone]

## 2024-07-22 ENCOUNTER — TELEPHONE (OUTPATIENT)
Dept: HEMATOLOGY | Facility: CLINIC | Age: 74
End: 2024-07-22
Payer: MEDICARE

## 2024-07-22 NOTE — TELEPHONE ENCOUNTER
[facility]otting [facility]    Pt called to update writer that he qualified for TAF funds.  He reported that this has alleviated many of his financial anxieties.  The fund has been easy to use thus far.  He just wanted to express gratitude.    Analilia Rashid, [facility]  Clinical   Center for Ble[facility] Clotting Disorders  Office: [phone]

## 2024-07-29 ENCOUNTER — TELEPHONE (OUTPATIENT)
Dept: HEMATOLOGY | Facility: CLINIC | Age: 74
End: 2024-07-29
Payer: MEDICARE

## 2024-07-29 NOTE — CONFIDENTIAL NOTE
[id number]  Orlin Alcantar  74 year old male  CBCD Diagnosis: Severe Hemophilia A  CBCD Provider: Jasmina Felipe PA-C, RN called Orlin to let him know that I had received his infusion logs. He mentions that his q48 hour advate dosing is working very well. He endorses improved joint pain and psychologic effect. He has our phone number for any further questions, comments or concerns.     Paola Ruiz  MSN, RN, PHN  [facility] for Ble[facility] Clotting Disorders  Office: [phone]  Fax: [phone]

## 2024-08-05 DIAGNOSIS — D66 SEVERE HEMOPHILIA A (H): ICD-10-CM

## 2024-08-05 RX ORDER — CODEINE SULFATE 30 MG/1
30 TABLET ORAL EVERY 8 HOURS PRN
Qty: 84 TABLET | Refills: 0 | Status: SHIPPED | OUTPATIENT
Start: 2024-08-05 | End: 2024-08-21

## 2024-08-05 NOTE — TELEPHONE ENCOUNTER
*RX needed by: Today, 8/5/2024 by 2 pm    *Last comp visit: 12/12/2023    *Refills left: 0    Thanks,    MICHAEL Cameron, [facility]  Lead Pharmacy Coordinator  [facility] Clotting Disorders  [phone]

## 2024-08-07 ENCOUNTER — TELEPHONE (OUTPATIENT)
Dept: HEMATOLOGY | Facility: CLINIC | Age: 74
End: 2024-08-07
Payer: MEDICARE

## 2024-08-07 NOTE — CONFIDENTIAL NOTE
[id number]  Orlin Alcantar  74 year old male  CBCD Diagnosis: Severe Hemophilia A  CBCD Provider: Monica Felipe PA-C     Patient was scheduled for telephone call with Monica Felipe PA-C today to check and see how factor product change is going.    During check-in, patient expressed concerns about appointment saying he does not need it. RN called him and let him know it was originally scheduled after he switched to Altuviiio earlier this summer (he is on a different product now). Patient stated he did not have any concerns with his current treatment Advate Q48 hours. Cancelled telephone call per patient preference.    Tayla Gill RN, BSN, PCCN  Nurse Clinician    [facility] for Bleeding and Clotting Disorders  [address] [place] [address], [address], [place] [address]   Office, direct: [phone]  Main office number: [phone]  Pronouns: She, her, hers

## 2024-08-21 DIAGNOSIS — D66 SEVERE HEMOPHILIA A (H): ICD-10-CM

## 2024-08-21 RX ORDER — CODEINE SULFATE 30 MG/1
30 TABLET ORAL EVERY 8 HOURS PRN
Qty: 84 TABLET | Refills: 0 | Status: SHIPPED | OUTPATIENT
Start: 2024-08-21 | End: 2024-10-07

## 2024-08-21 NOTE — TELEPHONE ENCOUNTER
*Last Refill: 8/6/2024    *Notes:. We are requesting codeine early to have on hand when patient is due 9/3. Rx will be profiled.    Thanks,    Jocelyn Henry, Pharmacy Coordinator  [facility] for Bleeding and Clotting Disorders  [phone]

## 2024-09-04 ENCOUNTER — TELEPHONE (OUTPATIENT)
Dept: FAMILY MEDICINE | Facility: CLINIC | Age: 74
End: 2024-09-04

## 2024-09-04 NOTE — TELEPHONE ENCOUNTER
Per patient - does not need new visit. George 2589 urostomy pouch was not called in with last refill.     Was seen in April. OK to submit notes from previous preventative.   Put form back in MA basket.

## 2024-09-04 NOTE — TELEPHONE ENCOUNTER
Requires office visit as per form for medical justification. Put form back in ma basket. Please notify patient to schedule an appt.

## 2024-09-04 NOTE — TELEPHONE ENCOUNTER
Forms/Letter Request    Type of form/letter: OTHER: / renewal prescription standard written order - DME supplies       Do we have the form/letter: Yes: placed in care team 3 providers form folder    Who is the form from? [facility] (if other please explain)    Where did/will the form come from? form was faxed in    When is form/letter needed by: n/a    How would you like the form/letter returned: Fax : [phone]

## 2024-09-04 NOTE — TELEPHONE ENCOUNTER
Patient will be coming in today at 2:00 pm to get paper work filled out from  [facility] medical supply/ renewal prescription standard written order - DME supplies   Paper work placed back in providers form folder

## 2024-10-07 DIAGNOSIS — D66 SEVERE HEMOPHILIA A (H): ICD-10-CM

## 2024-10-07 RX ORDER — CODEINE SULFATE 30 MG/1
30 TABLET ORAL EVERY 6 HOURS PRN
Qty: 84 TABLET | Refills: 0 | Status: SHIPPED | OUTPATIENT
Start: 2024-10-07 | End: 2024-11-05

## 2024-10-24 ENCOUNTER — TELEPHONE (OUTPATIENT)
Dept: HEMATOLOGY | Facility: CLINIC | Age: 74
End: 2024-10-24
Payer: MEDICARE

## 2024-10-24 NOTE — TELEPHONE ENCOUNTER
[facility] for [facility]ot[facility] Disorders    Received update from patient he was made aware that [facility] will not be renewing premium assistance for 2025. He also has yet to be reimbursed since August 2024.    Options were discussed including getting on waitlist for other funds, one-time use of HFMD, shopping for alternate health care plans, and [employer] eval for determine eligibility for other services.  Pt wrote down the information and plans to research the next time he is at the library.    With pt permission, writer emailed [facility]  team to determine if pt is missing documentation for August reimbursement or if pt should be expecting a check. Will update pt once writer receives a response.    Analilia Rashid, [facility]  Clinical   [facility] for Bleeding [facility] Clotting Disorders  Office: [phone]    Addendum:  No response received from [facility].  Another email was sent 10/31.    Addendum:  Received a call from patient on 11/7.  He stated he received a check from [facility] this week.  Pt/writer will continue to investigate potential premium assistance options for 2025.

## 2024-11-05 DIAGNOSIS — D66 SEVERE HEMOPHILIA A (H): ICD-10-CM

## 2024-11-05 RX ORDER — CODEINE SULFATE 30 MG/1
30 TABLET ORAL EVERY 6 HOURS PRN
Qty: 84 TABLET | Refills: 0 | Status: SHIPPED | OUTPATIENT
Start: 2024-11-05

## 2024-11-11 ENCOUNTER — TELEPHONE (OUTPATIENT)
Dept: HEMATOLOGY | Facility: CLINIC | Age: 74
End: 2024-11-11
Payer: MEDICARE

## 2024-11-11 NOTE — TELEPHONE ENCOUNTER
[facility] Ble[facility] Clotting Disorders    Received a call from patient.  Orlin does not have routine access to internet and email, so patient asked writer to email a  at [facility] on his behalf to consent to electronic communication and updates about his assistance michelet.    Writer sent email to [employer]<[email or web address]>.    Pt stated he plans to update his landlord with [employer] that his michelet for his health insurance premium has run out for 2025, as this may impact his rent.  He requested assistance with documentation, if needed.  Pt will keep writer updated.    Analilia Rashid, [facility]  Clinical   [facility] for Ble[facility] Clotting Disorders  Office: [phone]

## 2024-11-26 ENCOUNTER — TELEPHONE (OUTPATIENT)
Dept: HEMATOLOGY | Facility: CLINIC | Age: 74
End: 2024-11-26
Payer: MEDICARE

## 2024-11-26 NOTE — TELEPHONE ENCOUNTER
Center for Bleeding and Clotting Disorders  Social Work Note    Writer received an email intended for patient from [employer] re: patient's next reimbursement. Spoke with patient about this today and he is aware a check will be sent.  He updated writer that he may need assistance obtaining confirmation he won't be receiving insurance reimbursement in 2025, as it will impact his rent responsibility with [employer]. Will assist patient with TAF and documentation as indicated.    Analilia Rashid, [facility]  Clinical   [facility] for Ble[facility] Clotting Disorders  Office: [phone]

## 2024-12-04 DIAGNOSIS — D66 SEVERE HEMOPHILIA A (H): ICD-10-CM

## 2024-12-04 RX ORDER — CODEINE SULFATE 30 MG/1
30 TABLET ORAL EVERY 6 HOURS PRN
Qty: 84 TABLET | Refills: 0 | Status: SHIPPED | OUTPATIENT
Start: 2024-12-04 | End: 2024-12-04

## 2024-12-04 RX ORDER — CODEINE SULFATE 30 MG/1
30 TABLET ORAL EVERY 8 HOURS PRN
Qty: 84 TABLET | Refills: 0 | Status: SHIPPED | OUTPATIENT
Start: 2024-12-04

## 2024-12-18 DIAGNOSIS — D66 HEMOPHILIC ARTHROPATHY (H): ICD-10-CM

## 2024-12-18 DIAGNOSIS — M36.2 HEMOPHILIC ARTHROPATHY (H): ICD-10-CM

## 2024-12-18 DIAGNOSIS — D66 SEVERE HEMOPHILIA A (H): ICD-10-CM

## 2024-12-18 DIAGNOSIS — R26.9 ABNORMAL GAIT: ICD-10-CM

## 2024-12-18 RX ORDER — CELECOXIB 100 MG/1
CAPSULE ORAL
Qty: 60 CAPSULE | Refills: 1 | Status: SHIPPED | OUTPATIENT
Start: 2024-12-18

## 2024-12-18 NOTE — TELEPHONE ENCOUNTER
Scheduled 1/21 for CC visit.   Per last chart note:  Celebrex 100mg twice daily for arthropathy pain     Paola Ruiz  MSN, RN, PHN  [facility] for Bleeding and Clotting Disorders  Office: [phone]  Fax: [phone]

## 2025-01-02 DIAGNOSIS — D66 SEVERE HEMOPHILIA A (H): ICD-10-CM

## 2025-01-02 RX ORDER — ANTIHEMOPHILIC FACTOR (RECOMBINANT) 3000 (+/-)
KIT INTRAVENOUS
Qty: 23978 EACH | Refills: 0 | Status: SHIPPED | OUTPATIENT
Start: 2025-01-02

## 2025-01-02 RX ORDER — CODEINE SULFATE 30 MG/1
30 TABLET ORAL EVERY 8 HOURS PRN
Qty: 84 TABLET | Refills: 0 | Status: SHIPPED | OUTPATIENT
Start: 2025-01-02

## 2025-01-02 NOTE — TELEPHONE ENCOUNTER
*Medicare refill - please check units and qty*    *Need Rx by: Today, January 2nd by 2 pm    *Last comp visit: 12/12/2023    *Drug: Advate at 3 doses of 2876 units & 5 doses of 3070 units    *Refills left: 0    *Notes: No refills needed on Advate Rx because we the vial sizes will be changing, we will send new refill request at his next fill    Thanks,    MICHAEL Cameron, [facility]  Lead Pharmacy Coordinator  [facility] for Bleeding & Clotting Disorders  [phone]

## 2025-01-06 ENCOUNTER — TELEPHONE (OUTPATIENT)
Dept: HEMATOLOGY | Facility: CLINIC | Age: 75
End: 2025-01-06
Payer: MEDICARE

## 2025-01-06 NOTE — TELEPHONE ENCOUNTER
[facility]otting [facility]    Received a call from Orlin albert.  We reviewed options for obtaining his social security award letter and he will follow-up independently. Reviewed 2024 reimbursement via [employer] and an email was sent to Nora at [facility] on pt's behalf.     Pt reported that he had a nice holiday and he was able to spend time with his two nieces that live in [place].    Will continue to pursue Premium options for patient in 2025, if they become available.    Analilia Rashid, [facility]  Clinical   [place] for Bleeding and Clotting Disorders  Office: [phone]      Addendum 1/7:  Informed patient that [employer] is open for premium assistance applications for 2025. Assisted patient with application and hard copy was mailed to patient.

## 2025-01-13 ENCOUNTER — CARE COORDINATION (OUTPATIENT)
Dept: HEMATOLOGY | Facility: CLINIC | Age: 75
End: 2025-01-13
Payer: MEDICARE

## 2025-01-13 NOTE — PROGRESS NOTES
[facility]ENTER [facility] BLEEDING AND CLOTTING [facility]ORDERS  COMPREHENSIVE CLINIC PRE-VISIT CARE COORDINATION NOTE     NAME: Orlin Alcantar  :  1950   AGE:  74 year old    Appointment date/time:  2025 at 10am      Assigned RN:   Diagnosis:   Factor level:        Inhibitor Hx:       Viral issues:      Other health issues:    Planned procedures or surgeries:       Hemophilia treatment plan:      Method of logging infusions:       Psychosocial:       Last CC or Office Visit:     Confirmation of appointment:      Goals for Visit:           [facility]           Pharmacy Insurance:        Specialty Pharmacy:        Copay Assistance:      A/R Bill Balance:    HTC Assistance Fund:        PDC:        Pharmacokinetics:       Pharmacy Notes:             Joint issues:       Orthopedic past medical history:         PT orders:          Eligible studies:       Transition Tracker Eligible:         Genetics:

## 2025-01-14 DIAGNOSIS — D66 SEVERE HEMOPHILIA A (H): ICD-10-CM

## 2025-01-14 DIAGNOSIS — F51.04 PSYCHOPHYSIOLOGIC INSOMNIA: ICD-10-CM

## 2025-01-14 DIAGNOSIS — F41.1 ANXIETY STATE: ICD-10-CM

## 2025-01-14 RX ORDER — MIRTAZAPINE 45 MG/1
45 TABLET, FILM COATED ORAL AT BEDTIME
Qty: 90 TABLET | Refills: 0 | Status: SHIPPED | OUTPATIENT
Start: 2025-01-14

## 2025-01-14 RX ORDER — CODEINE SULFATE 30 MG/1
30 TABLET ORAL EVERY 8 HOURS PRN
Qty: 84 TABLET | Refills: 0 | Status: SHIPPED | OUTPATIENT
Start: 2025-01-14

## 2025-01-14 RX ORDER — ANTIHEMOPHILIC FACTOR (RECOMBINANT) 3000 (+/-)
KIT INTRAVENOUS
Qty: 24560 EACH | Refills: 0 | Status: SHIPPED | OUTPATIENT
Start: 2025-01-14

## 2025-01-14 NOTE — TELEPHONE ENCOUNTER
*Medicare refill - please check units and qty*    *Need Rx by: 01/15/2025    *Last comp visit: 12/12/23    *Drug: Advate at 8 doses of 3070 units = 36468    *Refills left: 0    *Notes: Pharmacy will not dispense Codeine until beginning of February. We are requesting RX early to have on hand when patient is needed.      Thanks,    Jocelyn Henry, Pharmacy Coordinator  [facility] for Bleeding and Clotting Disorders  [phone]

## 2025-01-23 ENCOUNTER — TELEPHONE (OUTPATIENT)
Dept: HEMATOLOGY | Facility: CLINIC | Age: 75
End: 2025-01-23
Payer: MEDICARE

## 2025-01-23 NOTE — TELEPHONE ENCOUNTER
[facility]ot[facility]    Assisted patient with uploading health premium invoice documents to [facility] for premium assistance.  Submission ID [id number].  Copy mailed to patient.    Analilia Rashid, [facility]  Clinical   [place] for Bleeding and Clotting Disorders  Office: [phone]

## 2025-01-29 DIAGNOSIS — D66 SEVERE HEMOPHILIA A (H): ICD-10-CM

## 2025-01-29 RX ORDER — ANTIHEMOPHILIC FACTOR (RECOMBINANT) 3000 (+/-)
KIT INTRAVENOUS
Qty: 24560 EACH | Refills: 0 | Status: SHIPPED | OUTPATIENT
Start: 2025-01-29 | End: 2025-01-29

## 2025-01-29 RX ORDER — CODEINE SULFATE 30 MG/1
30 TABLET ORAL EVERY 8 HOURS PRN
Qty: 84 TABLET | Refills: 0 | Status: SHIPPED | OUTPATIENT
Start: 2025-01-29

## 2025-01-29 RX ORDER — ANTIHEMOPHILIC FACTOR (RECOMBINANT) 3000 (+/-)
KIT INTRAVENOUS
Qty: 24560 EACH | Refills: 2 | Status: SHIPPED | OUTPATIENT
Start: 2025-01-29

## 2025-01-29 NOTE — TELEPHONE ENCOUNTER
*Medicare refill - please check units and qty*    *Need Rx by: Today, January 29th by 12 pm    *Last comp visit: 12/12/2023    Scheduled comp visit: 2/25/2025    *Drug 1: Advate at 8 doses of 3070 units    *Refills left: 0    *Notes: We have plenty of Advate 3070 unit vials in stock. Would we be able to get a few refills with this order please?    *Drug 2: Filling Orlin's Codeine rx we had on file, would we be able to get a new one to keep on file for the following month?    Thanks,    MICHAEL Cameron, [facility]  Lead Pharmacy Coordinator  [facility] for Ble[facility] & Clotting Disorders  [phone]

## 2025-02-03 ENCOUNTER — TELEPHONE (OUTPATIENT)
Dept: HEMATOLOGY | Facility: CLINIC | Age: 75
End: 2025-02-03
Payer: MEDICARE

## 2025-02-03 NOTE — TELEPHONE ENCOUNTER
Center for Bleeding and Clotting Disorders  Social Work Note    Received a request from patient to assist with uploading bank statements to [facility] Cares. This was completed, and the confirmation was mailed to patient.    Analilia Rashid, [facility]  Clinical   [place] for Bleeding and Clotting Disorders  Office: [phone]

## 2025-02-12 DIAGNOSIS — R26.9 ABNORMAL GAIT: ICD-10-CM

## 2025-02-12 DIAGNOSIS — D66 HEMOPHILIC ARTHROPATHY (H): ICD-10-CM

## 2025-02-12 DIAGNOSIS — D66 SEVERE HEMOPHILIA A (H): ICD-10-CM

## 2025-02-12 DIAGNOSIS — M36.2 HEMOPHILIC ARTHROPATHY (H): ICD-10-CM

## 2025-02-12 RX ORDER — CELECOXIB 100 MG/1
CAPSULE ORAL
Qty: 60 CAPSULE | Refills: 1 | Status: SHIPPED | OUTPATIENT
Start: 2025-02-12

## 2025-02-12 NOTE — TELEPHONE ENCOUNTER
Scheduled for an appointment 2/25. Will give 1 refill to get to this appointment.     Paola Ruiz  MSN, RN, PHN  [facility] for Bleeding and Clotting Disorders  Office: [phone]  Fax: [phone]

## 2025-02-17 ENCOUNTER — CARE COORDINATION (OUTPATIENT)
Dept: HEMATOLOGY | Facility: CLINIC | Age: 75
End: 2025-02-17
Payer: MEDICARE

## 2025-02-17 NOTE — PROGRESS NOTES
[facility]LE[facility] CLOTTING [facility]ORDERS  COMPREHENSIVE CLINIC PRE-VISIT CARE COORDINATION NOTE     NAME: Orlin Alcantar  :  1950   AGE:  74 year old    Appointment date/time:  25 @ 12 PM       Assigned RN:  Paola Ruiz RN    Diagnosis:  Severe Hemophilia A  Factor level:  Factor VIII level of <1%      Inhibitor Hx:  No history of inhibitor, last [place] assay of 0.1 NBU on 23     Viral issues:  Hx of HCV-cleared with interferon and ribavirin in , HIV negative, HAV status unknown HBV core antibody negative      Other health issues:  Invasive urothelial carcinoma-underwent chemo and radical cystoprostatectomy with ileal conduit (currently in remission), Type 2 DM, chronic pain-codeine prescribed by CBCD, HTN, mood disorder/anxiety, nephroplithiasis with stent placement and removal in     Planned procedures or surgeries:  Unknown      Hemophilia treatment plan:  3000 units of Advate every 48 hours for prophylaxis and daily as needed for bleeding  Method of logging infusions:  Paper records? Does not have microhealth     Psychosocial:     Orlin lives in public housing in [place]. He has a small support network (he reports is by choice),  He moved into foster care at the age of 1.  Reports his only family are two neices, whom he sees infrequently.  Health Care Directive lists Dr. Jorge Jiang as his health care agent.  Pt has enrolled in [employer].  He is organized and thorough.  He has a limited income each month that he manages well and writer has helped him access several supports.  Pt does not use internet or computers.  Last CC or Office Visit:   Comp 23  Confirmation of appointment:  Confirmed    Goals for Visit:     Nothing to report, only trouble was with product switch last summer but Advate working well. Generally wondered what we've been hearing about cuts to 340B.      Medical Insurance:   Payer Type: Medicare  Payer Name: Medicare  HTC Specified Payer Name:  MEDICARE  Insurance Type: Primary  Coverage Type: Medical     Medical Insurance:   Payer Type: Commercial  Payer Name: [employer]  HTC Specified Payer Name: [facility] COMMERCIAL  Insurance Type: Secondary  Coverage Type: Medical      Pharmacy Insurance:        Specialty Pharmacy:        Copay Assistance:      A/R Bill Balance:    HTC Assistance Fund:        PDC:        Pharmacokinetics:       Pharmacy Notes:             Joint issues:       Orthopedic past medical history:         PT orders:          Eligible studies:       Transition Tracker Eligible:         Genetics:        Available if interested  He has never met with genetics. He does not have a pedigree in his chart nor record of genetic testing.   He declined to meet with genetics in 2023.

## 2025-02-24 NOTE — PROGRESS NOTES
[facility]  [facility]  [facility] [address], [address], [place] [address]  Main: [phone], Fax: [phone]    Comprehensive Hemophilia Clinic Visit        Patient: Orlin Alcantar  MRN: [id number]  : 1950  MAR: 2025    Orlin Alcantar is a 75 year old male with severe hemophilia A that presents today for a routine comprehensive hemophilia clinic visit.    HISTORY   Hemophilia History:   Severe hemophilia A. No history of inhibitor.   Orlin was initially diagnosed with hemophilia at the [facility] by Dr. Quiñones.   He was followed by a community hematologist/oncologist for many years. During that time, we saw him intermittently (, ) surrounding surgeries.   Established long term care with our [facility] in .   He has been on prophylactic factor VIII replacement for many years via a number of different products, typically standard half life products.   He has long preferred to use smaller factor doses/shorter half life factor products more frequently rather than extend the interval between infusions. At one point he was treated with 500 unit(s) of standard half life factor VIII 3x/day.    He was briefly transitioned to emicizumab in , but ultimately switched back to standard half life factor VIII prophylaxis. He felt like he had better control over his hemophilia when he was able to infuse factor on a regular basis.    He developed hematuria in  and was found to have invasive urothelial carcinoma. He underwent chemotherapy and radical cystoprostatectomy with ileal conduit. He follows with Oncology for this and remains in remission.      Joint History/Pain Management:   Right elbow hemarthropathy.   Left elbow hemarthropathy.   Right hip hemarthropathy s/p:  -Right SUNG in .   -Open synovectomy in .  -Right SUNG revision on synovectomy in .   -Multiple steroid injections spanning  6161-9160.   History of left hip fracture in 1995 s/p ORIF.   Left knee hemarthropathy s/p:  -Left total knee arthroplasty in 1991.   Right knee essentially spared.    Right ankle hemarthropathy.   Left ankle hemarthropathy.   He has chronic pain related to his arthropathy for which he uses Celebrex and codeine. We have tried other regimens but this has been the most effective for him.     Infectious Disease History:   HIV negative.   History of hepatitis C diagnosed in 1991 s/p treatment (Interferon, Ribavirin) with documented clearance in 1999.    Other Pertinent Medical History:   Type 2 diabetes mellitus. Not insulin dependent.   Hypertension.   History of invasive urothelial carcinoma s/p radical cyctoprostatectomy and lymph node dissection, ileal conduit, adjuvant chemotherapy. In remission. Followed by [facility] oncologist.   History of nephrolithiasis w/ stent placement and removal in 5/2023.    Family History/Genetics:   Orlin was raised by a foster family and does not know his biological parents or family health history.     Social History:   Please refer to Analilia Rashid [facility]'s note for detailed social history.    CURRENT TREATMENT REGIMEN  Prophylaxis:   Advate 3000 units (~43u/kg) q48h.    Breakthrough/On-Demand:   Advate 3000 units PRN.    INTERVAL HISTORY  Orlin's last comprehensive clinic visit was on 12/12/23. At that time, he was on Kogenate every other day for prophylaxis. In June 2024, He reported an increased frequency of bleeding events vs. flare-ups of hemarthritic pain particularly involving the right elbow, right hip, and the left ankle. He inquired about alternative treatment options.    He did a trial of ALTUVIIIO 50u/kg q7 days. He continued to report concern for bleeds while on ALTUVIIIO, even with documentation of factor VIII levels in the normal range (thus suggesting that symptoms were likely hemarthritic in nature, as opposed to acute hemarthrosis).     After completing his trial of  ALTUVIIIO trial, he ultimately elected to switch back to a standard half life product (Advate, given discontinuation of Kogenate), stating that he felt like more frequent dosing gave him better coverage/more control over his hemophilia management.     At present, he continues on Advate 3000 units (~43u/kg) every other day for prophylaxis, and as needed for breakthrough bleeding. He is pleased with this regimen. He ends up taking an extra dose of Advate an average of 3x/month due to flare-ups of pain (concern for possible bleeds in right elbow, right hip, and/or left ankle). He reports these flare-ups are related to activity/overuse about 50% of the time. He reports an improvement of symptoms after taking a dose of factor. Those extra doses of factor are outlined in the table below.    He does self infuse, but notes that his venous access has declined. He is still able to obtain access at the right antecubital but understandably has concern about this fading over time.    He notes stable joint mobility status. He does have a longstanding history of chronic pain related to his advanced multi-joint hemarthropathy. This is controlled with Celebrex daily and Codiene 30mg TID. On this regimen, he continues to have daily left ankle pain but it is tolerable. His other joints do not cause him daily pain.        OBJECTIVE  Exam:  BP (!) 155/92 (BP Location: Right arm)   Pulse 85   Temp 98.5  F (36.9  C) (Tympanic)   Wt 70.4 kg (155 lb 3.2 oz)   SpO2 99%   BMI 21.73 kg/m    Constitutional: Appears well. Not in distress.   Respiratory: Breathing comfortably on room air.  Neuro: Aox3.    Labs:  Component      Latest Ref Rng 4/23/2024  10:33 AM   Sodium      135 - 145 mmol/L 141    Potassium      3.4 - 5.3 mmol/L 4.4    Carbon Dioxide (CO2)      22 - 29 mmol/L 25    Anion Gap      7 - 15 mmol/L 11    Urea Nitrogen      8.0 - 23.0 mg/dL 20.6    Creatinine      0.67 - 1.17 mg/dL 1.05    GFR Estimate      >60 mL/min/1.73m2 74     Calcium      8.8 - 10.2 mg/dL 9.8    Chloride      98 - 107 mmol/L 105    Glucose      70 - 99 mg/dL 93    Alkaline Phosphatase      40 - 150 U/L 86    AST      0 - 45 U/L 20    ALT      0 - 70 U/L 16    Protein Total      6.4 - 8.3 g/dL 7.4    Albumin      3.5 - 5.2 g/dL 4.8    Bilirubin Total      <=1.2 mg/dL 0.9      Component      Latest Ref Rng 10/6/2023  8:47 AM   WBC      4.0 - 11.0 10e3/uL 4.2    RBC Count      4.40 - 5.90 10e6/uL 4.94    Hemoglobin      13.3 - 17.7 g/dL 14.0    Hematocrit      40.0 - 53.0 % 41.6    MCV      78 - 100 fL 84    MCH      26.5 - 33.0 pg 28.3    MCHC      31.5 - 36.5 g/dL 33.7    RDW      10.0 - 15.0 % 13.8    Platelet Count      150 - 450 10e3/uL 200    % Neutrophils      % 57    % Lymphocytes      % 25    % Monocytes      % 10    % Eosinophils      % 7    % Basophils      % 1    % Immature Granulocytes      % 0    NRBCs per 100 WBC      <1 /100 0    Absolute Neutrophils      1.6 - 8.3 10e3/uL 2.4    Absolute Lymphocytes      0.8 - 5.3 10e3/uL 1.0    Absolute Monocytes      0.0 - 1.3 10e3/uL 0.4    Absolute Eosinophils      0.0 - 0.7 10e3/uL 0.3    Absolute Basophils      0.0 - 0.2 10e3/uL 0.1    Absolute Immature Granulocytes      <=0.4 10e3/uL 0.0    Absolute NRBCs      10e3/uL 0.0        ASSESSMENT  In summary, Orlin Alcantar is a 75 year old male that has severe hemophilia A without inhibitor. He prefers to treat with standard half-life factor FVIII concentrates, thus is presently maintained on every other day Advate for prophylaxis. We have tried other approaches to prophylaxis in the past, including emicizumab in 2021, and q7day ALTUVIIIO this past year. Ultimately, Orlin feels better/more in control of his hemophilia with regular factor administration, thus he will continue on every other day Advate for prophylaxis. Additional doses of Advate are used for breakthrough bleeding. Orlin averages ~3 additional doses of Advate per month, as detailed above. A joint pain flare  up is generally what signals him to administer an additional dose of factor, due to concern that the pain may be the start of a joint bleed. I suspect that many of his recorded bleeds are flare ups of his hemarthritic pain as opposed to true breakthrough bleeds. Nonetheless, his factor usage is safe and his overall joint function has remained stable. His chronic pain is well managed with Celebrex and codeine. We have tried other regimens but this has been the most effective for him. We have had no concerns about inappropriate pain medication use.      His venous access has declined over the years. At present, he has one self-accessible site (right antecubital). He is interested in staying up to date regarding subcutaneous options for prophylaxis. He expressed the most interest in Concizumab (anti-TFPI monoclonal antibody), given its daily administration. At present, this is only approved for patients with inhibitors, but may be available for non-inhibitor patients in the near future.     Orlin's comorbidities include hypertension and type 2 diabetes mellitus (diet controlled). He also has a history of hepatitis C (treated and cleared in 1999) and bladder cancer, for which he underwent chemotherapy and surgery. He remains in remission.       PLAN  Treatment Plan:   Advate 3000 units (~43u/kg) q48h AND as needed for breakthrough bleeding.     Hemophilic Arthropathy/Chronic Pain Management Plan:   Joint status is stable.   Codeine 30mg TID for pain. PDMP reviewed.   Celebrex 200mg daily.    Routine Health Maintenance:   Recommend q6-12mo follow-up with primary care provider.     Follow up clinic visit timing:   Follow-up in 1 year for a comprehensive hemophilia clinic visit. Sooner as needed.       The following individuals have seen this patient independently today:   Paola Ruiz RN.   Analilia Rashid [facility].   [facility] pharmacy liaison.     Dr. Jorge Jiang, staff hematologist has also seen this patient today in clinic.          Gia Barragan PA-C, NOEMY  [facility]  [facility]  [address], [address], [place] [address]  Main: [phone], Fax: [phone]   we will continue with his current treatment plan, and reevaluate annually, sooner with new issues or concerns.    Total time on date of encounter 40 minutes, including review of medical records and labs, clinic visit, and documentation.  The longitudinal plan of care for the diagnosis(es)/condition(s) as documented were addressed during this visit. Due to the added complexity in care, I will continue to support Orlin in the subsequent management and with ongoing continuity of care.        Jorge Jiang MD  Professor of Medicine  Division of Hematology, Oncology, and Transplantation  Director, [facility]

## 2025-02-25 ENCOUNTER — OFFICE VISIT (OUTPATIENT)
Dept: HEMATOLOGY | Facility: CLINIC | Age: 75
End: 2025-02-25
Attending: INTERNAL MEDICINE
Payer: MEDICARE

## 2025-02-25 ENCOUNTER — ALLIED HEALTH/NURSE VISIT (OUTPATIENT)
Dept: HEMATOLOGY | Facility: CLINIC | Age: 75
End: 2025-02-25

## 2025-02-25 ENCOUNTER — DOCUMENTATION ONLY (OUTPATIENT)
Dept: HEMATOLOGY | Facility: CLINIC | Age: 75
End: 2025-02-25
Payer: MEDICARE

## 2025-02-25 VITALS
BODY MASS INDEX: 21.73 KG/M2 | SYSTOLIC BLOOD PRESSURE: 155 MMHG | TEMPERATURE: 98.5 F | HEART RATE: 85 BPM | OXYGEN SATURATION: 99 % | WEIGHT: 155.2 LBS | DIASTOLIC BLOOD PRESSURE: 92 MMHG

## 2025-02-25 DIAGNOSIS — D66 HEMOPHILIC ARTHROPATHY (H): ICD-10-CM

## 2025-02-25 DIAGNOSIS — R26.9 ABNORMAL GAIT: ICD-10-CM

## 2025-02-25 DIAGNOSIS — D66 SEVERE HEMOPHILIA A (H): ICD-10-CM

## 2025-02-25 DIAGNOSIS — M36.2 HEMOPHILIC ARTHROPATHY (H): ICD-10-CM

## 2025-02-25 DIAGNOSIS — Z71.9 ENCOUNTER FOR COUNSELING: Primary | ICD-10-CM

## 2025-02-25 PROCEDURE — 3077F SYST BP >= 140 MM HG: CPT | Performed by: INTERNAL MEDICINE

## 2025-02-25 PROCEDURE — 3080F DIAST BP >= 90 MM HG: CPT | Performed by: INTERNAL MEDICINE

## 2025-02-25 PROCEDURE — 99215 OFFICE O/P EST HI 40 MIN: CPT | Performed by: INTERNAL MEDICINE

## 2025-02-25 PROCEDURE — G0463 HOSPITAL OUTPT CLINIC VISIT: HCPCS | Performed by: INTERNAL MEDICINE

## 2025-02-25 PROCEDURE — G2211 COMPLEX E/M VISIT ADD ON: HCPCS | Performed by: INTERNAL MEDICINE

## 2025-02-25 RX ORDER — CELECOXIB 100 MG/1
100 CAPSULE ORAL 2 TIMES DAILY
Qty: 60 CAPSULE | Refills: 11 | Status: SHIPPED | OUTPATIENT
Start: 2025-02-25

## 2025-02-25 RX ORDER — ANTIHEMOPHILIC FACTOR (RECOMBINANT) 3000 (+/-)
KIT INTRAVENOUS
Qty: 24560 EACH | Refills: 11 | Status: SHIPPED | OUTPATIENT
Start: 2025-02-25

## 2025-02-25 NOTE — PROGRESS NOTES
"  [facility]  Social Work Evaluation    Name:  Orlin Alcantar  Age:   75 year old  :  1950    Presenting Information:  Orlin is followed by the [facility] for management of severe hemophilia A.  He presented to clinic today for a routine comprehensive clinic visit.  Met with the patient 1:1 to complete an updated psychosocial evaluation.    Living Situation:    Orlin lives in a section 8 apartment in [place].  This is a 3rd floor apartment and he has access to an elevator.  He is very happy in his current location and does not plan to try to move anytime soon.  He has been living in this apartment for about 8 years.     Family/Social Support:  Orlin has a limited support network and he stated this is due to his own preferences.  He has stated if he needs support, he trusts [facility] staff the most.  He moved into a foster home at the age of 1, and his foster parents and siblings are all . His remaining family include neices, whom he has limited contact with. He does not know any of his biological family.    Functional Status: Independent with ADLs and IADLs, including motor vehicle .  He has a set of crutches and a walker at home, though reported they are \"gathering dust\" as he has not needed them recently.     Current Community Services:    [facility]  [facility] (reapplying for )  [facility]  PCP    Past Community Services:   Inpatient psychiatry at [facility] in .  Outpatient Psychiatry for medication mangement through  (reported his provider left)  [facility]    Chemical Use:    Orlin reported minimal alcohol use and that he may have a Guiness once in a while.  He has tried legal CBD gummies but did not care for them.  He does not use tobacco.    Mental/Emotional Health:   coreHEM: positive outlook.  Scores were discussed and Orlin reported mental health has been very stable.     The patient reported a history " of the following mental health concerns and/or diagnoses: anxiety and depression.       Orlin  reported manageable stress levels and that they cope with stress by listening to music and reading.  We discussed the ANS and symptoms within chronic health conditions.    Pain Management Strategies:  Per provider note: He does have a longstanding history of chronic pain related to his advanced multi-joint hemarthropathy. This is controlled with Celebrex daily and Codiene 30mg TID. On this regimen, he continues to have daily left ankle pain but it is tolerable.     Abuse Concerns:  No concerns were reported today.    Education: Orlin is a high school graduate and he completed 1 year of post-secondary education.     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     School/Work Attendance:   NA    Financial/Income:  Social security disability income up until he retired and he now receives social security income.  He stated finances are tight, but he budgets and makes ends meet.  He denied specific financial issues during today's visit.     Health Insurance:  Medicare and BCBS.  He is connected to PAN premium assistance and [employer] 340B program.   He did not have questions or concerns related to out of pocket costs or coverage today though does reach out proactively to discuss issues as they arise.    Health Literacy/Adjustment to Illness:  Orlin keeps an annual visit schedule, and reaches out to the center as needed.  He is proactive in meeting his health care needs and is very organized when managing his benefits and finances.  He has maintained access to health insurance, resources, and community resources. He currently treats with Advate every other day and as needed; he has tried Altuviiio and Hemlibra and this is what he prefers.  He treats extra an average of 3x/month.  He would be open to considering other regimens if his access becomes challenging.     Medical Alert ID Tag:  A  wallet card was ordered in 2022 for patient via [employer].     Advanced Care Planning:  Documents are in Epic and validated by  honoring [employer]..     Authorization to discuss PHI:  Declined, as he would not want to list anyone on the form.  Electronic Consent for email: Declined, does not use.  Electronic Consent for text messaging:  Declined, does not use.    Interventions Utilized:  Assessment of strengths and needs  Assessment of impact of living with a chronic health condition, overall adjustment, and current coping skills  Explored aspects of family history and dynamics   Explored and processed emotional/social responses to bleeding disorder and treatment plan.  Non-medical psychosocial stressors were discussed  Normalized and validated feelings and experiences  Provided positive feedback and encouragement  Encouraged ongoing self-care and continued attention to self-care  Discussed Advanced Care Planning:  confirmed current wishes/goals  Case Coordination with CBCD Team    Impressions/Recommendations:    Orlin Alcantar is a 75 year old male who presented to the [facility] at [facility] this date for a routine comprehensive clinic visit.  He presented today as alert, oriented and engaged and they were welcoming of social work visit.    Orlin is detail oriented and a great advocate for himself.  He is proactive in planning for needs and partners with the center on his health care preferences.  Orlin and writer speak on a regular basis to ensure ongoing access to [facility] and other pertinent resources.    Orlin has good coping skills and a small support network though he states this is by choice.  He enjoys reading, listening to music and doing puzzles.  Local family include two neices and he maintains intermittent contact with them, and does occasionally see them.    Plan:   Writer and patient will remain in contact re: resources connection and other psychosocial  support    Analilia Rashid, [facility]  Clinical   [facility]  Office: [phone]

## 2025-02-25 NOTE — PROGRESS NOTES
I met with Orlin Alcantar on February 25, 2025 during his annual comprehensive clinic visit with the [facility].    We discussed current treatment plan of Advate ~3000 units iv every 48 hours for prophylaxis and every 24 hours as needed for bleeding. He stated he likes the product and will continue on it. I let him know of a new hemophilia product that was recently approved for Hemophilia A & B with inhibitors, Alhemo. He liked that it was similar to insulin in that you dose daily and it is subcutaneous injection.     Currently, he has one good vein right now that he uses to infuse, but worried in the future as to how long it will hold up. We gave him a Alhemo pamphlet and let him know that there are studies being done for patients without inhibitors and we can let him know once it's FDA approved for those diagnosis and when we are able to bill Medicare B for it. He had not signed [facility] for [facility] [place], so I gave him the application to complete and send back with the proof of income.     Does Pt currently log? Yes   Type of logs: Paper  Outcome of logging? On-going    We discussed the HRSA Statement and the patient fully understood.    I spent 8 minutes face to face in clinic today with MICHAEL Chavarria, [facility]  Lead Pharmacy Coordinator  [facility] - [facility]  [phone]

## 2025-02-25 NOTE — PROGRESS NOTES
Orlin Alcantar  [id number]  1950    Teaching Flowsheet   Orlin Alcantar  has a diagnosis of hemophilia A with a baseline factor VIII level of  <1% . Orlin Alcantar  presents today for his comprehensive Hemophilia clinic evaluation.    Teaching Topic: annual     Learning Assessment  Person(s) involved in teaching:   Patient     Motivation Level:  Asks Questions: Yes  Eager to Learn: Yes  Cooperative: Yes  Receptive (willing/able to accept information): Yes  Comments: n/a     Patient demonstrates understanding of the following:  Reason for the appointment, diagnosis and treatment plan: Yes  Knowledge of proper use of medications and conditions for which they are ordered (with special attention to potential side effects or drug interactions): Yes  Which situations necessitate calling provider and whom to contact: Yes    Pain management techniques: Yes  How and/when to access community resources: Yes     Nursing Summary  Orlin Alcantar uses  Advate brand factor VIII concentrate  and treats prophylactically every 2 days.  He treats himself using independent peripheral venipuncture and his typical dose is 3000 units/kg. He keeps track of his infusions by using paper logs (mails into clinic and brings to visit) .    Patient's goal for today's visit is: Annual Check-in. First time meeting Orlin in person. He is currently on Advate 3000 units treating every other day in his right AC. His venous access is a concern for him as he only has 1 vein. He thinks he will go back to Altuviiio if this vein goes. He does not have any goals for today's visit. See PA note for additional details about bleeds.    Other Health Maintenance  Patient's primary care provider is Dr. Greene with [facility]  Patient does not have a dentist- was encouraged to get an appointment (he does not have dental insurance at this time) and is not interested in scheduling.     Nursing Education Provided:  - Reviewed procedure for home  infusions of factor concentrates.  See attached treatment recommendations.   - Reviewed severe/life threatening hemorrhage and handout given that recommends appropriate dosing.  If suspect bleeding in head, neck, throat or abdomen, give dose of factor if able, contact the hemophilia center immediately or report to the Emergency Room at [facility] or the nearest emergency room.   - Discussed need for dental check-ups and prophylactic antibiotics for dental procedures.   - Reviewed resources/weblinks with patient.  The National Hemophilia Foundation (HANDI educational resource request)   Www.hemophilia.org  The World Federation of Hemophilia (Find a treatment center)   Www.wfh.org  -Patient was given [facility] Contact Card.

## 2025-02-25 NOTE — PATIENT INSTRUCTIONS
Emergency Treatment Recommendations    Date: 2025    Name: Orlin Alcantar  MRN: [id number]     : 1950  Diagnosis:  Severe Hemophilia A  Baseline factor VIII level: <1 %  Inhibitor status: No history of inhibitor  Patient weight: 70.4 kg  Current treatment:  3000 units of Advate every 48 hours  Half-life information:  Unknown on Advate    For bleeding events:   Treat with: recombinant factor VIII 50 units/kg   Factor replacement should be administered emergently and prior to imaging or any invasive/surgical procedure   For suspected intracranial hemorrhage or life / limb threatening bleed, draw a peak factor VIII level 5-10 minutes after administration   For mucosal bleeding, antifibrinolytics may be useful; these are contraindicated in hematuria  Aminocaproic acid (Amicar) 50mg/kg IV or PO q 6 hours  Tranexamic acid (Lysteda) 1300 mg po TID or 10mg/kg IV q 8 hours     A single dose of factor replacement will temporarily correct the bleeding disorder-- further dosing may be required.  For guidance, please contact [facility]: [phone]  After Hours please call [phone] and ask for hematologist on call      Factor Plan:   Dose of factor (VIII) concentrate = 3000 units given every 48 hours or before increased activity.  If only treating for bleeds, please call if more than 3 bleeds in one joint in 3 month period.  For emergency head trauma, or signs of serious bleeing,  please treat with at least 50 units/kg to boost your factor level to 100%.  Please seek emergency care for head CT scan.  Continue to keep infusion records via Paper log        General Recommendations:  See primary care provider for general health maintenance.  See dentist every 6 months. Call the center for recommendations for dental visits that involve more than an examination and cleaning. If you have had a joint replacement or port for infusions, please call for antibiotic recommendations  "for dental cleaning.  Wear medic alert on your person for highest level of safety www.medicalert.org  Carry factor concentrate with you at all times. Call the center if you will be traveling out of the area. We can create a travel letter prior to your departure . For treatment centers around the world go to [email or web address], click on \"Support\" and then scroll down to  \"Find Local Support\" and click on Hemophilia  Treatment Centres\" link.   Enter country +/- city,  then scroll up or down to find city closest to destination.  For any questions, your nurse clinician is Paola Ruiz  RN, MSN, [phone].  If Paola is unavailable and you have immediate concerns, please call [phone] and ask for a nurse.   If you have emergency needs in the evening or weekend, please call [phone] and ask for the hematologist on call. Please return for comprehensive clinic in 1 year.    Patient Education & Resources  If you would like further information about your bleeding disorder, the following links may be of help:  The National Bleeding Disorders Foundation (includes HANDI - Information Resource Center under \"Community Resources\")   bleeding.org  The World Federation of Hemophilia (includes [facility] [place] Directory)   wfh.org  The Hemophilia Foundation of [place]/[facility] (organizes local annual meeting in Spring & other events)  hfmd.org    Additional Patient Information  Here is the information reviewed you today regarding [facility] patient choice information:    Per the 2005 [employer] Manual for Participating in the Drug Pricing Program Established by Section 340B of the Public Health Service Act I. Freedom of Choice Regarding Factor Replacement Products and Avoidance of Conflict of Interest It is a requirement of the [facility] National Hemophilia Program (NHP) that all [facility] funded hemophilia treatment centers (HTCs) have a \"Freedom of Choice\" policy where patients are informed of choices they have regarding factor replacement " products (FRP) and where these products might be purchased. It is important that this policy be exercised with all patients and it is especially important that this policy be exercised by [facility] funded HTCs that sell FRP since income generated from this activity is used to further the provision of services by the [employer]. To avoid any appearance of conflict of interest, patients must be informed about their choices and be encouraged to make whatever decision they desire.

## 2025-03-17 ENCOUNTER — TELEPHONE (OUTPATIENT)
Dept: HEMATOLOGY | Facility: CLINIC | Age: 75
End: 2025-03-17
Payer: MEDICARE

## 2025-03-17 NOTE — TELEPHONE ENCOUNTER
[facility]    Uploaded pt's Medicare premium statement to [employer] on his behalf. Confirmation was mailed to the patient.    Analilia Rashid, [facility]  Clinical   [facility]  Office: [phone]

## 2025-03-31 ENCOUNTER — DOCUMENTATION ONLY (OUTPATIENT)
Dept: HEMATOLOGY | Facility: CLINIC | Age: 75
End: 2025-03-31

## 2025-04-28 ENCOUNTER — OFFICE VISIT (OUTPATIENT)
Dept: FAMILY MEDICINE | Facility: CLINIC | Age: 75
End: 2025-04-28
Payer: MEDICARE

## 2025-04-28 ENCOUNTER — TELEPHONE (OUTPATIENT)
Dept: FAMILY MEDICINE | Facility: CLINIC | Age: 75
End: 2025-04-28

## 2025-04-28 VITALS
BODY MASS INDEX: 22.05 KG/M2 | SYSTOLIC BLOOD PRESSURE: 145 MMHG | HEART RATE: 110 BPM | WEIGHT: 154 LBS | DIASTOLIC BLOOD PRESSURE: 102 MMHG | TEMPERATURE: 97.5 F | OXYGEN SATURATION: 98 % | HEIGHT: 70 IN | RESPIRATION RATE: 24 BRPM

## 2025-04-28 DIAGNOSIS — I10 HYPERTENSION GOAL BP (BLOOD PRESSURE) < 140/90: ICD-10-CM

## 2025-04-28 DIAGNOSIS — C67.9 UROTHELIAL CARCINOMA OF BLADDER (H): ICD-10-CM

## 2025-04-28 DIAGNOSIS — R73.01 IMPAIRED FASTING GLUCOSE: ICD-10-CM

## 2025-04-28 DIAGNOSIS — F51.04 PSYCHOPHYSIOLOGIC INSOMNIA: ICD-10-CM

## 2025-04-28 DIAGNOSIS — Z93.6 S/P ILEAL CONDUIT (H): ICD-10-CM

## 2025-04-28 DIAGNOSIS — F41.1 ANXIETY STATE: ICD-10-CM

## 2025-04-28 DIAGNOSIS — Z12.11 SCREEN FOR COLON CANCER: ICD-10-CM

## 2025-04-28 DIAGNOSIS — Z00.00 ENCOUNTER FOR MEDICARE ANNUAL WELLNESS EXAM: Primary | ICD-10-CM

## 2025-04-28 PROBLEM — E11.9 TYPE 2 DIABETES MELLITUS WITHOUT COMPLICATION, UNSPECIFIED WHETHER LONG TERM INSULIN USE (H): Status: RESOLVED | Noted: 2021-04-07 | Resolved: 2025-04-28

## 2025-04-28 LAB
ALBUMIN SERPL BCG-MCNC: 4.9 G/DL (ref 3.5–5.2)
ALP SERPL-CCNC: 116 U/L (ref 40–150)
ALT SERPL W P-5'-P-CCNC: 14 U/L (ref 0–70)
ANION GAP SERPL CALCULATED.3IONS-SCNC: 12 MMOL/L (ref 7–15)
AST SERPL W P-5'-P-CCNC: 22 U/L (ref 0–45)
BILIRUB SERPL-MCNC: 0.7 MG/DL
BUN SERPL-MCNC: 24.4 MG/DL (ref 8–23)
CALCIUM SERPL-MCNC: 9.8 MG/DL (ref 8.8–10.4)
CHLORIDE SERPL-SCNC: 105 MMOL/L (ref 98–107)
CHOLEST SERPL-MCNC: 196 MG/DL
CREAT SERPL-MCNC: 0.99 MG/DL (ref 0.67–1.17)
CREAT UR-MCNC: 47.3 MG/DL
EGFRCR SERPLBLD CKD-EPI 2021: 79 ML/MIN/1.73M2
ERYTHROCYTE [DISTWIDTH] IN BLOOD BY AUTOMATED COUNT: 13.4 % (ref 10–15)
EST. AVERAGE GLUCOSE BLD GHB EST-MCNC: 108 MG/DL
FASTING STATUS PATIENT QL REPORTED: YES
FASTING STATUS PATIENT QL REPORTED: YES
GLUCOSE SERPL-MCNC: 106 MG/DL (ref 70–99)
HBA1C MFR BLD: 5.4 % (ref 0–5.6)
HCO3 SERPL-SCNC: 25 MMOL/L (ref 22–29)
HCT VFR BLD AUTO: 45.5 % (ref 40–53)
HDLC SERPL-MCNC: 64 MG/DL
HGB BLD-MCNC: 14.6 G/DL (ref 13.3–17.7)
LDLC SERPL CALC-MCNC: 112 MG/DL
MCH RBC QN AUTO: 27.7 PG (ref 26.5–33)
MCHC RBC AUTO-ENTMCNC: 32.1 G/DL (ref 31.5–36.5)
MCV RBC AUTO: 86 FL (ref 78–100)
MICROALBUMIN UR-MCNC: 32.7 MG/L
MICROALBUMIN/CREAT UR: 69.13 MG/G CR (ref 0–17)
NONHDLC SERPL-MCNC: 132 MG/DL
PLATELET # BLD AUTO: 220 10E3/UL (ref 150–450)
POTASSIUM SERPL-SCNC: 4.4 MMOL/L (ref 3.4–5.3)
PROT SERPL-MCNC: 7.6 G/DL (ref 6.4–8.3)
RBC # BLD AUTO: 5.28 10E6/UL (ref 4.4–5.9)
SODIUM SERPL-SCNC: 142 MMOL/L (ref 135–145)
TRIGL SERPL-MCNC: 101 MG/DL
TSH SERPL DL<=0.005 MIU/L-ACNC: 1.32 UIU/ML (ref 0.3–4.2)
VIT B12 SERPL-MCNC: 472 PG/ML (ref 232–1245)
WBC # BLD AUTO: 5.8 10E3/UL (ref 4–11)

## 2025-04-28 PROCEDURE — 82043 UR ALBUMIN QUANTITATIVE: CPT | Performed by: FAMILY MEDICINE

## 2025-04-28 PROCEDURE — 85027 COMPLETE CBC AUTOMATED: CPT | Performed by: FAMILY MEDICINE

## 2025-04-28 PROCEDURE — 36415 COLL VENOUS BLD VENIPUNCTURE: CPT | Performed by: FAMILY MEDICINE

## 2025-04-28 PROCEDURE — 3077F SYST BP >= 140 MM HG: CPT | Performed by: FAMILY MEDICINE

## 2025-04-28 PROCEDURE — 3080F DIAST BP >= 90 MM HG: CPT | Performed by: FAMILY MEDICINE

## 2025-04-28 PROCEDURE — G2211 COMPLEX E/M VISIT ADD ON: HCPCS | Performed by: FAMILY MEDICINE

## 2025-04-28 PROCEDURE — 1126F AMNT PAIN NOTED NONE PRSNT: CPT | Performed by: FAMILY MEDICINE

## 2025-04-28 PROCEDURE — 80053 COMPREHEN METABOLIC PANEL: CPT | Performed by: FAMILY MEDICINE

## 2025-04-28 PROCEDURE — 82570 ASSAY OF URINE CREATININE: CPT | Performed by: FAMILY MEDICINE

## 2025-04-28 PROCEDURE — 83036 HEMOGLOBIN GLYCOSYLATED A1C: CPT | Performed by: FAMILY MEDICINE

## 2025-04-28 PROCEDURE — 80061 LIPID PANEL: CPT | Performed by: FAMILY MEDICINE

## 2025-04-28 PROCEDURE — 82607 VITAMIN B-12: CPT | Performed by: FAMILY MEDICINE

## 2025-04-28 PROCEDURE — 99214 OFFICE O/P EST MOD 30 MIN: CPT | Mod: 25 | Performed by: FAMILY MEDICINE

## 2025-04-28 PROCEDURE — 84443 ASSAY THYROID STIM HORMONE: CPT | Performed by: FAMILY MEDICINE

## 2025-04-28 PROCEDURE — G0439 PPPS, SUBSEQ VISIT: HCPCS | Performed by: FAMILY MEDICINE

## 2025-04-28 RX ORDER — AMLODIPINE BESYLATE 5 MG/1
5 TABLET ORAL DAILY
Qty: 90 TABLET | Refills: 0 | Status: SHIPPED | OUTPATIENT
Start: 2025-04-28

## 2025-04-28 RX ORDER — LISINOPRIL 40 MG/1
40 TABLET ORAL DAILY
Qty: 90 TABLET | Refills: 3 | Status: SHIPPED | OUTPATIENT
Start: 2025-04-28

## 2025-04-28 RX ORDER — MIRTAZAPINE 45 MG/1
45 TABLET, FILM COATED ORAL AT BEDTIME
Qty: 90 TABLET | Refills: 3 | Status: SHIPPED | OUTPATIENT
Start: 2025-04-28

## 2025-04-28 ASSESSMENT — SOCIAL DETERMINANTS OF HEALTH (SDOH): HOW OFTEN DO YOU GET TOGETHER WITH FRIENDS OR RELATIVES?: PATIENT DECLINED

## 2025-04-28 ASSESSMENT — PAIN SCALES - GENERAL: PAINLEVEL_OUTOF10: NO PAIN (0)

## 2025-04-28 NOTE — PATIENT INSTRUCTIONS
Patient Education   Preventive Care Advice   This is general advice given by our system to help you stay healthy. However, your care team may have specific advice just for you. Please talk to your care team about your preventive care needs.  Nutrition  Eat 5 or more servings of fruits and vegetables each day.  Try wheat bread, brown rice and whole grain pasta (instead of white bread, rice, and pasta).  Get enough calcium and vitamin D. Check the label on foods and aim for 100% of the RDA (recommended daily allowance).  Lifestyle  Exercise at least 150 minutes each week  (30 minutes a day, 5 days a week).  Do muscle strengthening activities 2 days a week. These help control your weight and prevent disease.  No smoking.  Wear sunscreen to prevent skin cancer.  Have a dental exam and cleaning every 6 months.  Yearly exams  See your health care team every year to talk about:  Any changes in your health.  Any medicines your care team has prescribed.  Preventive care, family planning, and ways to prevent chronic diseases.  Shots (vaccines)   HPV shots (up to age 26), if you've never had them before.  Hepatitis B shots (up to age 59), if you've never had them before.  COVID-19 shot: Get this shot when it's due.  Flu shot: Get a flu shot every year.  Tetanus shot: Get a tetanus shot every 10 years.  Pneumococcal, hepatitis A, and RSV shots: Ask your care team if you need these based on your risk.  Shingles shot (for age 50 and up)  General health tests  Diabetes screening:  Starting at age 35, Get screened for diabetes at least every 3 years.  If you are younger than age 35, ask your care team if you should be screened for diabetes.  Cholesterol test: At age 39, start having a cholesterol test every 5 years, or more often if advised.  Bone density scan (DEXA): At age 50, ask your care team if you should have this scan for osteoporosis (brittle bones).  Hepatitis C: Get tested at least once in your life.  STIs (sexually  transmitted infections)  Before age 24: Ask your care team if you should be screened for STIs.  After age 24: Get screened for STIs if you're at risk. You are at risk for STIs (including HIV) if:  You are sexually active with more than one person.  You don't use condoms every time.  You or a partner was diagnosed with a sexually transmitted infection.  If you are at risk for HIV, ask about PrEP medicine to prevent HIV.  Get tested for HIV at least once in your life, whether you are at risk for HIV or not.  Cancer screening tests  Cervical cancer screening: If you have a cervix, begin getting regular cervical cancer screening tests starting at age 21.  Breast cancer scan (mammogram): If you've ever had breasts, begin having regular mammograms starting at age 40. This is a scan to check for breast cancer.  Colon cancer screening: It is important to start screening for colon cancer at age 45.  Have a colonoscopy test every 10 years (or more often if you're at risk) Or, ask your provider about stool tests like a FIT test every year or Cologuard test every 3 years.  To learn more about your testing options, visit:   .  For help making a decision, visit:   https://bit.ly/lv90379.  Prostate cancer screening test: If you have a prostate, ask your care team if a prostate cancer screening test (PSA) at age 55 is right for you.  Lung cancer screening: If you are a current or former smoker ages 50 to 80, ask your care team if ongoing lung cancer screenings are right for you.  For informational purposes only. Not to replace the advice of your health care provider. Copyright   2023 [facility]. All rights reserved. Clinically reviewed by the [facility] Transitions Program. [employer] 287323 - REV 01/24.  Learning About Stress  What is stress?     Stress is your body's response to a hard situation. Your body can have a physical, emotional, or mental response. Stress is a fact of life for most people, and it  affects everyone differently. What causes stress for you may not be stressful for someone else.  A lot of things can cause stress. You may feel stress when you go on a job interview, take a test, or run a race. This kind of short-term stress is normal and even useful. It can help you if you need to work hard or react quickly. For example, stress can help you finish an important job on time.  Long-term stress is caused by ongoing stressful situations or events. Examples of long-term stress include long-term health problems, ongoing problems at work, or conflicts in your family. Long-term stress can harm your health.  How does stress affect your health?  When you are stressed, your body responds as though you are in danger. It makes hormones that speed up your heart, make you breathe faster, and give you a burst of energy. This is called the fight-or-flight stress response. If the stress is over quickly, your body goes back to normal and no harm is done.  But if stress happens too often or lasts too long, it can have bad effects. Long-term stress can make you more likely to get sick, and it can make symptoms of some diseases worse. If you tense up when you are stressed, you may develop neck, shoulder, or low back pain. Stress is linked to high blood pressure and heart disease.  Stress also harms your emotional health. It can make you coreas, tense, or depressed. Your relationships may suffer, and you may not do well at work or school.  What can you do to manage stress?  You can try these things to help manage stress:   Do something active. Exercise or activity can help reduce stress. Walking is a great way to get started. Even everyday activities such as housecleaning or yard work can help.  Try yoga or cornelia chi. These techniques combine exercise and meditation. You may need some training at first to learn them.  Do something you enjoy. For example, listen to music or go to a movie. Practice your hobby or do volunteer  "work.  Meditate. This can help you relax, because you are not worrying about what happened before or what may happen in the future.  Do guided imagery. Imagine yourself in any setting that helps you feel calm. You can use online videos, books, or a teacher to guide you.  Do breathing exercises. For example:  From a standing position, bend forward from the waist with your knees slightly bent. Let your arms dangle close to the floor.  Breathe in slowly and deeply as you return to a standing position. Roll up slowly and lift your head last.  Hold your breath for just a few seconds in the standing position.  Breathe out slowly and bend forward from the waist.  Let your feelings out. Talk, laugh, cry, and express anger when you need to. Talking with supportive friends or family, a counselor, or a edilma leader about your feelings is a healthy way to relieve stress. Avoid discussing your feelings with people who make you feel worse.  Write. It may help to write about things that are bothering you. This helps you find out how much stress you feel and what is causing it. When you know this, you can find better ways to cope.  What can you do to prevent stress?  You might try some of these things to help prevent stress:  Manage your time. This helps you find time to do the things you want and need to do.  Get enough sleep. Your body recovers from the stresses of the day while you are sleeping.  Get support. Your family, friends, and community can make a difference in how you experience stress.  Limit your news feed. Avoid or limit time on social media or news that may make you feel stressed.  Do something active. Exercise or activity can help reduce stress. Walking is a great way to get started.  Where can you learn more?  Go to https://www.[employer].net/patiented  Enter N032 in the search box to learn more about \"Learning About Stress.\"  Current as of: October 24, 2024  Content Version: 14.4 2024-2025 [employer].   Care instructions adapted under license by your healthcare professional. If you have questions about a medical condition or this instruction, always ask your healthcare professional. [employer] disclaims any warranty or liability for your use of this information.    Chronic Pain: Care Instructions  Your Care Instructions     Chronic pain is pain that lasts a long time (months or even years) and may or may not have a clear cause. It is different from acute pain, which usually does have a clear cause--like an injury or illness--and gets better over time. Chronic pain:  Lasts over time but may vary from day to day.  Does not go away despite efforts to end it.  May disrupt your sleep and lead to fatigue.  May cause depression or anxiety.  May make your muscles tense, causing more pain.  Can disrupt your work, hobbies, home life, and relationships with friends and family.  Chronic pain is a very real condition. It is not just in your head. Treatment can help and usually includes several methods used together, such as medicines, physical therapy, exercise, and other treatments. Learning how to relax and changing negative thought patterns can also help you cope.  Chronic pain is complex. Taking an active role in your treatment will help you better manage your pain. Tell your doctor if you have trouble dealing with your pain. You may have to try several things before you find what works best for you.  Follow-up care is a key part of your treatment and safety. Be sure to make and go to all appointments, and call your doctor if you are having problems. It's also a good idea to know your test results and keep a list of the medicines you take.  How can you care for yourself at home?  Pace yourself. Break up large jobs into smaller tasks. Save harder tasks for days when you have less pain, or go back and forth between hard tasks and easier ones. Take rest breaks.  Relax, and reduce stress. Relaxation  techniques such as deep breathing or meditation can help.  Keep moving. Gentle, daily exercise can help reduce pain over the long run. Try low- or no-impact exercises such as walking, swimming, and stationary biking. Do stretches to stay flexible.  Try heat, cold packs, and massage.  Get enough sleep. Chronic pain can make you tired and drain your energy. Talk with your doctor if you have trouble sleeping because of pain.  Think positive. Your thoughts can affect your pain level. Do things that you enjoy to distract yourself when you have pain instead of focusing on the pain. See a movie, read a book, listen to music, or spend time with a friend.  If you think you are depressed, talk to your doctor about treatment.  Keep a daily pain diary. Record how your moods, thoughts, sleep patterns, activities, and medicine affect your pain. You may find that your pain is worse during or after certain activities or when you are feeling a certain emotion. Having a record of your pain can help you and your doctor find the best ways to treat your pain.  Take pain medicines exactly as directed.  If the doctor gave you a prescription medicine for pain, take it as prescribed.  If you are not taking a prescription pain medicine, ask your doctor if you can take an over-the-counter medicine.  Reducing constipation caused by pain medicine  Talk to your doctor about a laxative. If a laxative doesn't work, your doctor may suggest a prescription medicine.  Include fruits, vegetables, beans, and whole grains in your diet each day. These foods are high in fiber.  If your doctor recommends it, get more exercise. Walking is a good choice. Bit by bit, increase the amount you walk every day. Try for at least 30 minutes on most days of the week.  Schedule time each day for a bowel movement. A daily routine may help. Take your time and do not strain when having a bowel movement.  When should you call for help?   Call your doctor now or seek  "immediate medical care if:    Your pain gets worse or is out of control.     You feel down or blue, or you do not enjoy things like you once did. You may be depressed, which is common in people with chronic pain. Depression can be treated.     You have vomiting or cramps for more than 2 hours.   Watch closely for changes in your health, and be sure to contact your doctor if:    You cannot sleep because of pain.     You are very worried or anxious about your pain.     You have trouble taking your pain medicine.     You have any concerns about your pain medicine.     You have trouble with bowel movements, such as:  No bowel movement in 3 days.  Blood in the anal area, in your stool, or on the toilet paper.  Diarrhea for more than 24 hours.   Where can you learn more?  Go to https://www.[employer].net/patiented  Enter N004 in the search box to learn more about \"Chronic Pain: Care Instructions.\"  Current as of: July 31, 2024  Content Version: 14.4    0669-3479 [employer].   Care instructions adapted under license by your healthcare professional. If you have questions about a medical condition or this instruction, always ask your healthcare professional. [employer] disclaims any warranty or liability for your use of this information.       "

## 2025-04-28 NOTE — LETTER
April 29, 2025      Orlin Alcantar  [address]   [place] [address]        Dear ,    We are writing to inform you of your test results.    Hemoglobin, platelets, diabetes, thyroid, B12 level, kidney, liver are within normal limit.  Urine test shows some leaking of protein which is a sign of kidney damage which improves with better control of blood pressure.  Bad cholesterol LDL is somewhat on higher side.  Continue to work on diet, exercise and better control of blood pressure.  We can consider low-dose of cholesterol-lowering medication if your numbers remains on higher side.     Resulted Orders   Hemoglobin A1c   Result Value Ref Range    Estimated Average Glucose 108 <117 mg/dL    Hemoglobin A1C 5.4 0.0 - 5.6 %      Comment:      Normal <5.7%   Prediabetes 5.7-6.4%    Diabetes 6.5% or higher     Note: Adopted from ADA consensus guidelines.   Lipid panel reflex to direct LDL Fasting   Result Value Ref Range    Cholesterol 196 <200 mg/dL    Triglycerides 101 <150 mg/dL    Direct Measure HDL 64 >=40 mg/dL    LDL Cholesterol Calculated 112 (H) <100 mg/dL    Non HDL Cholesterol 132 (H) <130 mg/dL    Patient Fasting > 8hrs? Yes     Narrative    Cholesterol  Desirable: < 200 mg/dL  Borderline High: 200 - 239 mg/dL  High: >= 240 mg/dL    Triglycerides  Normal: < 150 mg/dL  Borderline High: 150 - 199 mg/dL  High: 200-499 mg/dL  Very High: >= 500 mg/dL    Direct Measure HDL  Female: >= 50 mg/dL   Male: >= 40 mg/dL    LDL Cholesterol  Desirable: < 100 mg/dL  Above Desirable: 100 - 129 mg/dL   Borderline High: 130 - 159 mg/dL   High:  160 - 189 mg/dL   Very High: >= 190 mg/dL    Non HDL Cholesterol  Desirable: < 130 mg/dL  Above Desirable: 130 - 159 mg/dL  Borderline High: 160 - 189 mg/dL  High: 190 - 219 mg/dL  Very High: >= 220 mg/dL   Comprehensive metabolic panel   Result Value Ref Range    Sodium 142 135 - 145 mmol/L    Potassium 4.4 3.4 - 5.3 mmol/L    Carbon Dioxide (CO2) 25 22 - 29 mmol/L     Anion Gap 12 7 - 15 mmol/L    Urea Nitrogen 24.4 (H) 8.0 - 23.0 mg/dL    Creatinine 0.99 0.67 - 1.17 mg/dL    GFR Estimate 79 >60 mL/min/1.73m2      Comment:      eGFR calculated using 2021 CKD-EPI equation.    Calcium 9.8 8.8 - 10.4 mg/dL    Chloride 105 98 - 107 mmol/L    Glucose 106 (H) 70 - 99 mg/dL    Alkaline Phosphatase 116 40 - 150 U/L    AST 22 0 - 45 U/L    ALT 14 0 - 70 U/L    Protein Total 7.6 6.4 - 8.3 g/dL    Albumin 4.9 3.5 - 5.2 g/dL    Bilirubin Total 0.7 <=1.2 mg/dL    Patient Fasting > 8hrs? Yes    Albumin Random Urine Quantitative with Creat Ratio   Result Value Ref Range    Creatinine Urine mg/dL 47.3 mg/dL      Comment:      The reference ranges have not been established in urine creatinine. The results should be integrated into the clinical context for interpretation.    Albumin Urine mg/L 32.7 mg/L      Comment:      The reference ranges have not been established in urine albumin. The results should be integrated into the clinical context for interpretation.    Albumin Urine mg/g Cr 69.13 (H) 0.00 - 17.00 mg/g Cr      Comment:      Microalbuminuria is defined as an albumin:creatinine ratio of 17 to 299 for males and 25 to 299 for females. A ratio of albumin:creatinine of 300 or higher is indicative of overt proteinuria.  Due to biologic variability, positive results should be confirmed by a second, first-morning random or 24-hour timed urine specimen. If there is discrepancy, a third specimen is recommended. When 2 out of 3 results are in the microalbuminuria range, this is evidence for incipient nephropathy and warrants increased efforts at glucose control, blood pressure control, and institution of therapy with an angiotensin-converting-enzyme (ACE) inhibitor (if the patient can tolerate it).     CBC with platelets   Result Value Ref Range    WBC Count 5.8 4.0 - 11.0 10e3/uL    RBC Count 5.28 4.40 - 5.90 10e6/uL    Hemoglobin 14.6 13.3 - 17.7 g/dL    Hematocrit 45.5 40.0 - 53.0 %    MCV 86  78 - 100 fL    MCH 27.7 26.5 - 33.0 pg    MCHC 32.1 31.5 - 36.5 g/dL    RDW 13.4 10.0 - 15.0 %    Platelet Count 220 150 - 450 10e3/uL   Vitamin B12   Result Value Ref Range    Vitamin B12 472 232 - 1,245 pg/mL   TSH with free T4 reflex   Result Value Ref Range    TSH 1.32 0.30 - 4.20 uIU/mL       If you have any questions or concerns, please call the clinic at the number listed above.       Sincerely,      Twin Le MD - PC    Electronically signed

## 2025-04-28 NOTE — PROGRESS NOTES
Preventive Care Visit  [facility]  Twin Arturo Le MD, MD, Family Medicine  Apr 28, 2025      Assessment & Plan     Encounter for Medicare annual wellness exam   Seeing hematologist for hemophilia A    Hypertension goal BP (blood pressure) < 140/90  Blood pressure above goal.  Used to be on amlodipine.  It was discontinued previously due to low blood pressure.  We agreed to start on 5 mg dose and he is coming back for MA only for blood pressure check and we will increase it to 10 mg if needed.  He had made some anxiety being in doctor's office can also contribute.  He is going to check his home blood pressure reading also.  - amLODIPine (NORVASC) 5 MG tablet; Take 1 tablet (5 mg) by mouth daily.  - lisinopril (ZESTRIL) 40 MG tablet; Take 1 tablet (40 mg) by mouth daily.    Impaired fasting glucose  At 1 point he is A1c was over 6.5 but his A1c has been consistently on lower side without any intervention and I suspect previously it may be erroneous.  At this point I am going to obtain an full sets of labs but if it remains normal he may not need all the extensive labs except for A1c and routine labs.  - Hemoglobin A1c; Future  - Lipid panel reflex to direct LDL Fasting; Future  - Comprehensive metabolic panel; Future  - Albumin Random Urine Quantitative with Creat Ratio; Future  - CBC with platelets; Future  - Vitamin B12; Future  - TSH with free T4 reflex; Future  - Hemoglobin A1c  - Lipid panel reflex to direct LDL Fasting  - Comprehensive metabolic panel  - Albumin Random Urine Quantitative with Creat Ratio  - CBC with platelets  - Vitamin B12  - TSH with free T4 reflex    Anxiety state  Using it consistently.  We discussed about seeing psychiatrist.  He does not think is necessary.  He was wondering about benzodiazepine and we discussed it may not be most ideal for his age without psychiatry input.  He does not think any other intervention is needed at this point.  He feels he  can reach out to psychiatry through his oncology department if he had any concerns and he would like to hold off on intervention for now.  - mirtazapine (REMERON) 45 MG tablet; Take 1 tablet (45 mg) by mouth at bedtime.    Psychophysiologic insomnia  Patient is tolerating current medication without any major side effects of concerns and current dose seems reasonable too.  Current medication regime is effective. Continue current treatment without any changes.   - mirtazapine (REMERON) 45 MG tablet; Take 1 tablet (45 mg) by mouth at bedtime.    S/P ileal conduit (H)  Needs orders. Faxed.   - Miscellaneous DME Order    Urothelial carcinoma of bladder (H)  S/p ileal conduit. Had radical cystoprostatectomy and bilateral pelvic lymphnode dissection along with ileal conduit. Been to oncology in the past.             Counseling  Appropriate preventive services were addressed with this patient via screening, questionnaire, or discussion as appropriate for fall prevention, nutrition, physical activity, Tobacco-use cessation, social engagement, weight loss and cognition.  Checklist reviewing preventive services available has been given to the patient.  Reviewed patient's diet, addressing concerns and/or questions.   The patient was instructed to see the dentist every 6 months.   He is at risk for psychosocial distress and has been provided with information to reduce risk.   I have reviewed Opioid Use Disorder and Substance Use Disorder risk factors and made any needed referrals.           Vito Ordaz is a 75 year old, presenting for the following:  Medicare Visit        4/28/2025     9:42 AM   Additional Questions   Roomed by Justine MOE    Checking bp at home. Soemwhat high but in doctors office it goes really high.   Did not take lisinpril today.     Hemophilia - no new changes. Been to hematologist.     9 yrs ago depression and anxiety and hospitalized. Was on 5mg of diazepam on regularly. Worsening of anxiety.  Going to reach out to hemophilia clinic for follow up.  Uses remeron with good benefit.      S/p iliostomy conduit. 2018 after bladder cancer. Needs supply order. Need to fax it to [phone] [facility] supply.     DME (Durable Medical Equipment) Orders and Documentation  No orders of the defined types were placed in this encounter.     The patient was assessed and it was determined the patient is in need of the following listed DME Supplies/Equipment. Please complete supporting documentation below to demonstrate medical necessity.      Wishes to hold off on immunization.              Advance Care Planning    Document on file is a Health Care Directive or POLST.        4/28/2025   General Health   How would you rate your overall physical health? (!) FAIR   Feel stress (tense, anxious, or unable to sleep) To some extent   (!) STRESS CONCERN      4/28/2025   Nutrition   Diet: Regular (no restrictions)          No data to display                  4/28/2025   Social Factors   Frequency of gathering with friends or relatives Patient declined   Worry food won't last until get money to buy more Yes   Food not last or not have enough money for food? No   Do you have housing? (Housing is defined as stable permanent housing and does not include staying outside in a car, in a tent, in an abandoned building, in an overnight shelter, or couch-surfing.) No   Are you worried about losing your housing? No   Lack of transportation? No   Unable to get utilities (heat,electricity)? No   Want help with housing or utility concern? No   (!) FOOD SECURITY CONCERN PRESENT(!) HOUSING CONCERN PRESENT      4/28/2025   Fall Risk   Fallen 2 or more times in the past year? No   Trouble with walking or balance? No          4/28/2025   Activities of Daily Living- Home Safety   Needs help with the following daily activites None of the above   Safety concerns in the home None of the above         4/28/2025   Dental   Dentist two times every year?  (!) NO         2025   Hearing Screening   Hearing concerns? None of the above         2025   Driving Risk Screening   Patient/family members have concerns about driving No         2025   General Alertness/Fatigue Screening   Have you been more tired than usual lately? No         2025   Urinary Incontinence Screening   Bothered by leaking urine in past 6 months No       Today's PHQ-9 Score:       2025     9:30 AM   PHQ-9 SCORE   PHQ-9 Total Score MyChart 0   PHQ-9 Total Score 0        Patient-reported         2025   Substance Use   Alcohol more than 3/day or more than 7/wk No   Do you have a current opioid prescription? (!) YES   How severe/bad is pain from 1 to 10? 2/10   Do you use any other substances recreationally? No          No data to display              Low Risk (0-3)  Moderate Risk (4-7)  High Risk (>8)  Social History     Tobacco Use    Smoking status: Former     Current packs/day: 0.00     Average packs/day: 1 pack/day for 20.0 years (20.0 ttl pk-yrs)     Types: Cigarettes     Start date: 1993     Quit date: 2013     Years since quittin.8    Smokeless tobacco: Never    Tobacco comments:     e cig no longer using   Vaping Use    Vaping status: Never Used   Substance Use Topics    Alcohol use: Never    Drug use: Never       ASCVD Risk   The 10-year ASCVD risk score (Harry HERNANDEZ, et al., 2019) is: 54.1%    Values used to calculate the score:      Age: 75 years      Sex: Male      Is Non- : No      Diabetic: Yes      Tobacco smoker: No      Systolic Blood Pressure: 145 mmHg      Is BP treated: Yes      HDL Cholesterol: 55 mg/dL      Total Cholesterol: 199 mg/dL            Reviewed and updated as needed this visit by Provider                      Current providers sharing in care for this patient include:  Patient Care Team:  Twin Le MD as PCP - General (Family Medicine)  Kaylin Rajan APRN CNS as Nurse  Practitioner (Clinical Nurse Specialist)  Isadora Sood, RN as Nurse Coordinator (Psychiatry)  Mely Smith PA-C as Physician Assistant (Physician Assistant)  Cassidy Liao MD as MD (Urology)  Ligia Ram, RN as Registered Nurse (Urology)  Katie Ramos MD as Referring Physician (Family Practice)  Bebeto Shea MD as MD (Urology)  Katie Ramos MD as Referring Physician (Family Practice)  Nguyễn Morris MD as MD (Urology)  Michelet Guthrie, APRN CNP as Nurse Practitioner (Nurse Practitioner)  Giselle Manning, PharmD as Pharmacist (Pharmacist)  Louann Solis RN as Specialty Care Coordinator (Hematology)  Analilia Rashid LICSW as  (Hematology)  Twin Le MD as Assigned Pain Medication Provider  Twin Le MD as Assigned PCP  Jamir Contreras MD as MD (Urology)  Jorge Jiang MD as Assigned Cancer Care Provider    The following health maintenance items are reviewed in Epic and correct as of today:  Health Maintenance   Topic Date Due    DIABETIC FOOT EXAM  Never done    URINE DRUG SCREEN  Never done    EYE EXAM  Never done    MENINGITIS IMMUNIZATION (1 - Risk 2-dose series) Never done    COLORECTAL CANCER SCREENING  Never done    DTAP/TDAP/TD IMMUNIZATION (1 - Tdap) Never done    ZOSTER IMMUNIZATION (1 of 2) Never done    HEPATITIS B IMMUNIZATION (1 of 3 - Risk 3-dose series) Never done    Pneumococcal Vaccine: 50+ Years (2 of 2 - PPSV23) 06/18/2015    A1C  07/23/2024    LUNG CANCER SCREENING  10/06/2024    RSV VACCINE (1 - 1-dose 75+ series) Never done    COVID-19 Vaccine (8 - 2024-25 season) 03/20/2025    BMP  04/23/2025    LIPID  04/23/2025    MICROALBUMIN  04/23/2025    ANNUAL REVIEW OF HM ORDERS  04/23/2025    MEDICARE ANNUAL WELLNESS VISIT  04/23/2025    PHQ-9  05/28/2025    FALL RISK ASSESSMENT  04/28/2026    ADVANCE CARE PLANNING  04/23/2029    PHQ-2 (once per calendar year)  Completed     "INFLUENZA VACCINE  Completed    AORTIC ANEURYSM SCREENING (SYSTEM ASSIGNED)  Completed    HPV IMMUNIZATION  Aged Out            Objective    Exam  BP (!) 145/102 (BP Location: Right arm, Patient Position: Chair, Cuff Size: Adult Regular)   Pulse 110   Temp 97.5  F (36.4  C) (Oral)   Resp 24   Ht 1.778 m (5' 10\")   Wt 69.9 kg (154 lb)   SpO2 98%   BMI 22.10 kg/m     Estimated body mass index is 22.1 kg/m  as calculated from the following:    Height as of this encounter: 1.778 m (5' 10\").    Weight as of this encounter: 69.9 kg (154 lb).    Physical Exam  GENERAL: alert and no distress  EYES: Eyes grossly normal to inspection, PERRL and conjunctivae and sclerae normal  HENT: ear canals and TM's normal, nose and mouth without ulcers or lesions  NECK: no adenopathy, no asymmetry, masses, or scars  RESP: lungs clear to auscultation - no rales, rhonchi or wheezes  CV: regular rate and rhythm, normal S1 S2, no S3 or S4, no murmur, click or rub, no peripheral edema  ABDOMEN: soft, nontender, no hepatosplenomegaly, no masses and bowel sounds normal, right lower quadrant iliostomy present.   MS: no gross musculoskeletal defects noted, no edema  SKIN: no suspicious lesions or rashes  NEURO: Normal strength and tone, mentation intact and speech normal  PSYCH: mentation appears normal, affect normal/bright         4/28/2025   Mini Cog   Clock Draw Score 2 Normal   3 Item Recall 3 objects recalled   Mini Cog Total Score 5          The longitudinal plan of care for the diagnosis(es)/condition(s) as documented were addressed during this visit. Due to the added complexity in care, I will continue to support Orlin in the subsequent management and with ongoing continuity of care.    Signed Electronically by: Twin Le MD, MD  DME (Durable Medical Equipment) Orders and Documentation  Orders Placed This Encounter   Procedures    Miscellaneous DME Order        The patient was assessed and it was determined the " patient is in need of the following listed DME Supplies/Equipment. Please complete supporting documentation below to demonstrate medical necessity.

## 2025-04-28 NOTE — TELEPHONE ENCOUNTER
Forms/Letter Request    Type of form/letter: DME (wheelchair, hospital bed)    Type of DME requested: Listed on form    Do we have the form/letter: Yes: In care team 3, provider's form folder.    Who is the form from? [employer] (if other please explain)    Where did/will the form come from? form was faxed in    When is form/letter needed by: n/a    How would you like the form/letter returned: Fax : [phone]

## 2025-04-29 NOTE — RESULT ENCOUNTER NOTE
Hemoglobin, platelets, diabetes, thyroid, B12 level, kidney, liver are within normal limit.  Urine test shows some leaking of protein which is a sign of kidney damage which improves with better control of blood pressure.  Bad cholesterol LDL is somewhat on higher side.  Continue to work on diet, exercise and better control of blood pressure.  We can consider low-dose of cholesterol-lowering medication if your numbers remains on higher side.

## 2025-05-07 ENCOUNTER — TELEPHONE (OUTPATIENT)
Dept: FAMILY MEDICINE | Facility: CLINIC | Age: 75
End: 2025-05-07

## 2025-05-07 NOTE — TELEPHONE ENCOUNTER
Faxed forms to [phone] and placed in provider's fax folder. Called patient and LVM to inform him that the forms were faxed.

## 2025-05-07 NOTE — LETTER
May 7, 2025      Re: Orlin Alcantar  [address]   [place] [address]        To Whom It May Concern,     Orlin Alcantar is under my professional care.  Ostomy supplies are medically necessary for Orlin due to S/P iliostomy conduit.          Sincerely,        Twin Le MD/hunter    Electronically signed

## 2025-05-07 NOTE — TELEPHONE ENCOUNTER
Dr. Le-Please review and sign pended letter if agree.    Clinic Care Team-Please fax letter and office visit notes from 4/28/25 to [facility] [employer] (FAX: [phone]) and call patient when completed.  Patient stated staff  may call [phone] or [phone] and gave verbal permission to leave detailed message.    Call received from patient:  Had appt on 4/28/25 with Dr. Le  Fax was sent to ostomy supply store  They are now saying they need more than the prescription order  They need office visit notes and statement the ostomy supplies are medically necessary  [facility] medical [place] says they sent a fax back to clinic requesting more information  Would like this request sent high priority    Informed patient request will be sent high priority to Dr. Le.    Thank you!  ANISHA Garcia BSN, RN-[facility]

## 2025-05-08 ENCOUNTER — DOCUMENTATION ONLY (OUTPATIENT)
Dept: UROLOGY | Facility: CLINIC | Age: 75
End: 2025-05-08
Payer: MEDICARE

## 2025-05-08 NOTE — PROGRESS NOTES
Call placed to patient. He reports he needs to be seen ASAP for an annual visit for his DME [place] conduit supplies. He has seen Dr Contreras in the past for his bladder cancer and would like to see him if possible. Spoke with JOY Jj and scheduled with Dr Contreras on 5/13/25 at 1030 am. Let patient know that he should plan to see an BARB going forward and make the appointment early so he doesn't have this last minute worry in the future. He voices understanding. He will bring a full list of needed supplies. Order should then be faxed to [facility] [employer] at [phone].      Thank you,  Shiela Katz RN, BSN   Urology Triage Nurse

## 2025-05-13 ENCOUNTER — OFFICE VISIT (OUTPATIENT)
Dept: UROLOGY | Facility: CLINIC | Age: 75
End: 2025-05-13
Payer: MEDICARE

## 2025-05-13 VITALS
HEIGHT: 71 IN | SYSTOLIC BLOOD PRESSURE: 128 MMHG | OXYGEN SATURATION: 97 % | WEIGHT: 154 LBS | BODY MASS INDEX: 21.56 KG/M2 | HEART RATE: 105 BPM | DIASTOLIC BLOOD PRESSURE: 87 MMHG

## 2025-05-13 DIAGNOSIS — C67.8 MALIGNANT NEOPLASM OF OVERLAPPING SITES OF BLADDER (H): ICD-10-CM

## 2025-05-13 DIAGNOSIS — Z93.6 S/P ILEAL CONDUIT (H): Primary | ICD-10-CM

## 2025-05-13 DIAGNOSIS — C67.9 UROTHELIAL CARCINOMA OF BLADDER (H): ICD-10-CM

## 2025-05-13 ASSESSMENT — PAIN SCALES - GENERAL: PAINLEVEL_OUTOF10: NO PAIN (0)

## 2025-05-13 NOTE — PROGRESS NOTES
"  CHIEF COMPLAINT   It was my pleasure to see Orlin Alcantar who is a 75 year old male for follow-up of bladder cancer.      HPI  Orlin Alcantar is a 75 year old male with a history of hemophilia and T2 bladder cancer s/p radical cystoprostatectomy with IC and BPLND on 07/30/2018 with Dr. Shea. Here for routine follow-up. No evidence of recurrence.     Had PCNL for stones with Dr. Thomas in 5/2023.    He has completed routine follow-up surveillance. Returns today to re-establish care for the purposes of DME and stoma supplies.     Labs 4/28/2025  Cr 1.99  Cytology Neg     TNM Stage: T2a, N0, M0  Number of nodes removed: 23  Number of positive nodes: 0  Surgical Margins Positive: No  Grade: High   Histology: urothelial without secondary histology.      CT chest/abd/pelvis 10/6/2023  IMPRESSION:  1.  No evidence for metastatic disease in the chest, abdomen or pelvis.  2.  Cystoprostatectomy with no evidence for local tumor recurrence.  3.  Stable benign pulmonary nodules demonstrating greater than two-year stability    PHYSICAL EXAM  Patient is a 75 year old  male   Vitals: Blood pressure 128/87, pulse 105, height 1.803 m (5' 11\"), weight 69.9 kg (154 lb), SpO2 97%.  General Appearance Adult: Body mass index is 21.48 kg/m .  Alert, no acute distress, oriented  Lungs: no respiratory distress, or pursed lip breathing  Abdomen: soft, nontender, no organomegaly or masses  Back: no CVAT  Neuro: Alert, oriented, speech and mentation normal  Psych: affect and mood normal    Outside and Past Medical records:  Review of the result(s) of each unique test - CT, creat    ASSESSMENT and PLAN  Orlin Alcantar  is a 75 year old male with a history of T2 bladder cancer s/p neoadjuvant gem-cis-nivo and radical cystoprostatectomy with IC and BPLND on 07/30/2018 with Dr. Shea. No evidence of recurrence and he overall is doing well. He has completed his surveillance and no further ongoing imaging is indicate.    He continues to need " medically necessary DME stoma supplies for his urostomy.     - Follow-up 1 year with Efrain Gill PA-C    20 minutes spent on the date of the encounter doing chart review, history and exam, documentation and further activities as noted above.    Jamir Contreras MD  Urology  [facility]

## 2025-05-13 NOTE — NURSING NOTE
"Orlin Alcantar is a 75 year old male patient that presents today in clinic for the following:    Chief Complaint   Patient presents with    Follow Up       The patient's allergies and medications were reviewed as noted. A set of vitals were recorded as noted without incident. The patient does not have any other questions for the provider.    Blood pressure 128/87, pulse 105, height 1.803 m (5' 11\"), weight 69.9 kg (154 lb), SpO2 97%. Body mass index is 21.48 kg/m .    Patient Active Problem List   Diagnosis    History of total hip arthroplasty    Hemophilia A (H)    Pain in joint, pelvic region and thigh    Anxiety state    Hypertension goal BP (blood pressure) < 140/90    Mood disorder in conditions classified elsewhere    Hematuria    Bladder cancer (H)    Examination of participant in clinical trial    Urothelial carcinoma of bladder (H)    Malignant neoplasm of overlapping sites of bladder (H)    Hemarthrosis    Pain    S/P ileal conduit (H)    Psychophysiologic insomnia    Nephrolithiasis       Allergies   Allergen Reactions    Aspirin      This drug inhibits platelets and is contraindicated due to hemophilia diagnosis.          Current Outpatient Medications   Medication Sig Dispense Refill    acetaminophen (TYLENOL) 325 MG tablet Take 3 tablets (975 mg) by mouth every 8 hours as needed for mild pain 50 tablet -    amLODIPine (NORVASC) 5 MG tablet Take 1 tablet (5 mg) by mouth daily. 90 tablet 0    celecoxib (CELEBREX) 100 MG capsule Take 1 capsule (100 mg) by mouth 2 times daily. 60 capsule 11    codeine 30 MG tablet Take 1 tablet (30 mg) by mouth every 8 hours as needed for severe pain. 84 tablet 0    factor VIII plas/alb free (XYNTHA SOLOFUSE) 3000 units KIT Inject 2,960 Units into the vein every 48 hours and as needed every 24 hours for breakthrough bleeding 74979 each 4    factor VIII rAHF-PFM (ADVATE) 3000 units SOLR injection Infuse 3070 units iv every 48 hours for prophylaxis AND daily as needed for " bleeding 77548 each 11    lisinopril (ZESTRIL) 40 MG tablet Take 1 tablet (40 mg) by mouth daily. 90 tablet 3    mirtazapine (REMERON) 45 MG tablet Take 1 tablet (45 mg) by mouth at bedtime. 90 tablet 3       Social History     Tobacco Use    Smoking status: Former     Current packs/day: 0.00     Average packs/day: 1 pack/day for 20.0 years (20.0 ttl pk-yrs)     Types: Cigarettes     Start date: 1993     Quit date: 2013     Years since quittin.8    Smokeless tobacco: Never    Tobacco comments:     e cig no longer using   Vaping Use    Vaping status: Never Used   Substance Use Topics    Alcohol use: Never    Drug use: Never       Karthik Downs, EMT-P   2025  10:36 AM

## 2025-05-13 NOTE — LETTER
"5/13/2025       RE: Orlin Alcantar  [address]  [place] [address]     Dear Colleague,    Thank you for referring your patient, Orlin Alcantar, to the [facility]OLOG[facility] [place] [facility]. Please see a copy of my visit note below.      CHIEF COMPLAINT   It was my pleasure to see Orlin Alcantar who is a 75 year old male for follow-up of ***.      HPI  Orlin Alcantar is a 75 year old male with a history of hemophilia and T2 bladder cancer s/p radical cystoprostatectomy with IC and BPLND on 07/30/2018 with Dr. Shea. Here for routine follow-up. No evidence of recurrence.     Had PCNL for stones with Dr. Thomas in 5/2023.     Labs 4/28/2025  Cr 1.99  Cytology Neg     TNM Stage: T2a, N0, M0  Number of nodes removed: 23  Number of positive nodes: 0  Surgical Margins Positive: No  Grade: High   Histology: urothelial without secondary histology.      CT chest/abd/pelvis 10/6/2023  IMPRESSION:  1.  No evidence for metastatic disease in the chest, abdomen or pelvis.  2.  Cystoprostatectomy with no evidence for local tumor recurrence.  3.  Stable benign pulmonary nodules demonstrating greater than two-year stability    PHYSICAL EXAM  Patient is a 75 year old  male   Vitals: Blood pressure 128/87, pulse 105, height 1.803 m (5' 11\"), weight 69.9 kg (154 lb), SpO2 97%.  General Appearance Adult: Body mass index is 21.48 kg/m .  Alert, no acute distress, oriented  Lungs: no respiratory distress, or pursed lip breathing  Abdomen: soft, nontender, no organomegaly or masses; ***  Back: *** CVAT  Neuro: Alert, oriented, speech and mentation normal  Psych: affect and mood normal  : {LISA EXAM MALE GENITAL:591627}    Outside and Past Medical records:  Assessment requiring an independent historian(s) - {:103522}  Review of prior external note(s) from - {note reviewed source:462371}  Review of the result(s) of each unique test - ***    ASSESSMENT and " PLAN  Orlin Alcantar  is a 75 year old male with a history of T2 bladder cancer s/p neoadjuvant gem-cis-nivo and radical cystoprostatectomy with IC and BPLND on 07/30/2018 with Dr. Shea. No evidence of recurrence and he overall is doing well.    He continues to need medically necessary DME stoma supplies for his urostomy.     - Follow-up 1 year with Efrain Gill PA-C    *** minutes spent on the date of the encounter doing chart review, history and exam, documentation and further activities as noted above.    Jamir Contreras MD  Urology  [facility]      Faxed DME order for urostomy supplies to [facility] [employer] at [phone].    Again, thank you for allowing me to participate in the care of your patient.      Sincerely,    Jamir Contreras MD

## 2025-05-20 DIAGNOSIS — D66 SEVERE HEMOPHILIA A (H): ICD-10-CM

## 2025-05-20 RX ORDER — CODEINE SULFATE 30 MG/1
30 TABLET ORAL EVERY 8 HOURS PRN
Qty: 84 TABLET | Refills: 0 | Status: SHIPPED | OUTPATIENT
Start: 2025-05-20

## 2025-05-20 RX ORDER — ANTIHEMOPHILIC FACTOR (RECOMBINANT) 3000 (+/-)
KIT INTRAVENOUS
Qty: 23072 EACH | Refills: 3 | Status: SHIPPED | OUTPATIENT
Start: 2025-05-20

## 2025-05-20 NOTE — TELEPHONE ENCOUNTER
*Medicare refill - please check units and qty*    *Need Rx by: 5/21/25     *Last comp visit: 02/25/25    *Drug: ADVATE at 8 doses of 2884 units    *Refills left: 9    *Notes: Pharmacy requesting Rx for Codeine to have on file for next refill. Will not dispense until due. He tends to call the day before needing his medication delivered.    Thanks,    Jocelyn Henry, Pharmacy Coordinator  [facility]  [phone]

## 2025-05-21 NOTE — PROGRESS NOTES
Orlin Alcantar called to order a refill of Kogenate FS . He has 0 dose(s) remaining at home and would like it delivered to his house via  to arrive on 3/9/2021.     The following doses were dispensed from our pharmacy:    Product: Kogenate FS    Doses: 4 doses of 3133 units    MICHAEL Keys, [facility]  Hemophilia Pharmacy Coordinator  [phone]  [email or web address]             
Ambulatory

## 2025-05-29 ENCOUNTER — DOCUMENTATION ONLY (OUTPATIENT)
Dept: HEMATOLOGY | Facility: CLINIC | Age: 75
End: 2025-05-29
Payer: MEDICARE

## 2025-06-04 DIAGNOSIS — D66 SEVERE HEMOPHILIA A (H): ICD-10-CM

## 2025-06-04 RX ORDER — CODEINE SULFATE 30 MG/1
30 TABLET ORAL EVERY 8 HOURS PRN
Qty: 84 TABLET | Refills: 0 | Status: SHIPPED | OUTPATIENT
Start: 2025-06-04

## 2025-06-04 NOTE — TELEPHONE ENCOUNTER
Pharmacy is dispensing a 28 day supply to deliver to his home tomorrow, 6/5/2025. Asking for a refill request ahead of time to keep on file, so we do not need to ask for one last minute as he calls in the day before he is in need of it.     Thanks,    Sofía Price

## 2025-06-09 ENCOUNTER — DOCUMENTATION ONLY (OUTPATIENT)
Dept: HEMATOLOGY | Facility: CLINIC | Age: 75
End: 2025-06-09
Payer: MEDICARE

## 2025-06-09 NOTE — PROGRESS NOTES
[facility]    Uploaded documents to [facility] on pt's behalf.  Confirmation was mailed to patient.    Analilia Rashid, [facility]  Clinical   [facility]  Office: [phone]

## 2025-06-09 NOTE — PROGRESS NOTES
[facility]    Uploaded documents to [facility] on pt's behalf.  Confirmation was mailed to patient.    Analilia Rashid, [facility]  Clinical   [facility]  Office: [phone]

## 2025-06-09 NOTE — PROGRESS NOTES
[facility]    Uploaded documents to [facility] on pt's behalf.  Confirmation was mailed to patient.    Analilia Rashid, [facility]  Clinical   [facility]  Office: [phone]

## 2025-06-16 ENCOUNTER — DOCUMENTATION ONLY (OUTPATIENT)
Dept: HEMATOLOGY | Facility: CLINIC | Age: 75
End: 2025-06-16

## 2025-06-26 ENCOUNTER — DOCUMENTATION ONLY (OUTPATIENT)
Dept: HEMATOLOGY | Facility: CLINIC | Age: 75
End: 2025-06-26
Payer: MEDICARE

## 2025-06-30 ENCOUNTER — ALLIED HEALTH/NURSE VISIT (OUTPATIENT)
Dept: FAMILY MEDICINE | Facility: CLINIC | Age: 75
End: 2025-06-30
Payer: MEDICARE

## 2025-06-30 VITALS — SYSTOLIC BLOOD PRESSURE: 125 MMHG | OXYGEN SATURATION: 97 % | HEART RATE: 104 BPM | DIASTOLIC BLOOD PRESSURE: 62 MMHG

## 2025-06-30 DIAGNOSIS — I10 HYPERTENSION GOAL BP (BLOOD PRESSURE) < 140/90: Primary | ICD-10-CM

## 2025-06-30 PROCEDURE — 3074F SYST BP LT 130 MM HG: CPT

## 2025-06-30 PROCEDURE — 99207 PR NO CHARGE NURSE ONLY: CPT

## 2025-06-30 PROCEDURE — 3078F DIAST BP <80 MM HG: CPT

## 2025-06-30 NOTE — PROGRESS NOTES
Orlin Alcantar is a 75 year old patient who comes in today for a Blood Pressure check.  Initial BP:  /62 (BP Location: Right arm, Patient Position: Sitting, Cuff Size: Adult Regular)   Pulse 104   SpO2 97%      Data Unavailable  Disposition: provider notified while patient in the clinic

## 2025-07-03 ENCOUNTER — DOCUMENTATION ONLY (OUTPATIENT)
Dept: HEMATOLOGY | Facility: CLINIC | Age: 75
End: 2025-07-03
Payer: MEDICARE

## 2025-07-21 DIAGNOSIS — D66 SEVERE HEMOPHILIA A (H): ICD-10-CM

## 2025-07-21 RX ORDER — ANTIHEMOPHILIC FACTOR (RECOMBINANT) 3000 (+/-)
KIT INTRAVENOUS
Qty: 23072 EACH | Refills: 3 | Status: SHIPPED | OUTPATIENT
Start: 2025-07-21

## 2025-07-21 NOTE — TELEPHONE ENCOUNTER
*RX needed by: 7/21/2025    *Last comp visit: 2/25/2025    *Refills left: 0       Thanks,    Thank you!    Lux Cassidy, Pharmacy Coordinator  [facility]  [phone]

## 2025-07-24 ENCOUNTER — DOCUMENTATION ONLY (OUTPATIENT)
Dept: HEMATOLOGY | Facility: CLINIC | Age: 75
End: 2025-07-24
Payer: MEDICARE

## 2025-07-24 NOTE — PROGRESS NOTES
[facility]     Uploaded documents to [facility] on pt's behalf, per pt request.  Confirmation was mailed to patient.     Analilia Rashid, [facility]  Clinical   [place] [facility]  Office: [phone]

## 2025-07-24 NOTE — PROGRESS NOTES
[facility]     Uploaded documents to [facility] on pt's behalf, per pt request.  Confirmation was mailed to patient.     Analilia Rashid, [facility]  Clinical   [place] [facility]  Office: [phone]

## 2025-07-24 NOTE — PROGRESS NOTES
[facility]     Uploaded documents to [facility] on pt's behalf, per pt request.  Confirmation was mailed to patient.     Analilia Rashid, [facility]  Clinical   [place] [facility]  Office: [phone]

## 2025-07-24 NOTE — PROGRESS NOTES
[facility]     Uploaded documents to [facility] on pt's behalf, per pt request.  Confirmation was mailed to patient.     Analilia Rashid, [facility]  Clinical   [place] [facility]  Office: [phone]

## 2025-08-04 ENCOUNTER — DOCUMENTATION ONLY (OUTPATIENT)
Dept: HEMATOLOGY | Facility: CLINIC | Age: 75
End: 2025-08-04
Payer: MEDICARE

## 2025-08-04 DIAGNOSIS — I10 HYPERTENSION GOAL BP (BLOOD PRESSURE) < 140/90: ICD-10-CM

## 2025-08-04 RX ORDER — AMLODIPINE BESYLATE 5 MG/1
5 TABLET ORAL DAILY
Qty: 90 TABLET | Refills: 1 | Status: SHIPPED | OUTPATIENT
Start: 2025-08-04

## 2025-08-18 ENCOUNTER — DOCUMENTATION ONLY (OUTPATIENT)
Dept: HEMATOLOGY | Facility: CLINIC | Age: 75
End: 2025-08-18
Payer: MEDICARE

## 2025-08-18 DIAGNOSIS — D66 SEVERE HEMOPHILIA A (H): ICD-10-CM

## 2025-08-18 RX ORDER — CODEINE SULFATE 30 MG/1
30 TABLET ORAL EVERY 8 HOURS PRN
Qty: 84 TABLET | Refills: 0 | Status: SHIPPED | OUTPATIENT
Start: 2025-08-18

## (undated) DEVICE — CLIP ENDO HEMO-LOC GREEN MED/LG 544230

## (undated) DEVICE — LINEN TOWEL PACK X30 5481

## (undated) DEVICE — SU ETHILON 2-0 FS 18" 664H

## (undated) DEVICE — BASKET NITINOL TIPLESS HALO  1.5FRX120CM 554120

## (undated) DEVICE — BLADE CLIPPER SGL USE 9680

## (undated) DEVICE — SU SILK 4-0 RB-1 CR 8X18" C054D

## (undated) DEVICE — STPL 80 X 3.8MM GIA8038S

## (undated) DEVICE — CATH ANGIO JB1 SOFT-VU 5FRX65CM MARINER 11734101

## (undated) DEVICE — STPL SKIN 35W 059037

## (undated) DEVICE — CATH URETERAL DUAL LUMEN 10FRX54CM M0064051000

## (undated) DEVICE — SOL NACL 0.9% IRRIG 1000ML BOTTLE 2F7124

## (undated) DEVICE — PACK GOWN 3/PK DISP XL SBA32GPFCB

## (undated) DEVICE — SU STEEL 6 CCS 4X18" M654G

## (undated) DEVICE — PACK CYSTO UMMC CUSTOM

## (undated) DEVICE — CATH FOLEY 3WAY 22FR 30ML SIL 73022SI

## (undated) DEVICE — SU SILK 3-0 TIE 12X30" A304H

## (undated) DEVICE — SPONGE DOUBLE 1.25" W/STRING 23275-690

## (undated) DEVICE — DRAPE LINGEMAN PERC PROCEDURE 1-0815

## (undated) DEVICE — DRSG TEGADERM 2 3/8X2 3/4" 1624W

## (undated) DEVICE — GUIDEWIRE SENSOR DUAL FLEX STR 0.035"X150CM M0066703080

## (undated) DEVICE — JELLY LUBRICATING SURGILUBE 2OZ TUBE

## (undated) DEVICE — DRAPE C-ARM W/STRAPS 42X72" 07-CA104

## (undated) DEVICE — CATH URETERAL OPEN END 05FR 70CM 020015

## (undated) DEVICE — SU SILK 3-0 SH CR 8X18" C013D

## (undated) DEVICE — SYR 10ML LL W/O NDL 302995

## (undated) DEVICE — KIT UROSTOMY POUCH 2 3/4" 19254

## (undated) DEVICE — ESU ELEC CUTTING LOOP 24/26FR .35MM BIPOLAR 27040GP1-S

## (undated) DEVICE — SUCTION MANIFOLD DORNOCH ULTRA CART UL-CL500

## (undated) DEVICE — SU VICRYL 3-0 SH 27" J316H

## (undated) DEVICE — BAG DRAINAGE URO CATCHER II 4-040-14-10

## (undated) DEVICE — DILATOR RENAL AMPLATZ TYPE 8FRX35CM M0062601010

## (undated) DEVICE — SPECIMEN CONTAINER 5OZ STERILE 2600SA

## (undated) DEVICE — RAD KNIFE HANDLE W/11 BLADE DISPOSABLE 371611

## (undated) DEVICE — SU PDS II 4-0 RB-1 27" Z304H

## (undated) DEVICE — SOL WATER IRRIG 1000ML BOTTLE 07139-09

## (undated) DEVICE — SU VICRYL 4-0 RB-1 27" J304

## (undated) DEVICE — ENDO SEAL BX PORT BPS-A

## (undated) DEVICE — GUIDEWIRE BENTSON FLEX TIP 0.035"X150CM M0066201250

## (undated) DEVICE — STPL SKIN 35W APPOSE 8886803712

## (undated) DEVICE — SOL WATER IRRIG 1000ML BOTTLE 2F7114

## (undated) DEVICE — SYR 70ML TOOMEY 041170

## (undated) DEVICE — LINEN TOWEL PACK X6 WHITE 5487

## (undated) DEVICE — NDL PERC ACCESS NAVIGUIDE W/SLDER 18GX20CM M0067001330

## (undated) DEVICE — SU CHROMIC 4-0 RB-1 27" U203H

## (undated) DEVICE — PROBE SHOCKPULSE LITHOTRIPTER 3.76X396MM SPL-PDBX376

## (undated) DEVICE — PITCHER STERILE 1000ML  SSK9004A

## (undated) DEVICE — LINEN TOWEL PACK X5 5464

## (undated) DEVICE — DRSG TELFA 3X8" 1238

## (undated) DEVICE — SOL NACL 0.9% IRRIG 3000ML BAG 2B7477

## (undated) DEVICE — BAG DRAIN BILIARY NEPHROSTOMY REMINGTON 600ML LF 600-D

## (undated) DEVICE — SYR 30ML SLIP TIP W/O NDL 302833

## (undated) DEVICE — CATH BLNDIL 55CM 7FR 24FR NPHRMX AMPTZ 17CM PTFE SIL HI-PRS

## (undated) DEVICE — PUMP SYSTEM SINGLE ACTION M0067201000

## (undated) DEVICE — COVER NEOPROBE SOFTFLEX 5X96" W/BANDS 20-PC596

## (undated) DEVICE — TABLESPOON METAL STERILE 17508/24

## (undated) DEVICE — CLIP HORIZON LG ORANGE 004200

## (undated) DEVICE — CONNECTOR WATER VALVE PERFUSION PACK STR 020272801

## (undated) DEVICE — GLOVE BIOGEL PI MICRO SZ 7.5 48575

## (undated) DEVICE — CUSTOM PERFUSION SYSTEM

## (undated) DEVICE — DRAIN JACKSON PRATT ROUND SIL 19FR W/TROCAR LF JP-2232

## (undated) DEVICE — BAG URINARY DRAIN LUBRISIL IC 4000ML LF 253509A

## (undated) DEVICE — Device

## (undated) DEVICE — GLOVE ESTEEM BLUE W/NEU-THERA 7.5  2D73PB75

## (undated) DEVICE — ADAPTER CATH CHECK-FLO 9FR FLL 050885 G15476

## (undated) DEVICE — SU CHROMIC 3-0 SH 27" G122H

## (undated) DEVICE — SU VICRYL 0 CT-1 27" UND J260H

## (undated) DEVICE — SUCTION MANIFOLD NEPTUNE 2 SYS 4 PORT 0702-020-000

## (undated) DEVICE — CLIP HORIZON MED BLUE 002200

## (undated) DEVICE — PREP CHLORAPREP 26ML TINTED ORANGE  260815

## (undated) DEVICE — CATH TRAY FOLEY SURESTEP 16FR W/URNE MTR STLK LATEX A303316A

## (undated) DEVICE — DRAPE ISOLATION BAG 1003

## (undated) DEVICE — BLADE KNIFE SURG 15 371115

## (undated) DEVICE — GLOVE PROTEXIS POWDER FREE SMT 6.5  2D72PT65X

## (undated) DEVICE — SU SILK 2-0 TIE 12X30" A305H

## (undated) DEVICE — GOWN XLG DISP 9545

## (undated) DEVICE — ESU HOLSTER PLASTIC DISP E2400

## (undated) DEVICE — TUBING SUCTION 10'X3/16" N510

## (undated) DEVICE — PREP POVIDONE IODINE SOLUTION 10% 4OZ

## (undated) DEVICE — EVACUATOR BLADDER UROVAC LATEX M0067301250

## (undated) DEVICE — VESSEL LOOPS BLUE SUPERMAXI 011022PBX

## (undated) DEVICE — GUIDEWIRE AMPLATZ 0.035"X145CM  SUPER STIFF 640-104

## (undated) DEVICE — VESSEL LOOPS YELLOW MAXI 31145694

## (undated) DEVICE — DRSG GAUZE 4X4" TRAY 6939

## (undated) DEVICE — DRSG PRIMAPORE 02X3" 7133

## (undated) DEVICE — DRSG TEGADERM 4X4 3/4" 1626W

## (undated) DEVICE — PAD CHUX UNDERPAD 23X24" 7136

## (undated) DEVICE — STPL LINEAR 60 X 3.5MM TA6035S

## (undated) DEVICE — DRAIN JACKSON PRATT RESERVOIR 100ML SU130-1305

## (undated) DEVICE — ENDO SEAL BX PORT GREEN CS-W7S

## (undated) DEVICE — DRAPE SHEET REV FOLD 3/4 9349

## (undated) DEVICE — PREP POVIDONE IODINE SCRUB 7.5% 120ML

## (undated) DEVICE — TUBING IV EXTENSION SET STD BORE 7.6" ONE-LINK 7N8391

## (undated) DEVICE — SYR BULB IRRIG 50ML LATEX FREE 0035280

## (undated) DEVICE — BAG URINARY DRAIN 4000ML LF 153509

## (undated) DEVICE — SYR INFLATION DEVICE 20ML W/ 26GA TORQUE DEVICE M001151062

## (undated) DEVICE — CATH PLUG W/CAP 000076

## (undated) DEVICE — SU SILK 4-0 TIE 12X30" A303H

## (undated) DEVICE — CATH URETERAL CONE 08FR 70CM 138008LT / 138008RT

## (undated) DEVICE — NDL PERC ACCESS 18GX20CM M0067001220

## (undated) DEVICE — SPONGE KITTNER 30-101

## (undated) DEVICE — SUTURE BOOTS 051003PBX

## (undated) DEVICE — STPL RELOAD 80 X 3.8MM GIA8038L

## (undated) DEVICE — WIRE GUIDE 0.035"X145CM BENTSON TSFB-35-145-BH

## (undated) DEVICE — DECANTER VIAL 2006S

## (undated) DEVICE — LABEL MEDICATION SYSTEM 3303-P

## (undated) DEVICE — KIT UROSTOMY POUCH 2PC 70MMX 2.75INCH 19904

## (undated) DEVICE — PACK AB HYST II

## (undated) DEVICE — SPONGE LAP 18X18" X8435

## (undated) DEVICE — SU PDS II 0 TP-1 60" Z991G

## (undated) DEVICE — SU VICRYL 3-0 SH CR 8X18" J774

## (undated) DEVICE — SU MONOCRYL 4-0 PS-2 18" UND Y496G

## (undated) DEVICE — ESU LIGASURE IMPACT OPEN SEALER/DVDR CVD LG JAW LF4418

## (undated) DEVICE — GLOVE PROTEXIS MICRO 7.0  2D73PM70

## (undated) DEVICE — SU SILK 0 TIE 6X30" A306H

## (undated) DEVICE — LINEN GOWN XLG 5407

## (undated) RX ORDER — CEFAZOLIN SODIUM 1 G/3ML
INJECTION, POWDER, FOR SOLUTION INTRAMUSCULAR; INTRAVENOUS
Status: DISPENSED
Start: 2018-07-30

## (undated) RX ORDER — ALBUTEROL SULFATE 90 UG/1
AEROSOL, METERED RESPIRATORY (INHALATION)
Status: DISPENSED
Start: 2018-02-19

## (undated) RX ORDER — FUROSEMIDE 10 MG/ML
INJECTION INTRAMUSCULAR; INTRAVENOUS
Status: DISPENSED
Start: 2018-02-21

## (undated) RX ORDER — PHENYLEPHRINE HCL IN 0.9% NACL 1 MG/10 ML
SYRINGE (ML) INTRAVENOUS
Status: DISPENSED
Start: 2018-07-30

## (undated) RX ORDER — ONDANSETRON 2 MG/ML
INJECTION INTRAMUSCULAR; INTRAVENOUS
Status: DISPENSED
Start: 2018-02-19

## (undated) RX ORDER — SODIUM CHLORIDE 9 MG/ML
INJECTION, SOLUTION INTRAVENOUS
Status: DISPENSED
Start: 2018-07-30

## (undated) RX ORDER — DEXAMETHASONE SODIUM PHOSPHATE 4 MG/ML
INJECTION, SOLUTION INTRA-ARTICULAR; INTRALESIONAL; INTRAMUSCULAR; INTRAVENOUS; SOFT TISSUE
Status: DISPENSED
Start: 2018-07-30

## (undated) RX ORDER — PROPOFOL 10 MG/ML
INJECTION, EMULSION INTRAVENOUS
Status: DISPENSED
Start: 2018-02-19

## (undated) RX ORDER — CEFAZOLIN SODIUM 2 G/100ML
INJECTION, SOLUTION INTRAVENOUS
Status: DISPENSED
Start: 2018-07-30

## (undated) RX ORDER — ERTAPENEM 1 G/1
INJECTION, POWDER, LYOPHILIZED, FOR SOLUTION INTRAMUSCULAR; INTRAVENOUS
Status: DISPENSED
Start: 2018-07-30

## (undated) RX ORDER — FENTANYL CITRATE 50 UG/ML
INJECTION, SOLUTION INTRAMUSCULAR; INTRAVENOUS
Status: DISPENSED
Start: 2018-07-30

## (undated) RX ORDER — FENTANYL CITRATE 50 UG/ML
INJECTION, SOLUTION INTRAMUSCULAR; INTRAVENOUS
Status: DISPENSED
Start: 2018-02-19

## (undated) RX ORDER — FENTANYL CITRATE 50 UG/ML
INJECTION, SOLUTION INTRAMUSCULAR; INTRAVENOUS
Status: DISPENSED
Start: 2018-02-21

## (undated) RX ORDER — MINERAL OIL
OIL (ML) MISCELLANEOUS
Status: DISPENSED
Start: 2018-07-30

## (undated) RX ORDER — HYDROMORPHONE HYDROCHLORIDE 1 MG/ML
INJECTION, SOLUTION INTRAMUSCULAR; INTRAVENOUS; SUBCUTANEOUS
Status: DISPENSED
Start: 2018-07-30

## (undated) RX ORDER — GENTAMICIN 40 MG/ML
INJECTION, SOLUTION INTRAMUSCULAR; INTRAVENOUS
Status: DISPENSED
Start: 2018-02-22

## (undated) RX ORDER — ACETAMINOPHEN 325 MG/1
TABLET ORAL
Status: DISPENSED
Start: 2023-05-17

## (undated) RX ORDER — EPHEDRINE SULFATE 50 MG/ML
INJECTION, SOLUTION INTRAMUSCULAR; INTRAVENOUS; SUBCUTANEOUS
Status: DISPENSED
Start: 2018-02-19

## (undated) RX ORDER — GLYCOPYRROLATE 0.2 MG/ML
INJECTION, SOLUTION INTRAMUSCULAR; INTRAVENOUS
Status: DISPENSED
Start: 2018-02-19

## (undated) RX ORDER — EPHEDRINE SULFATE 50 MG/ML
INJECTION, SOLUTION INTRAMUSCULAR; INTRAVENOUS; SUBCUTANEOUS
Status: DISPENSED
Start: 2018-07-30

## (undated) RX ORDER — LIDOCAINE HYDROCHLORIDE 20 MG/ML
INJECTION, SOLUTION EPIDURAL; INFILTRATION; INTRACAUDAL; PERINEURAL
Status: DISPENSED
Start: 2018-02-19

## (undated) RX ORDER — FENTANYL CITRATE 50 UG/ML
INJECTION, SOLUTION INTRAMUSCULAR; INTRAVENOUS
Status: DISPENSED
Start: 2018-02-22

## (undated) RX ORDER — GABAPENTIN 300 MG/1
CAPSULE ORAL
Status: DISPENSED
Start: 2018-07-30

## (undated) RX ORDER — AMPICILLIN 1 G/1
INJECTION, POWDER, FOR SOLUTION INTRAMUSCULAR; INTRAVENOUS
Status: DISPENSED
Start: 2018-02-22

## (undated) RX ORDER — CEFAZOLIN SODIUM 1 G/3ML
INJECTION, POWDER, FOR SOLUTION INTRAMUSCULAR; INTRAVENOUS
Status: DISPENSED
Start: 2018-02-19

## (undated) RX ORDER — MEPERIDINE HYDROCHLORIDE 50 MG/ML
INJECTION INTRAMUSCULAR; INTRAVENOUS; SUBCUTANEOUS
Status: DISPENSED
Start: 2018-02-19

## (undated) RX ORDER — ALBUMIN, HUMAN INJ 5% 5 %
SOLUTION INTRAVENOUS
Status: DISPENSED
Start: 2018-07-30

## (undated) RX ORDER — IOPAMIDOL 510 MG/ML
INJECTION, SOLUTION INTRAVASCULAR
Status: DISPENSED
Start: 2023-05-17

## (undated) RX ORDER — ONDANSETRON 2 MG/ML
INJECTION INTRAMUSCULAR; INTRAVENOUS
Status: DISPENSED
Start: 2018-07-30

## (undated) RX ORDER — SODIUM CHLORIDE 9 MG/ML
INJECTION, SOLUTION INTRAVENOUS
Status: DISPENSED
Start: 2018-02-19

## (undated) RX ORDER — PROPOFOL 10 MG/ML
INJECTION, EMULSION INTRAVENOUS
Status: DISPENSED
Start: 2018-07-30

## (undated) RX ORDER — LIDOCAINE HYDROCHLORIDE 10 MG/ML
INJECTION, SOLUTION EPIDURAL; INFILTRATION; INTRACAUDAL; PERINEURAL
Status: DISPENSED
Start: 2018-02-22

## (undated) RX ORDER — LEVOFLOXACIN 5 MG/ML
INJECTION, SOLUTION INTRAVENOUS
Status: DISPENSED
Start: 2023-05-17

## (undated) RX ORDER — PHENYLEPHRINE HCL IN 0.9% NACL 1 MG/10 ML
SYRINGE (ML) INTRAVENOUS
Status: DISPENSED
Start: 2018-02-19

## (undated) RX ORDER — FENTANYL CITRATE 50 UG/ML
INJECTION, SOLUTION INTRAMUSCULAR; INTRAVENOUS
Status: DISPENSED
Start: 2023-05-17

## (undated) RX ORDER — LIDOCAINE HYDROCHLORIDE 10 MG/ML
INJECTION, SOLUTION EPIDURAL; INFILTRATION; INTRACAUDAL; PERINEURAL
Status: DISPENSED
Start: 2018-02-21

## (undated) RX ORDER — MAGNESIUM HYDROXIDE 1200 MG/15ML
LIQUID ORAL
Status: DISPENSED
Start: 2018-02-19